# Patient Record
Sex: FEMALE | Race: WHITE | Employment: FULL TIME | URBAN - METROPOLITAN AREA
[De-identification: names, ages, dates, MRNs, and addresses within clinical notes are randomized per-mention and may not be internally consistent; named-entity substitution may affect disease eponyms.]

---

## 2017-05-03 RX ORDER — LEVOTHYROXINE SODIUM 0.15 MG/1
TABLET ORAL
Qty: 30 TABLET | Refills: 0 | Status: SHIPPED | OUTPATIENT
Start: 2017-05-03 | End: 2017-06-23

## 2017-05-03 NOTE — TELEPHONE ENCOUNTER
LOV 1/28/16 with RTC 1 year. No F/U scheduled. Letter sent to patient requesting she make a follow up appt. 30 day refill pending.

## 2017-05-10 ENCOUNTER — TELEPHONE (OUTPATIENT)
Dept: ENDOCRINOLOGY CLINIC | Facility: CLINIC | Age: 57
End: 2017-05-10

## 2017-05-10 DIAGNOSIS — E89.0 POSTOPERATIVE HYPOTHYROIDISM: Primary | ICD-10-CM

## 2017-05-10 NOTE — TELEPHONE ENCOUNTER
[facility] and let her know she will need bloodwork and a thyroid Ultrasound prior to OV. Provided her number for scheduling US and orders placed.

## 2017-05-10 NOTE — TELEPHONE ENCOUNTER
Pt requesting a call back to advise if she needs to have blood test prior to OV, Please Advise. Per pt ok to LV.

## 2017-05-20 ENCOUNTER — APPOINTMENT (OUTPATIENT)
Dept: LAB | Facility: HOSPITAL | Age: 57
End: 2017-05-20
Attending: INTERNAL MEDICINE
Payer: COMMERCIAL

## 2017-05-20 ENCOUNTER — HOSPITAL ENCOUNTER (OUTPATIENT)
Dept: ULTRASOUND IMAGING | Facility: HOSPITAL | Age: 57
Discharge: HOME | End: 2017-05-20
Attending: INTERNAL MEDICINE
Payer: COMMERCIAL

## 2017-05-20 DIAGNOSIS — E89.0 POSTOPERATIVE HYPOTHYROIDISM: ICD-10-CM

## 2017-05-20 PROCEDURE — 76536 US EXAM OF HEAD AND NECK: CPT | Performed by: INTERNAL MEDICINE

## 2017-05-20 PROCEDURE — 36415 COLL VENOUS BLD VENIPUNCTURE: CPT

## 2017-05-20 PROCEDURE — 84432 ASSAY OF THYROGLOBULIN: CPT

## 2017-05-20 PROCEDURE — 84439 ASSAY OF FREE THYROXINE: CPT

## 2017-05-20 PROCEDURE — 86800 THYROGLOBULIN ANTIBODY: CPT

## 2017-05-20 PROCEDURE — 84443 ASSAY THYROID STIM HORMONE: CPT

## 2017-06-08 ENCOUNTER — TELEPHONE (OUTPATIENT)
Dept: ENDOCRINOLOGY CLINIC | Facility: CLINIC | Age: 57
End: 2017-06-08

## 2017-06-08 ENCOUNTER — OFFICE VISIT (OUTPATIENT)
Dept: ENDOCRINOLOGY CLINIC | Facility: CLINIC | Age: 57
End: 2017-06-08

## 2017-06-08 VITALS
WEIGHT: 181.19 LBS | BODY MASS INDEX: 32.11 KG/M2 | DIASTOLIC BLOOD PRESSURE: 82 MMHG | HEART RATE: 76 BPM | HEIGHT: 63 IN | SYSTOLIC BLOOD PRESSURE: 134 MMHG

## 2017-06-08 DIAGNOSIS — E03.8 OTHER SPECIFIED HYPOTHYROIDISM: ICD-10-CM

## 2017-06-08 DIAGNOSIS — C73 THYROID CANCER (HCC): Primary | ICD-10-CM

## 2017-06-08 PROCEDURE — 99212 OFFICE O/P EST SF 10 MIN: CPT | Performed by: INTERNAL MEDICINE

## 2017-06-08 PROCEDURE — 99214 OFFICE O/P EST MOD 30 MIN: CPT | Performed by: INTERNAL MEDICINE

## 2017-06-08 RX ORDER — CLONAZEPAM 0.5 MG/1
0.5 TABLET, ORALLY DISINTEGRATING ORAL 2 TIMES DAILY PRN
COMMUNITY
End: 2021-10-12

## 2017-06-08 NOTE — TELEPHONE ENCOUNTER
Patient needs WBS per [facility] to monitor for thyroid cancer recurrence. Patient is concerned about cost of scan given high deductible plan. As she is hesitant to complete scan, I contacted her [employer] to check benefits.      Spoke with representative and

## 2017-06-08 NOTE — PROGRESS NOTES
Name: Zina Jones  Date: 6/8/2017    Referring Physician: No ref.  provider found    Patient presents with:  Thyroid Problem      HISTORY OF PRESENT ILLNESS   Zina Jones is a 65year old female who presents for Patient presents with:  Thyroid Problem normal moisturized skin    Medications:     Current outpatient prescriptions:   •  ClonazePAM 0.5 MG Oral Tablet Dispersible, Take 0.5 mg by mouth 2 (two) times daily as needed. , Disp: , Rfl:   •  LEVOTHYROXINE SODIUM 150 MCG Oral Tab, TAKE 1 TABLET BY BRAD Appearance:  Alert, in no acute distress, well developed  Eyes: normal conjunctivae, sclera.   Ears/Nose/Mouth/Throat/Neck:  normal hearing, normal speech and absent thyroid gland, no LNs  Neurologic: sensory grossly intact and motor grossly intact  Respira

## 2017-06-09 ENCOUNTER — TELEPHONE (OUTPATIENT)
Dept: OTOLARYNGOLOGY | Facility: CLINIC | Age: 57
End: 2017-06-09

## 2017-06-09 NOTE — TELEPHONE ENCOUNTER
Spoke w/ pt; informed of JDO's feedback and recommendation to RTC if surgical management is recommended. Pt verbalized understanding. Appt for Monday cxl'd.

## 2017-06-09 NOTE — TELEPHONE ENCOUNTER
Contacted also Price estimates department with central scheduling here at the [facility]. Cost of scan is $2,732.00. Patient will for sure owe $208.00 toward rest of deductible and then 20% of rest of cost which would be around $504.  Patient will likely

## 2017-06-09 NOTE — TELEPHONE ENCOUNTER
Martina Levy MD  [facility] Clinical Staff                     Please let her know that Dr. Mims Bolds contacted me regarding her recent [address] of the thyroid.  I would like for her to RTC to discuss this finding and to discuss her upcoming nuclear scan study.  Ple

## 2017-06-09 NOTE — TELEPHONE ENCOUNTER
She has lymph nodes in both side of her neck that may represent recurrence of her previous thyroid cancer.   If the nuclear scan suggests recurrence she may be able to get more radioactive iodine but surgery may be an alternative and in fact may be recommen

## 2017-06-12 ENCOUNTER — TELEPHONE (OUTPATIENT)
Dept: ENDOCRINOLOGY CLINIC | Facility: CLINIC | Age: 57
End: 2017-06-12

## 2017-06-12 NOTE — TELEPHONE ENCOUNTER
Pt calling to let  know that no one from hospital has gotten back to her yet re the body scan - she called them twice

## 2017-06-12 NOTE — TELEPHONE ENCOUNTER
Per chart, patient was called with pricing information on Friday. Patient states she would like to schedule the procedure but is having trouble doing so with central scheduling. Called central scheduling.  They state that they do not scheduling the [address] dianna

## 2017-06-19 ENCOUNTER — TELEPHONE (OUTPATIENT)
Dept: ENDOCRINOLOGY CLINIC | Facility: CLINIC | Age: 57
End: 2017-06-19

## 2017-06-19 NOTE — TELEPHONE ENCOUNTER
Spoke with patient. Having difficulty understanding what she needs.  She states she spoke with Dr. Raylene Severin to schedule her WBS and he said he was going to send the instructions for the diet and preparation to her [facility] account but she did not receive anyt

## 2017-06-19 NOTE — TELEPHONE ENCOUNTER
Refer to TE 6/12/17 Pt is requesting to have body scan results and updated medication list to be documented into [employer]. Pt stated that in order for her to see Dr. Sarah Aparicio she needs this information. For additional questions Please Call.  Thank you

## 2017-06-23 RX ORDER — LEVOTHYROXINE SODIUM 0.15 MG/1
TABLET ORAL
Qty: 30 TABLET | Refills: 5 | Status: SHIPPED | OUTPATIENT
Start: 2017-06-23 | End: 2018-02-10

## 2017-07-06 ENCOUNTER — HOSPITAL ENCOUNTER (OUTPATIENT)
Dept: NUCLEAR MEDICINE | Facility: HOSPITAL | Age: 57
Discharge: HOME | End: 2017-07-06
Attending: INTERNAL MEDICINE
Payer: COMMERCIAL

## 2017-07-06 DIAGNOSIS — C73 THYROID CANCER (HCC): ICD-10-CM

## 2017-07-06 NOTE — PROGRESS NOTES
Thyrogen injection:  First dose thyrogen injection 0.9mg/ml given IM to the Left buttock area. Thyrogen dose- Exp. Oct 2017. Lot# [id number]. Patient instructed to come back for next dose in am at 0730.   Patient will wait for 15 minutes in the lounge, post

## 2017-07-07 ENCOUNTER — HOSPITAL ENCOUNTER (OUTPATIENT)
Dept: NUCLEAR MEDICINE | Facility: HOSPITAL | Age: 57
Discharge: HOME | End: 2017-07-07
Attending: INTERNAL MEDICINE
Payer: COMMERCIAL

## 2017-07-08 ENCOUNTER — HOSPITAL ENCOUNTER (OUTPATIENT)
Dept: NUCLEAR MEDICINE | Facility: HOSPITAL | Age: 57
Discharge: HOME | End: 2017-07-08
Attending: INTERNAL MEDICINE
Payer: COMMERCIAL

## 2017-07-10 ENCOUNTER — TELEPHONE (OUTPATIENT)
Dept: ENDOCRINOLOGY CLINIC | Facility: CLINIC | Age: 57
End: 2017-07-10

## 2017-07-10 ENCOUNTER — HOSPITAL ENCOUNTER (OUTPATIENT)
Dept: NUCLEAR MEDICINE | Facility: HOSPITAL | Age: 57
Discharge: HOME | End: 2017-07-10
Attending: INTERNAL MEDICINE
Payer: COMMERCIAL

## 2017-07-10 PROCEDURE — 78020 THYROID MET UPTAKE: CPT | Performed by: INTERNAL MEDICINE

## 2017-07-10 PROCEDURE — 78018 THYROID MET IMAGING BODY: CPT | Performed by: INTERNAL MEDICINE

## 2017-07-10 NOTE — TELEPHONE ENCOUNTER
Pt called for clarification for what she needs to do, make appt. ? Or you will advise if Dr. Barbara Cantu needs to see her. Pt had one more lab work today, pls call at: [phone], thanks.

## 2017-07-10 NOTE — TELEPHONE ENCOUNTER
No need to make appointment. Her WBS scan was normal and no evidence of cancer recurrence. The blood work is still pending and can take a week to result. We will contact her when I have the result.   Thanks

## 2017-07-10 NOTE — TELEPHONE ENCOUNTER
[facility] and let her know per [facility] no need to make appt at this time. Discussed normal WBS and let her know bloodwork pending but we will call when we get results.

## 2017-07-13 ENCOUNTER — TELEPHONE (OUTPATIENT)
Dept: ENDOCRINOLOGY CLINIC | Facility: CLINIC | Age: 57
End: 2017-07-13

## 2017-07-13 DIAGNOSIS — C73 THYROID CANCER (HCC): Primary | ICD-10-CM

## 2017-07-13 NOTE — PROGRESS NOTES
Please let her know that I am aware and very happy that her nuclear scan showed no recurrence of her thyroid cancer. I would like for her to be aware that these lymph nodes seen in the ultrasound were noted to be abnormal in appearance.   It might not be a

## 2017-07-13 NOTE — TELEPHONE ENCOUNTER
Discussed case with PCP, Dr. Trey David. Dr. Shaneka Prado I am sending her for PET/CT and then will likely also ask her to follow up with you. Thanks.

## 2017-07-15 ENCOUNTER — TELEPHONE (OUTPATIENT)
Dept: OTOLARYNGOLOGY | Facility: CLINIC | Age: 57
End: 2017-07-15

## 2017-07-15 NOTE — TELEPHONE ENCOUNTER
Jory Monreal MD at 7/12/2017 10:12 PM     Status: Signed      Please let her know that I am aware and very happy that her nuclear scan showed no recurrence of her thyroid cancer.  I would like for her to be aware that these lymph nodes seen in the [facility]

## 2017-07-15 NOTE — TELEPHONE ENCOUNTER
Patient informed of the results, verbalized understanding. Patient states has PET scan scheduled this Wednesday 7-19-17, ordered by Dr. Valeria Odom. Patient states after scan, will schedule an appointment with DAT.  Since patient is having PET scan, will you want

## 2017-07-19 ENCOUNTER — HOSPITAL ENCOUNTER (OUTPATIENT)
Dept: NUCLEAR MEDICINE | Facility: HOSPITAL | Age: 57
Discharge: HOME | End: 2017-07-19
Attending: INTERNAL MEDICINE
Payer: COMMERCIAL

## 2017-07-19 DIAGNOSIS — C73 THYROID CANCER (HCC): ICD-10-CM

## 2017-07-19 LAB — GLUCOSE BLDC GLUCOMTR-MCNC: 118 MG/DL (ref 70–99)

## 2017-07-19 PROCEDURE — 82962 GLUCOSE BLOOD TEST: CPT

## 2017-07-19 PROCEDURE — 78815 PET IMAGE W/CT SKULL-THIGH: CPT | Performed by: INTERNAL MEDICINE

## 2017-07-20 ENCOUNTER — TELEPHONE (OUTPATIENT)
Dept: ENDOCRINOLOGY CLINIC | Facility: CLINIC | Age: 57
End: 2017-07-20

## 2017-07-20 ENCOUNTER — OFFICE VISIT (OUTPATIENT)
Dept: OTOLARYNGOLOGY | Facility: CLINIC | Age: 57
End: 2017-07-20

## 2017-07-20 VITALS
HEIGHT: 63 IN | SYSTOLIC BLOOD PRESSURE: 132 MMHG | TEMPERATURE: 98 F | WEIGHT: 177 LBS | BODY MASS INDEX: 31.36 KG/M2 | DIASTOLIC BLOOD PRESSURE: 72 MMHG

## 2017-07-20 DIAGNOSIS — C73 THYROID CANCER (HCC): Primary | ICD-10-CM

## 2017-07-20 PROCEDURE — 99212 OFFICE O/P EST SF 10 MIN: CPT | Performed by: OTOLARYNGOLOGY

## 2017-07-20 PROCEDURE — 99243 OFF/OP CNSLTJ NEW/EST LOW 30: CPT | Performed by: OTOLARYNGOLOGY

## 2017-07-20 NOTE — TELEPHONE ENCOUNTER
Called patient to discuss positive PET scan, she has bilateral lymph nodes although unclear on scan if recurrent thyroid cancer vs. Infection. Although concerned for recurrent thyroid cancer given elevated TG level.   Refer to Dr. Danette Rosenbaum for possible surgi

## 2017-07-20 NOTE — PROGRESS NOTES
Miguelina Driscoll is a 65year old female. 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  At the time of surgery in Drug use: No            Other Topics            Concern  Caffeine Concern        Yes    Comment:1 cup of coffee         Family History   Problem Relation Age of Onset   • Heart Disorder Mother    • Ovarian Cancer Neg    • Breast Cancer Neg        Past Medi Normal Inspection - Normal. Palpation - Normal. Parotid gland - Normal. Thyroid gland -surgically absent   Eyes Normal Conjunctiva - Right: Normal, Left: Normal. Pupil - Right: Normal, Left: Normal. Fundus - Right: Normal, Left: Normal.   Neurological Norm were suspicious even back in May. She does understand that she may require surgery and/or further radioablation. I will call her after my discussions with Dr. Georgiana Day. Timur Burrows.  Bolivar Longo MD    7/20/2017    6:52 PM

## 2017-07-26 ENCOUNTER — TELEPHONE (OUTPATIENT)
Dept: OTOLARYNGOLOGY | Facility: CLINIC | Age: 57
End: 2017-07-26

## 2017-07-26 DIAGNOSIS — C73 THYROID CANCER (HCC): Primary | ICD-10-CM

## 2017-07-26 NOTE — TELEPHONE ENCOUNTER
Please let her know that I have spoken with Devika Pulido and have discussed her case and both have reached the same conclusion that she should have these two suspicious nodes biopsied with ultrasound guidance in order to make sure that these nodes are abnorm

## 2017-07-27 NOTE — TELEPHONE ENCOUNTER
Informed the patient of Dr. Sabina Orozco  Recommendations, The patient had some questions.  The patient would like to know if they want to biopsy 2 or 3 nodes, she was under the impression there were 3, also wanted to know if he is basically checking to see if

## 2017-08-18 ENCOUNTER — HOSPITAL ENCOUNTER (OUTPATIENT)
Dept: ULTRASOUND IMAGING | Facility: HOSPITAL | Age: 57
Discharge: HOME | End: 2017-08-18
Attending: OTOLARYNGOLOGY
Payer: COMMERCIAL

## 2017-08-18 VITALS
HEART RATE: 63 BPM | DIASTOLIC BLOOD PRESSURE: 79 MMHG | SYSTOLIC BLOOD PRESSURE: 142 MMHG | RESPIRATION RATE: 16 BRPM | OXYGEN SATURATION: 98 %

## 2017-08-18 DIAGNOSIS — C73 THYROID CANCER (HCC): ICD-10-CM

## 2017-08-18 PROCEDURE — 88173 CYTOPATH EVAL FNA REPORT: CPT | Performed by: OTOLARYNGOLOGY

## 2017-08-18 PROCEDURE — 88305 TISSUE EXAM BY PATHOLOGIST: CPT | Performed by: OTOLARYNGOLOGY

## 2017-08-18 PROCEDURE — 88172 CYTP DX EVAL FNA 1ST EA SITE: CPT | Performed by: OTOLARYNGOLOGY

## 2017-08-18 PROCEDURE — 76942 ECHO GUIDE FOR BIOPSY: CPT | Performed by: OTOLARYNGOLOGY

## 2017-08-18 PROCEDURE — 88342 IMHCHEM/IMCYTCHM 1ST ANTB: CPT | Performed by: OTOLARYNGOLOGY

## 2017-08-18 PROCEDURE — 10022 US FINE NEEDLE ASPIRATION W GUIDE(CPT=10022/76942): CPT | Performed by: OTOLARYNGOLOGY

## 2017-08-18 PROCEDURE — 88177 CYTP FNA EVAL EA ADDL: CPT | Performed by: OTOLARYNGOLOGY

## 2017-08-18 PROCEDURE — 88341 IMHCHEM/IMCYTCHM EA ADD ANTB: CPT | Performed by: OTOLARYNGOLOGY

## 2017-08-18 NOTE — PROCEDURES
[facility]  Procedure Note    Amelia Turner Patient Status:  Outpatient    1960 MRN [id number]   Location [address] [facility] Attending Darya Bradley MD   Hosp Day # 0 PCP Olinda Mittal MD, John Boone MD     Procedure

## 2017-08-18 NOTE — IMAGING NOTE
PT ARRIVED TO ROOM 3   SCANS BY Ray BAÑUELOS TAKEN PROCEDURE EXPLAINED QUESTIONS ANSWERED  Abnormal bilateral neck lymph nodes. History of thyroid cancer, hypothyroidism.  In 2010, thyriodectomy    PT CONSENTED AT [address] [place] [address]

## 2017-08-23 ENCOUNTER — TELEPHONE (OUTPATIENT)
Dept: OTOLARYNGOLOGY | Facility: CLINIC | Age: 57
End: 2017-08-23

## 2017-08-23 NOTE — TELEPHONE ENCOUNTER
Pt notified of results and recommendations. Verbalizes understanding and denies questions at this time. See result note documentation.

## 2017-08-29 ENCOUNTER — OFFICE VISIT (OUTPATIENT)
Dept: OTOLARYNGOLOGY | Facility: CLINIC | Age: 57
End: 2017-08-29

## 2017-08-29 VITALS
HEIGHT: 63 IN | TEMPERATURE: 98 F | WEIGHT: 177 LBS | BODY MASS INDEX: 31.36 KG/M2 | SYSTOLIC BLOOD PRESSURE: 118 MMHG | DIASTOLIC BLOOD PRESSURE: 62 MMHG

## 2017-08-29 DIAGNOSIS — C73 THYROID CANCER (HCC): Primary | ICD-10-CM

## 2017-08-29 PROCEDURE — 99214 OFFICE O/P EST MOD 30 MIN: CPT | Performed by: OTOLARYNGOLOGY

## 2017-08-29 PROCEDURE — 99212 OFFICE O/P EST SF 10 MIN: CPT | Performed by: OTOLARYNGOLOGY

## 2017-08-29 NOTE — PROGRESS NOTES
Iqra Love is a 65year old female. Patient presents with:   Follow - Up: patient presents for f/u on lymph node cytology   Sinus Problem: also has been having nasal allergies itching has not taken any medication for the problem      HISTORY OF PRE time.    Social History    Marital status:              Spouse name:                       Years of education:                 Number of children:               Social History Main Topics    Smoking status: Never Smoker Hema/Lymph Negative Easy bleeding and easy bruising.            PHYSICAL EXAM    /62 (BP Location: Left arm, Patient Position: Sitting, Cuff Size: large)   Temp 98 °F (36.7 °C) (Tympanic)   Ht 5' 3\" (1.6 m)   Wt 177 lb (80.3 kg)   BMI 31.35 kg/m² Disp: , Rfl:   •  Nebivolol HCl (BYSTOLIC) 5 MG Oral Tab, Take 5 mg by mouth daily. , Disp: , Rfl:   ASSESSMENT AND PLAN    1. Thyroid cancer ([facility] [address]  We discussed the results of her pathology which demonstrated 2 benign lymph nodes.   At this time I have greta

## 2017-09-01 ENCOUNTER — TELEPHONE (OUTPATIENT)
Dept: OTOLARYNGOLOGY | Facility: CLINIC | Age: 57
End: 2017-09-01

## 2017-09-01 NOTE — TELEPHONE ENCOUNTER
Kristen Macias MD  [facility] Clinical Staff             Please place her name and our thyroid book.  She requires a repeat ultrasound of the thyroid specifically looking for lymphadenopathy.  This should be performed in 6 months.

## 2018-02-12 RX ORDER — LEVOTHYROXINE SODIUM 0.15 MG/1
TABLET ORAL
Qty: 30 TABLET | Refills: 0 | Status: SHIPPED | OUTPATIENT
Start: 2018-02-12 | End: 2018-03-12

## 2018-03-12 RX ORDER — LEVOTHYROXINE SODIUM 0.15 MG/1
TABLET ORAL
Qty: 30 TABLET | Refills: 2 | Status: SHIPPED | OUTPATIENT
Start: 2018-03-12 | End: 2018-07-17

## 2018-03-28 ENCOUNTER — TELEPHONE (OUTPATIENT)
Dept: OTOLARYNGOLOGY | Facility: CLINIC | Age: 58
End: 2018-03-28

## 2018-03-28 DIAGNOSIS — C73 THYROID CANCER (HCC): Primary | ICD-10-CM

## 2018-03-31 NOTE — TELEPHONE ENCOUNTER
Pt informed she is due for her repeat US THYROID. Pt verbalized understanding; phone number to CS given to pt. She states she will call to schedule an appt.

## 2018-04-16 NOTE — TELEPHONE ENCOUNTER
SHAYLATCTAMAR. Pt is due for repeat US THYROID and she may contact CS to schedule, [phone]. Please inform pt.

## 2018-04-27 NOTE — TELEPHONE ENCOUNTER
Dr. Ramy Stout, ELIN:  Office has spoken with pt and LM several times to remind pt to schedule US THYROID. Pt has not scheduled yet.

## 2018-07-17 RX ORDER — LEVOTHYROXINE SODIUM 0.15 MG/1
TABLET ORAL
Qty: 30 TABLET | Refills: 0 | Status: SHIPPED | OUTPATIENT
Start: 2018-07-17 | End: 2018-08-27

## 2018-07-17 NOTE — TELEPHONE ENCOUNTER
LOV 6/8/17. RTC 3 months. NO F/U scheduled. Letter sent that patient is due for an appt. 1 month refill sent per [facility] protocol.

## 2018-08-27 RX ORDER — LEVOTHYROXINE SODIUM 0.15 MG/1
TABLET ORAL
Qty: 30 TABLET | Refills: 0 | Status: SHIPPED | OUTPATIENT
Start: 2018-08-27 | End: 2018-10-02

## 2018-08-27 NOTE — TELEPHONE ENCOUNTER
LOV 6/8/17. RTC 3 months. No F/U scheduled. Letter sent 7/17/18. Called Heike Ferrari. ROGERS that she is due for an appt. 1 month refill pending.

## 2018-09-13 ENCOUNTER — HOSPITAL ENCOUNTER (OUTPATIENT)
Dept: ULTRASOUND IMAGING | Facility: HOSPITAL | Age: 58
Discharge: HOME | End: 2018-09-13
Attending: PODIATRIST
Payer: COMMERCIAL

## 2018-09-13 DIAGNOSIS — M77.42 METATARSALGIA OF BOTH FEET: ICD-10-CM

## 2018-09-13 DIAGNOSIS — M77.41 METATARSALGIA OF BOTH FEET: ICD-10-CM

## 2018-09-13 PROCEDURE — 76881 US COMPL JOINT R-T W/IMG: CPT | Performed by: PODIATRIST

## 2018-10-03 RX ORDER — LEVOTHYROXINE SODIUM 0.15 MG/1
TABLET ORAL
Qty: 30 TABLET | Refills: 2 | Status: SHIPPED | OUTPATIENT
Start: 2018-10-03 | End: 2018-12-06

## 2018-10-29 ENCOUNTER — TELEPHONE (OUTPATIENT)
Dept: ENDOCRINOLOGY CLINIC | Facility: CLINIC | Age: 58
End: 2018-10-29

## 2018-10-29 DIAGNOSIS — Z85.850 HISTORY OF THYROID CANCER: Primary | ICD-10-CM

## 2018-10-29 NOTE — TELEPHONE ENCOUNTER
Pt cancelled apt on 10/31 due to illness and rescheduled 12/6. Pt would like to know if you would like any labs before apt on 12/6. Please advise.

## 2018-10-29 NOTE — TELEPHONE ENCOUNTER
LMTCB- needs yearly follow up so if chooses to cancel on Wednesday will need to reschedule next available for yearly follow up.

## 2018-10-29 NOTE — TELEPHONE ENCOUNTER
Pt states she currently has a cold and inquiring why SH would like to see her for appt scheduled on 10/31/18. Pt prefers to cancel appt due to being sick.  Please call thank you [phone]

## 2018-12-03 ENCOUNTER — APPOINTMENT (OUTPATIENT)
Dept: LAB | Age: 58
End: 2018-12-03
Attending: INTERNAL MEDICINE
Payer: COMMERCIAL

## 2018-12-03 DIAGNOSIS — Z85.850 HISTORY OF THYROID CANCER: ICD-10-CM

## 2018-12-03 PROCEDURE — 84439 ASSAY OF FREE THYROXINE: CPT

## 2018-12-03 PROCEDURE — 84443 ASSAY THYROID STIM HORMONE: CPT

## 2018-12-03 PROCEDURE — 86800 THYROGLOBULIN ANTIBODY: CPT

## 2018-12-03 PROCEDURE — 36415 COLL VENOUS BLD VENIPUNCTURE: CPT

## 2018-12-03 PROCEDURE — 84432 ASSAY OF THYROGLOBULIN: CPT

## 2018-12-06 ENCOUNTER — OFFICE VISIT (OUTPATIENT)
Dept: ENDOCRINOLOGY CLINIC | Facility: CLINIC | Age: 58
End: 2018-12-06
Payer: COMMERCIAL

## 2018-12-06 VITALS
DIASTOLIC BLOOD PRESSURE: 82 MMHG | SYSTOLIC BLOOD PRESSURE: 128 MMHG | WEIGHT: 175.38 LBS | HEART RATE: 61 BPM | BODY MASS INDEX: 31 KG/M2

## 2018-12-06 DIAGNOSIS — E89.0 POSTOPERATIVE HYPOTHYROIDISM: Primary | ICD-10-CM

## 2018-12-06 PROCEDURE — 99213 OFFICE O/P EST LOW 20 MIN: CPT | Performed by: INTERNAL MEDICINE

## 2018-12-06 PROCEDURE — 99212 OFFICE O/P EST SF 10 MIN: CPT | Performed by: INTERNAL MEDICINE

## 2018-12-06 RX ORDER — LEVOTHYROXINE SODIUM 0.15 MG/1
TABLET ORAL
Qty: 90 TABLET | Refills: 3 | Status: SHIPPED | OUTPATIENT
Start: 2018-12-06

## 2018-12-06 RX ORDER — FAMOTIDINE 40 MG/1
40 TABLET, FILM COATED ORAL AS NEEDED
Refills: 0 | COMMUNITY
Start: 2018-10-10

## 2018-12-06 NOTE — PROGRESS NOTES
Name: Michele Kennedy  Date: 12/6/2018    Referring Physician: No ref. provider found    Patient presents with:   Follow - Up: Thyroid      HISTORY OF PRESENT ILLNESS   Michele Kennedy is a 63year old female who presents for Patient presents with:  F normal moisturized skin    Medications:     Current Outpatient Medications:   •  famotidine 40 MG Oral Tab, 40 mg as needed. , Disp: , Rfl: 0  •  LEVOTHYROXINE SODIUM 150 MCG Oral Tab, TAKE 1 TABLET BY MOUTH BEFORE BREAKFAST, Disp: 30 tablet, Rfl: 2  •  ome hr labor    •       40 week 7 lb(s) 3 oz Female   •       41 week 9 lb(s) 8 oz Male   • OTHER SURGICAL HISTORY      Injection Tendon Sheath, Ligament   • THYROIDECTOMY      total       PHYSICAL EXAMINATION  /82   Pulse 61   Wt

## 2020-10-19 ENCOUNTER — APPOINTMENT (OUTPATIENT)
Dept: GENERAL RADIOLOGY | Facility: HOSPITAL | Age: 60
End: 2020-10-19
Attending: EMERGENCY MEDICINE
Payer: COMMERCIAL

## 2020-10-19 ENCOUNTER — APPOINTMENT (OUTPATIENT)
Dept: CT IMAGING | Facility: HOSPITAL | Age: 60
End: 2020-10-19
Attending: EMERGENCY MEDICINE
Payer: COMMERCIAL

## 2020-10-19 ENCOUNTER — HOSPITAL ENCOUNTER (EMERGENCY)
Facility: HOSPITAL | Age: 60
Discharge: HOME | End: 2020-10-19
Attending: EMERGENCY MEDICINE
Payer: COMMERCIAL

## 2020-10-19 ENCOUNTER — HOSPITAL ENCOUNTER (OUTPATIENT)
Age: 60
Discharge: ANOTHER HOSPITAL | End: 2020-10-19
Attending: EMERGENCY MEDICINE
Payer: COMMERCIAL

## 2020-10-19 VITALS
DIASTOLIC BLOOD PRESSURE: 72 MMHG | HEART RATE: 75 BPM | SYSTOLIC BLOOD PRESSURE: 138 MMHG | TEMPERATURE: 99 F | BODY MASS INDEX: 31.89 KG/M2 | WEIGHT: 180 LBS | RESPIRATION RATE: 18 BRPM | OXYGEN SATURATION: 99 % | HEIGHT: 63 IN

## 2020-10-19 VITALS
TEMPERATURE: 98 F | DIASTOLIC BLOOD PRESSURE: 73 MMHG | RESPIRATION RATE: 16 BRPM | WEIGHT: 180 LBS | HEIGHT: 63 IN | SYSTOLIC BLOOD PRESSURE: 147 MMHG | OXYGEN SATURATION: 100 % | BODY MASS INDEX: 31.89 KG/M2 | HEART RATE: 73 BPM

## 2020-10-19 DIAGNOSIS — S09.90XA INJURY OF HEAD, INITIAL ENCOUNTER: Primary | ICD-10-CM
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
## 2020-10-19 PROCEDURE — 72220 X-RAY EXAM SACRUM TAILBONE: CPT | Performed by: EMERGENCY MEDICINE

## 2020-10-19 PROCEDURE — 72125 CT NECK SPINE W/O DYE: CPT | Performed by: EMERGENCY MEDICINE

## 2020-10-19 PROCEDURE — 93010 ELECTROCARDIOGRAM REPORT: CPT | Performed by: EMERGENCY MEDICINE

## 2020-10-19 PROCEDURE — 99284 EMERGENCY DEPT VISIT MOD MDM: CPT

## 2020-10-19 PROCEDURE — 73080 X-RAY EXAM OF ELBOW: CPT | Performed by: EMERGENCY MEDICINE

## 2020-10-19 PROCEDURE — 99204 OFFICE O/P NEW MOD 45 MIN: CPT | Performed by: EMERGENCY MEDICINE

## 2020-10-19 PROCEDURE — 72100 X-RAY EXAM L-S SPINE 2/3 VWS: CPT | Performed by: EMERGENCY MEDICINE

## 2020-10-19 PROCEDURE — 70450 CT HEAD/BRAIN W/O DYE: CPT | Performed by: EMERGENCY MEDICINE

## 2020-10-19 PROCEDURE — 93005 ELECTROCARDIOGRAM TRACING: CPT

## 2020-10-19 RX ORDER — IBUPROFEN 600 MG/1
600 TABLET ORAL ONCE
Status: COMPLETED | OUTPATIENT
Start: 2020-10-19 | End: 2020-10-19

## 2020-10-19 RX ORDER — ONDANSETRON 4 MG/1
4 TABLET, ORALLY DISINTEGRATING ORAL ONCE
Status: COMPLETED | OUTPATIENT
Start: 2020-10-19 | End: 2020-10-19

## 2020-10-19 NOTE — ED INITIAL ASSESSMENT (HPI)
Pt came in for mechanical fall 2330 last night while walking on wet street. Hit back of he head, buttocks, and left elbow. Sent from [facility] for CT. RR even and nonlabored, speaking in full sentences, ambulatory slow gait.  No blood thinners, witnessed fall, de

## 2020-10-19 NOTE — ED PROVIDER NOTES
Patient Seen in: [facility]    History   Patient presents with:  Fall    Stated Complaint: fell/inj head/back/lt elbow/buttocks    HPI    Patient here with complaint of head injury.   Injury occurred last night at 11:30 PM.  Patient denies [place] Cancer Neg    • Breast Cancer Neg      Family history reviewed with patient/caregiver and is not pertinent to presenting problem. Social History    Tobacco Use      Smoking status: Never Smoker      Smokeless tobacco: Never Used    Alcohol use:  Yes head, initial encounter  (primary encounter diagnosis)    Disposition: Ic to ed    Follow-up:  No follow-up provider specified.     Medications Prescribed:  Current Discharge Medication List

## 2020-10-19 NOTE — ED PROVIDER NOTES
Patient Seen in: [facility] AND [facility] Emergency Department      History   Patient presents with:  Fall    Stated Complaint: Post fall, c/o headache, nausea, left elbow and back pain    HPI    81-year-old female presenting for evaluation after a fall.   Sara Rodriguez (37 °C)   Temp src Oral   SpO2 98 %   O2 Device None (Room air)       Current:/75   Pulse 72   Temp 98.6 °F (37 °C) (Oral)   Resp 16   Ht 160 cm (5' 3\")   Wt 81.6 kg   SpO2 98%   BMI 31.89 kg/m²         Physical Exam  Vitals signs and nursing note r Dictated by (CST): Francisco Choudhury MD on 10/19/2020 at 12:18 PM     Finalized by (CST): Francisco Choudhury MD on 10/19/2020 at 12:21 PM          Xr Elbow, Complete (min 3 Views), Left (cpt=73080)    Result Date: 10/19/2020  CONCLUSION:   Normal x-rays of the follow-up with her PMD.  Return precautions given she is comfortable with the plan.               Disposition and Plan     Clinical Impression:  Subgaleal hemorrhage  (primary encounter diagnosis)  Fall, initial encounter    Disposition:  Discharge  10/19/2

## 2020-10-19 NOTE — ED NOTES
Pt states last night at 2330 was out walking and fell, landing on back, hitting lt elbow and back of head. Denies LOC.  states she either tripped on her own feet or slipped on wet leaves.  States bump/tingling to rt posterior head and bruising to lt

## 2020-10-19 NOTE — ED NOTES
Pt sent to [address] [place] [address] ED for further testing and management. Pt's  driving patient to ED.

## 2020-10-19 NOTE — ED INITIAL ASSESSMENT (HPI)
Pt presents with pain on left elbow, back and back of head due to a fall yesterday.  Pt nauseous now

## 2021-02-16 ENCOUNTER — HOSPITAL ENCOUNTER (EMERGENCY)
Facility: HOSPITAL | Age: 61
Discharge: HOME | End: 2021-02-16
Attending: EMERGENCY MEDICINE
Payer: COMMERCIAL

## 2021-02-16 ENCOUNTER — APPOINTMENT (OUTPATIENT)
Dept: CT IMAGING | Facility: HOSPITAL | Age: 61
End: 2021-02-16
Attending: EMERGENCY MEDICINE
Payer: COMMERCIAL

## 2021-02-16 ENCOUNTER — APPOINTMENT (OUTPATIENT)
Dept: GENERAL RADIOLOGY | Facility: HOSPITAL | Age: 61
End: 2021-02-16
Attending: EMERGENCY MEDICINE
Payer: COMMERCIAL

## 2021-02-16 VITALS
OXYGEN SATURATION: 94 % | TEMPERATURE: 100 F | RESPIRATION RATE: 25 BRPM | DIASTOLIC BLOOD PRESSURE: 72 MMHG | BODY MASS INDEX: 29.95 KG/M2 | HEART RATE: 71 BPM | SYSTOLIC BLOOD PRESSURE: 141 MMHG | HEIGHT: 63 IN | WEIGHT: 169 LBS

## 2021-02-16 DIAGNOSIS — J12.82 PNEUMONIA DUE TO COVID-19 VIRUS: Primary | ICD-10-CM

## 2021-02-16 DIAGNOSIS — U07.1 PNEUMONIA DUE TO COVID-19 VIRUS: Primary | ICD-10-CM

## 2021-02-16 LAB
ANION GAP SERPL CALC-SCNC: 6 MMOL/L (ref 0–18)
BASOPHILS # BLD AUTO: 0.02 X10(3) UL (ref 0–0.2)
BASOPHILS NFR BLD AUTO: 0.2 %
BUN BLD-MCNC: 11 MG/DL (ref 7–18)
BUN/CREAT SERPL: 11.8 (ref 10–20)
CALCIUM BLD-MCNC: 8.3 MG/DL (ref 8.5–10.1)
CHLORIDE SERPL-SCNC: 107 MMOL/L (ref 98–112)
CO2 SERPL-SCNC: 27 MMOL/L (ref 21–32)
CREAT BLD-MCNC: 0.93 MG/DL
D DIMER PPP FEU-MCNC: 0.69 UG/ML FEU (ref ?–0.59)
DEPRECATED RDW RBC AUTO: 45.8 FL (ref 35.1–46.3)
EOSINOPHIL # BLD AUTO: 0.05 X10(3) UL (ref 0–0.7)
EOSINOPHIL NFR BLD AUTO: 0.6 %
ERYTHROCYTE [DISTWIDTH] IN BLOOD BY AUTOMATED COUNT: 13.5 % (ref 11–15)
GLUCOSE BLD-MCNC: 104 MG/DL (ref 70–99)
HCT VFR BLD AUTO: 39.3 %
HGB BLD-MCNC: 12.4 G/DL
IMM GRANULOCYTES # BLD AUTO: 0.06 X10(3) UL (ref 0–1)
IMM GRANULOCYTES NFR BLD: 0.7 %
LYMPHOCYTES # BLD AUTO: 1.5 X10(3) UL (ref 1–4)
LYMPHOCYTES NFR BLD AUTO: 18.7 %
MCH RBC QN AUTO: 28.8 PG (ref 26–34)
MCHC RBC AUTO-ENTMCNC: 31.6 G/DL (ref 31–37)
MCV RBC AUTO: 91.2 FL
MONOCYTES # BLD AUTO: 0.53 X10(3) UL (ref 0.1–1)
MONOCYTES NFR BLD AUTO: 6.6 %
NEUTROPHILS # BLD AUTO: 5.86 X10 (3) UL (ref 1.5–7.7)
NEUTROPHILS # BLD AUTO: 5.86 X10(3) UL (ref 1.5–7.7)
NEUTROPHILS NFR BLD AUTO: 73.2 %
OSMOLALITY SERPL CALC.SUM OF ELEC: 290 MOSM/KG (ref 275–295)
PLATELET # BLD AUTO: 252 10(3)UL (ref 150–450)
POTASSIUM SERPL-SCNC: 3.4 MMOL/L (ref 3.5–5.1)
RBC # BLD AUTO: 4.31 X10(6)UL
SODIUM SERPL-SCNC: 140 MMOL/L (ref 136–145)
WBC # BLD AUTO: 8 X10(3) UL (ref 4–11)

## 2021-02-16 PROCEDURE — 96361 HYDRATE IV INFUSION ADD-ON: CPT

## 2021-02-16 PROCEDURE — 71260 CT THORAX DX C+: CPT | Performed by: EMERGENCY MEDICINE

## 2021-02-16 PROCEDURE — 85379 FIBRIN DEGRADATION QUANT: CPT | Performed by: EMERGENCY MEDICINE

## 2021-02-16 PROCEDURE — 93010 ELECTROCARDIOGRAM REPORT: CPT | Performed by: EMERGENCY MEDICINE

## 2021-02-16 PROCEDURE — 96374 THER/PROPH/DIAG INJ IV PUSH: CPT

## 2021-02-16 PROCEDURE — 71045 X-RAY EXAM CHEST 1 VIEW: CPT | Performed by: EMERGENCY MEDICINE

## 2021-02-16 PROCEDURE — 80048 BASIC METABOLIC PNL TOTAL CA: CPT | Performed by: EMERGENCY MEDICINE

## 2021-02-16 PROCEDURE — 99285 EMERGENCY DEPT VISIT HI MDM: CPT

## 2021-02-16 PROCEDURE — 96375 TX/PRO/DX INJ NEW DRUG ADDON: CPT

## 2021-02-16 PROCEDURE — 85025 COMPLETE CBC W/AUTO DIFF WBC: CPT | Performed by: EMERGENCY MEDICINE

## 2021-02-16 PROCEDURE — 99453 REM MNTR PHYSIOL PARAM SETUP: CPT

## 2021-02-16 PROCEDURE — 93005 ELECTROCARDIOGRAM TRACING: CPT

## 2021-02-16 RX ORDER — ONDANSETRON 4 MG/1
4 TABLET, ORALLY DISINTEGRATING ORAL EVERY 8 HOURS PRN
Qty: 20 TABLET | Refills: 0 | Status: SHIPPED | OUTPATIENT
Start: 2021-02-16 | End: 2021-10-12

## 2021-02-16 RX ORDER — LORAZEPAM 2 MG/ML
0.5 INJECTION INTRAMUSCULAR ONCE
Status: COMPLETED | OUTPATIENT
Start: 2021-02-16 | End: 2021-02-16

## 2021-02-16 RX ORDER — ONDANSETRON 2 MG/ML
4 INJECTION INTRAMUSCULAR; INTRAVENOUS ONCE
Status: COMPLETED | OUTPATIENT
Start: 2021-02-16 | End: 2021-02-16

## 2021-02-16 RX ORDER — KETOROLAC TROMETHAMINE 30 MG/ML
30 INJECTION, SOLUTION INTRAMUSCULAR; INTRAVENOUS ONCE
Status: COMPLETED | OUTPATIENT
Start: 2021-02-16 | End: 2021-02-16

## 2021-02-16 NOTE — PATIENT INSTRUCTIONS
Patient Instructions for Home Pulse Oximetry due to COVID-19 Pneumonia  Overview and Terminology  A pulse oximeter a small device that clips on a finger and measures oxygen saturation levels along with your heart rate.  It works by passing small beams of [phone]

## 2021-02-16 NOTE — ED NOTES
Discharge instructions given to pt. Pt verbalized understanding of home care, pulse ox use, incentive spirometer use, and to follow up with pcp. Pt denied further questions or concerns. Pt discharged to lobby via wheelchair, discharged in stable condition.

## 2021-02-16 NOTE — ED INITIAL ASSESSMENT (HPI)
Patient presents to ER with complaints of difficulty breathing, chills and fatigue. Patient COVID positive on 2/8. States her \"breathing has gotten worse\" +cough. Patient is able to speak in full sentences. Shallow breaths due to cough. 96% RA.

## 2021-02-16 NOTE — ED PROVIDER NOTES
Patient Seen in: [facility] AND [facility] Emergency Department    History   Patient presents with:  Shortness Of Breath    Stated Complaint: shortness of breath    HPI    Patient is here with multiple symptoms.   She states she has not felt well for 15 days and famotidine 40 MG Oral Tab,  40 mg as needed.    Levothyroxine Sodium 150 MCG Oral Tab,  TAKE 1 TABLET BY MOUTH BEFORE BREAKFAST   omeprazole (PRILOSEC) 20 MG Oral Capsule Delayed Release,  Take 20 mg by mouth every morning before breakfast.   Olmesartan Med Patient appears anxious. Patient with continued symptoms related to known Covid. She is currently saturating 96% on room air with good waveform.   We will give her IV fluids chest x-ray blood work IV Toradol IV Zofran IV lorazepam    Patient Demar Cueva rhythm    Disposition and Plan     We recommend that you schedule follow up care with a primary care provider within the next three months to obtain basic health screening including reassessment of your blood pressure.       Clinical Impression:  Pneumonia

## 2021-04-13 ENCOUNTER — IMMUNIZATION (OUTPATIENT)
Dept: LAB | Age: 61
End: 2021-04-13
Attending: HOSPITALIST
Payer: COMMERCIAL

## 2021-04-13 DIAGNOSIS — Z23 NEED FOR VACCINATION: Primary | ICD-10-CM

## 2021-04-13 PROCEDURE — 0001A SARSCOV2 VAC 30MCG/0.3ML IM: CPT

## 2021-05-20 ENCOUNTER — HOSPITAL ENCOUNTER (OUTPATIENT)
Dept: ULTRASOUND IMAGING | Facility: HOSPITAL | Age: 61
Discharge: HOME | End: 2021-05-20
Attending: PODIATRIST
Payer: COMMERCIAL

## 2021-05-20 DIAGNOSIS — M77.30 HEEL SPUR, UNSPECIFIED LATERALITY: ICD-10-CM

## 2021-05-20 DIAGNOSIS — M72.2 PLANTAR FASCIITIS, BILATERAL: ICD-10-CM

## 2021-05-20 PROCEDURE — 76881 US COMPL JOINT R-T W/IMG: CPT | Performed by: PODIATRIST

## 2021-07-01 ENCOUNTER — TELEPHONE (OUTPATIENT)
Dept: LAB | Facility: HOSPITAL | Age: 61
End: 2021-07-01

## 2021-09-14 ENCOUNTER — ORDER TRANSCRIPTION (OUTPATIENT)
Dept: ADMINISTRATIVE | Facility: HOSPITAL | Age: 61
End: 2021-09-14

## 2021-09-14 DIAGNOSIS — Z12.31 ENCOUNTER FOR SCREENING MAMMOGRAM FOR MALIGNANT NEOPLASM OF BREAST: Primary | ICD-10-CM

## 2021-10-06 ENCOUNTER — HOSPITAL ENCOUNTER (OUTPATIENT)
Dept: MAMMOGRAPHY | Age: 61
Discharge: HOME | End: 2021-10-06
Attending: INTERNAL MEDICINE
Payer: COMMERCIAL

## 2021-10-06 DIAGNOSIS — Z12.31 ENCOUNTER FOR SCREENING MAMMOGRAM FOR MALIGNANT NEOPLASM OF BREAST: ICD-10-CM

## 2021-10-06 PROCEDURE — 77063 BREAST TOMOSYNTHESIS BI: CPT | Performed by: INTERNAL MEDICINE

## 2021-10-06 PROCEDURE — 77067 SCR MAMMO BI INCL CAD: CPT | Performed by: INTERNAL MEDICINE

## 2021-10-12 PROBLEM — R53.83 FATIGUE: Status: ACTIVE | Noted: 2021-03-01

## 2021-10-12 PROBLEM — J01.90 ACUTE SINUSITIS: Status: ACTIVE | Noted: 2021-07-25

## 2021-10-12 PROBLEM — M23.92 INTERNAL DERANGEMENT OF LEFT KNEE: Status: ACTIVE | Noted: 2021-10-12

## 2021-10-12 PROBLEM — K29.00 ACUTE GASTRITIS: Status: ACTIVE | Noted: 2021-01-16

## 2021-10-12 PROBLEM — J03.90 ACUTE TONSILLITIS: Status: ACTIVE | Noted: 2021-07-25

## 2021-10-12 PROBLEM — I10 ESSENTIAL HYPERTENSION: Status: ACTIVE | Noted: 2021-08-22

## 2021-10-12 PROBLEM — M54.9 BACKACHE: Status: ACTIVE | Noted: 2021-08-22

## 2021-10-12 PROBLEM — B34.9 NONSPECIFIC SYNDROME SUGGESTIVE OF VIRAL ILLNESS: Status: ACTIVE | Noted: 2021-02-12

## 2021-10-12 PROBLEM — M75.50 BURSITIS OF SHOULDER: Status: ACTIVE | Noted: 2021-08-22

## 2021-10-12 PROBLEM — F51.04 PSYCHOPHYSIOLOGIC INSOMNIA: Status: ACTIVE | Noted: 2021-04-04

## 2021-10-12 PROBLEM — J34.89 NASAL MUCOSA DRY: Status: ACTIVE | Noted: 2021-01-16

## 2021-10-12 PROBLEM — K21.9 GASTROESOPHAGEAL REFLUX DISEASE: Status: ACTIVE | Noted: 2021-01-16

## 2021-10-12 PROBLEM — M17.12 PRIMARY OSTEOARTHRITIS OF LEFT KNEE: Status: ACTIVE | Noted: 2021-10-12

## 2021-10-26 PROBLEM — M23.91 INTERNAL DERANGEMENT OF RIGHT KNEE: Status: ACTIVE | Noted: 2021-10-26

## 2021-10-26 PROBLEM — M25.361 KNEE INSTABILITY, RIGHT: Status: ACTIVE | Noted: 2021-10-26

## 2021-11-12 ENCOUNTER — HOSPITAL ENCOUNTER (OUTPATIENT)
Dept: CT IMAGING | Facility: HOSPITAL | Age: 61
Discharge: HOME | End: 2021-11-12
Attending: ORTHOPAEDIC SURGERY
Payer: COMMERCIAL

## 2021-11-12 ENCOUNTER — HOSPITAL ENCOUNTER (OUTPATIENT)
Dept: GENERAL RADIOLOGY | Facility: HOSPITAL | Age: 61
Discharge: HOME | End: 2021-11-12
Attending: ORTHOPAEDIC SURGERY
Payer: COMMERCIAL

## 2021-11-12 DIAGNOSIS — M23.91 INTERNAL DERANGEMENT OF RIGHT KNEE: ICD-10-CM

## 2021-11-12 DIAGNOSIS — M25.361 KNEE INSTABILITY, RIGHT: ICD-10-CM

## 2021-11-12 PROCEDURE — 77002 NEEDLE LOCALIZATION BY XRAY: CPT | Performed by: ORTHOPAEDIC SURGERY

## 2021-11-12 PROCEDURE — 27369 NJX CNTRST KNE ARTHG/CT/MRI: CPT | Performed by: ORTHOPAEDIC SURGERY

## 2021-11-12 PROCEDURE — 73701 CT LOWER EXTREMITY W/DYE: CPT | Performed by: ORTHOPAEDIC SURGERY

## 2021-11-12 RX ORDER — LIDOCAINE HYDROCHLORIDE 20 MG/ML
5 INJECTION, SOLUTION EPIDURAL; INFILTRATION; INTRACAUDAL; PERINEURAL ONCE
Status: COMPLETED | OUTPATIENT
Start: 2021-11-12 | End: 2021-11-12

## 2021-11-12 RX ORDER — LIDOCAINE HYDROCHLORIDE 20 MG/ML
INJECTION, SOLUTION EPIDURAL; INFILTRATION; INTRACAUDAL; PERINEURAL
Status: COMPLETED
Start: 2021-11-12 | End: 2021-11-12

## 2021-11-12 RX ADMIN — LIDOCAINE HYDROCHLORIDE 5 ML: 20 INJECTION, SOLUTION EPIDURAL; INFILTRATION; INTRACAUDAL; PERINEURAL at 10:07:00

## 2021-11-12 RX ADMIN — LIDOCAINE HYDROCHLORIDE 5 ML: 20 INJECTION, SOLUTION EPIDURAL; INFILTRATION; INTRACAUDAL; PERINEURAL at 10:09:00

## 2021-11-23 PROBLEM — S83.231A COMPLEX TEAR OF MEDIAL MENISCUS OF RIGHT KNEE AS CURRENT INJURY: Status: ACTIVE | Noted: 2021-11-23

## 2021-11-29 ENCOUNTER — OFFICE VISIT (OUTPATIENT)
Dept: PHYSICAL MEDICINE AND REHAB | Facility: CLINIC | Age: 61
End: 2021-11-29
Payer: COMMERCIAL

## 2021-11-29 ENCOUNTER — TELEPHONE (OUTPATIENT)
Dept: NEUROLOGY | Facility: CLINIC | Age: 61
End: 2021-11-29

## 2021-11-29 VITALS
BODY MASS INDEX: 32.6 KG/M2 | WEIGHT: 184 LBS | SYSTOLIC BLOOD PRESSURE: 140 MMHG | HEART RATE: 91 BPM | OXYGEN SATURATION: 98 % | DIASTOLIC BLOOD PRESSURE: 72 MMHG | HEIGHT: 63 IN

## 2021-11-29 DIAGNOSIS — F41.9 ANXIETY: ICD-10-CM

## 2021-11-29 DIAGNOSIS — R26.9 GAIT DISTURBANCE: ICD-10-CM

## 2021-11-29 DIAGNOSIS — M54.42 CHRONIC BILATERAL LOW BACK PAIN WITH BILATERAL SCIATICA: Primary | ICD-10-CM

## 2021-11-29 DIAGNOSIS — G89.29 CHRONIC BILATERAL LOW BACK PAIN WITH BILATERAL SCIATICA: Primary | ICD-10-CM

## 2021-11-29 DIAGNOSIS — F32.A DEPRESSION, UNSPECIFIED DEPRESSION TYPE: ICD-10-CM

## 2021-11-29 DIAGNOSIS — M54.41 CHRONIC BILATERAL LOW BACK PAIN WITH BILATERAL SCIATICA: Primary | ICD-10-CM

## 2021-11-29 DIAGNOSIS — R12 HEARTBURN: ICD-10-CM

## 2021-11-29 PROCEDURE — 99244 OFF/OP CNSLTJ NEW/EST MOD 40: CPT | Performed by: PHYSICAL MEDICINE & REHABILITATION

## 2021-11-29 PROCEDURE — 3078F DIAST BP <80 MM HG: CPT | Performed by: PHYSICAL MEDICINE & REHABILITATION

## 2021-11-29 PROCEDURE — 3077F SYST BP >= 140 MM HG: CPT | Performed by: PHYSICAL MEDICINE & REHABILITATION

## 2021-11-29 PROCEDURE — 3008F BODY MASS INDEX DOCD: CPT | Performed by: PHYSICAL MEDICINE & REHABILITATION

## 2021-11-29 NOTE — TELEPHONE ENCOUNTER
[employer] for authorization of approval for MRI L-spine wo cpt code 52887. Authorization is not required. Transaction ID: [id number]. Pt. Informed. Transferred call to scheduling for appt.

## 2021-11-29 NOTE — PROGRESS NOTES
Claudio Mera [address]  Progress Note    CHIEF COMPLAINT:  Patient presents with:  Back Pain: New right handed pt comes in with back pain that radiates to both feet. Feels like she is walking on glass.  Has N/T fee SURGICAL HISTORY      Injection Tendon Sheath, Ligament   • THYROIDECTOMY  2010    total   • TOTAL ABDOM HYSTERECTOMY         SOCIAL HISTORY:   Social History    Occupational History      Not on file    Tobacco Use      Smoking status: Never Smoker      Sm denies  Nodules or Lumps: denies  Rash: denies   Neurological  Loss of Strength Since last Visit: admits  Tingling/Numbness: admits  Balance: admits   Psychiatric  Anxiety: admits  Depressed Mood: admits             PHYSICAL EXAM:   /72   Pulse 91 spinal condition, she can be seen by neurology  - MRI SPINE LUMBAR (CPT=72148); Future    3. Heartburn  No NSAIDs    4. Anxiety      5.  Depression, unspecified depression type          RTC: Post MRI      The patient was in agreement with the assessment and

## 2021-11-30 ENCOUNTER — HOSPITAL ENCOUNTER (OUTPATIENT)
Dept: MRI IMAGING | Age: 61
Discharge: HOME | End: 2021-11-30
Attending: PHYSICAL MEDICINE & REHABILITATION
Payer: COMMERCIAL

## 2021-11-30 DIAGNOSIS — R26.9 GAIT DISTURBANCE: ICD-10-CM

## 2021-11-30 DIAGNOSIS — G89.29 CHRONIC BILATERAL LOW BACK PAIN WITH BILATERAL SCIATICA: ICD-10-CM

## 2021-11-30 DIAGNOSIS — M54.42 CHRONIC BILATERAL LOW BACK PAIN WITH BILATERAL SCIATICA: ICD-10-CM

## 2021-11-30 DIAGNOSIS — M54.41 CHRONIC BILATERAL LOW BACK PAIN WITH BILATERAL SCIATICA: ICD-10-CM

## 2021-11-30 PROCEDURE — 72148 MRI LUMBAR SPINE W/O DYE: CPT | Performed by: PHYSICAL MEDICINE & REHABILITATION

## 2021-12-01 ENCOUNTER — TELEPHONE (OUTPATIENT)
Dept: NEUROLOGY | Facility: CLINIC | Age: 61
End: 2021-12-01

## 2021-12-01 NOTE — TELEPHONE ENCOUNTER
----- Message from Supa Khanna MD sent at 11/30/2021  5:18 PM CST -----  I personally reviewed a lumbar MRI from November 2021. There is minor disc desiccation, worst at L4-5 and L5-S1.   No nerve root impingement or significant central canal stenosi

## 2021-12-07 PROBLEM — M17.11 PRIMARY OSTEOARTHRITIS OF RIGHT KNEE: Status: ACTIVE | Noted: 2021-12-07

## 2022-02-17 ENCOUNTER — TELEPHONE (OUTPATIENT)
Dept: GASTROENTEROLOGY | Facility: CLINIC | Age: 62
End: 2022-02-17

## 2022-02-17 ENCOUNTER — OFFICE VISIT (OUTPATIENT)
Dept: GASTROENTEROLOGY | Facility: CLINIC | Age: 62
End: 2022-02-17
Payer: COMMERCIAL

## 2022-02-17 VITALS
WEIGHT: 186 LBS | BODY MASS INDEX: 32.96 KG/M2 | SYSTOLIC BLOOD PRESSURE: 128 MMHG | HEART RATE: 75 BPM | HEIGHT: 63 IN | TEMPERATURE: 99 F | DIASTOLIC BLOOD PRESSURE: 73 MMHG

## 2022-02-17 DIAGNOSIS — R19.8 IRREGULAR BOWEL HABITS: ICD-10-CM

## 2022-02-17 DIAGNOSIS — Z86.010 PERSONAL HISTORY OF COLONIC POLYPS: ICD-10-CM

## 2022-02-17 DIAGNOSIS — K21.9 GASTROESOPHAGEAL REFLUX DISEASE, UNSPECIFIED WHETHER ESOPHAGITIS PRESENT: Primary | ICD-10-CM

## 2022-02-17 DIAGNOSIS — K59.00 CONSTIPATION, UNSPECIFIED CONSTIPATION TYPE: ICD-10-CM

## 2022-02-17 DIAGNOSIS — R15.9 INCONTINENCE OF FECES, UNSPECIFIED FECAL INCONTINENCE TYPE: ICD-10-CM

## 2022-02-17 DIAGNOSIS — Z12.11 SCREENING FOR COLON CANCER: ICD-10-CM

## 2022-02-17 PROCEDURE — 3078F DIAST BP <80 MM HG: CPT | Performed by: NURSE PRACTITIONER

## 2022-02-17 PROCEDURE — 3008F BODY MASS INDEX DOCD: CPT | Performed by: NURSE PRACTITIONER

## 2022-02-17 PROCEDURE — 3074F SYST BP LT 130 MM HG: CPT | Performed by: NURSE PRACTITIONER

## 2022-02-17 PROCEDURE — 99204 OFFICE O/P NEW MOD 45 MIN: CPT | Performed by: NURSE PRACTITIONER

## 2022-02-17 RX ORDER — OMEPRAZOLE 40 MG/1
1 CAPSULE, DELAYED RELEASE ORAL DAILY
COMMUNITY

## 2022-02-17 RX ORDER — DICYCLOMINE HCL 20 MG
20 TABLET ORAL 2 TIMES DAILY
COMMUNITY
Start: 2021-12-18

## 2022-02-17 RX ORDER — POLYETHYLENE GLYCOL 3350, SODIUM CHLORIDE, SODIUM BICARBONATE, POTASSIUM CHLORIDE 420; 11.2; 5.72; 1.48 G/4L; G/4L; G/4L; G/4L
POWDER, FOR SOLUTION ORAL
Qty: 4000 ML | Refills: 0 | Status: SHIPPED | OUTPATIENT
Start: 2022-02-17

## 2022-02-17 RX ORDER — ATORVASTATIN CALCIUM 10 MG/1
TABLET, FILM COATED ORAL
COMMUNITY
Start: 2022-02-10

## 2022-02-17 NOTE — TELEPHONE ENCOUNTER
Scheduled for:  Colonoscopy 39485 EGD [id number]  Provider Name:  Dr James Milian  Date:  5/27/2022  Location:  [facility]  Sedation:  MAC  Time:  11:00am (pt is aware that [address] will call the day before to confirm arrival time)   Prep:  Trilyte  Meds/Allergies Reconciled?:  Huyen/SANJIV reviewed. Diagnosis with codes:  Colon screening Z12.11 History of colon polyps Z86.010 Change in bowel habits R19.4 Fecal Incontinence R15.9  Was patient informed to call insurance with codes (Y/N):  Yes  Referral sent?: Yes   [address] [facility] or [address] notified? :  (pt is aware that [address] will call the day before to confirm arrival time)   Medication Orders:   This patient verbally confirmed that she is not taking:  Iron, blood thinners and is not diabetic  Not latex allergy, Not PCN allergy and does not have a pacemaker  Patient is aware to HOLD Olmesartan the day of the procedure  Pt is aware to NOT take iron pills, herbal meds and diet supplements for 7 days before exam. Also to NOT take any form of alcohol, recreational drugs and any forms of ED meds 24 hours before exam.         Misc Orders:  Patient is aware that the ENDO dept will call to schedule a COVID 19 test before the procedure      Further instructions given by staff:   I discussed the prep instructions with the patient at the time of the appointment which she verbally understood

## 2022-03-16 ENCOUNTER — TELEPHONE (OUTPATIENT)
Dept: GASTROENTEROLOGY | Facility: CLINIC | Age: 62
End: 2022-03-16

## 2022-03-16 NOTE — TELEPHONE ENCOUNTER
FYI-Pt has CLN/EGD scheduled for 5/27/22. Pt states she will have knee replacement surgery on 4/25/22. Pt states she is unsure if she will be able to proceed with CLN/EGD. Pt states she will call back at a later time to inform the office of her decision. Pt inquiring if there are any dates available this month for CLN/EGD.  Please call [phone]

## 2022-03-16 NOTE — TELEPHONE ENCOUNTER
Noted message below. GI Schedulers--    Patient wants to know if there are any open dates within next 2 weeks.

## 2022-03-19 NOTE — TELEPHONE ENCOUNTER
Dr Michelle Pompa    This patient is having knee replacement surgery on 4/25/2022 and is requesting the have her Colon/EGD before that she is scheduled on 5/27/2022     Can I add her on Thursday 3/31/2022 in the afternoon you have time open from 2-4    Please advise

## 2022-05-23 NOTE — TELEPHONE ENCOUNTER
CBLM to reschedule procedure. Please transfer to [facility] at ext [phone] for scheduling. Please send me a [employer] message if the pt returns my call.

## 2022-08-11 ENCOUNTER — TELEPHONE (OUTPATIENT)
Dept: GASTROENTEROLOGY | Facility: CLINIC | Age: 62
End: 2022-08-11

## 2022-08-11 NOTE — TELEPHONE ENCOUNTER
Pt called requesting a sooner procedure appt with Dr. Tanya Conte, pt states been having frequent accidents at work, would like a call back

## 2022-08-12 NOTE — TELEPHONE ENCOUNTER
Dr. Claudette Weinstein-      Patient is requesting for a sooner date because she has been having frequent accidents at work. I told her that right now that you are booked until January. Can she go to a different provider to have the procedure? Please advise! She is currently scheduled for 10/14/2022. Thank you!

## 2022-09-26 ENCOUNTER — PATIENT MESSAGE (OUTPATIENT)
Dept: GASTROENTEROLOGY | Facility: CLINIC | Age: 62
End: 2022-09-26

## 2022-09-30 NOTE — TELEPHONE ENCOUNTER
Patient's10/14/22 colonoscopy with Dr. Merissa Garcia cancelled per patient request.     Griselda Colmenares via apt desk. See TE 8/11/22 --> message sent to GI schedulers to send [employer] request to cancel.

## 2022-09-30 NOTE — TELEPHONE ENCOUNTER
Gi Schedulers-    See [employer] message 9/29/22. Patient's10/14/22 colonoscopy with Dr. Juan Antonio Maharaj cancelled per patient request.     Adam Bustos via apt desk. Please cancel through caset[employer]. Thank you!

## 2022-10-26 RX ORDER — SODIUM CHLORIDE, SODIUM LACTATE, POTASSIUM CHLORIDE, CALCIUM CHLORIDE 600; 310; 30; 20 MG/100ML; MG/100ML; MG/100ML; MG/100ML
INJECTION, SOLUTION INTRAVENOUS PRN
Status: CANCELLED | OUTPATIENT
Start: 2022-10-26

## 2022-10-26 RX ORDER — 0.9 % SODIUM CHLORIDE 0.9 %
2 VIAL (ML) INJECTION EVERY 12 HOURS SCHEDULED
Status: CANCELLED | OUTPATIENT
Start: 2022-10-26

## 2022-10-27 ENCOUNTER — ANESTHESIA EVENT (OUTPATIENT)
Dept: GASTROENTEROLOGY | Age: 62
End: 2022-10-27

## 2022-10-27 ENCOUNTER — ANESTHESIA (OUTPATIENT)
Dept: GASTROENTEROLOGY | Age: 62
End: 2022-10-27

## 2022-10-27 ENCOUNTER — HOSPITAL ENCOUNTER (OUTPATIENT)
Dept: GASTROENTEROLOGY | Age: 62
Discharge: HOME | End: 2022-10-27
Attending: INTERNAL MEDICINE

## 2022-10-27 VITALS
BODY MASS INDEX: 31.36 KG/M2 | SYSTOLIC BLOOD PRESSURE: 135 MMHG | HEART RATE: 70 BPM | OXYGEN SATURATION: 99 % | WEIGHT: 177 LBS | DIASTOLIC BLOOD PRESSURE: 88 MMHG | HEIGHT: 63 IN | RESPIRATION RATE: 17 BRPM | TEMPERATURE: 97.5 F

## 2022-10-27 DIAGNOSIS — K21.9 GERD WITHOUT ESOPHAGITIS: ICD-10-CM

## 2022-10-27 DIAGNOSIS — R15.9 INCONTINENCE OF FECES, UNSPECIFIED FECAL INCONTINENCE TYPE: ICD-10-CM

## 2022-10-27 DIAGNOSIS — R19.4 CHANGE IN BOWEL HABITS: ICD-10-CM

## 2022-10-27 PROCEDURE — 88305 TISSUE EXAM BY PATHOLOGIST: CPT | Performed by: SURGERY

## 2022-10-27 PROCEDURE — 10002803 HB RX 637: Performed by: SURGERY

## 2022-10-27 PROCEDURE — 13000028 HB GI MAJOR COMPLEX CASE S/U + 1ST 15 MIN

## 2022-10-27 PROCEDURE — 10002801 HB RX 250 W/O HCPCS: Performed by: ANESTHESIOLOGY

## 2022-10-27 PROCEDURE — 13000004 HB  ANESTHESIA  GENERAL OUTSIDE OR

## 2022-10-27 PROCEDURE — 10002807 HB RX 258: Performed by: ANESTHESIOLOGY

## 2022-10-27 PROCEDURE — 13000029 HB GI MAJOR COMPLEX CASE EA ADD MINUTE

## 2022-10-27 PROCEDURE — 13000001 HB PHASE II RECOVERY EA 30 MINUTES

## 2022-10-27 PROCEDURE — 10002800 HB RX 250 W HCPCS: Performed by: ANESTHESIOLOGY

## 2022-10-27 PROCEDURE — 88342 IMHCHEM/IMCYTCHM 1ST ANTB: CPT | Performed by: INTERNAL MEDICINE

## 2022-10-27 PROCEDURE — 10006027 HB SUPPLY 278

## 2022-10-27 RX ORDER — LIDOCAINE HYDROCHLORIDE 10 MG/ML
INJECTION, SOLUTION INFILTRATION; PERINEURAL PRN
Status: DISCONTINUED | OUTPATIENT
Start: 2022-10-27 | End: 2022-10-27

## 2022-10-27 RX ORDER — ONDANSETRON 2 MG/ML
4 INJECTION INTRAMUSCULAR; INTRAVENOUS
Status: DISCONTINUED | OUTPATIENT
Start: 2022-10-27 | End: 2022-10-29 | Stop reason: HOSPADM

## 2022-10-27 RX ORDER — SIMETHICONE 20 MG/.3ML
EMULSION ORAL PRN
Status: COMPLETED | OUTPATIENT
Start: 2022-10-27 | End: 2022-10-27

## 2022-10-27 RX ORDER — SODIUM CHLORIDE, SODIUM LACTATE, POTASSIUM CHLORIDE, CALCIUM CHLORIDE 600; 310; 30; 20 MG/100ML; MG/100ML; MG/100ML; MG/100ML
INJECTION, SOLUTION INTRAVENOUS CONTINUOUS PRN
Status: DISCONTINUED | OUTPATIENT
Start: 2022-10-27 | End: 2022-10-27

## 2022-10-27 RX ORDER — OLMESARTAN MEDOXOMIL 20 MG/1
20 TABLET ORAL DAILY
COMMUNITY

## 2022-10-27 RX ORDER — PROPOFOL 10 MG/ML
INJECTION, EMULSION INTRAVENOUS PRN
Status: DISCONTINUED | OUTPATIENT
Start: 2022-10-27 | End: 2022-10-27

## 2022-10-27 RX ADMIN — SIMETHICONE 20 MG: 20 EMULSION ORAL at 11:41

## 2022-10-27 RX ADMIN — LIDOCAINE HYDROCHLORIDE 50 MG: 10 INJECTION, SOLUTION INFILTRATION; PERINEURAL at 11:18

## 2022-10-27 RX ADMIN — SODIUM CHLORIDE, POTASSIUM CHLORIDE, SODIUM LACTATE AND CALCIUM CHLORIDE: 600; 310; 30; 20 INJECTION, SOLUTION INTRAVENOUS at 11:00

## 2022-10-27 RX ADMIN — PROPOFOL 75 MCG/KG/MIN: 10 INJECTION, EMULSION INTRAVENOUS at 11:18

## 2022-10-27 RX ADMIN — PROPOFOL 100 MG: 10 INJECTION, EMULSION INTRAVENOUS at 11:18

## 2022-10-27 ASSESSMENT — PAIN SCALES - GENERAL
PAINLEVEL_OUTOF10: 0
PAINLEVEL_OUTOF10: 0

## 2022-10-27 ASSESSMENT — ENCOUNTER SYMPTOMS
CHEST TIGHTNESS: 0
ABDOMINAL DISTENTION: 0
DIARRHEA: 1
ACTIVITY CHANGE: 0
ABDOMINAL PAIN: 0

## 2022-10-27 ASSESSMENT — ACTIVITIES OF DAILY LIVING (ADL)
ADL_SCORE: 12
ADL_BEFORE_ADMISSION: INDEPENDENT

## 2022-10-31 LAB
ASR DISCLAIMER: NORMAL
ASR DISCLAIMER: NORMAL
CASE RPRT: NORMAL
CASE RPRT: NORMAL
CLINICAL INFO: NORMAL
CLINICAL INFO: NORMAL
PATH REPORT.FINAL DX SPEC: NORMAL
PATH REPORT.FINAL DX SPEC: NORMAL
PATH REPORT.GROSS SPEC: NORMAL
PATH REPORT.GROSS SPEC: NORMAL

## 2022-11-30 ENCOUNTER — APPOINTMENT (OUTPATIENT)
Dept: CT IMAGING | Facility: HOSPITAL | Age: 62
End: 2022-11-30
Attending: EMERGENCY MEDICINE
Payer: COMMERCIAL

## 2022-11-30 ENCOUNTER — HOSPITAL ENCOUNTER (EMERGENCY)
Facility: HOSPITAL | Age: 62
Discharge: HOME | End: 2022-11-30
Attending: EMERGENCY MEDICINE
Payer: COMMERCIAL

## 2022-11-30 VITALS
OXYGEN SATURATION: 98 % | SYSTOLIC BLOOD PRESSURE: 139 MMHG | WEIGHT: 177 LBS | HEART RATE: 84 BPM | BODY MASS INDEX: 31.36 KG/M2 | RESPIRATION RATE: 20 BRPM | DIASTOLIC BLOOD PRESSURE: 78 MMHG | TEMPERATURE: 98 F | HEIGHT: 63 IN

## 2022-11-30 DIAGNOSIS — K86.89 PANCREATIC MASS: ICD-10-CM

## 2022-11-30 DIAGNOSIS — N94.89 ADNEXAL MASS: ICD-10-CM

## 2022-11-30 DIAGNOSIS — K57.92 ACUTE DIVERTICULITIS: Primary | ICD-10-CM

## 2022-11-30 LAB
ALBUMIN SERPL-MCNC: 3.7 G/DL (ref 3.4–5)
ALP LIVER SERPL-CCNC: 119 U/L
ALT SERPL-CCNC: 24 U/L
ANION GAP SERPL CALC-SCNC: 8 MMOL/L (ref 0–18)
AST SERPL-CCNC: 8 U/L (ref 15–37)
BASOPHILS # BLD AUTO: 0.07 X10(3) UL (ref 0–0.2)
BASOPHILS NFR BLD AUTO: 0.3 %
BILIRUB DIRECT SERPL-MCNC: 0.3 MG/DL (ref 0–0.2)
BILIRUB SERPL-MCNC: 2.1 MG/DL (ref 0.1–2)
BILIRUB UR QL: NEGATIVE
BUN BLD-MCNC: 14 MG/DL (ref 7–18)
BUN/CREAT SERPL: 16.7 (ref 10–20)
CALCIUM BLD-MCNC: 9.4 MG/DL (ref 8.5–10.1)
CHLORIDE SERPL-SCNC: 101 MMOL/L (ref 98–112)
CLARITY UR: CLEAR
CO2 SERPL-SCNC: 29 MMOL/L (ref 21–32)
COLOR UR: YELLOW
CREAT BLD-MCNC: 0.84 MG/DL
DEPRECATED RDW RBC AUTO: 48.4 FL (ref 35.1–46.3)
EOSINOPHIL # BLD AUTO: 0.06 X10(3) UL (ref 0–0.7)
EOSINOPHIL NFR BLD AUTO: 0.3 %
ERYTHROCYTE [DISTWIDTH] IN BLOOD BY AUTOMATED COUNT: 14.4 % (ref 11–15)
GFR SERPLBLD BASED ON 1.73 SQ M-ARVRAT: 79 ML/MIN/1.73M2 (ref 60–?)
GLUCOSE BLD-MCNC: 111 MG/DL (ref 70–99)
GLUCOSE UR-MCNC: NEGATIVE MG/DL
HCT VFR BLD AUTO: 42.5 %
HGB BLD-MCNC: 13.4 G/DL
IMM GRANULOCYTES # BLD AUTO: 0.11 X10(3) UL (ref 0–1)
IMM GRANULOCYTES NFR BLD: 0.5 %
KETONES UR-MCNC: NEGATIVE MG/DL
LIPASE SERPL-CCNC: 59 U/L (ref 73–393)
LYMPHOCYTES # BLD AUTO: 3.93 X10(3) UL (ref 1–4)
LYMPHOCYTES NFR BLD AUTO: 19.1 %
MCH RBC QN AUTO: 29.1 PG (ref 26–34)
MCHC RBC AUTO-ENTMCNC: 31.5 G/DL (ref 31–37)
MCV RBC AUTO: 92.2 FL
MONOCYTES # BLD AUTO: 1.16 X10(3) UL (ref 0.1–1)
MONOCYTES NFR BLD AUTO: 5.6 %
NEUTROPHILS # BLD AUTO: 15.25 X10 (3) UL (ref 1.5–7.7)
NEUTROPHILS # BLD AUTO: 15.25 X10(3) UL (ref 1.5–7.7)
NEUTROPHILS NFR BLD AUTO: 74.2 %
NITRITE UR QL STRIP.AUTO: NEGATIVE
OSMOLALITY SERPL CALC.SUM OF ELEC: 287 MOSM/KG (ref 275–295)
PH UR: 5.5 [PH] (ref 5–8)
PLATELET # BLD AUTO: 280 10(3)UL (ref 150–450)
POTASSIUM SERPL-SCNC: 3.5 MMOL/L (ref 3.5–5.1)
PROT SERPL-MCNC: 9 G/DL (ref 6.4–8.2)
PROT UR-MCNC: NEGATIVE MG/DL
RBC # BLD AUTO: 4.61 X10(6)UL
SODIUM SERPL-SCNC: 138 MMOL/L (ref 136–145)
SP GR UR STRIP: 1.01 (ref 1–1.03)
UROBILINOGEN UR STRIP-ACNC: 0.2
WBC # BLD AUTO: 20.6 X10(3) UL (ref 4–11)

## 2022-11-30 PROCEDURE — 96361 HYDRATE IV INFUSION ADD-ON: CPT

## 2022-11-30 PROCEDURE — 81001 URINALYSIS AUTO W/SCOPE: CPT

## 2022-11-30 PROCEDURE — 99284 EMERGENCY DEPT VISIT MOD MDM: CPT

## 2022-11-30 PROCEDURE — 85025 COMPLETE CBC W/AUTO DIFF WBC: CPT

## 2022-11-30 PROCEDURE — 87086 URINE CULTURE/COLONY COUNT: CPT

## 2022-11-30 PROCEDURE — 85025 COMPLETE CBC W/AUTO DIFF WBC: CPT | Performed by: EMERGENCY MEDICINE

## 2022-11-30 PROCEDURE — 96374 THER/PROPH/DIAG INJ IV PUSH: CPT

## 2022-11-30 PROCEDURE — 81015 MICROSCOPIC EXAM OF URINE: CPT

## 2022-11-30 PROCEDURE — 80048 BASIC METABOLIC PNL TOTAL CA: CPT

## 2022-11-30 PROCEDURE — 74177 CT ABD & PELVIS W/CONTRAST: CPT | Performed by: EMERGENCY MEDICINE

## 2022-11-30 PROCEDURE — 83690 ASSAY OF LIPASE: CPT | Performed by: EMERGENCY MEDICINE

## 2022-11-30 PROCEDURE — 80076 HEPATIC FUNCTION PANEL: CPT | Performed by: EMERGENCY MEDICINE

## 2022-11-30 PROCEDURE — 87086 URINE CULTURE/COLONY COUNT: CPT | Performed by: EMERGENCY MEDICINE

## 2022-11-30 PROCEDURE — 81001 URINALYSIS AUTO W/SCOPE: CPT | Performed by: EMERGENCY MEDICINE

## 2022-11-30 PROCEDURE — 80048 BASIC METABOLIC PNL TOTAL CA: CPT | Performed by: EMERGENCY MEDICINE

## 2022-11-30 RX ORDER — FLUCONAZOLE 150 MG/1
150 TABLET ORAL ONCE
Qty: 1 TABLET | Refills: 0 | Status: SHIPPED | OUTPATIENT
Start: 2022-11-30 | End: 2022-11-30

## 2022-11-30 RX ORDER — CIPROFLOXACIN 500 MG/1
500 TABLET, FILM COATED ORAL 2 TIMES DAILY
Qty: 20 TABLET | Refills: 0 | Status: SHIPPED | OUTPATIENT
Start: 2022-11-30 | End: 2022-12-10

## 2022-11-30 RX ORDER — HYDROCODONE BITARTRATE AND ACETAMINOPHEN 5; 325 MG/1; MG/1
1-2 TABLET ORAL EVERY 6 HOURS PRN
Qty: 10 TABLET | Refills: 0 | Status: SHIPPED | OUTPATIENT
Start: 2022-11-30 | End: 2022-12-05

## 2022-11-30 RX ORDER — MORPHINE SULFATE 4 MG/ML
4 INJECTION, SOLUTION INTRAMUSCULAR; INTRAVENOUS ONCE
Status: COMPLETED | OUTPATIENT
Start: 2022-11-30 | End: 2022-11-30

## 2022-11-30 RX ORDER — METRONIDAZOLE 500 MG/1
500 TABLET ORAL 3 TIMES DAILY
Qty: 30 TABLET | Refills: 0 | Status: SHIPPED | OUTPATIENT
Start: 2022-11-30 | End: 2022-12-10

## 2022-11-30 NOTE — ED INITIAL ASSESSMENT (HPI)
Pt presents to ED for lower abdominal spasms that radiate to her rectal area after a colonoscopy on 10/27. Denies N/V. Pt also c/o lower back pain with the spasms from her abdomen.

## 2022-12-01 NOTE — DISCHARGE INSTRUCTIONS
Take antibiotics as prescribed. Take Tylenol or ibuprofen as needed for pain. Take Norco as needed for worsening pain. Follow-up with gynecology and with your primary physician for further evaluation. Return to the emergency department if increasing pain, repeated vomiting, or other new symptoms develop.

## 2022-12-02 ENCOUNTER — TELEPHONE (OUTPATIENT)
Dept: OBGYN CLINIC | Facility: CLINIC | Age: 62
End: 2022-12-02

## 2022-12-05 ENCOUNTER — OFFICE VISIT (OUTPATIENT)
Dept: OBGYN CLINIC | Facility: CLINIC | Age: 62
End: 2022-12-05
Payer: COMMERCIAL

## 2022-12-05 ENCOUNTER — LAB ENCOUNTER (OUTPATIENT)
Dept: LAB | Facility: HOSPITAL | Age: 62
End: 2022-12-05
Attending: OBSTETRICS & GYNECOLOGY
Payer: COMMERCIAL

## 2022-12-05 VITALS — DIASTOLIC BLOOD PRESSURE: 72 MMHG | SYSTOLIC BLOOD PRESSURE: 112 MMHG | WEIGHT: 182 LBS | BODY MASS INDEX: 32 KG/M2

## 2022-12-05 DIAGNOSIS — N83.8 OVARIAN MASS, RIGHT: ICD-10-CM

## 2022-12-05 DIAGNOSIS — N94.89 ADNEXAL MASS: Primary | ICD-10-CM

## 2022-12-05 DIAGNOSIS — N94.89 ADNEXAL MASS: ICD-10-CM

## 2022-12-05 LAB — CANCER AG125 SERPL-ACNC: 18.1 U/ML (ref ?–35)

## 2022-12-05 PROCEDURE — 36415 COLL VENOUS BLD VENIPUNCTURE: CPT

## 2022-12-05 PROCEDURE — 86304 IMMUNOASSAY TUMOR CA 125: CPT

## 2022-12-05 RX ORDER — AMLODIPINE BESYLATE 100 %
POWDER (GRAM) MISCELLANEOUS
COMMUNITY

## 2022-12-05 RX ORDER — ATROPINE SULFATE MONOHYDRATE 1 G/G
POWDER MISCELLANEOUS
COMMUNITY

## 2022-12-05 RX ORDER — LEVOTHYROXINE SODIUM 100 %
POWDER (GRAM) MISCELLANEOUS
COMMUNITY

## 2022-12-06 ENCOUNTER — TELEPHONE (OUTPATIENT)
Dept: HEMATOLOGY/ONCOLOGY | Facility: HOSPITAL | Age: 62
End: 2022-12-06

## 2022-12-06 DIAGNOSIS — K86.2 CYSTIC MASS OF PANCREAS: ICD-10-CM

## 2022-12-06 DIAGNOSIS — N94.89 ADNEXAL MASS: Primary | ICD-10-CM

## 2022-12-06 NOTE — TELEPHONE ENCOUNTER
**M to schedule a new consult, Ref by Laveda Kayser to corrine becerra Cyst on pancrease, called 12/6/22**

## 2022-12-06 NOTE — TELEPHONE ENCOUNTER
Called patient and LM on number confirmed  VM to call us back to arrange consult and discuss imaging that was ordered.

## 2022-12-09 ENCOUNTER — TELEPHONE (OUTPATIENT)
Dept: OBGYN CLINIC | Facility: CLINIC | Age: 62
End: 2022-12-09

## 2022-12-09 NOTE — TELEPHONE ENCOUNTER
----- Message from Daniel Thomas MD sent at 12/9/2022  9:40 AM CST -----  Called pt and left message.

## 2022-12-13 ENCOUNTER — HOSPITAL ENCOUNTER (OUTPATIENT)
Dept: MRI IMAGING | Facility: HOSPITAL | Age: 62
Discharge: HOME | End: 2022-12-13
Attending: PHYSICIAN ASSISTANT
Payer: COMMERCIAL

## 2022-12-13 DIAGNOSIS — K86.2 CYSTIC MASS OF PANCREAS: ICD-10-CM

## 2022-12-13 PROCEDURE — 74181 MRI ABDOMEN W/O CONTRAST: CPT | Performed by: PHYSICIAN ASSISTANT

## 2022-12-13 PROCEDURE — 76376 3D RENDER W/INTRP POSTPROCES: CPT | Performed by: PHYSICIAN ASSISTANT

## 2022-12-14 ENCOUNTER — HOSPITAL ENCOUNTER (OUTPATIENT)
Dept: MRI IMAGING | Age: 62
Discharge: HOME | End: 2022-12-14
Attending: PHYSICIAN ASSISTANT
Payer: COMMERCIAL

## 2022-12-14 DIAGNOSIS — N94.89 ADNEXAL MASS: ICD-10-CM

## 2022-12-14 PROCEDURE — A9575 INJ GADOTERATE MEGLUMI 0.1ML: HCPCS | Performed by: PHYSICIAN ASSISTANT

## 2022-12-14 PROCEDURE — 72197 MRI PELVIS W/O & W/DYE: CPT | Performed by: PHYSICIAN ASSISTANT

## 2022-12-14 RX ORDER — GADOTERATE MEGLUMINE 376.9 MG/ML
20 INJECTION INTRAVENOUS
Status: COMPLETED | OUTPATIENT
Start: 2022-12-14 | End: 2022-12-14

## 2022-12-14 RX ADMIN — GADOTERATE MEGLUMINE 17 ML: 376.9 INJECTION INTRAVENOUS at 17:59:00

## 2022-12-14 NOTE — TELEPHONE ENCOUNTER
Patient calling to follow up. She will be having an MRI done today 5:30 - 6:30 so will be unavailable.

## 2022-12-14 NOTE — TELEPHONE ENCOUNTER
Patient calling back. When an unknown number comes through the call automatically goes to voicemail. Please advise the best way for her to reach him.

## 2022-12-15 NOTE — TELEPHONE ENCOUNTER
Counseled pt on ca 125 and mri pelvis ,  5.5 cm adnexal mass and 2.2 adnexal cyst,  I advised laparotomy/bilateral salpingoophorectomy. Pt is going to talk to her work and call us in 1 week on when she can schedule this. Please call pt in 1 week.

## 2022-12-17 ENCOUNTER — LAB ENCOUNTER (OUTPATIENT)
Dept: LAB | Age: 62
End: 2022-12-17
Attending: ANESTHESIOLOGY
Payer: COMMERCIAL

## 2022-12-17 DIAGNOSIS — Z01.812 PRE-PROCEDURAL LABORATORY EXAMINATION: Primary | ICD-10-CM

## 2022-12-17 LAB
ANION GAP SERPL CALC-SCNC: 7 MMOL/L (ref 0–18)
APTT PPP: 26 SECONDS (ref 23.3–35.6)
BASOPHILS # BLD AUTO: 0.03 X10(3) UL (ref 0–0.2)
BASOPHILS NFR BLD AUTO: 0.2 %
BUN BLD-MCNC: 20 MG/DL (ref 7–18)
BUN/CREAT SERPL: 30.3 (ref 10–20)
CALCIUM BLD-MCNC: 8.7 MG/DL (ref 8.5–10.1)
CHLORIDE SERPL-SCNC: 108 MMOL/L (ref 98–112)
CO2 SERPL-SCNC: 27 MMOL/L (ref 21–32)
CREAT BLD-MCNC: 0.66 MG/DL
DEPRECATED RDW RBC AUTO: 48.1 FL (ref 35.1–46.3)
EOSINOPHIL # BLD AUTO: 0.03 X10(3) UL (ref 0–0.7)
EOSINOPHIL NFR BLD AUTO: 0.2 %
ERYTHROCYTE [DISTWIDTH] IN BLOOD BY AUTOMATED COUNT: 14.4 % (ref 11–15)
FASTING STATUS PATIENT QL REPORTED: YES
GFR SERPLBLD BASED ON 1.73 SQ M-ARVRAT: 100 ML/MIN/1.73M2 (ref 60–?)
GLUCOSE BLD-MCNC: 97 MG/DL (ref 70–99)
HCT VFR BLD AUTO: 38.6 %
HGB BLD-MCNC: 12.3 G/DL
IMM GRANULOCYTES # BLD AUTO: 0.05 X10(3) UL (ref 0–1)
IMM GRANULOCYTES NFR BLD: 0.4 %
INR BLD: 1.02 (ref 0.85–1.16)
LYMPHOCYTES # BLD AUTO: 2.67 X10(3) UL (ref 1–4)
LYMPHOCYTES NFR BLD AUTO: 21.7 %
MCH RBC QN AUTO: 29.2 PG (ref 26–34)
MCHC RBC AUTO-ENTMCNC: 31.9 G/DL (ref 31–37)
MCV RBC AUTO: 91.7 FL
MONOCYTES # BLD AUTO: 0.62 X10(3) UL (ref 0.1–1)
MONOCYTES NFR BLD AUTO: 5 %
NEUTROPHILS # BLD AUTO: 8.93 X10 (3) UL (ref 1.5–7.7)
NEUTROPHILS # BLD AUTO: 8.93 X10(3) UL (ref 1.5–7.7)
NEUTROPHILS NFR BLD AUTO: 72.5 %
OSMOLALITY SERPL CALC.SUM OF ELEC: 297 MOSM/KG (ref 275–295)
PLATELET # BLD AUTO: 282 10(3)UL (ref 150–450)
POTASSIUM SERPL-SCNC: 3.9 MMOL/L (ref 3.5–5.1)
PROTHROMBIN TIME: 13.4 SECONDS (ref 11.6–14.8)
RBC # BLD AUTO: 4.21 X10(6)UL
SODIUM SERPL-SCNC: 142 MMOL/L (ref 136–145)
WBC # BLD AUTO: 12.3 X10(3) UL (ref 4–11)

## 2022-12-17 PROCEDURE — 85610 PROTHROMBIN TIME: CPT

## 2022-12-17 PROCEDURE — 85730 THROMBOPLASTIN TIME PARTIAL: CPT

## 2022-12-17 PROCEDURE — 85025 COMPLETE CBC W/AUTO DIFF WBC: CPT

## 2022-12-17 PROCEDURE — 36415 COLL VENOUS BLD VENIPUNCTURE: CPT

## 2022-12-17 PROCEDURE — 80048 BASIC METABOLIC PNL TOTAL CA: CPT

## 2022-12-19 ENCOUNTER — TELEPHONE (OUTPATIENT)
Dept: OBGYN CLINIC | Facility: CLINIC | Age: 62
End: 2022-12-19

## 2022-12-19 NOTE — TELEPHONE ENCOUNTER
Pt called and informed of results and recommendations. Pt voices understanding. Pt's mother is having health issues and surgery. Pt wants to be called in January to schedule her surgery. ----- Message from Elizabeth Hu MD sent at 12/17/2022  2:42 PM CST -----  Called pt , no answer. MRI c/w findings on ultrasound. Please inform, pt is deciding on surgery date,  please inform and pt may coordinate with Chelsy/me as well.

## 2022-12-21 ENCOUNTER — APPOINTMENT (OUTPATIENT)
Dept: CT IMAGING | Age: 62
End: 2022-12-21
Attending: SURGERY

## 2022-12-22 ENCOUNTER — OFFICE VISIT (OUTPATIENT)
Dept: HEMATOLOGY/ONCOLOGY | Facility: HOSPITAL | Age: 62
End: 2022-12-22
Attending: INTERNAL MEDICINE
Payer: COMMERCIAL

## 2022-12-22 ENCOUNTER — TELEPHONE (OUTPATIENT)
Dept: GASTROENTEROLOGY | Facility: CLINIC | Age: 62
End: 2022-12-22

## 2022-12-22 VITALS
TEMPERATURE: 98 F | HEART RATE: 71 BPM | DIASTOLIC BLOOD PRESSURE: 63 MMHG | BODY MASS INDEX: 31.43 KG/M2 | RESPIRATION RATE: 20 BRPM | OXYGEN SATURATION: 99 % | WEIGHT: 177.38 LBS | SYSTOLIC BLOOD PRESSURE: 141 MMHG | HEIGHT: 63 IN

## 2022-12-22 DIAGNOSIS — N83.201 CYST OF RIGHT OVARY: ICD-10-CM

## 2022-12-22 DIAGNOSIS — R93.3 ABNORMAL MAGNETIC RESONANCE CHOLANGIOPANCREATOGRAPHY (MRCP): ICD-10-CM

## 2022-12-22 DIAGNOSIS — Z87.19 HX OF DIVERTICULITIS OF COLON: ICD-10-CM

## 2022-12-22 DIAGNOSIS — Z12.11 COLON CANCER SCREENING: ICD-10-CM

## 2022-12-22 DIAGNOSIS — K86.89 PANCREATIC MASS: Primary | ICD-10-CM

## 2022-12-22 DIAGNOSIS — K86.9 PANCREATIC LESION: Primary | ICD-10-CM

## 2022-12-22 PROCEDURE — 99244 OFF/OP CNSLTJ NEW/EST MOD 40: CPT | Performed by: INTERNAL MEDICINE

## 2022-12-22 RX ORDER — OMEPRAZOLE 20 MG/1
20 CAPSULE, DELAYED RELEASE ORAL
COMMUNITY

## 2022-12-22 NOTE — TELEPHONE ENCOUNTER
----- Message from Netta Matute MD sent at 12/22/2022 10:38 AM CST -----  Regarding: pt in need of EUS  Hi Marialuisajose martin Omer,    This is the patient we discussed briefly who is in need of EUS evaluation for likely IPMN as the lesion is over 3cm. I feel this should get evaluated/biopsied prior to consideration of surgery. She also needs follow up for hx of recent diverticulitis as she underwent colonoscopy at an OSH, but wants to switch all of her care to our system. Symptoms are improved s/p her abx course. Told her that you can do the repeat C-scope as well. She is aware that your team will reach out to her for scheduling for next Thur/Friday.     Thanks,  [employer]

## 2022-12-22 NOTE — TELEPHONE ENCOUNTER
GI staff    Please schedule patient for EGD/EUS with FNA and colonoscopy with MAC at the hospital for diagnosis of pancreas lesion, abnormal MRCP, colorectal cancer screening, history of diverticulitis     Split golytely    Please see if this can be done 12/29/22    90 minute procedure    Thank you!     David Winston MD  [address] - Gastroenterology  12/22/2022  11:02 AM

## 2022-12-22 NOTE — TELEPHONE ENCOUNTER
Scheduled for:  Colonoscopy - 89335, EGD - 27166 & EUS (w/poss FNA) - 34162   Provider Name:  Dr. Ondina Rowe    Date:  12/29/22  Location:  [facility]  Sedation:  MAC  Time:  12:30 pm (pt is aware to arrive at 11:30 am)  Prep:  Golytely, Prep instructions were given to pt over the phone, pt verbalized understanding. Meds/Allergies Reconciled?:  Yes, Physician reviewed    Diagnosis with codes:  Pancreatic lesion - K86.9, Abnormal MRCP - R93.3, Colon screening - Z12.11, Hx of diverticulitis - Z87.19  Was patient informed to call insurance with codes (Y/N):  Yes, I confirmed [employer] PPO insurance with this patient. Referral sent?:  Yes, Referral was sent at the time of electronic surgical scheduling. [address] [place] [address] or [facility] notified?:  Yes, I sent an electronic request to Endo Scheduling and received a confirmation today. Medication Orders:  Pt is to HOLD Olmesartan the evening before or the morning of the procedure. Misc Orders:  Patient was informed that they will need a COVID 19 test prior to their procedure. Patient verbally understood & will await a phone call from [facility] to schedule. Further instructions given by staff:  I discussed the prep instructions with the patient which she verbally understood and is aware that I will send the instructions today via [address]

## 2022-12-27 ENCOUNTER — LAB ENCOUNTER (OUTPATIENT)
Dept: LAB | Age: 62
End: 2022-12-27
Attending: INTERNAL MEDICINE
Payer: COMMERCIAL

## 2022-12-27 DIAGNOSIS — Z01.818 PRE-OP TESTING: ICD-10-CM

## 2022-12-27 LAB — SARS-COV-2 RNA RESP QL NAA+PROBE: NOT DETECTED

## 2022-12-28 ENCOUNTER — TELEPHONE (OUTPATIENT)
Dept: HEMATOLOGY/ONCOLOGY | Facility: HOSPITAL | Age: 62
End: 2022-12-28

## 2022-12-29 ENCOUNTER — HOSPITAL ENCOUNTER (OUTPATIENT)
Facility: HOSPITAL | Age: 62
Setting detail: HOSPITAL OUTPATIENT SURGERY
Discharge: HOME | End: 2022-12-29
Attending: INTERNAL MEDICINE | Admitting: INTERNAL MEDICINE
Payer: COMMERCIAL

## 2022-12-29 ENCOUNTER — ANESTHESIA (OUTPATIENT)
Dept: ENDOSCOPY | Facility: HOSPITAL | Age: 62
End: 2022-12-29
Payer: COMMERCIAL

## 2022-12-29 ENCOUNTER — TELEPHONE (OUTPATIENT)
Dept: GASTROENTEROLOGY | Facility: CLINIC | Age: 62
End: 2022-12-29

## 2022-12-29 ENCOUNTER — ANESTHESIA EVENT (OUTPATIENT)
Dept: ENDOSCOPY | Facility: HOSPITAL | Age: 62
End: 2022-12-29
Payer: COMMERCIAL

## 2022-12-29 VITALS
OXYGEN SATURATION: 96 % | WEIGHT: 177 LBS | DIASTOLIC BLOOD PRESSURE: 79 MMHG | RESPIRATION RATE: 14 BRPM | BODY MASS INDEX: 31.36 KG/M2 | HEART RATE: 69 BPM | SYSTOLIC BLOOD PRESSURE: 130 MMHG | HEIGHT: 63 IN

## 2022-12-29 DIAGNOSIS — Z12.11 COLON CANCER SCREENING: ICD-10-CM

## 2022-12-29 DIAGNOSIS — Z87.19 HX OF DIVERTICULITIS OF COLON: ICD-10-CM

## 2022-12-29 DIAGNOSIS — R93.3 ABNORMAL MAGNETIC RESONANCE CHOLANGIOPANCREATOGRAPHY (MRCP): ICD-10-CM

## 2022-12-29 DIAGNOSIS — Z01.818 PRE-OP TESTING: Primary | ICD-10-CM

## 2022-12-29 DIAGNOSIS — K86.9 PANCREATIC LESION: ICD-10-CM

## 2022-12-29 PROCEDURE — 43238 EGD US FINE NEEDLE BX/ASPIR: CPT | Performed by: INTERNAL MEDICINE

## 2022-12-29 PROCEDURE — 45380 COLONOSCOPY AND BIOPSY: CPT | Performed by: INTERNAL MEDICINE

## 2022-12-29 PROCEDURE — 0DDK8ZX EXTRACTION OF ASCENDING COLON, VIA NATURAL OR ARTIFICIAL OPENING ENDOSCOPIC, DIAGNOSTIC: ICD-10-PCS | Performed by: INTERNAL MEDICINE

## 2022-12-29 PROCEDURE — 0DDP8ZX EXTRACTION OF RECTUM, VIA NATURAL OR ARTIFICIAL OPENING ENDOSCOPIC, DIAGNOSTIC: ICD-10-PCS | Performed by: INTERNAL MEDICINE

## 2022-12-29 PROCEDURE — 0DBL8ZX EXCISION OF TRANSVERSE COLON, VIA NATURAL OR ARTIFICIAL OPENING ENDOSCOPIC, DIAGNOSTIC: ICD-10-PCS | Performed by: INTERNAL MEDICINE

## 2022-12-29 PROCEDURE — 45385 COLONOSCOPY W/LESION REMOVAL: CPT | Performed by: INTERNAL MEDICINE

## 2022-12-29 PROCEDURE — 0FD98ZX EXTRACTION OF COMMON BILE DUCT, VIA NATURAL OR ARTIFICIAL OPENING ENDOSCOPIC, DIAGNOSTIC: ICD-10-PCS | Performed by: INTERNAL MEDICINE

## 2022-12-29 PROCEDURE — 43239 EGD BIOPSY SINGLE/MULTIPLE: CPT | Performed by: INTERNAL MEDICINE

## 2022-12-29 PROCEDURE — 0DDE8ZX EXTRACTION OF LARGE INTESTINE, VIA NATURAL OR ARTIFICIAL OPENING ENDOSCOPIC, DIAGNOSTIC: ICD-10-PCS | Performed by: INTERNAL MEDICINE

## 2022-12-29 RX ORDER — LEVOFLOXACIN 5 MG/ML
500 INJECTION, SOLUTION INTRAVENOUS ONCE
Status: COMPLETED | OUTPATIENT
Start: 2022-12-29 | End: 2022-12-29

## 2022-12-29 RX ORDER — NALOXONE HYDROCHLORIDE 0.4 MG/ML
80 INJECTION, SOLUTION INTRAMUSCULAR; INTRAVENOUS; SUBCUTANEOUS AS NEEDED
Status: DISCONTINUED | OUTPATIENT
Start: 2022-12-29 | End: 2022-12-29

## 2022-12-29 RX ORDER — LIDOCAINE HYDROCHLORIDE 10 MG/ML
INJECTION, SOLUTION EPIDURAL; INFILTRATION; INTRACAUDAL; PERINEURAL AS NEEDED
Status: DISCONTINUED | OUTPATIENT
Start: 2022-12-29 | End: 2022-12-29 | Stop reason: SURG

## 2022-12-29 RX ORDER — LEVOFLOXACIN 500 MG/1
500 TABLET, FILM COATED ORAL EVERY 24 HOURS
Qty: 2 TABLET | Refills: 0 | Status: SHIPPED | OUTPATIENT
Start: 2022-12-30 | End: 2023-01-01

## 2022-12-29 RX ORDER — SODIUM CHLORIDE, SODIUM LACTATE, POTASSIUM CHLORIDE, CALCIUM CHLORIDE 600; 310; 30; 20 MG/100ML; MG/100ML; MG/100ML; MG/100ML
INJECTION, SOLUTION INTRAVENOUS CONTINUOUS
Status: DISCONTINUED | OUTPATIENT
Start: 2022-12-29 | End: 2022-12-29

## 2022-12-29 RX ADMIN — LIDOCAINE HYDROCHLORIDE 50 MG: 10 INJECTION, SOLUTION EPIDURAL; INFILTRATION; INTRACAUDAL; PERINEURAL at 12:45:00

## 2022-12-29 RX ADMIN — SODIUM CHLORIDE, SODIUM LACTATE, POTASSIUM CHLORIDE, CALCIUM CHLORIDE: 600; 310; 30; 20 INJECTION, SOLUTION INTRAVENOUS at 12:40:00

## 2022-12-29 RX ADMIN — LEVOFLOXACIN 500 MG: 5 INJECTION, SOLUTION INTRAVENOUS at 12:56:00

## 2022-12-29 RX ADMIN — SODIUM CHLORIDE, SODIUM LACTATE, POTASSIUM CHLORIDE, CALCIUM CHLORIDE: 600; 310; 30; 20 INJECTION, SOLUTION INTRAVENOUS at 13:34:00

## 2022-12-29 NOTE — ANESTHESIA POSTPROCEDURE EVALUATION
Patient:  Darline Markham    Procedure Summary     Date: 12/29/22 Room / Location: [address] [place] [address] ENDOSCOPY 01 / [address] [place] [address] ENDOSCOPY    Anesthesia Start: 5591 Anesthesia Stop: 7932    Procedures:       COLONOSCOPY      ESOPHAGOGASTRODUODENOSCOPY (EGD)      ENDOSCOPIC ULTRASOUND (EUS) with fine needle aspiration Diagnosis:       Pancreatic lesion      Abnormal magnetic resonance cholangiopancreatography (MRCP)      Colon cancer screening      Hx of diverticulitis of colon      (colon polyps, hemorrhoids, normal EGD, pancreatic cyst, )    Surgeons: Gurdeep De Leon MD Anesthesiologist: Kassandra Layne CRNA    Anesthesia Type: MAC ASA Status: 2          Anesthesia Type: MAC    Vitals Value Taken Time   /74 12/29/22 1338   Temp  12/29/22 1338   Pulse 74 12/29/22 1338   Resp 15 12/29/22 1338   SpO2 96 12/29/22 1338       [facility] AN Post Evaluation:   Patient Evaluated in PACU  Patient Participation: waiting for patient participation  Level of Consciousness: awake and alert  Pain Management: adequate  Airway Patency:patent  Yes    Cardiovascular Status: blood pressure returned to baseline  Respiratory Status: acceptable and room air  Postoperative Hydration acceptable      Cj Mathews CRNA  12/29/2022 1:38 PM

## 2022-12-29 NOTE — TELEPHONE ENCOUNTER
Discussed/reviewed findings and recommendations with patient and spouse.     Sending new script for levofloxacin for 2 days    Ady Mancia MD  [address] - Gastroenterology  12/29/2022  1:58 PM

## 2022-12-29 NOTE — DISCHARGE INSTRUCTIONS

## 2022-12-29 NOTE — OR NURSING
Interpace given to cytology [facility]. Two vials and one buccal brush labeled with patient sticker, date and location. Requisition, and facesheet placed with specimen.

## 2023-01-05 ENCOUNTER — TELEPHONE (OUTPATIENT)
Dept: HEMATOLOGY/ONCOLOGY | Facility: HOSPITAL | Age: 63
End: 2023-01-05

## 2023-01-05 ENCOUNTER — TELEPHONE (OUTPATIENT)
Dept: SURGERY | Facility: CLINIC | Age: 63
End: 2023-01-05

## 2023-01-05 ENCOUNTER — APPOINTMENT (OUTPATIENT)
Dept: HEMATOLOGY/ONCOLOGY | Facility: HOSPITAL | Age: 63
End: 2023-01-05
Attending: INTERNAL MEDICINE
Payer: COMMERCIAL

## 2023-01-05 NOTE — TELEPHONE ENCOUNTER
I called patient to schedule a new consult to see Dr Valente Carlson , referred by Dr. Sakina Rodney for a pancreatic cyst

## 2023-01-05 NOTE — TELEPHONE ENCOUNTER
Per Dr. Luz aHys please reschedule patient's appointment for 1 month for a follow up with HW. Per Jeremi Powers.  Called 1/5/23

## 2023-01-05 NOTE — TELEPHONE ENCOUNTER
I called the patient to discuss her plans for follow up. We reviewed that pathology result have not returned from her EUS procedure with Dr. Margo Decker. I reviewed her case with Dr. Desiree Hunt in surgical oncology. He would like to see this patient as he feels the pancreatic lesion does not need surgical resection only monitoring with subsequent MRI/MRCP. Her result suggest this is not an aggressive pancreatic adenocarcinoma that needs immediate surgical removal.  Discussed the plan for Blairwandy Sera to follow-up with me in a month. In the meantime, she will be seen by Dr. Desiree Hunt. His office will reach out to her to schedule the visit. She thanked me for the call and information. My office will reach out to Justyn Zamora for her new visit with me. Pilar Rice MD  [facility]  [address], [facility]

## 2023-01-06 ENCOUNTER — TELEPHONE (OUTPATIENT)
Facility: CLINIC | Age: 63
End: 2023-01-06

## 2023-01-10 ENCOUNTER — APPOINTMENT (OUTPATIENT)
Dept: HEMATOLOGY/ONCOLOGY | Facility: HOSPITAL | Age: 63
End: 2023-01-10
Attending: INTERNAL MEDICINE
Payer: COMMERCIAL

## 2023-01-10 ENCOUNTER — TELEPHONE (OUTPATIENT)
Facility: CLINIC | Age: 63
End: 2023-01-10

## 2023-01-10 NOTE — TELEPHONE ENCOUNTER
Health maintenance updated. Last colonoscopy done 12/29/22. 2 year recall placed into Pt Outreach, next due on 12/29/24 per Dr. Elizabeth Rhodes.       MRI/MRCP recall laced in alternate TE.

## 2023-01-10 NOTE — TELEPHONE ENCOUNTER
I called and spoke with the patient. We discussed her path including those biopsies from the upper endoscopy which was unremarkable. She had 3 colon polyps that were adenomas. I also reviewed that she had had a colonoscopy at an outside facility in October with 6 adenomas removed then. Based on the number of adenomas, I discussed I recommend we repeat her colonoscopy in 2 years. 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 Discussed I recommend we repeat her MRI/MRCP in 1 year. She notes she was referred to Dr. Vanegas Courser. Discussed welcome to see him and get his opinion on the cyst as well. She notes only at this time some symptoms of heartburn. Says she was taking omeprazole daily but was giving her stomach ache, diarrhea and incontinence and not taking now has improved those symptoms but without it having more reflux and she is trying behavioral modification like avoiding eating late and avoiding food triggers. Discussed she should use famotidine OTC. Appreciative of call.     GI staff:    Please recall patient for MRI/MRCP in 1 year    Please recall patient for colonoscopy in 2 years    Deidre Mane MD  [address] - Gastroenterology  1/10/2023  2:36 PM

## 2023-01-11 ENCOUNTER — TELEPHONE (OUTPATIENT)
Dept: HEMATOLOGY/ONCOLOGY | Facility: HOSPITAL | Age: 63
End: 2023-01-11

## 2023-01-11 NOTE — TELEPHONE ENCOUNTER
Patient called unsure if she needs to see Dr. Sis Casillas- reviewed Dr. Arden Esteban note and explained to her his reasoning for seeing Dr. Sis Casillas per Dr. Arden Esteban note. She will keep appointment on 1/18/23 with surgery.

## 2023-01-16 PROBLEM — E66.9 OBESITY WITH BODY MASS INDEX 30 OR GREATER: Status: ACTIVE | Noted: 2019-08-23

## 2023-01-16 PROBLEM — E73.9 INTESTINAL DISACCHARIDASE DEFICIENCY: Status: ACTIVE | Noted: 2020-04-21

## 2023-01-16 PROBLEM — R53.83 MALAISE AND FATIGUE: Status: ACTIVE | Noted: 2020-03-12

## 2023-01-16 PROBLEM — M76.821 POSTERIOR TIBIAL TENDINITIS OF RIGHT LEG: Status: ACTIVE | Noted: 2017-12-27

## 2023-01-16 PROBLEM — M54.6 PAIN IN THORACIC SPINE: Status: ACTIVE | Noted: 2019-12-03

## 2023-01-16 PROBLEM — M72.2 PLANTAR FASCIAL FIBROMATOSIS: Status: ACTIVE | Noted: 2021-12-14

## 2023-01-16 PROBLEM — G57.60 MORTON'S METATARSALGIA: Status: ACTIVE | Noted: 2021-12-14

## 2023-01-16 PROBLEM — E11.9 TYPE 2 DIABETES MELLITUS WITHOUT COMPLICATION (HCC): Status: ACTIVE | Noted: 2019-09-28

## 2023-01-16 PROBLEM — R15.9 INCONTINENCE OF FECES: Status: ACTIVE | Noted: 2021-12-18

## 2023-01-16 PROBLEM — J30.2 SEASONAL ALLERGIC RHINITIS: Status: ACTIVE | Noted: 2018-11-27

## 2023-01-16 PROBLEM — M17.9 OSTEOARTHRITIS OF KNEE: Status: ACTIVE | Noted: 2021-12-18

## 2023-01-16 PROBLEM — R53.81 MALAISE AND FATIGUE: Status: ACTIVE | Noted: 2020-03-12

## 2023-01-16 PROBLEM — R82.81 PYURIA: Status: ACTIVE | Noted: 2018-08-24

## 2023-01-16 PROBLEM — M72.2 BILATERAL PLANTAR FASCIITIS: Status: ACTIVE | Noted: 2021-12-18

## 2023-01-16 PROBLEM — K57.92 DIVERTICULITIS: Status: ACTIVE | Noted: 2022-12-02

## 2023-01-16 PROBLEM — S83.249A ACUTE MENISCAL TEAR, MEDIAL: Status: ACTIVE | Noted: 2021-11-23

## 2023-01-16 PROBLEM — B34.9 VIRAL INFECTION: Status: ACTIVE | Noted: 2021-02-12

## 2023-01-16 PROBLEM — E78.2 MIXED HYPERLIPIDEMIA: Status: ACTIVE | Noted: 2020-04-21

## 2023-01-16 PROBLEM — R51.9 HEADACHE: Status: ACTIVE | Noted: 2020-04-12
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
## 2023-01-16 PROBLEM — E66.9 OBESITY: Status: ACTIVE | Noted: 2019-11-03

## 2023-01-16 PROBLEM — K58.9 IRRITABLE BOWEL SYNDROME: Status: ACTIVE | Noted: 2021-12-18

## 2023-01-16 PROBLEM — M54.17 LUMBOSACRAL RADICULOPATHY: Status: ACTIVE | Noted: 2020-03-22

## 2023-01-16 PROBLEM — R06.00 DYSPNEA: Status: ACTIVE | Noted: 2020-03-12

## 2023-01-16 PROBLEM — R26.2 DISABILITY OF WALKING: Status: ACTIVE | Noted: 2022-01-07

## 2023-01-16 PROBLEM — R26.9 ABNORMAL GAIT: Status: ACTIVE | Noted: 2021-11-29

## 2023-01-16 PROBLEM — K86.2 CYST OF PANCREAS (HCC): Status: ACTIVE | Noted: 2022-12-02

## 2023-01-16 PROBLEM — F41.1 ANXIETY STATE: Status: ACTIVE | Noted: 2020-02-26

## 2023-01-16 PROBLEM — L29.9 ITCHING: Status: ACTIVE | Noted: 2017-12-27

## 2023-01-16 PROBLEM — J34.89 POSTERIOR RHINORRHEA: Status: ACTIVE | Noted: 2020-04-12

## 2023-01-16 PROBLEM — K86.2 CYST OF PANCREAS: Status: ACTIVE | Noted: 2022-12-02

## 2023-01-16 PROBLEM — N94.89 MASS OF UTERINE ADNEXA: Status: ACTIVE | Noted: 2022-12-02

## 2023-01-16 PROBLEM — L40.9 PSORIASIS: Status: ACTIVE | Noted: 2018-05-15

## 2023-01-16 PROBLEM — M25.361 OTHER INSTABILITY, RIGHT KNEE: Status: ACTIVE | Noted: 2021-10-26

## 2023-01-16 PROBLEM — B35.4 TINEA CORPORIS: Status: ACTIVE | Noted: 2017-12-27

## 2023-01-16 PROBLEM — R07.81 PLEURITIC PAIN: Status: ACTIVE | Noted: 2019-10-02

## 2023-01-16 PROBLEM — F41.1 GENERALIZED ANXIETY DISORDER: Status: ACTIVE | Noted: 2019-08-23

## 2023-01-16 PROBLEM — Z23 INFLUENZA VACCINE NEEDED: Status: ACTIVE | Noted: 2019-12-08

## 2023-01-16 PROBLEM — E55.9 VITAMIN D DEFICIENCY: Status: ACTIVE | Noted: 2020-04-21

## 2023-01-16 PROBLEM — L25.9 CONTACT DERMATITIS: Status: ACTIVE | Noted: 2017-12-27

## 2023-01-16 PROBLEM — M77.30 CALCANEAL SPUR: Status: ACTIVE | Noted: 2019-10-02

## 2023-01-16 PROBLEM — I10 HYPERTENSIVE DISORDER: Status: ACTIVE | Noted: 2019-11-03

## 2023-01-18 ENCOUNTER — TELEPHONE (OUTPATIENT)
Dept: OBGYN CLINIC | Facility: CLINIC | Age: 63
End: 2023-01-18

## 2023-01-18 ENCOUNTER — OFFICE VISIT (OUTPATIENT)
Dept: SURGERY | Facility: CLINIC | Age: 63
End: 2023-01-18
Payer: COMMERCIAL

## 2023-01-18 VITALS
BODY MASS INDEX: 31.89 KG/M2 | TEMPERATURE: 98 F | WEIGHT: 180 LBS | SYSTOLIC BLOOD PRESSURE: 145 MMHG | HEART RATE: 80 BPM | DIASTOLIC BLOOD PRESSURE: 80 MMHG | HEIGHT: 63 IN | RESPIRATION RATE: 16 BRPM | OXYGEN SATURATION: 98 %

## 2023-01-18 DIAGNOSIS — K86.2 PANCREATIC CYST: Primary | ICD-10-CM

## 2023-01-18 PROCEDURE — 86301 IMMUNOASSAY TUMOR CA 19-9: CPT | Performed by: SURGERY

## 2023-01-18 NOTE — TELEPHONE ENCOUNTER
Pt stated she is supposed to have surgery. Would like to speak with Dr. Serena Fernando first or does Pt have to make appointment. Please call.

## 2023-01-19 ENCOUNTER — APPOINTMENT (OUTPATIENT)
Dept: HEMATOLOGY/ONCOLOGY | Facility: HOSPITAL | Age: 63
End: 2023-01-19
Attending: INTERNAL MEDICINE
Payer: COMMERCIAL

## 2023-01-19 NOTE — TELEPHONE ENCOUNTER
Pt counseled on laparotomy/bilateral salpingoophorectomy,  pt planning to schedule surgery,  pt wants to schedule for feb 2023,  please call the OR and coordinate with pt .  Thank you

## 2023-01-19 NOTE — TELEPHONE ENCOUNTER
Called and spoke to pt. Pt requests dates after 2/13. I informed pt that I will check dates with ASJ and return her call tomorrow. Request call between [phone].

## 2023-01-31 ENCOUNTER — OFFICE VISIT (OUTPATIENT)
Dept: OBGYN CLINIC | Facility: CLINIC | Age: 63
End: 2023-01-31

## 2023-01-31 VITALS
SYSTOLIC BLOOD PRESSURE: 132 MMHG | WEIGHT: 181.19 LBS | DIASTOLIC BLOOD PRESSURE: 84 MMHG | HEIGHT: 63 IN | BODY MASS INDEX: 32.11 KG/M2

## 2023-01-31 DIAGNOSIS — N83.299 COMPLEX OVARIAN CYST: ICD-10-CM

## 2023-01-31 DIAGNOSIS — N94.89 ADNEXAL MASS: Primary | ICD-10-CM

## 2023-01-31 DIAGNOSIS — Z78.0 ASYMPTOMATIC POSTMENOPAUSAL STATUS: ICD-10-CM

## 2023-01-31 PROCEDURE — 3079F DIAST BP 80-89 MM HG: CPT | Performed by: OBSTETRICS & GYNECOLOGY

## 2023-01-31 PROCEDURE — 99214 OFFICE O/P EST MOD 30 MIN: CPT | Performed by: OBSTETRICS & GYNECOLOGY

## 2023-01-31 PROCEDURE — 3075F SYST BP GE 130 - 139MM HG: CPT | Performed by: OBSTETRICS & GYNECOLOGY

## 2023-01-31 PROCEDURE — 3008F BODY MASS INDEX DOCD: CPT | Performed by: OBSTETRICS & GYNECOLOGY

## 2023-02-01 ENCOUNTER — TELEPHONE (OUTPATIENT)
Dept: OBGYN CLINIC | Facility: CLINIC | Age: 63
End: 2023-02-01

## 2023-02-01 NOTE — TELEPHONE ENCOUNTER
Dr. Sharon Robles told Pt to see Dr. Vanessa Roy for pre op clearance for surgery. Dr. Mendez Given office  requested an order or instructions on what is needed. Surgery is scheduled for 2/28. Please call.

## 2023-02-02 ENCOUNTER — APPOINTMENT (OUTPATIENT)
Dept: HEMATOLOGY/ONCOLOGY | Facility: HOSPITAL | Age: 63
End: 2023-02-02
Attending: INTERNAL MEDICINE
Payer: COMMERCIAL

## 2023-02-03 NOTE — TELEPHONE ENCOUNTER
Spoke to Dr. Som Pedroza office and informed. Pt scheduled for preop appt and will receive call from his RN once appt is completed.

## 2023-02-07 ENCOUNTER — TELEPHONE (OUTPATIENT)
Dept: HEMATOLOGY/ONCOLOGY | Facility: HOSPITAL | Age: 63
End: 2023-02-07

## 2023-02-07 NOTE — TELEPHONE ENCOUNTER
Called and unable to reach patient. Left VM to notify patient that we want to reschedule her appointment tomorrow Wednesday, February 8th, 2023 for a follow up appointment in one year. Left her our office phone number to call back. We will try to contact patient again tomorrow.

## 2023-02-08 ENCOUNTER — APPOINTMENT (OUTPATIENT)
Dept: HEMATOLOGY/ONCOLOGY | Facility: HOSPITAL | Age: 63
End: 2023-02-08
Attending: INTERNAL MEDICINE
Payer: COMMERCIAL

## 2023-02-15 ENCOUNTER — HOSPITAL ENCOUNTER (OUTPATIENT)
Dept: GENERAL RADIOLOGY | Age: 63
Discharge: HOME | End: 2023-02-15
Attending: INTERNAL MEDICINE
Payer: COMMERCIAL

## 2023-02-15 DIAGNOSIS — Z01.818 PREOPERATIVE EXAMINATION, UNSPECIFIED: ICD-10-CM

## 2023-02-15 PROCEDURE — 71046 X-RAY EXAM CHEST 2 VIEWS: CPT | Performed by: INTERNAL MEDICINE

## 2023-02-25 ENCOUNTER — LAB ENCOUNTER (OUTPATIENT)
Dept: LAB | Age: 63
End: 2023-02-25
Attending: OBSTETRICS & GYNECOLOGY
Payer: COMMERCIAL

## 2023-02-25 DIAGNOSIS — Z01.818 PREOPERATIVE TESTING: ICD-10-CM

## 2023-02-25 LAB
ANTIBODY SCREEN: NEGATIVE
RH BLOOD TYPE: POSITIVE
RH BLOOD TYPE: POSITIVE

## 2023-02-25 PROCEDURE — 86900 BLOOD TYPING SEROLOGIC ABO: CPT

## 2023-02-25 PROCEDURE — 86850 RBC ANTIBODY SCREEN: CPT

## 2023-02-25 PROCEDURE — 86901 BLOOD TYPING SEROLOGIC RH(D): CPT

## 2023-02-25 PROCEDURE — 36415 COLL VENOUS BLD VENIPUNCTURE: CPT

## 2023-02-25 RX ORDER — OLMESARTAN MEDOXOMIL AND HYDROCHLOROTHIAZIDE 40/25 40; 25 MG/1; MG/1
1 TABLET ORAL DAILY
COMMUNITY

## 2023-02-26 LAB — SARS-COV-2 RNA RESP QL NAA+PROBE: NOT DETECTED

## 2023-02-28 ENCOUNTER — HOSPITAL ENCOUNTER (INPATIENT)
Facility: HOSPITAL | Age: 63
LOS: 2 days | Discharge: HOME | DRG: 743 | End: 2023-03-02
Attending: OBSTETRICS & GYNECOLOGY | Admitting: OBSTETRICS & GYNECOLOGY
Payer: COMMERCIAL

## 2023-02-28 ENCOUNTER — ANESTHESIA EVENT (OUTPATIENT)
Dept: SURGERY | Facility: HOSPITAL | Age: 63
DRG: 743 | End: 2023-02-28
Payer: COMMERCIAL

## 2023-02-28 ENCOUNTER — ANESTHESIA (OUTPATIENT)
Dept: SURGERY | Facility: HOSPITAL | Age: 63
DRG: 743 | End: 2023-02-28
Payer: COMMERCIAL

## 2023-02-28 DIAGNOSIS — N94.89 ADNEXAL MASS: ICD-10-CM

## 2023-02-28 DIAGNOSIS — Z01.818 PREOPERATIVE TESTING: Primary | ICD-10-CM

## 2023-02-28 PROCEDURE — 58720 REMOVAL OF OVARY/TUBE(S): CPT | Performed by: OBSTETRICS & GYNECOLOGY

## 2023-02-28 PROCEDURE — 0UT20ZZ RESECTION OF BILATERAL OVARIES, OPEN APPROACH: ICD-10-PCS | Performed by: OBSTETRICS & GYNECOLOGY

## 2023-02-28 PROCEDURE — 0UB70ZZ EXCISION OF BILATERAL FALLOPIAN TUBES, OPEN APPROACH: ICD-10-PCS | Performed by: OBSTETRICS & GYNECOLOGY

## 2023-02-28 RX ORDER — MORPHINE SULFATE 4 MG/ML
4 INJECTION, SOLUTION INTRAMUSCULAR; INTRAVENOUS EVERY 10 MIN PRN
Status: DISCONTINUED | OUTPATIENT
Start: 2023-02-28 | End: 2023-02-28 | Stop reason: HOSPADM

## 2023-02-28 RX ORDER — PROCHLORPERAZINE EDISYLATE 5 MG/ML
5 INJECTION INTRAMUSCULAR; INTRAVENOUS EVERY 8 HOURS PRN
Status: DISCONTINUED | OUTPATIENT
Start: 2023-02-28 | End: 2023-02-28 | Stop reason: HOSPADM

## 2023-02-28 RX ORDER — SODIUM CHLORIDE, SODIUM LACTATE, POTASSIUM CHLORIDE, CALCIUM CHLORIDE 600; 310; 30; 20 MG/100ML; MG/100ML; MG/100ML; MG/100ML
INJECTION, SOLUTION INTRAVENOUS CONTINUOUS
Status: DISCONTINUED | OUTPATIENT
Start: 2023-02-28 | End: 2023-02-28 | Stop reason: HOSPADM

## 2023-02-28 RX ORDER — NICOTINE POLACRILEX 4 MG
15 LOZENGE BUCCAL
Status: DISCONTINUED | OUTPATIENT
Start: 2023-02-28 | End: 2023-02-28 | Stop reason: HOSPADM

## 2023-02-28 RX ORDER — NEOSTIGMINE METHYLSULFATE 1 MG/ML
INJECTION, SOLUTION INTRAVENOUS AS NEEDED
Status: DISCONTINUED | OUTPATIENT
Start: 2023-02-28 | End: 2023-02-28 | Stop reason: SURG

## 2023-02-28 RX ORDER — DEXTROSE MONOHYDRATE 25 G/50ML
50 INJECTION, SOLUTION INTRAVENOUS
Status: DISCONTINUED | OUTPATIENT
Start: 2023-02-28 | End: 2023-02-28 | Stop reason: HOSPADM

## 2023-02-28 RX ORDER — HYDROMORPHONE HYDROCHLORIDE 1 MG/ML
0.2 INJECTION, SOLUTION INTRAMUSCULAR; INTRAVENOUS; SUBCUTANEOUS EVERY 5 MIN PRN
Status: DISCONTINUED | OUTPATIENT
Start: 2023-02-28 | End: 2023-02-28 | Stop reason: HOSPADM

## 2023-02-28 RX ORDER — GLYCOPYRROLATE 0.2 MG/ML
INJECTION, SOLUTION INTRAMUSCULAR; INTRAVENOUS AS NEEDED
Status: DISCONTINUED | OUTPATIENT
Start: 2023-02-28 | End: 2023-02-28 | Stop reason: SURG

## 2023-02-28 RX ORDER — BUPIVACAINE HYDROCHLORIDE 2.5 MG/ML
INJECTION, SOLUTION EPIDURAL; INFILTRATION; INTRACAUDAL AS NEEDED
Status: DISCONTINUED | OUTPATIENT
Start: 2023-02-28 | End: 2023-02-28 | Stop reason: HOSPADM

## 2023-02-28 RX ORDER — PANTOPRAZOLE SODIUM 40 MG/1
40 TABLET, DELAYED RELEASE ORAL
Status: DISCONTINUED | OUTPATIENT
Start: 2023-03-01 | End: 2023-03-02

## 2023-02-28 RX ORDER — METOCLOPRAMIDE 10 MG/1
10 TABLET ORAL ONCE
Status: COMPLETED | OUTPATIENT
Start: 2023-02-28 | End: 2023-02-28

## 2023-02-28 RX ORDER — DIPHENHYDRAMINE HYDROCHLORIDE 50 MG/ML
12.5 INJECTION INTRAMUSCULAR; INTRAVENOUS EVERY 4 HOURS PRN
Status: DISCONTINUED | OUTPATIENT
Start: 2023-02-28 | End: 2023-03-01

## 2023-02-28 RX ORDER — AMLODIPINE BESYLATE 10 MG/1
10 TABLET ORAL EVERY MORNING
Status: DISCONTINUED | OUTPATIENT
Start: 2023-03-01 | End: 2023-03-02

## 2023-02-28 RX ORDER — LEVOTHYROXINE SODIUM 0.15 MG/1
150 TABLET ORAL EVERY MORNING
Status: DISCONTINUED | OUTPATIENT
Start: 2023-02-28 | End: 2023-03-02

## 2023-02-28 RX ORDER — ONDANSETRON 4 MG/1
4 TABLET, FILM COATED ORAL EVERY 8 HOURS PRN
Status: DISCONTINUED | OUTPATIENT
Start: 2023-02-28 | End: 2023-03-02

## 2023-02-28 RX ORDER — FAMOTIDINE 20 MG/1
20 TABLET, FILM COATED ORAL ONCE
Status: COMPLETED | OUTPATIENT
Start: 2023-02-28 | End: 2023-02-28

## 2023-02-28 RX ORDER — LIDOCAINE HYDROCHLORIDE 10 MG/ML
INJECTION, SOLUTION EPIDURAL; INFILTRATION; INTRACAUDAL; PERINEURAL AS NEEDED
Status: DISCONTINUED | OUTPATIENT
Start: 2023-02-28 | End: 2023-02-28 | Stop reason: SURG

## 2023-02-28 RX ORDER — MORPHINE SULFATE 10 MG/ML
6 INJECTION, SOLUTION INTRAMUSCULAR; INTRAVENOUS EVERY 10 MIN PRN
Status: DISCONTINUED | OUTPATIENT
Start: 2023-02-28 | End: 2023-02-28 | Stop reason: HOSPADM

## 2023-02-28 RX ORDER — HYDROCODONE BITARTRATE AND ACETAMINOPHEN 5; 325 MG/1; MG/1
2 TABLET ORAL EVERY 4 HOURS PRN
Status: DISCONTINUED | OUTPATIENT
Start: 2023-02-28 | End: 2023-03-01

## 2023-02-28 RX ORDER — NALOXONE HYDROCHLORIDE 0.4 MG/ML
0.08 INJECTION, SOLUTION INTRAMUSCULAR; INTRAVENOUS; SUBCUTANEOUS
Status: DISCONTINUED | OUTPATIENT
Start: 2023-02-28 | End: 2023-03-01

## 2023-02-28 RX ORDER — NICOTINE POLACRILEX 4 MG
30 LOZENGE BUCCAL
Status: DISCONTINUED | OUTPATIENT
Start: 2023-02-28 | End: 2023-02-28 | Stop reason: HOSPADM

## 2023-02-28 RX ORDER — MORPHINE SULFATE 4 MG/ML
2 INJECTION, SOLUTION INTRAMUSCULAR; INTRAVENOUS EVERY 10 MIN PRN
Status: DISCONTINUED | OUTPATIENT
Start: 2023-02-28 | End: 2023-02-28 | Stop reason: HOSPADM

## 2023-02-28 RX ORDER — HYDROMORPHONE HYDROCHLORIDE 1 MG/ML
0.4 INJECTION, SOLUTION INTRAMUSCULAR; INTRAVENOUS; SUBCUTANEOUS EVERY 5 MIN PRN
Status: DISCONTINUED | OUTPATIENT
Start: 2023-02-28 | End: 2023-02-28 | Stop reason: HOSPADM

## 2023-02-28 RX ORDER — SODIUM CHLORIDE, SODIUM LACTATE, POTASSIUM CHLORIDE, CALCIUM CHLORIDE 600; 310; 30; 20 MG/100ML; MG/100ML; MG/100ML; MG/100ML
INJECTION, SOLUTION INTRAVENOUS CONTINUOUS
Status: DISCONTINUED | OUTPATIENT
Start: 2023-02-28 | End: 2023-02-28

## 2023-02-28 RX ORDER — HYDROMORPHONE HYDROCHLORIDE 1 MG/ML
0.6 INJECTION, SOLUTION INTRAMUSCULAR; INTRAVENOUS; SUBCUTANEOUS EVERY 5 MIN PRN
Status: DISCONTINUED | OUTPATIENT
Start: 2023-02-28 | End: 2023-02-28 | Stop reason: HOSPADM

## 2023-02-28 RX ORDER — ENOXAPARIN SODIUM 100 MG/ML
40 INJECTION SUBCUTANEOUS NIGHTLY
Status: DISCONTINUED | OUTPATIENT
Start: 2023-02-28 | End: 2023-03-02

## 2023-02-28 RX ORDER — HYDROCODONE BITARTRATE AND ACETAMINOPHEN 5; 325 MG/1; MG/1
1 TABLET ORAL EVERY 4 HOURS PRN
Status: DISCONTINUED | OUTPATIENT
Start: 2023-02-28 | End: 2023-03-01

## 2023-02-28 RX ORDER — ROCURONIUM BROMIDE 10 MG/ML
INJECTION, SOLUTION INTRAVENOUS AS NEEDED
Status: DISCONTINUED | OUTPATIENT
Start: 2023-02-28 | End: 2023-02-28 | Stop reason: SURG

## 2023-02-28 RX ORDER — DEXTROSE, SODIUM CHLORIDE, SODIUM LACTATE, POTASSIUM CHLORIDE, AND CALCIUM CHLORIDE 5; .6; .31; .03; .02 G/100ML; G/100ML; G/100ML; G/100ML; G/100ML
INJECTION, SOLUTION INTRAVENOUS CONTINUOUS
Status: DISCONTINUED | OUTPATIENT
Start: 2023-02-28 | End: 2023-03-02

## 2023-02-28 RX ORDER — ONDANSETRON 2 MG/ML
INJECTION INTRAMUSCULAR; INTRAVENOUS AS NEEDED
Status: DISCONTINUED | OUTPATIENT
Start: 2023-02-28 | End: 2023-02-28 | Stop reason: SURG

## 2023-02-28 RX ORDER — NALOXONE HYDROCHLORIDE 0.4 MG/ML
80 INJECTION, SOLUTION INTRAMUSCULAR; INTRAVENOUS; SUBCUTANEOUS AS NEEDED
Status: DISCONTINUED | OUTPATIENT
Start: 2023-02-28 | End: 2023-02-28 | Stop reason: HOSPADM

## 2023-02-28 RX ORDER — CEFAZOLIN SODIUM/WATER 2 G/20 ML
SYRINGE (ML) INTRAVENOUS AS NEEDED
Status: DISCONTINUED | OUTPATIENT
Start: 2023-02-28 | End: 2023-02-28 | Stop reason: SURG

## 2023-02-28 RX ORDER — ONDANSETRON 2 MG/ML
4 INJECTION INTRAMUSCULAR; INTRAVENOUS EVERY 8 HOURS PRN
Status: DISCONTINUED | OUTPATIENT
Start: 2023-02-28 | End: 2023-03-02

## 2023-02-28 RX ORDER — SODIUM CHLORIDE 9 MG/ML
INJECTION, SOLUTION INTRAVENOUS CONTINUOUS
Status: DISCONTINUED | OUTPATIENT
Start: 2023-02-28 | End: 2023-03-01

## 2023-02-28 RX ORDER — ONDANSETRON 2 MG/ML
4 INJECTION INTRAMUSCULAR; INTRAVENOUS EVERY 6 HOURS PRN
Status: DISCONTINUED | OUTPATIENT
Start: 2023-02-28 | End: 2023-03-01

## 2023-02-28 RX ORDER — ONDANSETRON 2 MG/ML
4 INJECTION INTRAMUSCULAR; INTRAVENOUS EVERY 6 HOURS PRN
Status: DISCONTINUED | OUTPATIENT
Start: 2023-02-28 | End: 2023-02-28 | Stop reason: HOSPADM

## 2023-02-28 RX ORDER — LOSARTAN POTASSIUM AND HYDROCHLOROTHIAZIDE 25; 100 MG/1; MG/1
1 TABLET ORAL DAILY
Status: DISCONTINUED | OUTPATIENT
Start: 2023-02-28 | End: 2023-02-28 | Stop reason: RX

## 2023-02-28 RX ORDER — DEXAMETHASONE SODIUM PHOSPHATE 4 MG/ML
VIAL (ML) INJECTION AS NEEDED
Status: DISCONTINUED | OUTPATIENT
Start: 2023-02-28 | End: 2023-02-28 | Stop reason: SURG

## 2023-02-28 RX ORDER — ACETAMINOPHEN 500 MG
1000 TABLET ORAL ONCE
Status: COMPLETED | OUTPATIENT
Start: 2023-02-28 | End: 2023-02-28

## 2023-02-28 RX ORDER — CLONAZEPAM 1 MG/1
2 TABLET ORAL NIGHTLY
Status: DISCONTINUED | OUTPATIENT
Start: 2023-02-28 | End: 2023-03-02

## 2023-02-28 RX ADMIN — DEXAMETHASONE SODIUM PHOSPHATE 4 MG: 4 MG/ML VIAL (ML) INJECTION at 08:00:00

## 2023-02-28 RX ADMIN — ROCURONIUM BROMIDE 40 MG: 10 INJECTION, SOLUTION INTRAVENOUS at 07:48:00

## 2023-02-28 RX ADMIN — LIDOCAINE HYDROCHLORIDE 50 MG: 10 INJECTION, SOLUTION EPIDURAL; INFILTRATION; INTRACAUDAL; PERINEURAL at 07:48:00

## 2023-02-28 RX ADMIN — CEFAZOLIN SODIUM/WATER 2 G: 2 G/20 ML SYRINGE (ML) INTRAVENOUS at 07:55:00

## 2023-02-28 RX ADMIN — NEOSTIGMINE METHYLSULFATE 4 MG: 1 INJECTION, SOLUTION INTRAVENOUS at 08:55:00

## 2023-02-28 RX ADMIN — GLYCOPYRROLATE 0.6 MG: 0.2 INJECTION, SOLUTION INTRAMUSCULAR; INTRAVENOUS at 08:55:00

## 2023-02-28 RX ADMIN — SODIUM CHLORIDE, SODIUM LACTATE, POTASSIUM CHLORIDE, CALCIUM CHLORIDE: 600; 310; 30; 20 INJECTION, SOLUTION INTRAVENOUS at 09:16:00

## 2023-02-28 RX ADMIN — ONDANSETRON 4 MG: 2 INJECTION INTRAMUSCULAR; INTRAVENOUS at 08:00:00

## 2023-02-28 NOTE — OPERATIVE REPORT
[facility]    PATIENT'S NAME: Tyson KEY   ATTENDING PHYSICIAN: Barrington Dyer MD   OPERATING PHYSICIAN: Mau Dyer MD   PATIENT ACCOUNT#:   [de-identified]    LOCATION:  [address] #:   [id number]       YOB: 1960  ADMISSION DATE:       02/28/2023      OPERATION DATE:  02/28/2023    OPERATIVE REPORT    PREOPERATIVE DIAGNOSIS:  Large adnexal mass. POSTOPERATIVE DIAGNOSIS:  Large adnexal mass. PROCEDURE:  Exploratory laparotomy and excision of right adnexal mass/right ovary partial tube and left ovary and partial tube. ASSISTANT SURGEON:  Cy Gonzales M.D. ANESTHESIA:  General endotracheal.    ESTIMATED BLOOD LOSS:  Less than 10 mL. URINE OUTPUT:  Pending. COMPLICATIONS:  None. CONDITION:  Patient tolerated the procedure well, was taken to PAR in good condition. INDICATIONS:  The patient is a 71-year-old female noted to have persistent right ovarian cyst and left ovarian cyst.  Per the patient, this has been present for over 6 years. The patient stated she is now ready for surgery. The patient's CA-125 was within normal limits. The patient is menopausal and is status post total abdominal hysterectomy in the past.  The patient was counseled risks, benefits, and alternatives of surgery including risk of bleeding, infection, damage to internal organs (including bowel, bladder, uterus, ovaries, tubes, cervix, vagina, ureters, blood vessels, among other organs), risk of anesthesia, risk of thromboembolic disease such as DVT, PE, MI or stroke, risk of hemorrhage and blood transfusion, among other risks. The patient voiced understanding of all above, and all questions were answered. FINDINGS:  Large right ovary measuring approximately 6 cm with inflammatory cyst noted contiguous to the right ovary. Left ovary noted to be small with no gross abnormality noted.   Portion of right and left fallopian tube noted at the time intraoperatively as well. OPERATIVE TECHNIQUE:  The patient was taken to the operating room. After induction of general anesthesia, the patient was prepped and draped in usual sterile fashion. Mann catheter was placed in bladder for drainage. Ancef 2 g was infused preoperatively for infection prophylaxis. Sequential compression devices were begun prior to procedure, were operative throughout the procedure, and will be operative postop as well for DVT prophylaxis. A knife was used to perform a Pfannenstiel skin incision which was carried down through subcutaneous tissue to the fascia. The fascia was incised in midline and incision extended laterally. Fascia was raised off the rectus muscles. Rectus muscles  in midline. Peritoneum was entered. The peritoneal incision was extended. The patient was placed in Trendelenburg position. The medium Silvio self-retaining retractor was placed. Two tagged moist laparotomy sponges were used to retract the bowel cephalad. Right and left ovary were visualized. The uterus was surgically absent. Right ovary was noted to be enlarged with inflammatory cyst noted contiguous to the right ovary. Left ovary was noted to be small and no inflammatory cyst noted. Portions of fallopian tubes appeared to remain from the prior surgery. The right ovary was then elevated and was noted to be away from the ureter by the surgeons. Curved Nora clamps were placed as well as curved 6 on the infundibulopelvic ligament and the right ovary and probable portion of right fallopian tube was excised intact and sent to Pathology. A 0 Vicryl suture was used to doubly ligate, and excellent hemostasis noted. Attention was turned to the left fallopian tube. This was elevated and noted to be away from the pelvic sidewall and away from the ureters by the surgeons as well. This was doubly clamped and doubly suture ligated with 0 Vicryl suture and excellent hemostasis was noted. At this time, copious irrigation was performed and excellent hemostasis was noted. All counts were correct. Medium Silvio retractor was removed and rectus muscles noted to be hemostatic. The fascia was then closed with #1 Vicryl suture x2 and excellent closure noted. All counts were correct once again. The subcutaneous tissue was irrigated. Excellent hemostasis noted. A 3-0 plain gut suture was used to reapproximate the subcutaneous space. A 3-0 Vicryl Rapide suture was used to close the skin in a subcuticular stitch, and Dermabond was then placed after 20 mL of Marcaine was used in the alan-incisional area for postop analgesia. Dermabond was then placed. A dressing was applied. All counts were correct. The patient tolerated the procedure well, was taken to PAR in good condition. Dictated By Alan Alvarez MD  d: 02/28/2023 09:45:12  t: 02/28/2023 11:33:40  Job [id number]  AAS/    cc: Dr. David Alexander

## 2023-02-28 NOTE — INTERVAL H&P NOTE
Pre-op Diagnosis: Adnexal mass [N94.89]    The above referenced H&P was reviewed by Katherine Orozco. Roxy Perez MD on 2/28/2023, the patient was examined and no significant changes have occurred in the patient's condition since the H&P was performed. I discussed with the patient and/or legal representative the potential benefits, risks and side effects of this procedure; the likelihood of the patient achieving goals; and potential problems that might occur during recuperation. I discussed reasonable alternatives to the procedure, including risks, benefits and side effects related to the alternatives and risks related to not receiving this procedure. We will proceed with procedure as planned. The pt and her family were counseled on the risks/benefits/alternatives of surgery; pt noted that she she has been observing her ovarian cysts for over 6 years and wants to proceed with surgery today;  pt aware of the possibility of needing further treatment/surgery in case of a frozen pelvis, which the pt inquired about today as well, or if findings on final pathology indicate further treatment/surgery in the future, since malignancy is always a possible finding on pathology analysis, and all questions answered. Pt requested to proceed with exploratory laparotomy/bilateral salpingoophorectomy today.

## 2023-02-28 NOTE — BRIEF OP NOTE
Pre-Operative Diagnosis: Adnexal mass [N94.89]     Post-Operative Diagnosis: Adnexal mass [N94.89]      Procedure Performed:   EXPLORATORY LAPAROTOMYy, BILATERAL SALPINGO-OOPHERECTOMY    Surgeon(s) and Role:     * Levi Favre, MD - Primary     * Carin Alejandro MD - Assisting Surgeon    Assistant(s):        Surgical Findings: enlarged right ovary, prob inflammatory cysts noted. Specimen: right ovary/portion of right tube, left ovary/portion left tube     Estimated Blood Loss: No data recorded    Dictation Number:  Pending      Barrington Brown MD  2/28/2023  9:28 AM

## 2023-02-28 NOTE — ANESTHESIA PROCEDURE NOTES
Airway  Date/Time: 2/28/2023 7:49 AM  Urgency: Elective      General Information and Staff    Patient location during procedure: OR  Anesthesiologist: Leticia Padilla MD  Resident/CRNA: Kendra Marie CRNA  Performed: CRNA   Performed by: Kendra Marie CRNA  Authorized by: Leticia Paidlla MD      Indications and Patient Condition  Indications for airway management: anesthesia  Sedation level: deep  Preoxygenated: yes  Patient position: sniffing  Mask difficulty assessment: 1 - vent by mask    Final Airway Details  Final airway type: endotracheal airway      Successful airway: ETT  Cuffed: yes   Successful intubation technique: direct laryngoscopy  Facilitating devices/methods: intubating stylet and cricoid pressure  Endotracheal tube insertion site: oral  Blade: Luca  Blade size: #3  ETT size (mm): 7.0    Cormack-Lehane Classification: grade IIB - view of arytenoids or posterior of glottis only  Placement verified by: chest auscultation and capnometry   Measured from: teeth  ETT to teeth (cm): 22  Number of attempts at approach: 1

## 2023-03-01 LAB
DEPRECATED RDW RBC AUTO: 41.9 FL (ref 35.1–46.3)
ERYTHROCYTE [DISTWIDTH] IN BLOOD BY AUTOMATED COUNT: 12.6 % (ref 11–15)
HCT VFR BLD AUTO: 35.1 %
HGB BLD-MCNC: 11.3 G/DL
MCH RBC QN AUTO: 29.5 PG (ref 26–34)
MCHC RBC AUTO-ENTMCNC: 32.2 G/DL (ref 31–37)
MCV RBC AUTO: 91.6 FL
PLATELET # BLD AUTO: 246 10(3)UL (ref 150–450)
RBC # BLD AUTO: 3.83 X10(6)UL
WBC # BLD AUTO: 18.1 X10(3) UL (ref 4–11)

## 2023-03-01 RX ORDER — GABAPENTIN 300 MG/1
300 CAPSULE ORAL 3 TIMES DAILY
Status: DISCONTINUED | OUTPATIENT
Start: 2023-03-01 | End: 2023-03-02

## 2023-03-01 RX ORDER — IBUPROFEN 600 MG/1
600 TABLET ORAL EVERY 6 HOURS PRN
Status: DISCONTINUED | OUTPATIENT
Start: 2023-03-01 | End: 2023-03-02

## 2023-03-01 RX ORDER — DOCUSATE SODIUM 100 MG/1
100 CAPSULE, LIQUID FILLED ORAL 2 TIMES DAILY
Status: DISCONTINUED | OUTPATIENT
Start: 2023-03-01 | End: 2023-03-02

## 2023-03-01 RX ORDER — ACETAMINOPHEN 500 MG
1000 TABLET ORAL EVERY 6 HOURS PRN
Status: DISCONTINUED | OUTPATIENT
Start: 2023-03-01 | End: 2023-03-02

## 2023-03-01 NOTE — PROGRESS NOTES
POD 1    Pt doing well. Good pain control. Alert and oriented, nad  abd soft, nontender, incision c/d/i  Ext nt    A/P POD 1  Pt doing well. No c/o. Start po pain meds and stop pca. Ambulate.

## 2023-03-01 NOTE — PROGRESS NOTES
Therapeutic Substitution Note    Olmesartan/hydrochlorothiazide 100/25mg is non-formulary and was auto-substituted to losartan/hydrochlorothiazide 100/25mg per [employer]change Policy.     Binta Del Rosario PharmD, 02/28/23, 7:14 PM

## 2023-03-02 VITALS
RESPIRATION RATE: 18 BRPM | HEIGHT: 63 IN | TEMPERATURE: 98 F | WEIGHT: 179 LBS | OXYGEN SATURATION: 95 % | HEART RATE: 85 BPM | BODY MASS INDEX: 31.71 KG/M2 | SYSTOLIC BLOOD PRESSURE: 129 MMHG | DIASTOLIC BLOOD PRESSURE: 70 MMHG

## 2023-03-02 RX ORDER — IBUPROFEN 600 MG/1
600 TABLET ORAL EVERY 6 HOURS PRN
Qty: 40 TABLET | Refills: 1 | Status: SHIPPED | OUTPATIENT
Start: 2023-03-02

## 2023-03-02 RX ORDER — ACETAMINOPHEN 500 MG
500 TABLET ORAL EVERY 6 HOURS PRN
Qty: 40 TABLET | Refills: 1 | Status: SHIPPED | OUTPATIENT
Start: 2023-03-02

## 2023-03-02 NOTE — PROGRESS NOTES
FOCUS: DISCHARGE    D: DISCHARGE ORDERS RECEIVED FROM PROVIDER. A: DISCHARGE AVS GIVEN AND REVIEWED EXTENSIVELY, PRESCRIPTIONS REVIEWED. VOCALIZED UNDERSTANDING. PATIENT INFORMED WHEN TO MAKE FOLLOW UP APPOINTMENTS WITH PROVIDER. R: DISCHARGED IN STABLE CONDITION VIA AMBULATORY.

## 2023-03-02 NOTE — PLAN OF CARE
Problem: Patient Centered Care  Goal: Patient preferences are identified and integrated in the patient's plan of care  Description: Interventions:  - What would you like us to know as we care for you?  Discharge on thursday  - Provide timely, complete, and accurate information to patient/family  - Incorporate patient and family knowledge, values, beliefs, and cultural backgrounds into the planning and delivery of care  - Encourage patient/family to participate in care and decision-making at the level they choose  - Honor patient and family perspectives and choices  Outcome: Progressing

## 2023-03-02 NOTE — PLAN OF CARE
Problem: Patient Centered Care  Goal: Patient preferences are identified and integrated in the patient's plan of care  Description: Interventions:  - What would you like us to know as we care for you?  Pain control  - Provide timely, complete, and accurate information to patient/family  - Incorporate patient and family knowledge, values, beliefs, and cultural backgrounds into the planning and delivery of care  - Encourage patient/family to participate in care and decision-making at the level they choose  - Honor patient and family perspectives and choices  Outcome: Progressing

## 2023-03-02 NOTE — PLAN OF CARE
Problem: Patient Centered Care  Goal: Patient preferences are identified and integrated in the patient's plan of care  Description: Interventions:  - What would you like us to know as we care for you?  - Provide timely, complete, and accurate information to patient/family  - Incorporate patient and family knowledge, values, beliefs, and cultural backgrounds into the planning and delivery of care  - Encourage patient/family to participate in care and decision-making at the level they choose  - Honor patient and family perspectives and choices  Outcome: Completed     Problem: PAIN - ADULT  Goal: Verbalizes/displays adequate comfort level or patient's stated pain goal  Description: INTERVENTIONS:  - Encourage pt to monitor pain and request assistance  - Assess pain using appropriate pain scale  - Administer analgesics based on type and severity of pain and evaluate response  - Implement non-pharmacological measures as appropriate and evaluate response  - Consider cultural and social influences on pain and pain management  - Manage/alleviate anxiety  - Utilize distraction and/or relaxation techniques  - Monitor for opioid side effects  - Notify MD/LIP if interventions unsuccessful or patient reports new pain  - Anticipate increased pain with activity and pre-medicate as appropriate  Outcome: Completed     Problem: RISK FOR INFECTION - ADULT  Goal: Absence of fever/infection during anticipated neutropenic period  Description: INTERVENTIONS  - Monitor WBC  - Administer growth factors as ordered  - Implement neutropenic guidelines  Outcome: Completed     Problem: SAFETY ADULT - FALL  Goal: Free from fall injury  Description: INTERVENTIONS:  - Assess pt frequently for physical needs  - Identify cognitive and physical deficits and behaviors that affect risk of falls.   - [place] fall precautions as indicated by assessment.  - Educate pt/family on patient safety including physical limitations  - Instruct pt to call for assistance with activity based on assessment  - Modify environment to reduce risk of injury  - Provide assistive devices as appropriate  - Consider OT/PT consult to assist with strengthening/mobility  - Encourage toileting schedule  Outcome: Completed     Problem: DISCHARGE PLANNING  Goal: Discharge to home or other facility with appropriate resources  Description: INTERVENTIONS:  - Identify barriers to discharge w/pt and caregiver  - Include patient/family/discharge partner in discharge planning  - Arrange for needed discharge resources and transportation as appropriate  - Identify discharge learning needs (meds, wound care, etc)  - Arrange for interpreters to assist at discharge as needed  - Consider post-discharge preferences of patient/family/discharge partner  - Complete POLST form as appropriate  - Assess patient's ability to be responsible for managing their own health  - Refer to Case Management Department for coordinating discharge planning if the patient needs post-hospital services based on physician/LIP order or complex needs related to functional status, cognitive ability or social support system  Outcome: Completed

## 2023-03-02 NOTE — DISCHARGE SUMMARY
[facility]    Discharge Summary    Tani Corrales Patient Status:  Inpatient    1960 MRN [id number]   Location [facility] 3SE Attending Maximo Simmons MD   Hosp Day # 2 PCP Naomi Dorsey MD, Kaylin Wade MD     Admit Date: 2023    Discharge Date : 3/2/23    Attending Physician: Maximo Simmons MD    Reason for Admission:  Adnexal masses    Procedures:  Exploratory laparotomy  Removal adnexal VIA [facility] [place] Course: POD 2, pt is feeling well, good pain control on tylenol/motrin prn. Passing flatus. Ambulating well. No c/o, pt feels well and requested discharge home now. F/u office 1 week. Discharge Diagnoses: The primary encounter diagnosis was Preoperative testing. A diagnosis of Adnexal mass was also pertinent to this visit. Discharged Condition: good    Disposition: home     Alert and oriented, shannen  abd soft, nontender, incision c/d/i  Ext nt    Discharge instructions given. F/u 1 week    Patient Instructions: Follow-up appointment with office in 1 week. Barrington Dyer MD  3/2/2023  5:46 PM

## 2023-03-06 ENCOUNTER — TELEPHONE (OUTPATIENT)
Dept: OBGYN CLINIC | Facility: CLINIC | Age: 63
End: 2023-03-06

## 2023-03-06 NOTE — TELEPHONE ENCOUNTER
Routed to [facility] clinical pool in error. Please redirect message.

## 2023-03-06 NOTE — TELEPHONE ENCOUNTER
Patient name and  verified. Contacted patient. Patient would not state what symptoms she is experiencing. Requesting a call back from [facility]. Aware ASTIFFANY is not in office currently.

## 2023-03-07 ENCOUNTER — PATIENT MESSAGE (OUTPATIENT)
Dept: OBGYN CLINIC | Facility: CLINIC | Age: 63
End: 2023-03-07

## 2023-03-07 NOTE — TELEPHONE ENCOUNTER
From: Rosey Enrique  To: Barrington Calhoun MD  Sent: 3/7/2023 5:14 PM CST  Subject: Clearance letter for work    Good afternoon,    As per our conversation yesterday , please send me the clearance letter to email it to work [employer].     Respectfully,  Bethel Cagle [facility]   4/6/1960

## 2023-03-07 NOTE — TELEPHONE ENCOUNTER
Note generated and uploaded to [facility] per ASJ verbal approval. Message sent.

## 2023-03-07 NOTE — TELEPHONE ENCOUNTER
Called pt mobile phone,  pt stated she may be feeling a little depressed,  pt stated her work is telling her to go back to work. Stated she is healing well from the surgery, not having pain, incision slightly sore and relieved by ice. Pt has f/u in office 3/15/23 for postop visit. Pt requesting note for her work at her postop visit. Pt taking stool softener to avoid constipation. Eating well, no nausea or vomiting.

## 2023-03-13 ENCOUNTER — LAB ENCOUNTER (OUTPATIENT)
Dept: LAB | Age: 63
End: 2023-03-13
Attending: ANESTHESIOLOGY
Payer: COMMERCIAL

## 2023-03-13 DIAGNOSIS — Z01.812 ENCOUNTER FOR PREPROCEDURAL LABORATORY EXAMINATION: Primary | ICD-10-CM

## 2023-03-13 LAB
ANION GAP SERPL CALC-SCNC: 7 MMOL/L (ref 0–18)
BASOPHILS # BLD AUTO: 0.05 X10(3) UL (ref 0–0.2)
BASOPHILS NFR BLD AUTO: 0.4 %
BUN BLD-MCNC: 18 MG/DL (ref 7–18)
BUN/CREAT SERPL: 20.2 (ref 10–20)
CALCIUM BLD-MCNC: 9 MG/DL (ref 8.5–10.1)
CHLORIDE SERPL-SCNC: 109 MMOL/L (ref 98–112)
CO2 SERPL-SCNC: 28 MMOL/L (ref 21–32)
CREAT BLD-MCNC: 0.89 MG/DL
DEPRECATED RDW RBC AUTO: 41.3 FL (ref 35.1–46.3)
EOSINOPHIL # BLD AUTO: 0.15 X10(3) UL (ref 0–0.7)
EOSINOPHIL NFR BLD AUTO: 1.3 %
ERYTHROCYTE [DISTWIDTH] IN BLOOD BY AUTOMATED COUNT: 12.4 % (ref 11–15)
FASTING STATUS PATIENT QL REPORTED: NO
GFR SERPLBLD BASED ON 1.73 SQ M-ARVRAT: 74 ML/MIN/1.73M2 (ref 60–?)
GLUCOSE BLD-MCNC: 140 MG/DL (ref 70–99)
HCT VFR BLD AUTO: 37.1 %
HGB BLD-MCNC: 11.9 G/DL
IMM GRANULOCYTES # BLD AUTO: 0.03 X10(3) UL (ref 0–1)
IMM GRANULOCYTES NFR BLD: 0.3 %
INR BLD: 0.98 (ref 0.85–1.16)
LYMPHOCYTES # BLD AUTO: 3.17 X10(3) UL (ref 1–4)
LYMPHOCYTES NFR BLD AUTO: 26.6 %
MCH RBC QN AUTO: 29.2 PG (ref 26–34)
MCHC RBC AUTO-ENTMCNC: 32.1 G/DL (ref 31–37)
MCV RBC AUTO: 90.9 FL
MONOCYTES # BLD AUTO: 0.57 X10(3) UL (ref 0.1–1)
MONOCYTES NFR BLD AUTO: 4.8 %
NEUTROPHILS # BLD AUTO: 7.93 X10 (3) UL (ref 1.5–7.7)
NEUTROPHILS # BLD AUTO: 7.93 X10(3) UL (ref 1.5–7.7)
NEUTROPHILS NFR BLD AUTO: 66.6 %
OSMOLALITY SERPL CALC.SUM OF ELEC: 302 MOSM/KG (ref 275–295)
PLATELET # BLD AUTO: 312 10(3)UL (ref 150–450)
POTASSIUM SERPL-SCNC: 3.6 MMOL/L (ref 3.5–5.1)
PROTHROMBIN TIME: 12.9 SECONDS (ref 11.6–14.8)
RBC # BLD AUTO: 4.08 X10(6)UL
SODIUM SERPL-SCNC: 144 MMOL/L (ref 136–145)
WBC # BLD AUTO: 11.9 X10(3) UL (ref 4–11)

## 2023-03-13 PROCEDURE — 80048 BASIC METABOLIC PNL TOTAL CA: CPT

## 2023-03-13 PROCEDURE — 36415 COLL VENOUS BLD VENIPUNCTURE: CPT

## 2023-03-13 PROCEDURE — 85025 COMPLETE CBC W/AUTO DIFF WBC: CPT

## 2023-03-13 PROCEDURE — 85610 PROTHROMBIN TIME: CPT

## 2023-03-15 ENCOUNTER — OFFICE VISIT (OUTPATIENT)
Dept: OBGYN CLINIC | Facility: CLINIC | Age: 63
End: 2023-03-15

## 2023-03-15 VITALS
HEIGHT: 63 IN | BODY MASS INDEX: 31.18 KG/M2 | DIASTOLIC BLOOD PRESSURE: 80 MMHG | HEART RATE: 89 BPM | WEIGHT: 176 LBS | SYSTOLIC BLOOD PRESSURE: 134 MMHG

## 2023-03-15 DIAGNOSIS — Z98.890 POSTOPERATIVE STATE: Primary | ICD-10-CM

## 2023-03-15 PROCEDURE — 3008F BODY MASS INDEX DOCD: CPT | Performed by: OBSTETRICS & GYNECOLOGY

## 2023-03-15 PROCEDURE — 3075F SYST BP GE 130 - 139MM HG: CPT | Performed by: OBSTETRICS & GYNECOLOGY

## 2023-03-15 PROCEDURE — 3079F DIAST BP 80-89 MM HG: CPT | Performed by: OBSTETRICS & GYNECOLOGY

## 2023-03-15 PROCEDURE — 99024 POSTOP FOLLOW-UP VISIT: CPT | Performed by: OBSTETRICS & GYNECOLOGY

## 2023-04-17 ENCOUNTER — OFFICE VISIT (OUTPATIENT)
Dept: OBGYN CLINIC | Facility: CLINIC | Age: 63
End: 2023-04-17

## 2023-04-17 VITALS
DIASTOLIC BLOOD PRESSURE: 86 MMHG | SYSTOLIC BLOOD PRESSURE: 124 MMHG | WEIGHT: 176 LBS | HEIGHT: 63 IN | BODY MASS INDEX: 31.18 KG/M2

## 2023-04-17 DIAGNOSIS — Z98.890 POSTOPERATIVE STATE: Primary | ICD-10-CM

## 2023-04-17 PROCEDURE — 99024 POSTOP FOLLOW-UP VISIT: CPT | Performed by: OBSTETRICS & GYNECOLOGY

## 2023-04-17 PROCEDURE — 3074F SYST BP LT 130 MM HG: CPT | Performed by: OBSTETRICS & GYNECOLOGY

## 2023-04-17 PROCEDURE — 3008F BODY MASS INDEX DOCD: CPT | Performed by: OBSTETRICS & GYNECOLOGY

## 2023-04-17 PROCEDURE — 3079F DIAST BP 80-89 MM HG: CPT | Performed by: OBSTETRICS & GYNECOLOGY

## 2023-04-17 RX ORDER — AMOXICILLIN AND CLAVULANATE POTASSIUM 875; 125 MG/1; MG/1
TABLET, FILM COATED ORAL
COMMUNITY
Start: 2023-04-08

## 2023-04-17 RX ORDER — OMEPRAZOLE 40 MG/1
CAPSULE, DELAYED RELEASE ORAL
COMMUNITY
Start: 2023-04-12

## 2023-04-17 RX ORDER — LEVOTHYROXINE SODIUM 0.15 MG/1
TABLET ORAL
COMMUNITY
Start: 2023-04-09

## 2023-04-17 RX ORDER — DEXTROMETHORPHAN HYDROBROMIDE AND PROMETHAZINE HYDROCHLORIDE 15; 6.25 MG/5ML; MG/5ML
SYRUP ORAL
COMMUNITY
Start: 2023-04-08

## 2023-04-17 RX ORDER — OLMESARTAN MEDOXOMIL 40 MG/1
TABLET ORAL
COMMUNITY
Start: 2023-03-17

## 2023-08-14 ENCOUNTER — ORDER TRANSCRIPTION (OUTPATIENT)
Dept: ADMINISTRATIVE | Facility: HOSPITAL | Age: 63
End: 2023-08-14

## 2023-08-14 DIAGNOSIS — D36.10 NEUROMA: ICD-10-CM

## 2023-08-14 DIAGNOSIS — M72.2 PLANTAR FASCIITIS: Primary | ICD-10-CM

## 2023-08-29 ENCOUNTER — HOSPITAL ENCOUNTER (OUTPATIENT)
Dept: ULTRASOUND IMAGING | Facility: HOSPITAL | Age: 63
Discharge: HOME | End: 2023-08-29
Attending: PODIATRIST
Payer: COMMERCIAL

## 2023-08-29 DIAGNOSIS — M72.2 PLANTAR FASCIITIS: ICD-10-CM

## 2023-08-29 DIAGNOSIS — D36.10 NEUROMA: ICD-10-CM

## 2023-08-29 PROCEDURE — 76881 US COMPL JOINT R-T W/IMG: CPT | Performed by: PODIATRIST

## 2023-12-08 ENCOUNTER — TELEPHONE (OUTPATIENT)
Facility: CLINIC | Age: 63
End: 2023-12-08

## 2023-12-08 DIAGNOSIS — K86.2 PANCREAS CYST: Primary | ICD-10-CM

## 2023-12-08 DIAGNOSIS — R93.3 ABNORMAL MAGNETIC RESONANCE CHOLANGIOPANCREATOGRAPHY (MRCP): ICD-10-CM

## 2023-12-15 NOTE — TELEPHONE ENCOUNTER
Dr. Margo Emery,    Patient due for recall mri/mrcp. Please place order if appropriate, thank you.
Dr. Margo Emery,    The order placed by Dr. Yuliet Sun is good until 1/18/24, are you able to place a new one is case she doesn't get an appt after that?
It looks like her MRI was ordered by Dr. Valente Carlson already but I can repeat the order if needed    Thanks    Arlette Alvarez MD  [address] - Gastroenterology  12/11/2023  4:19 PM
Mychart sent to patient.
Patient outreach message received:    MRI/MRCP 1 recall placed into Pt Outreach. Next due December 2023 per Dr. Elizabeth Rhodes.
Patient viewed message    Last read by Ahmet Artis at 11:05 AM on 12/14/2023.
Sure, order placed    Thanks    Jovi Murphy MD  [address] - Gastroenterology  12/12/2023  9:59 AM
141

## 2024-01-23 ENCOUNTER — HOSPITAL ENCOUNTER (OUTPATIENT)
Dept: MRI IMAGING | Age: 64
Discharge: HOME | End: 2024-01-23
Attending: INTERNAL MEDICINE
Payer: COMMERCIAL

## 2024-01-23 DIAGNOSIS — K86.2 PANCREAS CYST: ICD-10-CM

## 2024-01-23 DIAGNOSIS — R93.3 ABNORMAL MAGNETIC RESONANCE CHOLANGIOPANCREATOGRAPHY (MRCP): ICD-10-CM

## 2024-01-23 PROCEDURE — A9575 INJ GADOTERATE MEGLUMI 0.1ML: HCPCS | Performed by: INTERNAL MEDICINE

## 2024-01-23 PROCEDURE — 74183 MRI ABD W/O CNTR FLWD CNTR: CPT | Performed by: INTERNAL MEDICINE

## 2024-01-23 RX ORDER — GADOTERATE MEGLUMINE 376.9 MG/ML
20 INJECTION INTRAVENOUS
Status: COMPLETED | OUTPATIENT
Start: 2024-01-23 | End: 2024-01-23

## 2024-01-23 RX ADMIN — GADOTERATE MEGLUMINE 20 ML: 376.9 INJECTION INTRAVENOUS at 16:06:00

## 2024-01-30 ENCOUNTER — APPOINTMENT (OUTPATIENT)
Dept: PAIN MANAGEMENT | Age: 64
End: 2024-01-30
Attending: ANESTHESIOLOGY

## 2024-01-31 ENCOUNTER — TELEPHONE (OUTPATIENT)
Dept: GASTROENTEROLOGY | Facility: CLINIC | Age: 64
End: 2024-01-31

## 2024-01-31 DIAGNOSIS — K86.2 PANCREAS CYST (HCC): Primary | ICD-10-CM

## 2024-01-31 DIAGNOSIS — R93.3 ABNORMAL MAGNETIC RESONANCE CHOLANGIOPANCREATOGRAPHY (MRCP): ICD-10-CM

## 2024-01-31 NOTE — TELEPHONE ENCOUNTER
Patient states that she is returning Dr No's call to go over the MRI results, but she is only available between 1:00 to 1:30 during lunch break and then any time 5:00pm.

## 2024-01-31 NOTE — TELEPHONE ENCOUNTER
Dr No,    Patient returned your call to go over MRI results.    Please call, states she's available between 1-1:30 and then after 5pm.

## 2024-01-31 NOTE — TELEPHONE ENCOUNTER
I called the patient discussed the MRI/MRCP. We discussed the cyst of the pancreas is a bit bigger than noted 1 year prior. Discussed again the challenge of these cysts and that diagnosis can be challenging. While fluid studies did not suggest an mucinous cyst, it is a bit large than 1 year prior and radiology suggest it connects with the main pancreatic duct suggestive more of an IPMN. We discussed given this I think it would be reasonable and recommend repeating her EUS in the next few months as we discussed these pre-cancerous cysts can transform to malignancy which can have poor prognosis. She expressed understanding and willingness to plan for this. I also reminded her to follow up with Dr. Bobo as he indicated in his office note from 1 year prior.    GI staff:    Please call the patient to schedule EGD/EUS with possible FNA with MAC at the hospital for abnormal MRCP, pancreas cyst    Thanks    MD Jabari [facility] - Gastroenterology  1/31/2024  1:20 PM

## 2024-03-08 NOTE — TELEPHONE ENCOUNTER
GI staff:     Please call the patient to schedule EGD/EUS with possible FNA with MAC at the hospital for abnormal MRCP, pancreas cyst     Thanks     Neeraj No MD

## 2024-03-12 ENCOUNTER — TELEPHONE (OUTPATIENT)
Dept: SURGERY | Facility: CLINIC | Age: 64
End: 2024-03-12

## 2024-03-12 NOTE — TELEPHONE ENCOUNTER
Called and LVM. Awaiting call back to follow-up on Dr. No recommending EUS and FNA and schedule yearly appointment with Dr. Bobo in April.

## 2024-03-21 NOTE — TELEPHONE ENCOUNTER
Second attempt to reach patient to follow-up on Dr. No recommending EUS and FNA and schedule yearly appointment with Dr. Bobo in April, 2024. LVM. Awaiting call back.

## 2024-03-22 ENCOUNTER — TELEPHONE (OUTPATIENT)
Dept: SURGERY | Facility: CLINIC | Age: 64
End: 2024-03-22

## 2024-03-22 NOTE — TELEPHONE ENCOUNTER
Spoke to patient regarding follow-up appointment with Dr. Bobo past due. Patient declines appointment at this time and states does not want to see Dr. Bobo until after EUS. States calling financial assistance to make payment arrangements for balance due to Dr. No. States this needs to be done prior to EUS and FNA to be scheduled with Dr. No. Advise patient to call financial assistance and schedule EUS and FNA with Dr. No. Financial assistance number given. Advise patient to call as soon as EUS is scheduled or if unable to call and schedule follow-up with Dr. Bobo. All questions answered, advised to call back for any future questions or concerns. Patient verbalized understanding.

## 2024-03-25 NOTE — TELEPHONE ENCOUNTER
Dr. No-    I spoke with patient and attempted to schedule procedure, patient states she is \"confused\" as to what she needs to do first. She states oncology wants her to do some testing as well, and she is unsure what she is supposed to do.    I advised patient you are out of town for the week and I also advised patient to speak to oncology as well in regards to her testing.    Patient states she is ok waiting for your call    Thanks!

## 2024-04-01 NOTE — TELEPHONE ENCOUNTER
I called the patient to try to answer her questions.  There was no answer I left a message to call back.    [facility] - Gastroenterology  4/1/2024  4:49 PM

## 2024-04-04 NOTE — TELEPHONE ENCOUNTER
I called and spoke with the patient.  She answered the phone.  She wanted to simply speak with me about plan.  I reviewed the recent MRI of the end of January.  Which sound increased in size or cyst we again discussed the difficult nature and diagnosis of the cysts.  Discussed given the size increase recommendation for EUS nonurgently.  She is comfortable with this plan and she wants to proceed with the EUS prior to her follow-up with Dr. Bobo which is fine.  She was appreciative the call.  Discussed we will have the office staff call her to schedule the procedure.    GI staff:    Please call the patient to schedule her EUS    Thanks    MD Jabari [facility] - Gastroenterology  4/4/2024  11:06 AM

## 2024-04-04 NOTE — TELEPHONE ENCOUNTER
Scheduled for:  EGD 49886 EUS w/ poss FNA  Provider Name:  Dr. No  Date:  6/3/2024  Location:  [facility]  Sedation:  MAC  Time:  12:30pm, (pt is aware to arrive at 11:30am)   Prep:  NPO  Meds/Allergies Reconciled?:  Physician reviewed     Diagnosis with codes:  abnormal MRCP R93.3 pancreas cyst K86.2  Was patient informed to call insurance with codes (Y/N):  Yes, I confirmed [employer] insurance with this patient.      Referral sent?:  Referral was sent at the time of electronic surgical scheduling.   EM or EOSC notified?:  I sent an electronic request to Endo Scheduling and received a confirmation today.      Medication Orders:  states not currently on diabetic medications  Misc Orders:  n/a     Further instructions given by staff:   I discussed the prep instructions with the patient which she verbally understood and is aware that I will mail the instructions today.

## 2024-05-02 ENCOUNTER — LAB ENCOUNTER (OUTPATIENT)
Dept: LAB | Facility: HOSPITAL | Age: 64
End: 2024-05-02
Attending: OTOLARYNGOLOGY
Payer: COMMERCIAL

## 2024-05-02 ENCOUNTER — OFFICE VISIT (OUTPATIENT)
Dept: OTOLARYNGOLOGY | Facility: CLINIC | Age: 64
End: 2024-05-02
Payer: COMMERCIAL

## 2024-05-02 DIAGNOSIS — C73 THYROID CANCER (HCC): ICD-10-CM

## 2024-05-02 DIAGNOSIS — C73 THYROID CANCER (HCC): Primary | ICD-10-CM

## 2024-05-02 LAB
T3FREE SERPL-MCNC: 3.07 PG/ML (ref 2.4–4.2)
T4 FREE SERPL-MCNC: 2 NG/DL (ref 0.8–1.7)

## 2024-05-02 PROCEDURE — 36415 COLL VENOUS BLD VENIPUNCTURE: CPT

## 2024-05-02 PROCEDURE — 86800 THYROGLOBULIN ANTIBODY: CPT

## 2024-05-02 PROCEDURE — 84439 ASSAY OF FREE THYROXINE: CPT

## 2024-05-02 PROCEDURE — 84481 FREE ASSAY (FT-3): CPT

## 2024-05-02 RX ORDER — SERTRALINE HYDROCHLORIDE 25 MG/1
TABLET, FILM COATED ORAL
COMMUNITY

## 2024-05-02 RX ORDER — LANCETS 33 GAUGE
EACH MISCELLANEOUS
COMMUNITY
Start: 2024-03-30

## 2024-05-02 RX ORDER — BUTALBITAL, ACETAMINOPHEN AND CAFFEINE 50; 325; 40 MG/1; MG/1; MG/1
TABLET ORAL
COMMUNITY

## 2024-05-02 RX ORDER — BLOOD SUGAR DIAGNOSTIC
STRIP MISCELLANEOUS
COMMUNITY
Start: 2024-03-30

## 2024-05-02 RX ORDER — ONDANSETRON 4 MG/1
TABLET, ORALLY DISINTEGRATING ORAL
COMMUNITY
Start: 2024-03-23

## 2024-05-02 RX ORDER — HYDROCHLOROTHIAZIDE 12.5 MG/1
TABLET ORAL
COMMUNITY

## 2024-05-02 NOTE — PROGRESS NOTES
Camelia Markham is a 64 year old female.    Chief Complaint   Patient presents with    Consult     Patient is here due to being referred by Dr. Marie  due to abnormal labs.        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 At the time of surgery in 2010 she was noted to have a left true vocal fold paralysis. She did receive postoperative radioablation by Dr. Pike. I last saw her in 2011 and at that time recommended a repeat nuclear scan. She believes that this has not been done at this point.She has had her levothyroxine adjusted and was recently increased the labs were recently performed demonstrating a TSH of 28.5 as well as low vitamin D levels. She does complain of chronic fatigue as well as a sense of depression. No other signs, symptoms or complaints.    7/20/17 she has not been seen in my office since late 2014.  History of left true vocal cord paralysis with subsequent finding of papillary carcinoma the thyroid extending into the laryngeal structures.  She is done well but more recently has been having findings of elevated thyroglobulin levels.  She underwent an ultrasound with no significant findings other than 3 small nodes less than a centimeter in size but suspicious due to their abnormal appearance.  She did have a subsequent nuclear scan which was negative for any uptake.  He did recently have a PET CT scan which demonstrated uptake in the neck with unusual finding of lymph nodes near the submandibular glands potentially causing blocked view of these nodes during her nuclear scan.  She is otherwise asymptomatic    8/29/17 she is here to go over the results of her recent ultrasound-guided biopsies of 2 lymph nodes which lit up on a PET scan.  Both were noted to have normal lymphoid cellularity with no evidence of malignancy.  No other significant issues at this time.     5/2/24 I last saw her 7 years ago the previous  history of having total thyroidectomy back in .  Known left true vocal cord paralysis.  She has been on a certain level of levothyroxine for years had her blood checked recently and her TSH is suppressed at 0.1 and has been for the last 12 months.  Sent to me for further discussion as to management of her thyroid hormones.  No recent ultrasounds no recent thyroglobulin levels.  Sent by Dr. Marie for my opinion regarding her thyroid issues.      Social History     Socioeconomic History    Marital status:    Tobacco Use    Smoking status: Former     Types: Cigarettes    Smokeless tobacco: Never   Vaping Use    Vaping status: Never Used   Substance and Sexual Activity    Alcohol use: Not Currently     Comment: social    Drug use: No   Other Topics Concern    Caffeine Concern Yes     Comment: 1 cup of coffee        Family History   Problem Relation Age of Onset    Heart Disorder Mother     Breast Cancer Maternal Cousin Female 57    Cancer Daughter 29        Hodgkin's Lymphoma    Ovarian Cancer Neg     Bleeding Disorders Neg     DVT/VTE Neg     Pancreatic Cancer Neg     Prostate Cancer Neg        Past Medical History:    Anxiety state, unspecified    Disorder of thyroid    thyroidectomy    Diverticulosis of large intestine    Esophageal reflux    Exposure to medical therapeutic radiation    thyroid    High blood pressure    Hypercholesteremia    Hypothyroidism    Osteoarthrosis, unspecified whether generalized or localized, unspecified site    Thyroid cancer (HCC)    papillary thyroid; s/p surgery resection with Asher and BRIDGES    Unspecified essential hypertension       Past Surgical History:   Procedure Laterality Date    Colonoscopy N/A 2022    Procedure: COLONOSCOPY;  Surgeon: Neeraj No MD;  [facility] ENDOSCOPY    Egd  2022    Endoscopic ultrasound - internal  2022    Knee surgery Left 2022    no associated bleeding complications          40 week 7 lb(s) 5 oz  Male; 6 hr labor           40 week 7 lb(s) 3 oz Female          41 week 9 lb(s) 8 oz Male    Other surgical history      Injection Tendon Sheath, Ligament    Thyroidectomy      total    Total abdom hysterectomy           REVIEW OF SYSTEMS    System Neg/Pos Details   Constitutional Negative Fatigue, fever and weight loss.   ENMT Negative Drooling.   Eyes Negative Blurred vision and vision changes.   Respiratory Negative Dyspnea and wheezing.   Cardio Negative Chest pain, irregular heartbeat/palpitations and syncope.   GI Negative Abdominal pain and diarrhea.   Endocrine Negative Cold intolerance and heat intolerance.   Neuro Negative Tremors.   Psych Negative Anxiety and depression.   Integumentary Negative Frequent skin infections, pigment change and rash.   Hema/Lymph Negative Easy bleeding and easy bruising.           PHYSICAL EXAM    There were no vitals taken for this visit.       Constitutional Normal Overall appearance - Normal.   Psychiatric Normal Orientation - Oriented to time, place, person & situation. Appropriate mood and affect.   Neck Exam Normal Inspection - Normal. Palpation - Normal. Parotid gland - Normal. Thyroid gland -surgically absent   Eyes Normal Conjunctiva - Right: Normal, Left: Normal. Pupil - Right: Normal, Left: Normal. Fundus - Right: Normal, Left: Normal.   Neurological Normal Memory - Normal. Cranial nerves - Cranial nerves II through XII grossly intact.   Head/Face Normal Facial features - Normal. Eyebrows - Normal. Skull - Normal.        Nasopharynx Normal External nose - Normal. Lips/teeth/gums - Normal. Tonsils - Normal. Oropharynx - Normal.   Ears Normal Inspection - Right: Normal, Left: Normal. Canal - Right: Normal, Left: Normal. TM - Right: Normal, Left: Normal.   Skin Normal Inspection - Normal.        Lymph Detail Normal Submental. Submandibular. Anterior cervical. Posterior cervical. Supraclavicular.        Nose/Mouth/Throat Normal External nose -  Normal. Lips/teeth/gums - Normal. Tonsils - Normal. Oropharynx - Normal.   Nose/Mouth/Throat Normal Nares - Right: Normal Left: Normal. Septum -Normal  Turbinates - Right: Normal, Left: Normal.       Current Outpatient Medications:     butalbital-acetaminophen-caffeine -40 MG Oral Tab, Take 1 tablet every 8 hours by oral route as needed., Disp: , Rfl:     Ciclopirox Olamine 0.77 % External Cream, , Disp: , Rfl:     Glucose Blood (ONETOUCH VERIO) In Vitro Strip, , Disp: , Rfl:     ondansetron 4 MG Oral Tablet Dispersible, , Disp: , Rfl:     sertraline 25 MG Oral Tab, Take 1 tablet every day by oral route in the morning., Disp: , Rfl:     Olmesartan Medoxomil 40 MG Oral Tab, , Disp: , Rfl:     Omeprazole 40 MG Oral Capsule Delayed Release, , Disp: , Rfl:     Levothyroxine Sodium 150 MCG/5ML Oral Solution, Take by mouth every morning., Disp: , Rfl:     amLODIPine 10 MG Oral Tab, Take 1 tablet (10 mg total) by mouth every morning., Disp: , Rfl:     clonazePAM 2 MG Oral Tab, Take 1 tablet (2 mg total) by mouth nightly., Disp: , Rfl:     hydroCHLOROthiazide 12.5 MG Oral Tab, Take 1 tablet every day by oral route in the morning. (Patient not taking: Reported on 5/2/2024), Disp: , Rfl:     Lancets (ONETOUCH DELICA PLUS [id number]) Does not apply Misc, , Disp: , Rfl:     metFORMIN HCl 1000 MG Oral Tab, , Disp: , Rfl:   ASSESSMENT AND PLAN    1. Thyroid cancer (HCC)  She is more than 13 years out from total thyroidectomy for papillary carcinoma.  Known true vocal cord paralysis on the left preoperatively.  No respiratory issues at this time.  Chronically suppressed TSH.  We did discuss the fact that at this point she more than likely does not require her TSH to be suppressed and may be able to back off on some of her levothyroxine.  Has not seen Dr. Dana Sands of endocrinology for many years.  I did recommend getting an ultrasound of her neck to evaluate for any residual thyroid tissue or new lymphadenopathy as she did  have lymph nodes in her neck 6 years ago that turned out to be benign.  Additionally I did recommend checking a thyroglobulin level and T3-T4 and she will follow-up with Dr. Sands to discuss management of her levothyroxine.  She will continue to see me on a yearly basis for routine oncologic follow-up.        This note was prepared using Dragon Medical voice recognition dictation software. As a result errors may occur. When identified these errors have been corrected. While every attempt is made to correct errors during dictation discrepancies may still exist    Norman Asher MD    5/2/2024    3:20 PM

## 2024-05-06 LAB
THYROGLOB AB: <1 IU/ML
THYROGLOB IMA: 7.7 NG/ML

## 2024-05-20 ENCOUNTER — TELEPHONE (OUTPATIENT)
Facility: CLINIC | Age: 64
End: 2024-05-20

## 2024-05-20 NOTE — TELEPHONE ENCOUNTER
Spoke to patient states she can't speak right now. I let her know I will call towards the end of business day.

## 2024-05-20 NOTE — TELEPHONE ENCOUNTER
Dr. No,    Patient is scheduled for EGD/EUS on 6/3. For the past year on/off she's been having episodes of diarrhea 10 minutes after eating, she has to run to the bathroom. Notes she has had some accidents. She changed her eating habits 7 months ago, eats mostly home cooked meals.  Since this past Thursday she also has been having pain on right side under rib cage. She is able to breathe but taking deep breaths hurt.  No tenderness. She saw her pcp this past Saturday and currently following liquid/bland diet for 5 days. Staying hydrated. Feels tired and hungry due to diet. She was prescribed dicyclomine and ciprofloxacin. Hasn't started either, wanted to get your thoughts before taking to see if you know what could be going on.    Her pcp asked her to reach out to you for further guidance. She wanted to see you prior to procedure if possible or if you have recommendations to follow in the meantime prior to her procedure.

## 2024-05-21 NOTE — TELEPHONE ENCOUNTER
Looking through her chart appears to indicate similar episodes of urgent/loose stools she described to our GI APRN over 2 years ago. I would have her follow up in the office to further discuss. I see she is scheduled 6/27/24. I'm not sure unfortunatley I have any sooner availability. I think a routine appointment to discuss this makes sense. I'm not sure what to make of the RUQ pain and taking deep breath hurts. I would recommend this be evaluated sooner than later in the emergency department if severe not improving or consider urgent care/immediate care if less severe/not constant.     Thanks    MD Jabari [facility]  [facility] - Gastroenterology  5/21/2024  2:14 PM

## 2024-05-23 ENCOUNTER — APPOINTMENT (OUTPATIENT)
Dept: CT IMAGING | Facility: HOSPITAL | Age: 64
End: 2024-05-23
Attending: STUDENT IN AN ORGANIZED HEALTH CARE EDUCATION/TRAINING PROGRAM

## 2024-05-23 ENCOUNTER — HOSPITAL ENCOUNTER (EMERGENCY)
Facility: HOSPITAL | Age: 64
Discharge: HOME | End: 2024-05-23
Attending: STUDENT IN AN ORGANIZED HEALTH CARE EDUCATION/TRAINING PROGRAM

## 2024-05-23 VITALS
OXYGEN SATURATION: 95 % | SYSTOLIC BLOOD PRESSURE: 147 MMHG | HEIGHT: 63 IN | RESPIRATION RATE: 24 BRPM | WEIGHT: 184 LBS | HEART RATE: 63 BPM | DIASTOLIC BLOOD PRESSURE: 74 MMHG | BODY MASS INDEX: 32.6 KG/M2 | TEMPERATURE: 98 F

## 2024-05-23 DIAGNOSIS — K57.92 ACUTE DIVERTICULITIS: Primary | ICD-10-CM

## 2024-05-23 LAB
ADENOVIRUS F 40/41 PCR: NEGATIVE
ALBUMIN SERPL-MCNC: 4.4 G/DL (ref 3.2–4.8)
ALBUMIN/GLOB SERPL: 1.2 {RATIO} (ref 1–2)
ALP LIVER SERPL-CCNC: 121 U/L
ALT SERPL-CCNC: 23 U/L
ANION GAP SERPL CALC-SCNC: 8 MMOL/L (ref 0–18)
AST SERPL-CCNC: 23 U/L (ref ?–34)
ASTROVIRUS PCR: NEGATIVE
BASOPHILS # BLD AUTO: 0.04 X10(3) UL (ref 0–0.2)
BASOPHILS NFR BLD AUTO: 0.4 %
BILIRUB SERPL-MCNC: 1.2 MG/DL (ref 0.2–1.1)
BILIRUB UR QL: NEGATIVE
BUN BLD-MCNC: 12 MG/DL (ref 9–23)
BUN/CREAT SERPL: 14 (ref 10–20)
C CAYETANENSIS DNA SPEC QL NAA+PROBE: NEGATIVE
C DIFF TOX B STL QL: NEGATIVE
CALCIUM BLD-MCNC: 9.3 MG/DL (ref 8.7–10.4)
CAMPY SP DNA.DIARRHEA STL QL NAA+PROBE: NEGATIVE
CHLORIDE SERPL-SCNC: 109 MMOL/L (ref 98–112)
CLARITY UR: CLEAR
CO2 SERPL-SCNC: 26 MMOL/L (ref 21–32)
COLOR UR: COLORLESS
CREAT BLD-MCNC: 0.86 MG/DL
CRYPTOSP DNA SPEC QL NAA+PROBE: NEGATIVE
D DIMER PPP FEU-MCNC: 0.55 UG/ML FEU (ref ?–0.63)
DEPRECATED RDW RBC AUTO: 46.8 FL (ref 35.1–46.3)
EAEC PAA PLAS AGGR+AATA ST NAA+NON-PRB: POSITIVE
EC STX1+STX2 + H7 FLIC SPEC NAA+PROBE: NEGATIVE
EGFRCR SERPLBLD CKD-EPI 2021: 76 ML/MIN/1.73M2 (ref 60–?)
ENTAMOEBA HISTOLYTICA PCR: NEGATIVE
EOSINOPHIL # BLD AUTO: 0.17 X10(3) UL (ref 0–0.7)
EOSINOPHIL NFR BLD AUTO: 1.8 %
EPEC EAE GENE STL QL NAA+NON-PROBE: POSITIVE
ERYTHROCYTE [DISTWIDTH] IN BLOOD BY AUTOMATED COUNT: 14.1 % (ref 11–15)
ETEC LTA+ST1A+ST1B TOX ST NAA+NON-PROBE: NEGATIVE
GIARDIA LAMBLIA PCR: NEGATIVE
GLOBULIN PLAS-MCNC: 3.8 G/DL (ref 2–3.5)
GLUCOSE BLD-MCNC: 106 MG/DL (ref 70–99)
GLUCOSE UR-MCNC: NORMAL MG/DL
HCT VFR BLD AUTO: 41 %
HGB BLD-MCNC: 13 G/DL
HGB UR QL STRIP.AUTO: NEGATIVE
IMM GRANULOCYTES # BLD AUTO: 0.03 X10(3) UL (ref 0–1)
IMM GRANULOCYTES NFR BLD: 0.3 %
KETONES UR-MCNC: NEGATIVE MG/DL
LEUKOCYTE ESTERASE UR QL STRIP.AUTO: 75
LIPASE SERPL-CCNC: 33 U/L (ref 13–75)
LYMPHOCYTES # BLD AUTO: 2.73 X10(3) UL (ref 1–4)
LYMPHOCYTES NFR BLD AUTO: 28.4 %
MCH RBC QN AUTO: 28.6 PG (ref 26–34)
MCHC RBC AUTO-ENTMCNC: 31.7 G/DL (ref 31–37)
MCV RBC AUTO: 90.3 FL
MONOCYTES # BLD AUTO: 0.47 X10(3) UL (ref 0.1–1)
MONOCYTES NFR BLD AUTO: 4.9 %
NEUTROPHILS # BLD AUTO: 6.16 X10 (3) UL (ref 1.5–7.7)
NEUTROPHILS # BLD AUTO: 6.16 X10(3) UL (ref 1.5–7.7)
NEUTROPHILS NFR BLD AUTO: 64.2 %
NITRITE UR QL STRIP.AUTO: NEGATIVE
NOROVIRUS GI/GII PCR: NEGATIVE
OSMOLALITY SERPL CALC.SUM OF ELEC: 296 MOSM/KG (ref 275–295)
P SHIGELLOIDES DNA STL QL NAA+PROBE: NEGATIVE
PH UR: 6.5 [PH] (ref 5–8)
PLATELET # BLD AUTO: 253 10(3)UL (ref 150–450)
POTASSIUM SERPL-SCNC: 3.9 MMOL/L (ref 3.5–5.1)
PROT SERPL-MCNC: 8.2 G/DL (ref 5.7–8.2)
PROT UR-MCNC: NEGATIVE MG/DL
RBC # BLD AUTO: 4.54 X10(6)UL
ROTAVIRUS A PCR: NEGATIVE
SALMONELLA DNA SPEC QL NAA+PROBE: NEGATIVE
SAPOVIRUS PCR: NEGATIVE
SHIGELLA SP+EIEC IPAH ST NAA+NON-PROBE: NEGATIVE
SODIUM SERPL-SCNC: 143 MMOL/L (ref 136–145)
SP GR UR STRIP: 1.03 (ref 1–1.03)
UROBILINOGEN UR STRIP-ACNC: NORMAL
V CHOLERAE DNA SPEC QL NAA+PROBE: NEGATIVE
VIBRIO DNA SPEC NAA+PROBE: NEGATIVE
WBC # BLD AUTO: 9.6 X10(3) UL (ref 4–11)
YERSINIA DNA SPEC NAA+PROBE: NEGATIVE

## 2024-05-23 PROCEDURE — 99285 EMERGENCY DEPT VISIT HI MDM: CPT

## 2024-05-23 PROCEDURE — 87493 C DIFF AMPLIFIED PROBE: CPT | Performed by: STUDENT IN AN ORGANIZED HEALTH CARE EDUCATION/TRAINING PROGRAM

## 2024-05-23 PROCEDURE — 96361 HYDRATE IV INFUSION ADD-ON: CPT

## 2024-05-23 PROCEDURE — 83690 ASSAY OF LIPASE: CPT | Performed by: STUDENT IN AN ORGANIZED HEALTH CARE EDUCATION/TRAINING PROGRAM

## 2024-05-23 PROCEDURE — 81001 URINALYSIS AUTO W/SCOPE: CPT | Performed by: STUDENT IN AN ORGANIZED HEALTH CARE EDUCATION/TRAINING PROGRAM

## 2024-05-23 PROCEDURE — 85025 COMPLETE CBC W/AUTO DIFF WBC: CPT | Performed by: STUDENT IN AN ORGANIZED HEALTH CARE EDUCATION/TRAINING PROGRAM

## 2024-05-23 PROCEDURE — 99284 EMERGENCY DEPT VISIT MOD MDM: CPT

## 2024-05-23 PROCEDURE — 74177 CT ABD & PELVIS W/CONTRAST: CPT | Performed by: STUDENT IN AN ORGANIZED HEALTH CARE EDUCATION/TRAINING PROGRAM

## 2024-05-23 PROCEDURE — 87507 IADNA-DNA/RNA PROBE TQ 12-25: CPT | Performed by: STUDENT IN AN ORGANIZED HEALTH CARE EDUCATION/TRAINING PROGRAM

## 2024-05-23 PROCEDURE — 96372 THER/PROPH/DIAG INJ SC/IM: CPT

## 2024-05-23 PROCEDURE — 87086 URINE CULTURE/COLONY COUNT: CPT | Performed by: STUDENT IN AN ORGANIZED HEALTH CARE EDUCATION/TRAINING PROGRAM

## 2024-05-23 PROCEDURE — 96374 THER/PROPH/DIAG INJ IV PUSH: CPT

## 2024-05-23 PROCEDURE — 80053 COMPREHEN METABOLIC PANEL: CPT | Performed by: STUDENT IN AN ORGANIZED HEALTH CARE EDUCATION/TRAINING PROGRAM

## 2024-05-23 PROCEDURE — 85379 FIBRIN DEGRADATION QUANT: CPT | Performed by: STUDENT IN AN ORGANIZED HEALTH CARE EDUCATION/TRAINING PROGRAM

## 2024-05-23 RX ORDER — DICYCLOMINE HCL 20 MG
20 TABLET ORAL 4 TIMES DAILY PRN
Qty: 30 TABLET | Refills: 0 | Status: SHIPPED | OUTPATIENT
Start: 2024-05-23 | End: 2024-06-22

## 2024-05-23 RX ORDER — AMOXICILLIN AND CLAVULANATE POTASSIUM 875; 125 MG/1; MG/1
1 TABLET, FILM COATED ORAL 2 TIMES DAILY
Qty: 20 TABLET | Refills: 0 | Status: SHIPPED | OUTPATIENT
Start: 2024-05-23 | End: 2024-06-02

## 2024-05-23 RX ORDER — DICYCLOMINE HYDROCHLORIDE 10 MG/ML
20 INJECTION INTRAMUSCULAR ONCE
Status: COMPLETED | OUTPATIENT
Start: 2024-05-23 | End: 2024-05-23

## 2024-05-23 RX ORDER — ONDANSETRON 4 MG/1
4 TABLET, ORALLY DISINTEGRATING ORAL EVERY 4 HOURS PRN
Qty: 10 TABLET | Refills: 0 | Status: SHIPPED | OUTPATIENT
Start: 2024-05-23 | End: 2024-05-30

## 2024-05-23 RX ORDER — ONDANSETRON 2 MG/ML
4 INJECTION INTRAMUSCULAR; INTRAVENOUS ONCE
Status: COMPLETED | OUTPATIENT
Start: 2024-05-23 | End: 2024-05-23

## 2024-05-23 NOTE — ED INITIAL ASSESSMENT (HPI)
Pt presents for c/o diarrhea x1.5 weeks. Pt reports diarrhea after any solid food or liquid intake. Pt also reports RUQ abd pain. Pt denies recent abx use. Pt denies fevers or vomiting.

## 2024-05-23 NOTE — ED PROVIDER NOTES
Patient Seen in: [facility] Emergency Department      History     Chief Complaint   Patient presents with    Abdomen/Flank Pain     Stated Complaint: Trouble breathing, RUQ pain, diarrhea.    Subjective:   HPI    64-year-old female history of anxiety hypertension hyperlipidemia hypothyroidism presenting for evaluation of diarrhea.  Onset of symptoms 1.5 weeks ago.  Describes several episodes of daily watery nonbloody diarrhea.  Associated with lower and right upper abdominal discomfort.  He describes a lower abdominal pain is cramping.  And and she has persistent discomfort in her right upper quadrant.  This pain is worse with taking deep breaths.  She is due to have endoscopy 6/3 with EUS and possible FNA regarding pancreatic mass for which she had recent MRI.  No fevers chills or night sweats no urinary symptoms.  Denies bleeding from any orifice.    Objective:   Past Medical History:    Anxiety state, unspecified    Disorder of thyroid    thyroidectomy    Diverticulosis of large intestine    Esophageal reflux    Exposure to medical therapeutic radiation    thyroid    High blood pressure    Hypercholesteremia    Hypothyroidism    Osteoarthrosis, unspecified whether generalized or localized, unspecified site    Thyroid cancer (HCC)    papillary thyroid; s/p surgery resection with Asher and BRIDGES    Unspecified essential hypertension              Past Surgical History:   Procedure Laterality Date    Colonoscopy N/A 2022    Procedure: COLONOSCOPY;  Surgeon: Neeraj No MD;  Location: [facility] ENDOSCOPY    Egd  2022    Endoscopic ultrasound - internal  2022    Knee surgery Left 2022    no associated bleeding complications          40 week 7 lb(s) 5 oz Male; 6 hr labor           40 week 7 lb(s) 3 oz Female          41 week 9 lb(s) 8 oz Male    Other surgical history      Injection Tendon Sheath, Ligament    Thyroidectomy      total    Total abdom  hysterectomy                  Social History     Socioeconomic History    Marital status:    Tobacco Use    Smoking status: Never    Smokeless tobacco: Never   Vaping Use    Vaping status: Never Used   Substance and Sexual Activity    Alcohol use: Not Currently     Comment: social    Drug use: No   Other Topics Concern    Caffeine Concern Yes     Comment: 1 cup of coffee    Social History Narrative    Camelia Figueroa is  to Baldemar x30 yrs. She has 3 adult children. Patient works in accounting in book keeping. She lives with her , her mom, and 1 of the children in [place], [place].     Social Determinants of Health      Received from [facility]              Review of Systems    Positive for stated complaint: Trouble breathing, RUQ pain, diarrhea.  Other systems are as noted in HPI.  Constitutional and vital signs reviewed.      All other systems reviewed and negative except as noted above.    Physical Exam     ED Triage Vitals   BP 05/23/24 1634 155/84   Pulse 05/23/24 1634 93   Resp 05/23/24 1634 24   Temp 05/23/24 1635 98.1 °F (36.7 °C)   Temp src 05/23/24 1635 Oral   SpO2 05/23/24 1634 94 %   O2 Device 05/23/24 1634 None (Room air)       Current Vitals:   Vital Signs  BP: 147/74  Pulse: 63  Resp: 24  Temp: 98.1 °F (36.7 °C)  Temp src: Oral    Oxygen Therapy  SpO2: 95 %  O2 Device: None (Room air)            Physical Exam    Constitutional: awake, alert, no sig distress  HENT: mmm, no lesions,  Neck: normal range of motion, no tenderness, supple.  Eyes: PERRL, EOMI, conjunctiva normal, no discharge. Sclera anicteric.  Cardiovascular: rr no murmur  Respiratory: Normal breath sounds, no respiratory distress, no wheezing, no chest tenderness.  GI: Bowel sounds normal, Soft, Epigastric, RUQ, and LLQ TTP, no masses, no pulsatile masses.  : No CVA tenderness.  Skin: Warm, dry, no erythema, no rash.  Musculoskeletal: Intact distal pulses, no edema, no tenderness, no cyanosis, no clubbing. Good  range of motion in all major joints. No tenderness to palpation or major deformities noted. Back- No tenderness.  Neurologic: Alert & oriented x 3, normal motor function, normal sensory function, no focal deficits noted.  Psych: Calm, cooperative, nl affect        ED Course     Labs Reviewed   COMP METABOLIC PANEL (14) - Abnormal; Notable for the following components:       Result Value    Glucose 106 (*)     Calculated Osmolality 296 (*)     Bilirubin, Total 1.2 (*)     Globulin  3.8 (*)     All other components within normal limits   URINALYSIS WITH CULTURE REFLEX - Abnormal; Notable for the following components:    Urine Color Colorless (*)     Leukocyte Esterase Urine 75 (*)     Squamous Epi. Cells Few (*)     All other components within normal limits   CBC W/ DIFFERENTIAL - Abnormal; Notable for the following components:    RDW-SD 46.8 (*)     All other components within normal limits   LIPASE - Normal   D-DIMER - Normal   C. DIFFICILE(TOXIGENIC)PCR - Normal   CBC WITH DIFFERENTIAL WITH PLATELET    Narrative:     The following orders were created for panel order CBC With Differential With Platelet.  Procedure                               Abnormality         Status                     ---------                               -----------         ------                     CBC W/ DIFFERENTIAL[[id number]]          Abnormal            Final result                 Please view results for these tests on the individual orders.   RAINBOW DRAW LAVENDER   RAINBOW DRAW LIGHT GREEN   GI STOOL PANEL BY PCR   URINE CULTURE, ROUTINE                      MDM      64F hx as above presenting with RUQ pain, LLQ pain, diarrhea. On arrival vss, reassuring  Ddx: diverticulitis, gastroenteritis, enterocolitis, metabolic derangement  -unclear if RUQ pain is related to diarrhea, may be attributable to small bowel irritation, or known pancreatic cyst, or may be separate entity such as PE given pleuritic nature of pain, cannot apply PERC  rule d/t age and SPO2 94%, so will obtain d-dimer      Labs reviewed no acute findings.  I have independently reviewed patient CT scan which does not show any evidence of perforation or free air.  CT read is for possible mild acute uncomplicated diverticulitis.  This fits clinical picture.  Will start on Augmentin Bentyl Zofran.  Return precautions and follow-up instructions were discussed with patient who voiced understanding and agreement the plan.  All questions were answered to patient satisfaction.                             Medical Decision Making      Disposition and Plan     Clinical Impression:  1. Acute diverticulitis         Disposition:  Discharge  5/23/2024  8:06 pm    Follow-up:  Alton Marie MD  [address]  [place] [address]  [phone]    Follow up in 2 day(s)      [facility] Emergency Department  [address] [id number] [phone]  Follow up  As needed, If symptoms worsen          Medications Prescribed:  Discharge Medication List as of 5/23/2024  8:07 PM        START taking these medications    Details   amoxicillin clavulanate 875-125 MG Oral Tab Take 1 tablet by mouth 2 (two) times daily for 10 days., Normal, Disp-20 tablet, R-0      !! ondansetron 4 MG Oral Tablet Dispersible Take 1 tablet (4 mg total) by mouth every 4 (four) hours as needed for Nausea., Normal, Disp-10 tablet, R-0      dicyclomine 20 MG Oral Tab Take 1 tablet (20 mg total) by mouth 4 (four) times daily as needed., Normal, Disp-30 tablet, R-0       !! - Potential duplicate medications found. Please discuss with provider.

## 2024-05-28 ENCOUNTER — TELEPHONE (OUTPATIENT)
Facility: CLINIC | Age: 64
End: 2024-05-28

## 2024-05-28 DIAGNOSIS — K86.2 PANCREAS CYST (HCC): ICD-10-CM

## 2024-05-28 DIAGNOSIS — R93.3 ABNORMAL MAGNETIC RESONANCE CHOLANGIOPANCREATOGRAPHY (MRCP): Primary | ICD-10-CM

## 2024-05-28 NOTE — TELEPHONE ENCOUNTER
Dr. No,    I spoke to patient who was transferred from [facility]. States she was seen in ED on 5/23 and discharged home with antibiotics for acute diverticulitis. Yesterday she started feeling better, no diarrhea, cramping of abdominal pain. She has been following a high fiber/soft diet.     Merged with [facility] also let her know she tested positive for e.Coli and patient states this was not mentioned to her in ER and if needs to be treated. All labs/imaging are available in [facility] for review..     Also needs to know if ok to keep EGD/EUS on 6/3 as planned or reschedule to later date. Will she need a colonoscopy as well?

## 2024-05-29 NOTE — TELEPHONE ENCOUNTER
Dr. No,    I spoke to patient and reviewed your note below. She is agreeable to the plan. She is asking for a detailed note stating that her procedure is going to be cancelled due to her diverticulitis noted in ED visit and that we will plan to have procedure at a later date after seeing her in office on 6/27.    Please let me know if I can send a letter to her ([phone]).

## 2024-05-29 NOTE — TELEPHONE ENCOUNTER
Yes, please send a letter that is fine.    Thank you!    Neeraj No MD  [facility] - Gastroenterology  5/29/2024  2:43 PM

## 2024-05-29 NOTE — TELEPHONE ENCOUNTER
I saw the results of her stool test showing the E. coli species.  These do not absolutely require antibiotic treatment especially if she is feeling better.  I think since there has been a lot going on since I have last seen her including this I would recommend that we follow-up first at our planned office visit in the next 3 to 4 weeks and hold off specifically on the endoscopic ultrasound at this time.  It is not urgent and we can discuss it in the office as to the timing to perform it.    Thanks    MD Jab[facility] - Gastroenterology  5/29/2024  12:12 PM

## 2024-06-17 ENCOUNTER — HOSPITAL ENCOUNTER (OUTPATIENT)
Dept: ULTRASOUND IMAGING | Facility: HOSPITAL | Age: 64
Discharge: HOME | End: 2024-06-17
Attending: OTOLARYNGOLOGY

## 2024-06-17 DIAGNOSIS — C73 THYROID CANCER (HCC): ICD-10-CM

## 2024-06-17 PROCEDURE — 76536 US EXAM OF HEAD AND NECK: CPT | Performed by: OTOLARYNGOLOGY

## 2024-06-19 ENCOUNTER — TELEPHONE (OUTPATIENT)
Dept: OTOLARYNGOLOGY | Facility: CLINIC | Age: 64
End: 2024-06-19

## 2024-06-27 ENCOUNTER — OFFICE VISIT (OUTPATIENT)
Dept: GASTROENTEROLOGY | Facility: CLINIC | Age: 64
End: 2024-06-27

## 2024-06-27 ENCOUNTER — TELEPHONE (OUTPATIENT)
Dept: GASTROENTEROLOGY | Facility: CLINIC | Age: 64
End: 2024-06-27

## 2024-06-27 VITALS
SYSTOLIC BLOOD PRESSURE: 125 MMHG | WEIGHT: 177.38 LBS | HEART RATE: 91 BPM | BODY MASS INDEX: 31.43 KG/M2 | DIASTOLIC BLOOD PRESSURE: 78 MMHG | HEIGHT: 63 IN

## 2024-06-27 DIAGNOSIS — K57.30 DIVERTICULOSIS OF COLON: ICD-10-CM

## 2024-06-27 DIAGNOSIS — K21.9 GASTROESOPHAGEAL REFLUX DISEASE, UNSPECIFIED WHETHER ESOPHAGITIS PRESENT: ICD-10-CM

## 2024-06-27 DIAGNOSIS — K86.2 PANCREAS CYST (HCC): Primary | ICD-10-CM

## 2024-06-27 DIAGNOSIS — Z12.11 ENCOUNTER FOR COLONOSCOPY DUE TO HISTORY OF ADENOMATOUS COLONIC POLYPS: ICD-10-CM

## 2024-06-27 DIAGNOSIS — R93.3 ABNORMAL MAGNETIC RESONANCE CHOLANGIOPANCREATOGRAPHY (MRCP): ICD-10-CM

## 2024-06-27 DIAGNOSIS — Z86.010 ENCOUNTER FOR COLONOSCOPY DUE TO HISTORY OF ADENOMATOUS COLONIC POLYPS: ICD-10-CM

## 2024-06-27 DIAGNOSIS — R10.9 ABDOMINAL DISCOMFORT: ICD-10-CM

## 2024-06-27 PROCEDURE — 3008F BODY MASS INDEX DOCD: CPT | Performed by: INTERNAL MEDICINE

## 2024-06-27 PROCEDURE — 3078F DIAST BP <80 MM HG: CPT | Performed by: INTERNAL MEDICINE

## 2024-06-27 PROCEDURE — 3074F SYST BP LT 130 MM HG: CPT | Performed by: INTERNAL MEDICINE

## 2024-06-27 PROCEDURE — 99215 OFFICE O/P EST HI 40 MIN: CPT | Performed by: INTERNAL MEDICINE

## 2024-06-27 RX ORDER — SODIUM, POTASSIUM,MAG SULFATES 17.5-3.13G
SOLUTION, RECONSTITUTED, ORAL ORAL
Qty: 1 EACH | Refills: 0 | Status: SHIPPED | OUTPATIENT
Start: 2024-06-27

## 2024-06-27 RX ORDER — OFLOXACIN 3 MG/ML
2 SOLUTION/ DROPS OPHTHALMIC EVERY 4 HOURS
COMMUNITY
Start: 2024-05-21

## 2024-06-27 RX ORDER — KETOROLAC TROMETHAMINE 5 MG/ML
1 SOLUTION OPHTHALMIC 4 TIMES DAILY
COMMUNITY
Start: 2024-05-21

## 2024-06-27 RX ORDER — PREDNISOLONE ACETATE 10 MG/ML
1 SUSPENSION/ DROPS OPHTHALMIC 3 TIMES DAILY
COMMUNITY
Start: 2024-05-21

## 2024-06-27 NOTE — TELEPHONE ENCOUNTER
PPD PA-    Patient is scheduled within the next month in July please check for PA.  Patient scheduled for Colonoscopy 03059 ,Egd 06800 ,Eus [id number] with possible Fine Needle Aspiration at [facility] on 7/29/2024 @12:30 Pm  Diagnosis:   Pancreas cyst (HCC)  Encounter for colonoscopy due to history of adenomatous colonic polyps  Abnormal magnetic resonance cholangiopancreatography (MRCP)  Abdominal discomfort  Gastroesophageal reflux disease, unspecified whether esophagitis present  Diverticulosis of colon  Thanks!   Alexis

## 2024-06-27 NOTE — TELEPHONE ENCOUNTER
Scheduled for:  Colonoscopy 13121 ,EGD 02549,Eus [id number] with possible Fna  Provider Name:  Dr. No  Date:  7/29/2024  Location:[facility]  Sedation:  MAC  Time:  12:30 Pm(Patient is aware arrival time is at 11:30 Am)  Prep:  Suprep ,Egd -Npo 3 hours before prior to prcedure  Meds/Allergies Reconciled?:  Physician Reviewed   Diagnosis with codes:  Pancreas cyst (HCC) K86.2  Encounter for colonoscopy due to history of adenomatous colonic polyps Z12.11, Z86.010  Abnormal magnetic resonance cholangiopancreatography (MRCP) R93.3  Abdominal discomfort R10.9  Gastroesophageal reflux disease, unspecified whether esophagitis present K21.9  Diverticulosis of colon K57.30  Was patient informed to call insurance with codes (Y/N):  Yes  Referral sent?:  Referral was sent at the time of electronic surgical scheduling.  EM or [facility] notified?:  I sent an electronic request to Endo Scheduling and received a confirmation today.  Medication Orders:  Pt is aware to NOT take iron pills, herbal meds and diet supplements for 7 days before exam. Also to NOT take any form of alcohol, recreational drugs and any forms of ED meds 24 hours before exam.    Medication   Hold metformin day before and day of  Continue all medications for procedure,I confirmed with patient is no longer taking her Metformin .  Misc Orders:  Patient was informed about the new cancellation policy for his/her procedure. Patient was also given a copy of the cancellation policy at the time of the appointment and verbalized understanding.      Further instructions given by staff:  I provide prep instructions to patient at the time of the appointment and reviewed date, time and location, Pattient verbalized that she understood and is aware to call if she has any questions.

## 2024-06-27 NOTE — PATIENT INSTRUCTIONS
1.Schedule upper endoscopy (EGD) and endoscopic ultrasound (EUS) with possible fine needle aspiration (FNA) and colonoscopy with monitored anesthesia care (MAC) with Dr. No at the hospital    2.  bowel prep from pharmacy (split suprep) - please read attached/given instructions very carefully     3. Medication   Hold metformin day before and day of  Continue all medications for procedure    4. Read all bowel prep instructions carefully    5. AVOID seeds, nuts, popcorn, raw fruits and vegetables (cooked is okay) for 2-3 days before procedure    >>>Please note: if you were prescribed a bowel prep and it is too expensive or not covered by insurance, it is okay to substitute Trilyte or Golytely (or any similar generic prep). This can be done by notifying the pharmacy or calling our office.     
VIEW ALL (Pediatric)

## 2024-06-27 NOTE — PROGRESS NOTES
[facility] - Gastroenterology                                                                                                  Clinic Progress Note    Chief Complaint   Patient presents with    Follow - Up     Pancreas cyst     HPI:   Camelia Markham is a 64 year old woman with history of BMI of 30, thyroid cancer, diabetes, hyperlipidemia, hypertension, lumbar radiculopathy and multiple other musculoskeletal problems listed, anxiety, dysthymia, knee surgery, thyroidectomy, hysterectomy here for follow-up    Says she has been doing okay.  Does have some issues with heartburn.  Has been taking omeprazole 40 mg every other day.  Feels like taking it every day causes more cramping in her stomach.  Has the cramping intermittently and has been this way for some years more so in the left lower quadrant.  Has been really trying to eat healthier recently.  Does note lots of stress in her life.  She does actually note that her bowel movements have improved as to previously where she would have explosive bowel movements and incontinence not having this so much anymore with what she feels like has been related to changes in diet.    History, Medications, Allergies, ROS:      Past Medical History:    Anxiety state, unspecified    Disorder of thyroid    thyroidectomy    Diverticulosis of large intestine    Esophageal reflux    Exposure to medical therapeutic radiation    thyroid    High blood pressure    Hypercholesteremia    Hypothyroidism    Osteoarthrosis, unspecified whether generalized or localized, unspecified site    Thyroid cancer (HCC)    papillary thyroid; s/p surgery resection with Asher and BRIDGES    Unspecified essential hypertension      Past Surgical History:   Procedure Laterality Date    Colonoscopy N/A 12/29/2022    Procedure: COLONOSCOPY;  Surgeon: Neeraj No MD;  Location: [facility] ENDOSCOPY    Egd  12/29/2022     Endoscopic ultrasound - internal  2022    Knee surgery Left 2022    no associated bleeding complications          40 week 7 lb(s) 5 oz Male; 6 hr labor           40 week 7 lb(s) 3 oz Female          41 week 9 lb(s) 8 oz Male    Other surgical history      Injection Tendon Sheath, Ligament    Thyroidectomy      total    Total abdom hysterectomy        Family Hx:   Family History   Problem Relation Age of Onset    Heart Disorder Mother     Breast Cancer Maternal Cousin Female 57    Cancer Daughter 29        Hodgkin's Lymphoma    Ovarian Cancer Neg     Bleeding Disorders Neg     DVT/VTE Neg     Pancreatic Cancer Neg     Prostate Cancer Neg       Social History:   Social History     Socioeconomic History    Marital status:    Tobacco Use    Smoking status: Never    Smokeless tobacco: Never   Vaping Use    Vaping status: Never Used   Substance and Sexual Activity    Alcohol use: Not Currently     Comment: social    Drug use: No   Other Topics Concern    Caffeine Concern Yes     Comment: 1 cup of coffee    Social History Narrative    Camelia Figueroa is  to Baldemar x30 yrs. She has 3 adult children. 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 She lives with her , her mom, and 1 of the children in [place], [place].     Social Determinants of Health      Received from [facility]        Medications (Active prior to today's visit):  Current Outpatient Medications   Medication Sig Dispense Refill    Omega-3 Fatty Acids (FISH OIL) 1200 MG Oral Capsule Delayed Release Take by mouth.      butalbital-acetaminophen-caffeine -40 MG Oral Tab Take 1 tablet every 8 hours by oral route as needed. (Patient not taking: Reported on 2024)      Ciclopirox Olamine 0.77 % External Cream  (Patient not taking: Reported on 2024)      Glucose Blood (ONETOUCH VERIO) In Vitro Strip       hydroCHLOROthiazide 12.5 MG Oral Tab Take 1 tablet every day by oral route in  the morning. (Patient not taking: Reported on 5/2/2024)      Lancets (ONETOUCH DELICA PLUS [id number]) Does not apply Misc  (Patient not taking: Reported on 5/2/2024)      metFORMIN HCl 1000 MG Oral Tab  (Patient not taking: Reported on 6/18/2024)      ondansetron 4 MG Oral Tablet Dispersible  (Patient not taking: Reported on 6/18/2024)      sertraline 25 MG Oral Tab Take 1 tablet every day by oral route in the morning. (Patient not taking: Reported on 6/18/2024)      Olmesartan Medoxomil 40 MG Oral Tab       Omeprazole 40 MG Oral Capsule Delayed Release       Levothyroxine Sodium 150 MCG/5ML Oral Solution Take by mouth every morning.      amLODIPine 10 MG Oral Tab Take 1 tablet (10 mg total) by mouth every morning.      clonazePAM 2 MG Oral Tab Take 1 tablet (2 mg total) by mouth nightly.         Allergies:  Allergies   Allergen Reactions    Latex HIVES    Peanut-Containing Drug Products SWELLING     Closes Throat  Closes Throat  Closes Throat      Peanuts SWELLING     Closes Throat    Adhesive Tape OTHER (SEE COMMENTS)    Seasonal        ROS:   Systems were reviewed and were negative except as noted in the HPI    PHYSICAL EXAM:   Blood pressure 125/78, pulse 91, height 5' 3\" (1.6 m), weight 177 lb 6.4 oz (80.5 kg).    General:awake, cooperative, no acute distress  HEENT: EOMI, no scleral icterus, MMM; oral pharnyx is without exudates or lesions  Neck: no lymphadenopathy; thyroid is not enlarged and without nodules  CV: RRR  Resp: non-labored breathing  Abd: soft, non-tender, non-distended  Ext: no lower extremity swelling  Neuro: Alert, Oriented X 3  Skin: no rashes, bruises  Psych: normal affect    Labs/Imaging:     Reviewed as noted in the HPI and A/P    ASSESSMENT/PLAN:   Camelia Markham is a 64 year old woman with history of BMI of 30, thyroid cancer, diabetes, hyperlipidemia, hypertension, lumbar radiculopathy and multiple other musculoskeletal problems listed, anxiety, dysthymia, knee surgery, thyroidectomy,  hysterectomy here for follow-up    She was initially seen by the gastrointestinal nurse practitioner in the office in February 2022 for evaluation of acid reflux which was noted to be longstanding as well as symptoms of abdominal bloating, fecal urgency with incontinence.  Review of the chart notes that she underwent upper endoscopy and colonoscopy as well as anorectal manometry through [facility] system in October 2022.  The procedure reports are not readily available though review of the pathology report shows unremarkable gastric biopsies and appears to be multiple adenomatous colon polyps removed.  She is then noted to have presented to [facility] emergency department November 2022 with abdominal bloating and pain.  CT abdomen pelvis from that time noted a focal short segment of sigmoid colon diverticulitis, indeterminate right adnexal lesion, cystic pancreatic lesion.  A subsequent MRI/MRCP in December 2022 showed a 3.2 cm cystic pancreatic lesion in the body.  She did see an OB [facility] for evaluation of the right adnexal mass which appears was eventually removed surgically in March 2023.  She underwent EGD, EUS, colonoscopy December 29, 2022 with findings including 6 subcentimeter colon polyps removed, few sigmoid colon diverticulosis, small hemorrhoids, a 2.93 cm x 1.44 cm pancreas body lesion status post FNA.  Her pathology and fluid studies showed 3 of her colon polyps were adenomatous. Her pancreas lesion aspirate showed a low CEA and elevated glucose.  Mucin was noted on cytopathology.  Biopsies additionally from the duodenum and colon were unremarkable.  It was recommended to repeat her colonoscopy in 2 years and to repeat her MRI/MRCP in 1 year.  She did see surgical oncology and follow-up in January 2023.  A follow-up MRI/MRCP in January 2024 showed moderate diffuse fatty atrophy of the pancreas with a cystic lesion measuring 3.8 cm in largest dimension where it previously measured 3.2 cm.   This was described to likely communicate with the main pancreatic duct which was not dilated.  After that I spoke with her at least twice over the phone about recommendations for reevaluation with endoscopic ultrasound.  She was noted to be in the emergency department in May 2024 with a CT scan of the abdomen and pelvis describing question mild acute, uncomplicated diverticulitis    We discussed at this time recommendations for colonoscopy for surveillance of adenomatous colon polyps as well as again as we have discussed repeating her endoscopic ultrasound given the pancreas that she has and changes noted on last prior cross-sectional imaging.  Discussed can evaluate if there are changes related to GERD at the time with upper endoscopy.  We discussed consideration of changing her omeprazole to something like famotidine though she believes she has tried this before without help or to something like pantoprazole.  We also discussed the abdominal cramping could be related more to suspected irritable bowel syndrome.  She believes dicyclomine has not helped in the past.  Could consider hyoscyamine.  At the moment she prefers not to start or change medications at this moment though certainly can be entertained in the future.  We discussed her diverticulosis and certainly if diverticulitis occurs that antibiotics or not absolutely necessary but can be managed with safe doses of acetaminophen bowel rest and monitoring    Recommend:  -EGD/EUS and colonoscopy  -consider hyoscyamine  -consider changing omeprazole to pantoprazole    EGD Consent: I have discussed the risks, benefits, and alternatives to EGD with the patient [who demonstrated understanding], including but not limited to the risks of bleeding, infection, pain, perforation as well as the cardiopulmonary risks of anesthesia all leading to prolonged hospital stay or surgical intervention. All questions were answered to the patient’s satisfaction. The patient elected  to proceed with EGD with intervention [i.e. Biopsy, dilatation, control of bleeding, etc.] as indicated.    EUS Consent: I have discussed the risks, benefits, and alternatives to EUS with the patient [who demonstrated understanding], including but not limited to the risks of bleeding, infection, pain, pancreatitis, perforation, missed lesions as well as the cardiopulmonary risks of anesthesia all leading to prolonged hospital stay or surgical intervention. All questions were answered to the patient’s satisfaction. The patient elected to proceed with EUS with intervention [i.e. FNA, FNB, etc.] as indicated.    Colonoscopy consent: I have discussed the risks, benefits, and alternatives (including stool testing) to colonoscopy with the patient [who demonstrated understanding], including but not limited to the risks of bleeding, infection, pain, as well as the risks of anesthesia and perforation all leading to prolonged hospitalization, surgical intervention, or could be life threatening. I also specifically mentioned the risk of missed lesions/polyps. All questions were answered to the patient’s satisfaction. The patient signed informed consent and elected to proceed with colonoscopy with intervention [i.e. polypectomy, biopsy, control of bleeding, etc.] as indicated. I also discussed a ride home from a family member of friend is required and driving after the procedure with sedation is not safe or recommended.    I spent 45 minutes obtaining history, evaluating the patient including a medically appropriate exam, discussing treatment options and counseling and educating the patient, reviewing the patient's chart, ordering tests/medications/procedures, and completing documentation    Orders This Visit:  No orders of the defined types were placed in this encounter.    Meds This Visit:  Requested Prescriptions      No prescriptions requested or ordered in this encounter     Imaging & Referrals:  None     Neeraj  MD Jab[facility] No[facility]  [facility] - Gastroenterology  6/27/2024

## 2024-06-29 ENCOUNTER — TELEPHONE (OUTPATIENT)
Dept: ENDOCRINOLOGY CLINIC | Facility: CLINIC | Age: 64
End: 2024-06-29

## 2024-06-29 ENCOUNTER — OFFICE VISIT (OUTPATIENT)
Dept: OTOLARYNGOLOGY | Facility: CLINIC | Age: 64
End: 2024-06-29

## 2024-06-29 DIAGNOSIS — C73 THYROID CANCER (HCC): Primary | ICD-10-CM

## 2024-06-29 DIAGNOSIS — E04.1 THYROID NODULE: ICD-10-CM

## 2024-06-29 PROCEDURE — 99213 OFFICE O/P EST LOW 20 MIN: CPT | Performed by: OTOLARYNGOLOGY

## 2024-06-29 RX ORDER — LORATADINE 10 MG/1
10 TABLET ORAL DAILY
Qty: 30 TABLET | Refills: 3 | Status: SHIPPED | OUTPATIENT
Start: 2024-06-29

## 2024-06-29 RX ORDER — MONTELUKAST SODIUM 10 MG/1
10 TABLET ORAL NIGHTLY
Qty: 30 TABLET | Refills: 3 | Status: SHIPPED | OUTPATIENT
Start: 2024-06-29

## 2024-06-29 RX ORDER — AZELASTINE 1 MG/ML
2 SPRAY, METERED NASAL 2 TIMES DAILY
Qty: 30 ML | Refills: 0 | Status: SHIPPED | OUTPATIENT
Start: 2024-06-29

## 2024-06-29 NOTE — PROGRESS NOTES
Camelia Markham is a 64 year old female.    Chief Complaint   Patient presents with    Follow - Up     Patient is here for ultrasound results. Patient reports discomfort on both ears.       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 At the time of surgery in 2010 she was noted to have a left true vocal fold paralysis. She did receive postoperative radioablation by Dr. Pike. I last saw her in 2011 and at that time recommended a repeat nuclear scan. She believes that this has not been done at this point.She has had her levothyroxine adjusted and was recently increased the labs were recently performed demonstrating a TSH of 28.5 as well as low vitamin D levels. She does complain of chronic fatigue as well as a sense of depression. No other signs, symptoms or complaints.     7/20/17 she has not been seen in my office since late 2014.  History of left true vocal cord paralysis with subsequent finding of papillary carcinoma the thyroid extending into the laryngeal structures.  She is done well but more recently has been having findings of elevated thyroglobulin levels.  She underwent an ultrasound with no significant findings other than 3 small nodes less than a centimeter in size but suspicious due to their abnormal appearance.  She did have a subsequent nuclear scan which was negative for any uptake.  He did recently have a PET CT scan which demonstrated uptake in the neck with unusual finding of lymph nodes near the submandibular glands potentially causing blocked view of these nodes during her nuclear scan.  She is otherwise asymptomatic     8/29/17 she is here to go over the results of her recent ultrasound-guided biopsies of 2 lymph nodes which lit up on a PET scan.  Both were noted to have normal lymphoid cellularity with no evidence of malignancy.  No other significant issues at this time.      5/2/24 I last saw her 7 years ago  the previous history of having total thyroidectomy back in 2010.  Known left true vocal cord paralysis.  She has been on a certain level of levothyroxine for years had her blood checked recently and her TSH is suppressed at 0.1 and has been for the last 12 months.  Sent to me for further discussion as to management of her thyroid hormones.  No recent ultrasounds no recent thyroglobulin levels.  Sent by Dr. Marie for my opinion regarding her thyroid issues.     6/29/24 last visit I ordered an ultrasound and some blood work.  Thyroglobulin levels came back at 7.7.  Up from 2.7 back in 2018.  Found demonstrates a left-sided  nodule in the thyroid bed measuring  1 x 0.6 x 0.6 cm similar right-sided lymph nodes that appear to be benign as they have a fatty hilum.  TSH is 0.01.  Scheduled an appointment in August to meet with a different endocrinologist that she cannot meet with Dr. Sands.      Social History     Socioeconomic History    Marital status:    Tobacco Use    Smoking status: Never    Smokeless tobacco: Never   Vaping Use    Vaping status: Never Used   Substance and Sexual Activity    Alcohol use: Not Currently     Comment: social    Drug use: No   Other Topics Concern    Caffeine Concern Yes     Comment: 1 cup of coffee        Family History   Problem Relation Age of Onset    Heart Disorder Mother     Breast Cancer Maternal Cousin Female 57    Cancer Daughter 29        Hodgkin's Lymphoma    Ovarian Cancer Neg     Bleeding Disorders Neg     DVT/VTE Neg     Pancreatic Cancer Neg     Prostate Cancer Neg        Past Medical History:    Anxiety state, unspecified    Disorder of thyroid    thyroidectomy    Diverticulosis of large intestine    Esophageal reflux    Exposure to medical therapeutic radiation    thyroid    High blood pressure    Hypercholesteremia    Hypothyroidism    Osteoarthrosis, unspecified whether generalized or localized, unspecified site    Thyroid cancer (HCC)    papillary thyroid; s/p  surgery resection with Asher and BRIDGES    Unspecified essential hypertension       Past Surgical History:   Procedure Laterality Date    Colonoscopy N/A 2022    Procedure: COLONOSCOPY;  Surgeon: Neeraj No MD;  Location: [facility] ENDOSCOPY    Egd  2022    Endoscopic ultrasound - internal  2022    Knee surgery Left 2022    no associated bleeding complications          40 week 7 lb(s) 5 oz Male; 6 hr labor           40 week 7 lb(s) 3 oz Female          41 week 9 lb(s) 8 oz Male    Other surgical history      Injection Tendon Sheath, Ligament    Thyroidectomy      total    Total abdom hysterectomy           REVIEW OF SYSTEMS    System Neg/Pos Details   Constitutional Negative Fatigue, fever and weight loss.   ENMT Negative Drooling.   Eyes Negative Blurred vision and vision changes.   Respiratory Negative Dyspnea and wheezing.   Cardio Negative Chest pain, irregular heartbeat/palpitations and syncope.   GI Negative Abdominal pain and diarrhea.   Endocrine Negative Cold intolerance and heat intolerance.   Neuro Negative Tremors.   Psych Negative Anxiety and depression.   Integumentary Negative Frequent skin infections, pigment change and rash.   Hema/Lymph Negative Easy bleeding and easy bruising.           PHYSICAL EXAM    There were no vitals taken for this visit.       Constitutional Normal Overall appearance - Normal.   Psychiatric Normal Orientation - Oriented to time, place, person & situation. Appropriate mood and affect.   Neck Exam Normal Inspection - Normal. Palpation - Normal. Parotid gland - Normal. Thyroid gland -surgically absent   Eyes Normal Conjunctiva - Right: Normal, Left: Normal. Pupil - Right: Normal, Left: Normal. Fundus - Right: Normal, Left: Normal.   Neurological Normal Memory - Normal. Cranial nerves - Cranial nerves II through XII grossly intact.   Head/Face Normal Facial features - Normal. Eyebrows - Normal. Skull - Normal.         Nasopharynx Normal External nose - Normal. Lips/teeth/gums - Normal. Tonsils - Normal. Oropharynx - Normal.   Ears Normal Inspection - Right: Normal, Left: Normal. Canal - Right: Normal, Left: Normal. TM - Right: Normal, Left: Normal.   Skin Normal Inspection - Normal.        Lymph Detail Normal Submental. Submandibular. Anterior cervical. Posterior cervical. Supraclavicular.        Nose/Mouth/Throat Normal External nose - Normal. Lips/teeth/gums - Normal. Tonsils - Normal. Oropharynx -postnasal discharge with erythema   Nose/Mouth/Throat Normal Nares - Right: Normal Left: Normal. Septum -Normal  Turbinates - Right: Normal, Left: Normal.  Nasal mucosa-congested       Current Outpatient Medications:     ofloxacin 0.3 % Ophthalmic Solution, Place 2 drops into the right eye every 4 (four) hours., Disp: , Rfl:     ketorolac 0.5 % Ophthalmic Solution, Place 1 drop into the right eye 4 (four) times daily., Disp: , Rfl:     prednisoLONE 1 % Ophthalmic Suspension, Place 1 drop into the right eye 3 (three) times daily., Disp: , Rfl:     Na Sulfate-K Sulfate-Mg Sulf (SUPREP BOWEL PREP KIT) 17.5-3.13-1.6 GM/177ML Oral Solution, Take as directed, Disp: 1 each, Rfl: 0    Omega-3 Fatty Acids (FISH OIL) 1200 MG Oral Capsule Delayed Release, Take by mouth., Disp: , Rfl:     ondansetron 4 MG Oral Tablet Dispersible, , Disp: , Rfl:     sertraline 25 MG Oral Tab, , Disp: , Rfl:     Olmesartan Medoxomil 40 MG Oral Tab, , Disp: , Rfl:     Omeprazole 40 MG Oral Capsule Delayed Release, , Disp: , Rfl:     Levothyroxine Sodium 150 MCG/5ML Oral Solution, Take by mouth every morning., Disp: , Rfl:     amLODIPine 10 MG Oral Tab, Take 1 tablet (10 mg total) by mouth every morning., Disp: , Rfl:     clonazePAM 2 MG Oral Tab, Take 1 tablet (2 mg total) by mouth nightly., Disp: , Rfl:     butalbital-acetaminophen-caffeine -40 MG Oral Tab, Take 1 tablet every 8 hours by oral route as needed. (Patient not taking: Reported on 6/18/2024),  Disp: , Rfl:     Ciclopirox Olamine 0.77 % External Cream, , Disp: , Rfl:     Glucose Blood (ONETOUCH VERIO) In Vitro Strip, , Disp: , Rfl:     hydroCHLOROthiazide 12.5 MG Oral Tab, Take 1 tablet every day by oral route in the morning. (Patient not taking: Reported on 5/2/2024), Disp: , Rfl:     Lancets (ONETOUCH DELICA PLUS [id number]) Does not apply Misc, , Disp: , Rfl:     metFORMIN HCl 1000 MG Oral Tab, , Disp: , Rfl:   ASSESSMENT AND PLAN    1. Thyroid cancer (HCC)  Physical exam showed no significant abnormalities except for postnasal discharge most likely allergy related.  I will call in some Singulair loratadine and Astelin nasal spray for her.  With respect to her ultrasound findings I did recommend that she undergo an ultrasound-guided needle biopsy of this 1 cm lesion in the left thyroid bed.  We did discuss the fact that I am a bit concerned about her thyroglobulin increasing from 2.7 to 7.7 since 2018.  I will contact  to see if she can see her sooner to help with adjusting her thyroid hormones.  She will return to see me after her studies to discuss further management.        This note was prepared using Dragon [employer] voice recognition dictation software. As a result errors may occur. When identified these errors have been corrected. While every attempt is made to correct errors during dictation discrepancies may still exist    Norman Asher MD    6/29/2024    7:23 AM

## 2024-07-02 ENCOUNTER — TELEPHONE (OUTPATIENT)
Dept: OTOLARYNGOLOGY | Facility: CLINIC | Age: 64
End: 2024-07-02

## 2024-07-02 NOTE — TELEPHONE ENCOUNTER
90 day prescription request for   Azelastine 0.1% (137 mcg) nasal sp-200  Use 2 sprays in each nostril twice daily

## 2024-07-05 RX ORDER — AZELASTINE 1 MG/ML
2 SPRAY, METERED NASAL 2 TIMES DAILY
Qty: 90 ML | Refills: 1 | Status: SHIPPED | OUTPATIENT
Start: 2024-07-05

## 2024-07-17 ENCOUNTER — OFFICE VISIT (OUTPATIENT)
Dept: ENDOCRINOLOGY CLINIC | Facility: CLINIC | Age: 64
End: 2024-07-17
Payer: COMMERCIAL

## 2024-07-17 ENCOUNTER — LAB ENCOUNTER (OUTPATIENT)
Dept: LAB | Age: 64
End: 2024-07-17
Attending: INTERNAL MEDICINE
Payer: COMMERCIAL

## 2024-07-17 VITALS
BODY MASS INDEX: 31.36 KG/M2 | SYSTOLIC BLOOD PRESSURE: 130 MMHG | DIASTOLIC BLOOD PRESSURE: 82 MMHG | WEIGHT: 177 LBS | HEIGHT: 63 IN | HEART RATE: 87 BPM

## 2024-07-17 DIAGNOSIS — C73 THYROID CANCER (HCC): ICD-10-CM

## 2024-07-17 DIAGNOSIS — E89.0 POSTOPERATIVE HYPOTHYROIDISM: ICD-10-CM

## 2024-07-17 DIAGNOSIS — E89.0 POSTOPERATIVE HYPOTHYROIDISM: Primary | ICD-10-CM

## 2024-07-17 LAB
T4 FREE SERPL-MCNC: 1.9 NG/DL (ref 0.8–1.7)
TSI SER-ACNC: 0.05 MIU/ML (ref 0.55–4.78)

## 2024-07-17 PROCEDURE — 84439 ASSAY OF FREE THYROXINE: CPT

## 2024-07-17 PROCEDURE — 36415 COLL VENOUS BLD VENIPUNCTURE: CPT

## 2024-07-17 PROCEDURE — 3079F DIAST BP 80-89 MM HG: CPT | Performed by: INTERNAL MEDICINE

## 2024-07-17 PROCEDURE — 3075F SYST BP GE 130 - 139MM HG: CPT | Performed by: INTERNAL MEDICINE

## 2024-07-17 PROCEDURE — 86800 THYROGLOBULIN ANTIBODY: CPT

## 2024-07-17 PROCEDURE — 84432 ASSAY OF THYROGLOBULIN: CPT

## 2024-07-17 PROCEDURE — 84443 ASSAY THYROID STIM HORMONE: CPT

## 2024-07-17 PROCEDURE — 3008F BODY MASS INDEX DOCD: CPT | Performed by: INTERNAL MEDICINE

## 2024-07-17 PROCEDURE — 99203 OFFICE O/P NEW LOW 30 MIN: CPT | Performed by: INTERNAL MEDICINE

## 2024-07-17 NOTE — PROGRESS NOTES
Name: Camelia Markham  Date: 7/17/2024    Referring Physician: No ref. provider found    Chief Complaint   Patient presents with    Thyroid Problem       HISTORY OF PRESENT ILLNESS   Camelia Markham is a 64 year old female who presents for   Chief Complaint   Patient presents with    Thyroid Problem     63 y/o F presents for follow up evaluation of thyroid cancer.  She underwent total thyroidectomy 3 years ago for papillary thyroid cancer.  She initially presented to ENT 3 years ago due to recurrent sinus symptoms although she was noted to have enlarged thyroid gland. She underwent US guided FNA which demonstrated papillary thyroid cancer.  She underwent resection on 11/3/2010 and final pathology demonstrated 2.5cm tumor and 2 positive lymph nodes for metastasis, stage 4 disease.  She underwent WBS in 12/2010 and received 207miCu of radioactive iodine, no abnormal uptake outside of the neck.     She is currently maintained on LT4 150mcg PO daily, dose was increased at last visit in 12/2014.  She underwent yearly testing in 3/2015 which demonstrated low thyroglobulin level and thyroid US was negative for recurrence.     She was noted to have mild elevation of TG level at last visit - WBS was negative for recurrence and normal PET scan.  Since that time she has been lost to follow up for 18 months and repeat TG level 2.7.  However she did not undergo imaging due to cost at that time.     7/2024   She has now been lost to follow up for 6 years. Her most recent blood work demonstrated elevated TG level and LN on thyroid US.      She is taking Levothyroxine 150mcg PO daily, taking in AM and waiting 30-60 min before eating.  She is feeling ok on medication.  No palpitations.        She was evaluated by Dr. Asher and found to have enlarged LN - planning to undergo FNA.     REVIEW OF SYSTEMS  Eyes: no change in vision  Neurologic: no headache, generalized or focal weakness or numbness.  Head: normal  ENT: normal  Lungs:  no shortness of breath, wheezing or VAUGHN  Cardiovascular:  no chest pain or palpitations  Gastrointestinal:  no abdominal pain, bowel movement problems  Musculoskeletal: no muscle pain or arthralgia  /Gyne: no frequency or discomfort while urinating  Psychiatric:  no acute distress, anxiety  or depression  Skin: normal moisturized skin    Medications:     Current Outpatient Medications:     azelastine 0.1 % Nasal Solution, 2 sprays by Nasal route 2 (two) times daily., Disp: 90 mL, Rfl: 1    montelukast 10 MG Oral Tab, Take 1 tablet (10 mg total) by mouth nightly., Disp: 30 tablet, Rfl: 3    ofloxacin 0.3 % Ophthalmic Solution, Place 2 drops into the right eye every 4 (four) hours., Disp: , Rfl:     ketorolac 0.5 % Ophthalmic Solution, Place 1 drop into the right eye 4 (four) times daily., Disp: , Rfl:     prednisoLONE 1 % Ophthalmic Suspension, Place 1 drop into the right eye 3 (three) times daily., Disp: , Rfl:     Na Sulfate-K Sulfate-Mg Sulf (SUPREP BOWEL PREP KIT) 17.5-3.13-1.6 GM/177ML Oral Solution, Take as directed, Disp: 1 each, Rfl: 0    Olmesartan Medoxomil 40 MG Oral Tab, , Disp: , Rfl:     Omeprazole 40 MG Oral Capsule Delayed Release, , Disp: , Rfl:     Levothyroxine Sodium 150 MCG/5ML Oral Solution, Take by mouth every morning., Disp: , Rfl:     amLODIPine 10 MG Oral Tab, Take 1 tablet (10 mg total) by mouth every morning., Disp: , Rfl:     clonazePAM 2 MG Oral Tab, Take 1 tablet (2 mg total) by mouth nightly., Disp: , Rfl:     loratadine 10 MG Oral Tab, Take 1 tablet (10 mg total) by mouth daily. (Patient not taking: Reported on 7/17/2024), Disp: 30 tablet, Rfl: 3    Omega-3 Fatty Acids (FISH OIL) 1200 MG Oral Capsule Delayed Release, Take by mouth. (Patient not taking: Reported on 7/17/2024), Disp: , Rfl:     butalbital-acetaminophen-caffeine -40 MG Oral Tab, Take 1 tablet every 8 hours by oral route as needed. (Patient not taking: Reported on 6/18/2024), Disp: , Rfl:     Ciclopirox Olamine  0.77 % External Cream, , Disp: , Rfl:     Glucose Blood (ONETOUCH VERIO) In Vitro Strip, , Disp: , Rfl:     hydroCHLOROthiazide 12.5 MG Oral Tab, Take 1 tablet every day by oral route in the morning. (Patient not taking: Reported on 5/2/2024), Disp: , Rfl:     Lancets (ONETOUCH DELICA PLUS [id number]) Does not apply Misc, , Disp: , Rfl:     metFORMIN HCl 1000 MG Oral Tab, , Disp: , Rfl:     ondansetron 4 MG Oral Tablet Dispersible, , Disp: , Rfl:     sertraline 25 MG Oral Tab, , Disp: , Rfl:      Allergies:   Allergies   Allergen Reactions    Latex HIVES    Peanut-Containing Drug Products SWELLING     Closes Throat  Closes Throat  Closes Throat      Peanuts SWELLING     Closes Throat    Adhesive Tape OTHER (SEE COMMENTS)    Seasonal        Social History:   Social History     Socioeconomic History    Marital status:    Tobacco Use    Smoking status: Never    Smokeless tobacco: Never   Vaping Use    Vaping status: Never Used   Substance and Sexual Activity    Alcohol use: Not Currently     Comment: social    Drug use: No   Other Topics Concern    Caffeine Concern Yes     Comment: 1 cup of coffee        Medical History:   Past Medical History:    Anxiety state, unspecified    Disorder of thyroid    thyroidectomy    Diverticulosis of large intestine    Esophageal reflux    Exposure to medical therapeutic radiation    thyroid    High blood pressure    Hypercholesteremia    Hypothyroidism    Osteoarthrosis, unspecified whether generalized or localized, unspecified site    Thyroid cancer (HCC)    papillary thyroid; s/p surgery resection with Asher and BRIDGES    Unspecified essential hypertension       Surgical history:   Past Surgical History:   Procedure Laterality Date    Colonoscopy N/A 12/29/2022    Procedure: COLONOSCOPY;  Surgeon: Neeraj No MD;  Location: [facility] ENDOSCOPY    Egd  12/29/2022    Endoscopic ultrasound - internal  12/29/2022    Knee surgery Left 04/25/2022    no associated bleeding  complications          40 week 7 lb(s) 5 oz Male; 6 hr labor           40 week 7 lb(s) 3 oz Female          41 week 9 lb(s) 8 oz Male    Other surgical history      Injection Tendon Sheath, Ligament    Thyroidectomy      total    Total abdom hysterectomy         PHYSICAL EXAMINATION  /82   Pulse 87   Ht 5' 3\" (1.6 m)   Wt 177 lb (80.3 kg)   BMI 31.35 kg/m²     General Appearance:  Alert, in no acute distress, well developed  Eyes: normal conjunctivae, sclera.  Ears/Nose/Mouth/Throat/Neck:  normal hearing, normal speech and absent thyroid gland, no LNs  Neurologic: sensory grossly intact and motor grossly intact  Musculoskeletal:  normal muscle strength and tone  PV: normal pulses of carotids, pedals  Skin:  normal moisture and skin texture  Hair & Nails:  normal scalp hair     Neuro:  sensory grossly intact and motor grossly intact  Psychiatric:  oriented to time, self, and place  Nutritional:  no abnormal weight gain or loss    ASSESSMENT/PLAN:    1. Thyroid Cancer  -Diagnosed with stage 4 thyroid cancer 4 years ago  -She has been lost to follow up for 6 years with increased TG Level   -Continue Levothyroxine 150mcg PO daily  -TFTs are at goal, discussed TSH should be low to prevent recurrence of thyroid cancer   -Recheck TSH, FT4, Thyroglobulin level  -Discussed importance of thyrogen stimulated WBS  -FNA scheduled per DR. Asher  -Further management based on above results     RTC 6 months     2024  Dana Sands MD

## 2024-07-19 DIAGNOSIS — C73 THYROID CANCER (HCC): Primary | ICD-10-CM

## 2024-07-19 LAB
THYROGLOB AB: <1 IU/ML
THYROGLOB IMA: 10 NG/ML

## 2024-07-29 ENCOUNTER — ANESTHESIA (OUTPATIENT)
Dept: ENDOSCOPY | Facility: HOSPITAL | Age: 64
End: 2024-07-29
Payer: COMMERCIAL

## 2024-07-29 ENCOUNTER — TELEPHONE (OUTPATIENT)
Dept: GASTROENTEROLOGY | Facility: CLINIC | Age: 64
End: 2024-07-29

## 2024-07-29 ENCOUNTER — HOSPITAL ENCOUNTER (OUTPATIENT)
Facility: HOSPITAL | Age: 64
Setting detail: HOSPITAL OUTPATIENT SURGERY
Discharge: HOME | End: 2024-07-29
Attending: INTERNAL MEDICINE | Admitting: INTERNAL MEDICINE
Payer: COMMERCIAL

## 2024-07-29 ENCOUNTER — ANESTHESIA EVENT (OUTPATIENT)
Dept: ENDOSCOPY | Facility: HOSPITAL | Age: 64
End: 2024-07-29
Payer: COMMERCIAL

## 2024-07-29 DIAGNOSIS — Z86.010 ENCOUNTER FOR COLONOSCOPY DUE TO HISTORY OF ADENOMATOUS COLONIC POLYPS: ICD-10-CM

## 2024-07-29 DIAGNOSIS — K21.9 GASTROESOPHAGEAL REFLUX DISEASE, UNSPECIFIED WHETHER ESOPHAGITIS PRESENT: ICD-10-CM

## 2024-07-29 DIAGNOSIS — K86.2 PANCREAS CYST (HCC): ICD-10-CM

## 2024-07-29 DIAGNOSIS — R10.9 ABDOMINAL DISCOMFORT: ICD-10-CM

## 2024-07-29 DIAGNOSIS — Z12.11 ENCOUNTER FOR COLONOSCOPY DUE TO HISTORY OF ADENOMATOUS COLONIC POLYPS: ICD-10-CM

## 2024-07-29 DIAGNOSIS — R93.3 ABNORMAL MAGNETIC RESONANCE CHOLANGIOPANCREATOGRAPHY (MRCP): ICD-10-CM

## 2024-07-29 DIAGNOSIS — K57.30 DIVERTICULOSIS OF COLON: ICD-10-CM

## 2024-07-29 PROCEDURE — 0DBL8ZX EXCISION OF TRANSVERSE COLON, VIA NATURAL OR ARTIFICIAL OPENING ENDOSCOPIC, DIAGNOSTIC: ICD-10-PCS | Performed by: INTERNAL MEDICINE

## 2024-07-29 PROCEDURE — 45385 COLONOSCOPY W/LESION REMOVAL: CPT | Performed by: INTERNAL MEDICINE

## 2024-07-29 PROCEDURE — 43238 EGD US FINE NEEDLE BX/ASPIR: CPT | Performed by: INTERNAL MEDICINE

## 2024-07-29 PROCEDURE — 0DJ08ZZ INSPECTION OF UPPER INTESTINAL TRACT, VIA NATURAL OR ARTIFICIAL OPENING ENDOSCOPIC: ICD-10-PCS | Performed by: INTERNAL MEDICINE

## 2024-07-29 PROCEDURE — 0F9G8ZX DRAINAGE OF PANCREAS, VIA NATURAL OR ARTIFICIAL OPENING ENDOSCOPIC, DIAGNOSTIC: ICD-10-PCS | Performed by: INTERNAL MEDICINE

## 2024-07-29 PROCEDURE — BF47ZZZ ULTRASONOGRAPHY OF PANCREAS: ICD-10-PCS | Performed by: INTERNAL MEDICINE

## 2024-07-29 PROCEDURE — 0DBK8ZX EXCISION OF ASCENDING COLON, VIA NATURAL OR ARTIFICIAL OPENING ENDOSCOPIC, DIAGNOSTIC: ICD-10-PCS | Performed by: INTERNAL MEDICINE

## 2024-07-29 RX ORDER — AMOXICILLIN AND CLAVULANATE POTASSIUM 875; 125 MG/1; MG/1
1 TABLET, FILM COATED ORAL 2 TIMES DAILY
Qty: 6 TABLET | Refills: 0 | Status: SHIPPED | OUTPATIENT
Start: 2024-07-30 | End: 2024-08-02

## 2024-07-29 RX ORDER — LIDOCAINE HYDROCHLORIDE 10 MG/ML
INJECTION, SOLUTION EPIDURAL; INFILTRATION; INTRACAUDAL; PERINEURAL AS NEEDED
Status: DISCONTINUED | OUTPATIENT
Start: 2024-07-29 | End: 2024-07-29 | Stop reason: SURG

## 2024-07-29 RX ORDER — NALOXONE HYDROCHLORIDE 0.4 MG/ML
0.08 INJECTION, SOLUTION INTRAMUSCULAR; INTRAVENOUS; SUBCUTANEOUS ONCE AS NEEDED
Status: DISCONTINUED | OUTPATIENT
Start: 2024-07-29 | End: 2024-07-29

## 2024-07-29 RX ORDER — SODIUM CHLORIDE, SODIUM LACTATE, POTASSIUM CHLORIDE, CALCIUM CHLORIDE 600; 310; 30; 20 MG/100ML; MG/100ML; MG/100ML; MG/100ML
INJECTION, SOLUTION INTRAVENOUS CONTINUOUS
Status: DISCONTINUED | OUTPATIENT
Start: 2024-07-29 | End: 2024-07-29

## 2024-07-29 RX ADMIN — LIDOCAINE HYDROCHLORIDE 50 MG: 10 INJECTION, SOLUTION EPIDURAL; INFILTRATION; INTRACAUDAL; PERINEURAL at 12:44:00

## 2024-07-29 NOTE — ANESTHESIA POSTPROCEDURE EVALUATION
Patient: Camelia Markham    Procedure Summary       Date: 07/29/24 Room / Location: [facility] ENDOSCOPY 01 / [facility] ENDOSCOPY    Anesthesia Start: 1240 Anesthesia Stop:     Procedures:       COLONOSCOPY      ESOPHAGOGASTRODUODENOSCOPY      ENDOSCOPIC ULTRASOUND Diagnosis:       Pancreas cyst (HCC)      Encounter for colonoscopy due to history of adenomatous colonic polyps      Abdominal discomfort      Gastroesophageal reflux disease, unspecified whether esophagitis present      Abnormal magnetic resonance cholangiopancreatography (MRCP)      Diverticulosis of colon      (Pancreas cyst (HCC)/ Encounter for colonoscopy due to history of adenomatous colonic polyps/ Abdominal discomfort/ Gastroesophageal reflux disease, unspecified whether esophagitis present/ Abnormal magnetic resonance cholangiopancreatography (MRCP)/)      (Diverticulosis of colon)    Surgeons: Neeraj No MD Anesthesiologist: Kostas Wolff CRNA    Anesthesia Type: MAC ASA Status: 2            Anesthesia Type: MAC    Vitals Value Taken Time   /67 07/29/24 1254   Temp / 07/29/24 1254   Pulse 81 07/29/24 1254   Resp 20 07/29/24 1254   SpO2 94% 07/29/24 1254       [facility] AN Post Evaluation:   Patient Evaluated in PACU  Patient Participation: complete - patient participated  Level of Consciousness: awake  Pain Score: 0  Pain Management: adequate  Airway Patency:patent  Dental exam unchanged from preop  Yes    Nausea/Vomiting: none  Cardiovascular Status: acceptable and stable  Respiratory Status: acceptable and room air  Postoperative Hydration acceptable      Kostas Wolff CRNA  7/29/2024 12:54 PM

## 2024-07-29 NOTE — ANESTHESIA PREPROCEDURE EVALUATION
Anesthesia PreOp Note    HPI:     Camelia Markham is a 64 year old female who presents for preoperative consultation requested by: Neeraj No MD    Date of Surgery: 7/29/2024    Procedure(s):  COLONOSCOPY  ESOPHAGOGASTRODUODENOSCOPY  ENDOSCOPIC ULTRASOUND  Indication: Pancreas cyst (HCC)/ Encounter for colonoscopy due to history of adenomatous colonic polyps/ Abdominal discomfort/ Gastroesophageal reflux disease, unspecified whether esophagitis present/ Abnormal magnetic resonance cholangiopancreatography (MRCP)/  Diverticulosis of colon    Relevant Problems   No relevant active problems       NPO:   Last solid intake: 7/28/24 at 11 AM   Last clear liquid intake: 7/29/24 at 8:10 AM                   History Review:  Patient Active Problem List    Diagnosis Date Noted    Preoperative testing 02/28/2023    History of colon polyps     Cyst of pancreas (HCC) 12/02/2022    Diverticulitis 12/02/2022    Mass of uterine adnexa 12/02/2022    Exposure to medical therapeutic radiation 08/01/2022    Disability of walking 01/07/2022    Incontinence of feces 12/18/2021    Bilateral plantar fasciitis 12/18/2021    Irritable bowel syndrome 12/18/2021    Osteoarthritis of knee 12/18/2021    Rogers's metatarsalgia 12/14/2021    Plantar fascial fibromatosis 12/14/2021    Primary osteoarthritis of right knee 12/07/2021    Abnormal gait 11/29/2021    Heartburn 11/29/2021    Acute meniscal tear, medial 11/23/2021    Internal derangement of right knee 10/26/2021    Other instability, right knee 10/26/2021    Primary osteoarthritis of left knee 10/12/2021    Internal derangement of left knee 10/12/2021    Backache 08/22/2021    Bursitis of shoulder 08/22/2021    Acute tonsillitis 07/25/2021    Psychophysiologic insomnia 04/04/2021    Fatigue 03/01/2021    Nonspecific syndrome suggestive of viral illness 02/12/2021    Viral infection 02/12/2021    Acute gastritis 01/16/2021    Gastroesophageal reflux disease 01/16/2021    Nasal  mucosa dry 2021    Intestinal disaccharidase deficiency 2020    Mixed hyperlipidemia 2020    Vitamin D deficiency 2020    Headache 2020    Posterior rhinorrhea 2020    Lumbosacral radiculopathy 2020    Dyspnea 2020    Malaise and fatigue 2020    Anxiety state 2020    Influenza vaccine needed 2019    Pain in thoracic spine 2019    Hypertensive disorder 2019    Obesity 2019    Calcaneal spur 10/02/2019    Pleuritic pain 10/02/2019    Type 2 diabetes mellitus without complication (HCC) 2019    Generalized anxiety disorder 2019    Obesity with body mass index 30 or greater 2019    Seasonal allergic rhinitis 2018    Pyuria 2018    Psoriasis 05/15/2018    Posterior tibial tendinitis of right leg 2017    Contact dermatitis 2017    Itching 2017    Tinea corporis 2017    Thyroid cancer (HCC) 2014    Hypothyroidism 2014    Malignant neoplasm of thyroid gland (HCC) 2014    Dysthymia 2012    Familial combined hyperlipidemia 2012       Past Medical History:    Anxiety state, unspecified    Disorder of thyroid    thyroidectomy    Diverticulosis of large intestine    Esophageal reflux    Exposure to medical therapeutic radiation    thyroid    High blood pressure    High cholesterol    Hypercholesteremia    Hypothyroidism    Osteoarthrosis, unspecified whether generalized or localized, unspecified site    Thyroid cancer (HCC)    papillary thyroid; s/p surgery resection with Asher and BRIDGES    Unspecified essential hypertension       Past Surgical History:   Procedure Laterality Date    Colonoscopy N/A 2022    Procedure: COLONOSCOPY;  Surgeon: Neeraj No MD;  Location: [facility] ENDOSCOPY    Egd  2022    Endoscopic ultrasound - internal  2022    Hysterectomy      Knee surgery Left 2022    no associated bleeding complications      1984     40 week 7 lb(s) 5 oz Male; 6 hr labor           40 week 7 lb(s) 3 oz Female          41 week 9 lb(s) 8 oz Male    Other surgical history      Injection Tendon Sheath, Ligament    Thyroidectomy      total    Total abdom hysterectomy         Medications Prior to Admission   Medication Sig Dispense Refill Last Dose    azelastine 0.1 % Nasal Solution 2 sprays by Nasal route 2 (two) times daily. 90 mL 1 2024    montelukast 10 MG Oral Tab Take 1 tablet (10 mg total) by mouth nightly. 30 tablet 3 2024    Na Sulfate-K Sulfate-Mg Sulf (SUPREP BOWEL PREP KIT) 17.5-3.13-1.6 GM/177ML Oral Solution Take as directed 1 each 0     ondansetron 4 MG Oral Tablet Dispersible Take 1 tablet (4 mg total) by mouth every 4 (four) hours as needed for Nausea. 10 tablet 0     dicyclomine 20 MG Oral Tab Take 1 tablet (20 mg total) by mouth 4 (four) times daily as needed. 30 tablet 0  at prn    Olmesartan Medoxomil 40 MG Oral Tab        Omeprazole 40 MG Oral Capsule Delayed Release        Levothyroxine Sodium 150 MCG/5ML Oral Solution Take by mouth every morning.   2024 at 0800    amLODIPine 10 MG Oral Tab Take 1 tablet (10 mg total) by mouth every morning.   2024    clonazePAM 2 MG Oral Tab Take 1 tablet (2 mg total) by mouth nightly.   2024    loratadine 10 MG Oral Tab Take 1 tablet (10 mg total) by mouth daily. (Patient not taking: Reported on 2024) 30 tablet 3     ofloxacin 0.3 % Ophthalmic Solution Place 2 drops into the right eye every 4 (four) hours.   2024    ketorolac 0.5 % Ophthalmic Solution Place 1 drop into the right eye 4 (four) times daily.   2024 at prn    prednisoLONE 1 % Ophthalmic Suspension Place 1 drop into the right eye 3 (three) times daily.       Omega-3 Fatty Acids (FISH OIL) 1200 MG Oral Capsule Delayed Release Take by mouth. (Patient not taking: Reported on 2024)       [] amoxicillin clavulanate 875-125 MG Oral Tab Take 1 tablet by mouth 2  (two) times daily for 10 days. (Patient not taking: Reported on 7/29/2024) 20 tablet 0 Not Taking    butalbital-acetaminophen-caffeine -40 MG Oral Tab Take 1 tablet every 8 hours by oral route as needed. (Patient not taking: Reported on 6/18/2024)       Ciclopirox Olamine 0.77 % External Cream  (Patient not taking: Reported on 6/18/2024)       Glucose Blood (ONETOUCH VERIO) In Vitro Strip        hydroCHLOROthiazide 12.5 MG Oral Tab Take 1 tablet every day by oral route in the morning. (Patient not taking: Reported on 5/2/2024)       Lancets (ONETOUCH DELICA PLUS [id number]) Does not apply Misc  (Patient not taking: Reported on 5/2/2024)       metFORMIN HCl 1000 MG Oral Tab  (Patient not taking: Reported on 6/18/2024)       ondansetron 4 MG Oral Tablet Dispersible        sertraline 25 MG Oral Tab  (Patient not taking: Reported on 7/17/2024)        No current Epic-ordered facility-administered medications on file.     No current Epic-ordered outpatient medications on file.       Allergies   Allergen Reactions    Latex HIVES    Peanut-Containing Drug Products SWELLING     Closes Throat  Closes Throat  Closes Throat      Peanuts SWELLING     Closes Throat    Adhesive Tape OTHER (SEE COMMENTS)    Seasonal        Family History   Problem Relation Age of Onset    Heart Disorder Mother     Breast Cancer Maternal Cousin Female 57    Cancer Daughter 29        Hodgkin's Lymphoma    Ovarian Cancer Neg     Bleeding Disorders Neg     DVT/VTE Neg     Pancreatic Cancer Neg     Prostate Cancer Neg      Social History     Socioeconomic History    Marital status:    Tobacco Use    Smoking status: Never    Smokeless tobacco: Never   Vaping Use    Vaping status: Never Used   Substance and Sexual Activity    Alcohol use: Yes     Comment: social    Drug use: No   Other Topics Concern    Caffeine Concern Yes     Comment: 1 cup of coffee        Available pre-op labs reviewed.  Lab Results   Component Value Date    WBC 9.6  05/23/2024    RBC 4.54 05/23/2024    HGB 13.0 05/23/2024    HCT 41.0 05/23/2024    MCV 90.3 05/23/2024    MCH 28.6 05/23/2024    MCHC 31.7 05/23/2024    RDW 14.1 05/23/2024    .0 05/23/2024     Lab Results   Component Value Date     05/23/2024    K 3.9 05/23/2024     05/23/2024    CO2 26.0 05/23/2024    BUN 12 05/23/2024    CREATSERUM 0.86 05/23/2024     (H) 05/23/2024    CA 9.3 05/23/2024          Vital Signs:  Body mass index is 31.18 kg/m².   height is 1.6 m (5' 3\") and weight is 79.8 kg (176 lb). Her blood pressure is 121/70 and her pulse is 82. Her respiration is 15 and oxygen saturation is 95%.   Vitals:    07/23/24 1557 07/29/24 1209   BP:  121/70   Pulse:  82   Resp:  15   SpO2:  95%   Weight: 79.8 kg (176 lb)    Height: 1.6 m (5' 3\")         Anesthesia Evaluation     Patient summary reviewed and Nursing notes reviewed    No history of anesthetic complications   Airway   Mallampati: II  TM distance: >3 FB  Neck ROM: full  Dental    (+) upper dentures and partials        Pulmonary    (-) COPD, asthma, shortness of breath, recent URI, sleep apnea  Cardiovascular   Exercise tolerance: good  (+) hypertension well controlled  (-) past MI, CAD, CABG/stent, angina    Rhythm: regular    Neuro/Psych    (+)  anxiety/panic attacks,      (-) seizures, CVA    GI/Hepatic/Renal    (+) GERD poorly controlled, bowel prep  (-) liver disease, renal disease    Endo/Other    (+) hypothyroidism  (-) diabetes mellitus, blood dyscrasia    Comments: Thyroid cancer s/p throidectomy  Abdominal                  Anesthesia Plan:   ASA:  2  Plan:   MAC  Informed Consent Plan and Risks Discussed With:  Patient  Discussed plan with:  CRNA and surgeon      I have informed Camelia Markham and/or legal guardian or family member of the nature of the anesthetic plan, benefits, risks including possible dental damage if relevant, major complications, and any alternative forms of anesthetic management.   All of the  patient's questions were answered to the best of my ability. The patient desires the anesthetic management as planned.  Kostas Wolff CRNA  7/29/2024 12:16 PM  Present on Admission:  **None**

## 2024-07-29 NOTE — DISCHARGE INSTRUCTIONS
Home Care Instructions for Colonoscopy and/or Gastroscopy with Sedation    Diet:  - Resume your regular diet as tolerated unless otherwise instructed.  - Start with light meals to minimize bloating.  - Do not drink alcohol today.    Medication:  - If you have questions about resuming your normal medications, please contact your Primary Care Physician.    Activities:  - Take it easy today. Do not return to work today.  - Do not drive today.  - Do not operate any machinery today (including kitchen equipment).    Colonoscopy:  - You may notice some rectal \"spotting\" (a little blood on the toilet tissue) for a day or two after the exam. This is normal.  - If you experience any rectal bleeding (not spotting), persistent tenderness or sharp severe abdominal pains, oral temperature over 100 degrees Fahrenheit, light-headedness or dizziness, or any other problems, contact your doctor.    Gastroscopy:  - You may have a sore throat for 2-3 days following the exam. This is normal. Gargling with warm salt water (1/2 tsp salt to 1 glass warm water) or using throat lozenges will help.  - If you experience any sharp pain in your neck, abdomen or chest, vomiting of blood, oral temperature over 100 degrees Fahrenheit, light-headedness or dizziness, or any other problems, contact your doctor.    **If unable to reach your doctor, please go to the [facility] Emergency Room**    - Your referring physician will receive a full report of your examination.  - If you do not hear from your doctor's office within two weeks of your biopsy, please call them for your results.    You may be able to see your laboratory results in [facility] between 4 and 7 business days.  In some cases, your physician may not have viewed the results before they are released to [facility].  If you have questions regarding your results contact the physician who ordered the test/exam by phone or via [facility] by choosing \"Ask a Medical Question.\"

## 2024-07-29 NOTE — H&P
History & Physical Examination    Patient Name: Camelia Markham  MRN: [id number]  [facility]: [id number]  YOB: 1960    Diagnosis: GERD, pancreas cyst, history of adenomatous colon polyps, colorectal cancer screening    Medications Prior to Admission   Medication Sig Dispense Refill Last Dose    azelastine 0.1 % Nasal Solution 2 sprays by Nasal route 2 (two) times daily. 90 mL 1 2024    montelukast 10 MG Oral Tab Take 1 tablet (10 mg total) by mouth nightly. 30 tablet 3 2024    Na Sulfate-K Sulfate-Mg Sulf (SUPREP BOWEL PREP KIT) 17.5-3.13-1.6 GM/177ML Oral Solution Take as directed 1 each 0     ondansetron 4 MG Oral Tablet Dispersible Take 1 tablet (4 mg total) by mouth every 4 (four) hours as needed for Nausea. 10 tablet 0     dicyclomine 20 MG Oral Tab Take 1 tablet (20 mg total) by mouth 4 (four) times daily as needed. 30 tablet 0  at prn    Olmesartan Medoxomil 40 MG Oral Tab        Omeprazole 40 MG Oral Capsule Delayed Release        Levothyroxine Sodium 150 MCG/5ML Oral Solution Take by mouth every morning.   2024 at 0800    amLODIPine 10 MG Oral Tab Take 1 tablet (10 mg total) by mouth every morning.   2024    clonazePAM 2 MG Oral Tab Take 1 tablet (2 mg total) by mouth nightly.   2024    loratadine 10 MG Oral Tab Take 1 tablet (10 mg total) by mouth daily. (Patient not taking: Reported on 2024) 30 tablet 3     ofloxacin 0.3 % Ophthalmic Solution Place 2 drops into the right eye every 4 (four) hours.   2024    ketorolac 0.5 % Ophthalmic Solution Place 1 drop into the right eye 4 (four) times daily.   2024 at prn    prednisoLONE 1 % Ophthalmic Suspension Place 1 drop into the right eye 3 (three) times daily.       Omega-3 Fatty Acids (FISH OIL) 1200 MG Oral Capsule Delayed Release Take by mouth. (Patient not taking: Reported on 2024)       [] amoxicillin clavulanate 875-125 MG Oral Tab Take 1 tablet by mouth 2 (two) times daily for 10 days. (Patient  not taking: Reported on 7/29/2024) 20 tablet 0 Not Taking    butalbital-acetaminophen-caffeine -40 MG Oral Tab Take 1 tablet every 8 hours by oral route as needed. (Patient not taking: Reported on 6/18/2024)       Ciclopirox Olamine 0.77 % External Cream  (Patient not taking: Reported on 6/18/2024)       Glucose Blood (ONETOUCH VERIO) In Vitro Strip        hydroCHLOROthiazide 12.5 MG Oral Tab Take 1 tablet every day by oral route in the morning. (Patient not taking: Reported on 5/2/2024)       Lancets (ONETOUCH DELICA PLUS [id number]) Does not apply Misc  (Patient not taking: Reported on 5/2/2024)       metFORMIN HCl 1000 MG Oral Tab  (Patient not taking: Reported on 6/18/2024)       ondansetron 4 MG Oral Tablet Dispersible        sertraline 25 MG Oral Tab  (Patient not taking: Reported on 7/17/2024)        Current Facility-Administered Medications   Medication Dose Route Frequency    lactated ringers infusion   Intravenous Continuous       Allergies:   Allergies   Allergen Reactions    Latex HIVES    Peanut-Containing Drug Products SWELLING     Closes Throat  Closes Throat  Closes Throat      Peanuts SWELLING     Closes Throat    Adhesive Tape OTHER (SEE COMMENTS)    Seasonal        Past Medical History:    Anxiety state, unspecified    Disorder of thyroid    thyroidectomy    Diverticulosis of large intestine    Esophageal reflux    Exposure to medical therapeutic radiation    thyroid    High blood pressure    High cholesterol    Hypercholesteremia    Hypothyroidism    Osteoarthrosis, unspecified whether generalized or localized, unspecified site    Thyroid cancer (HCC)    papillary thyroid; s/p surgery resection with Asher and BRIDGES    Unspecified essential hypertension     Past Surgical History:   Procedure Laterality Date    Colonoscopy N/A 12/29/2022    Procedure: COLONOSCOPY;  Surgeon: Neeraj No MD;  Location: [facility] ENDOSCOPY    Egd  12/29/2022    Endoscopic ultrasound - internal  12/29/2022     Hysterectomy      Knee surgery Left 2022    no associated bleeding complications          40 week 7 lb(s) 5 oz Male; 6 hr labor           40 week 7 lb(s) 3 oz Female          41 week 9 lb(s) 8 oz Male    Other surgical history      Injection Tendon Sheath, Ligament    Thyroidectomy  2010    total    Total abdom hysterectomy       Family History   Problem Relation Age of Onset    Heart Disorder Mother     Breast Cancer Maternal Cousin Female 57    Cancer Daughter 29        Hodgkin's Lymphoma    Ovarian Cancer Neg     Bleeding Disorders Neg     DVT/VTE Neg     Pancreatic Cancer Neg     Prostate Cancer Neg      Social History     Tobacco Use    Smoking status: Never    Smokeless tobacco: Never   Substance Use Topics    Alcohol use: Yes     Comment: social       SYSTEM Check if Physical Exam is Normal If not normal, please explain:   HEENT [ X]    NECK  [ X]    HEART [ X]    LUNGS [ X]    ABDOMEN [ X]    EXTREMITIES [ X]      General:awake, cooperative, no acute distress  HEENT: EOMI, no scleral icterus, MMM; oral pharnyx is without exudates or lesions  Neck: no lymphadenopathy; thyroid is not enlarged and without nodules  CV: RRR  Resp: non-labored breathing  Abd: soft, non-tender, non-distended  Ext: no lower extremity swelling  Neuro: Alert, Oriented X 3  Skin: no rashes, bruises  Psych: normal affect  I have discussed the risks and benefits and alternatives of the procedure with the patient/family.  They understand and agreed to proceed with plan of care.   I have reviewed the History and Physical done within the last 30 days.  Any changes noted above.  Neeraj No MD  [facility] - Gastroenterology  2024  12:22 PM

## 2024-07-29 NOTE — OPERATIVE REPORT
Colonoscopy Report    Camelia Markham     1960 Age 64 year old   PCP FINA MABRY, MD WENDY Endoscopist Neeraj No MD     Date of procedure: 24    Procedure: Colonoscopy w/ snare polypectomy    Pre-operative diagnosis: colorectal cancer screening, history of adenomatous colon polyps    Post-operative diagnosis: see impression    Sedation: monitored anesthesia care (MAC)    Consent: We discussed the risks/benefits and alternatives to this procedure, as well as the planned sedation. Informed consent was obtained from the patient after the risks of the procedure were discussed, including but not limited to bleeding, perforation, aspiration, infection, or possibility of a missed lesion as well as the risks of anesthesia including but not limited to cardiopulmonary complications. The patient signed informed consent and elected to proceed with Colonoscopy with intervention [i.e. Biopsy, control of bleeding, dilatation, polypectomy, endoscopic mucosal resection, etc.] as indicated.    Colonoscopy procedure: Once an adequate level of sedation was obtained a digital rectal exam was completed revealing normal tone and no masses palpated. Then the lubricated tip of the [id number] diagnostic video colonoscope was carefully inserted and advanced without difficulty to the cecum using the air insufflation technique (only Co2 was used for insufflation). The cecum was identified by localizing the trifold, the appendix and the ileocecal valve. Withdrawal was begun with thorough washing and careful examination of the colonic walls and folds. The ascending colon was viewed twice in the forward view. Photodocumentation was obtained. The bowel prep was good. Views of the colon were good with washing. Withdrawal time was 9 minutes.    Air was then withdrawn and the endoscope was removed. The patient tolerated the procedure well. There were no immediate postoperative complications. The patient’s vital signs  were monitored throughout the procedure and remained stable.    Estimated blood loss: insignificant    Specimens collected:  colon polyps    Complications: none     Colonoscopy findings:  There were scattered diverticulosis noted throughout the colon.    Cecum: normal mucosa and vascular pattern    Ascending colon: there was a 5-6 mm polyp removed by cold snare polypectomy. There was blue hued mucosa in the mid to distal ascending colon consistent with a prior tattoo marking. Otherwise, normal mucosa and vascular pattern    Transverse colon: there was a 5-6 mm polyp near the hepatic flexure and a 5-6 mm mid transverse colon polyp and both removed by cold snare polypectomy. Otherwise, normal mucosa and vascular pattern    Descending colon: normal mucosa and vascular pattern    Sigmoid colon: normal mucosa and vascular pattern    Rectum: retroflexed view showed small non-bleeding hemorrhoids. Otherwise, normal mucosa and vascular pattern.    Impression:  1. 3 small colon polyps removed  2. Colon diverticulosis  3. Small hemorrhoids  4. Otherwise, unremarkable colonoscopy    Recommend:  1. Await pathology.   2. Repeat colonoscopy interval pending final pathology results. If new signs or symptoms develop, procedure may need to be repeated sooner.   3. Continue your current medications  4. Increase fiber in the diet  5. Follow up with your primary care physician on a routine basis    >>>If biopsies were performed and you have not received your pathology results either by phone or letter within 2 weeks, please call our office at [phone].    MD Jabari [facility] - Gastroenterology  7/29/2024

## 2024-07-29 NOTE — TELEPHONE ENCOUNTER
Discussed findings with patient and spouse post procedure. Discussed recommendations.     Neeraj No MD  [facility] - Gastroenterology  7/29/2024  1:53 PM

## 2024-07-29 NOTE — OPERATIVE REPORT
Esophagogastroduodenoscopy (EGD) & Endoscopic Ultrasound (EUS) Report    Camelia Markham     1960 Age 64 year old   PCP FINA MABRY, MD WENDY Endoscopist Neeraj No MD     Date of procedure: 24    Procedure: EGD and EUS esophagus, stomach, duodenum with guided FNA    Pre-operative diagnosis: pancreas cyst, abnormal MRCP    Post-operative diagnosis: see impression    Sedation: Monitored anesthesia care (MAC)    Medication: ceftriaxone 2 g IV    Consent: We discussed the risks/benefits and alternatives to this procedure, as well as the planned sedation.  Informed consent was obtained from the patient after the risks of the procedure were discussed, including but not limited to bleeding, perforation, aspiration, infection, or possibility of a missed lesion as well as the risks of anesthesia including but not limited to cardiopulmonary complications. The patient signed informed consent and elected to proceed with EGD/EUS with intervention [i.e. Biopsy, control of bleeding, dilatation, FNA, FNB, polypectomy, endoscopic mucosal resection, etc.] as indicated.     EGD & EUS procedure: The lubricated tip of the [id number] diagnostic video upper endoscope was carefully inserted and advanced using direct visualization into the posterior pharynx and ultimately into the esophagus. After the EGD was performed, the gastroscope was removed and the echoendoscope was then carefully inserted through the oropharynx, esophagus intubated, then advanced to the stomach and descending duodenum.    Air was then withdrawn and the echoendoscope was removed. The patient tolerated the procedure well. There were no immediate postoperative complications. The patient’s vital signs were monitored throughout the procedure and remained stable.    Estimated blood loss: insignificant    Specimens collected:  pancreas cyst aspirate    Complications: none    EGD findings:    1. Esophagus: The squamocolumnar junction was noted  at 35 cm and appeared regular. The esophageal mucosa appeared unremarkable.  2. Stomach: The stomach distended normally. Normal rugal folds were seen. The pylorus was patent. The gastric mucosa appeared unremarkable. Retroflexion revealed a normal fundus and cardia.   3. Duodenum: The duodenal mucosa appeared normal in the 1st and 2nd portion of the duodenum.     EUS findings:  A linear echoendoscope was used.  In the body of the pancreas there was a multiloculated anechoic cystic lesion measuring 3.9 cm in largest diameter.  The largest locule measured 2.1 x 2.2 cm.  FNA was performed using a 22-gauge needle with use of Doppler to avoid any intervening vessels. There was no solid component or mural nodule visualized. Aspiration of approximately 4 mL of somewhat yellow/brownish fluid that was aspirated.  The parenchyma in the head, neck, body and tail were unremarkable otherwise. The pancreatic duct was normal in size.     Impression:  A 3.9 cm pancreas body cyst s/p FNA    Recommend:  Await cytology and fluid studies  Oral antibiotic course  Proceed with planned colonoscopy     MD Jabari [facility] - Gastroenterology  7/29/2024

## 2024-07-30 VITALS
RESPIRATION RATE: 14 BRPM | WEIGHT: 176 LBS | OXYGEN SATURATION: 98 % | HEART RATE: 74 BPM | BODY MASS INDEX: 31.18 KG/M2 | HEIGHT: 63 IN | DIASTOLIC BLOOD PRESSURE: 76 MMHG | SYSTOLIC BLOOD PRESSURE: 129 MMHG

## 2024-08-05 ENCOUNTER — TELEPHONE (OUTPATIENT)
Facility: CLINIC | Age: 64
End: 2024-08-05

## 2024-08-05 NOTE — TELEPHONE ENCOUNTER
----- Message from Neeraj No sent at 8/5/2024 12:35 PM CDT -----  I called and spoke with the patient. Discussed the path results including unremarkable findings from cyst aspirate but recommendations to continue to monitor. Also discussed the polyps from the colon which were pre-cancerous but benign. Discussed recommendation colonoscopy for surveillance. She was appreciative of the call.    GI staff:    Please recall patient for colonoscopy in 3 years    Please recall for MRI/MRCP in 6 months    Thanks    MD [facility] - Gastroenterology  8/5/2024  12:33 PM

## 2024-08-05 NOTE — PROGRESS NOTES
I called and spoke with the patient. Discussed the path results including unremarkable findings from cyst aspirate but recommendations to continue to monitor. Also discussed the polyps from the colon which were pre-cancerous but benign. Discussed recommendation colonoscopy for surveillance. She was appreciative of the call.    GI staff:    Please recall patient for colonoscopy in 3 years    Please recall for MRI/MRCP in 6 months    Thanks    Neeraj No MD  [facility] - Gastroenterology  8/5/2024  12:33 PM

## 2024-08-05 NOTE — TELEPHONE ENCOUNTER
Health Maintenance Updated.    3 year colonoscopy recall entered into patient outreach in [facility].  Next colonoscopy will be due 7/29/2027

## 2024-08-05 NOTE — TELEPHONE ENCOUNTER
----- Message from Neeraj No sent at 8/5/2024 12:35 PM CDT -----  I called and spoke with the patient. Discussed the path results including unremarkable findings from cyst aspirate but recommendations to continue to monitor. Also discussed the polyps from the colon which were pre-cancerous but benign. Discussed recommendation colonoscopy for surveillance. She was appreciative of the call.    GI staff:    Please recall patient for colonoscopy in 3 years    Please recall for MRI/MRCP in 6 months    Thanks    MD [facility] - Gastroenterology  8/5/2024  12:33 PM

## 2024-08-05 NOTE — DISCHARGE INSTRUCTIONS
Discharge Instructions for Fine-Needle Thyroid Biopsy  You have had a procedure called fine-needle thyroid biopsy. This biopsy was done to learn more about a nodule or cyst in your thyroid gland or to find out what might be causing enlargement of your thyroid. During the biopsy, a very thin needle is inserted through the skin into the gland. The needle is used to remove a small amount of tissue from the gland. More than 1 tissue sample may be done to be sure to get cells from all parts of the nodule. Or the needle might be used to drain fluid from a cyst. Often a special ultrasound probe or transducer is used to help guide the needle to the right place. The tissue or fluid is then studied in a lab.    Home care  Here are some tips to take care of yourself at home:   You will have a small adhesive bandage on your biopsy site. Leave the bandage on for 4 to 6 hours. After this time, you don't need to keep it covered.   If you feel discomfort after the biopsy, take over-the-counter pain medicine. Don't take aspirin. It's normal to feel sore for a day or 2.  Ask your healthcare provider when you can return to normal activities. This will likely be the same day as your procedure.  Getting your results  Your biopsy results may take a few days. When the results are ready, your healthcare provider will discuss them with you and tell you what, if anything, needs to be done next. If you have questions, consider writing them down so you won't forget to ask when the provider calls.   Follow-up  Make a follow-up appointment as directed by your healthcare provider.  When to call your healthcare provider  Call your healthcare provider right away if you have any of the following:  Bleeding that won’t stop  Shortness of breath, trouble breathing or speaking, or a change in your voice  Fever of 100.4°F (38°C) or higher, or as directed by your provider  Increasing pain, redness, tenderness, or drainage at the biopsy site  Swelling of  the biopsy site  Be sure you understand what problems you should watch for and know how to reach the healthcare provider after hours and on weekends.    Kristi last reviewed this educational content on 12/1/2021 © 2000-2023 [employer]. All rights reserved. This information is not intended as a substitute for professional medical care. Always follow your healthcare professional's instructions.         22-Aug-2018

## 2024-08-05 NOTE — TELEPHONE ENCOUNTER
6 month MRI/MRCP recall entered into patient outreach in [facility] due February 2025

## 2024-08-06 ENCOUNTER — HOSPITAL ENCOUNTER (OUTPATIENT)
Dept: ULTRASOUND IMAGING | Facility: HOSPITAL | Age: 64
Discharge: HOME | End: 2024-08-06
Attending: OTOLARYNGOLOGY
Payer: COMMERCIAL

## 2024-08-06 VITALS — RESPIRATION RATE: 16 BRPM | SYSTOLIC BLOOD PRESSURE: 133 MMHG | DIASTOLIC BLOOD PRESSURE: 68 MMHG | HEART RATE: 73 BPM

## 2024-08-06 DIAGNOSIS — E04.1 THYROID NODULE: ICD-10-CM

## 2024-08-06 PROCEDURE — 88305 TISSUE EXAM BY PATHOLOGIST: CPT | Performed by: OTOLARYNGOLOGY

## 2024-08-06 PROCEDURE — 88173 CYTOPATH EVAL FNA REPORT: CPT | Performed by: OTOLARYNGOLOGY

## 2024-08-06 PROCEDURE — 10005 FNA BX W/US GDN 1ST LES: CPT | Performed by: OTOLARYNGOLOGY

## 2024-08-06 PROCEDURE — 88177 CYTP FNA EVAL EA ADDL: CPT | Performed by: OTOLARYNGOLOGY

## 2024-08-06 PROCEDURE — 88172 CYTP DX EVAL FNA 1ST EA SITE: CPT | Performed by: OTOLARYNGOLOGY

## 2024-08-06 NOTE — IMAGING NOTE
1355 Pt arrived to ultrasound room #3    1358 Scans taken by SAM HANNA ultrasound  sonographer     1400 History taken and as follows:  abnormal head/neck ultrasound. Hx thyroid cancer.      1408 Procedure explained questions answered.    1410 Consent verified and obtained      1422  scans reviewed by Dr. SIMENTAL    Site marked LEFT THYROID BED NODULE     1425 Time out taken      1427 Area cleaned sterile towels  to site. Pathology  was  notified.      1430 Lidocaine 1% 10 milligrams per ml  from kit  was given 4 ml    at 1430     FNA # 1 taken at  1431 with 25 g needle    FNA # 2 taken at 1433  with 25 g needle    FNA # 3 taken at  1438 with 25 g needle    FNA # 4 taken at  1440 with 25 g needle    1442 Procedure completed. Per Dr. Simental, compression to site for 5 minutes and rescan.     1448 area re scanned. Per Dr. Simental, okay to discharge. Area cleaned band aid to site ice pack to site.        1453 Post instructions given  verbal et written with AVS summary sheet provided to patient.  Also instructed patient to refrain from drinking or eating anything hot for several hours after biopsy  to prevent increase bleeding from occurring.    1458  Pt  discharged comfortably.

## 2024-08-07 ENCOUNTER — TELEPHONE (OUTPATIENT)
Dept: PULMONOLOGY | Facility: CLINIC | Age: 64
End: 2024-08-07

## 2024-08-07 ENCOUNTER — TELEPHONE (OUTPATIENT)
Dept: ENDOCRINOLOGY CLINIC | Facility: CLINIC | Age: 64
End: 2024-08-07

## 2024-08-07 NOTE — TELEPHONE ENCOUNTER
Received pulmonary referral from Dr. Tristen Marie. Patient does not have any upcoming appointments scheduled at this time. Referral given to PSR/.

## 2024-08-07 NOTE — TELEPHONE ENCOUNTER
Received fax from [facility] attached is pt xray results, results placed in provider folder for review.

## 2024-08-07 NOTE — TELEPHONE ENCOUNTER
Patient calling [facility] did an xray on 7/27/24 with [facility] and will have them fax over results for Dr Sands. [facility] also did also biopsy yesterday for nodule. States fluids were found in right lung. Unsure if  can advise. Please call and advise.

## 2024-08-12 ENCOUNTER — HOSPITAL ENCOUNTER (OUTPATIENT)
Dept: NUCLEAR MEDICINE | Facility: HOSPITAL | Age: 64
Discharge: HOME | End: 2024-08-12
Attending: INTERNAL MEDICINE
Payer: COMMERCIAL

## 2024-08-12 DIAGNOSIS — C73 THYROID CANCER (HCC): ICD-10-CM

## 2024-08-12 DIAGNOSIS — E89.0 POSTOPERATIVE HYPOTHYROIDISM: ICD-10-CM

## 2024-08-12 PROCEDURE — 78020 THYROID MET UPTAKE: CPT | Performed by: INTERNAL MEDICINE

## 2024-08-12 PROCEDURE — 78018 THYROID MET IMAGING BODY: CPT | Performed by: INTERNAL MEDICINE

## 2024-08-12 RX ORDER — WATER 10 ML/10ML
INJECTION INTRAMUSCULAR; INTRAVENOUS; SUBCUTANEOUS
Status: DISPENSED
Start: 2024-08-12 | End: 2024-08-12

## 2024-08-13 ENCOUNTER — HOSPITAL ENCOUNTER (OUTPATIENT)
Dept: NUCLEAR MEDICINE | Facility: HOSPITAL | Age: 64
Discharge: HOME | End: 2024-08-13
Attending: INTERNAL MEDICINE
Payer: COMMERCIAL

## 2024-08-13 RX ORDER — WATER 10 ML/10ML
INJECTION INTRAMUSCULAR; INTRAVENOUS; SUBCUTANEOUS
Status: DISCONTINUED
Start: 2024-08-13 | End: 2024-08-13

## 2024-08-14 ENCOUNTER — HOSPITAL ENCOUNTER (OUTPATIENT)
Dept: NUCLEAR MEDICINE | Facility: HOSPITAL | Age: 64
Discharge: HOME | End: 2024-08-14
Attending: INTERNAL MEDICINE
Payer: COMMERCIAL

## 2024-08-16 ENCOUNTER — HOSPITAL ENCOUNTER (OUTPATIENT)
Dept: NUCLEAR MEDICINE | Facility: HOSPITAL | Age: 64
Discharge: HOME | End: 2024-08-16
Attending: INTERNAL MEDICINE
Payer: COMMERCIAL

## 2024-08-16 ENCOUNTER — TELEPHONE (OUTPATIENT)
Dept: ENDOCRINOLOGY CLINIC | Facility: CLINIC | Age: 64
End: 2024-08-16

## 2024-08-16 DIAGNOSIS — C73 THYROID CANCER (HCC): Primary | ICD-10-CM

## 2024-08-16 NOTE — TELEPHONE ENCOUNTER
Called patient to discuss results.  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  Discussed with radiology and proceed with PET scan.  Concerned for possible pulmonary involvement given pleural effusions.  May need to consider medical oncology consult based on results.       ELIN Asher.  I did briefly discuss with Dr. Rivas and he will add on patient to schedule once imaging is complete if necessary.

## 2024-08-17 ENCOUNTER — OFFICE VISIT (OUTPATIENT)
Dept: OTOLARYNGOLOGY | Facility: CLINIC | Age: 64
End: 2024-08-17
Payer: COMMERCIAL

## 2024-08-17 DIAGNOSIS — C73 THYROID CANCER (HCC): Primary | ICD-10-CM

## 2024-08-17 PROCEDURE — 99214 OFFICE O/P EST MOD 30 MIN: CPT | Performed by: OTOLARYNGOLOGY

## 2024-08-17 NOTE — PROGRESS NOTES
Camelia Markham is a 64 year old female.    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 At the time of surgery in 2010 she was noted to have a left true vocal fold paralysis. She did receive postoperative radioablation by Dr. Pike. I last saw her in 2011 and at that time recommended a repeat nuclear scan. She believes that this has not been done at this point.She has had her levothyroxine adjusted and was recently increased the labs were recently performed demonstrating a TSH of 28.5 as well as low vitamin D levels. She does complain of chronic fatigue as well as a sense of depression. No other signs, symptoms or complaints.     7/20/17 she has not been seen in my office since late 2014.  History of left true vocal cord paralysis with subsequent finding of papillary carcinoma the thyroid extending into the laryngeal structures.  She is done well but more recently has been having findings of elevated thyroglobulin levels.  She underwent an ultrasound with no significant findings other than 3 small nodes less than a centimeter in size but suspicious due to their abnormal appearance.  She did have a subsequent nuclear scan which was negative for any uptake.  He did recently have a PET CT scan which demonstrated uptake in the neck with unusual finding of lymph nodes near the submandibular glands potentially causing blocked view of these nodes during her nuclear scan.  She is otherwise asymptomatic     8/29/17 she is here to go over the results of her recent ultrasound-guided biopsies of 2 lymph nodes which lit up on a PET scan.  Both were noted to have normal lymphoid cellularity with no evidence of malignancy.  No other significant issues at this time.      5/2/24 I last saw her 7 years ago the previous history of having total  thyroidectomy back in 2010.  Known left true vocal cord paralysis.  She has been on a certain level of levothyroxine for years had her blood checked recently and her TSH is suppressed at 0.1 and has been for the last 12 months.  Sent to me for further discussion as to management of her thyroid hormones.  No recent ultrasounds no recent thyroglobulin levels.  Sent by Dr. Marie for my opinion regarding her thyroid issues.     6/29/24 last visit I ordered an ultrasound and some blood work.  Thyroglobulin levels came back at 7.7.  Up from 2.7 back in 2018.  Found demonstrates a left-sided  nodule in the thyroid bed measuring  1 x 0.6 x 0.6 cm similar right-sided lymph nodes that appear to be benign as they have a fatty hilum.  TSH is 0.01.  Scheduled an appointment in August to meet with a different endocrinologist that she cannot meet with Dr. Sands.    8/17/24 since I last saw her she underwent an ultrasound-guided biopsy of a 1 cm structure in the left thyroid bed.  This unfortunately did demonstrate recurrent papillary carcinoma.  Yesterday underwent Thyrogen stimulated nuclear medicine study with no uptake noted within the neck suggestive of all noniodine avid papillary carcinoma.  Scheduled for a PET CT scan in the near future.  Previously noted to have a very low TSH and currently having her thyroid supplementation managed by Dr. Sands of endocrinology.  Here to discuss further management.    Social History     Socioeconomic History    Marital status:    Tobacco Use    Smoking status: Never    Smokeless tobacco: Never   Vaping Use    Vaping status: Never Used   Substance and Sexual Activity    Alcohol use: Yes     Comment: social    Drug use: No   Other Topics Concern    Caffeine Concern Yes     Comment: 1 cup of coffee        Family History   Problem Relation Age of Onset    Heart Disorder Mother     Breast Cancer Maternal Cousin Female 57    Cancer Daughter 29        Hodgkin's Lymphoma    Ovarian Cancer  Neg     Bleeding Disorders Neg     DVT/VTE Neg     Pancreatic Cancer Neg     Prostate Cancer Neg        Past Medical History:    Anxiety state, unspecified    Colon adenomas    x3    Disorder of thyroid    thyroidectomy    Diverticulosis of large intestine    Esophageal reflux    Exposure to medical therapeutic radiation    thyroid    High blood pressure    High cholesterol    Hypercholesteremia    Hypothyroidism    Osteoarthrosis, unspecified whether generalized or localized, unspecified site    Thyroid cancer (HCC)    papillary thyroid; s/p surgery resection with Asher and BRIDGES    Unspecified essential hypertension       Past Surgical History:   Procedure Laterality Date    Colonoscopy N/A 2022    Procedure: COLONOSCOPY;  Surgeon: Neeraj No MD;  Location: [facility] ENDOSCOPY    Colonoscopy  2024    Colonoscopy N/A 2024    Procedure: COLONOSCOPY;  Surgeon: Neeraj No MD;  Location: [facility] ENDOSCOPY    Egd  2022    Egd  2024    Esophagogastroduodenoscopy    Endoscopic ultrasound - internal  2022    Endoscopic ultrasound exam  2024    Endoscopic Ultrasound    Hysterectomy      Knee surgery Left 2022    no associated bleeding complications          40 week 7 lb(s) 5 oz Male; 6 hr labor           40 week 7 lb(s) 3 oz Female          41 week 9 lb(s) 8 oz Male    Other surgical history      Injection Tendon Sheath, Ligament    Thyroidectomy      total    Total abdom hysterectomy           REVIEW OF SYSTEMS    System Neg/Pos Details   Constitutional Negative Fatigue, fever and weight loss.   ENMT Negative Drooling.   Eyes Negative Blurred vision and vision changes.   Respiratory Negative Dyspnea and wheezing.   Cardio Negative Chest pain, irregular heartbeat/palpitations and syncope.   GI Negative Abdominal pain and diarrhea.   Endocrine Negative Cold intolerance and heat intolerance.   Neuro Negative Tremors.   Psych Negative Anxiety  and depression.   Integumentary Negative Frequent skin infections, pigment change and rash.   Hema/Lymph Negative Easy bleeding and easy bruising.           PHYSICAL EXAM    There were no vitals taken for this visit.       Constitutional Normal Overall appearance - Normal.   Psychiatric Normal Orientation - Oriented to time, place, person & situation. Appropriate mood and affect.   Neck Exam Normal Inspection - Normal. Palpation - Normal. Parotid gland - Normal. Thyroid gland no palpable thyroid tissue   Eyes Normal Conjunctiva - Right: Normal, Left: Normal. Pupil - Right: Normal, Left: Normal. Fundus - Right: Normal, Left: Normal.   Neurological Normal Memory - Normal. Cranial nerves - Cranial nerves II through XII grossly intact.   Head/Face Normal Facial features - Normal. Eyebrows - Normal. Skull - Normal.        Nasopharynx Normal External nose - Normal. Lips/teeth/gums - Normal. Tonsils - Normal. Oropharynx - Normal.   Ears Normal Inspection - Right: Normal, Left: Normal. Canal - Right: Normal, Left: Normal. TM - Right: Normal, Left: Normal.   Skin Normal Inspection - Normal.        Lymph Detail Normal Submental. Submandibular. Anterior cervical. Posterior cervical. Supraclavicular.        Nose/Mouth/Throat Normal External nose - Normal. Lips/teeth/gums - Normal. Tonsils - Normal. Oropharynx - Normal.   Nose/Mouth/Throat Normal Nares - Right: Normal Left: Normal. Septum -Normal  Turbinates - Right: Normal, Left: Normal.       Current Outpatient Medications:     azelastine 0.1 % Nasal Solution, 2 sprays by Nasal route 2 (two) times daily., Disp: 90 mL, Rfl: 1    montelukast 10 MG Oral Tab, Take 1 tablet (10 mg total) by mouth nightly., Disp: 30 tablet, Rfl: 3    loratadine 10 MG Oral Tab, Take 1 tablet (10 mg total) by mouth daily. (Patient not taking: Reported on 7/17/2024), Disp: 30 tablet, Rfl: 3    ofloxacin 0.3 % Ophthalmic Solution, Place 2 drops into the right eye every 4 (four) hours., Disp: , Rfl:      ketorolac 0.5 % Ophthalmic Solution, Place 1 drop into the right eye 4 (four) times daily., Disp: , Rfl:     prednisoLONE 1 % Ophthalmic Suspension, Place 1 drop into the right eye 3 (three) times daily., Disp: , Rfl:     metFORMIN HCl 1000 MG Oral Tab, , Disp: , Rfl:     ondansetron 4 MG Oral Tablet Dispersible, , Disp: , Rfl:     sertraline 25 MG Oral Tab, , Disp: , Rfl:     Olmesartan Medoxomil 40 MG Oral Tab, , Disp: , Rfl:     Omeprazole 40 MG Oral Capsule Delayed Release, , Disp: , Rfl:     Levothyroxine Sodium 150 MCG/5ML Oral Solution, Take by mouth every morning., Disp: , Rfl:     amLODIPine 10 MG Oral Tab, Take 1 tablet (10 mg total) by mouth every morning., Disp: , Rfl:     clonazePAM 2 MG Oral Tab, Take 1 tablet (2 mg total) by mouth nightly., Disp: , Rfl:   ASSESSMENT AND PLAN    1. Thyroid cancer (HCC)  We had a very detailed conversation with the patient and her  regarding the findings of her studies so far.  Previous ultrasound demonstrated a 1 cm structure within the left thyroid bed/peritracheal region.  She subsequently underwent ultrasound-guided biopsy of this 1 cm left neck nodule which demonstrated recurrent papillary carcinoma.  We did discuss the fact that this is a very small recurrence at this time.  We also discussed the fact that her nuclear medicine study was negative for any uptake in the left neck suggestive of a noniodine avid papillary carcinoma recurrence.  At this time she is scheduled to undergo a PET scan to evaluate for this neck metastasis and also to look for any metastasis anywhere else throughout the body.  We did discuss management options which may likely require neck dissection in the near future.  At this time I have recommended that we simply wait to see what the results of her PET scan which show and plan accordingly depending on these results.  She states understanding and accepts this plan and will simply return to see me after she has gone through with her  PET scan.  Greater than 30 minutes spent in discussions with patient and  regarding management of her recurrent papillary carcinoma.  Greater than 30 minutes spent with patient and  in discussions regarding management of her recurrent papillary carcinoma.        This note was prepared using Dragon Medical voice recognition dictation software. As a result errors may occur. When identified these errors have been corrected. While every attempt is made to correct errors during dictation discrepancies may still exist    Norman Asher MD    8/17/2024    9:24 AM

## 2024-08-22 ENCOUNTER — HOSPITAL ENCOUNTER (OUTPATIENT)
Dept: NUCLEAR MEDICINE | Facility: HOSPITAL | Age: 64
Discharge: HOME | End: 2024-08-22
Attending: INTERNAL MEDICINE
Payer: COMMERCIAL

## 2024-08-22 ENCOUNTER — PATIENT MESSAGE (OUTPATIENT)
Dept: OTOLARYNGOLOGY | Facility: CLINIC | Age: 64
End: 2024-08-22

## 2024-08-22 ENCOUNTER — TELEPHONE (OUTPATIENT)
Dept: ENDOCRINOLOGY CLINIC | Facility: CLINIC | Age: 64
End: 2024-08-22

## 2024-08-22 DIAGNOSIS — C73 THYROID CANCER (HCC): ICD-10-CM

## 2024-08-22 DIAGNOSIS — C73 THYROID CANCER (HCC): Primary | ICD-10-CM

## 2024-08-22 LAB — GLUCOSE BLDC GLUCOMTR-MCNC: 113 MG/DL (ref 70–99)

## 2024-08-22 PROCEDURE — 78815 PET IMAGE W/CT SKULL-THIGH: CPT | Performed by: INTERNAL MEDICINE

## 2024-08-22 PROCEDURE — 82962 GLUCOSE BLOOD TEST: CPT

## 2024-08-22 NOTE — TELEPHONE ENCOUNTER
Called patient to discuss PET results.  Her scan does demonstrate recurrent thyroid cancer in the cervical lymph nodes but also extensive pulmonary disease.  She has a nodule in the right lobe with large pleural effusion and extensive pleural disease.      Recommend evaluation by medical oncology and referral placed.      Will also discussion with pulmonary if any benefit for evaluation of pleural fluid.     ELIN Asher

## 2024-08-22 NOTE — TELEPHONE ENCOUNTER
From: Camelia Markham  To: Norman Asher  Sent: 8/22/2024 8:46 AM CDT  Subject: PET Scan    Good morning Dr. Asher!  Just dropping a note to let you know that I just finished the PET scan that was scheduled for today.    Regards  Katlyn Markham

## 2024-08-23 ENCOUNTER — TELEPHONE (OUTPATIENT)
Dept: HEMATOLOGY/ONCOLOGY | Facility: HOSPITAL | Age: 64
End: 2024-08-23

## 2024-08-23 NOTE — TELEPHONE ENCOUNTER
Patient called to make f/u with dr becerra new dx for lung. Dr david brower Good afternoon, I actually talked to Dr. Becerra this morning and he is recommending that you see Dr. Chen. He specialized in head and neck cancers. So when you call oncology hopefully they can get you in for an appointment with that doctor. Thank you. Dr. Sands

## 2024-08-26 ENCOUNTER — TELEPHONE (OUTPATIENT)
Dept: PULMONOLOGY | Facility: CLINIC | Age: 64
End: 2024-08-26

## 2024-08-26 DIAGNOSIS — J90 PLEURAL EFFUSION: Primary | ICD-10-CM

## 2024-08-26 NOTE — TELEPHONE ENCOUNTER
Spoke with patient states she has oncology appointment on Wednesday 8/28/24 at 8:30am, she is unable to make consult appointment with Dr. Brooks and thoracentesis. Spoke with Dr. Brooks, ask if Radiology room and patient available 8am consult on 8/29/24 and 9am thoracentesis.

## 2024-08-26 NOTE — TELEPHONE ENCOUNTER
Per Dr. Brooks, schedule patient on Wednesday 8/28/24 at 8am, 9am thoracentesis. Spoke with Martina in Radiology, Tequila scheduled for 9am.    Dr. Brooks - Per PET, large right pleural effusion. Please review/sign pended order.

## 2024-08-27 PROBLEM — C78.01 MALIGNANT NEOPLASM METASTATIC TO RIGHT LUNG (HCC): Status: ACTIVE | Noted: 2024-08-27

## 2024-08-27 PROBLEM — J90 PLEURAL EFFUSION: Status: ACTIVE | Noted: 2024-08-27

## 2024-08-27 NOTE — CONSULTS
[facility] Consultation Note    Patient Name: Camelia Markham   YOB: 1960   Medical Record Number: [id number]   CSN: [id number]   Consulting Physician: Inocente Chen MD  Referring Physician(s): Dr Dana Sands MD  Date of Consultation: 8/27/2024     Reason for Consultation:  Metastatic  Thyroid Cancer   Cancer Staging   Malignant neoplasm of thyroid gland (HCC)  Staging form: Thyroid - Differentiated, AJCC 8th Edition  - Clinical: Stage IVB (rcT2, cN1, cM1, Age at diagnosis: >= 55 years) - Signed by Inocente Chen MD on 8/28/2024      History of Present Illness:   Camelia Markham is a 64 year old female that was seen today in the Cancer Center for metastatic thyroid cancer. Her oncologic history is detailed below    11/3/2010 was found to have an enlarged thyroid gland ultrasound-guided FNA showed papillary thyroid cancer.  Underwent total thyroidectomy with Dr. Asher.  Final pathology showed a 2.5 cm tumor and 2 positive lymph nodes.    12/2010 BRIDGES uptake study showed no evidence of abnormal uptake outside the neck.  Underwent 207 miCu of radioactive iodine.  She had then been maintained on levothyroxine.  Subsequently was lost to follow-up after 2018.    6/17/2024 Thyroglobulin increased to 10, prompted an ultrasound that showed an oval hypoechoic nodule in the superior aspect of the left thyroid measuring 1 x 0.6 x 0.6 cm thought to represent an indeterminate lymph node.    8/6/2024 ultrasound-guided FNA showed papillary carcinoma.     8/16/2024 [facility] thyroid scan showed no discernible pathologic activity increased uptake in the thyroid bed.  Given the biopsy-proven recurrence and absence of I-131 uptake this was thought to represent dedifferentiated recurrent thyroid malignancy.    8/22/2024 PET/CT fusion study showed extensive hypermetabolic pleural-based nodularity, large pleural effusion as well as hypermetabolic nodularity in the left neck soft tissues as well as peripheral right  upper nodule all of which was concerning for metastatic disease.  Previously seen cystic pancreatic lesion did not demonstrate any hypermetabolic activity.    She was recommended thoracentesis and is awaiting pulmonology evaluation for the same. Continues to struggle with right chest wall pain that is affecting her sleep and her ability to work. Also has some exertional dyspnea. Overwhelmed about her diagnosis and financial issues etc    Past Medical History:  Past Medical History:    Anxiety state, unspecified    Colon adenomas    x3    Disorder of thyroid    thyroidectomy    Diverticulosis of large intestine    Esophageal reflux    Exposure to medical therapeutic radiation    thyroid    High blood pressure    High cholesterol    Hypercholesteremia    Hypothyroidism    Osteoarthrosis, unspecified whether generalized or localized, unspecified site    Thyroid cancer (HCC)    papillary thyroid; s/p surgery resection with Asher and BRIDGES    Unspecified essential hypertension       Past Surgical History:  Past Surgical History:   Procedure Laterality Date    Colonoscopy N/A 2022    Procedure: COLONOSCOPY;  Surgeon: Neeraj No MD;  Location: [facility] ENDOSCOPY    Colonoscopy  2024    Colonoscopy N/A 2024    Procedure: COLONOSCOPY;  Surgeon: Neeraj No MD;  Location: [facility] ENDOSCOPY    Egd  2022    Egd  2024    Esophagogastroduodenoscopy    Endoscopic ultrasound - internal  2022    Endoscopic ultrasound exam  2024    Endoscopic Ultrasound    Hysterectomy      Knee surgery Left 2022    no associated bleeding complications          40 week 7 lb(s) 5 oz Male; 6 hr labor           40 week 7 lb(s) 3 oz Female          41 week 9 lb(s) 8 oz Male    Other surgical history      Injection Tendon Sheath, Ligament    Thyroidectomy      total    Total abdom hysterectomy         Family Medical History:  Family History   Problem Relation Age of  Onset    Heart Disorder Mother     Breast Cancer Maternal Cousin Female 57    Cancer Daughter 29        Hodgkin's Lymphoma    Ovarian Cancer Neg     Bleeding Disorders Neg     DVT/VTE Neg     Pancreatic Cancer Neg     Prostate Cancer Neg        Gyne History:  OB History    Para Term  AB Living   3 3 0 0 0 0   SAB IAB Ectopic Multiple Live Births   0 0 0 0 0       Social History:  Social History     Socioeconomic History    Marital status:      Spouse name: Not on file    Number of children: Not on file    Years of education: Not on file    Highest education level: Not on file   Occupational History    Not on file   Tobacco Use    Smoking status: Never    Smokeless tobacco: Never   Vaping Use    Vaping status: Never Used   Substance and Sexual Activity    Alcohol use: Yes     Comment: social    Drug use: No    Sexual activity: Not on file   Other Topics Concern     Service Not Asked    Blood Transfusions Not Asked    Caffeine Concern Yes     Comment: 1 cup of coffee     Occupational Exposure Not Asked    Hobby Hazards Not Asked    Sleep Concern Not Asked    Stress Concern Not Asked    Weight Concern Not Asked    Special Diet Not Asked    Back Care Not Asked    Exercise Not Asked    Bike Helmet Not Asked    Seat Belt Not Asked    Self-Exams Not Asked   Social History Narrative    Camelia Figueroa is  to Baldemar x30 yrs. She has 3 adult children. 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 She lives with her , her mom, and 1 of the children in [place], [place].     Social Determinants of Health     Financial Resource Strain: Not on file   Food Insecurity: Not on file   Transportation Needs: Not on file   Physical Activity: Not on file   Stress: Not on file   Social Connections: Not on file   Housing Stability: At Risk (2023)    Received from [facility]     Living Situation: Not on file     Housing Problems: Not on file       Allergies:   Allergies   Allergen Reactions     Latex HIVES    Peanut-Containing Drug Products SWELLING     Closes Throat  Closes Throat  Closes Throat      Peanuts SWELLING     Closes Throat    Adhesive Tape OTHER (SEE COMMENTS)    Seasonal        Current Medications:  No outpatient medications have been marked as taking for the 8/28/24 encounter (Appointment) with Inocente Chen MD.       Review of Systems:    Constitutional: Negative for anorexia, chills, Has fatigue but no recent weight loss  Eyes: Negative for visual disturbance, irritation and redness.  Respiratory: Negative for cough, hemoptysis,  has right sided chest pain, and VAUGHN as above  Gastrointestinal: Negative for dysphagia, odynophagia, reflux symptoms, nausea, vomiting  Has chronic diarrhea and bowel urgency   Integument/breast: Negative for rash, skin lesions, and pruritus.  Hematologic/lymphatic: Negative for easy bruising, bleeding, and lymphadenopathy.  Musculoskeletal: Negative for myalgias, arthralgias, muscle weakness.  Neurological: Negative for headaches, dizziness, speech problems, gait problems and weakness.    A comprehensive 14 point review of systems was completed.  Pertinent positives and negatives noted in the the HPI.     Vital Signs:  /59 (BP Location: Left arm, Patient Position: Sitting, Cuff Size: large)   Pulse 72   Temp 97.9 °F (36.6 °C) (Oral)   Resp 18   Ht 1.575 m (5' 2\")   Wt 79.4 kg (175 lb)   SpO2 97%   BMI 32.01 kg/m²    Physical Examination:    General: Patient is alert and oriented x 3, not in acute distress.  Psych:  oriented x 3, very anxious   HEENT: EOMs intact. PERRL. Oropharynx is clear.   Neck: No JVD. No palpable lymphadenopathy. Neck is supple.  Lymphatics: There is no palpable lymphadenopathy throughout in the cervical, supraclavicular, axillary, or inguinal regions.  Chest: Clear to auscultation. Right rib tenderness around 6th rib, decreased AE right base  Heart: Regular rate and rhythm. S1S2 normal.  Abdomen: Soft, non tender.  No  hepatosplenomegaly.  No palpable mass.  Extremities: No edema or calf tenderness.  Neurological: Grossly intact.     Performance Status:  ECOG-1    Labs:    Lab Results   Component Value Date/Time    WBC 9.6 05/23/2024 05:12 PM    RBC 4.54 05/23/2024 05:12 PM    HGB 13.0 05/23/2024 05:12 PM    HCT 41.0 05/23/2024 05:12 PM    MCV 90.3 05/23/2024 05:12 PM    MCH 28.6 05/23/2024 05:12 PM    MCHC 31.7 05/23/2024 05:12 PM    RDW 14.1 05/23/2024 05:12 PM    NEPRELIM 6.16 05/23/2024 05:12 PM    .0 05/23/2024 05:12 PM       Lab Results   Component Value Date/Time     (H) 05/23/2024 05:12 PM    BUN 12 05/23/2024 05:12 PM    CREATSERUM 0.86 05/23/2024 05:12 PM    GFRNAA 67 02/16/2021 12:09 PM    CA 9.3 05/23/2024 05:12 PM    ALB 4.4 05/23/2024 05:12 PM     05/23/2024 05:12 PM    K 3.9 05/23/2024 05:12 PM     05/23/2024 05:12 PM    CO2 26.0 05/23/2024 05:12 PM    ALKPHO 121 05/23/2024 05:12 PM    AST 23 05/23/2024 05:12 PM    ALT 23 05/23/2024 05:12 PM       Imaging:  PET films reviewed personallly with pt/spouse -impression as above    Impression:      ICD-10-CM    1. Thyroid cancer (HCC)  C73       2. Malignant neoplasm metastatic to right lung (HCC)  C78.01       3. Pleural effusion  J90       64-year-old with a long history of thyroid cancer s/p thyroidectomy followed by BRIDGES and levothyroxine suppression now with biopsy-proven recurrence around the neck as well as pulmonary parenchymal mets and pleural nodules all worrisome for metastatic thyroid cancer.  BRIDGES uptake scan was negative suggesting dedifferentiated thyroid cancer.    I had a long discussion with patient explained that unfortunately this represents an incurable Stage IV malignancy.  However this is certainly very treatable with oral tyrosine kinase inhibitors with pt having survival for several years.    Plan:  Proceed with planned thoracentesis and will await cytology specimens.  Will send these for NGS testing to evaluate for   mutations including BRAF and RET  Recommended initiation of treatment with lenvatinib.  We discussed the anticipated side effects of this therapy including HTN, proteinuria, diarrhea skin rash etc.  She will meet with the oncology NP for detailed drug education and we will attempt to arrange this through the specialty pharmacy.  Obtain baseline thyroglobulin will monitor this throughout her treatment and plan reimaging in about 3 months.  Start hydrocodone for cancer related pain, if pain uncontrolled will refer to Radiation oncology for palliative XRT  MSW referral to assist with financial issues/disability application etc    The diagnosis, prognosis, treatment goals, plan for treatment, and expected response was explained to the patient.     Thank you Dr Dana Gonsalves MD for the opportunity to participate in the care of this interesting patient. Please do contact me if I may be of any further assistance    Inocente Chen MD  [facility] Hematology/Oncology    Total time on this consult was  65 minutes , more than 50% of the time was spent in counseling and/or coordination of care related to metastatic thyroid cancer.    Copy to:  Dr FINA MABRY, MD Dr. Norman NGUYEN MD

## 2024-08-28 ENCOUNTER — OFFICE VISIT (OUTPATIENT)
Dept: HEMATOLOGY/ONCOLOGY | Facility: HOSPITAL | Age: 64
End: 2024-08-28
Attending: INTERNAL MEDICINE
Payer: COMMERCIAL

## 2024-08-28 VITALS
HEART RATE: 72 BPM | HEIGHT: 62 IN | SYSTOLIC BLOOD PRESSURE: 112 MMHG | OXYGEN SATURATION: 97 % | TEMPERATURE: 98 F | DIASTOLIC BLOOD PRESSURE: 59 MMHG | RESPIRATION RATE: 18 BRPM | WEIGHT: 175 LBS | BODY MASS INDEX: 32.2 KG/M2

## 2024-08-28 DIAGNOSIS — C73 THYROID CANCER (HCC): Primary | ICD-10-CM

## 2024-08-28 DIAGNOSIS — J90 PLEURAL EFFUSION: ICD-10-CM

## 2024-08-28 DIAGNOSIS — G89.3 CANCER RELATED PAIN: ICD-10-CM

## 2024-08-28 DIAGNOSIS — C78.01 MALIGNANT NEOPLASM METASTATIC TO RIGHT LUNG (HCC): ICD-10-CM

## 2024-08-28 PROCEDURE — 99215 OFFICE O/P EST HI 40 MIN: CPT | Performed by: INTERNAL MEDICINE

## 2024-08-28 RX ORDER — HYDROCODONE BITARTRATE AND ACETAMINOPHEN 5; 325 MG/1; MG/1
1-2 TABLET ORAL EVERY 6 HOURS PRN
Qty: 60 TABLET | Refills: 0 | Status: SHIPPED | OUTPATIENT
Start: 2024-08-28

## 2024-08-28 RX ORDER — LENVATINIB 24 MG/DAY
24 KIT ORAL DAILY
Qty: 30 EACH | Refills: 5 | Status: SHIPPED | OUTPATIENT
Start: 2024-08-28

## 2024-08-28 NOTE — PROGRESS NOTES
[facility] collected and sent via [employer] on 8/28.      Order entered online for [facility] for PD-L1, xT, and MMR will be drawn from Pathology specimen [id number].

## 2024-08-29 ENCOUNTER — OFFICE VISIT (OUTPATIENT)
Dept: PULMONOLOGY | Facility: CLINIC | Age: 64
End: 2024-08-29
Payer: COMMERCIAL

## 2024-08-29 ENCOUNTER — HOSPITAL ENCOUNTER (OUTPATIENT)
Dept: ULTRASOUND IMAGING | Facility: HOSPITAL | Age: 64
Discharge: HOME | End: 2024-08-29
Attending: INTERNAL MEDICINE
Payer: COMMERCIAL

## 2024-08-29 ENCOUNTER — HOSPITAL ENCOUNTER (OUTPATIENT)
Dept: GENERAL RADIOLOGY | Facility: HOSPITAL | Age: 64
Discharge: HOME | End: 2024-08-29
Attending: INTERNAL MEDICINE
Payer: COMMERCIAL

## 2024-08-29 ENCOUNTER — LAB REQUISITION (OUTPATIENT)
Dept: LAB | Facility: HOSPITAL | Age: 64
End: 2024-08-29
Payer: COMMERCIAL

## 2024-08-29 VITALS
SYSTOLIC BLOOD PRESSURE: 119 MMHG | BODY MASS INDEX: 32.54 KG/M2 | RESPIRATION RATE: 20 BRPM | DIASTOLIC BLOOD PRESSURE: 73 MMHG | OXYGEN SATURATION: 97 % | HEIGHT: 62 IN | WEIGHT: 176.81 LBS | HEART RATE: 71 BPM

## 2024-08-29 VITALS — WEIGHT: 176 LBS | HEIGHT: 62 IN | BODY MASS INDEX: 32.39 KG/M2

## 2024-08-29 DIAGNOSIS — J90 PLEURAL EFFUSION: ICD-10-CM

## 2024-08-29 DIAGNOSIS — J90 PLEURAL EFFUSION: Primary | ICD-10-CM

## 2024-08-29 DIAGNOSIS — C73 MALIGNANT NEOPLASM OF THYROID GLAND (HCC): ICD-10-CM

## 2024-08-29 LAB
BASOPHILS NFR PLR: 1 %
EOSINOPHIL NFR PLR: 13 %
GLUCOSE PLR-MCNC: 66 MG/DL
LDH FLD L TO P-CCNC: 1320 U/L
LYMPHOCYTES NFR PLR: 53 %
MONOS+MACROS NFR PLR: 27 %
NEUTROPHILS NFR PLR: 5 %
PROT PLR-MCNC: 5.8 G/DL
RBC # FLD: ABNORMAL /CUMM (ref ?–1)
TOTAL CELLS COUNTED FLD: 100
TOTAL CELLS COUNTED PLR: 2341 /CUMM (ref ?–1)
WBC # PLR: 2318 /CUMM
WBC OTHER NFR PLR: 1 %

## 2024-08-29 PROCEDURE — 32555 ASPIRATE PLEURA W/ IMAGING: CPT | Performed by: INTERNAL MEDICINE

## 2024-08-29 PROCEDURE — 99204 OFFICE O/P NEW MOD 45 MIN: CPT | Performed by: INTERNAL MEDICINE

## 2024-08-29 PROCEDURE — 3078F DIAST BP <80 MM HG: CPT | Performed by: INTERNAL MEDICINE

## 2024-08-29 PROCEDURE — 3008F BODY MASS INDEX DOCD: CPT | Performed by: INTERNAL MEDICINE

## 2024-08-29 PROCEDURE — 3074F SYST BP LT 130 MM HG: CPT | Performed by: INTERNAL MEDICINE

## 2024-08-29 PROCEDURE — 88363 XM ARCHIVE TISSUE MOLEC ANAL: CPT | Performed by: PATHOLOGY

## 2024-08-29 NOTE — PROGRESS NOTES
Initial Pulmonary Medicine Outpatient Consultation           Reason for Consultation and CC: abnormal chest CT and new right pleural effusion.    Referring Physician: Dr. Marie and Dr. Sands     HPI: I had the pleasure of seeing Camelia Markham for a Pulmonary Medicine consultation on 8/29/24.  Very pleasant 63 yo woman with hx of metastatic thyroid CA s/p total thyroidectomy in 2010, s/p BRIDGES tx. Recently, thyroglobulin level increased and a thyroid US showed an oval thyroid nodule thought to be a indeterminante lymph node. 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 Most recent PET scan notable showed extensive hypermetabolic pleural-based nodularity, large right pleural effusion as well as hypermetabolic nodularity in the left neck soft tissues as well as peripheral right upper nodule all of which was concerning for metastatic disease. She has been experiencing right chest pain (under her right breast) for 2 months. She thought it was MSK from sleeping the wrong way.   Also reports shortness of breath at rest and with exertion over the past 2 weeks.   Reports she coughs sometimes and wheezes. Cough is dry.   Denies fevers, chills. Does endorse night-time upper neck sweating about twice a week.        REVIEW OF SYSTEMS:  Positives and negatives as stated in HPI. Remainder of 10 pt review of systems otherwise are negative.       PAST MEDICAL HISTORY:  Past Medical History:    Anxiety state, unspecified    Colon adenomas    x3    Disorder of thyroid    thyroidectomy    Diverticulosis of large intestine    Esophageal reflux    Exposure to medical therapeutic radiation    thyroid    High blood pressure    High cholesterol    Hypercholesteremia    Hypothyroidism    Osteoarthrosis, unspecified whether generalized or localized, unspecified site    Thyroid cancer (HCC)    papillary thyroid; s/p surgery resection with Asher and BRIDGES    Unspecified  essential hypertension   Denies hx of previous pulmonary conditions       PAST SURGICAL HISTORY:  Past Surgical History:   Procedure Laterality Date    Colonoscopy N/A 2022    Procedure: COLONOSCOPY;  Surgeon: Neeraj No MD;  Location: [facility] ENDOSCOPY    Colonoscopy  2024    Colonoscopy N/A 2024    Procedure: COLONOSCOPY;  Surgeon: Neeraj No MD;  Location: [facility] ENDOSCOPY    Egd  2022    Egd  2024    Esophagogastroduodenoscopy    Endoscopic ultrasound - internal  2022    Endoscopic ultrasound exam  2024    Endoscopic Ultrasound    Hysterectomy      Knee surgery Left 2022    no associated bleeding complications          40 week 7 lb(s) 5 oz Male; 6 hr labor           40 week 7 lb(s) 3 oz Female          41 week 9 lb(s) 8 oz Male    Other surgical history      Injection Tendon Sheath, Ligament    Thyroidectomy      total    Total abdom hysterectomy          PAST FAMILY HISTORY:  Family History   Problem Relation Age of Onset    Heart Disorder Mother     Breast Cancer Maternal Cousin Female 57    Cancer Daughter 29        Hodgkin's Lymphoma    Ovarian Cancer Neg     Bleeding Disorders Neg     DVT/VTE Neg     Pancreatic Cancer Neg     Prostate Cancer Neg         PAST SOCIAL HISTORY:  Social History     Socioeconomic History    Marital status:    Tobacco Use    Smoking status: Never    Smokeless tobacco: Never   Vaping Use    Vaping status: Never Used   Substance and Sexual Activity    Alcohol use: Yes     Comment: social    Drug use: No   Other Topics Concern    Caffeine Concern Yes     Comment: 1 cup of coffee    Never smoked  No hx of asbestos exposure  Lives with mom and   Has a dog  Employed: office setting environment      ALLERGIES:  Allergies   Allergen Reactions    Latex HIVES    Peanut-Containing Drug Products SWELLING     Closes Throat  Closes Throat  Closes Throat      Peanuts SWELLING     Closes Throat     Adhesive Tape OTHER (SEE COMMENTS)    Seasonal         MEDS:  Current Outpatient Medications on File Prior to Visit   Medication Sig Dispense Refill    HYDROcodone-acetaminophen 5-325 MG Oral Tab Take 1-2 tablets by mouth every 6 (six) hours as needed for Pain. 60 tablet 0    Lenvatinib, 24 MG Daily Dose, (LENVIMA, 24 MG DAILY DOSE,) 2 x 10 MG & 4 MG Oral Capsule Therapy Pack Take 24 mg by mouth daily. 30 each 5    azelastine 0.1 % Nasal Solution 2 sprays by Nasal route 2 (two) times daily. 90 mL 1    ofloxacin 0.3 % Ophthalmic Solution Place 2 drops into the right eye every 4 (four) hours.      ketorolac 0.5 % Ophthalmic Solution Place 1 drop into the right eye 4 (four) times daily.      prednisoLONE 1 % Ophthalmic Suspension Place 1 drop into the right eye 3 (three) times daily.      sertraline 25 MG Oral Tab       Olmesartan Medoxomil 40 MG Oral Tab       Omeprazole 40 MG Oral Capsule Delayed Release       Levothyroxine Sodium 150 MCG/5ML Oral Solution Take by mouth every morning.      amLODIPine 10 MG Oral Tab Take 1 tablet (10 mg total) by mouth every morning.      clonazePAM 2 MG Oral Tab Take 1 tablet (2 mg total) by mouth nightly.       No current facility-administered medications on file prior to visit.       PHYSICAL EXAM:  Blood pressure 119/73, pulse 71, resp. rate 20, height 5' 2\" (1.575 m), weight 176 lb 12.8 oz (80.2 kg), SpO2 97%.  CONSTITUTIONAL: alert, oriented, no apparent distress  HEENT: atraumatic normocephalic  MOUTH: mucous membranes are moist. No OP exudates  NECK/THROAT: no JVD. Trachea midline. No obvious thyromegaly  LUNG: clear mostly with diminished at right base, no wheezing, crackles. Chest symmetric with respiratory motion  HEART: regular rate and rhythm, no obvious murmers or gallops note  ABD: soft non tender. + bowel sounds. No organomegaly noted  EXT: no clubbing, cyanosis, or edema noted. Pulses intact grossly  NEURO/MUSCULOSKELETAL: no gross deficits  SKIN: warm, dry. No  obvious lesions noted  LYMPH: no obvious LAD       IMAGES:  PET scan 8/2024  FINDINGS:  REFERENCE VALUES: The SUV max of the mediastinal blood pool is 3.3. The SUV max of the liver is 4.3.  HEAD/NECK: In the left neck soft tissues, a 1.5 x 1.7 cm hypodensity is seen (series 11, image 39) with SUV max of 18.4, increasing to 18.8 on delayed head neck imaging. A 0.6 x 0.7 cm node in the left neck (series 3, image 50) demonstrates SUV 5.6,  increasing to 6.1.    Postoperative changes of thyroidectomy are demonstrated. There is metallic streak artifact from dental amalgam.  LUNGS/PLEURA: Extensive pleural nodularity is noted. A reference area of nodularity measuring 0.8 x 1.2 cm (series 3, image 109) has SUV max of 9.3. Hypermetabolic pleural nodularity is demonstrated along the right lateral pleural surfaces is seen with  SUV max of 6.7-7.1. Extensive nodularity along the minor fissure is demonstrated, measuring 2.7 x 5.2 cm in aggregate (series 3, image 135) with SUV max of 8.9-10.7. Posteriorly along the minor fissure, there is hypermetabolic activity with SUV max of  8.3. Anterior pleural nodularity measures up to 4.6 x 1.8 cm (series 3, image 161) and has SUV max of 12.3. Posterior pleural nodularity has SUV max of 7.8. Extensive nodularity extending into the right costophrenic sulcus has SUV max of 7.7-9.1.  A peripheral right upper lobe nodule measures 1.4 x 1.1 cm (series 3, image 111) with SUV max of 6.1.  Large right pleural effusion is seen with associated compressive atelectasis, with or without superimposed airspace consolidation. Additional scattered ground-glass and reticular opacities are present and may be atelectatic in origin.  MEDIASTINUM/SHAZIA: Epicardial nodularity is seen with reference nodules measuring 1.1 x 1.2 cm and 1.9 x 1.5 cm (series 3, image 54). The SUV max in this region is 4.1-5.3. Posterior and lateral epicardial activity is seen with SUV max of 6.2.  Periesophageal activity is seen  with SUV max of 5.7.  CHEST WALL/AXILLA: No pathologic FDG activity.    ABDOMEN/PELVIS: Nonspecific low density of the hepatic parenchyma may represent underlying hepatic steatosis. The gallbladder is surgically absent with cholecystectomy clips in the gallbladder fossa. There is mild diffuse fatty replacement of the  pancreas. A lobulated cystic pancreatic lesion the body measures 1.9 x 3.6 cm (series 23, image 182). Scattered colonic diverticula are present in the sigmoid colon. There is no colonic wall thickening or pericolonic fat stranding. Scattered  atherosclerotic vascular calcifications of the abdominal aorta are observed. The uterus is surgically absent. No pathologic FDG activity.    MUSCULOSKELETAL: There are degenerative changes of the shoulders bilaterally.  Degenerative changes of the hips are present bilaterally.  Postprocedural changes of prior right femoral intramedullary carmella and nail placement are partially delineated with  resultant artifactual degradation. No pathologic FDG activity. No suspicious lesions on CT imaging.    OTHER: There is a minute fat-containing umbilical hernia. There is transversely oriented lower abdominopelvic wall scarring, suggestive of prior Pfannenstiel incision.  Small bilateral fat containing inguinal hernias are suspected.    Impression   1. Extensive hypermetabolic pleural-based nodularity is compatible with metastatic disease. Given the absence of activity on prior I-131 imaging, these findings are strongly suggestive of dedifferentiated metastatic thyroid malignancy.  2. Large right pleural effusion, likely malignant.  3. Hypermetabolic nodularity in the left neck soft tissues and involving a left-sided cervical node, consistent with metastatic disease.  4. A hypermetabolic peripheral right upper lobe nodule is concerning for pulmonary parenchymal metastasis.  5. Cystic pancreatic lesion without discernible hypermetabolic activity.  6. Uncomplicated distal colonic  diverticulosis.    7. Hepatic steatosis.  8. Status post cholecystectomy.  9. Postoperative changes of hysterectomy.    10. Lesser incidental findings as above.        LABS:  Lab Results   Component Value Date    WBC 10.9 08/28/2024    RBC 4.25 08/28/2024    HGB 12.7 08/28/2024    HCT 38.2 08/28/2024    MCV 89.9 08/28/2024    MCH 29.9 08/28/2024    MCHC 33.2 08/28/2024    MPV 9.9 04/08/2011     Recent Labs   Lab 08/28/24  0920   *   BUN 16   CREATSERUM 0.97   EGFRCR 66   CA 9.5   ALB 4.4      K 3.7      CO2 27.0   ALKPHO 116   AST 18   ALT 14   BILT 1.0   TP 8.4*        PFTS none on record       ASSESSMENT/PLAN:  Abnormal PET scan with hypermetabolic uptake in cervical/neck tissues, hypermetabolic right pleural nodularity/right upper lung nodule and a large right pleural effusion in a pt with hx of thyroid cancer concerning for recurrence. Referred for a right sided thoracentesis.  PET scan imaging was reviewed and discussed with the patient during the clinic visit. We discussed the next steps for diagnosis involves performing a thoracentesis. Risks and benefits were discussed (including bleeding, infection, lung collapse) with the pt. She understood and is willing to proceed with the procedure.  Plan for a US guided right sided thoracentesis later today.  If the pleural fluid is non diagnostic, then recommend pt be referred to IR for a pleural biopsy.  She understands the right chest discomfort may not improve with the thoracentesis since she has evidence of uptake in the pleural lining.       Thank you for the opportunity to care for Camelia Markham.     I spent a total of 35 minutes in direct contact with the patient and reviewing pertinent outside records on the day of the encounter.     ALDA Brooks DO, MPH  Pulmonary Critical Care Medicine

## 2024-08-29 NOTE — PATIENT INSTRUCTIONS
We will plan to do the right sided thoracentesis at 12 pm today. You will need a chest x ray after the procedure.     We will call you with the results.

## 2024-08-29 NOTE — PROCEDURES
Thoracentesis Procedure Note:                 Procedure: Ultrasound Guided Thoracentesis     Performed by: Dr. ALDA Brooks DO     Indications: right pleural effusion        Patient consent: Indications and risks discussed with the patient who understands the risks and benefits of the procedure and consents.        Preparation and Technique:     Informed consent was obtained and verified in the chart prior to procedure. Patient medication list reviewed. The patient's platelet count, and INR were reviewed. After reviewing risks and benefits, the patient was deemed satisfactory condition to undergo the procedure. A time out was performed and chest imaging reviewed. Ultrasound guidance was used to identify anatomical structures and the right sided pleural effusion was confirmed and site marked on the patient's skin. The patient was then prepped and draped in a sterile manner using chlorhexidine scrubs.  Sterile gloves and mask were used by all operators. A total of 10cc of 1% lidocaine was used to anesthesize the skin, subcutaneous tissue, superior aspect of the rib periosteum and parietal pleura. A finder needle was then introduced over the superior aspect of the rib to locate the pleural fluid; a tea-colored pleural fluid was aspirated. 11-blade scalpel was used to nick the skin at the insertion site. The Safe-T- Centesis needle was then introduced and advanced through the skin incision into the pleural space using negative aspiration pressure. The thoracentesis catheter was then threaded without difficulty.  A total of 1000 cc of tea-colored fluid was removed without difficulty.  The catheter was then removed. No immediate complications were noted during the procedure. A post-procedure chest x-ray is pending. Pleural fluid sent for studies (Light's criteria, micro and cytology).        Complications: None. Follow up CXR     Estimated blood loss: none        ALDA Brooks DO, MPH  Pulmonary Critical Care  Medicine

## 2024-09-03 DIAGNOSIS — J90 PLEURAL EFFUSION ON RIGHT: Primary | ICD-10-CM

## 2024-09-03 RX ORDER — SULFAMETHOXAZOLE/TRIMETHOPRIM 800-160 MG
1 TABLET ORAL 2 TIMES DAILY
Qty: 14 TABLET | Refills: 0 | Status: SHIPPED | OUTPATIENT
Start: 2024-09-03 | End: 2024-09-10

## 2024-09-04 RX ORDER — LENVATINIB 24 MG/DAY
24 KIT ORAL DAILY
Qty: 30 EACH | Refills: 5 | Status: SHIPPED | OUTPATIENT
Start: 2024-09-04 | End: 2024-09-05

## 2024-09-05 ENCOUNTER — OFFICE VISIT (OUTPATIENT)
Dept: HEMATOLOGY/ONCOLOGY | Facility: HOSPITAL | Age: 64
End: 2024-09-05
Attending: INTERNAL MEDICINE
Payer: COMMERCIAL

## 2024-09-05 DIAGNOSIS — C73 THYROID CANCER (HCC): Primary | ICD-10-CM

## 2024-09-05 DIAGNOSIS — C78.01 MALIGNANT NEOPLASM METASTATIC TO RIGHT LUNG (HCC): ICD-10-CM

## 2024-09-05 DIAGNOSIS — F41.9 ANXIETY: ICD-10-CM

## 2024-09-05 DIAGNOSIS — R11.0 NAUSEA: ICD-10-CM

## 2024-09-05 PROCEDURE — 99215 OFFICE O/P EST HI 40 MIN: CPT | Performed by: NURSE PRACTITIONER

## 2024-09-05 RX ORDER — PROCHLORPERAZINE MALEATE 10 MG
10 TABLET ORAL EVERY 6 HOURS PRN
Qty: 30 TABLET | Refills: 3 | Status: SHIPPED | OUTPATIENT
Start: 2024-09-05

## 2024-09-05 RX ORDER — LENVATINIB 24 MG/DAY
24 KIT ORAL DAILY
Qty: 30 EACH | Refills: 5 | Status: SHIPPED | OUTPATIENT
Start: 2024-09-05

## 2024-09-05 RX ORDER — ONDANSETRON 8 MG/1
8 TABLET, FILM COATED ORAL EVERY 8 HOURS PRN
Qty: 30 TABLET | Refills: 3 | Status: SHIPPED | OUTPATIENT
Start: 2024-09-05

## 2024-09-05 NOTE — PROGRESS NOTES
Medication Education Record: Oral Therapy    Learner:  Patient and Spouse    Barriers / Limitations:  None    Psychosocial Assessment:  patient psychosocial response appropriate and referred to Behavioral health    Diagnosis:   Thyroid Cancer    Readiness of the patient to learn and adhere to oral cancer treatment outside of the cancer center: Yes    Medication Name Dose/Strength Frequency   Lenvatinib (Lenvima)  24mg (Two 10mg and one 4mg tablet)   Daily               Schedule of oral medication(s):  Daily         Number of cycles planned:  Based on tolerance and repsonse    Verified Consent to Chemotherapy/Biotherapy Cancer Treatment form signed by patient and provider:  Yes      RN/PA Verified prescription bottle against physician order: No, awaiting medication delivery    Start date of treatment:  TBD, awaiting medication delivery    How to take your medication(s):  Swallow each tablet whole.  Do not break, crush, or chew    Plan for appointments and lab testing: Labs every 2 weeks for 2 months then monthly or as clinically indiciated    Missed Dose Management:   If you miss a dose, call your doctor for further instructions.  If you vomit or throw up your medication, call your doctor for further instructions.  Do not take 2 doses at the same time to make up for a missed dose.       Cancer Treatment Side Effects (refer to Chemo Care handout for further information):  Allergic reactions  Constipation  Diarrhea  Fatigue  Hair loss  Heart effects  Kidney / Bladder effects  Liver effects  Loss of appetite  Low red blood cell count / Anemia  Low White Blood Cell Count/Risk of infection  Low Platelet Count/Risk of Bleeding  Lung Effects  Mouth or Throat Sores  Muscle / Bone Effects  Nausea / Vomiting  Nerve Effects  Reproductive / Fertility Effects  Sexual Effects  Skin Effects  Taste Changes  Other: High blood pressure  Recommended Antinausea medications: (as directed by your Provider):   Prochloperazine (Compazine)  10 mg every 6 hours and Ondansetron (Zofran) 8 mg every 8 hours  Take as needed  Helpful hints during cancer treatment:    Diet:  Avoid greasy or spicy foods on days surrounding chemotherapy  Eat small frequent meals per day (6-7 meals) rather than 3 large meals  Choose high calorie/high protein foods (chicken, hard cooked eggs, peanut butter, cheese)  If nauseated, try dry foods, such as toast, crackers or pretzels; light or bland foods, such as applesauce or oatmeal.    Fluid intake:  Drink 8-10 cups of liquid a day and take a water bottle wherever you go.  Any fluid is acceptable, but caffeinated products do not count towards your intake and should be limited to 1-2 drinks/day.    Physical Activity  If your doctor approves, be as physically active as you can, but start out slowly, and increase your activity over time as you feel stronger.  Listen to your body and rest when you need to.  Do what you can when you feel up to it.      Oral Care  Keep your mouth clean.  Rinse your mouth before and after meals with plain water or with a mild mouth rinse (made with 1 quart water, 1 teaspoon salt, and 1 teaspoon baking soda, shake before using)   Avoid commercial mouthwashes that contain alcohol, alcoholic or acidic drinks or tobacco  An acceptable mouthwash is Biotene®   If soreness or sores develop, contact the office.    Diarrhea or Constipation  Imodium A-D use for diarrhea:  Take 2 tabs (4mg) at the first sign of diarrhea; then take 1 tab (2mg) every 2 hours until you have had no diarrhea for at least 12 hours; during the night take 2 tabs (4mg) every 4 hours as needed.  Maximum of 8 tablets in 24 hours.    Constipation: Over-the-counter recommendations include: Senokot, Ducolax, Milk of Magnesia or Miralax (generics are ok)      Skin Care  Avoid direct sunlight.  Wear a broad-spectrum sunscreen with an SPF of 30 or higher on any skin exposed to the sun.  Re-apply every 2 hours if in the sun and after bathing or  sweating.  For dry skin, use an alcohol-free lotion twice per day, especially after baths.  If scalp hair loss has occurred, protect the scalp from the sun by wearing a hat and use sunscreen.  Apply alcohol-free moisturizer as needed.      When to call the doctor:  Fever of 100.4 or greater or shaking chills  Nausea/vomiting not controlled with anti-nausea medications: Unable to drink for 24 hours or have signs of dehydration: tiredness, thirst, dry mouth, dark and decreased amount of urine  Diarrhea - not controlled with Imodium AD or more than 6 episodes in 24 hours  Constipation -no bowel movement x 3 days, no response to stool softeners or laxatives  Mouth sores, sore throat or blisters on the lips affecting oral intake  Difficulty breathing, chest pain, or new cough  Excessive tiredness or weakness, confusion or loss of balance  New rash  Tingling or burning, redness, swelling of the palms of hands or soles of feet  Sudden new unexpected symptom -such as change in vision, swelling in arm or leg  Increase in numbness and tingling of hands or feet  Unusual bleeding (nose bleeds, blood in urine, stool or phlegm)  Pain with urination  Persistent mood changes, depression, nervousness, difficulty sleeping   Pain, redness, swelling or blistering at the IV site  If you go to Emergency Room for any reason or seek medical attention elsewhere  If you should need to cancel or reschedule any visit, it is important that you contact the office    What Phone Number to Call:   Cancer Center [phone] / Triage Nurse    Teaching Materials Provided:   Chemo Care chemotherapy information sheets     Please refer to the following link if you are interested in additional information about chemotherapy for yourself or family members: [email or web address]        Safe Handling of Chemotherapy  While you or your family member is receiving chemotherapy, whether in the clinic or at home, the following  precautions must be taken to lessen any exposure to the medication.     Handling oral medication:    Confirm that the medication is appropriately labeled.  Only patients who need chemotherapy should take or touch the medication.    If caregivers are involved, caregivers should wear gloves when administering the medication to protect against exposure.  Discard used gloves immediately - do not use for anything else.  Wash hands thoroughly before and after contact with this medication.  Women of child bearing age, pregnant women or women who are nursing should not handle this medication or any contaminated waste.     If the medication is provided in a blister pack, care should be taken when opening the packet to avoid breathing in any powder that may be aerosolized.   Do not crush, break or open any pills or capsules unless instructed by your doctor.  Do not chew tablets.    Storage:   Store medication in sealed containers, out of reach of children and pets.  Do not store medication in the bathroom, as high humidity may damage the medication.    Check the medication label to confirm if medication should be stored in the refrigerator or away from light.  Store the medicine container inside another container or in a sealed bag.  Make sure it does not touch any food.    The medication should remain in its original packaging until it is ready for ingestion.  Pill containers can be used but should not be used for other medications, and as few people as possible should come in contact with it.  Empty pill containers should be washed with soap and water or disposed within a plastic bag.      Disposal:  The unused medication should not be flushed down the toilet or placed in the trash (preventing contamination of landfills and water supply).  Please contact your local police department for collection of unused prescription medications.    Handling Body Waste:   Caregivers must wear gloves if exposed to the patient’s blood,  urine, stool or vomit.  Dispose of the used gloves after each use and wash hands with soap and water.  Any sheets or clothes soiled with the bodily fluids should be machine washed twice in hot water with regular laundry detergent.  Do not wash soiled garments with hands.  If the soiled garments cannot be washed right away, place them in a sealed plastic bag until they can be washed.   Absorbable undergarments, or any other items contaminated with chemotherapy, should be placed in a sealed plastic bag for disposal, separate from other trash.  Toilets should be flushed twice with the lid closed while taking this medication and for 48 hours after the last dose.  Wash your hands after using the toilet.  Wash any skin area that has come in contact with urine or stool.    Safety for my family and friends:  Hugging and kissing is safe for you and your partner or family members.  You can visit, sit with, hug and kiss the children in your life.    You can be around pregnant women, though (if possible) they should not clean up any of your body fluids after you have treatment.   Sexual activity is safe while throughout treatment.  It is possible that traces of the oral chemotherapy may be present in vaginal fluid or semen for 48 hours after taking.  A condom should be used during this time.  Effective birth control should be used throughout treatment to prevent pregnancy while on these medications and for several months or years after therapy.  Chemotherapy can have harmful side effects to the fetus, especially in the first trimester.  In addition, menstrual cycles can become irregular during and after treatment, so you may not know if you are at a time in your cycle when you could become pregnant or if you are actually pregnant.      60 minute appointment spent on education and counseling on above plan along with supportive care

## 2024-09-05 NOTE — PATIENT INSTRUCTIONS
of live baby boy at  by Dr. Steph Hull. Apgars 8/9. Medication Education Record: Oral Therapy    Learner:  Patient and Spouse    Barriers / Limitations:  None    Psychosocial Assessment:  patient psychosocial response appropriate and referred to Behavioral health    Diagnosis:   Thyroid Cancer    Readiness of the patient to learn and adhere to oral cancer treatment outside of the cancer center: Yes    Medication Name Dose/Strength Frequency   Lenvatinib (Lenvima)  24mg (Two 10mg and one 4mg tablet)   Daily               Schedule of oral medication(s):  Daily           Number of cycles planned:  Based on tolerance and repsonse    Verified Consent to Chemotherapy/Biotherapy Cancer Treatment form signed by patient and provider:  Yes      RN/PA Verified prescription bottle against physician order: no; awaiting medication delivery    Start date of treatment:  TBD, awaiting medication delivery    How to take your medication(s):  Swallow each tablet whole.  Do not break, crush, or chew    Plan for appointments and lab testing: Labs every 2 weeks for 2 months then monthly or as clinically indiciated    Missed Dose Management:   If you miss a dose, call your doctor for further instructions.  If you vomit or throw up your medication, call your doctor for further instructions.  Do not take 2 doses at the same time to make up for a missed dose.       Cancer Treatment Side Effects (refer to Chemo Care handout for further information):  Allergic reactions  Constipation  Diarrhea  Fatigue  Hair loss  Heart effects  Kidney / Bladder effects  Liver effects  Loss of appetite  Low red blood cell count / Anemia  Low White Blood Cell Count/Risk of infection  Low Platelet Count/Risk of Bleeding  Lung Effects  Mouth or Throat Sores  Muscle / Bone Effects  Nausea / Vomiting  Nerve Effects  Reproductive / Fertility Effects  Sexual Effects  Skin Effects  Taste Changes  Other: High blood pressure  Recommended Antinausea medications: (as directed by your Provider):   Prochloperazine (Compazine)  10 mg every 6 hours and Ondansetron (Zofran) 8 mg every 8 hours  Take as needed  Helpful hints during cancer treatment:    Diet:  Avoid greasy or spicy foods on days surrounding chemotherapy  Eat small frequent meals per day (6-7 meals) rather than 3 large meals  Choose high calorie/high protein foods (chicken, hard cooked eggs, peanut butter, cheese)  If nauseated, try dry foods, such as toast, crackers or pretzels; light or bland foods, such as applesauce or oatmeal.    Fluid intake:  Drink 8-10 cups of liquid a day and take a water bottle wherever you go.  Any fluid is acceptable, but caffeinated products do not count towards your intake and should be limited to 1-2 drinks/day.    Physical Activity  If your doctor approves, be as physically active as you can, but start out slowly, and increase your activity over time as you feel stronger.  Listen to your body and rest when you need to.  Do what you can when you feel up to it.      Oral Care  Keep your mouth clean.  Rinse your mouth before and after meals with plain water or with a mild mouth rinse (made with 1 quart water, 1 teaspoon salt, and 1 teaspoon baking soda, shake before using)   Avoid commercial mouthwashes that contain alcohol, alcoholic or acidic drinks or tobacco  An acceptable mouthwash is Biotene®   If soreness or sores develop, contact the office.    Diarrhea or Constipation  Imodium A-D use for diarrhea:  Take 2 tabs (4mg) at the first sign of diarrhea; then take 1 tab (2mg) every 2 hours until you have had no diarrhea for at least 12 hours; during the night take 2 tabs (4mg) every 4 hours as needed.  Maximum of 8 tablets in 24 hours.    Constipation: Over-the-counter recommendations include: Senokot, Ducolax, Milk of Magnesia or Miralax (generics are ok)      Skin Care  Avoid direct sunlight.  Wear a broad-spectrum sunscreen with an SPF of 30 or higher on any skin exposed to the sun.  Re-apply every 2 hours if in the sun and after bathing or  sweating.  For dry skin, use an alcohol-free lotion twice per day, especially after baths.  If scalp hair loss has occurred, protect the scalp from the sun by wearing a hat and use sunscreen.  Apply alcohol-free moisturizer as needed.      When to call the doctor:  Fever of 100.4 or greater or shaking chills  Nausea/vomiting not controlled with anti-nausea medications: Unable to drink for 24 hours or have signs of dehydration: tiredness, thirst, dry mouth, dark and decreased amount of urine  Diarrhea - not controlled with Imodium AD or more than 6 episodes in 24 hours  Constipation -no bowel movement x 3 days, no response to stool softeners or laxatives  Mouth sores, sore throat or blisters on the lips affecting oral intake  Difficulty breathing, chest pain, or new cough  Excessive tiredness or weakness, confusion or loss of balance  New rash  Tingling or burning, redness, swelling of the palms of hands or soles of feet  Sudden new unexpected symptom -such as change in vision, swelling in arm or leg  Increase in numbness and tingling of hands or feet  Unusual bleeding (nose bleeds, blood in urine, stool or phlegm)  Pain with urination  Persistent mood changes, depression, nervousness, difficulty sleeping   Pain, redness, swelling or blistering at the IV site  If you go to Emergency Room for any reason or seek medical attention elsewhere  If you should need to cancel or reschedule any visit, it is important that you contact the office    What Phone Number to Call:   Cancer Center [phone] / Triage Nurse    Teaching Materials Provided:   Chemo Care chemotherapy information sheets     Please refer to the following link if you are interested in additional information about chemotherapy for yourself or family members: [email or web address]        Safe Handling of Chemotherapy  While you or your family member is receiving chemotherapy, whether in the clinic or at home, the following  precautions must be taken to lessen any exposure to the medication.     Handling oral medication:    Confirm that the medication is appropriately labeled.  Only patients who need chemotherapy should take or touch the medication.    If caregivers are involved, caregivers should wear gloves when administering the medication to protect against exposure.  Discard used gloves immediately - do not use for anything else.  Wash hands thoroughly before and after contact with this medication.  Women of child bearing age, pregnant women or women who are nursing should not handle this medication or any contaminated waste.     If the medication is provided in a blister pack, care should be taken when opening the packet to avoid breathing in any powder that may be aerosolized.   Do not crush, break or open any pills or capsules unless instructed by your doctor.  Do not chew tablets.    Storage:   Store medication in sealed containers, out of reach of children and pets.  Do not store medication in the bathroom, as high humidity may damage the medication.    Check the medication label to confirm if medication should be stored in the refrigerator or away from light.  Store the medicine container inside another container or in a sealed bag.  Make sure it does not touch any food.    The medication should remain in its original packaging until it is ready for ingestion.  Pill containers can be used but should not be used for other medications, and as few people as possible should come in contact with it.  Empty pill containers should be washed with soap and water or disposed within a plastic bag.      Disposal:  The unused medication should not be flushed down the toilet or placed in the trash (preventing contamination of landfills and water supply).  Please contact your local police department for collection of unused prescription medications.    Handling Body Waste:   Caregivers must wear gloves if exposed to the patient’s blood,  urine, stool or vomit.  Dispose of the used gloves after each use and wash hands with soap and water.  Any sheets or clothes soiled with the bodily fluids should be machine washed twice in hot water with regular laundry detergent.  Do not wash soiled garments with hands.  If the soiled garments cannot be washed right away, place them in a sealed plastic bag until they can be washed.   Absorbable undergarments, or any other items contaminated with chemotherapy, should be placed in a sealed plastic bag for disposal, separate from other trash.  Toilets should be flushed twice with the lid closed while taking this medication and for 48 hours after the last dose.  Wash your hands after using the toilet.  Wash any skin area that has come in contact with urine or stool.    Safety for my family and friends:  Hugging and kissing is safe for you and your partner or family members.  You can visit, sit with, hug and kiss the children in your life.    You can be around pregnant women, though (if possible) they should not clean up any of your body fluids after you have treatment.   Sexual activity is safe while throughout treatment.  It is possible that traces of the oral chemotherapy may be present in vaginal fluid or semen for 48 hours after taking.  A condom should be used during this time.  Effective birth control should be used throughout treatment to prevent pregnancy while on these medications and for several months or years after therapy.  Chemotherapy can have harmful side effects to the fetus, especially in the first trimester.  In addition, menstrual cycles can become irregular during and after treatment, so you may not know if you are at a time in your cycle when you could become pregnant or if you are actually pregnant.

## 2024-09-06 ENCOUNTER — TELEPHONE (OUTPATIENT)
Dept: HEMATOLOGY/ONCOLOGY | Facility: HOSPITAL | Age: 64
End: 2024-09-06

## 2024-09-06 NOTE — TELEPHONE ENCOUNTER
Patient called back, did not understand request from us. Explained to patient the form she submitted is for herself, not her provider. Patient understood, will reach back out to employer and submit forms for provider.

## 2024-09-06 NOTE — TELEPHONE ENCOUNTER
Family Medical Leave Act forms received BUT IT IS ONLY FOR THE PATIENT, DID NOT COME WITH FORM FOR THE PROVIDER TO FILL OUT. Called patient and left voice message to call back.

## 2024-09-09 ENCOUNTER — TELEPHONE (OUTPATIENT)
Dept: HEMATOLOGY/ONCOLOGY | Facility: HOSPITAL | Age: 64
End: 2024-09-09

## 2024-09-09 RX ORDER — LENVATINIB 24 MG/DAY
24 KIT ORAL DAILY
Qty: 30 EACH | Refills: 5 | OUTPATIENT
Start: 2024-09-09

## 2024-09-09 NOTE — TELEPHONE ENCOUNTER
Called Camelia Figueroa, she has heard from [facility] and the medication is supposed to be delivered on 9/12. She will call me when she receives the medication. The co-pay was affordable per patient.

## 2024-09-13 ENCOUNTER — APPOINTMENT (OUTPATIENT)
Dept: INTERVENTIONAL RADIOLOGY/VASCULAR | Facility: HOSPITAL | Age: 64
End: 2024-09-13
Attending: NURSE PRACTITIONER
Payer: COMMERCIAL

## 2024-09-13 ENCOUNTER — TELEPHONE (OUTPATIENT)
Dept: HEMATOLOGY/ONCOLOGY | Facility: HOSPITAL | Age: 64
End: 2024-09-13

## 2024-09-13 ENCOUNTER — OFFICE VISIT (OUTPATIENT)
Dept: HEMATOLOGY/ONCOLOGY | Facility: HOSPITAL | Age: 64
End: 2024-09-13
Attending: INTERNAL MEDICINE
Payer: COMMERCIAL

## 2024-09-13 ENCOUNTER — TELEPHONE (OUTPATIENT)
Dept: PULMONOLOGY | Facility: CLINIC | Age: 64
End: 2024-09-13

## 2024-09-13 ENCOUNTER — HOSPITAL ENCOUNTER (INPATIENT)
Facility: HOSPITAL | Age: 64
LOS: 4 days | Discharge: HOME | End: 2024-09-17
Attending: EMERGENCY MEDICINE | Admitting: HOSPITALIST
Payer: COMMERCIAL

## 2024-09-13 ENCOUNTER — HOSPITAL ENCOUNTER (OUTPATIENT)
Dept: GENERAL RADIOLOGY | Facility: HOSPITAL | Age: 64
Discharge: HOME | End: 2024-09-13
Attending: NURSE PRACTITIONER
Payer: COMMERCIAL

## 2024-09-13 VITALS
BODY MASS INDEX: 31.65 KG/M2 | RESPIRATION RATE: 18 BRPM | HEART RATE: 90 BPM | WEIGHT: 172 LBS | SYSTOLIC BLOOD PRESSURE: 120 MMHG | HEIGHT: 62 IN | DIASTOLIC BLOOD PRESSURE: 63 MMHG | OXYGEN SATURATION: 97 % | TEMPERATURE: 98 F

## 2024-09-13 DIAGNOSIS — C78.01 MALIGNANT NEOPLASM METASTATIC TO RIGHT LUNG (HCC): ICD-10-CM

## 2024-09-13 DIAGNOSIS — C73 THYROID CANCER (HCC): ICD-10-CM

## 2024-09-13 DIAGNOSIS — J90 PLEURAL EFFUSION: ICD-10-CM

## 2024-09-13 DIAGNOSIS — R05.8 OTHER COUGH: ICD-10-CM

## 2024-09-13 DIAGNOSIS — J90 PLEURAL EFFUSION: Primary | ICD-10-CM

## 2024-09-13 DIAGNOSIS — R05.8 OTHER COUGH: Primary | ICD-10-CM

## 2024-09-13 PROBLEM — J91.0 MALIGNANT PLEURAL EFFUSION (HCC): Status: ACTIVE | Noted: 2024-09-13

## 2024-09-13 LAB
ANION GAP SERPL CALC-SCNC: 7 MMOL/L (ref 0–18)
BASOPHILS # BLD AUTO: 0.1 X10(3) UL (ref 0–0.2)
BASOPHILS NFR BLD AUTO: 0.8 %
BUN BLD-MCNC: 16 MG/DL (ref 9–23)
BUN/CREAT SERPL: 16.7 (ref 10–20)
CALCIUM BLD-MCNC: 9 MG/DL (ref 8.7–10.4)
CHLORIDE SERPL-SCNC: 108 MMOL/L (ref 98–112)
CO2 SERPL-SCNC: 23 MMOL/L (ref 21–32)
CREAT BLD-MCNC: 0.96 MG/DL
DEPRECATED RDW RBC AUTO: 46.2 FL (ref 35.1–46.3)
EGFRCR SERPLBLD CKD-EPI 2021: 66 ML/MIN/1.73M2 (ref 60–?)
EOSINOPHIL # BLD AUTO: 0.35 X10(3) UL (ref 0–0.7)
EOSINOPHIL NFR BLD AUTO: 2.9 %
ERYTHROCYTE [DISTWIDTH] IN BLOOD BY AUTOMATED COUNT: 14.3 % (ref 11–15)
EST. AVERAGE GLUCOSE BLD GHB EST-MCNC: 128 MG/DL (ref 68–126)
GLUCOSE BLD-MCNC: 98 MG/DL (ref 70–99)
GLUCOSE BLDC GLUCOMTR-MCNC: 113 MG/DL (ref 70–99)
GLUCOSE BLDC GLUCOMTR-MCNC: 96 MG/DL (ref 70–99)
HBA1C MFR BLD: 6.1 % (ref ?–5.7)
HCT VFR BLD AUTO: 36 %
HGB BLD-MCNC: 12.2 G/DL
IMM GRANULOCYTES # BLD AUTO: 0.04 X10(3) UL (ref 0–1)
IMM GRANULOCYTES NFR BLD: 0.3 %
LYMPHOCYTES # BLD AUTO: 3.44 X10(3) UL (ref 1–4)
LYMPHOCYTES NFR BLD AUTO: 28.1 %
MCH RBC QN AUTO: 30.1 PG (ref 26–34)
MCHC RBC AUTO-ENTMCNC: 33.9 G/DL (ref 31–37)
MCV RBC AUTO: 88.9 FL
MONOCYTES # BLD AUTO: 0.72 X10(3) UL (ref 0.1–1)
MONOCYTES NFR BLD AUTO: 5.9 %
NEUTROPHILS # BLD AUTO: 7.6 X10 (3) UL (ref 1.5–7.7)
NEUTROPHILS # BLD AUTO: 7.6 X10(3) UL (ref 1.5–7.7)
NEUTROPHILS NFR BLD AUTO: 62 %
OSMOLALITY SERPL CALC.SUM OF ELEC: 287 MOSM/KG (ref 275–295)
PLATELET # BLD AUTO: 279 10(3)UL (ref 150–450)
POTASSIUM SERPL-SCNC: 4.1 MMOL/L (ref 3.5–5.1)
RBC # BLD AUTO: 4.05 X10(6)UL
SODIUM SERPL-SCNC: 138 MMOL/L (ref 136–145)
WBC # BLD AUTO: 12.3 X10(3) UL (ref 4–11)

## 2024-09-13 PROCEDURE — 99223 1ST HOSP IP/OBS HIGH 75: CPT | Performed by: HOSPITALIST

## 2024-09-13 PROCEDURE — 99215 OFFICE O/P EST HI 40 MIN: CPT | Performed by: NURSE PRACTITIONER

## 2024-09-13 PROCEDURE — 0W9930Z DRAINAGE OF RIGHT PLEURAL CAVITY WITH DRAINAGE DEVICE, PERCUTANEOUS APPROACH: ICD-10-PCS | Performed by: RADIOLOGY

## 2024-09-13 PROCEDURE — 99223 1ST HOSP IP/OBS HIGH 75: CPT | Performed by: INTERNAL MEDICINE

## 2024-09-13 PROCEDURE — 71046 X-RAY EXAM CHEST 2 VIEWS: CPT | Performed by: NURSE PRACTITIONER

## 2024-09-13 RX ORDER — MORPHINE SULFATE 2 MG/ML
2 INJECTION, SOLUTION INTRAMUSCULAR; INTRAVENOUS EVERY 2 HOUR PRN
Status: DISCONTINUED | OUTPATIENT
Start: 2024-09-13 | End: 2024-09-17

## 2024-09-13 RX ORDER — CLONAZEPAM 1 MG/1
2 TABLET ORAL NIGHTLY
Status: DISCONTINUED | OUTPATIENT
Start: 2024-09-13 | End: 2024-09-17

## 2024-09-13 RX ORDER — DICYCLOMINE HCL 20 MG
20 TABLET ORAL 4 TIMES DAILY PRN
Status: DISCONTINUED | OUTPATIENT
Start: 2024-09-13 | End: 2024-09-17

## 2024-09-13 RX ORDER — MORPHINE SULFATE 2 MG/ML
1 INJECTION, SOLUTION INTRAMUSCULAR; INTRAVENOUS EVERY 2 HOUR PRN
Status: DISCONTINUED | OUTPATIENT
Start: 2024-09-13 | End: 2024-09-17

## 2024-09-13 RX ORDER — HYDROCODONE BITARTRATE AND ACETAMINOPHEN 5; 325 MG/1; MG/1
2 TABLET ORAL EVERY 4 HOURS PRN
Status: DISCONTINUED | OUTPATIENT
Start: 2024-09-13 | End: 2024-09-17

## 2024-09-13 RX ORDER — ONDANSETRON 2 MG/ML
4 INJECTION INTRAMUSCULAR; INTRAVENOUS EVERY 6 HOURS PRN
Status: DISCONTINUED | OUTPATIENT
Start: 2024-09-13 | End: 2024-09-17

## 2024-09-13 RX ORDER — MIDAZOLAM HYDROCHLORIDE 1 MG/ML
INJECTION INTRAMUSCULAR; INTRAVENOUS
Status: COMPLETED
Start: 2024-09-13 | End: 2024-09-13

## 2024-09-13 RX ORDER — PANTOPRAZOLE SODIUM 40 MG/1
40 TABLET, DELAYED RELEASE ORAL
Status: DISCONTINUED | OUTPATIENT
Start: 2024-09-14 | End: 2024-09-17

## 2024-09-13 RX ORDER — HYDROCODONE BITARTRATE AND ACETAMINOPHEN 5; 325 MG/1; MG/1
1-2 TABLET ORAL EVERY 6 HOURS PRN
Status: DISCONTINUED | OUTPATIENT
Start: 2024-09-13 | End: 2024-09-13

## 2024-09-13 RX ORDER — LEVOTHYROXINE SODIUM 75 UG/1
150 TABLET ORAL
Status: DISCONTINUED | OUTPATIENT
Start: 2024-09-14 | End: 2024-09-17

## 2024-09-13 RX ORDER — MORPHINE SULFATE 4 MG/ML
4 INJECTION, SOLUTION INTRAMUSCULAR; INTRAVENOUS EVERY 2 HOUR PRN
Status: DISCONTINUED | OUTPATIENT
Start: 2024-09-13 | End: 2024-09-17

## 2024-09-13 RX ORDER — ACETAMINOPHEN 500 MG
500 TABLET ORAL EVERY 4 HOURS PRN
Status: DISCONTINUED | OUTPATIENT
Start: 2024-09-13 | End: 2024-09-17

## 2024-09-13 RX ORDER — AMLODIPINE BESYLATE 10 MG/1
10 TABLET ORAL DAILY
Status: DISCONTINUED | OUTPATIENT
Start: 2024-09-13 | End: 2024-09-17

## 2024-09-13 RX ORDER — ACETAMINOPHEN 325 MG/1
650 TABLET ORAL EVERY 4 HOURS PRN
Status: DISCONTINUED | OUTPATIENT
Start: 2024-09-13 | End: 2024-09-17

## 2024-09-13 RX ORDER — HYDROCODONE BITARTRATE AND ACETAMINOPHEN 5; 325 MG/1; MG/1
1 TABLET ORAL EVERY 4 HOURS PRN
Status: DISCONTINUED | OUTPATIENT
Start: 2024-09-13 | End: 2024-09-17

## 2024-09-13 RX ORDER — LIDOCAINE HYDROCHLORIDE 20 MG/ML
INJECTION, SOLUTION INFILTRATION; PERINEURAL
Status: COMPLETED
Start: 2024-09-13 | End: 2024-09-13

## 2024-09-13 RX ORDER — PROCHLORPERAZINE EDISYLATE 5 MG/ML
5 INJECTION INTRAMUSCULAR; INTRAVENOUS EVERY 8 HOURS PRN
Status: DISCONTINUED | OUTPATIENT
Start: 2024-09-13 | End: 2024-09-17

## 2024-09-13 RX ORDER — TEMAZEPAM 15 MG/1
15 CAPSULE ORAL NIGHTLY PRN
Status: DISCONTINUED | OUTPATIENT
Start: 2024-09-13 | End: 2024-09-17

## 2024-09-13 RX ORDER — LEVOTHYROXINE SODIUM 150 UG/1
150 TABLET ORAL
COMMUNITY

## 2024-09-13 RX ORDER — ENOXAPARIN SODIUM 100 MG/ML
40 INJECTION SUBCUTANEOUS EVERY 24 HOURS
Status: DISCONTINUED | OUTPATIENT
Start: 2024-09-13 | End: 2024-09-17

## 2024-09-13 RX ORDER — LOSARTAN POTASSIUM 100 MG/1
100 TABLET ORAL DAILY
Status: DISCONTINUED | OUTPATIENT
Start: 2024-09-13 | End: 2024-09-17

## 2024-09-13 NOTE — ED QUICK NOTES
Pt presented to ED c/o EMRLENE with exertion and rest x5 days. Stated she started Monday with a mild cough and some shortness of breath and then Wednesday progressed to having more severe MERLENE with exertion. Pt stated she had a thoracentesis procedure preformed on August 29th for a R pleural effusion in which they removed 1L of fluid off her R lung. Pt has hx of metastatic lung cancer, but has not started her new Lenvima medication.     Pt arrives in mild resp distress with tripod positioning.

## 2024-09-13 NOTE — TELEPHONE ENCOUNTER
Called patient as she sent a my chart message that she received her Lenvatinib. As we were talking she was coughing a lot on the phone,  and has had a cough for the past 3 days. She has been more tired. Requested that she come in for an acute care visit to be seen to see if she needs another thoracentesis again, she might need a CXR. ACV booked for 11:00 with Riya. Camelia Figueroa is worried about leaving work for appointment, stressed the importance she needs to worry about herself right now.

## 2024-09-13 NOTE — TELEPHONE ENCOUNTER
Dr. Brooks-cancer center called r/t symptomatic patient being taken to the ED for thoracentesis. ELIN

## 2024-09-13 NOTE — H&P
[facility]    PATIENT'S NAME: DEANA SHINE   ATTENDING PHYSICIAN: Inocente Mercado MD   PATIENT ACCOUNT#:   [id number]    LOCATION:  [place] [address]  MEDICAL RECORD #:   [id number]       YOB: 1960  ADMISSION DATE:       09/13/2024    HISTORY AND PHYSICAL EXAMINATION    CHIEF COMPLAINT:  Right-sided malignant pleural effusion, shortness of breath, and chest pain.    HISTORY OF PRESENT ILLNESS:  Patient is a 64-year-old  female who was recently diagnosed with recurrent metastatic thyroid papillary carcinoma.  She had thoracentesis of her right pleural effusion on August 29, and 1 L was removed which was positive for malignancy.  Today, came to the emergency department for recurrent shortness of breath and right-sided chest discomfort.  She had an x-ray done today as an outpatient which showed large right pleural effusion retirement in the right hemithorax, increased from previous study.  Patient was evaluated by Pulmonology, and she will be taken to interventional radiology suite for right PleurX catheter placement.    PAST MEDICAL HISTORY:  Patient initially had papillary thyroid carcinoma, status post total thyroidectomy in 2010.  Recently, she had elevation of thyroglobulin.  She had neck ultrasound which showed thyroid bed oval nodule.  Fine-needle aspiration was positive for recurrent papillary thyroid carcinoma.  She had a PET scan which showed right pleural effusion, right upper lobe lung metastatic lesion, and neck thyroid bed recurrent disease with lymphadenopathy.  She was evaluated by Hematology/Oncology and recently had right pleurocentesis as outlined above, and she is supposed to be started on lenvatinib.  Also, she has history of generalized osteoarthritis, hyperlipidemia, hypertension, gastroesophageal reflux disease, hypothyroidism, diverticulosis.    PAST SURGICAL HISTORY:  Total thyroidectomy, hysterectomy, left knee arthroscopic procedure.     MEDICATIONS:   Please see medication reconciliation list.     ALLERGIES:  Latex and peanuts.  No known drug allergies.     FAMILY HISTORY:  Mother had heart disease.    SOCIAL HISTORY:  No tobacco or drug use.  Occasional alcohol.  Lives with her family.  Independent for basic activities of daily living.     REVIEW OF SYSTEMS:  Recurrent progressive shortness of breath and right pleuritic chest pain for the last week.  She denies any fever or chills.  No abdominal pain.  Other 12-point review of systems is negative.       PHYSICAL EXAMINATION:    GENERAL:  Alert and oriented to time, place and person.  Mild to moderate distress.   VITAL SIGNS:  Temperature 98.1, pulse 88, respiratory rate 15, blood pressure 126/76, pulse ox 95% on room air.   HEENT:  Atraumatic.  Oropharynx clear.  Moist mucous membranes.  Ears and nose normal.  Eyes:  Anicteric sclerae.   NECK:  Supple.  No lymphadenopathy.  Trachea midline.  Full range of motion.   LUNGS:  No breathing sounds auscultated on lower two-thirds of the right lung field.  Otherwise, clear to auscultation bilaterally.   HEART:  Regular rate and rhythm.  S1 and S2 auscultated.  No murmur.    ABDOMEN:  Soft, nondistended.  No tenderness.  Positive bowel sounds.   EXTREMITIES:  Trace edema, both legs.  No clubbing or cyanosis.   NEUROLOGIC:  Motor and sensory intact.      ASSESSMENT:  Right malignant pleural effusion with recurrent thyroid cancer, as outlined above.      PLAN:  Patient will be admitted to general medical floor.  PleurX catheter to be inserted by Interventional Radiology.  Postoperative DVT prophylaxis and pain control.  Pulmonary and oncology consults were notified.  Further recommendations to follow.     Dictated By Dara Campos MD  d: 09/13/2024 14:41:20  t: 09/13/2024 15:44:09  Job [id number]  /

## 2024-09-13 NOTE — ED QUICK NOTES
Pulmonologist  georgi savage at bedside to discuss thoracentesis with patient. Patient asked if Plurex drain could be done since she's here anyway. Pulmonologist called IR rn to see if patient can to go the procedure today. Last PO intake was coffee at 8am. Will continue to follow up.    Thoracentesis tray and US machine at bedside for MD

## 2024-09-13 NOTE — ED INITIAL ASSESSMENT (HPI)
Patient to ED from Cancer [facility] for pleural effusion. Went to center to start treatment today but had difficulty breathing; XR completed showing a large right pleural effusion. Hx metastatic thyroid cancer.

## 2024-09-13 NOTE — PROGRESS NOTES
[facility] Consultation Note    Patient Name: Camelia Markham   YOB: 1960   Medical Record Number: [id number]   CSN: [id number]   Consulting Physician: Inocente Chen MD  Referring Physician(s): Dr Dana Sands MD      Reason for Consultation:  Metastatic  Thyroid Cancer   Cancer Staging   Malignant neoplasm of thyroid gland (HCC)  Staging form: Thyroid - Differentiated, AJCC 8th Edition  - Clinical: Stage IVB (rcT2, cN1, cM1, Age at diagnosis: >= 55 years) - Signed by Inocente Chen MD on 8/28/2024      History of Present Illness:   Camelia Markham is a 64 year old female that was seen in the Cancer Center for metastatic thyroid cancer. Her oncologic history is detailed below    11/3/2010 was found to have an enlarged thyroid gland ultrasound-guided FNA showed papillary thyroid cancer.  Underwent total thyroidectomy with Dr. Asher.  Final pathology showed a 2.5 cm tumor and 2 positive lymph nodes.    12/2010 BRIDGES uptake study showed no evidence of abnormal uptake outside the neck.  Underwent 207 miCu of radioactive iodine.  She had then been maintained on levothyroxine.  Subsequently was lost to follow-up after 2018.    6/17/2024 Thyroglobulin increased to 10, prompted an ultrasound that showed an oval hypoechoic nodule in the superior aspect of the left thyroid measuring 1 x 0.6 x 0.6 cm thought to represent an indeterminate lymph node.    8/6/2024 ultrasound-guided FNA showed papillary carcinoma.     8/16/2024 [facility] thyroid scan showed no discernible pathologic activity increased uptake in the thyroid bed.  Given the biopsy-proven recurrence and absence of I-131 uptake this was thought to represent dedifferentiated recurrent thyroid malignancy.    8/22/2024 PET/CT fusion study showed extensive hypermetabolic pleural-based nodularity, large pleural effusion as well as hypermetabolic nodularity in the left neck soft tissues as well as peripheral right upper nodule all of which was  concerning for metastatic disease.  Previously seen cystic pancreatic lesion did not demonstrate any hypermetabolic activity.    Interval History:  Patient is here today for an Acute Care visit.    She called our clinic RN to inform us that she received her Lenvatinib and RN noticed patient was coughing.  Patient then reported she has been having cough and fatigue x 3-4 days.     In clinic, patient with persistent dry cough; vitals stable.  She says the cough has progressively gotten worse since Monday and has required multiple pillows to be able to sleep.  The cough is not productive of any sputum. She denies fever/chills, rhinorrhea, sore throat, malaise, and body aches.        Past Medical History:  Past Medical History:    Anxiety state, unspecified    Cancer (HCC)    Colon adenomas    x3    Disorder of thyroid    thyroidectomy    Diverticulosis of large intestine    Esophageal reflux    Exposure to medical therapeutic radiation    thyroid    High blood pressure    High cholesterol    Hypercholesteremia    Hypothyroidism    Osteoarthrosis, unspecified whether generalized or localized, unspecified site    Thyroid cancer (HCC)    papillary thyroid; s/p surgery resection with Asher and BRIDGES    Unspecified essential hypertension       Past Surgical History:  Past Surgical History:   Procedure Laterality Date    Colonoscopy N/A 2022    Procedure: COLONOSCOPY;  Surgeon: Neeraj No MD;  Location: [facility] ENDOSCOPY    Colonoscopy  2024    Colonoscopy N/A 2024    Procedure: COLONOSCOPY;  Surgeon: Neeraj No MD;  Location: [facility] ENDOSCOPY    Egd  2022    Egd  2024    Esophagogastroduodenoscopy    Endoscopic ultrasound - internal  2022    Endoscopic ultrasound exam  2024    Endoscopic Ultrasound    Eye surgery  2024    Eye surgery Left 2024    Hysterectomy      Knee surgery Left 2022    no associated bleeding complications          40 week 7 lb(s) 5  oz Male; 6 hr labor           40 week 7 lb(s) 3 oz Female          41 week 9 lb(s) 8 oz Male    Other surgical history      Injection Tendon Sheath, Ligament    Thyroidectomy      total    Total abdom hysterectomy         Family Medical History:  Family History   Problem Relation Age of Onset    Heart Disorder Mother     Breast Cancer Maternal Cousin Female 57    Cancer Daughter 29        Hodgkin's Lymphoma    Ovarian Cancer Neg     Bleeding Disorders Neg     DVT/VTE Neg     Pancreatic Cancer Neg     Prostate Cancer Neg        Gyne History:  OB History    Para Term  AB Living   3 3 0 0 0 0   SAB IAB Ectopic Multiple Live Births   0 0 0 0 0       Social History:  Social History     Socioeconomic History    Marital status:      Spouse name: Not on file    Number of children: Not on file    Years of education: Not on file    Highest education level: Not on file   Occupational History    Not on file   Tobacco Use    Smoking status: Never    Smokeless tobacco: Never   Vaping Use    Vaping status: Never Used   Substance and Sexual Activity    Alcohol use: Yes     Comment: social    Drug use: No    Sexual activity: Not on file   Other Topics Concern     Service Not Asked    Blood Transfusions Not Asked    Caffeine Concern Yes     Comment: 1 cup of coffee     Occupational Exposure Not Asked    Hobby Hazards Not Asked    Sleep Concern Not Asked    Stress Concern Not Asked    Weight Concern Not Asked    Special Diet Not Asked    Back Care Not Asked    Exercise Not Asked    Bike Helmet Not Asked    Seat Belt Not Asked    Self-Exams Not Asked   Social History Narrative    Camelia Figueroa is  to Baldemar x30 yrs. She has 3 adult children. Patient works in accounting in book keeping. She lives with her , her mom, and 1 of the children in [place], [place].     Social Determinants of Health     Financial Resource Strain: Not on file   Food Insecurity: Not on file   Transportation  Needs: Not on file   Physical Activity: Not on file   Stress: Not on file   Social Connections: Not on file   Housing Stability: At Risk (8/18/2023)    Received from [facility], [facility]     Living Situation: Not on file     Housing Problems: Not on file       Allergies:   Allergies   Allergen Reactions    Latex HIVES    Peanut-Containing Drug Products SWELLING     Closes Throat  Closes Throat  Closes Throat      Peanuts SWELLING     Closes Throat    Adhesive Tape OTHER (SEE COMMENTS)    Seasonal        Current Medications:   prochlorperazine (COMPAZINE) 10 mg tablet Take 1 tablet (10 mg total) by mouth every 6 (six) hours as needed for Nausea. 30 tablet 3    ondansetron (ZOFRAN) 8 MG tablet Take 1 tablet (8 mg total) by mouth every 8 (eight) hours as needed for Nausea. 30 tablet 3    HYDROcodone-acetaminophen 5-325 MG Oral Tab Take 1-2 tablets by mouth every 6 (six) hours as needed for Pain. 60 tablet 0    azelastine 0.1 % Nasal Solution 2 sprays by Nasal route 2 (two) times daily. 90 mL 1    ketorolac 0.5 % Ophthalmic Solution Place 1 drop into the right eye 4 (four) times daily.      prednisoLONE 1 % Ophthalmic Suspension Place 1 drop into the right eye 3 (three) times daily.      sertraline 25 MG Oral Tab       Olmesartan Medoxomil 40 MG Oral Tab       Omeprazole 40 MG Oral Capsule Delayed Release       Levothyroxine Sodium 150 MCG/5ML Oral Solution Take by mouth every morning.      amLODIPine 10 MG Oral Tab Take 1 tablet (10 mg total) by mouth every morning.      clonazePAM 2 MG Oral Tab Take 1 tablet (2 mg total) by mouth nightly.         Review of Systems:    A comprehensive 14 point review of systems was completed.  Pertinent positives and negatives noted in the the HPI.       Vital Signs:  /63 (BP Location: Left arm, Patient Position: Sitting, Cuff Size: large)   Pulse 90   Temp 98.3 °F (36.8 °C) (Oral)   Resp 18   Ht 1.575 m (5' 2\")   Wt 78 kg (172 lb)   SpO2 97%   BMI  31.46 kg/m²    Physical Examination:    General: Patient is alert and oriented x 3  HEENT: EOMs intact. PERRL. Oropharynx is clear.   Neck: No JVD  Chest: Decreased breath sounds RLL; persistent dry cough throughout visit  Heart: Regular rate and rhythm. S1S2 normal.  Abdomen: Soft, non tender.    Extremities: No edema or calf tenderness.  Neurological: Grossly intact.     Performance Status:  ECO-1    Labs:    Lab Results   Component Value Date/Time    WBC 10.9 2024 09:20 AM    RBC 4.25 2024 09:20 AM    HGB 12.7 2024 09:20 AM    HCT 38.2 2024 09:20 AM    MCV 89.9 2024 09:20 AM    MCH 29.9 2024 09:20 AM    MCHC 33.2 2024 09:20 AM    RDW 14.3 2024 09:20 AM    NEPRELIM 6.71 2024 09:20 AM    .0 2024 09:20 AM       Lab Results   Component Value Date/Time     (H) 2024 09:20 AM    BUN 16 2024 09:20 AM    CREATSERUM 0.97 2024 09:20 AM    GFRNAA 67 2021 12:09 PM    CA 9.5 2024 09:20 AM    ALB 4.4 2024 09:20 AM     2024 09:20 AM    K 3.7 2024 09:20 AM     2024 09:20 AM    CO2 27.0 2024 09:20 AM    ALKPHO 116 2024 09:20 AM    AST 18 2024 09:20 AM    ALT 14 2024 09:20 AM       Imaging:  PET films reviewed personallly with pt/spouse -impression as above    Impression:    No diagnosis found.  64-year-old with a long history of thyroid cancer s/p thyroidectomy followed by BRIDGES and levothyroxine suppression now with biopsy-proven recurrence around the neck as well as pulmonary parenchymal mets and pleural nodules all worrisome for metastatic thyroid cancer.  BRIDGES uptake scan was negative suggesting dedifferentiated thyroid cancer.    I had a long discussion with patient explained that unfortunately this represents an incurable Stage IV malignancy.  However this is certainly very treatable with oral tyrosine kinase inhibitors with pt having survival for several  years.    Plan:  Proceed with planned thoracentesis and will await cytology specimens.  Will send these for NGS testing to evaluate for  mutations including BRAF and RET  Recommended initiation of treatment with lenvatinib.  We discussed the anticipated side effects of this therapy including HTN, proteinuria, diarrhea skin rash etc.  She will meet with the oncology NP for detailed drug education and we will attempt to arrange this through the specialty pharmacy.  Obtain baseline thyroglobulin will monitor this throughout her treatment and plan reimaging in about 3 months.  Start hydrocodone for cancer related pain, if pain uncontrolled will refer to Radiation oncology for palliative XRT  MSW referral to assist with financial issues/disability application etc    Acute Care Visit 9/13/24:    -Patient came in for dry cough x 4-5 days that has progressively gotten worse  -Vitals stable, no systemic signs or symptoms of infection  -STAT CXR ordered:  large pleural effusion in right lung seen  -Pt unable to be seen by pulmonology today for thoracentesis, patient taken to ER by RN      SANJIV Rodriguez  Hematology/Oncology

## 2024-09-13 NOTE — ED QUICK NOTES
Rounded on pt. Pt sitting in bed at 90 degrees in a position of comfort. Pt does not appear to be in resp distress. Call light within reach. IR NP obtained consent for Pleurex placement for pt later this afternoon.

## 2024-09-13 NOTE — ED QUICK NOTES
Orders for admission, patient is aware of plan and ready to go upstairs. Any questions, please call ED RN Vik at extension [phone].     Patient Covid vaccination status: Fully vaccinated     COVID Test Ordered in ED: None    COVID Suspicion at Admission: N/A    Running Infusions:  None    Mental Status/LOC at time of transport: A/Ox4.  Ambulatory, independent.     Other pertinent information:   CIWA score: N/A   NIH score:  N/A

## 2024-09-13 NOTE — CONSULTS
Initial Pulmonary Medicine Inpatient Consultation           Consult requested by Dr. Estrada for recurrent right pleural effusion.        HPI: Very pleasant 65 yo woman with hx of metastatic thyroid CA s/p total thyroidectomy in 2010, s/p BRIDGES tx. Recently, thyroglobulin level increased and a thyroid US showed an oval thyroid nodule thought to be a indeterminante lymph node. 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 Most recent PET scan notable showed extensive hypermetabolic pleural-based nodularity, large right pleural effusion as well as hypermetabolic nodularity in the left neck soft tissues as well as peripheral right upper nodule all of which was concerning for metastatic disease. She had been experiencing right chest pain (under her right breast) for 2 months. She thought it was MSK from sleeping the wrong way. Chest imaging demonstrated large right pleural effusion for which she underwent a right thoracentesis with 1 L of pleural fluid removed on 8/29/24. Effusion was + for malignancy. Pt was to start chemo today but reported fatigue, shortness of breath, dizziness, and cough. A repeat CXR showed recurrence of the effusion for which she was sent to the ED. I discussed performing a bedside thoracentesis again but also that she will likely need a right pleurex catheter for recurrent malignant effusion. She prefers to have the pleurex placed today if able.   She otherwise denies fevers, chills, chest pain, sick contacts.       REVIEW OF SYSTEMS:  Positives and negatives as stated in HPI. Remainder of 12 pt review of systems otherwise are negative.       PAST MEDICAL HISTORY:  Past Medical History:   Diagnosis Date    Anxiety state, unspecified     Cancer (HCC) 2024    Colon adenomas 07/29/2024    x3    Disorder of thyroid     thyroidectomy    Diverticulosis of large intestine     Esophageal reflux     Exposure to medical therapeutic radiation  2011    thyroid    High blood pressure     High cholesterol     Hypercholesteremia 2005    Hypothyroidism     Osteoarthrosis, unspecified whether generalized or localized, unspecified site     Thyroid cancer (HCC) 2011    papillary thyroid; s/p surgery resection with Asher and BRIDGES    Unspecified essential hypertension         PAST SURGICAL HISTORY:  Past Surgical History:   Procedure Laterality Date    Colonoscopy N/A 2022    Procedure: COLONOSCOPY;  Surgeon: Neeraj No MD;  Location: [facility] ENDOSCOPY    Colonoscopy  2024    Colonoscopy N/A 2024    Procedure: COLONOSCOPY;  Surgeon: Neeraj No MD;  Location: [facility] ENDOSCOPY    Egd  2022    Egd  2024    Esophagogastroduodenoscopy    Endoscopic ultrasound - internal  2022    Endoscopic ultrasound exam  2024    Endoscopic Ultrasound    Eye surgery  2024    Eye surgery Left 2024    Hysterectomy      Knee surgery Left 2022    no associated bleeding complications          40 week 7 lb(s) 5 oz Male; 6 hr labor           40 week 7 lb(s) 3 oz Female          41 week 9 lb(s) 8 oz Male    Other surgical history      Injection Tendon Sheath, Ligament    Thyroidectomy      total    Total abdom hysterectomy          PAST FAMILY HISTORY:  Family History   Problem Relation Age of Onset    Heart Disorder Mother     Breast Cancer Maternal Cousin Female 57    Cancer Daughter 29        Hodgkin's Lymphoma    Ovarian Cancer Neg     Bleeding Disorders Neg     DVT/VTE Neg     Pancreatic Cancer Neg     Prostate Cancer Neg         PAST SOCIAL HISTORY:  Social History     Socioeconomic History    Marital status:    Tobacco Use    Smoking status: Never    Smokeless tobacco: Never   Vaping Use    Vaping status: Never Used   Substance and Sexual Activity    Alcohol use: Yes     Comment: social    Drug use: No   Other Topics Concern    Caffeine Concern Yes     Comment: 1 cup  of coffee         ALLERGIES:  Allergies   Allergen Reactions    Latex HIVES    Peanut-Containing Drug Products SWELLING     Closes Throat  Closes Throat  Closes Throat      Peanuts SWELLING     Closes Throat    Adhesive Tape OTHER (SEE COMMENTS)    Seasonal         MEDS:  Home Medications:  No outpatient medications have been marked as taking for the 9/13/24 encounter (Hospital Encounter).     Scheduled Medication:    Continuous Infusing Medication:    PRN Medications:         PHYSICAL EXAM:  /76   Pulse 84   Temp 98.1 °F (36.7 °C)   Resp 19   Wt 172 lb (78 kg)   SpO2 95%   BMI 31.46 kg/m²   CONSTITUTIONAL: alert, oriented, no apparent distress  HEENT: atraumatic normocephalic  MOUTH: mucous membranes are moist. No OP exudates  NECK/THROAT: no JVD. Trachea midline. No obvious thyromegaly  LUNG: clear upper with diminished right base, no wheezing, crackles. Chest symmetric with respiratory motion  HEART: regular rate and rhythm, no obvious murmers or gallops note  ABD: soft non tender. + bowel sounds. No organomegaly noted  EXT: no clubbing, cyanosis, or edema noted. Pulses intact grossly  NEURO/MUSCULOSKELETAL: no gross deficits  SKIN: warm, dry. No obvious lesions noted  LYMPH: no obvious LAD       IMAGES:  CXR  CONCLUSION:   1. Large right pleural effusion coursing USP up the right hemithorax increased from previous study of 8/29/2024.  I cannot exclude right basal pneumonia/atelectasis/mass.  Left lung field is clear.  Correlate clinically.       LABS:  Recent Labs   Lab 09/13/24  1319   RBC 4.05   HGB 12.2   HCT 36.0   MCV 88.9   MCH 30.1   MCHC 33.9   RDW 14.3   NEPRELIM 7.60   WBC 12.3*   .0       Recent Labs   Lab 09/13/24  1319   GLU 98   BUN 16   CREATSERUM 0.96   EGFRCR 66   CA 9.0      K 4.1      CO2 23.0   Final Diagnosis:  Pleural fluid, right; ultrasound guided thoracentesis:  Adequacy: Satisfactory for evaluation  General Category: Positive for  malignancy  Diagnosis:  · Cytomorphologic features diagnostic of metastatic thyroid carcinoma      ASSESSMENT/PLAN:  1.Recurrent malignant right pleural effusion  -effusion re-occurred within 2 weeks and is malignant  -pleurex catheter will provide the best benefit at this time  -IR contacted and able to place this afternoon  -keep pt npo for the procedure  -will send message to Dr. Chen, Dr. Sands, Dr. Asher, and Dr. Marie      Thank you for the opportunity to care for Camelia MarkhamMaverick Brooks DO, MPH  Pulmonary Critical Care Medicine  [facility] Pulmonary and Critical Care Medicine

## 2024-09-13 NOTE — ED PROVIDER NOTES
Patient Seen in: [facility] Emergency Department    History     Chief Complaint   Patient presents with    Difficulty Breathing       HPI    History is provided by patient/independent historian: patient  64 year old female with history of thyroid cancer with mets to the lungs status post recent thoracentesis, here with complaints of worsening difficulty breathing over the last few weeks.  She went today to the cancer center to start chemo, but was noted to have shortness of breath.  She had an x-ray that showed a large pleural effusion and was sent to the emergency department for further evaluation.  No fevers.  She does have a cough.  No leg swelling.    History reviewed.   Past Medical History:    Anxiety state, unspecified    Cancer (HCC)    Colon adenomas    x3    Disorder of thyroid    thyroidectomy    Diverticulosis of large intestine    Esophageal reflux    Exposure to medical therapeutic radiation    thyroid    High blood pressure    High cholesterol    Hypercholesteremia    Hypothyroidism    Osteoarthrosis, unspecified whether generalized or localized, unspecified site    Thyroid cancer (HCC)    papillary thyroid; s/p surgery resection with Asher and BRIDGES    Unspecified essential hypertension         History reviewed.   Past Surgical History:   Procedure Laterality Date    Colonoscopy N/A 2022    Procedure: COLONOSCOPY;  Surgeon: Neeraj No MD;  Location: [facility] ENDOSCOPY    Colonoscopy  2024    Colonoscopy N/A 2024    Procedure: COLONOSCOPY;  Surgeon: Neeraj No MD;  Location: [facility] ENDOSCOPY    Egd  2022    Egd  2024    Esophagogastroduodenoscopy    Endoscopic ultrasound - internal  2022    Endoscopic ultrasound exam  2024    Endoscopic Ultrasound    Eye surgery  2024    Eye surgery Left 2024    Hysterectomy      Knee surgery Left 2022    no associated bleeding complications          40 week 7 lb(s) 5 oz Male; 6 hr labor            40 week 7 lb(s) 3 oz Female          41 week 9 lb(s) 8 oz Male    Other surgical history      Injection Tendon Sheath, Ligament    Thyroidectomy      total    Total abdom hysterectomy           Home Medications reviewed :  (Not in a hospital admission)        History reviewed.   Social History     Socioeconomic History    Marital status:    Tobacco Use    Smoking status: Never    Smokeless tobacco: Never   Vaping Use    Vaping status: Never Used   Substance and Sexual Activity    Alcohol use: Yes     Comment: social    Drug use: No   Other Topics Concern    Caffeine Concern Yes     Comment: 1 cup of coffee          ROS  Review of Systems   Respiratory:  Positive for cough and shortness of breath.    Cardiovascular:  Negative for chest pain.   All other systems reviewed and are negative.     All other pertinent organ systems are reviewed and are negative.      Physical Exam     ED Triage Vitals [24 1252]   /84   Pulse 93   Resp 22   Temp 98.1 °F (36.7 °C)   Temp src    SpO2 95 %   O2 Device None (Room air)     Vital signs reviewed.      Physical Exam  Vitals and nursing note reviewed.   Cardiovascular:      Pulses: Normal pulses.   Pulmonary:      Effort: No respiratory distress.      Breath sounds: Examination of the right-middle field reveals decreased breath sounds. Examination of the right-lower field reveals decreased breath sounds. Decreased breath sounds present.      Comments: Tachypneic  Abdominal:      General: There is no distension.   Neurological:      Mental Status: She is alert.         ED Course       Labs:     Labs Reviewed   CBC WITH DIFFERENTIAL WITH PLATELET - Abnormal; Notable for the following components:       Result Value    WBC 12.3 (*)     All other components within normal limits   BASIC METABOLIC PANEL (8) - Normal   RAINBOW DRAW LAVENDER   RAINBOW DRAW LIGHT GREEN   RAINBOW DRAW BLUE         My EKG Interpretation: EKG    Rate, intervals  and axes as noted on EKG Report.  Rate: 93  Rhythm: Sinus Rhythm  Reading: normal for rate, normal for rhythm, no acute ST changes           As reviewed and Interpreted by me      Imaging Results Available and Reviewed while in ED:   XR CHEST PA + LAT CHEST (CPT=71046)    Result Date: 9/13/2024  CONCLUSION:  1. Large right pleural effusion coursing retirement up the right hemithorax increased from previous study of 8/29/2024.  I cannot exclude right basal pneumonia/atelectasis/mass.  Left lung field is clear.  Correlate clinically.    Dictated by (CST): Nicolás Caicedo MD on 9/13/2024 at 12:21 PM     Finalized by (CST): Nicolás Caicedo MD on 9/13/2024 at 12:25 PM         My review and independent interpretation of XR images: R pleural effusion. Radiology report corroborates this in addition to other details as reported by them.      Decision rules/scores evaluated: none      Diagnostic labs/tests considered but not ordered: CRP    ED Medications Administered:   Medications   lidocaine (Xylocaine) 2 % injection (has no administration in time range)                MDM       Medical Decision Making      Differential Diagnosis: After obtaining the patient's history, performing the physical exam and reviewing the diagnostics, multiple initial diagnoses were considered based on the presenting problem including pneumothorax, hemothorax, pleural effusion, empyema    External document review: I personally reviewed available external medical records for any recent pertinent discharge summaries, testing, and procedures - the findings are as follows: today visit with BEBE Gordon for cough    Complicating Factors: The patient already  has a past medical history of Anxiety state, unspecified, Cancer (HCC) (2024), Colon adenomas (07/29/2024), Disorder of thyroid, Diverticulosis of large intestine, Esophageal reflux, Exposure to medical therapeutic radiation (01/01/2011), High blood pressure, High cholesterol, Hypercholesteremia  (01/01/2005), Hypothyroidism, Osteoarthrosis, unspecified whether generalized or localized, unspecified site, Thyroid cancer (HCC) (01/01/2011), and Unspecified essential hypertension. to contribute to the complexity of this ED evaluation.    Procedures performed: none    Discussed management with physician/appropriate source: Dr. Brooks, Eliza Miller for IR, Dr. Chen    Considered admission/deescalation of care for: pleural effusion    Social determinants of health affecting patient care: none    Prescription medications considered: discussed continuing current medication regimen    The patient requires continuous monitoring for: shortness of breath    Shared decision making: discussed possible admission    Disposition and Plan     Clinical Impression:  1. Pleural effusion        Disposition:  Admit    Follow-up:  No follow-up provider specified.    Medications Prescribed:  Current Discharge Medication List          Hospital Problems       Present on Admission  Date Reviewed: 9/13/2024            ICD-10-CM Noted POA    * (Principal) Pleural effusion J90 8/27/2024 Unknown

## 2024-09-13 NOTE — CONSULTS
[facility]  part of [facility]      Consult     Camelia Markham Patient Status:  Emergency    1960 MRN [id number]   Location [facility] EMERGENCY DEPARTMENT Attending Odin Estrada MD   Hosp Day # 0 PCP FINA MABRY, MD WENDY     Referring Provider: Dr Brooks   Reason for Consultation: chest pleurx catheter placement    Subjective:    Camelia Markham is a 64 year old female with metastatic thyroid cancer s/p recent thoracentesis, pleural fluid was malignant. She presents to the emergency department with increased SOB and recurrent right large pleural effusion. IR consulted for chest pleurx placement.    History/Other:      Past Medical History:  Past Medical History:    Anxiety state, unspecified    Cancer (HCC)    Colon adenomas    x3    Disorder of thyroid    thyroidectomy    Diverticulosis of large intestine    Esophageal reflux    Exposure to medical therapeutic radiation    thyroid    High blood pressure    High cholesterol    Hypercholesteremia    Hypothyroidism    Osteoarthrosis, unspecified whether generalized or localized, unspecified site    Thyroid cancer (HCC)    papillary thyroid; s/p surgery resection with Asher and BRIDGES    Unspecified essential hypertension        Past Surgical History:   Past Surgical History:   Procedure Laterality Date    Colonoscopy N/A 2022    Procedure: COLONOSCOPY;  Surgeon: Neeraj No MD;  Location: [facility] ENDOSCOPY    Colonoscopy  2024    Colonoscopy N/A 2024    Procedure: COLONOSCOPY;  Surgeon: Neeraj No MD;  Location: [facility] ENDOSCOPY    Egd  2022    Egd  2024    Esophagogastroduodenoscopy    Endoscopic ultrasound - internal  2022    Endoscopic ultrasound exam  2024    Endoscopic Ultrasound    Eye surgery  2024    Eye surgery Left 2024    Hysterectomy      Knee surgery Left 2022    no associated bleeding complications          40 week 7 lb(s) 5 oz Male; 6 hr labor            40 week 7 lb(s) 3 oz Female          41 week 9 lb(s) 8 oz Male    Other surgical history      Injection Tendon Sheath, Ligament    Thyroidectomy      total    Total abdom hysterectomy         Social History:  reports that she has never smoked. She has never used smokeless tobacco. She reports current alcohol use. She reports that she does not use drugs.    Family History:   Family History   Problem Relation Age of Onset    Heart Disorder Mother     Breast Cancer Maternal Cousin Female 57    Cancer Daughter 29        Hodgkin's Lymphoma    Ovarian Cancer Neg     Bleeding Disorders Neg     DVT/VTE Neg     Pancreatic Cancer Neg     Prostate Cancer Neg        Allergies:   Allergies   Allergen Reactions    Latex HIVES    Peanut-Containing Drug Products SWELLING     Closes Throat  Closes Throat  Closes Throat      Peanuts SWELLING     Closes Throat    Adhesive Tape OTHER (SEE COMMENTS)    Seasonal        Medications:    Current Facility-Administered Medications on File Prior to Encounter   Medication Dose Route Frequency Provider Last Rate Last Admin    [COMPLETED] tetracaine (Pontocaine) 0.5 % ophthalmic solution 1 drop  1 drop Left Eye Once Jeremi Santiago MD   1 drop at 24 1240    [COMPLETED] diazePAM (Valium) tab 5 mg  5 mg Oral Once Jeremi Santiago MD   5 mg at 24 1250    [COMPLETED] phenylephrine/cyclopentolate/tropicamide ophthalmic mixture  1 drop Left Eye Q5 Min Jeremi Santiago MD   1 drop at 24 1250    [COMPLETED] diazePAM (Valium) tab 5 mg  5 mg Oral Once Jeremi Santiago MD   5 mg at 24 1309    [COMPLETED] thyrotropin ky (Thyrogen) 0.9 mg injection 0.9 mg  0.9 mg Intramuscular Q24H Dana Sands MD   0.9 mg at 24 0715    [] sterile water for injection (PF) injection             [COMPLETED] cefTRIAXone (Rocephin) 2 g in sodium chloride 0.9% 100 mL IVPB-ADDV  2 g Intravenous Once Neeraj No MD   2 g at 24 1256     [COMPLETED] proparacaine (Alcaine) 0.5 % ophthalmic solution 1 drop  1 drop Right Eye Once Jeremi Santiago MD   1 drop at 24 1225    [COMPLETED] phenylephrine/cyclopentolate/tropicamide ophthalmic mixture  1 drop Right Eye Q5 Min Jeremi Santiago MD   1 drop at 24 1236    [COMPLETED] diazePAM (Valium) tab 10 mg  10 mg Oral Once Jeremi Santiago MD   10 mg at 24 1224     Current Outpatient Medications on File Prior to Encounter   Medication Sig Dispense Refill    Lenvatinib, 24 MG Daily Dose, (LENVIMA, 24 MG DAILY DOSE,) 2 x 10 MG & 4 MG Oral Capsule Therapy Pack Take 24 mg by mouth daily. (Patient not taking: Reported on 2024) 30 each 5    prochlorperazine (COMPAZINE) 10 mg tablet Take 1 tablet (10 mg total) by mouth every 6 (six) hours as needed for Nausea. 30 tablet 3    ondansetron (ZOFRAN) 8 MG tablet Take 1 tablet (8 mg total) by mouth every 8 (eight) hours as needed for Nausea. 30 tablet 3    [] sulfamethoxazole-trimethoprim -160 MG Oral Tab per tablet Take 1 tablet by mouth 2 (two) times daily for 7 days. 14 tablet 0    HYDROcodone-acetaminophen 5-325 MG Oral Tab Take 1-2 tablets by mouth every 6 (six) hours as needed for Pain. 60 tablet 0    [] amoxicillin clavulanate 875-125 MG Oral Tab Take 1 tablet by mouth 2 (two) times daily for 3 days. Take every 12 hours 6 tablet 0    azelastine 0.1 % Nasal Solution 2 sprays by Nasal route 2 (two) times daily. 90 mL 1    ketorolac 0.5 % Ophthalmic Solution Place 1 drop into the right eye 4 (four) times daily.      prednisoLONE 1 % Ophthalmic Suspension Place 1 drop into the right eye 3 (three) times daily.      [] ondansetron 4 MG Oral Tablet Dispersible Take 1 tablet (4 mg total) by mouth every 4 (four) hours as needed for Nausea. 10 tablet 0    [] dicyclomine 20 MG Oral Tab Take 1 tablet (20 mg total) by mouth 4 (four) times daily as needed. 30 tablet 0    sertraline 25 MG Oral Tab       Olmesartan  Medoxomil 40 MG Oral Tab       Omeprazole 40 MG Oral Capsule Delayed Release       Levothyroxine Sodium 150 MCG/5ML Oral Solution Take by mouth every morning.      amLODIPine 10 MG Oral Tab Take 1 tablet (10 mg total) by mouth every morning.      clonazePAM 2 MG Oral Tab Take 1 tablet (2 mg total) by mouth nightly.         Review of Systems:   Review of systems was completed.  Pertinent positives and negatives noted in the HPI.    Objective:     /76   Pulse 88   Temp 98.1 °F (36.7 °C)   Resp 15   Wt 172 lb (78 kg)   SpO2 95%   BMI 31.46 kg/m²   General: No acute distress.  A/OX.3  Respiratory: normal respiratory effort, room air  Cardiovascular: rate regular  Abdomen: no guarding    Results:    Recent Labs   Lab 09/13/24  1319   RBC 4.05   HGB 12.2   HCT 36.0   MCV 88.9   MCH 30.1   MCHC 33.9   RDW 14.3   NEPRELIM 7.60   WBC 12.3*   .0       Recent Labs   Lab 09/13/24  1319   GLU 98   BUN 16   CREATSERUM 0.96   EGFRCR 66   CA 9.0      K 4.1      CO2 23.0     XR CHEST PA + LAT CHEST (CPT=71046)    Result Date: 9/13/2024  CONCLUSION:  1. Large right pleural effusion coursing nursing home up the right hemithorax increased from previous study of 8/29/2024.  I cannot exclude right basal pneumonia/atelectasis/mass.  Left lung field is clear.  Correlate clinically.    Dictated by (CST): Nicolás Caicedo MD on 9/13/2024 at 12:21 PM     Finalized by (CST): Nicolás Caicedo MD on 9/13/2024 at 12:25 PM             Assessment & Plan:    65 yo female with metastatic thyroid cancer with recurrent malignant right pleural effusion  Plan: chest pleurx catheter insertion today  Discussed procedure, risks, benefits with patient who agrees to proceed    Recommending  services to assist with pleurx drainage and supplies, patient agrees    BEBE Tovar  9/13/2024

## 2024-09-13 NOTE — TELEPHONE ENCOUNTER
Cancer Center calling to find out if patient should be sent to the ER.  RN states patient has a Right Plural Effusion.  Transferred to RN

## 2024-09-13 NOTE — PLAN OF CARE
Pt received post plurex drain placement, a/o x 4, vss.  Discussed POC, offered emotional support.. Oriented to room and call light. Instructed pt to use call light for assistance, call light and belongings with in reach.   Problem: Patient Centered Care  Goal: Patient preferences are identified and integrated in the patient's plan of care  Description: Interventions:  - What would you like us to know as we care for you?   - Provide timely, complete, and accurate information to patient/family  - Incorporate patient and family knowledge, values, beliefs, and cultural backgrounds into the planning and delivery of care  - Encourage patient/family to participate in care and decision-making at the level they choose  - Honor patient and family perspectives and choices  Outcome: Progressing     Problem: Patient/Family Goals  Goal: Patient/Family Long Term Goal  Description: Patient's Long Term Goal:     Interventions:  -   - See additional Care Plan goals for specific interventions  Outcome: Progressing  Goal: Patient/Family Short Term Goal  Description: Patient's Short Term Goal:     Interventions:   -   - See additional Care Plan goals for specific interventions  Outcome: Progressing     Problem: Diabetes/Glucose Control  Goal: Glucose maintained within prescribed range  Description: INTERVENTIONS:  - Monitor Blood Glucose as ordered  - Assess for signs and symptoms of hyperglycemia and hypoglycemia  - Administer ordered medications to maintain glucose within target range  - Assess barriers to adequate nutritional intake and initiate nutrition consult as needed  - Instruct patient on self management of diabetes  Outcome: Progressing

## 2024-09-14 ENCOUNTER — APPOINTMENT (OUTPATIENT)
Dept: GENERAL RADIOLOGY | Facility: HOSPITAL | Age: 64
End: 2024-09-14
Attending: HOSPITALIST
Payer: COMMERCIAL

## 2024-09-14 LAB
ANION GAP SERPL CALC-SCNC: 5 MMOL/L (ref 0–18)
ATRIAL RATE: 93 BPM
BASOPHILS # BLD AUTO: 0.07 X10(3) UL (ref 0–0.2)
BASOPHILS NFR BLD AUTO: 0.7 %
BUN BLD-MCNC: 18 MG/DL (ref 9–23)
BUN/CREAT SERPL: 20 (ref 10–20)
CALCIUM BLD-MCNC: 9.1 MG/DL (ref 8.7–10.4)
CHLORIDE SERPL-SCNC: 108 MMOL/L (ref 98–112)
CO2 SERPL-SCNC: 24 MMOL/L (ref 21–32)
CREAT BLD-MCNC: 0.9 MG/DL
DEPRECATED RDW RBC AUTO: 50.1 FL (ref 35.1–46.3)
EGFRCR SERPLBLD CKD-EPI 2021: 72 ML/MIN/1.73M2 (ref 60–?)
EOSINOPHIL # BLD AUTO: 0.34 X10(3) UL (ref 0–0.7)
EOSINOPHIL NFR BLD AUTO: 3.5 %
ERYTHROCYTE [DISTWIDTH] IN BLOOD BY AUTOMATED COUNT: 14.2 % (ref 11–15)
GLUCOSE BLD-MCNC: 109 MG/DL (ref 70–99)
GLUCOSE BLDC GLUCOMTR-MCNC: 113 MG/DL (ref 70–99)
GLUCOSE BLDC GLUCOMTR-MCNC: 115 MG/DL (ref 70–99)
GLUCOSE BLDC GLUCOMTR-MCNC: 122 MG/DL (ref 70–99)
GLUCOSE BLDC GLUCOMTR-MCNC: 128 MG/DL (ref 70–99)
HCT VFR BLD AUTO: 36 %
HGB BLD-MCNC: 11.2 G/DL
IMM GRANULOCYTES # BLD AUTO: 0.02 X10(3) UL (ref 0–1)
IMM GRANULOCYTES NFR BLD: 0.2 %
LYMPHOCYTES # BLD AUTO: 2.49 X10(3) UL (ref 1–4)
LYMPHOCYTES NFR BLD AUTO: 25.6 %
MCH RBC QN AUTO: 29.7 PG (ref 26–34)
MCHC RBC AUTO-ENTMCNC: 31.1 G/DL (ref 31–37)
MCV RBC AUTO: 95.5 FL
MONOCYTES # BLD AUTO: 0.66 X10(3) UL (ref 0.1–1)
MONOCYTES NFR BLD AUTO: 6.8 %
NEUTROPHILS # BLD AUTO: 6.13 X10 (3) UL (ref 1.5–7.7)
NEUTROPHILS # BLD AUTO: 6.13 X10(3) UL (ref 1.5–7.7)
NEUTROPHILS NFR BLD AUTO: 63.2 %
OSMOLALITY SERPL CALC.SUM OF ELEC: 286 MOSM/KG (ref 275–295)
P AXIS: 6 DEGREES
P-R INTERVAL: 136 MS
PLATELET # BLD AUTO: 241 10(3)UL (ref 150–450)
POTASSIUM SERPL-SCNC: 4.6 MMOL/L (ref 3.5–5.1)
Q-T INTERVAL: 372 MS
QRS DURATION: 90 MS
QTC CALCULATION (BEZET): 462 MS
R AXIS: -17 DEGREES
RBC # BLD AUTO: 3.77 X10(6)UL
SODIUM SERPL-SCNC: 137 MMOL/L (ref 136–145)
T AXIS: 19 DEGREES
VENTRICULAR RATE: 93 BPM
WBC # BLD AUTO: 9.7 X10(3) UL (ref 4–11)

## 2024-09-14 PROCEDURE — 71046 X-RAY EXAM CHEST 2 VIEWS: CPT | Performed by: HOSPITALIST

## 2024-09-14 PROCEDURE — 99232 SBSQ HOSP IP/OBS MODERATE 35: CPT | Performed by: INTERNAL MEDICINE

## 2024-09-14 PROCEDURE — 99233 SBSQ HOSP IP/OBS HIGH 50: CPT | Performed by: HOSPITALIST

## 2024-09-14 PROCEDURE — 99253 IP/OBS CNSLTJ NEW/EST LOW 45: CPT | Performed by: STUDENT IN AN ORGANIZED HEALTH CARE EDUCATION/TRAINING PROGRAM

## 2024-09-14 RX ORDER — POLYETHYLENE GLYCOL 3350 17 G/17G
17 POWDER, FOR SOLUTION ORAL DAILY PRN
Status: DISCONTINUED | OUTPATIENT
Start: 2024-09-14 | End: 2024-09-17

## 2024-09-14 RX ORDER — SENNOSIDES 8.6 MG
8.6 TABLET ORAL 2 TIMES DAILY
Status: DISCONTINUED | OUTPATIENT
Start: 2024-09-14 | End: 2024-09-17

## 2024-09-14 NOTE — PROGRESS NOTES
[facility]  part of [facility]    Progress Note    Camelia Markham Patient Status:  Inpatient    1960 MRN [id number]   Location [facility] 4W/SW/SE Attending Feroz Henderson MD   Hosp Day # 1 PCP FINA MABRY, MD WENDY       Subjective:   Camelia Markham is a(n) 64 year old female was seen and examined  Lying in bed, mild distress  Cough persistent  No acute sob  Having some pleuritic cp  No f,c,n,v abd pain or HA     Objective:   Blood pressure 110/67, pulse 74, temperature 97.7 °F (36.5 °C), temperature source Oral, resp. rate 18, weight 170 lb 9.6 oz (77.4 kg), SpO2 95%.    GENERAL:  The patient appeared to be in no distress and was comfortable.  SKIN:  Warm and hydrated  PSYCHIATRIC: Calm and cooperative    HEENT:  Head was atraumatic and normocephalic.  Eyes: Sclera was anicteric.  Pupils were equal.  Ears:  There were no lesions.  Nose:  No lesions were noted.      NECK:  Supple.  There was no JVD.    CHEST:  R chest pleurex  CARDIAC: S1 S2+, RRR  LUNGS: decreased air exchange in R lung fields  ABDOMEN: Non-distended, non-tender, BS+  MUSCULOSKELETAL:  There was no deformity.  There was full range of motion in all the extremities.    EXTREMITIES: There was no edema  NEUROLOGIC: Cranial nerves II-XII intact, no focal defecits    Current Inpatient Medications:     Current Facility-Administered Medications:     enoxaparin (Lovenox) 40 MG/0.4ML SUBQ injection 40 mg, 40 mg, Subcutaneous, Q24H    acetaminophen (Tylenol Extra Strength) tab 500 mg, 500 mg, Oral, Q4H PRN    ondansetron (Zofran) 4 MG/2ML injection 4 mg, 4 mg, Intravenous, Q6H PRN    prochlorperazine (Compazine) 10 MG/2ML injection 5 mg, 5 mg, Intravenous, Q8H PRN    temazepam (Restoril) cap 15 mg, 15 mg, Oral, Nightly PRN    morphINE PF 2 MG/ML injection 1 mg, 1 mg, Intravenous, Q2H PRN **OR** morphINE PF 2 MG/ML injection 2 mg, 2 mg, Intravenous, Q2H PRN **OR** morphINE PF 4 MG/ML injection 4 mg, 4 mg,  Intravenous, Q2H PRN    acetaminophen (Tylenol) tab 650 mg, 650 mg, Oral, Q4H PRN **OR** HYDROcodone-acetaminophen (Norco) 5-325 MG per tab 1 tablet, 1 tablet, Oral, Q4H PRN **OR** HYDROcodone-acetaminophen (Norco) 5-325 MG per tab 2 tablet, 2 tablet, Oral, Q4H PRN    amLODIPine (Norvasc) tab 10 mg, 10 mg, Oral, Daily    clonazePAM (KlonoPIN) tab 2 mg, 2 mg, Oral, Nightly    dicyclomine (Bentyl) tab 20 mg, 20 mg, Oral, QID PRN    levothyroxine (Synthroid) tab 150 mcg, 150 mcg, Oral, Before breakfast    losartan (Cozaar) tab 100 mg, 100 mg, Oral, Daily    pantoprazole (Protonix) DR tab 40 mg, 40 mg, Oral, QAM AC    sertraline (Zoloft) tab 50 mg, 50 mg, Oral, Daily    Assessment and Plan:   R Malignant pleural effusion  Due t metastatic thyroid CA  IR has placed CT in R chest  Will repeat pa lateral cxr today  Oncology and pulm following     Other issues  Papillary thyroid CA s/p total thyroidectomy in 2010  HL  HTN  GERD  Hypothyroidism    DVT Ppx: lovenox  Full code    MDM: High         Results:     Recent Labs   Lab 09/13/24  1319 09/14/24  0722   RBC 4.05 3.77*   HGB 12.2 11.2*   HCT 36.0 36.0   MCV 88.9 95.5   MCH 30.1 29.7   MCHC 33.9 31.1   RDW 14.3 14.2   NEPRELIM 7.60 6.13   WBC 12.3* 9.7   .0 241.0         Recent Labs   Lab 09/13/24  1319 09/14/24  0722   GLU 98 109*   BUN 16 18   CREATSERUM 0.96 0.90   EGFRCR 66 72   CA 9.0 9.1    137   K 4.1 4.6    108   CO2 23.0 24.0         Imaging:   XR CHEST PA + LAT CHEST (CPT=71046)    Result Date: 9/13/2024  CONCLUSION:  1. Large right pleural effusion coursing long-term up the right hemithorax increased from previous study of 8/29/2024.  I cannot exclude right basal pneumonia/atelectasis/mass.  Left lung field is clear.  Correlate clinically.    Dictated by (CST): Nicolás Caicedo MD on 9/13/2024 at 12:21 PM     Finalized by (CST): Nicolás Caicedo MD on 9/13/2024 at 12:25 PM         EKG 12 Lead    Result Date: 9/14/2024  Normal sinus rhythm Normal ECG  No previous ECGs found in [facility] Confirmed by DO Barrios Asif ([id number]) on 9/14/2024 8:26:27 AM       Feroz Henderson MD  9/14/2024

## 2024-09-14 NOTE — PLAN OF CARE
Camelia Figueroa reports breathing is about the same; she has pain now with cough or deep breath due to placement of pleurex.  Norco and morphine used for pain.  Pleurex in place with dressing clean, dry and intact.   Patient using oxygen 2L for comfort prn, O2 sats stable on room air.    Problem: Patient Centered Care  Goal: Patient preferences are identified and integrated in the patient's plan of care  Description: Interventions:  - What would you like us to know as we care for you?   - Provide timely, complete, and accurate information to patient/family  - Incorporate patient and family knowledge, values, beliefs, and cultural backgrounds into the planning and delivery of care  - Encourage patient/family to participate in care and decision-making at the level they choose  - Honor patient and family perspectives and choices  Outcome: Progressing     Problem: Patient/Family Goals  Goal: Patient/Family Long Term Goal  Description: Patient's Long Term Goal: return home, start new chemotherapy    Interventions:  - discharge planning  - pleurex drain care  - See additional Care Plan goals for specific interventions  Outcome: Progressing  Goal: Patient/Family Short Term Goal  Description: Patient's Short Term Goal: less pain, easier breathing    Interventions:   - maintain pleurex, drain as ordered  - oxygen as needed  - pain management, norco or morphine   - See additional Care Plan goals for specific interventions  Outcome: Progressing     Problem: Diabetes/Glucose Control  Goal: Glucose maintained within prescribed range  Description: INTERVENTIONS:  - Monitor Blood Glucose as ordered  - Assess for signs and symptoms of hyperglycemia and hypoglycemia  - Administer ordered medications to maintain glucose within target range  - Assess barriers to adequate nutritional intake and initiate nutrition consult as needed  - Instruct patient on self management of diabetes  Outcome: Progressing     Problem: RESPIRATORY - ADULT  Goal:  Achieves optimal ventilation and oxygenation  Description: INTERVENTIONS:  - Assess for changes in respiratory status  - Assess for changes in mentation and behavior  - Position to facilitate oxygenation and minimize respiratory effort  - Oxygen supplementation based on oxygen saturation or ABGs  - Provide Smoking Cessation handout, if applicable  - Encourage broncho-pulmonary hygiene including cough, deep breathe, Incentive Spirometry  - Assess the need for suctioning and perform as needed  - Assess and instruct to report SOB or any respiratory difficulty  - Respiratory Therapy support as indicated  - Manage/alleviate anxiety  - Monitor for signs/symptoms of CO2 retention  Outcome: Progressing

## 2024-09-14 NOTE — CONSULTS
Hematology/Oncology Initial Consultation Note    Patient Name: Camelia Markham  Medical Record Number: [id number]    YOB: 1960   Date of Consultation: 9/14/2024   Physician requesting consultation: Dr. Gustafson    Reason for Consultation:  Camelia Markham was seen today for the diagnosis of metastatic thyroid cancer    Oncologic History:  From prior notes and confirmed with patient  11/3/2010 was found to have an enlarged thyroid gland ultrasound-guided FNA showed papillary thyroid cancer.  Underwent total thyroidectomy with Dr. Asher.  Final pathology showed a 2.5 cm tumor and 2 positive lymph nodes.     12/2010 BRIDGES uptake study showed no evidence of abnormal uptake outside the neck.  Underwent 207 miCu of radioactive iodine.  She had then been maintained on levothyroxine.  Subsequently was lost to follow-up after 2018.     6/17/2024 Thyroglobulin increased to 10, prompted an ultrasound that showed an oval hypoechoic nodule in the superior aspect of the left thyroid measuring 1 x 0.6 x 0.6 cm thought to represent an indeterminate lymph node.     8/6/2024 ultrasound-guided FNA showed papillary carcinoma.      8/16/2024 [facility] thyroid scan showed no discernible pathologic activity increased uptake in the thyroid bed.  Given the biopsy-proven recurrence and absence of I-131 uptake this was thought to represent dedifferentiated recurrent thyroid malignancy.     8/22/2024 PET/CT fusion study showed extensive hypermetabolic pleural-based nodularity, large pleural effusion as well as hypermetabolic nodularity in the left neck soft tissues as well as peripheral right upper nodule all of which was concerning for metastatic disease.  Previously seen cystic pancreatic lesion did not demonstrate any hypermetabolic activity.    =============================================================  History of Present Illness:      64-year-old female with recurrent metastatic thyroid papillary carcinoma with history of  pleural effusion on the right s/p thoracentesis on  presents to the ER with shortness of breath and chest discomfort.  She had an x-ray done that showed large right pleural effusion and patient was evaluated with pulmonology and had Pleurx catheter placed by the interventional radiology.  Patient mentions her breathing is improved.  No fevers or chills.  No new cough.  No chest discomfort.    Past Medical History:  Past Medical History:    Anxiety state, unspecified    Cancer (HCC)    Colon adenomas    x3    Disorder of thyroid    thyroidectomy    Diverticulosis of large intestine    Esophageal reflux    Exposure to medical therapeutic radiation    thyroid    High blood pressure    High cholesterol    Hypercholesteremia    Hypothyroidism    Osteoarthrosis, unspecified whether generalized or localized, unspecified site    Thyroid cancer (HCC)    papillary thyroid; s/p surgery resection with Asher and BRIDGES    Unspecified essential hypertension       Past Surgical History:   Procedure Laterality Date    Colonoscopy N/A 2022    Procedure: COLONOSCOPY;  Surgeon: Neeraj No MD;  Location: [facility] ENDOSCOPY    Colonoscopy  2024    Colonoscopy N/A 2024    Procedure: COLONOSCOPY;  Surgeon: Neeraj No MD;  Location: [facility] ENDOSCOPY    Egd  2022    Egd  2024    Esophagogastroduodenoscopy    Endoscopic ultrasound - internal  2022    Endoscopic ultrasound exam  2024    Endoscopic Ultrasound    Eye surgery  2024    Eye surgery Left 2024    Hysterectomy      Knee surgery Left 2022    no associated bleeding complications          40 week 7 lb(s) 5 oz Male; 6 hr labor           40 week 7 lb(s) 3 oz Female          41 week 9 lb(s) 8 oz Male    Other surgical history      Injection Tendon Sheath, Ligament    Thyroidectomy      total    Total abdom hysterectomy         Home Medications:  Current Facility-Administered Medications  on File Prior to Encounter   Medication Dose Route Frequency Provider Last Rate Last Admin    [COMPLETED] tetracaine (Pontocaine) 0.5 % ophthalmic solution 1 drop  1 drop Left Eye Once Jeremi Santiago MD   1 drop at 24 1240    [COMPLETED] diazePAM (Valium) tab 5 mg  5 mg Oral Once Jeremi Santiago MD   5 mg at 24 1250    [COMPLETED] phenylephrine/cyclopentolate/tropicamide ophthalmic mixture  1 drop Left Eye Q5 Min Jeremi Santiago MD   1 drop at 24 1250    [COMPLETED] diazePAM (Valium) tab 5 mg  5 mg Oral Once Jeremi Santiago MD   5 mg at 24 1309     Current Outpatient Medications on File Prior to Encounter   Medication Sig Dispense Refill    levothyroxine 150 MCG Oral Tab Take 1 tablet (150 mcg total) by mouth before breakfast.      ondansetron (ZOFRAN) 8 MG tablet Take 1 tablet (8 mg total) by mouth every 8 (eight) hours as needed for Nausea. 30 tablet 3    HYDROcodone-acetaminophen 5-325 MG Oral Tab Take 1-2 tablets by mouth every 6 (six) hours as needed for Pain. 60 tablet 0    ketorolac 0.5 % Ophthalmic Solution Place 1 drop into the left eye 4 (four) times daily.      prednisoLONE 1 % Ophthalmic Suspension Place 1 drop into the left eye 3 (three) times daily.      [] dicyclomine 20 MG Oral Tab Take 1 tablet (20 mg total) by mouth 4 (four) times daily as needed. 30 tablet 0    Olmesartan Medoxomil 40 MG Oral Tab Take 1 tablet (40 mg total) by mouth daily.      Omeprazole 40 MG Oral Capsule Delayed Release Take 1 capsule (40 mg total) by mouth every other day.      amLODIPine 10 MG Oral Tab Take 1 tablet (10 mg total) by mouth daily.      clonazePAM 2 MG Oral Tab Take 0.5 tablets (1 mg total) by mouth nightly.      Lenvatinib, 24 MG Daily Dose, (LENVIMA, 24 MG DAILY DOSE,) 2 x 10 MG & 4 MG Oral Capsule Therapy Pack Take 24 mg by mouth daily. (Patient not taking: Reported on 2024) 30 each 5    prochlorperazine (COMPAZINE) 10 mg tablet Take 1 tablet (10 mg total) by mouth  every 6 (six) hours as needed for Nausea. 30 tablet 3    azelastine 0.1 % Nasal Solution 2 sprays by Nasal route 2 (two) times daily. 90 mL 1    sertraline 50 MG Oral Tab Take 1 tablet (50 mg total) by mouth daily.         Current Inpatient Medications:  Inpatient Meds:   enoxaparin  40 mg Subcutaneous Q24H    amLODIPine  10 mg Oral Daily    clonazePAM  2 mg Oral Nightly    levothyroxine  150 mcg Oral Before breakfast    losartan  100 mg Oral Daily    pantoprazole  40 mg Oral QAM AC    sertraline  50 mg Oral Daily         PRN Meds:    acetaminophen    ondansetron    prochlorperazine    temazepam    morphINE **OR** morphINE **OR** morphINE    acetaminophen **OR** HYDROcodone-acetaminophen **OR** HYDROcodone-acetaminophen    dicyclomine    Allergies:   Allergies   Allergen Reactions    Latex HIVES    Peanut-Containing Drug Products SWELLING     Closes Throat  Closes Throat  Closes Throat      Peanuts SWELLING     Closes Throat    Adhesive Tape OTHER (SEE COMMENTS)    Seasonal        Psychosocial History:  Social History     Social History Narrative    Camelia Figueroa is  to Baldemar x30 yrs. She has 3 adult children. 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 She lives with her , her mom, and 1 of the children in [place], [place].     Social History     Socioeconomic History    Marital status:    Tobacco Use    Smoking status: Never    Smokeless tobacco: Never   Vaping Use    Vaping status: Never Used   Substance and Sexual Activity    Alcohol use: Yes     Comment: social    Drug use: No   Other Topics Concern    Caffeine Concern Yes     Comment: 1 cup of coffee    Social History Narrative    Camelia Figueroa is  to Baldemar x30 yrs. She has 3 adult children. 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 She lives with her , her mom, and 1 of the children in [place], [place].     Social Determinants of Health     Food Insecurity: No Food Insecurity (9/13/2024)    Food Insecurity     Food Insecurity: Never true    Transportation Needs: No Transportation Needs (9/13/2024)    Transportation Needs     Lack of Transportation: No   Housing Stability: Low Risk  (9/13/2024)    Housing Stability     Housing Instability: No       Family Medical History:  Family History   Problem Relation Age of Onset    Heart Disorder Mother     Breast Cancer Maternal Cousin Female 57    Cancer Daughter 29        Hodgkin's Lymphoma    Ovarian Cancer Neg     Bleeding Disorders Neg     DVT/VTE Neg     Pancreatic Cancer Neg     Prostate Cancer Neg        Review of Systems:  A 10-point ROS was done with pertinent positives and negative per the HPI    Vital Signs:  Height: --  Weight: 77.4 kg (170 lb 9.6 oz) (09/13 1615)  BSA (Calculated - sq m): --  Pulse: 68 (09/14 1225)  BP: 125/66 (09/14 1225)  Temp: 97.5 °F (36.4 °C) (09/14 1225)  Do Not Use - Resp Rate: --  SpO2: 95 % (09/14 1225)      Wt Readings from Last 6 Encounters:   09/13/24 77.4 kg (170 lb 9.6 oz)   09/13/24 78 kg (172 lb)   08/29/24 79.8 kg (176 lb)   08/29/24 80.2 kg (176 lb 12.8 oz)   08/28/24 79.4 kg (175 lb)   08/15/24 78.9 kg (174 lb)       ECOG PS: 0    Physical Examination:  General: Patient is alert and oriented, not in acute distress  Psych: Mood and affect are appropriate  Eyes: EOMI, PERRL  ENT: Oropharynx is clear, no adenopathy  CV: Regular rate and rhythm, normal S1S2, no murmurs, no LE edema  Respiratory: Lungs clear to auscultation bilaterally  GI/Abd: Soft, non-tender with normoactive bowel sounds, no hepatosplenomegaly  Neurological: Grossly intact   Lymphatics: No palpable cervical, supraclavicular, axillary, or inguinal lymphadenopathy  Skin: no rashes or petechiae  Pleurx catheter are noted on the right side.  Pleural fluid, bloody drainage.  Laboratory:  Recent Labs   Lab 09/13/24  1319 09/14/24  0722   WBC 12.3* 9.7   HGB 12.2 11.2*   HCT 36.0 36.0   .0 241.0   MCV 88.9 95.5   RDW 14.3 14.2   NEPRELIM 7.60 6.13       Recent Labs   Lab 09/13/24  7056  09/14/24  0722    137   K 4.1 4.6    108   CO2 23.0 24.0   BUN 16 18   CREATSERUM 0.96 0.90   GLU 98 109*   CA 9.0 9.1       No results for input(s): \"PT\", \"INR\", \"PTT\", \"FIB\" in the last 168 hours.    Imaging:    Chest x-ray reviewed.  Stable pleural effusion on the right side.    Impression & Plan:     64-year-old female with thyroid cancer s/p thyroidectomy followed by radioactive iodine and levothyroxine suppression with biopsy-proven recurrence including a pulmonary parenchyma and pleural nodules presenting with recurrent pleural effusion.  Patient had a negative BRIDGES uptake scan suggesting dedifferentiated thyroid cancer.    Right-sided malignant pleural effusion.   From metastatic thyroid carcinoma.  Patient had Pleurx catheter placed on the right chest.  Patient had good drainage from her catheter.  Her shortness of breath is improved.  Home care management to be arranged.      Metastatic thyroid cancer  Patient to start on lenvatinib after discharge.    Follow up with Dr. Chen in clinic.     Terri Mendoza MD  Hematology/Medical Oncology

## 2024-09-14 NOTE — PROGRESS NOTES
Pulmonary Medicine Inpatient Progress Note                 Subjective:  S/p right pleurx catheter yesterday  On RA  Still has chest and back pain       ALLERGIES:  Allergies   Allergen Reactions    Latex HIVES    Peanut-Containing Drug Products SWELLING     Closes Throat  Closes Throat  Closes Throat      Peanuts SWELLING     Closes Throat    Adhesive Tape OTHER (SEE COMMENTS)    Seasonal         MEDS:  Home Medications:  Outpatient Medications Marked as Taking for the 24 encounter (Hospital Encounter)   Medication Sig Dispense Refill    levothyroxine 150 MCG Oral Tab Take 1 tablet (150 mcg total) by mouth before breakfast.      ondansetron (ZOFRAN) 8 MG tablet Take 1 tablet (8 mg total) by mouth every 8 (eight) hours as needed for Nausea. 30 tablet 3    HYDROcodone-acetaminophen 5-325 MG Oral Tab Take 1-2 tablets by mouth every 6 (six) hours as needed for Pain. 60 tablet 0    ketorolac 0.5 % Ophthalmic Solution Place 1 drop into the left eye 4 (four) times daily.      prednisoLONE 1 % Ophthalmic Suspension Place 1 drop into the left eye 3 (three) times daily.      [] dicyclomine 20 MG Oral Tab Take 1 tablet (20 mg total) by mouth 4 (four) times daily as needed. 30 tablet 0    Olmesartan Medoxomil 40 MG Oral Tab Take 1 tablet (40 mg total) by mouth daily.      Omeprazole 40 MG Oral Capsule Delayed Release Take 1 capsule (40 mg total) by mouth every other day.      amLODIPine 10 MG Oral Tab Take 1 tablet (10 mg total) by mouth daily.      clonazePAM 2 MG Oral Tab Take 0.5 tablets (1 mg total) by mouth nightly.       Scheduled Medication:   enoxaparin  40 mg Subcutaneous Q24H    amLODIPine  10 mg Oral Daily    clonazePAM  2 mg Oral Nightly    levothyroxine  150 mcg Oral Before breakfast    losartan  100 mg Oral Daily    pantoprazole  40 mg Oral QAM AC    sertraline  50 mg Oral Daily     Continuous Infusing Medication:    PRN Medications:    acetaminophen    ondansetron    prochlorperazine    temazepam     morphINE **OR** morphINE **OR** morphINE    acetaminophen **OR** HYDROcodone-acetaminophen **OR** HYDROcodone-acetaminophen    dicyclomine       PHYSICAL EXAM:  /66 (BP Location: Right arm)   Pulse 68   Temp 97.5 °F (36.4 °C) (Oral)   Resp 16   Wt 170 lb 9.6 oz (77.4 kg)   SpO2 95%   BMI 31.20 kg/m²   CONSTITUTIONAL: alert, oriented, no apparent distress  HEENT: atraumatic normocephalic  MOUTH: mucous membranes are moist. No OP exudates  NECK/THROAT: no JVD. Trachea midline. No obvious thyromegaly  LUNG: clear upper with diminished right base, no wheezing, crackles. Chest symmetric with respiratory motion  HEART: regular rate and rhythm, no obvious murmers or gallops note  ABD: soft non tender. + bowel sounds. No organomegaly noted  EXT: no clubbing, cyanosis, or edema noted. Pulses intact grossly  NEURO/MUSCULOSKELETAL: no gross deficits  SKIN: warm, dry. No obvious lesions noted  LYMPH: no obvious LAD  + right anterior pleurex catheter       IMAGES:  CXR today  CONCLUSION:   Large right pleural effusion with right lower lobe atelectasis or pneumonia has increased in size since 9/13/2024.   Small left lower lobe pleural effusion with left lower lobe atelectasis or pneumonia is new.   Enlarged cardiomediastinal silhouette, unchanged.       LABS:  Recent Labs   Lab 09/13/24  1319 09/14/24  0722   RBC 4.05 3.77*   HGB 12.2 11.2*   HCT 36.0 36.0   MCV 88.9 95.5   MCH 30.1 29.7   MCHC 33.9 31.1   RDW 14.3 14.2   NEPRELIM 7.60 6.13   WBC 12.3* 9.7   .0 241.0       Recent Labs   Lab 09/13/24  1319 09/14/24  0722   GLU 98 109*   BUN 16 18   CREATSERUM 0.96 0.90   EGFRCR 66 72   CA 9.0 9.1    137   K 4.1 4.6    108   CO2 23.0 24.0       ASSESSMENT/PLAN:  1.Recurrent malignant right pleural effusion  -effusion re-occurred within 2 weeks and is malignant  -pleurex catheter will provide the best benefit at this time  -IR contacted and placed right pleurex catheter on 9/13/24  -recommend drain  today up to 1.5 liter. Should be drained every 3-4 days  -messaged Dr. Chen, Dr. Sands, Dr. Asher, and Dr. Marie to update them  -pain control    Can discharge home once pleurex catheter teaching is done and pt feels pain is controlled    Will follow       Thank you for the opportunity to care for Camelia MarkhamMaverick Brooks DO, MPH  Pulmonary Critical Care Medicine  [facility]

## 2024-09-14 NOTE — CM/SW NOTE
RAND followed up on DC planning.     RAND discussed with the pt regarding DC plan and HHC services    Pt is aware and agreeable to HHC services with [facility]    [facility] can do a SOC on Tuesday the earliest. Pt is suppose to drain every 3-4 days per MD notes and order.     RAND discussed with RN who is aware to send pt home with some Plurex drain kits to supplement pt in the mean time.     Form for drain completed and sent to RN to put on the chart for pulmonology to sign    PLAN: DC home with [facility] with the plurex drain once medically cleared    Shelia QUEZADA LSW, MSW ext. [phone]

## 2024-09-14 NOTE — DIETARY NOTE
NUTRITION NOTE:    9/14/24: Consult for : \"navigating diet with medication specific restrictions\". Meds reviewed. Pt is receiving 2 med rx that require specific instructions attached to them and reviewed guidelines with RN today.   Levothyroxine and Protonix are held daily before breakfast. CPM.     Rebekah Ha RD, LDN, [facility] ([id number])

## 2024-09-15 LAB
ALBUMIN SERPL-MCNC: 3.9 G/DL (ref 3.2–4.8)
ANION GAP SERPL CALC-SCNC: 5 MMOL/L (ref 0–18)
BASOPHILS # BLD AUTO: 0.08 X10(3) UL (ref 0–0.2)
BASOPHILS NFR BLD AUTO: 0.6 %
BUN BLD-MCNC: 18 MG/DL (ref 9–23)
BUN/CREAT SERPL: 18 (ref 10–20)
CALCIUM BLD-MCNC: 9.2 MG/DL (ref 8.7–10.4)
CHLORIDE SERPL-SCNC: 104 MMOL/L (ref 98–112)
CO2 SERPL-SCNC: 27 MMOL/L (ref 21–32)
CREAT BLD-MCNC: 1 MG/DL
DEPRECATED RDW RBC AUTO: 48 FL (ref 35.1–46.3)
EGFRCR SERPLBLD CKD-EPI 2021: 63 ML/MIN/1.73M2 (ref 60–?)
EOSINOPHIL # BLD AUTO: 0.41 X10(3) UL (ref 0–0.7)
EOSINOPHIL NFR BLD AUTO: 3.3 %
ERYTHROCYTE [DISTWIDTH] IN BLOOD BY AUTOMATED COUNT: 14 % (ref 11–15)
GLUCOSE BLD-MCNC: 113 MG/DL (ref 70–99)
GLUCOSE BLDC GLUCOMTR-MCNC: 107 MG/DL (ref 70–99)
GLUCOSE BLDC GLUCOMTR-MCNC: 117 MG/DL (ref 70–99)
GLUCOSE BLDC GLUCOMTR-MCNC: 118 MG/DL (ref 70–99)
GLUCOSE BLDC GLUCOMTR-MCNC: 94 MG/DL (ref 70–99)
HCT VFR BLD AUTO: 36.5 %
HGB BLD-MCNC: 11.4 G/DL
IMM GRANULOCYTES # BLD AUTO: 0.03 X10(3) UL (ref 0–1)
IMM GRANULOCYTES NFR BLD: 0.2 %
LYMPHOCYTES # BLD AUTO: 2.96 X10(3) UL (ref 1–4)
LYMPHOCYTES NFR BLD AUTO: 23.7 %
MAGNESIUM SERPL-MCNC: 2.3 MG/DL (ref 1.6–2.6)
MCH RBC QN AUTO: 29.4 PG (ref 26–34)
MCHC RBC AUTO-ENTMCNC: 31.2 G/DL (ref 31–37)
MCV RBC AUTO: 94.1 FL
MONOCYTES # BLD AUTO: 0.81 X10(3) UL (ref 0.1–1)
MONOCYTES NFR BLD AUTO: 6.5 %
NEUTROPHILS # BLD AUTO: 8.19 X10 (3) UL (ref 1.5–7.7)
NEUTROPHILS # BLD AUTO: 8.19 X10(3) UL (ref 1.5–7.7)
NEUTROPHILS NFR BLD AUTO: 65.7 %
OSMOLALITY SERPL CALC.SUM OF ELEC: 285 MOSM/KG (ref 275–295)
PHOSPHATE SERPL-MCNC: 4.6 MG/DL (ref 2.4–5.1)
PLATELET # BLD AUTO: 263 10(3)UL (ref 150–450)
POTASSIUM SERPL-SCNC: 4.5 MMOL/L (ref 3.5–5.1)
RBC # BLD AUTO: 3.88 X10(6)UL
SODIUM SERPL-SCNC: 136 MMOL/L (ref 136–145)
WBC # BLD AUTO: 12.5 X10(3) UL (ref 4–11)

## 2024-09-15 PROCEDURE — 99233 SBSQ HOSP IP/OBS HIGH 50: CPT | Performed by: HOSPITALIST

## 2024-09-15 PROCEDURE — 99233 SBSQ HOSP IP/OBS HIGH 50: CPT | Performed by: INTERNAL MEDICINE

## 2024-09-15 RX ORDER — ESCITALOPRAM OXALATE 10 MG/1
10 TABLET ORAL EVERY MORNING
Status: DISCONTINUED | OUTPATIENT
Start: 2024-09-15 | End: 2024-09-16

## 2024-09-15 NOTE — PLAN OF CARE
Patient is alert & oriented x4. On 2L NC for comfort. Vitals stable. Pain being managed with norco prn. Tolerating diet. Voiding freely. Senna & miralax added for constipation. Up x1 & walker. Will continue to monitor.      Problem: Patient Centered Care  Goal: Patient preferences are identified and integrated in the patient's plan of care  Description: Interventions:  - What would you like us to know as we care for you?   - Provide timely, complete, and accurate information to patient/family  - Incorporate patient and family knowledge, values, beliefs, and cultural backgrounds into the planning and delivery of care  - Encourage patient/family to participate in care and decision-making at the level they choose  - Honor patient and family perspectives and choices  Outcome: Progressing     Problem: Patient/Family Goals  Goal: Patient/Family Long Term Goal  Description: Patient's Long Term Goal: return home, start new chemotherapy    Interventions:  - discharge planning  - pleurex drain care  - See additional Care Plan goals for specific interventions  Outcome: Progressing  Goal: Patient/Family Short Term Goal  Description: Patient's Short Term Goal: less pain, easier breathing    Interventions:   - maintain pleurex, drain as ordered  - oxygen as needed  - pain management, norco or morphine   - See additional Care Plan goals for specific interventions  Outcome: Progressing     Problem: Diabetes/Glucose Control  Goal: Glucose maintained within prescribed range  Description: INTERVENTIONS:  - Monitor Blood Glucose as ordered  - Assess for signs and symptoms of hyperglycemia and hypoglycemia  - Administer ordered medications to maintain glucose within target range  - Assess barriers to adequate nutritional intake and initiate nutrition consult as needed  - Instruct patient on self management of diabetes  Outcome: Progressing     Problem: RESPIRATORY - ADULT  Goal: Achieves optimal ventilation and oxygenation  Description:  INTERVENTIONS:  - Assess for changes in respiratory status  - Assess for changes in mentation and behavior  - Position to facilitate oxygenation and minimize respiratory effort  - Oxygen supplementation based on oxygen saturation or ABGs  - Provide Smoking Cessation handout, if applicable  - Encourage broncho-pulmonary hygiene including cough, deep breathe, Incentive Spirometry  - Assess the need for suctioning and perform as needed  - Assess and instruct to report SOB or any respiratory difficulty  - Respiratory Therapy support as indicated  - Manage/alleviate anxiety  - Monitor for signs/symptoms of CO2 retention  Outcome: Progressing

## 2024-09-15 NOTE — PROGRESS NOTES
[facility]  part of [facility]    Progress Note    Camelia Markham Patient Status:  Inpatient    1960 MRN [id number]   Location [facility] 4W/SW/SE Attending Feroz Henderson MD   Hosp Day # 2 PCP FINA MABRY, MD WENDY       Subjective:   Camelia Markham is a(n) 64 year old female was seen and examined  Lying in bed, mild distress  Tearful   No acute sob  Having some pleuritic cp  No f,c,n,v abd pain or HA     Objective:   Blood pressure 119/66, pulse 78, temperature 97.8 °F (36.6 °C), temperature source Oral, resp. rate 16, weight 170 lb 9.6 oz (77.4 kg), SpO2 91%.    GENERAL:  NAD  SKIN:  Warm and hydrated  PSYCHIATRIC: Tearful, anxious    HEENT:  Head was atraumatic and normocephalic.  Eyes: Sclera was anicteric.  Pupils were equal.  Ears:  There were no lesions.  Nose:  No lesions were noted.      NECK:  Supple.  There was no JVD.    CHEST:  R chest pleurex  CARDIAC: S1 S2+, RRR  LUNGS: decreased air exchange in R lung fields  ABDOMEN: Non-distended, non-tender, BS+  MUSCULOSKELETAL:  There was no deformity.  There was full range of motion in all the extremities.    EXTREMITIES: There was no edema  NEUROLOGIC: Cranial nerves II-XII intact, no focal defecits    Current Inpatient Medications:     Current Facility-Administered Medications:     sennosides (Senokot) tab 8.6 mg, 8.6 mg, Oral, BID    polyethylene glycol (PEG 3350) (Miralax) 17 g oral packet 17 g, 17 g, Oral, Daily PRN    enoxaparin (Lovenox) 40 MG/0.4ML SUBQ injection 40 mg, 40 mg, Subcutaneous, Q24H    acetaminophen (Tylenol Extra Strength) tab 500 mg, 500 mg, Oral, Q4H PRN    ondansetron (Zofran) 4 MG/2ML injection 4 mg, 4 mg, Intravenous, Q6H PRN    prochlorperazine (Compazine) 10 MG/2ML injection 5 mg, 5 mg, Intravenous, Q8H PRN    temazepam (Restoril) cap 15 mg, 15 mg, Oral, Nightly PRN    morphINE PF 2 MG/ML injection 1 mg, 1 mg, Intravenous, Q2H PRN **OR** morphINE PF 2 MG/ML injection 2 mg, 2 mg,  Intravenous, Q2H PRN **OR** morphINE PF 4 MG/ML injection 4 mg, 4 mg, Intravenous, Q2H PRN    acetaminophen (Tylenol) tab 650 mg, 650 mg, Oral, Q4H PRN **OR** HYDROcodone-acetaminophen (Norco) 5-325 MG per tab 1 tablet, 1 tablet, Oral, Q4H PRN **OR** HYDROcodone-acetaminophen (Norco) 5-325 MG per tab 2 tablet, 2 tablet, Oral, Q4H PRN    amLODIPine (Norvasc) tab 10 mg, 10 mg, Oral, Daily    clonazePAM (KlonoPIN) tab 2 mg, 2 mg, Oral, Nightly    dicyclomine (Bentyl) tab 20 mg, 20 mg, Oral, QID PRN    levothyroxine (Synthroid) tab 150 mcg, 150 mcg, Oral, Before breakfast    losartan (Cozaar) tab 100 mg, 100 mg, Oral, Daily    pantoprazole (Protonix) DR tab 40 mg, 40 mg, Oral, QAM AC    sertraline (Zoloft) tab 50 mg, 50 mg, Oral, Daily    Assessment and Plan:   R Malignant pleural effusion  Due to metastatic thyroid CA  IR has placed CT in R chest  Repeat cxr reviewed  650 ml drained yesterday  Oncology and pulm following     Anxiety  Depression  Pt no longer taking sertraline  Will start lexapro  Will consult palliative care in am to further manage uncontrolled symptoms    Other issues  Papillary thyroid CA s/p total thyroidectomy in 2010  HL  HTN  GERD  Hypothyroidism    DVT Ppx: lovenox  Full code    MDM: High         Results:     Recent Labs   Lab 09/13/24  1319 09/14/24  0722 09/15/24  0624   RBC 4.05 3.77* 3.88   HGB 12.2 11.2* 11.4*   HCT 36.0 36.0 36.5   MCV 88.9 95.5 94.1   MCH 30.1 29.7 29.4   MCHC 33.9 31.1 31.2   RDW 14.3 14.2 14.0   NEPRELIM 7.60 6.13 8.19*   WBC 12.3* 9.7 12.5*   .0 241.0 263.0         Recent Labs   Lab 09/13/24  1319 09/14/24  0722 09/15/24  0624   GLU 98 109* 113*   BUN 16 18 18   CREATSERUM 0.96 0.90 1.00   EGFRCR 66 72 63   CA 9.0 9.1 9.2    137 136   K 4.1 4.6 4.5    108 104   CO2 23.0 24.0 27.0         Imaging:   XR CHEST PA + LAT CHEST (CPT=71046)    Result Date: 9/14/2024  CONCLUSION:   Large right pleural effusion with right lower lobe atelectasis or pneumonia  has increased in size since 9/13/2024.  Small left lower lobe pleural effusion with left lower lobe atelectasis or pneumonia is new.  Enlarged cardiomediastinal silhouette, unchanged.      Dictated by (CST): Klaus Mandel MD on 9/14/2024 at 1:37 PM     Finalized by (CST): Klaus Mandel MD on 9/14/2024 at 1:39 PM          XR CHEST PA + LAT CHEST (CPT=71046)    Result Date: 9/13/2024  CONCLUSION:  1. Large right pleural effusion coursing FPC up the right hemithorax increased from previous study of 8/29/2024.  I cannot exclude right basal pneumonia/atelectasis/mass.  Left lung field is clear.  Correlate clinically.    Dictated by (CST): Nicolás Caicedo MD on 9/13/2024 at 12:21 PM     Finalized by (CST): Nicolás Caicedo MD on 9/13/2024 at 12:25 PM         EKG 12 Lead    Result Date: 9/14/2024  Normal sinus rhythm Normal ECG No previous ECGs found in [facility] Confirmed by DO Barrios Asif ([id number]) on 9/14/2024 8:26:27 AM       Feroz Henderson MD  9/15/2024

## 2024-09-15 NOTE — SPIRITUAL CARE NOTE
Spiritual Care Visit Note    Patient Name: Camelia Markham Date of Spiritual Care Visit: 24   : 1960 Primary Dx: Pleural effusion       Referred By: Referral From: Nurse    Spiritual Care Taxonomy:    Intended Effects: Demonstrate caring and concern    Methods: Accompany someone in their spiritual/Episcopal practice outside your shadi tradition;Collaborate with care team member;Offer spiritual/Episcopal support    Interventions: Acknowledge response to difficult experience;Prayer for healing    Visit Type/Summary:     - Spiritual Care: Responded to a request via the on call phone Consulted with RN prior to visit. Offered empathic listening and emotional support. Provided support for Patient's spiritual/Episcopal requests. Coordinated Jew Communion and verified NPO status. Offered prayer.    Spiritual Care support can be requested via an Epic consult. For urgent/immediate needs, please contact the On Call  at: [place]: ext [phone]    Rev. Felicia Rascon MDiv

## 2024-09-15 NOTE — PROGRESS NOTES
Pulmonary Medicine Inpatient Progress Note                 Subjective:  S/p right pleurx catheter on 24.  Had 650ml drained yesterday.  On RA to 2 LNC       ALLERGIES:  Allergies   Allergen Reactions    Latex HIVES    Peanut-Containing Drug Products SWELLING     Closes Throat  Closes Throat  Closes Throat      Peanuts SWELLING     Closes Throat    Adhesive Tape OTHER (SEE COMMENTS)    Seasonal         MEDS:  Home Medications:  Outpatient Medications Marked as Taking for the 24 encounter (Hospital Encounter)   Medication Sig Dispense Refill    levothyroxine 150 MCG Oral Tab Take 1 tablet (150 mcg total) by mouth before breakfast.      ondansetron (ZOFRAN) 8 MG tablet Take 1 tablet (8 mg total) by mouth every 8 (eight) hours as needed for Nausea. 30 tablet 3    HYDROcodone-acetaminophen 5-325 MG Oral Tab Take 1-2 tablets by mouth every 6 (six) hours as needed for Pain. 60 tablet 0    ketorolac 0.5 % Ophthalmic Solution Place 1 drop into the left eye 4 (four) times daily.      prednisoLONE 1 % Ophthalmic Suspension Place 1 drop into the left eye 3 (three) times daily.      [] dicyclomine 20 MG Oral Tab Take 1 tablet (20 mg total) by mouth 4 (four) times daily as needed. 30 tablet 0    Olmesartan Medoxomil 40 MG Oral Tab Take 1 tablet (40 mg total) by mouth daily.      Omeprazole 40 MG Oral Capsule Delayed Release Take 1 capsule (40 mg total) by mouth every other day.      amLODIPine 10 MG Oral Tab Take 1 tablet (10 mg total) by mouth daily.      clonazePAM 2 MG Oral Tab Take 0.5 tablets (1 mg total) by mouth nightly.       Scheduled Medication:   sennosides  8.6 mg Oral BID    enoxaparin  40 mg Subcutaneous Q24H    amLODIPine  10 mg Oral Daily    clonazePAM  2 mg Oral Nightly    levothyroxine  150 mcg Oral Before breakfast    losartan  100 mg Oral Daily    pantoprazole  40 mg Oral QAM AC    sertraline  50 mg Oral Daily     Continuous Infusing Medication:    PRN Medications:    polyethylene glycol (PEG  6759)    acetaminophen    ondansetron    prochlorperazine    temazepam    morphINE **OR** morphINE **OR** morphINE    acetaminophen **OR** HYDROcodone-acetaminophen **OR** HYDROcodone-acetaminophen    dicyclomine       PHYSICAL EXAM:  /68 (BP Location: Right arm)   Pulse 78   Temp 97.8 °F (36.6 °C) (Oral)   Resp 16   Wt 170 lb 9.6 oz (77.4 kg)   SpO2 94%   BMI 31.20 kg/m²   CONSTITUTIONAL: alert, oriented, no apparent distress  HEENT: atraumatic normocephalic  MOUTH: mucous membranes are moist. No OP exudates  NECK/THROAT: no JVD. Trachea midline. No obvious thyromegaly  LUNG: clear upper with crackles at right base, no wheezing. Chest symmetric with respiratory motion  HEART: regular rate and rhythm, no obvious murmers or gallops note  ABD: soft non tender. + bowel sounds. No organomegaly noted  EXT: no clubbing, cyanosis, or edema noted. Pulses intact grossly  NEURO/MUSCULOSKELETAL: no gross deficits  SKIN: warm, dry. No obvious lesions noted  LYMPH: no obvious LAD  + right anterior pleurex catheter       IMAGES:  CXR today  CONCLUSION:   Large right pleural effusion with right lower lobe atelectasis or pneumonia has increased in size since 9/13/2024.   Small left lower lobe pleural effusion with left lower lobe atelectasis or pneumonia is new.   Enlarged cardiomediastinal silhouette, unchanged.       LABS:  Recent Labs   Lab 09/13/24  1319 09/14/24  0722 09/15/24  0624   RBC 4.05 3.77* 3.88   HGB 12.2 11.2* 11.4*   HCT 36.0 36.0 36.5   MCV 88.9 95.5 94.1   MCH 30.1 29.7 29.4   MCHC 33.9 31.1 31.2   RDW 14.3 14.2 14.0   NEPRELIM 7.60 6.13 8.19*   WBC 12.3* 9.7 12.5*   .0 241.0 263.0       Recent Labs   Lab 09/13/24  1319 09/14/24  0722 09/15/24  0624   GLU 98 109* 113*   BUN 16 18 18   CREATSERUM 0.96 0.90 1.00   EGFRCR 66 72 63   CA 9.0 9.1 9.2   ALB  --   --  3.9    137 136   K 4.1 4.6 4.5    108 104   CO2 23.0 24.0 27.0       ASSESSMENT/PLAN:  1.Recurrent malignant right pleural  effusion  -effusion re-occurred within 2 weeks and is malignant  -pleurex catheter will provide the best benefit at this time  -IR contacted and placed right pleurex catheter on 9/13/24  -recommend drain up to 1.5 liter. Should be drained every 3-4 days. RN communication ordered  -messaged Dr. Chen, Dr. Sands, Dr. Asher, and Dr. Marie to update them  -pain control-recommend palliative consult for pain control and symptom control  -PT/OT    Can discharge home once pleurex catheter teaching is done, home health is set up, and pt feels pain is controlled.    Will follow       Thank you for the opportunity to care for Camelia Markham.     ALDA Brooks DO, MPH  Pulmonary Critical Care Medicine  [facility]

## 2024-09-16 PROBLEM — Z51.5 PALLIATIVE CARE BY SPECIALIST: Status: ACTIVE | Noted: 2024-09-16

## 2024-09-16 PROBLEM — Z71.89 GOALS OF CARE, COUNSELING/DISCUSSION: Status: ACTIVE | Noted: 2024-09-16

## 2024-09-16 PROBLEM — C73 THYROID CARCINOMA (HCC): Status: ACTIVE | Noted: 2024-09-16

## 2024-09-16 PROBLEM — G89.3 CANCER RELATED PAIN: Status: ACTIVE | Noted: 2024-09-16

## 2024-09-16 PROBLEM — F41.9 ANXIETY: Status: ACTIVE | Noted: 2024-09-16

## 2024-09-16 PROBLEM — Z71.89 ADVANCE CARE PLANNING: Status: ACTIVE | Noted: 2024-09-16

## 2024-09-16 LAB
ALBUMIN SERPL-MCNC: 3.8 G/DL (ref 3.2–4.8)
ALBUMIN/GLOB SERPL: 1.1 {RATIO} (ref 1–2)
ALP LIVER SERPL-CCNC: 108 U/L
ALT SERPL-CCNC: 8 U/L
ANION GAP SERPL CALC-SCNC: 6 MMOL/L (ref 0–18)
AST SERPL-CCNC: 12 U/L (ref ?–34)
BASOPHILS # BLD AUTO: 0.06 X10(3) UL (ref 0–0.2)
BASOPHILS NFR BLD AUTO: 0.5 %
BILIRUB SERPL-MCNC: 1.1 MG/DL (ref 0.2–1.1)
BUN BLD-MCNC: 18 MG/DL (ref 9–23)
BUN/CREAT SERPL: 19.8 (ref 10–20)
CALCIUM BLD-MCNC: 9 MG/DL (ref 8.7–10.4)
CHLORIDE SERPL-SCNC: 104 MMOL/L (ref 98–112)
CO2 SERPL-SCNC: 26 MMOL/L (ref 21–32)
CREAT BLD-MCNC: 0.91 MG/DL
DEPRECATED RDW RBC AUTO: 46.8 FL (ref 35.1–46.3)
EGFRCR SERPLBLD CKD-EPI 2021: 71 ML/MIN/1.73M2 (ref 60–?)
EOSINOPHIL # BLD AUTO: 0.34 X10(3) UL (ref 0–0.7)
EOSINOPHIL NFR BLD AUTO: 2.7 %
ERYTHROCYTE [DISTWIDTH] IN BLOOD BY AUTOMATED COUNT: 13.7 % (ref 11–15)
GLOBULIN PLAS-MCNC: 3.5 G/DL (ref 2–3.5)
GLUCOSE BLD-MCNC: 101 MG/DL (ref 70–99)
GLUCOSE BLDC GLUCOMTR-MCNC: 101 MG/DL (ref 70–99)
GLUCOSE BLDC GLUCOMTR-MCNC: 107 MG/DL (ref 70–99)
HCT VFR BLD AUTO: 34.1 %
HGB BLD-MCNC: 11 G/DL
IMM GRANULOCYTES # BLD AUTO: 0.03 X10(3) UL (ref 0–1)
IMM GRANULOCYTES NFR BLD: 0.2 %
LYMPHOCYTES # BLD AUTO: 2.72 X10(3) UL (ref 1–4)
LYMPHOCYTES NFR BLD AUTO: 21.4 %
MCH RBC QN AUTO: 29.8 PG (ref 26–34)
MCHC RBC AUTO-ENTMCNC: 32.3 G/DL (ref 31–37)
MCV RBC AUTO: 92.4 FL
MONOCYTES # BLD AUTO: 0.69 X10(3) UL (ref 0.1–1)
MONOCYTES NFR BLD AUTO: 5.4 %
NEUTROPHILS # BLD AUTO: 8.85 X10 (3) UL (ref 1.5–7.7)
NEUTROPHILS # BLD AUTO: 8.85 X10(3) UL (ref 1.5–7.7)
NEUTROPHILS NFR BLD AUTO: 69.8 %
OSMOLALITY SERPL CALC.SUM OF ELEC: 284 MOSM/KG (ref 275–295)
PLATELET # BLD AUTO: 253 10(3)UL (ref 150–450)
POTASSIUM SERPL-SCNC: 4.2 MMOL/L (ref 3.5–5.1)
PROT SERPL-MCNC: 7.3 G/DL (ref 5.7–8.2)
RBC # BLD AUTO: 3.69 X10(6)UL
SODIUM SERPL-SCNC: 136 MMOL/L (ref 136–145)
WBC # BLD AUTO: 12.7 X10(3) UL (ref 4–11)

## 2024-09-16 PROCEDURE — 99254 IP/OBS CNSLTJ NEW/EST MOD 60: CPT | Performed by: REGISTERED NURSE

## 2024-09-16 PROCEDURE — 99233 SBSQ HOSP IP/OBS HIGH 50: CPT | Performed by: HOSPITALIST

## 2024-09-16 PROCEDURE — 99232 SBSQ HOSP IP/OBS MODERATE 35: CPT | Performed by: INTERNAL MEDICINE

## 2024-09-16 PROCEDURE — 99233 SBSQ HOSP IP/OBS HIGH 50: CPT | Performed by: INTERNAL MEDICINE

## 2024-09-16 RX ORDER — ESCITALOPRAM OXALATE 10 MG/1
10 TABLET ORAL EVERY EVENING
Status: DISCONTINUED | OUTPATIENT
Start: 2024-09-16 | End: 2024-09-17

## 2024-09-16 NOTE — PAYOR COMM NOTE
--------------  ADMISSION REVIEW   9/13-9/16  Payor: FLORINDA RODRIGUEZ  Subscriber #:  [id number]  Authorization Number: [id number]    Admit date: 9/13/24  Admit time:  4:12 PM       REVIEW DOCUMENTATION:    ED Provider Notes signed by Odin Estrada MD at 9/13/2024  2:58 PM        Patient Seen in: [facility] Emergency Department    History     Chief Complaint   Patient presents with    Difficulty Breathing       HPI    History is provided by patient/independent historian: patient  64 year old female with history of thyroid cancer with mets to the lungs status post recent thoracentesis, here with complaints of worsening difficulty breathing over the last few weeks.  She went today to the cancer center to start chemo, but was noted to have shortness of breath.  She had an x-ray that showed a large pleural effusion and was sent to the emergency department for further evaluation.  No fevers.  She does have a cough.  No leg swelling.    History reviewed.   Past Medical History:    Anxiety state, unspecified    Cancer (HCC)    Colon adenomas    x3    Disorder of thyroid    thyroidectomy    Diverticulosis of large intestine    Esophageal reflux    Exposure to medical therapeutic radiation    thyroid    High blood pressure    High cholesterol    Hypercholesteremia    Hypothyroidism    Osteoarthrosis, unspecified whether generalized or localized, unspecified site    Thyroid cancer (HCC)    papillary thyroid; s/p surgery resection with Asher and BRIDGES    Unspecified essential hypertension   ROS  Review of Systems   Respiratory:  Positive for cough and shortness of breath.    Cardiovascular:  Negative for chest pain.   All other systems reviewed and are negative.     All other pertinent organ systems are reviewed and are negative.      Physical Exam     ED Triage Vitals [09/13/24 1252]   /84   Pulse 93   Resp 22   Temp 98.1 °F (36.7 °C)   Temp src    SpO2 95 %   O2 Device None (Room air)     Vital signs  reviewed.      Physical Exam  Vitals and nursing note reviewed.   Cardiovascular:      Pulses: Normal pulses.   Pulmonary:      Effort: No respiratory distress.      Breath sounds: Examination of the right-middle field reveals decreased breath sounds. Examination of the right-lower field reveals decreased breath sounds. Decreased breath sounds present.      Comments: Tachypneic  Abdominal:      General: There is no distension.   Neurological:      Mental Status: She is alert.     Labs Reviewed   CBC WITH DIFFERENTIAL WITH PLATELET - Abnormal; Notable for the following components:       Result Value    WBC 12.3 (*)     All other components within normal limits   BASIC METABOLIC PANEL (8) - Normal   RAINBOW DRAW LAVENDER   RAINBOW DRAW LIGHT GREEN   RAINBOW DRAW BLUE     My EKG Interpretation: EKG    Rate, intervals and axes as noted on EKG Report.  Rate: 93  Rhythm: Sinus Rhythm  Reading: normal for rate, normal for rhythm, no acute ST changes           As reviewed and Interpreted by me      Imaging Results Available and Reviewed while in ED:   XR CHEST PA + LAT CHEST (CPT=71046)    Result Date: 9/13/2024  CONCLUSION:  1. Large right pleural effusion coursing [facility] up the right hemithorax increased from previous study of 8/29/2024.  I cannot exclude right basal pneumonia/atelectasis/mass.  Left lung field is clear.  Correlate clinically.    Dictated by (CST): Nicolás Caicedo MD on 9/13/2024 at 12:21 PM     Finalized by (CST): Nicolás Caicedo MD on 9/13/2024 at 12:25 PM         My review and independent interpretation of XR images: R pleural effusion. Radiology report corroborates this in addition to other details as reported by them.      Decision rules/scores evaluated: none      Diagnostic labs/tests considered but not ordered: CRP    ED Medications Administered:   Medications   lidocaine (Xylocaine) 2 % injection (has no administration in time range)       Differential Diagnosis: After obtaining the patient's  history, performing the physical exam and reviewing the diagnostics, multiple initial diagnoses were considered based on the presenting problem including pneumothorax, hemothorax, pleural effusion, empyema    External document review: I personally reviewed available external medical records for any recent pertinent discharge summaries, testing, and procedures - the findings are as follows: today visit with BEBE Gordon for cough    Complicating Factors: The patient already  has a past medical history of Anxiety state, unspecified, Cancer (HCC) (2024), Colon adenomas (07/29/2024), Disorder of thyroid, Diverticulosis of large intestine, Esophageal reflux, Exposure to medical therapeutic radiation (01/01/2011), High blood pressure, High cholesterol, Hypercholesteremia (01/01/2005), Hypothyroidism, Osteoarthrosis, unspecified whether generalized or localized, unspecified site, Thyroid cancer (HCC) (01/01/2011), and Unspecified essential hypertension. to contribute to the complexity of this ED evaluation.    Procedures performed: none    Discussed management with physician/appropriate source: Dr. Brooks, Dr. Campos, Eliza SPENCE for IR, Dr. Chen    Considered admission/deescalation of care for: pleural effusion    Social determinants of health affecting patient care: none    Prescription medications considered: discussed continuing current medication regimen    The patient requires continuous monitoring for: shortness of breath    Shared decision making: discussed possible admission    Disposition and Plan     Clinical Impression:  1. Pleural effusion        Disposition:  Admit  Hospital Problems       Present on Admission  Date Reviewed: 9/13/2024            ICD-10-CM Noted POA    * (Principal) Pleural effusion J90 8/27/2024 Unknown              9/13  HISTORY AND PHYSICAL EXAMINATION     CHIEF COMPLAINT:  Right-sided malignant pleural effusion, shortness of breath, and chest pain.     HISTORY OF PRESENT ILLNESS:  Patient  is a 64-year-old  female who was recently diagnosed with recurrent metastatic thyroid papillary carcinoma.  She had thoracentesis of her right pleural effusion on August 29, and 1 L was removed which was positive for malignancy.  Today, came to the emergency department for recurrent shortness of breath and right-sided chest discomfort.  She had an x-ray done today as an outpatient which showed large right pleural effusion FCI in the right hemithorax, increased from previous study.  Patient was evaluated by Pulmonology, and she will be taken to interventional radiology suite for right PleurX catheter placement.     PAST MEDICAL HISTORY:  Patient initially had papillary thyroid carcinoma, status post total thyroidectomy in 2010.  Recently, she had elevation of thyroglobulin.  She had neck ultrasound which showed thyroid bed oval nodule.  Fine-needle aspiration was positive for recurrent papillary thyroid carcinoma.  She had a PET scan which showed right pleural effusion, right upper lobe lung metastatic lesion, and neck thyroid bed recurrent disease with lymphadenopathy.  She was evaluated by Hematology/Oncology and recently had right pleurocentesis as outlined above, and she is supposed to be started on lenvatinib.  Also, she has history of generalized osteoarthritis, hyperlipidemia, hypertension, gastroesophageal reflux disease, hypothyroidism, diverticulosis.      REVIEW OF SYSTEMS:  Recurrent progressive shortness of breath and right pleuritic chest pain for the last week.  She denies any fever or chills.  No abdominal pain.  Other 12-point review of systems is negative.        PHYSICAL EXAMINATION:    GENERAL:  Alert and oriented to time, place and person.  Mild to moderate distress.   VITAL SIGNS:  Temperature 98.1, pulse 88, respiratory rate 15, blood pressure 126/76, pulse ox 95% on room air.   HEENT:  Atraumatic.  Oropharynx clear.  Moist mucous membranes.  Ears and nose normal.  Eyes:  Anicteric  sclerae.   NECK:  Supple.  No lymphadenopathy.  Trachea midline.  Full range of motion.   LUNGS:  No breathing sounds auscultated on lower two-thirds of the right lung field.  Otherwise, clear to auscultation bilaterally.   HEART:  Regular rate and rhythm.  S1 and S2 auscultated.  No murmur.    ABDOMEN:  Soft, nondistended.  No tenderness.  Positive bowel sounds.   EXTREMITIES:  Trace edema, both legs.  No clubbing or cyanosis.   NEUROLOGIC:  Motor and sensory intact.       ASSESSMENT:  Right malignant pleural effusion with recurrent thyroid cancer, as outlined above.       PLAN:  Patient will be admitted to general medical floor.  PleurX catheter to be inserted by Interventional Radiology.  Postoperative DVT prophylaxis and pain control.  Pulmonary and oncology consults were notified.  Further recommendations to follow.           9/14      Subjective:   Camelia Markham is a(n) 64 year old female was seen and examined  Lying in bed, mild distress  Cough persistent  No acute sob  Having some pleuritic cp  No f,c,n,v abd pain or HA      Objective:   Blood pressure 110/67, pulse 74, temperature 97.7 °F (36.5 °C), temperature source Oral, resp. rate 18, weight 170 lb 9.6 oz (77.4 kg), SpO2 95%.     GENERAL:  The patient appeared to be in no distress and was comfortable.  SKIN:  Warm and hydrated  PSYCHIATRIC: Calm and cooperative    HEENT:  Head was atraumatic and normocephalic.  Eyes: Sclera was anicteric.  Pupils were equal.  Ears:  There were no lesions.  Nose:  No lesions were noted.      NECK:  Supple.  There was no JVD.    CHEST:  R chest pleurex  CARDIAC: S1 S2+, RRR  LUNGS: decreased air exchange in R lung fields  ABDOMEN: Non-distended, non-tender, BS+  MUSCULOSKELETAL:  There was no deformity.  There was full range of motion in all the extremities.    EXTREMITIES: There was no edema  NEUROLOGIC: Cranial nerves II-XII intact, no focal defecits     Current Inpatient Medications:     Current Hospital Medications       Current Facility-Administered Medications:     enoxaparin (Lovenox) 40 MG/0.4ML SUBQ injection 40 mg, 40 mg, Subcutaneous, Q24H    acetaminophen (Tylenol Extra Strength) tab 500 mg, 500 mg, Oral, Q4H PRN    ondansetron (Zofran) 4 MG/2ML injection 4 mg, 4 mg, Intravenous, Q6H PRN    prochlorperazine (Compazine) 10 MG/2ML injection 5 mg, 5 mg, Intravenous, Q8H PRN    temazepam (Restoril) cap 15 mg, 15 mg, Oral, Nightly PRN    morphINE PF 2 MG/ML injection 1 mg, 1 mg, Intravenous, Q2H PRN **OR** morphINE PF 2 MG/ML injection 2 mg, 2 mg, Intravenous, Q2H PRN **OR** morphINE PF 4 MG/ML injection 4 mg, 4 mg, Intravenous, Q2H PRN    acetaminophen (Tylenol) tab 650 mg, 650 mg, Oral, Q4H PRN **OR** HYDROcodone-acetaminophen (Norco) 5-325 MG per tab 1 tablet, 1 tablet, Oral, Q4H PRN **OR** HYDROcodone-acetaminophen (Norco) 5-325 MG per tab 2 tablet, 2 tablet, Oral, Q4H PRN    amLODIPine (Norvasc) tab 10 mg, 10 mg, Oral, Daily    clonazePAM (KlonoPIN) tab 2 mg, 2 mg, Oral, Nightly    dicyclomine (Bentyl) tab 20 mg, 20 mg, Oral, QID PRN    levothyroxine (Synthroid) tab 150 mcg, 150 mcg, Oral, Before breakfast    losartan (Cozaar) tab 100 mg, 100 mg, Oral, Daily    pantoprazole (Protonix) DR tab 40 mg, 40 mg, Oral, QAM AC    sertraline (Zoloft) tab 50 mg, 50 mg, Oral, Daily        Assessment and Plan:   R Malignant pleural effusion  Due t metastatic thyroid CA  IR has placed CT in R chest  Will repeat pa lateral cxr today  Oncology and pulm following      Other issues  Papillary thyroid CA s/p total thyroidectomy in 2010  HL  HTN  GERD  Hypothyroidism     DVT Ppx: lovenox  Full code     MDM: High            Results:           Recent Labs   Lab 09/13/24  1319 09/14/24  0722   RBC 4.05 3.77*   HGB 12.2 11.2*   HCT 36.0 36.0   MCV 88.9 95.5   MCH 30.1 29.7   MCHC 33.9 31.1   RDW 14.3 14.2   NEPRELIM 7.60 6.13   WBC 12.3* 9.7   .0 241.0                 Recent Labs   Lab 09/13/24  1319 09/14/24  0722   GLU 98 109*   BUN  16 18   CREATSERUM 0.96 0.90   EGFRCR 66 72   CA 9.0 9.1    137   K 4.1 4.6    108   CO2 23.0 24.0       9/14 Pulm    Subjective:  S/p right pleurx catheter yesterday  On RA  Still has chest and back pain         ASSESSMENT/PLAN:  1.Recurrent malignant right pleural effusion  -effusion re-occurred within 2 weeks and is malignant  -pleurex catheter will provide the best benefit at this time  -IR contacted and placed right pleurex catheter on 9/13/24  -recommend drain today up to 1.5 liter. Should be drained every 3-4 days  -messaged Dr. Chen, Dr. Sands, Dr. Asher, and Dr. Marie to update them  -pain control            9/15    2 PCP FINA MABRY, MD WENDY         Subjective:   Camelia Markham is a(n) 64 year old female was seen and examined  Lying in bed, mild distress  Tearful   No acute sob  Having some pleuritic cp  No f,c,n,v abd pain or HA      Objective:   Blood pressure 119/66, pulse 78, temperature 97.8 °F (36.6 °C), temperature source Oral, resp. rate 16, weight 170 lb 9.6 oz (77.4 kg), SpO2 91%.     GENERAL:  NAD  SKIN:  Warm and hydrated  PSYCHIATRIC: Tearful, anxious    HEENT:  Head was atraumatic and normocephalic.  Eyes: Sclera was anicteric.  Pupils were equal.  Ears:  There were no lesions.  Nose:  No lesions were noted.      NECK:  Supple.  There was no JVD.    CHEST:  R chest pleurex  CARDIAC: S1 S2+, RRR  LUNGS: decreased air exchange in R lung fields  ABDOMEN: Non-distended, non-tender, BS+  MUSCULOSKELETAL:  There was no deformity.  There was full range of motion in all the extremities.    EXTREMITIES: There was no edema  NEUROLOGIC: Cranial nerves II-XII intact, no focal defecits     Current Inpatient Medications:     Current Hospital Medications      Current Facility-Administered Medications:     sennosides (Senokot) tab 8.6 mg, 8.6 mg, Oral, BID    polyethylene glycol (PEG 3350) (Miralax) 17 g oral packet 17 g, 17 g, Oral, Daily PRN    enoxaparin (Lovenox) 40 MG/0.4ML SUBQ  injection 40 mg, 40 mg, Subcutaneous, Q24H    acetaminophen (Tylenol Extra Strength) tab 500 mg, 500 mg, Oral, Q4H PRN    ondansetron (Zofran) 4 MG/2ML injection 4 mg, 4 mg, Intravenous, Q6H PRN    prochlorperazine (Compazine) 10 MG/2ML injection 5 mg, 5 mg, Intravenous, Q8H PRN    temazepam (Restoril) cap 15 mg, 15 mg, Oral, Nightly PRN    morphINE PF 2 MG/ML injection 1 mg, 1 mg, Intravenous, Q2H PRN **OR** morphINE PF 2 MG/ML injection 2 mg, 2 mg, Intravenous, Q2H PRN **OR** morphINE PF 4 MG/ML injection 4 mg, 4 mg, Intravenous, Q2H PRN    acetaminophen (Tylenol) tab 650 mg, 650 mg, Oral, Q4H PRN **OR** HYDROcodone-acetaminophen (Norco) 5-325 MG per tab 1 tablet, 1 tablet, Oral, Q4H PRN **OR** HYDROcodone-acetaminophen (Norco) 5-325 MG per tab 2 tablet, 2 tablet, Oral, Q4H PRN    amLODIPine (Norvasc) tab 10 mg, 10 mg, Oral, Daily    clonazePAM (KlonoPIN) tab 2 mg, 2 mg, Oral, Nightly    dicyclomine (Bentyl) tab 20 mg, 20 mg, Oral, QID PRN    levothyroxine (Synthroid) tab 150 mcg, 150 mcg, Oral, Before breakfast    losartan (Cozaar) tab 100 mg, 100 mg, Oral, Daily    pantoprazole (Protonix) DR tab 40 mg, 40 mg, Oral, QAM AC    sertraline (Zoloft) tab 50 mg, 50 mg, Oral, Daily        Assessment and Plan:   R Malignant pleural effusion  Due to metastatic thyroid CA  IR has placed CT in R chest  Repeat cxr reviewed  650 ml drained yesterday  Oncology and pulm following      Anxiety  Depression  Pt no longer taking sertraline  Will start lexapro  Will consult palliative care in am to further manage uncontrolled symptoms     Other issues  Papillary thyroid CA s/p total thyroidectomy in 2010  HL  HTN  GERD  Hypothyroidism     DVT Ppx: lovenox  Full code     MDM: High            Results:            Recent Labs   Lab 09/13/24  1319 09/14/24  0722 09/15/24  0624   RBC 4.05 3.77* 3.88   HGB 12.2 11.2* 11.4*   HCT 36.0 36.0 36.5   MCV 88.9 95.5 94.1   MCH 30.1 29.7 29.4   MCHC 33.9 31.1 31.2   RDW 14.3 14.2 14.0   NEPRELIM  7.60 6.13 8.19*   WBC 12.3* 9.7 12.5*   .0 241.0 263.0                  Recent Labs   Lab 09/13/24  1319 09/14/24  0722 09/15/24  0624   GLU 98 109* 113*   BUN 16 18 18   CREATSERUM 0.96 0.90 1.00   EGFRCR 66 72 63   CA 9.0 9.1 9.2    137 136   K 4.1 4.6 4.5    108 104   CO2 23.0 24.0 27.0               9/16    Subjective:   Camelia Markham is a(n) 64 year old female was seen and examined  Lying in bed, NAD  Calm  No acute sob  Having some pleuritic cp  No f,c,n,v abd pain or HA      Objective:   Blood pressure 112/53, pulse 96, temperature 98.5 °F (36.9 °C), temperature source Oral, resp. rate 16, weight 170 lb 9.6 oz (77.4 kg), SpO2 91%.     GENERAL:  NAD  SKIN:  Warm and hydrated  PSYCHIATRIC: Tearful, anxious    HEENT:  Head was atraumatic and normocephalic.  Eyes: Sclera was anicteric.  Pupils were equal.  Ears:  There were no lesions.  Nose:  No lesions were noted.      NECK:  Supple.  There was no JVD.    CHEST:  R chest pleurex  CARDIAC: S1 S2+, RRR  LUNGS: decreased air exchange in R lung fields  ABDOMEN: Non-distended, non-tender, BS+  MUSCULOSKELETAL:  There was no deformity.  There was full range of motion in all the extremities.    EXTREMITIES: There was no edema  NEUROLOGIC: Cranial nerves II-XII intact, no focal defecits     Current Inpatient Medications:     Current Hospital Medications      Current Facility-Administered Medications:     escitalopram (Lexapro) tab 10 mg, 10 mg, Oral, QPM    sennosides (Senokot) tab 8.6 mg, 8.6 mg, Oral, BID    polyethylene glycol (PEG 3350) (Miralax) 17 g oral packet 17 g, 17 g, Oral, Daily PRN    enoxaparin (Lovenox) 40 MG/0.4ML SUBQ injection 40 mg, 40 mg, Subcutaneous, Q24H    acetaminophen (Tylenol Extra Strength) tab 500 mg, 500 mg, Oral, Q4H PRN    ondansetron (Zofran) 4 MG/2ML injection 4 mg, 4 mg, Intravenous, Q6H PRN    prochlorperazine (Compazine) 10 MG/2ML injection 5 mg, 5 mg, Intravenous, Q8H PRN    temazepam (Restoril) cap 15 mg, 15  mg, Oral, Nightly PRN    morphINE PF 2 MG/ML injection 1 mg, 1 mg, Intravenous, Q2H PRN **OR** morphINE PF 2 MG/ML injection 2 mg, 2 mg, Intravenous, Q2H PRN **OR** morphINE PF 4 MG/ML injection 4 mg, 4 mg, Intravenous, Q2H PRN    acetaminophen (Tylenol) tab 650 mg, 650 mg, Oral, Q4H PRN **OR** HYDROcodone-acetaminophen (Norco) 5-325 MG per tab 1 tablet, 1 tablet, Oral, Q4H PRN **OR** HYDROcodone-acetaminophen (Norco) 5-325 MG per tab 2 tablet, 2 tablet, Oral, Q4H PRN    amLODIPine (Norvasc) tab 10 mg, 10 mg, Oral, Daily    clonazePAM (KlonoPIN) tab 2 mg, 2 mg, Oral, Nightly    dicyclomine (Bentyl) tab 20 mg, 20 mg, Oral, QID PRN    levothyroxine (Synthroid) tab 150 mcg, 150 mcg, Oral, Before breakfast    losartan (Cozaar) tab 100 mg, 100 mg, Oral, Daily    pantoprazole (Protonix) DR tab 40 mg, 40 mg, Oral, QAM AC        Assessment and Plan:   R Malignant pleural effusion  Due to metastatic thyroid CA  IR has placed CT in R chest  Repeat cxr reviewed  650 ml drained 9/14  Oncology and pulm following      Anxiety  Depression  Pt no longer taking sertraline  Cont lexapro  Palliative care consulted for further evaluation      Other issues  Papillary thyroid CA s/p total thyroidectomy in 2010  HL  HTN  GERD  Hypothyroidism     DVT Ppx: lovenox  Full code     MDM: High            Results:            Recent Labs   Lab 09/14/24  0722 09/15/24  0624 09/16/24  0619   RBC 3.77* 3.88 3.69*   HGB 11.2* 11.4* 11.0*   HCT 36.0 36.5 34.1*   MCV 95.5 94.1 92.4   MCH 29.7 29.4 29.8   MCHC 31.1 31.2 32.3   RDW 14.2 14.0 13.7   NEPRELIM 6.13 8.19* 8.85*   WBC 9.7 12.5* 12.7*   .0 263.0 253.0                  Recent Labs   Lab 09/14/24  0722 09/15/24  0624 09/16/24  0619   * 113* 101*   BUN 18 18 18   CREATSERUM 0.90 1.00 0.91   EGFRCR 72 63 71   CA 9.1 9.2 9.0    136 136   K 4.6 4.5 4.2    104 104   CO2 24.0 27.0 26.0           MEDICATIONS ADMINISTERED IN LAST 1 DAY:  clonazePAM (KlonoPIN) tab 2 mg       Date  Action Dose Route User    9/15/2024 2152 Given 2 mg Oral Marlo Barrera RN          enoxaparin (Lovenox) 40 MG/0.4ML SUBQ injection 40 mg       Date Action Dose Route User    9/15/2024 1830 Given 40 mg Subcutaneous (Left Lower Abdomen) Jenny Hightower RN          HYDROcodone-acetaminophen (Norco) 5-325 MG per tab 1 tablet       Date Action Dose Route User    9/16/2024 1359 Given 1 tablet Oral Jenny Hightower RN    9/16/2024 0649 Given 1 tablet Oral Marlo Barrera RN    9/15/2024 2242 Given 1 tablet Oral Goldberg, Rachel, RN          levothyroxine (Synthroid) tab 150 mcg       Date Action Dose Route User    9/16/2024 0645 Given 150 mcg Oral Marlo Barrera RN          pantoprazole (Protonix) DR tab 40 mg       Date Action Dose Route User    9/16/2024 0645 Given 40 mg Oral Marlo Barrera RN          sennosides (Senokot) tab 8.6 mg       Date Action Dose Route User    9/16/2024 1107 Given 8.6 mg Oral Jenny Hightower RN    9/15/2024 2152 Given 8.6 mg Oral Marlo Barrera RN            Vitals (last day)       Date/Time Temp Pulse Resp BP SpO2 Weight O2 Device O2 Flow Rate (L/min) [facility]    09/16/24 1106 -- 96 -- 112/53 91 % -- None (Room air) --     09/16/24 0630 98.5 °F (36.9 °C) 80 16 123/68 94 % -- None (Room air) --     09/15/24 2150 100.2 °F (37.9 °C) -- -- -- -- -- -- --     09/15/24 2132 100.1 °F (37.8 °C) 94 16 122/65 95 % -- None (Room air) --     09/15/24 1150 98.6 °F (37 °C) 86 16 109/71 92 % -- None (Room air) --     09/15/24 1056 -- -- -- -- 91 % -- None (Room air) --     09/15/24 1038 -- -- -- -- 93 % -- None (Room air) -- AJ    09/15/24 1027 -- 78 -- 119/66 96 % -- Nasal cannula 2 L/min AJ    09/15/24 0629 97.8 °F (36.6 °C) 78 16 115/68 94 % -- Nasal cannula 2 L/min IG

## 2024-09-16 NOTE — HOME CARE LIAISON
[facility]. Spoke w/ patient who is agreeable with [facility]. Contact information placed on AVS.

## 2024-09-16 NOTE — PROGRESS NOTES
Hematology/Oncology Progress note    Patient Name: Camelia Markham  Medical Record Number: [id number]    YOB: 1960   Date of Consultation: 9/14/2024   Physician requesting consultation: Dr. Gustafson    Reason for Consultation:  Camelia Markham was seen today for the diagnosis of metastatic thyroid cancer    Interval History  She was hospitalized Friday with worsening pleural effusion.  Was seen by pulmonology and recommended a Pleurx bridge with which she underwent Saturday with evacuation of 600 cc of fluid.  Has some pleuritic chest pain.  Very overwhelmed with her diagnosis.  Anticipates being discharged home today    Oncologic History:  11/3/2010 was found to have an enlarged thyroid gland ultrasound-guided FNA showed papillary thyroid cancer.  Underwent total thyroidectomy with Dr. Asher.  Final pathology showed a 2.5 cm tumor and 2 positive lymph nodes.     12/2010 BRIDGES uptake study showed no evidence of abnormal uptake outside the neck.  Underwent 207 miCu of radioactive iodine.  She had then been maintained on levothyroxine.  Subsequently was lost to follow-up after 2018.     6/17/2024 Thyroglobulin increased to 10, prompted an ultrasound that showed an oval hypoechoic nodule in the superior aspect of the left thyroid measuring 1 x 0.6 x 0.6 cm thought to represent an indeterminate lymph node.     8/6/2024 ultrasound-guided FNA showed papillary carcinoma.      8/16/2024 [facility] thyroid scan showed no discernible pathologic activity increased uptake in the thyroid bed.  Given the biopsy-proven recurrence and absence of I-131 uptake this was thought to represent dedifferentiated recurrent thyroid malignancy.     8/22/2024 PET/CT fusion study showed extensive hypermetabolic pleural-based nodularity, large pleural effusion as well as hypermetabolic nodularity in the left neck soft tissues as well as peripheral right upper nodule all of which was concerning for metastatic disease.  Previously seen  cystic pancreatic lesion did not demonstrate any hypermetabolic activity.      Past Medical History:  Past Medical History:    Anxiety state, unspecified    Cancer (HCC)    Colon adenomas    x3    Disorder of thyroid    thyroidectomy    Diverticulosis of large intestine    Esophageal reflux    Exposure to medical therapeutic radiation    thyroid    High blood pressure    High cholesterol    Hypercholesteremia    Hypothyroidism    Osteoarthrosis, unspecified whether generalized or localized, unspecified site    Thyroid cancer (HCC)    papillary thyroid; s/p surgery resection with Asher and BRIDGES    Unspecified essential hypertension       Past Surgical History:   Procedure Laterality Date    Colonoscopy N/A 2022    Procedure: COLONOSCOPY;  Surgeon: Neeraj No MD;  Location: [facility] ENDOSCOPY    Colonoscopy  2024    Colonoscopy N/A 2024    Procedure: COLONOSCOPY;  Surgeon: Neeraj No MD;  Location: [facility] ENDOSCOPY    Egd  2022    Egd  2024    Esophagogastroduodenoscopy    Endoscopic ultrasound - internal  2022    Endoscopic ultrasound exam  2024    Endoscopic Ultrasound    Eye surgery  2024    Eye surgery Left 2024    Hysterectomy      Knee surgery Left 2022    no associated bleeding complications          40 week 7 lb(s) 5 oz Male; 6 hr labor           40 week 7 lb(s) 3 oz Female          41 week 9 lb(s) 8 oz Male    Other surgical history      Injection Tendon Sheath, Ligament    Thyroidectomy      total    Total abdom hysterectomy         Home Medications:  Current Facility-Administered Medications on File Prior to Encounter   Medication Dose Route Frequency Provider Last Rate Last Admin    [COMPLETED] tetracaine (Pontocaine) 0.5 % ophthalmic solution 1 drop  1 drop Left Eye Once Jeremi Santiago MD   1 drop at 24 1240    [COMPLETED] diazePAM (Valium) tab 5 mg  5 mg Oral Once Jeremi Santiago MD   5 mg at  24 1250    [COMPLETED] phenylephrine/cyclopentolate/tropicamide ophthalmic mixture  1 drop Left Eye Q5 Min Jeremi Santiago MD   1 drop at 24 1250    [COMPLETED] diazePAM (Valium) tab 5 mg  5 mg Oral Once Jeremi Santiago MD   5 mg at 24 1309     Current Outpatient Medications on File Prior to Encounter   Medication Sig Dispense Refill    levothyroxine 150 MCG Oral Tab Take 1 tablet (150 mcg total) by mouth before breakfast.      ondansetron (ZOFRAN) 8 MG tablet Take 1 tablet (8 mg total) by mouth every 8 (eight) hours as needed for Nausea. 30 tablet 3    HYDROcodone-acetaminophen 5-325 MG Oral Tab Take 1-2 tablets by mouth every 6 (six) hours as needed for Pain. 60 tablet 0    ketorolac 0.5 % Ophthalmic Solution Place 1 drop into the left eye 4 (four) times daily.      prednisoLONE 1 % Ophthalmic Suspension Place 1 drop into the left eye 3 (three) times daily.      [] dicyclomine 20 MG Oral Tab Take 1 tablet (20 mg total) by mouth 4 (four) times daily as needed. 30 tablet 0    Olmesartan Medoxomil 40 MG Oral Tab Take 1 tablet (40 mg total) by mouth daily.      Omeprazole 40 MG Oral Capsule Delayed Release Take 1 capsule (40 mg total) by mouth every other day.      amLODIPine 10 MG Oral Tab Take 1 tablet (10 mg total) by mouth daily.      clonazePAM 2 MG Oral Tab Take 0.5 tablets (1 mg total) by mouth nightly.      Lenvatinib, 24 MG Daily Dose, (LENVIMA, 24 MG DAILY DOSE,) 2 x 10 MG & 4 MG Oral Capsule Therapy Pack Take 24 mg by mouth daily. (Patient not taking: Reported on 2024) 30 each 5    prochlorperazine (COMPAZINE) 10 mg tablet Take 1 tablet (10 mg total) by mouth every 6 (six) hours as needed for Nausea. 30 tablet 3    azelastine 0.1 % Nasal Solution 2 sprays by Nasal route 2 (two) times daily. 90 mL 1    sertraline 50 MG Oral Tab Take 1 tablet (50 mg total) by mouth daily.         Current Inpatient Medications:  Inpatient Meds:   escitalopram  10 mg Oral QAM    sennosides  8.6  mg Oral BID    enoxaparin  40 mg Subcutaneous Q24H    amLODIPine  10 mg Oral Daily    clonazePAM  2 mg Oral Nightly    levothyroxine  150 mcg Oral Before breakfast    losartan  100 mg Oral Daily    pantoprazole  40 mg Oral QAM AC         PRN Meds:    polyethylene glycol (PEG 3350)    acetaminophen    ondansetron    prochlorperazine    temazepam    morphINE **OR** morphINE **OR** morphINE    acetaminophen **OR** HYDROcodone-acetaminophen **OR** HYDROcodone-acetaminophen    dicyclomine    Allergies:   Allergies   Allergen Reactions    Latex HIVES    Peanut-Containing Drug Products SWELLING     Closes Throat  Closes Throat  Closes Throat      Peanuts SWELLING     Closes Throat    Adhesive Tape OTHER (SEE COMMENTS)    Seasonal        Psychosocial History:  Social History     Social History Narrative    Camelia Figueroa is  to Baldemar x30 yrs. She has 3 adult children. 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 She lives with her , her mom, and 1 of the children in [place], [place].     Social History     Socioeconomic History    Marital status:    Tobacco Use    Smoking status: Never    Smokeless tobacco: Never   Vaping Use    Vaping status: Never Used   Substance and Sexual Activity    Alcohol use: Yes     Comment: social    Drug use: No   Other Topics Concern    Caffeine Concern Yes     Comment: 1 cup of coffee    Social History Narrative    Camelia Figueroa is  to Baldemar x30 yrs. She has 3 adult children. Patient works in [employer] in [employer] keeping. She lives with her , her mom, and 1 of the children in [place], [place].     Social Determinants of Health     Food Insecurity: No Food Insecurity (9/13/2024)    Food Insecurity     Food Insecurity: Never true   Transportation Needs: No Transportation Needs (9/13/2024)    Transportation Needs     Lack of Transportation: No   Housing Stability: Low Risk  (9/13/2024)    Housing Stability     Housing Instability: No       Family Medical History:  Family History    Problem Relation Age of Onset    Heart Disorder Mother     Breast Cancer Maternal Cousin Female 57    Cancer Daughter 29        Hodgkin's Lymphoma    Ovarian Cancer Neg     Bleeding Disorders Neg     DVT/VTE Neg     Pancreatic Cancer Neg     Prostate Cancer Neg        Review of Systems:  A 10-point ROS was done with pertinent positives and negative per the HPI    Vital Signs:  Height: --  Weight: --  BSA (Calculated - sq m): --  Pulse: 80 (09/16 0630)  BP: 123/68 (09/16 0630)  Temp: 98.5 °F (36.9 °C) (09/16 0630)  Do Not Use - Resp Rate: --  SpO2: 94 % (09/16 0630)      Wt Readings from Last 6 Encounters:   09/13/24 77.4 kg (170 lb 9.6 oz)   09/13/24 78 kg (172 lb)   08/29/24 79.8 kg (176 lb)   08/29/24 80.2 kg (176 lb 12.8 oz)   08/28/24 79.4 kg (175 lb)   08/15/24 78.9 kg (174 lb)       ECOG PS: 0    Physical Examination:  /53 (BP Location: Left arm)   Pulse 96   Temp 98.5 °F (36.9 °C) (Oral)   Resp 16   Wt 77.4 kg (170 lb 9.6 oz)   SpO2 91%   BMI 31.20 kg/m²    General: Patient is alert and oriented, not in acute distress  Psych: Mood and affect are appropriate  Eyes: EOMI, PERRL  CV: Regular rate and rhythm, normal S1S2, no murmurs, no LE edema  Respiratory: Lungs clear to auscultation bilaterally  GI/Abd: Soft, non-tender with normoactive bowel sounds, no hepatosplenomegaly  Neurological: Grossly intact   Lymphatics: No palpable cervical, supraclavicular, axillary, or inguinal lymphadenopathy  Skin: no rashes or petechiae  Pleurx catheter are noted on the right side.  Pleural fluid, bloody drainage.    Laboratory:  Recent Labs   Lab 09/14/24  0722 09/15/24  0624 09/16/24  0619   WBC 9.7 12.5* 12.7*   HGB 11.2* 11.4* 11.0*   HCT 36.0 36.5 34.1*   .0 263.0 253.0   MCV 95.5 94.1 92.4   RDW 14.2 14.0 13.7   NEPRELIM 6.13 8.19* 8.85*       Recent Labs   Lab 09/14/24  0722 09/15/24  0624 09/16/24  0619    136 136   K 4.6 4.5 4.2    104 104   CO2 24.0 27.0 26.0   BUN 18 18 18    CREATSERUM 0.90 1.00 0.91   * 113* 101*   CA 9.1 9.2 9.0   PHOS  --  4.6  --    TP  --   --  7.3   ALB  --  3.9 3.8   ALKPHO  --   --  108   AST  --   --  12   ALT  --   --  8*   BILT  --   --  1.1       No results for input(s): \"PT\", \"INR\", \"PTT\", \"FIB\" in the last 168 hours.    Imaging:    Chest x-ray reviewed.  Stable pleural effusion on the right side.    Impression & Plan:   64-year-old female with thyroid cancer s/p thyroidectomy followed by radioactive iodine and levothyroxine suppression with biopsy-proven recurrence including a pulmonary parenchyma and pleural nodules presenting with recurrent pleural effusion.  Patient had a negative BRIDGES uptake scan suggesting dedifferentiated thyroid cancer.    Right-sided malignant pleural effusion: From metastatic thyroid carcinoma.  Patient had Pleurx catheter placed on the right chest.  Patient had good drainage from her catheter.  Her shortness of breath is improved.Home care management to be arranged.    Metastatic thyroid cancer  Patient to start on lenvatinib after discharge. Has had detailed drug education previously    CHARBEL: started lexapro, has lots of anxiety, seeing palliative care    Follow up in about 2 weeks as previously scheduled.     Inocente Chen MD  [facility] Hematology/Medical Oncology

## 2024-09-16 NOTE — PROGRESS NOTES
[facility], [place]  part of [facility]    Progress Note    Camelia Markham Patient Status:  Inpatient    1960 MRN [id number]   Location [facility] 4W/SW/SE Attending Feroz Henderson MD   Hosp Day # 3 PCP FINA MABRY, MD WENDY       Subjective:   Camelia Markham is a(n) 64 year old female was seen and examined  Lying in bed, NAD  Calm  No acute sob  Having some pleuritic cp  No f,c,n,v abd pain or HA     Objective:   Blood pressure 112/53, pulse 96, temperature 98.5 °F (36.9 °C), temperature source Oral, resp. rate 16, weight 170 lb 9.6 oz (77.4 kg), SpO2 91%.    GENERAL:  NAD  SKIN:  Warm and hydrated  PSYCHIATRIC: Tearful, anxious    HEENT:  Head was atraumatic and normocephalic.  Eyes: Sclera was anicteric.  Pupils were equal.  Ears:  There were no lesions.  Nose:  No lesions were noted.      NECK:  Supple.  There was no JVD.    CHEST:  R chest pleurex  CARDIAC: S1 S2+, RRR  LUNGS: decreased air exchange in R lung fields  ABDOMEN: Non-distended, non-tender, BS+  MUSCULOSKELETAL:  There was no deformity.  There was full range of motion in all the extremities.    EXTREMITIES: There was no edema  NEUROLOGIC: Cranial nerves II-XII intact, no focal defecits    Current Inpatient Medications:     Current Facility-Administered Medications:     escitalopram (Lexapro) tab 10 mg, 10 mg, Oral, QPM    sennosides (Senokot) tab 8.6 mg, 8.6 mg, Oral, BID    polyethylene glycol (PEG 3350) (Miralax) 17 g oral packet 17 g, 17 g, Oral, Daily PRN    enoxaparin (Lovenox) 40 MG/0.4ML SUBQ injection 40 mg, 40 mg, Subcutaneous, Q24H    acetaminophen (Tylenol Extra Strength) tab 500 mg, 500 mg, Oral, Q4H PRN    ondansetron (Zofran) 4 MG/2ML injection 4 mg, 4 mg, Intravenous, Q6H PRN    prochlorperazine (Compazine) 10 MG/2ML injection 5 mg, 5 mg, Intravenous, Q8H PRN    temazepam (Restoril) cap 15 mg, 15 mg, Oral, Nightly PRN    morphINE PF 2 MG/ML injection 1 mg, 1 mg, Intravenous, Q2H PRN **OR** morphINE  PF 2 MG/ML injection 2 mg, 2 mg, Intravenous, Q2H PRN **OR** morphINE PF 4 MG/ML injection 4 mg, 4 mg, Intravenous, Q2H PRN    acetaminophen (Tylenol) tab 650 mg, 650 mg, Oral, Q4H PRN **OR** HYDROcodone-acetaminophen (Norco) 5-325 MG per tab 1 tablet, 1 tablet, Oral, Q4H PRN **OR** HYDROcodone-acetaminophen (Norco) 5-325 MG per tab 2 tablet, 2 tablet, Oral, Q4H PRN    amLODIPine (Norvasc) tab 10 mg, 10 mg, Oral, Daily    clonazePAM (KlonoPIN) tab 2 mg, 2 mg, Oral, Nightly    dicyclomine (Bentyl) tab 20 mg, 20 mg, Oral, QID PRN    levothyroxine (Synthroid) tab 150 mcg, 150 mcg, Oral, Before breakfast    losartan (Cozaar) tab 100 mg, 100 mg, Oral, Daily    pantoprazole (Protonix) DR tab 40 mg, 40 mg, Oral, QAM AC    Assessment and Plan:   R Malignant pleural effusion  Due to metastatic thyroid CA  IR has placed CT in R chest  Repeat cxr reviewed  650 ml drained 9/14  Oncology and pulm following     Anxiety  Depression  Pt no longer taking sertraline  Cont lexapro  Palliative care consulted for further evaluation     Other issues  Papillary thyroid CA s/p total thyroidectomy in 2010  HL  HTN  GERD  Hypothyroidism    DVT Ppx: lovenox  Full code    MDM: High         Results:     Recent Labs   Lab 09/14/24  0722 09/15/24  0624 09/16/24  0619   RBC 3.77* 3.88 3.69*   HGB 11.2* 11.4* 11.0*   HCT 36.0 36.5 34.1*   MCV 95.5 94.1 92.4   MCH 29.7 29.4 29.8   MCHC 31.1 31.2 32.3   RDW 14.2 14.0 13.7   NEPRELIM 6.13 8.19* 8.85*   WBC 9.7 12.5* 12.7*   .0 263.0 253.0         Recent Labs   Lab 09/14/24  0722 09/15/24  0624 09/16/24  0619   * 113* 101*   BUN 18 18 18   CREATSERUM 0.90 1.00 0.91   EGFRCR 72 63 71   CA 9.1 9.2 9.0    136 136   K 4.6 4.5 4.2    104 104   CO2 24.0 27.0 26.0         Imaging:   XR CHEST PA + LAT CHEST (CPT=71046)    Result Date: 9/14/2024  CONCLUSION:   Large right pleural effusion with right lower lobe atelectasis or pneumonia has increased in size since 9/13/2024.  Small left  lower lobe pleural effusion with left lower lobe atelectasis or pneumonia is new.  Enlarged cardiomediastinal silhouette, unchanged.      Dictated by (CST): Klaus Mandel MD on 9/14/2024 at 1:37 PM     Finalized by (CST): Klaus Mandel MD on 9/14/2024 at 1:39 PM                 Feroz Henderson MD  9/16/2024

## 2024-09-16 NOTE — CONSULTS
[facility]  part of [facility]  Palliative Care Initial Consult Note    Camelia Markham Patient Status:  Inpatient    1960 MRN [id number]   Location [facility] 4W/SW/SE Attending Feroz Henderson MD   Hosp Day # 3 PCP FINA MABRY, MD WENDY     Date of Consult: 2024  Patient seen at: [facility] Inpatient    The  Cures Act makes medical notes like these available to patients in the interest of transparency. Please be advised this is a medical document. Medical documents are intended to carry relevant information, facts as evident, and the clinical opinion of the practitioner. The medical note is intended as peer to peer communication and may appear blunt or direct. It is written in medical language and may contain abbreviations or verbiage that are unfamiliar.     Reason for Consultation:   for evaluation of Palliative Care needs and Uncontrolled symptoms;Goals of care discussion.    Subjective     History of Present Illness: Camelia Markham is a 64 year old female with history of metastatic thyroid ca s/p thyroidectomy , radioactive iodine tx with recent metastatic disease to lung, LN follows with Dr. Chen, hypothyroidism, HTN, HLD, GERD and anxiety who was admitted on 2024 for SOB and cough. See below for reviewed labs and imaging. Pt was admitted for treatment and evaluation of recurrent  large R malignant pleural effusion. Pt underwent R pleurx placement . See below for reviewed imaging.     She is being followed by pulmonary, IR and oncology services.    I reviewed labs and imaging-see below. History was obtained from Epic and pt.  Today is day 3 of hospitalization. No other recent hospitalizations.    Pt has no code status listed in Epic, and no advance directives on file.    Reviewed symptom needs past 24 hrs: Norco 5/325 mg po X2    Reviewed IPMP report-filled  Klonopin 2 mg #30, Norco 5/325 mg #60    Patient was seen and examined  in bed with no family at bedside. Pt is A&OX4 and very pleasant. She reports fatigue symptoms. Breathing is non-labored on RA, +reports of dyspnea with any activity which improves with rest, but says her breathing has improved overall since pleurx was placed. +intermittent dry cough. She reports intermittent R chest wall pain, pleuritic type pain which radiates to her back, pain is sharp in nature, currently 9/10, but says she is very sensitive to pain medications and doesn't want anything for pain now, Pain worsens with coughing and she uses pillow splint and heat packs at night which help. She says she is getting relief from [place] 5/325 prn. She says at home she was taking Norco 5/325 tabs,  but was cutting them into 1/4's and mostly just taking one dose a day at night. Appetite has been recently decreased, +wt loss of 14 pounds in past 4 months, denies abdominal pain, yesterday had some nausea symptoms with dry heaving, denies n/v symptoms today. +constipation issues and finally had large BM today after fleets enema. +reports of depression with associated anxiety since she found out she has metastatic disease. She says she is worried about her job and finances as she was still working full time prior to admission. She was just started on Lexapro yesterday and says she has been on Klonopin 2 mg nightly (typically only takes 1 mg.) She tells me she is not a \"medicine person\" and prefers supportive care. She has good support from her family and feels she is coping ok.  See physical exam and ROS below.    Review of Systems/Palliative Care symptom needs assessed:   Pertinent items are noted in HPI/below    Fatigue:  Yes  Dyspnea: +intermittent dyspnea with activity   Current O2 therapy: RA  Cough: +intermittent dry cough  Pain Present: +intermittent R chest wall, pleuritic pain which radiates to back, worsens with coughing, pain improves with Norco prn, heat and splinting  Non-verbal signs of pain present:  NO  Appetite: decreased recently  Constipation: +constipation issues, just had large BM today after enema  Diarrhea: denies  Nausea/Vomiting: denies  Depression: +reports of depression with mood being down r/t metastatic dz  Anxiety: +anxiety r/t her job stress/finances and fear of having metastatic disease    Medical History: obtained from EPIC and pt  Past Medical History:    Anxiety state, unspecified    Cancer (HCC)    Colon adenomas    x3    Disorder of thyroid    thyroidectomy    Diverticulosis of large intestine    Esophageal reflux    Exposure to medical therapeutic radiation    thyroid    High blood pressure    High cholesterol    Hypercholesteremia    Hypothyroidism    Osteoarthrosis, unspecified whether generalized or localized, unspecified site    Thyroid cancer (HCC)    papillary thyroid; s/p surgery resection with Asher and BRIDGES    Unspecified essential hypertension     Past Surgical History:   Procedure Laterality Date    Colonoscopy N/A 2022    Procedure: COLONOSCOPY;  Surgeon: Neeraj No MD;  Location: [facility] ENDOSCOPY    Colonoscopy  2024    Colonoscopy N/A 2024    Procedure: COLONOSCOPY;  Surgeon: Neeraj No MD;  Location: [facility] ENDOSCOPY    Egd  2022    Egd  2024    Esophagogastroduodenoscopy    Endoscopic ultrasound - internal  2022    Endoscopic ultrasound exam  2024    Endoscopic Ultrasound    Eye surgery  2024    Eye surgery Left 2024    Hysterectomy      Knee surgery Left 2022    no associated bleeding complications          40 week 7 lb(s) 5 oz Male; 6 hr labor           40 week 7 lb(s) 3 oz Female          41 week 9 lb(s) 8 oz Male    Other surgical history      Injection Tendon Sheath, Ligament    Thyroidectomy      total    Total abdom hysterectomy         Family History: obtained from [facility]  Family History   Problem Relation Age of Onset    Heart Disorder Mother     Breast Cancer Maternal  Cousin Female 57    Cancer Daughter 29        Hodgkin's Lymphoma    Ovarian Cancer Neg     Bleeding Disorders Neg     DVT/VTE Neg     Pancreatic Cancer Neg     Prostate Cancer Neg        Palliative Care Social History:   Marital Status:   Children: 3 kids (2 sons, 1 dtr)  Living Situation Prior to Admit: lives with  and her mother  Does Patient Live Alone: no  Is Patient Confused: no  Occupational History: 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 She has never used smokeless tobacco.  reports current alcohol use.  reports no history of drug use.  Hx of Substance Use/Abuse: No    Spiritual Assessment:   Alevism: Bahai   requested: already following    Allergies:  Allergies   Allergen Reactions    Latex HIVES    Peanut-Containing Drug Products SWELLING     Closes Throat  Closes Throat  Closes Throat      Peanuts SWELLING     Closes Throat    Adhesive Tape OTHER (SEE COMMENTS)    Seasonal        Medications:     Current Facility-Administered Medications:     escitalopram (Lexapro) tab 10 mg, 10 mg, Oral, QPM    sennosides (Senokot) tab 8.6 mg, 8.6 mg, Oral, BID    polyethylene glycol (PEG 3350) (Miralax) 17 g oral packet 17 g, 17 g, Oral, Daily PRN    enoxaparin (Lovenox) 40 MG/0.4ML SUBQ injection 40 mg, 40 mg, Subcutaneous, Q24H    acetaminophen (Tylenol Extra Strength) tab 500 mg, 500 mg, Oral, Q4H PRN    ondansetron (Zofran) 4 MG/2ML injection 4 mg, 4 mg, Intravenous, Q6H PRN    prochlorperazine (Compazine) 10 MG/2ML injection 5 mg, 5 mg, Intravenous, Q8H PRN    temazepam (Restoril) cap 15 mg, 15 mg, Oral, Nightly PRN    morphINE PF 2 MG/ML injection 1 mg, 1 mg, Intravenous, Q2H PRN **OR** morphINE PF 2 MG/ML injection 2 mg, 2 mg, Intravenous, Q2H PRN **OR** morphINE PF 4 MG/ML injection 4 mg, 4 mg, Intravenous, Q2H PRN    acetaminophen (Tylenol) tab 650 mg, 650 mg, Oral, Q4H PRN **OR**  HYDROcodone-acetaminophen (Norco) 5-325 MG per tab 1 tablet, 1 tablet, Oral, Q4H PRN **OR** HYDROcodone-acetaminophen (Norco) 5-325 MG per tab 2 tablet, 2 tablet, Oral, Q4H PRN    amLODIPine (Norvasc) tab 10 mg, 10 mg, Oral, Daily    clonazePAM (KlonoPIN) tab 2 mg, 2 mg, Oral, Nightly    dicyclomine (Bentyl) tab 20 mg, 20 mg, Oral, QID PRN    levothyroxine (Synthroid) tab 150 mcg, 150 mcg, Oral, Before breakfast    losartan (Cozaar) tab 100 mg, 100 mg, Oral, Daily    pantoprazole (Protonix) DR tab 40 mg, 40 mg, Oral, QAM AC    Nutritional status:  BMI: 31 Weight: 170  Weight loss: down 14 pounds in past 4 months per EPIC documentation  Current Appetite: Poor  Dysphagia: denies    Functional Status History:  ADLs: uses walker for short distances, still independent      Palliative Performance Scale:   Prior to admission: 70%  Observed during hospitalization: 60%  % Ambulation Activity Level Self-Care Intake Consciousness   100 Full  Normal  No Disease Full Normal Full   90 Full  Normal  Some Disease Full Normal Full   80 Full  Normal w/effort  Some Disease Full Normal or reduced Full   70 Reduced  Can't Perform Job  Some Disease Full Normal or reduced Full   60 Reduced  Can't Perform Hobby   Significant Disease Occ Assist Normal or reduced Full or confused   50 Mainly sit/lie Can't do any work  Extensive Disease Partial Assist Normal or reduced Full or confused   40 Mainly in bed Can't do any work  Extensive Disease Mainly Assist Normal or reduced Full or confused   30 Bed Bound Can't do any work  Extensive Disease Max Assist  Total Care Reduced  Drowsy/confused   20 Bed Bound Can't do any work  Extensive Disease Max Assist  Total Care Minimal  Drowsy/confused   10 Bed Bound Can't do any work  Extensive Disease Max Assist  Total Care Mouth Care  Drowsy/confused   0 Death        Objective      Vital Signs:  Blood pressure 112/53, pulse 96, temperature 98.5 °F (36.9 °C), temperature source Oral, resp. rate 16, weight  170 lb 9.6 oz (77.4 kg), SpO2 91%.  Body mass index is 31.2 kg/m².  Present Level of pain: 9/10 R chest wall  Non-verbal signs of pain present: No    Physical Exam:  General: Alert and in no apparent distress.  HEENT: No focal deficits. +poor dentition  Cardiac: Regular rate and rhythm, S1, S2 normal, no murmur, rub or gallop.  Lungs: Diminished breath sounds bilaterally.  Normal excursions and effort. R pleurx site CDI  Abdomen: Soft, non-tender, normal bowel sounds X 4 quadrants, no rebound or guarding  Extremities: Without clubbing, cyanosis. Peripheral pulses are 2+. BLE Edema not present  Neurologic: Alert and oriented X4, anxious appearing  Skin: Warm and dry.    Hematology:  Lab Results   Component Value Date    WBC 12.7 (H) 09/16/2024    HGB 11.0 (L) 09/16/2024    HCT 34.1 (L) 09/16/2024    .0 09/16/2024       Coags:  Lab Results   Component Value Date    INR 0.98 03/13/2023    PTT 26.0 12/17/2022       Chemistry:  Lab Results   Component Value Date    CREATSERUM 0.91 09/16/2024    BUN 18 09/16/2024     09/16/2024    K 4.2 09/16/2024     09/16/2024    CO2 26.0 09/16/2024     (H) 09/16/2024    CA 9.0 09/16/2024    ALB 3.8 09/16/2024    ALKPHO 108 09/16/2024    BILT 1.1 09/16/2024    TP 7.3 09/16/2024    AST 12 09/16/2024    ALT 8 (L) 09/16/2024    DDIMER 0.55 05/23/2024    MG 2.3 09/15/2024    PHOS 4.6 09/15/2024       Imaging:  XR CHEST PA + LAT CHEST (CPT=71046)    Result Date: 9/14/2024  CONCLUSION:   Large right pleural effusion with right lower lobe atelectasis or pneumonia has increased in size since 9/13/2024.  Small left lower lobe pleural effusion with left lower lobe atelectasis or pneumonia is new.  Enlarged cardiomediastinal silhouette, unchanged.      Dictated by (CST): Klaus Mandel MD on 9/14/2024 at 1:37 PM     Finalized by (CST): Klaus Mandel MD on 9/14/2024 at 1:39 PM            8/22/24 PET Scan  CONCLUSION:   1. Extensive hypermetabolic pleural-based nodularity is  compatible with metastatic disease. Given the absence of activity on prior I-131 imaging, these findings are strongly suggestive of dedifferentiated metastatic thyroid malignancy.      2. Large right pleural effusion, likely malignant.      3. Hypermetabolic nodularity in the left neck soft tissues and involving a left-sided cervical node, consistent with metastatic disease.      4. A hypermetabolic peripheral right upper lobe nodule is concerning for pulmonary parenchymal metastasis.      5. Cystic pancreatic lesion without discernible hypermetabolic activity.      6. Uncomplicated distal colonic diverticulosis.       7. Hepatic steatosis.      8. Status post cholecystectomy.      9. Postoperative changes of hysterectomy.       10. Lesser incidental fi   Summary of GOC Discussion        I discussed reason for palliative care consultation with patient.     I differentiated the palliative treatment-focus model versus the hospice comfort-focused philosophy of care. I informed the patient/family that having palliative care support does not limit medical treatment options or decisions to those who wish to continue curative or restorative medical therapies. I discussed the benefits of palliative care to include assistance with arising symptom management needs, an extra layer of support, to ensure GOC are respected throughout healthcare continuum, and assist with transition to hospice care when appropriate.  Palliative care handout provided.    Outpatient/Community Palliative Care Services:  Usually visit once per 4 weeks depending on contracted agency guidelines  Focus on GOC and symptom management   Palliative Care criteria:  Not altered by prognosis   Does not limit curative or restorative therapies      Outpatient Hospice services:  24/7 phone triage services   RN visit one or more times per week depending on need  Home health aid to assist in ADLs/hygiene   Hospice criteria:  Less than six-month prognosis   Must  forego most life-prolonging  measures/treatments   Focus solely on comfort   Must sign onto hospice benefit with agency       Prognostic awareness/understanding: Good    -I  discussed current clinical condition and explored previous discussions with MDs. She was tearful today as she talked about her cancer dx and I provided emotional support.    -I discussed the normal disease trajectory of metastatic thyroid ca with associated symptoms and decline over time.     Hopes/goals/concerns:   -Pt talked with me about her concern with having metastatic ca. She has a lot of anticipatory worry about how she will feel with cancer tx. I encouraged taking things one day at a time.     -She says she is hopeful she will be able to start on cancer tx soon as OP. She has worries about her job and finances and was tearful at times as she talked about her situation. She says she is applying for FMLA and is considering applying for SSI disability as she is not sure that she will be able to maintain working full time during her cancer tx. She tells me she has good family support especially from her /children and her siblings.     -Pt is hopeful to return home on dc. I recommended establishing care with Sugar KINCAID in OP palliative care clinic for support through cancer journey. Pt is agreeable to this.    -Discussed ongoing GOC discussions will be needed over time.     Advance Care Planning counseling and discussion:     -I discussed the importance of advance care planning prior to crisis with pt. She admits she has not completed any advance directives in the past.    -I encouraged consideration for HCPOA paperwork completion and provided blank paperwork for review. I discussed [place] her  Baldemar would be her HC surrogate.     -I also addressed the importance of early code status  discussions in the setting of advanced malignancy. I discussed the risks vs benefits of life sustaining treatments in  the setting of her clinical picture. Education provided on what DNAR entails and encouraged setting limits. Patient says she will think about her wishes further and will remain full code. I did provide [place] for [place].    -Provided emotional support to pt who is coping adequately.       Assessment and Recommendations      Problem List:    Recurrent R malignant pleural effusion s/p Pleurx placement 9/13  Metastatic thyroid ca  Leukocytosis  HTN  HLD  GERD  Hypothyroidism    Cancer related pain  -Controlled, pt says she is very sensitive to opioids  -Continue Norco 5/325 mg 1-2 tabs po Q 4 hrs prn  -Continue Morphine 1,2,4 mg IVP Q 2 hrs prn BTP not controlled with orals.  -Continue splinting and heat packs prn  -Will continue to monitor pain symptoms closely.    Opioid-induced Constipation  -Resolved today after enema  -Continue Senna 2 tabs po BID and Miralax prn  -Education provided on need for ongoing bowel regimen to prevent OIC while on pain meds.    Anxiety/depression  -Continue Lexapro 10 mg po nightly  -Continue Klonopin 2 mg po nightly for sleep.  -Ongoing supportive care and encouraged consideration for counseling as OP.     Dyspnea  -Stable, improved after Pleurx placement  -Monitor, O2 prn, tx per pulm    Goals of care counseling  -see above for details  -Pt is full code; introduced importance of early code status discussions in the setting of advanced malignancy.  -Pt wants to continue with cancer tx as OP.  -Ongoing GOC discussions will be needed through her cancer journey.  -Pt has concerns about her job/finances and encouraged ongoing discussions with SW about FMLA/possible SSI disability applying.  -Agreeable to palliative care following  -Dispo:  Return home with HH. Encouraged follow up with Sugar KINCAID in OP palliative care clinic on 9/23 at 9a. SW to help with dc planning.   -Provided emotional support to pt who is coping adequately.    Advance care planning  -see above for  details  -Pt has not completed any advance directives in the past. Education provided on the importance of early completion.  -Pt's  Baldemar Markham is HC surrogate #[phone].  -Provided [place] of [place] for [place] and discussed palliative care can assist with completion when ready.    Palliative Performance Scale 60%    Emotion support provided to patient/family today: Yes    A total of 70 mins were spent on this consult, which included all of the following:direct face to face contact, history taking, physical examination, and >50% was spent counseling and coordinating care.    Discussed today's visit with message to Dr. Henderson, , April, Suagr KINCAID and Jenny ISLAS.    I will continue to follow clinically.    Thank you for allowing Palliative Care services to participate in the care of Ms. Camelia Markham.       Martina Merritt, ANP-BC, ACHPN [id number]  9/16/2024  2:46 PM  Palliative Care Services

## 2024-09-16 NOTE — DISCHARGE INSTRUCTIONS
Sometimes managing your health at home requires assistance.  The [facility] team has recognized your preference to use [facility].  They can be reached by phone at [phone].  The fax number for your reference is [phone].  A representative from the home health agency will contact you or your family to schedule your first visit.           Pleurx drain to be draining every 4 days. Last time it was drained was 9/17/2024; Max is 1.5L.

## 2024-09-16 NOTE — PROGRESS NOTES
Pulmonary Medicine Inpatient Progress Note                 Subjective:  S/p right pleurx catheter on 24.  Had 650ml drained on Saturday.  On RA        ALLERGIES:  Allergies   Allergen Reactions    Latex HIVES    Peanut-Containing Drug Products SWELLING     Closes Throat  Closes Throat  Closes Throat      Peanuts SWELLING     Closes Throat    Adhesive Tape OTHER (SEE COMMENTS)    Seasonal         MEDS:  Home Medications:  Outpatient Medications Marked as Taking for the 24 encounter (Hospital Encounter)   Medication Sig Dispense Refill    levothyroxine 150 MCG Oral Tab Take 1 tablet (150 mcg total) by mouth before breakfast.      ondansetron (ZOFRAN) 8 MG tablet Take 1 tablet (8 mg total) by mouth every 8 (eight) hours as needed for Nausea. 30 tablet 3    HYDROcodone-acetaminophen 5-325 MG Oral Tab Take 1-2 tablets by mouth every 6 (six) hours as needed for Pain. 60 tablet 0    ketorolac 0.5 % Ophthalmic Solution Place 1 drop into the left eye 4 (four) times daily.      prednisoLONE 1 % Ophthalmic Suspension Place 1 drop into the left eye 3 (three) times daily.      [] dicyclomine 20 MG Oral Tab Take 1 tablet (20 mg total) by mouth 4 (four) times daily as needed. 30 tablet 0    Olmesartan Medoxomil 40 MG Oral Tab Take 1 tablet (40 mg total) by mouth daily.      Omeprazole 40 MG Oral Capsule Delayed Release Take 1 capsule (40 mg total) by mouth every other day.      amLODIPine 10 MG Oral Tab Take 1 tablet (10 mg total) by mouth daily.      clonazePAM 2 MG Oral Tab Take 0.5 tablets (1 mg total) by mouth nightly.       Scheduled Medication:   escitalopram  10 mg Oral QPM    sennosides  8.6 mg Oral BID    enoxaparin  40 mg Subcutaneous Q24H    amLODIPine  10 mg Oral Daily    clonazePAM  2 mg Oral Nightly    levothyroxine  150 mcg Oral Before breakfast    losartan  100 mg Oral Daily    pantoprazole  40 mg Oral QAM AC     Continuous Infusing Medication:    PRN Medications:    polyethylene glycol (PEG  0360)    acetaminophen    ondansetron    prochlorperazine    temazepam    morphINE **OR** morphINE **OR** morphINE    acetaminophen **OR** HYDROcodone-acetaminophen **OR** HYDROcodone-acetaminophen    dicyclomine       PHYSICAL EXAM:  /53 (BP Location: Left arm)   Pulse 96   Temp 98.5 °F (36.9 °C) (Oral)   Resp 16   Wt 170 lb 9.6 oz (77.4 kg)   SpO2 91%   BMI 31.20 kg/m²   CONSTITUTIONAL: alert, oriented, no apparent distress  HEENT: atraumatic normocephalic  MOUTH: mucous membranes are moist. No OP exudates  NECK/THROAT: no JVD. Trachea midline. No obvious thyromegaly  LUNG: clear upper with crackles at right base, no wheezing. Chest symmetric with respiratory motion  HEART: regular rate and rhythm, no obvious murmers or gallops note  ABD: soft non tender. + bowel sounds. No organomegaly noted  EXT: no clubbing, cyanosis, or edema noted. Pulses intact grossly  NEURO/MUSCULOSKELETAL: no gross deficits  SKIN: warm, dry. No obvious lesions noted  LYMPH: no obvious LAD  + right anterior pleurex catheter       IMAGES:  CXR today  CONCLUSION:   Large right pleural effusion with right lower lobe atelectasis or pneumonia has increased in size since 9/13/2024.   Small left lower lobe pleural effusion with left lower lobe atelectasis or pneumonia is new.   Enlarged cardiomediastinal silhouette, unchanged.       LABS:  Recent Labs   Lab 09/14/24  0722 09/15/24  0624 09/16/24  0619   RBC 3.77* 3.88 3.69*   HGB 11.2* 11.4* 11.0*   HCT 36.0 36.5 34.1*   MCV 95.5 94.1 92.4   MCH 29.7 29.4 29.8   MCHC 31.1 31.2 32.3   RDW 14.2 14.0 13.7   NEPRELIM 6.13 8.19* 8.85*   WBC 9.7 12.5* 12.7*   .0 263.0 253.0       Recent Labs   Lab 09/14/24  0722 09/15/24  0624 09/16/24  0619   * 113* 101*   BUN 18 18 18   CREATSERUM 0.90 1.00 0.91   EGFRCR 72 63 71   CA 9.1 9.2 9.0   ALB  --  3.9 3.8    136 136   K 4.6 4.5 4.2    104 104   CO2 24.0 27.0 26.0   ALKPHO  --   --  108   AST  --   --  12   ALT  --   --   8*   BILT  --   --  1.1   TP  --   --  7.3       ASSESSMENT/PLAN:  1.Recurrent malignant right pleural effusion  -effusion re-occurred within 2 weeks and is malignant  -pleurex catheter will provide the best benefit at this time  -IR contacted and placed right pleurex catheter on 9/13/24  -recommend drain up to 1.5 liter. Should be drained every 4 days. RN communication ordered. Paperwork signed  -messaged Dr. Chen, Dr. Sands, Dr. Asher, and Dr. Marie to update them  -pain control-palliative consulted for pain control and symptom control  -PT/OT  -check desat screen b/f discharge    Can discharge home once pleurex catheter teaching is done, home health is set up, and pt feels pain is controlled.    Will follow       Thank you for the opportunity to care for Camelia MarkhamMaverick Brooks DO, MPH  Pulmonary Critical Care Medicine  [facility]

## 2024-09-16 NOTE — PROGRESS NOTES
[facility]  part of [facility]  Progress Note      Camelia Markham Patient Status:  Inpatient    1960 MRN [id number]   Location [facility] 4W/SW/SE Attending Feroz Henderson MD   Hosp Day # 3 PCP FINA MABRY, MD WENDY       Subjective:   C/o constipation which has now resolved post enema. In bed, comfortable    Objective:   A/ox3  Unlabored breathing  R chest pleurx site intact    Vital Signs:  Blood pressure 112/53, pulse 96, temperature 98.5 °F (36.9 °C), temperature source Oral, resp. rate 16, weight 170 lb 9.6 oz (77.4 kg), SpO2 91%.    Input/Output:    Intake/Output Summary (Last 24 hours) at 2024 1420  Last data filed at 2024 1300  Gross per 24 hour   Intake 180 ml   Output --   Net 180 ml       Results:   Labs:  Lab Results   Component Value Date    WBC 12.7 2024    RBC 3.69 2024    HGB 11.0 2024    HCT 34.1 2024    MCV 92.4 2024    MCH 29.8 2024    MCHC 32.3 2024    RDW 13.7 2024    .0 2024     Recent Labs   Lab 24  0722 09/15/24  0624 24  0619   * 113* 101*   BUN 18 18 18   CREATSERUM 0.90 1.00 0.91   EGFRCR 72 63 71   CA 9.1 9.2 9.0    136 136   K 4.6 4.5 4.2    104 104   CO2 24.0 27.0 26.0         Assessment and Plan:   S/p R chest pleurx catheter insertion 24  650mL drained over the weekend  Supplies left at the bedside  Discussed with RNKeiko SUE is being set up to manage drainage and supplies    BEBE Tovar  2024  2:20 PM

## 2024-09-16 NOTE — PLAN OF CARE
Problem: Patient Centered Care  Goal: Patient preferences are identified and integrated in the patient's plan of care  Description: Interventions:  - What would you like us to know as we care for you?   - Provide timely, complete, and accurate information to patient/family  - Incorporate patient and family knowledge, values, beliefs, and cultural backgrounds into the planning and delivery of care  - Encourage patient/family to participate in care and decision-making at the level they choose  - Honor patient and family perspectives and choices  Outcome: Progressing     Problem: Patient/Family Goals  Goal: Patient/Family Long Term Goal  Description: Patient's Long Term Goal: return home, start new chemotherapy    Interventions:  - discharge planning  - pleurex drain care  - See additional Care Plan goals for specific interventions  Outcome: Progressing  Goal: Patient/Family Short Term Goal  Description: Patient's Short Term Goal: less pain, easier breathing    Interventions:   - maintain pleurex, drain as ordered  - oxygen as needed  - pain management, norco or morphine   - See additional Care Plan goals for specific interventions  Outcome: Progressing     Problem: Diabetes/Glucose Control  Goal: Glucose maintained within prescribed range  Description: INTERVENTIONS:  - Monitor Blood Glucose as ordered  - Assess for signs and symptoms of hyperglycemia and hypoglycemia  - Administer ordered medications to maintain glucose within target range  - Assess barriers to adequate nutritional intake and initiate nutrition consult as needed  - Instruct patient on self management of diabetes  Outcome: Progressing     Problem: RESPIRATORY - ADULT  Goal: Achieves optimal ventilation and oxygenation  Description: INTERVENTIONS:  - Assess for changes in respiratory status  - Assess for changes in mentation and behavior  - Position to facilitate oxygenation and minimize respiratory effort  - Oxygen supplementation based on oxygen  saturation or ABGs  - Provide Smoking Cessation handout, if applicable  - Encourage broncho-pulmonary hygiene including cough, deep breathe, Incentive Spirometry  - Assess the need for suctioning and perform as needed  - Assess and instruct to report SOB or any respiratory difficulty  - Respiratory Therapy support as indicated  - Manage/alleviate anxiety  - Monitor for signs/symptoms of CO2 retention  Outcome: Progressing     A/ox4, on 2L O2 overnight, up x1 SBA with rolling walker, received PRN Norco for pain management, denies nausea, ACHS, up x1 SBA with rolling walker, vital signs stable, no acute changes overnight. Call light within reach, safety measures in place, frequent rounding done, plan of care continued.

## 2024-09-16 NOTE — PLAN OF CARE
Problem: Patient Centered Care  Goal: Patient preferences are identified and integrated in the patient's plan of care  Description: Interventions:  - What would you like us to know as we care for you?   - Provide timely, complete, and accurate information to patient/family  - Incorporate patient and family knowledge, values, beliefs, and cultural backgrounds into the planning and delivery of care  - Encourage patient/family to participate in care and decision-making at the level they choose  - Honor patient and family perspectives and choices  Outcome: Progressing   Patient denies acute changes during the shift. She has been having intermittent pain to right chest. Pain increases with coughing. Patient denies difficulty breathing, RA. Plerux cath in place. Regular diet patient reports nausea, no vomiting, Zofran given as ordered. Palliative consult for pain management. Possible discharge home tomorrow.

## 2024-09-17 VITALS
OXYGEN SATURATION: 90 % | SYSTOLIC BLOOD PRESSURE: 100 MMHG | BODY MASS INDEX: 31 KG/M2 | WEIGHT: 170.63 LBS | HEART RATE: 80 BPM | TEMPERATURE: 99 F | DIASTOLIC BLOOD PRESSURE: 57 MMHG | RESPIRATION RATE: 18 BRPM

## 2024-09-17 LAB
MAGNESIUM SERPL-MCNC: 2.4 MG/DL (ref 1.6–2.6)
PHOSPHATE SERPL-MCNC: 4.2 MG/DL (ref 2.4–5.1)

## 2024-09-17 PROCEDURE — 99231 SBSQ HOSP IP/OBS SF/LOW 25: CPT | Performed by: REGISTERED NURSE

## 2024-09-17 PROCEDURE — 99232 SBSQ HOSP IP/OBS MODERATE 35: CPT | Performed by: INTERNAL MEDICINE

## 2024-09-17 PROCEDURE — 99232 SBSQ HOSP IP/OBS MODERATE 35: CPT | Performed by: PHYSICIAN ASSISTANT

## 2024-09-17 PROCEDURE — 99239 HOSP IP/OBS DSCHRG MGMT >30: CPT | Performed by: HOSPITALIST

## 2024-09-17 RX ORDER — DICYCLOMINE HCL 20 MG
20 TABLET ORAL 4 TIMES DAILY PRN
Qty: 30 TABLET | Refills: 0 | Status: SHIPPED | OUTPATIENT
Start: 2024-09-17 | End: 2024-10-17

## 2024-09-17 RX ORDER — ESCITALOPRAM OXALATE 10 MG/1
10 TABLET ORAL EVERY EVENING
Qty: 30 TABLET | Refills: 0 | Status: SHIPPED | OUTPATIENT
Start: 2024-09-17 | End: 2024-09-24

## 2024-09-17 RX ORDER — SENNOSIDES A AND B 8.6 MG/1
8.6 TABLET, FILM COATED ORAL 2 TIMES DAILY
Qty: 60 TABLET | Refills: 0 | Status: SHIPPED | OUTPATIENT
Start: 2024-09-17

## 2024-09-17 NOTE — PROGRESS NOTES
[facility]  part of [facility]    Progress Note    Camelia Markham Patient Status:  Inpatient    1960 MRN [id number]   Location [facility] 4W/SW/SE Attending Feroz Henderson MD   Hosp Day # 4 PCP FINA MABRY, MD WENDY     Subjective:   Doing well and hoping to go home today. Complains of mild right sided chest discomfort. Overall pain well controlled with Norco. No shortness of breath. Appetite is improving. On room air and tolerating ambulation.    Objective:   Blood pressure 131/74, pulse 84, temperature 98.5 °F (36.9 °C), temperature source Oral, resp. rate 18, weight 170 lb 9.6 oz (77.4 kg), SpO2 91%.  Physical Exam  Vitals and nursing note reviewed.   Constitutional:       General: She is awake. She is not in acute distress.     Appearance: Normal appearance.   HENT:      Head: Normocephalic and atraumatic.   Cardiovascular:      Rate and Rhythm: Normal rate and regular rhythm.   Pulmonary:      Effort: Pulmonary effort is normal. No respiratory distress.      Breath sounds: Examination of the right-lower field reveals decreased breath sounds. Decreased breath sounds and rales (few right basilar crackles) present. No wheezing or rhonchi.      Comments: Right sided Pleurx  Abdominal:      General: There is no distension.      Palpations: Abdomen is soft.      Tenderness: There is no abdominal tenderness.   Musculoskeletal:      Cervical back: Normal range of motion and neck supple.      Right lower leg: No edema.      Left lower leg: No edema.   Skin:     General: Skin is warm and dry.   Neurological:      General: No focal deficit present.      Mental Status: She is alert and oriented to person, place, and time.   Psychiatric:         Mood and Affect: Mood normal.         Behavior: Behavior is cooperative.       Results:   Lab Results   Component Value Date    WBC 12.7 (H) 2024    HGB 11.0 (L) 2024    HCT 34.1 (L) 2024    .0 2024     CREATSERUM 0.91 09/16/2024    BUN 18 09/16/2024     09/16/2024    K 4.2 09/16/2024     09/16/2024    CO2 26.0 09/16/2024     (H) 09/16/2024    CA 9.0 09/16/2024    ALB 3.8 09/16/2024    ALKPHO 108 09/16/2024    BILT 1.1 09/16/2024    TP 7.3 09/16/2024    AST 12 09/16/2024    ALT 8 (L) 09/16/2024    PTT 26.0 12/17/2022    INR 0.98 03/13/2023    T4F 1.9 (H) 07/17/2024    TSH 0.048 (L) 07/17/2024    LIP 33 05/23/2024    DDIMER 0.55 05/23/2024    MG 2.4 09/16/2024    PHOS 4.2 09/16/2024       Assessment & Plan:   Recurrent malignant right pleural effusion  Pleural effusion re-occurred within 2 weeks and is c/w metastatic thyroid carcinoma  S/p R Pleurx placement 9/13  Drained -650 ml on 9/14  Pain is controlled with current regimen  On room air and did not desaturate with exertion  Plan:  -Drain R Pleurx today with Pleurx teaching  -Drain R Pleurx every 4 days up to 1.5 L  -Pain control and appreciate palliative care assistance  -Anticipate discharge home this afternoon after home health set up  -Follow up with Dr. Brooks in 1 month    DVT prophylaxis: Rajesh Patel PA-C  Pulmonary Medicine  9/17/2024

## 2024-09-17 NOTE — PLAN OF CARE
Problem: Patient Centered Care  Goal: Patient preferences are identified and integrated in the patient's plan of care  Description: Interventions:  - What would you like us to know as we care for you?   - Provide timely, complete, and accurate information to patient/family  - Incorporate patient and family knowledge, values, beliefs, and cultural backgrounds into the planning and delivery of care  - Encourage patient/family to participate in care and decision-making at the level they choose  - Honor patient and family perspectives and choices  Outcome: Progressing     Problem: Diabetes/Glucose Control  Goal: Glucose maintained within prescribed range  Description: INTERVENTIONS:  - Monitor Blood Glucose as ordered  - Assess for signs and symptoms of hyperglycemia and hypoglycemia  - Administer ordered medications to maintain glucose within target range  - Assess barriers to adequate nutritional intake and initiate nutrition consult as needed  - Instruct patient on self management of diabetes  Outcome: Progressing     Problem: RESPIRATORY - ADULT  Goal: Achieves optimal ventilation and oxygenation  Description: INTERVENTIONS:  - Assess for changes in respiratory status  - Assess for changes in mentation and behavior  - Position to facilitate oxygenation and minimize respiratory effort  - Oxygen supplementation based on oxygen saturation or ABGs  - Provide Smoking Cessation handout, if applicable  - Encourage broncho-pulmonary hygiene including cough, deep breathe, Incentive Spirometry  - Assess the need for suctioning and perform as needed  - Assess and instruct to report SOB or any respiratory difficulty  - Respiratory Therapy support as indicated  - Manage/alleviate anxiety  - Monitor for signs/symptoms of CO2 retention  Outcome: Progressing   Patient alert and oriented X 4. Denies SOB, RA, right plerux in place, right chest pain pain is managed with Norco PRN. Patient has been tolerating regular diet ,  constipation resolved after Fleets enema. Patient has been ambulating in hallway. Fall precautions in place. Plan for discharge home tomorrow with home health care services.

## 2024-09-17 NOTE — PROGRESS NOTES
Hematology/Oncology Progress note    Patient Name: Camelia Markham  Medical Record Number: [id number]    YOB: 1960   Date of Consultation: 9/14/2024   Physician requesting consultation: Dr. Gustafson    Reason for Consultation:  Camelia Markham was seen today for the diagnosis of metastatic thyroid cancer    Interval History  9/17 she is feeling little better.  No dyspnea.  Anticipating going home today.  Has some discomfort around the Pleurx catheter site.     9/16 She was hospitalized Friday with worsening pleural effusion.  Was seen by pulmonology and recommended a Pleurx with which she underwent Saturday with evacuation of 600 cc of fluid.  Has some pleuritic chest pain.  Very overwhelmed with her diagnosis.  Anticipates being discharged home today    Oncologic History:  11/3/2010 was found to have an enlarged thyroid gland ultrasound-guided FNA showed papillary thyroid cancer.  Underwent total thyroidectomy with Dr. Asher.  Final pathology showed a 2.5 cm tumor and 2 positive lymph nodes.     12/2010 BRIDGES uptake study showed no evidence of abnormal uptake outside the neck.  Underwent 207 miCu of radioactive iodine.  She had then been maintained on levothyroxine.  Subsequently was lost to follow-up after 2018.     6/17/2024 Thyroglobulin increased to 10, prompted an ultrasound that showed an oval hypoechoic nodule in the superior aspect of the left thyroid measuring 1 x 0.6 x 0.6 cm thought to represent an indeterminate lymph node.     8/6/2024 ultrasound-guided FNA showed papillary carcinoma.      8/16/2024 [facility]BS thyroid scan showed no discernible pathologic activity increased uptake in the thyroid bed.  Given the biopsy-proven recurrence and absence of I-131 uptake this was thought to represent dedifferentiated recurrent thyroid malignancy.     8/22/2024 PET/CT fusion study showed extensive hypermetabolic pleural-based nodularity, large pleural effusion as well as hypermetabolic nodularity in the  left neck soft tissues as well as peripheral right upper nodule all of which was concerning for metastatic disease.  Previously seen cystic pancreatic lesion did not demonstrate any hypermetabolic activity.      Past Medical History:  Past Medical History:    Anxiety state, unspecified    Cancer (HCC)    Colon adenomas    x3    Disorder of thyroid    thyroidectomy    Diverticulosis of large intestine    Esophageal reflux    Exposure to medical therapeutic radiation    thyroid    High blood pressure    High cholesterol    Hypercholesteremia    Hypothyroidism    Osteoarthrosis, unspecified whether generalized or localized, unspecified site    Thyroid cancer (HCC)    papillary thyroid; s/p surgery resection with Asher and BRIDGES    Unspecified essential hypertension       Past Surgical History:   Procedure Laterality Date    Colonoscopy N/A 2022    Procedure: COLONOSCOPY;  Surgeon: Neeraj No MD;  Location: [facility] ENDOSCOPY    Colonoscopy  2024    Colonoscopy N/A 2024    Procedure: COLONOSCOPY;  Surgeon: Neeraj No MD;  Location: [facility] ENDOSCOPY    Egd  2022    Egd  2024    Esophagogastroduodenoscopy    Endoscopic ultrasound - internal  2022    Endoscopic ultrasound exam  2024    Endoscopic Ultrasound    Eye surgery  2024    Eye surgery Left 2024    Hysterectomy      Knee surgery Left 2022    no associated bleeding complications          40 week 7 lb(s) 5 oz Male; 6 hr labor           40 week 7 lb(s) 3 oz Female          41 week 9 lb(s) 8 oz Male    Other surgical history      Injection Tendon Sheath, Ligament    Thyroidectomy      total    Total abdom hysterectomy         Home Medications:  Current Facility-Administered Medications on File Prior to Encounter   Medication Dose Route Frequency Provider Last Rate Last Admin    [COMPLETED] tetracaine (Pontocaine) 0.5 % ophthalmic solution 1 drop  1 drop Left Eye Once Jack  Jeremi MIRAMONTES MD   1 drop at 08/20/24 1240    [COMPLETED] diazePAM (Valium) tab 5 mg  5 mg Oral Once Jeremi Santiago MD   5 mg at 08/20/24 1250    [COMPLETED] phenylephrine/cyclopentolate/tropicamide ophthalmic mixture  1 drop Left Eye Q5 Min Jeremi Santiago MD   1 drop at 08/20/24 1250    [COMPLETED] diazePAM (Valium) tab 5 mg  5 mg Oral Once Jeremi Santiago MD   5 mg at 08/20/24 1309     Current Outpatient Medications on File Prior to Encounter   Medication Sig Dispense Refill    levothyroxine 150 MCG Oral Tab Take 1 tablet (150 mcg total) by mouth before breakfast.      ondansetron (ZOFRAN) 8 MG tablet Take 1 tablet (8 mg total) by mouth every 8 (eight) hours as needed for Nausea. 30 tablet 3    HYDROcodone-acetaminophen 5-325 MG Oral Tab Take 1-2 tablets by mouth every 6 (six) hours as needed for Pain. 60 tablet 0    ketorolac 0.5 % Ophthalmic Solution Place 1 drop into the left eye 4 (four) times daily.      prednisoLONE 1 % Ophthalmic Suspension Place 1 drop into the left eye 3 (three) times daily.      Olmesartan Medoxomil 40 MG Oral Tab Take 1 tablet (40 mg total) by mouth daily.      Omeprazole 40 MG Oral Capsule Delayed Release Take 1 capsule (40 mg total) by mouth every other day.      amLODIPine 10 MG Oral Tab Take 1 tablet (10 mg total) by mouth daily.      clonazePAM 2 MG Oral Tab Take 0.5 tablets (1 mg total) by mouth nightly.      Lenvatinib, 24 MG Daily Dose, (LENVIMA, 24 MG DAILY DOSE,) 2 x 10 MG & 4 MG Oral Capsule Therapy Pack Take 24 mg by mouth daily. (Patient not taking: Reported on 9/13/2024) 30 each 5    prochlorperazine (COMPAZINE) 10 mg tablet Take 1 tablet (10 mg total) by mouth every 6 (six) hours as needed for Nausea. 30 tablet 3    azelastine 0.1 % Nasal Solution 2 sprays by Nasal route 2 (two) times daily. 90 mL 1    sertraline 50 MG Oral Tab Take 1 tablet (50 mg total) by mouth daily.         Current Inpatient Medications:  Inpatient Meds:   escitalopram  10 mg Oral QPM     sennosides  8.6 mg Oral BID    enoxaparin  40 mg Subcutaneous Q24H    amLODIPine  10 mg Oral Daily    clonazePAM  2 mg Oral Nightly    levothyroxine  150 mcg Oral Before breakfast    losartan  100 mg Oral Daily    pantoprazole  40 mg Oral QAM AC         PRN Meds:    polyethylene glycol (PEG 3350)    acetaminophen    ondansetron    prochlorperazine    temazepam    morphINE **OR** morphINE **OR** morphINE    acetaminophen **OR** HYDROcodone-acetaminophen **OR** HYDROcodone-acetaminophen    dicyclomine    Allergies:   Allergies   Allergen Reactions    Latex HIVES    Peanut-Containing Drug Products SWELLING     Closes Throat  Closes Throat  Closes Throat      Peanuts SWELLING     Closes Throat    Adhesive Tape OTHER (SEE COMMENTS)    Seasonal        Psychosocial History:  Social History     Social History Narrative    Camelia Figueroa is  to Baldemar x30 yrs. She has 3 adult children. Patient works in [employer] in [place]. She lives with her , her mom, and 1 of the children in [place], [place].     Social History     Socioeconomic History    Marital status:    Tobacco Use    Smoking status: Never    Smokeless tobacco: Never   Vaping Use    Vaping status: Never Used   Substance and Sexual Activity    Alcohol use: Yes     Comment: social    Drug use: No   Other Topics Concern    Caffeine Concern Yes     Comment: 1 cup of coffee    Social History Narrative    Camelia Figueroa is  to Baldemar x30 yrs. She has 3 adult children. Patient works in [employer] in [employer] [place]. She lives with her , her mom, and 1 of the children in [place], [place].     Social Determinants of Health     Food Insecurity: No Food Insecurity (9/13/2024)    Food Insecurity     Food Insecurity: Never true   Transportation Needs: No Transportation Needs (9/13/2024)    Transportation Needs     Lack of Transportation: No   Housing Stability: Low Risk  (9/13/2024)    Housing Stability     Housing Instability: No       Family Medical  History:  Family History   Problem Relation Age of Onset    Heart Disorder Mother     Breast Cancer Maternal Cousin Female 57    Cancer Daughter 29        Hodgkin's Lymphoma    Ovarian Cancer Neg     Bleeding Disorders Neg     DVT/VTE Neg     Pancreatic Cancer Neg     Prostate Cancer Neg        Review of Systems:  A 10-point ROS was done with pertinent positives and negative per the HPI    Vital Signs:  Height: --  Weight: --  BSA (Calculated - sq m): --  Pulse: 80 (09/17 1304)  BP: 100/57 (09/17 1304)  Temp: --  Do Not Use - Resp Rate: --  SpO2: 90 % (09/17 1304)      Wt Readings from Last 6 Encounters:   09/13/24 77.4 kg (170 lb 9.6 oz)   09/13/24 78 kg (172 lb)   08/29/24 79.8 kg (176 lb)   08/29/24 80.2 kg (176 lb 12.8 oz)   08/28/24 79.4 kg (175 lb)   08/15/24 78.9 kg (174 lb)       ECOG PS: 0    Physical Examination:  /57 (BP Location: Right arm)   Pulse 80   Temp 98.5 °F (36.9 °C) (Oral)   Resp 18   Wt 77.4 kg (170 lb 9.6 oz)   SpO2 90%   BMI 31.20 kg/m²    General: Patient is alert and oriented, not in acute distress  Psych: Mood and affect are appropriate  Eyes: EOMI, PERRL  CV: Regular rate and rhythm, normal S1S2, no murmurs, no LE edema  Respiratory: Lungs clear to auscultation bilaterally  GI/Abd: Soft, non-tender with normoactive bowel sounds, no hepatosplenomegaly  Lymphatics: No palpable cervical, supraclavicular, axillary, or inguinal lymphadenopathy  Skin: no rashes or petechiae    Laboratory:  Recent Labs   Lab 09/14/24  0722 09/15/24  0624 09/16/24  0619   WBC 9.7 12.5* 12.7*   HGB 11.2* 11.4* 11.0*   HCT 36.0 36.5 34.1*   .0 263.0 253.0   MCV 95.5 94.1 92.4   RDW 14.2 14.0 13.7   NEPRELIM 6.13 8.19* 8.85*       Recent Labs   Lab 09/14/24  0722 09/15/24  0624 09/16/24  0619    136 136   K 4.6 4.5 4.2    104 104   CO2 24.0 27.0 26.0   BUN 18 18 18   CREATSERUM 0.90 1.00 0.91   * 113* 101*   CA 9.1 9.2 9.0   PHOS  --  4.6 4.2   TP  --   --  7.3   ALB  --  3.9  3.8   ALKPHO  --   --  108   AST  --   --  12   ALT  --   --  8*   BILT  --   --  1.1       No results for input(s): \"PT\", \"INR\", \"PTT\", \"FIB\" in the last 168 hours.    Imaging:    Chest x-ray reviewed.  Stable pleural effusion on the right side.    Impression & Plan:   64-year-old female with thyroid cancer s/p thyroidectomy followed by radioactive iodine and levothyroxine suppression with biopsy-proven recurrence including a pulmonary parenchyma and pleural nodules presenting with recurrent pleural effusion.  Patient had a negative BRIDGES uptake scan suggesting dedifferentiated thyroid cancer.    Right-sided malignant pleural effusion: From metastatic thyroid carcinoma.  Patient had Pleurx catheter placed on the right chest.  Patient had good drainage from her catheter.  Her shortness of breath is improved.Home care management to be arranged. Noted pulm recs for drainage q4days    Metastatic thyroid cancer  Patient to start on lenvatinib after discharge. Has had detailed drug education previously    CHARBEL: started lexapro, has lots of anxiety, seeing palliative care    Follow up next week as previously scheduled.     Inocente Chen MD  [facility] Hematology/Medical Oncology

## 2024-09-17 NOTE — PROCEDURES
Pulse oximetry without oxygen:    Before ambulating (at rest)- 92%   While ambulating- 93%  After ambulating (at rest)- 93%

## 2024-09-17 NOTE — DISCHARGE SUMMARY
[facility]  part of [facility]    Discharge Summary    Camelia Markham Patient Status:  Inpatient    1960 MRN [id number]   Location [facility] 4W/SW/SE Attending Feroz Henderson MD   Hosp Day # 4 PCP FINA MABRY, MD WENDY     Date of Admission: 2024 Disposition: Home or Self Care     Date of Discharge: 24.    Admitting Diagnosis: Pleural effusion [J90]    Hospital Discharge Diagnoses: Metastatic thyroid CA, malignant pleural effusion, pleurex catheter placement    Lace+ Score: 65  59-90 High Risk  29-58 Medium Risk  0-28   Low Risk.    TCM Follow-Up Recommendation:  LACE > 58: High Risk of readmission after discharge from the hospital.    Problem List:   Patient Active Problem List   Diagnosis    Thyroid cancer (HCC)    Hypothyroidism    Exposure to medical therapeutic radiation    Acute gastritis    Posterior tibial tendinitis of right leg    Acute tonsillitis    Backache    Bursitis of shoulder    Hypertensive disorder    Fatigue    Gastroesophageal reflux disease    Nasal mucosa dry    Nonspecific syndrome suggestive of viral illness    Psychophysiologic insomnia    Primary osteoarthritis of left knee    Internal derangement of left knee    Internal derangement of right knee    Other instability, right knee    Acute meniscal tear, medial    Abnormal gait    Heartburn    Anxiety state    Dysthymia    Primary osteoarthritis of right knee    Incontinence of feces    History of colon polyps    Cyst of pancreas (HCC)    Intestinal disaccharidase deficiency    Headache    Generalized anxiety disorder    Familial combined hyperlipidemia    Dyspnea    Diverticulitis    Disability of walking    Contact dermatitis    Calcaneal spur    Bilateral plantar fasciitis    Pain in thoracic spine    Obesity    Rogers's metatarsalgia    Mixed hyperlipidemia    Mass of uterine adnexa    Malaise and fatigue    Lumbosacral radiculopathy    Itching    Irritable bowel syndrome     Obesity with body mass index 30 or greater    Type 2 diabetes mellitus without complication (HCC)    Tinea corporis    Seasonal allergic rhinitis    Pyuria    Psoriasis    Posterior rhinorrhea    Pleuritic pain    Plantar fascial fibromatosis    Viral infection    Vitamin D deficiency    Osteoarthritis of knee    Malignant neoplasm of thyroid gland (HCC)    Influenza vaccine needed    Preoperative testing    Malignant neoplasm metastatic to right lung (HCC)    Pleural effusion    Malignant pleural effusion (HCC)    Goals of care, counseling/discussion    Advance care planning    Palliative care by specialist    Thyroid carcinoma (HCC)    Cancer related pain    Anxiety       Reason for Admission: Right-sided malignant pleural effusion, shortness of breath, and chest pain.     Physical Exam:   Vitals:    09/17/24 1304   BP: 100/57   Pulse: 80   Resp: 18   GENERAL:  NAD  SKIN:  Warm and hydrated  PSYCHIATRIC: Tearful, anxious    HEENT:  Head was atraumatic and normocephalic.  Eyes: Sclera was anicteric.  Pupils were equal.  Ears:  There were no lesions.  Nose:  No lesions were noted.      NECK:  Supple.  There was no JVD.    CHEST:  R chest pleurex  CARDIAC: S1 S2+, RRR  LUNGS: decreased air exchange in R lung fields  ABDOMEN: Non-distended, non-tender, BS+  MUSCULOSKELETAL:  There was no deformity.  There was full range of motion in all the extremities.    EXTREMITIES: There was no edema  NEUROLOGIC: Cranial nerves II-XII intact, no focal defecits    History of Present Illness:   Per Dr. Campos  Patient is a 64-year-old  female who was recently diagnosed with recurrent metastatic thyroid papillary carcinoma.  She had thoracentesis of her right pleural effusion on August 29, and 1 L was removed which was positive for malignancy.  Today, came to the emergency department for recurrent shortness of breath and right-sided chest discomfort.  She had an x-ray done today as an outpatient which showed large right  pleural effusion snf in the right hemithorax, increased from previous study.  Patient was evaluated by Pulmonology, and she will be taken to interventional radiology suite for right PleurX catheter placement.     Hospital Course:   R Malignant pleural effusion  Due to metastatic thyroid CA  IR placed CT in R chest  Repeat cxr reviewed  650 ml drained 9/14  Oncology and pulm were following     Anxiety  Depression  Pt no longer taking sertraline  Cont lexapro  Appreciate palliative care evaluation     Other issues  Papillary thyroid CA s/p total thyroidectomy in 2010  HL  HTN  GERD  Hypothyroidism       Consultations: Pulmonary, IR, Oncology, Palliative care    Procedures: see above    Complications: none    Discharge Condition: Stable    Discharge Medications:      Discharge Medications        START taking these medications        Instructions Prescription details   escitalopram 10 MG Tabs  Commonly known as: Lexapro      Take 1 tablet (10 mg total) by mouth every evening.   Quantity: 30 tablet  Refills: 0     sennosides 8.6 MG Tabs  Commonly known as: Senokot      Take 1 tablet (8.6 mg total) by mouth 2 (two) times daily.   Quantity: 60 tablet  Refills: 0            CONTINUE taking these medications        Instructions Prescription details   amLODIPine 10 MG Tabs  Commonly known as: Norvasc      Take 1 tablet (10 mg total) by mouth daily.   Refills: 0     azelastine 0.1 % Soln  Commonly known as: Astelin      2 sprays by Nasal route 2 (two) times daily.   Quantity: 90 mL  Refills: 1     clonazePAM 2 MG Tabs  Commonly known as: KlonoPIN      Take 0.5 tablets (1 mg total) by mouth nightly.   Refills: 0     dicyclomine 20 MG Tabs  Commonly known as: Bentyl      Take 1 tablet (20 mg total) by mouth 4 (four) times daily as needed.   Stop taking on: October 17, 2024  Quantity: 30 tablet  Refills: 0     HYDROcodone-acetaminophen 5-325 MG Tabs  Commonly known as: Norco      Take 1-2 tablets by mouth every 6 (six) hours  as needed for Pain.   Quantity: 60 tablet  Refills: 0     ketorolac 0.5 % Soln  Commonly known as: Acular      Place 1 drop into the left eye 4 (four) times daily.   Refills: 0     levothyroxine 150 MCG Tabs  Commonly known as: Synthroid      Take 1 tablet (150 mcg total) by mouth before breakfast.   Refills: 0     Olmesartan Medoxomil 40 MG Tabs  Commonly known as: BENICAR      Take 1 tablet (40 mg total) by mouth daily.   Refills: 0     Omeprazole 40 MG Cpdr      Take 1 capsule (40 mg total) by mouth every other day.   Refills: 0     ondansetron 8 MG tablet  Commonly known as: Zofran      Take 1 tablet (8 mg total) by mouth every 8 (eight) hours as needed for Nausea.   Quantity: 30 tablet  Refills: 3     prednisoLONE 1 % Susp  Commonly known as: Pred Forte      Place 1 drop into the left eye 3 (three) times daily.   Refills: 0     prochlorperazine 10 mg tablet  Commonly known as: Compazine      Take 1 tablet (10 mg total) by mouth every 6 (six) hours as needed for Nausea.   Quantity: 30 tablet  Refills: 3            STOP taking these medications      sertraline 50 MG Tabs  Commonly known as: Zoloft               ASK your doctor about these medications        Instructions Prescription details   Lenvima (24 MG Daily Dose) 2 x 10 MG & 4 MG Cppk  Generic drug: Lenvatinib (24 MG Daily Dose)      Take 24 mg by mouth daily.   Quantity: 30 each  Refills: 5               Where to Get Your Medications        These medications were sent to [id number] - [place], [place] - [address] AT [facility], [phone], [phone]  [address] [place] [id number]      Phone: [phone]   dicyclomine 20 MG Tabs  escitalopram 10 MG Tabs  sennosides 8.6 MG Tabs         Greater than 35 minutes spent, >50% spent counseling re: treatment plan and workup     Feroz Henderson MD  9/17/2024

## 2024-09-17 NOTE — PLAN OF CARE
Pt A/O x4, VSS. Tolerating diet. Accu-checks dc'd. [place] PRN for pain. Right pleurx drained today, no output. Educated pt on draining pleurx. Verbalized understanding. Up ad lashell. Voiding freely. All dc information given to pt, all questions answered. Verbalized understanding. Will be going home today.     Problem: Patient Centered Care  Goal: Patient preferences are identified and integrated in the patient's plan of care  Description: Interventions:  - What would you like us to know as we care for you?   - Provide timely, complete, and accurate information to patient/family  - Incorporate patient and family knowledge, values, beliefs, and cultural backgrounds into the planning and delivery of care  - Encourage patient/family to participate in care and decision-making at the level they choose  - Honor patient and family perspectives and choices  Outcome: Adequate for Discharge     Problem: Patient/Family Goals  Goal: Patient/Family Long Term Goal  Description: Patient's Long Term Goal: return home, start new chemotherapy    Interventions:  - discharge planning  - pleurex drain care  - See additional Care Plan goals for specific interventions  Outcome: Adequate for Discharge  Goal: Patient/Family Short Term Goal  Description: Patient's Short Term Goal: less pain, easier breathing    Interventions:   - maintain pleurex, drain as ordered  - oxygen as needed  - pain management, norco or morphine   - See additional Care Plan goals for specific interventions  Outcome: Adequate for Discharge     Problem: Diabetes/Glucose Control  Goal: Glucose maintained within prescribed range  Description: INTERVENTIONS:  - Monitor Blood Glucose as ordered  - Assess for signs and symptoms of hyperglycemia and hypoglycemia  - Administer ordered medications to maintain glucose within target range  - Assess barriers to adequate nutritional intake and initiate nutrition consult as needed  - Instruct patient on self management of  diabetes  Outcome: Adequate for Discharge     Problem: RESPIRATORY - ADULT  Goal: Achieves optimal ventilation and oxygenation  Description: INTERVENTIONS:  - Assess for changes in respiratory status  - Assess for changes in mentation and behavior  - Position to facilitate oxygenation and minimize respiratory effort  - Oxygen supplementation based on oxygen saturation or ABGs  - Provide Smoking Cessation handout, if applicable  - Encourage broncho-pulmonary hygiene including cough, deep breathe, Incentive Spirometry  - Assess the need for suctioning and perform as needed  - Assess and instruct to report SOB or any respiratory difficulty  - Respiratory Therapy support as indicated  - Manage/alleviate anxiety  - Monitor for signs/symptoms of CO2 retention  Outcome: Adequate for Discharge     Problem: SAFETY ADULT - FALL  Goal: Free from fall injury  Description: INTERVENTIONS:  - Assess pt frequently for physical needs  - Identify cognitive and physical deficits and behaviors that affect risk of falls.  - [place] fall precautions as indicated by assessment.  - Educate pt/family on patient safety including physical limitations  - Instruct pt to call for assistance with activity based on assessment  - Modify environment to reduce risk of injury  - Provide assistive devices as appropriate  - Consider OT/PT consult to assist with strengthening/mobility  - Encourage toileting schedule  Outcome: Adequate for Discharge     Problem: DISCHARGE PLANNING  Goal: Discharge to home or other facility with appropriate resources  Description: INTERVENTIONS:  - Identify barriers to discharge w/pt and caregiver  - Include patient/family/discharge partner in discharge planning  - Arrange for needed discharge resources and transportation as appropriate  - Identify discharge learning needs (meds, wound care, etc)  - Arrange for interpreters to assist at discharge as needed  - Consider post-discharge preferences of  patient/family/discharge partner  - Complete POLST form as appropriate  - Assess patient's ability to be responsible for managing their own health  - Refer to Case Management Department for coordinating discharge planning if the patient needs post-hospital services based on physician/LIP order or complex needs related to functional status, cognitive ability or social support system  Outcome: Adequate for Discharge

## 2024-09-17 NOTE — PALLIATIVE CARE NOTE
[facility]  part of [facility]  Palliative Care Progress Note    Camelia Markham Patient Status:  Inpatient    1960 MRN [id number]   Location [facility] 4W/SW/SE Attending Feroz Henderson MD   Hosp Day # 4 PCP FINA MABRY, MD WENDY     The  Cures Act makes medical notes like these available to patients in the interest of transparency. Please be advised this is a medical document. Medical documents are intended to carry relevant information, facts as evident, and the clinical opinion of the practitioner. The medical note is intended as peer to peer communication and may appear blunt or direct. It is written in medical language and may contain abbreviations or verbiage that are unfamiliar.       Subjective     Reviewed symptom medications past 24 hrs: Norco 5/325 mg po X1    Patient was seen and examined in bed with no family at the bedside. Pt is A&O X4 and very pleasant. She says she slept well overnight. Denies any dyspnea symptoms on RA. She is planning on getting pleurx teaching today with draining. She reports mild pain at pleurx site, but doesn't want any pain meds. Appetite is fair, denies abdominal pain, n/v and moved her bowels yesterday.     See summary of discussion below.     Review of Systems:  Pertinent items are noted in subjective.    Allergies:  Allergies   Allergen Reactions    Latex HIVES    Peanut-Containing Drug Products SWELLING     Closes Throat  Closes Throat  Closes Throat      Peanuts SWELLING     Closes Throat    Adhesive Tape OTHER (SEE COMMENTS)    Seasonal        Medications:     Current Facility-Administered Medications:     escitalopram (Lexapro) tab 10 mg, 10 mg, Oral, QPM    sennosides (Senokot) tab 8.6 mg, 8.6 mg, Oral, BID    polyethylene glycol (PEG 3350) (Miralax) 17 g oral packet 17 g, 17 g, Oral, Daily PRN    enoxaparin (Lovenox) 40 MG/0.4ML SUBQ injection 40 mg, 40 mg, Subcutaneous, Q24H    acetaminophen (Tylenol Extra  Strength) tab 500 mg, 500 mg, Oral, Q4H PRN    ondansetron (Zofran) 4 MG/2ML injection 4 mg, 4 mg, Intravenous, Q6H PRN    prochlorperazine (Compazine) 10 MG/2ML injection 5 mg, 5 mg, Intravenous, Q8H PRN    temazepam (Restoril) cap 15 mg, 15 mg, Oral, Nightly PRN    morphINE PF 2 MG/ML injection 1 mg, 1 mg, Intravenous, Q2H PRN **OR** morphINE PF 2 MG/ML injection 2 mg, 2 mg, Intravenous, Q2H PRN **OR** morphINE PF 4 MG/ML injection 4 mg, 4 mg, Intravenous, Q2H PRN    acetaminophen (Tylenol) tab 650 mg, 650 mg, Oral, Q4H PRN **OR** HYDROcodone-acetaminophen (Norco) 5-325 MG per tab 1 tablet, 1 tablet, Oral, Q4H PRN **OR** HYDROcodone-acetaminophen (Norco) 5-325 MG per tab 2 tablet, 2 tablet, Oral, Q4H PRN    amLODIPine (Norvasc) tab 10 mg, 10 mg, Oral, Daily    clonazePAM (KlonoPIN) tab 2 mg, 2 mg, Oral, Nightly    dicyclomine (Bentyl) tab 20 mg, 20 mg, Oral, QID PRN    levothyroxine (Synthroid) tab 150 mcg, 150 mcg, Oral, Before breakfast    losartan (Cozaar) tab 100 mg, 100 mg, Oral, Daily    pantoprazole (Protonix) DR tab 40 mg, 40 mg, Oral, QAM AC    Objective     Vital Signs:  Blood pressure 131/74, pulse 84, temperature 98.5 °F (36.9 °C), temperature source Oral, resp. rate 18, weight 170 lb 9.6 oz (77.4 kg), SpO2 91%.  Body mass index is 31.2 kg/m².  Present Level of pain: mild R pleurx site pain  Non-verbal signs of pain present: No    Physical Exam:  General: Alert and in no apparent distress.  HEENT: No focal deficits. +poor dentition  Cardiac: Regular rate and rhythm, S1, S2 normal, no murmur, rub or gallop.  Lungs: Diminished breath sounds bilaterally.  Normal excursions and effort. R pleurx site CDI  Abdomen: Soft, non-tender, normal bowel sounds X 4 quadrants, no rebound or guarding  Extremities: Without clubbing, cyanosis. Peripheral pulses are 2+. BLE Edema not present  Neurologic: Alert and oriented X4  Skin: Warm and dry.    Prior to admission Palliative performance scale PPSv2 (%): 70    Current  PPS 60%    Hematology:  Lab Results   Component Value Date    WBC 12.7 (H) 09/16/2024    HGB 11.0 (L) 09/16/2024    HCT 34.1 (L) 09/16/2024    .0 09/16/2024       Coags:  Lab Results   Component Value Date    INR 0.98 03/13/2023    PTT 26.0 12/17/2022       Chemistry:  Lab Results   Component Value Date    CREATSERUM 0.91 09/16/2024    BUN 18 09/16/2024     09/16/2024    K 4.2 09/16/2024     09/16/2024    CO2 26.0 09/16/2024     (H) 09/16/2024    CA 9.0 09/16/2024    ALB 3.8 09/16/2024    ALKPHO 108 09/16/2024    BILT 1.1 09/16/2024    TP 7.3 09/16/2024    AST 12 09/16/2024    ALT 8 (L) 09/16/2024    DDIMER 0.55 05/23/2024    MG 2.4 09/16/2024    PHOS 4.2 09/16/2024       Imaging:  XR CHEST PA + LAT CHEST (CPT=71046)    Result Date: 9/14/2024  CONCLUSION:   Large right pleural effusion with right lower lobe atelectasis or pneumonia has increased in size since 9/13/2024.  Small left lower lobe pleural effusion with left lower lobe atelectasis or pneumonia is new.  Enlarged cardiomediastinal silhouette, unchanged.      Dictated by (CST): Klaus Mandel MD on 9/14/2024 at 1:37 PM     Finalized by (CST): Klaus Mandel MD on 9/14/2024 at 1:39 PM           Follow up GOC Discussion      9/17/24-Provided clinical update to pt. She is hopeful to go home later today after pleurx teaching. She wants to continue with supportive care and she talked with me about her anticipatory worry about starting on cancer tx. 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 I encouraged pt to remain positive and take things one day at a time. Provided emotional support to pt who is coping adequately.    Discussed with Patient: yes  Patient's preference about sharing medical information: speak to pt and family  Patient's decision making preferences: speak to pt  Code status: Full code  Have advanced directives been discussed with patient or  healthcare power of : Yes        Healthcare Agent Appointed: Yes  Healthcare Agent's Name: Baldemar Markham  Healthcare Agent's Phone Number: [phone]          Spiritual needs addressed: Currently following    Palliative disposition: Outpatient Palliative Care      Palliative Care Assessment and Recommendations     Problem List:     Recurrent R malignant pleural effusion s/p Pleurx placement 9/13  Metastatic thyroid ca  Leukocytosis  HTN  HLD  GERD  Hypothyroidism     Cancer related pain  -Controlled, pt says she is very sensitive to opioids  -Continue Norco 5/325 mg 1-2 tabs po Q 4 hrs prn  -Continue Morphine 1,2,4 mg IVP Q 2 hrs prn BTP not controlled with orals.  -Continue splinting and heat packs prn  -Will continue to monitor pain symptoms closely.     Opioid-induced Constipation  -Resolved today after enema  -Continue Senna 2 tabs po BID and Miralax prn  -Education provided on need for ongoing bowel regimen to prevent OIC while on pain meds.     Anxiety/depression  -Continue Lexapro 10 mg po nightly  -Continue Klonopin 2 mg po nightly for sleep.  -Ongoing supportive care and encouraged consideration for counseling as OP.      Dyspnea  -Stable, improved after Pleurx placement  -Monitor, O2 prn, tx per pulm     Goals of care counseling  -see above for details  -Pt is full code; introduced importance of early code status discussions in the setting of advanced malignancy.  -Pt wants to continue with cancer tx as OP.  -Ongoing GOC discussions will be needed through her cancer journey.  -Pt has concerns about her job/finances and encouraged ongoing discussions with SW about FMLA/possible SSI disability applying.  -Agreeable to palliative care following  -Dispo:  Return home with HH. Encouraged follow up with Sugar KINCAID in OP palliative care clinic on 9/23 at 9a. SW to help with dc planning.   -Provided emotional support to pt who is coping adequately.     Advance care planning  -Pt has not  completed any advance directives in the past. Education provided on the importance of early completion.  -Pt's  Baldemar Markham is  surrogate #[phone].  -Provided copies of HCP[place] paperwork/[place] POLST [place] for review and discussed palliative care can assist with completion when ready.     Palliative Performance Scale 60%     Emotion support provided to patient/family today: Yes      A total of 25 mins were spent on this consult, which included all of the following: direct face to face contact, history taking, physical examination, and >50% was spent counseling and coordinating care.    Discussed today's visit with Laurence ISLAS, message to Dr. Henderson and Sugar KINCAID.    I will sign off as dc is anticipated.     Martina Merritt, ANP-BC, [facility] [id number]  9/17/2024  10:18 AM  Palliative Care Services

## 2024-09-17 NOTE — PLAN OF CARE
Patient's pain managed with [place]. She denies of n/v. Afebrile overnight. Pleurx drain clamped. Up standby and a walker to bathroom, voiding freely. Plan for home today. Safety precautions in place.     Problem: Patient Centered Care  Goal: Patient preferences are identified and integrated in the patient's plan of care  Description: Interventions:  - What would you like us to know as we care for you?   - Provide timely, complete, and accurate information to patient/family  - Incorporate patient and family knowledge, values, beliefs, and cultural backgrounds into the planning and delivery of care  - Encourage patient/family to participate in care and decision-making at the level they choose  - Honor patient and family perspectives and choices  Outcome: Progressing     Problem: Diabetes/Glucose Control  Goal: Glucose maintained within prescribed range  Description: INTERVENTIONS:  - Monitor Blood Glucose as ordered  - Assess for signs and symptoms of hyperglycemia and hypoglycemia  - Administer ordered medications to maintain glucose within target range  - Assess barriers to adequate nutritional intake and initiate nutrition consult as needed  - Instruct patient on self management of diabetes  Outcome: Progressing

## 2024-09-19 ENCOUNTER — TELEPHONE (OUTPATIENT)
Dept: HEMATOLOGY/ONCOLOGY | Facility: HOSPITAL | Age: 64
End: 2024-09-19

## 2024-09-19 ENCOUNTER — DOCUMENTATION ONLY (OUTPATIENT)
Dept: HEMATOLOGY/ONCOLOGY | Facility: HOSPITAL | Age: 64
End: 2024-09-19

## 2024-09-19 NOTE — PROGRESS NOTES
Called Camelia Figueroa to see how she was feeling after discharge. She is feeling tired but much better. She started her Lenvatinib on 9/18 and is tolerating well. Will come on 9/23 for follow up visit. She thanked me for the call.

## 2024-09-23 ENCOUNTER — OFFICE VISIT (OUTPATIENT)
Dept: HEMATOLOGY/ONCOLOGY | Facility: HOSPITAL | Age: 64
End: 2024-09-23
Attending: INTERNAL MEDICINE
Payer: COMMERCIAL

## 2024-09-23 ENCOUNTER — TELEPHONE (OUTPATIENT)
Dept: PULMONOLOGY | Facility: CLINIC | Age: 64
End: 2024-09-23

## 2024-09-23 ENCOUNTER — TELEPHONE (OUTPATIENT)
Dept: HEMATOLOGY/ONCOLOGY | Facility: HOSPITAL | Age: 64
End: 2024-09-23

## 2024-09-23 VITALS
OXYGEN SATURATION: 96 % | HEART RATE: 66 BPM | BODY MASS INDEX: 30.51 KG/M2 | HEIGHT: 62 IN | TEMPERATURE: 98 F | RESPIRATION RATE: 16 BRPM | SYSTOLIC BLOOD PRESSURE: 146 MMHG | DIASTOLIC BLOOD PRESSURE: 66 MMHG | WEIGHT: 165.81 LBS

## 2024-09-23 VITALS
HEART RATE: 66 BPM | RESPIRATION RATE: 16 BRPM | OXYGEN SATURATION: 96 % | BODY MASS INDEX: 30.51 KG/M2 | SYSTOLIC BLOOD PRESSURE: 146 MMHG | TEMPERATURE: 98 F | DIASTOLIC BLOOD PRESSURE: 66 MMHG | WEIGHT: 165.81 LBS | HEIGHT: 62 IN

## 2024-09-23 DIAGNOSIS — G89.3 CANCER RELATED PAIN: ICD-10-CM

## 2024-09-23 DIAGNOSIS — C78.01 MALIGNANT NEOPLASM METASTATIC TO RIGHT LUNG (HCC): Primary | ICD-10-CM

## 2024-09-23 DIAGNOSIS — C78.01 MALIGNANT NEOPLASM METASTATIC TO RIGHT LUNG (HCC): ICD-10-CM

## 2024-09-23 DIAGNOSIS — J90 PLEURAL EFFUSION: ICD-10-CM

## 2024-09-23 DIAGNOSIS — C73 THYROID CANCER (HCC): ICD-10-CM

## 2024-09-23 DIAGNOSIS — I10 HYPERTENSION, UNSPECIFIED TYPE: ICD-10-CM

## 2024-09-23 DIAGNOSIS — T40.2X5A THERAPEUTIC OPIOID INDUCED CONSTIPATION: ICD-10-CM

## 2024-09-23 DIAGNOSIS — Z51.5 PALLIATIVE CARE ENCOUNTER: Primary | ICD-10-CM

## 2024-09-23 DIAGNOSIS — Z51.81 THERAPEUTIC DRUG MONITORING: ICD-10-CM

## 2024-09-23 DIAGNOSIS — K59.03 THERAPEUTIC OPIOID INDUCED CONSTIPATION: ICD-10-CM

## 2024-09-23 DIAGNOSIS — R45.89 ANXIETY ABOUT HEALTH: ICD-10-CM

## 2024-09-23 DIAGNOSIS — Z71.89 GOALS OF CARE, COUNSELING/DISCUSSION: ICD-10-CM

## 2024-09-23 LAB
ALBUMIN SERPL-MCNC: 4.5 G/DL (ref 3.2–4.8)
ALBUMIN/GLOB SERPL: 1.1 {RATIO} (ref 1–2)
ALP LIVER SERPL-CCNC: 133 U/L
ALT SERPL-CCNC: 21 U/L
ANION GAP SERPL CALC-SCNC: 8 MMOL/L (ref 0–18)
AST SERPL-CCNC: 24 U/L (ref ?–34)
BASOPHILS # BLD AUTO: 0.09 X10(3) UL (ref 0–0.2)
BASOPHILS NFR BLD AUTO: 0.9 %
BILIRUB SERPL-MCNC: 0.7 MG/DL (ref 0.2–1.1)
BUN BLD-MCNC: 12 MG/DL (ref 9–23)
BUN/CREAT SERPL: 13.8 (ref 10–20)
CALCIUM BLD-MCNC: 9.5 MG/DL (ref 8.7–10.4)
CHLORIDE SERPL-SCNC: 108 MMOL/L (ref 98–112)
CO2 SERPL-SCNC: 25 MMOL/L (ref 21–32)
CREAT BLD-MCNC: 0.87 MG/DL
DEPRECATED RDW RBC AUTO: 42.4 FL (ref 35.1–46.3)
EGFRCR SERPLBLD CKD-EPI 2021: 75 ML/MIN/1.73M2 (ref 60–?)
EOSINOPHIL # BLD AUTO: 0.19 X10(3) UL (ref 0–0.7)
EOSINOPHIL NFR BLD AUTO: 1.9 %
ERYTHROCYTE [DISTWIDTH] IN BLOOD BY AUTOMATED COUNT: 12.9 % (ref 11–15)
GLOBULIN PLAS-MCNC: 4.1 G/DL (ref 2–3.5)
GLUCOSE BLD-MCNC: 108 MG/DL (ref 70–99)
HCT VFR BLD AUTO: 38.9 %
HGB BLD-MCNC: 12.9 G/DL
IMM GRANULOCYTES # BLD AUTO: 0.05 X10(3) UL (ref 0–1)
IMM GRANULOCYTES NFR BLD: 0.5 %
LYMPHOCYTES # BLD AUTO: 2.77 X10(3) UL (ref 1–4)
LYMPHOCYTES NFR BLD AUTO: 27.6 %
MCH RBC QN AUTO: 29.5 PG (ref 26–34)
MCHC RBC AUTO-ENTMCNC: 33.2 G/DL (ref 31–37)
MCV RBC AUTO: 88.8 FL
MONOCYTES # BLD AUTO: 0.62 X10(3) UL (ref 0.1–1)
MONOCYTES NFR BLD AUTO: 6.2 %
NEUTROPHILS # BLD AUTO: 6.31 X10 (3) UL (ref 1.5–7.7)
NEUTROPHILS # BLD AUTO: 6.31 X10(3) UL (ref 1.5–7.7)
NEUTROPHILS NFR BLD AUTO: 62.9 %
OSMOLALITY SERPL CALC.SUM OF ELEC: 292 MOSM/KG (ref 275–295)
PLATELET # BLD AUTO: 301 10(3)UL (ref 150–450)
POTASSIUM SERPL-SCNC: 4 MMOL/L (ref 3.5–5.1)
PROT SERPL-MCNC: 8.6 G/DL (ref 5.7–8.2)
RBC # BLD AUTO: 4.38 X10(6)UL
SODIUM SERPL-SCNC: 141 MMOL/L (ref 136–145)
WBC # BLD AUTO: 10 X10(3) UL (ref 4–11)

## 2024-09-23 PROCEDURE — 99214 OFFICE O/P EST MOD 30 MIN: CPT | Performed by: NURSE PRACTITIONER

## 2024-09-23 PROCEDURE — 86800 THYROGLOBULIN ANTIBODY: CPT

## 2024-09-23 PROCEDURE — 80053 COMPREHEN METABOLIC PANEL: CPT

## 2024-09-23 PROCEDURE — 36415 COLL VENOUS BLD VENIPUNCTURE: CPT

## 2024-09-23 PROCEDURE — 99215 OFFICE O/P EST HI 40 MIN: CPT | Performed by: INTERNAL MEDICINE

## 2024-09-23 PROCEDURE — 85025 COMPLETE CBC W/AUTO DIFF WBC: CPT

## 2024-09-23 NOTE — TELEPHONE ENCOUNTER
Dank Brooks, DO  You5 minutes ago (10:50 AM)   MA  Would like it drained every 4 days up to 1.5 liters maximum at each time    Thanks    Mike Brooks

## 2024-09-23 NOTE — PROGRESS NOTES
Palliative Care Follow Up Note     Patient Name: Camelia Markham   YOB: 1960   Medical Record Number: [id number]   Date of visit: 9/23/2024     The 21st Century Cures Act makes medical notes like these available to patients in the interest of transparency. Please be advised this is a medical document. Medical documents are intended to carry relevant information, facts as evident, and the clinical opinion of the practitioner. The medical note is intended as peer to peer communication and may appear blunt or direct. It is written in medical language and may contain abbreviations or verbiage that are unfamiliar.     Chief Complaint/Reason for Visit:  Chief Complaint   Patient presents with    Palliative Care        History of Present Illness:         Camelia Markham is a 64 year old female with  history of metastatic thyroid ca s/p thyroidectomy 2010, radioactive iodine tx with recent metastatic disease to lung, LN follows with Dr. Chen, hypothyroidism, HTN, HLD, GERD and anxiety.    Camelia Figueroa was recently hospitalized with SOB and cough caused by large R pleural effusion. R PleurX drain was placed during hospitalization on 9/13/2024. She was seen by inpatient Palliative team for cancer related pain and anxiety.     PCP: Dr. Marie  Onc: Dr. Chen    Patient seen and examined with no family present present today. Camelia Figueroa is awake, alert, oriented x 4, answers questions appropriately, is a reliable historian, and is in NAD today.    See ROS.     Problem List:  Patient Active Problem List   Diagnosis    Thyroid cancer (HCC)    Hypothyroidism    Exposure to medical therapeutic radiation    Acute gastritis    Posterior tibial tendinitis of right leg    Acute tonsillitis    Backache    Bursitis of shoulder    Hypertensive disorder    Fatigue    Gastroesophageal reflux disease    Nasal mucosa dry    Nonspecific syndrome suggestive of viral illness    Psychophysiologic insomnia    Primary osteoarthritis of left  knee    Internal derangement of left knee    Internal derangement of right knee    Other instability, right knee    Acute meniscal tear, medial    Abnormal gait    Heartburn    Anxiety state    Dysthymia    Primary osteoarthritis of right knee    Incontinence of feces    History of colon polyps    Cyst of pancreas (HCC)    Intestinal disaccharidase deficiency    Headache    Generalized anxiety disorder    Familial combined hyperlipidemia    Dyspnea    Diverticulitis    Disability of walking    Contact dermatitis    Calcaneal spur    Bilateral plantar fasciitis    Pain in thoracic spine    Obesity    Rogers's metatarsalgia    Mixed hyperlipidemia    Mass of uterine adnexa    Malaise and fatigue    Lumbosacral radiculopathy    Itching    Irritable bowel syndrome    Obesity with body mass index 30 or greater    Type 2 diabetes mellitus without complication (HCC)    Tinea corporis    Seasonal allergic rhinitis    Pyuria    Psoriasis    Posterior rhinorrhea    Pleuritic pain    Plantar fascial fibromatosis    Viral infection    Vitamin D deficiency    Osteoarthritis of knee    Malignant neoplasm of thyroid gland (HCC)    Influenza vaccine needed    Preoperative testing    Malignant neoplasm metastatic to right lung (HCC)    Pleural effusion    Malignant pleural effusion (HCC)    Goals of care, counseling/discussion    Advance care planning    Palliative care by specialist    Thyroid carcinoma (HCC)    Cancer related pain    Anxiety        Medical History:  Past Medical History:    Anxiety state, unspecified    Cancer (HCC)    Colon adenomas    x3    Disorder of thyroid    thyroidectomy    Diverticulosis of large intestine    Esophageal reflux    Exposure to medical therapeutic radiation    thyroid    High blood pressure    High cholesterol    Hypercholesteremia    Hypothyroidism    Osteoarthrosis, unspecified whether generalized or localized, unspecified site    Thyroid cancer (HCC)    papillary thyroid; s/p surgery  resection with Asher and BRIDGES    Unspecified essential hypertension       Surgical History:  Past Surgical History:   Procedure Laterality Date    Colonoscopy N/A 2022    Procedure: COLONOSCOPY;  Surgeon: Neeraj No MD;  Location: [facility] ENDOSCOPY    Colonoscopy  2024    Colonoscopy N/A 2024    Procedure: COLONOSCOPY;  Surgeon: Neeraj No MD;  Location: [facility] ENDOSCOPY    Egd  2022    Egd  2024    Esophagogastroduodenoscopy    Endoscopic ultrasound - internal  2022    Endoscopic ultrasound exam  2024    Endoscopic Ultrasound    Eye surgery  2024    Eye surgery Left 2024    Hysterectomy      Knee surgery Left 2022    no associated bleeding complications          40 week 7 lb(s) 5 oz Male; 6 hr labor           40 week 7 lb(s) 3 oz Female          41 week 9 lb(s) 8 oz Male    Other surgical history      Injection Tendon Sheath, Ligament    Thyroidectomy      total    Total abdom hysterectomy         Allergies:  Allergies   Allergen Reactions    Latex HIVES    Peanut-Containing Drug Products SWELLING     Closes Throat  Closes Throat  Closes Throat      Peanuts SWELLING     Closes Throat    Adhesive Tape OTHER (SEE COMMENTS)    Seasonal        Family History:  Family History   Problem Relation Age of Onset    Heart Disorder Mother     Breast Cancer Maternal Cousin Female 57    Cancer Daughter 29        Hodgkin's Lymphoma    Ovarian Cancer Neg     Bleeding Disorders Neg     DVT/VTE Neg     Pancreatic Cancer Neg     Prostate Cancer Neg        Social History:  Social History     Socioeconomic History    Marital status:    Tobacco Use    Smoking status: Never    Smokeless tobacco: Never   Vaping Use    Vaping status: Never Used   Substance and Sexual Activity    Alcohol use: Yes     Comment: social    Drug use: No       Medications:  Current Outpatient Medications   Medication Sig Dispense Refill    sennosides 8.6 MG  Oral Tab Take 1 tablet (8.6 mg total) by mouth 2 (two) times daily. 60 tablet 0    dicyclomine 20 MG Oral Tab Take 1 tablet (20 mg total) by mouth 4 (four) times daily as needed. 30 tablet 0    escitalopram 10 MG Oral Tab Take 1 tablet (10 mg total) by mouth every evening. 30 tablet 0    levothyroxine 150 MCG Oral Tab Take 1 tablet (150 mcg total) by mouth before breakfast.      Lenvatinib, 24 MG Daily Dose, (LENVIMA, 24 MG DAILY DOSE,) 2 x 10 MG & 4 MG Oral Capsule Therapy Pack Take 24 mg by mouth daily. 30 each 5    prochlorperazine (COMPAZINE) 10 mg tablet Take 1 tablet (10 mg total) by mouth every 6 (six) hours as needed for Nausea. 30 tablet 3    ondansetron (ZOFRAN) 8 MG tablet Take 1 tablet (8 mg total) by mouth every 8 (eight) hours as needed for Nausea. 30 tablet 3    HYDROcodone-acetaminophen 5-325 MG Oral Tab Take 1-2 tablets by mouth every 6 (six) hours as needed for Pain. 60 tablet 0    prednisoLONE 1 % Ophthalmic Suspension Place 1 drop into the left eye 3 (three) times daily.      Olmesartan Medoxomil 40 MG Oral Tab Take 1 tablet (40 mg total) by mouth daily.      Omeprazole 40 MG Oral Capsule Delayed Release Take 1 capsule (40 mg total) by mouth every other day.      amLODIPine 10 MG Oral Tab Take 1 tablet (10 mg total) by mouth daily.      clonazePAM 2 MG Oral Tab Take 0.5 tablets (1 mg total) by mouth nightly.         Review of Systems:  Review of Systems   Constitutional:  Positive for malaise/fatigue (Fatigue begins mid day; Pt reports that she has more energy in the mornings) and weight loss.   HENT: Negative.     Eyes: Negative.    Respiratory:  Negative for cough, hemoptysis, sputum production, shortness of breath and wheezing.         Cough and SOB have improved since PleurX placed    R PleurX in place - per Pt., PleurX catheter is drained ~Q4D by LINETTE ISLAS    During this past weekend, PleurX catheter was clogged; HH RN resolved the clog by milking the tube   Cardiovascular: Negative.  Negative  for chest pain, palpitations and leg swelling.   Gastrointestinal:  Negative for constipation (Denies; Senna-S or Miralax PRN).   Genitourinary: Negative.    Musculoskeletal:         Cancer related R chest wall pain, pleuritic type pain which radiates to her back, pain is sharp in nature when present    Denies pain today during visit    Takes half tab Norco 5/325mg at bedtime/PRN, tried not to take Norco during the day   Skin: Negative.    Neurological: Negative.    Psychiatric/Behavioral:  Negative for hallucinations, memory loss, substance abuse and suicidal ideas. The patient is nervous/anxious (Anxiety about health; has appt with Polina Kelly LCSW in early October; Takes Lexapro 10mg QHS and Klonopin 1mg QHS to help with sleep; Lexapro causes pt to feel \"foggy, so she takes at bedtime). The patient does not have insomnia.         Physical Examination:  Physical Exam  Vitals reviewed.   Constitutional:       Appearance: Normal appearance. She is not ill-appearing.   HENT:      Head: Normocephalic and atraumatic.      Right Ear: External ear normal.      Left Ear: External ear normal.      Nose: Nose normal.      Mouth/Throat:      Mouth: Mucous membranes are moist.      Pharynx: Oropharynx is clear.   Eyes:      General: No scleral icterus.     Conjunctiva/sclera: Conjunctivae normal.   Pulmonary:      Effort: Pulmonary effort is normal. No respiratory distress.      Comments: R PleurX drain present and covered in a dressing    Abdominal:      General: There is no distension.   Musculoskeletal:         General: Normal range of motion.      Cervical back: Normal range of motion and neck supple.      Right lower leg: No edema.      Left lower leg: No edema.   Skin:     General: Skin is warm and dry.      Coloration: Skin is not jaundiced or pale.   Neurological:      General: No focal deficit present.      Mental Status: She is alert and oriented to person, place, and time. Mental status is at baseline.       Motor: No weakness.      Gait: Gait normal.   Psychiatric:         Mood and Affect: Mood normal.         Behavior: Behavior normal.         Thought Content: Thought content normal.         Judgment: Judgment normal.       Palliative Care Goals of Care:  Discussed with patient/family today: Yes  Patient's preference about sharing medical information: Patient and family may receive information  Patient's decision making preferences: Fully involved and speak with family  Code status: FULL CODE  Have advanced directives been discussed with patient or healthcare power of : Yes                         Palliative Care Assessment/Plan:  1. Palliative care encounter    2. Anxiety about health    3. Cancer related pain    4. Therapeutic opioid induced constipation    5. Goals of care, counseling/discussion    6. Thyroid cancer (HCC)    7. Malignant neoplasm metastatic to right lung (HCC)        Cancer Related Pain  -Discussed addiction vs tolerance  -Reviewed IL   -Discussed MOA and explained side effects of pain medications  -Max daily Tylenol limit is 3,000mg  -Pt reports pain is controlled at this time  -Pt states she is very sensitive to opioids  -Continue Norco 5/325 mg 1-2 tabs po Q 4 hrs prn  -Continue splinting and heat packs prn  -Discussed indication for opioid medications for cancer related pain per NCCN guidelines  -Discussed common SE of opioid meds which include, but are not limited to: drowsiness, n/v, stomach upset, constipation  -Excessive or misuse of opioid medications may cause respiratory depression, CNS depression, and possibly death  -Discussed to NEVER self adjust pain med/opioid doses or stop these meds abruptly as this may lead to opioid withdrawal  -Discussed signs and symptoms of withdrawal which include, but are not limited to: rhinorrhea, diarrhea, irritability, chills, sweating, insomnia, mood swings, anxiety, increased pain      Therapeutic opioid induced constipation  -A side effect  of opioids is constipation   -Continue Senna 2 tabs po BID and Miralax prn   -Increase water intake  -Goal is for a bowel movement at least every other day  -HOLD Senna-S/Miralax for 1-2 doses if you are having loose stool or diarrhea     Anxiety about health  -CONTINUE Lexapro 10mg at bedtime  -CONTINUE Klonopin 1mg at bedtime/PRN  -[facility]  -Increased stress r/t illness, caring for her elderly Mother, fear of losing insurance if she is not able to continue working FT  -[facility]     Goals of care counseling/discussion  -Pt is FULL CODE  -Continue supportive medical treatments; pt plans to continue pursuing cancer treatment  -Patient is agreeable to hospitalization, if indicated  -Patient agreeable to following up with outpatient palliative care  -Provided emotional support to pt/family who appear to be coping adequately  -See above narrative for further details      Advance care planning counseling/discussion  -Pt is FULL CODE  -Health Surrogate: Baldemar Markham ()      Palliative Performance Scale 80%    Emotional support provided to patient/family: Yes    Palliative Care Follow-up:  I spent a total of  30 minutes with the patient today, which included all of the following:direct face to face contact, history taking, physical examination, and >50% was spent counseling and coordinating care.    Thank you for allowing the Palliative Care Team to participate in the care of your patient. I will continue to follow clinically.    KARELY SMITH, BEBE DNP, FNP-BC, [facility]  [phone]  9/23/2024

## 2024-09-23 NOTE — TELEPHONE ENCOUNTER
Patient aware of Dr. Brooks's response below. Reassured her. All of her concerns & questions were addressed at this time.

## 2024-09-23 NOTE — TELEPHONE ENCOUNTER
Camelia Figueroa had right PleurX catheter placement on 9/13/24. According to progress notes while inpatient by Marlo Patel PA-C dated 9/17/24 \"drain R Pleurx every 4 days up to 1.5 L\". Please see home care liaison notes 9/16/24- it appears that patient is receiving home health services through [facility].    Patient requests to know if Dr. Brooks wants her PleurX catheter drained by home health every 3 or 4 days. She states on the fourth day, she coagulated,  they used 3 bottles, home health ordered more bottles since she had 1 left, & they were able to drain PleurX catheter finally. Patient aware RN will follow up after discussing with provider.

## 2024-09-23 NOTE — TELEPHONE ENCOUNTER
Call received from [facility] that there was an interaction with Lenvantinib and Lexapro. Causing increased risk of QT prolongation. Informed Dr. Chen, he has requested Praveena Miller from palliative care to change his antidepressants.

## 2024-09-23 NOTE — TELEPHONE ENCOUNTER
Patient called to inquire about where to send some Family Medical Leave Act paperwork that she is getting from her HR department, after some research it is determined that patient is not being given the full packet from HR to complete/sign off by  Office, patient will provide her portion of paperwork back to HR along w/ the letter from the providers office explaining upcoming care/ chemo and other appointments and if they need anything additional she has information to give to them to send directly to us for processing... patient expressed understanding of next steps.

## 2024-09-23 NOTE — PROGRESS NOTES
[facility] Progress Note    Patient Name: Camelia Markham   YOB: 1960   Medical Record Number: [id number]   CSN: [id number]   Consulting Physician: Inocente Chen MD  Referring Physician(s): Dr Dana Sands MD  Date of Visit: 9/23/24    Reason for Consultation:  Metastatic  Thyroid Cancer   Cancer Staging   Malignant neoplasm of thyroid gland (HCC)  Staging form: Thyroid - Differentiated, AJCC 8th Edition  - Clinical: Stage IVB (rcT2, cN1, cM1, Age at diagnosis: >= 55 years) - Signed by Inocente Chen MD on 8/28/2024    Current Therapy  Lenvatinib - started 9/18/24    Interval History  Patient returns for follow-up.  Since her discharge she has been tolerating her lenvatinib quite well.  Had Pleurx drained on Saturday with 50 cc of fluid.  She has a follow-up with pulmonology in a few weeks.  No significant chest pain has had to take her Norco occasionally.  No significant diarrhea.  Blood pressure was elevated at PCPs office Sat but was normal today in the office at 146/66    History of Present Illness:   Camelia Markham is a 64 year old female that was seen today in the Cancer Center for metastatic thyroid cancer. Her oncologic history is detailed below    11/3/2010 was found to have an enlarged thyroid gland ultrasound-guided FNA showed papillary thyroid cancer.  Underwent total thyroidectomy with Dr. Asher.  Final pathology showed a 2.5 cm tumor and 2 positive lymph nodes.    12/2010 BRIDGES uptake study showed no evidence of abnormal uptake outside the neck.  Underwent 207 miCu of radioactive iodine.  She had then been maintained on levothyroxine.  Subsequently was lost to follow-up after 2018.    6/17/2024 Thyroglobulin increased to 10, prompted an ultrasound that showed an oval hypoechoic nodule in the superior aspect of the left thyroid measuring 1 x 0.6 x 0.6 cm thought to represent an indeterminate lymph node.    8/6/2024 ultrasound-guided FNA showed papillary carcinoma.   NGS  testing showed a BRAF [id number] mutation    8/16/2024 [facility]BS thyroid scan showed no discernible pathologic activity increased uptake in the thyroid bed.  Given the biopsy-proven recurrence and absence of I-131 uptake this was thought to represent dedifferentiated recurrent thyroid malignancy.      8/22/2024 PET/CT fusion study showed extensive hypermetabolic pleural-based nodularity, large pleural effusion as well as hypermetabolic nodularity in the left neck soft tissues as well as peripheral right upper nodule all of which was concerning for metastatic disease.  Previously seen cystic pancreatic lesion did not demonstrate any hypermetabolic activity.    8/29/24 had thoracentesis with cytology showing metastatic thyroid carcinoma.    9/15/24 Underwent pleur-X for recurrent pleural effusion.    9/18/24 started lenvatinib      Past Medical History:  Past Medical History:    Anxiety state, unspecified    Cancer (HCC)    Colon adenomas    x3    Disorder of thyroid    thyroidectomy    Diverticulosis of large intestine    Esophageal reflux    Exposure to medical therapeutic radiation    thyroid    High blood pressure    High cholesterol    Hypercholesteremia    Hypothyroidism    Osteoarthrosis, unspecified whether generalized or localized, unspecified site    Thyroid cancer (HCC)    papillary thyroid; s/p surgery resection with Asher and BRIDGES    Unspecified essential hypertension       Past Surgical History:  Past Surgical History:   Procedure Laterality Date    Colonoscopy N/A 12/29/2022    Procedure: COLONOSCOPY;  Surgeon: Neeraj No MD;  Location: [facility] ENDOSCOPY    Colonoscopy  07/29/2024    Colonoscopy N/A 07/29/2024    Procedure: COLONOSCOPY;  Surgeon: Neeraj No MD;  Location: [facility] ENDOSCOPY    Egd  12/29/2022    Egd  07/29/2024    Esophagogastroduodenoscopy    Endoscopic ultrasound - internal  12/29/2022    Endoscopic ultrasound exam  07/29/2024    Endoscopic Ultrasound    Eye surgery  06/2024    Eye  surgery Left 2024    Hysterectomy      Knee surgery Left 2022    no associated bleeding complications          40 week 7 lb(s) 5 oz Male; 6 hr labor           40 week 7 lb(s) 3 oz Female          41 week 9 lb(s) 8 oz Male    Other surgical history      Injection Tendon Sheath, Ligament    Thyroidectomy      total    Total abdom hysterectomy         Family Medical History:  Family History   Problem Relation Age of Onset    Heart Disorder Mother     Breast Cancer Maternal Cousin Female 57    Cancer Daughter 29        Hodgkin's Lymphoma    Ovarian Cancer Neg     Bleeding Disorders Neg     DVT/VTE Neg     Pancreatic Cancer Neg     Prostate Cancer Neg        Gyne History:  OB History    Para Term  AB Living   3 3 0 0 0 0   SAB IAB Ectopic Multiple Live Births   0 0 0 0 0       Social History:  Social History     Socioeconomic History    Marital status:      Spouse name: Not on file    Number of children: Not on file    Years of education: Not on file    Highest education level: Not on file   Occupational History    Not on file   Tobacco Use    Smoking status: Never    Smokeless tobacco: Never   Vaping Use    Vaping status: Never Used   Substance and Sexual Activity    Alcohol use: Yes     Comment: social    Drug use: No    Sexual activity: Not on file   Other Topics Concern     Service Not Asked    Blood Transfusions Not Asked    Caffeine Concern Yes     Comment: 1 cup of coffee     Occupational Exposure Not Asked    Hobby Hazards Not Asked    Sleep Concern Not Asked    Stress Concern Not Asked    Weight Concern Not Asked    Special Diet Not Asked    Back Care Not Asked    Exercise Not Asked    Bike Helmet Not Asked    Seat Belt Not Asked    Self-Exams Not Asked   Social History Narrative    Camelia Figueroa is  to Baldemar x30 yrs. She has 3 adult children. Patient works in accounting in book keeping. She lives with her , her mom, and 1 of the  children in [place], [place].     Social Determinants of Health     Financial Resource Strain: Not on file   Food Insecurity: No Food Insecurity (9/13/2024)    Food Insecurity     Food Insecurity: Never true   Transportation Needs: No Transportation Needs (9/13/2024)    Transportation Needs     Lack of Transportation: No     Car Seat: Not on file   Physical Activity: Not on file   Stress: Not on file   Social Connections: Not on file   Housing Stability: Low Risk  (9/13/2024)    Housing Stability     Housing Instability: No     Housing Instability Emergency: Not on file     Crib or Bassinette: Not on file       Allergies:   Allergies   Allergen Reactions    Latex HIVES    Peanut-Containing Drug Products SWELLING     Closes Throat  Closes Throat  Closes Throat      Peanuts SWELLING     Closes Throat    Adhesive Tape OTHER (SEE COMMENTS)    Seasonal        Current Medications:   sennosides 8.6 MG Oral Tab Take 1 tablet (8.6 mg total) by mouth 2 (two) times daily. 60 tablet 0    dicyclomine 20 MG Oral Tab Take 1 tablet (20 mg total) by mouth 4 (four) times daily as needed. 30 tablet 0    escitalopram 10 MG Oral Tab Take 1 tablet (10 mg total) by mouth every evening. 30 tablet 0    levothyroxine 150 MCG Oral Tab Take 1 tablet (150 mcg total) by mouth before breakfast.      Lenvatinib, 24 MG Daily Dose, (LENVIMA, 24 MG DAILY DOSE,) 2 x 10 MG & 4 MG Oral Capsule Therapy Pack Take 24 mg by mouth daily. 30 each 5    prochlorperazine (COMPAZINE) 10 mg tablet Take 1 tablet (10 mg total) by mouth every 6 (six) hours as needed for Nausea. 30 tablet 3    ondansetron (ZOFRAN) 8 MG tablet Take 1 tablet (8 mg total) by mouth every 8 (eight) hours as needed for Nausea. 30 tablet 3    HYDROcodone-acetaminophen 5-325 MG Oral Tab Take 1-2 tablets by mouth every 6 (six) hours as needed for Pain. 60 tablet 0    prednisoLONE 1 % Ophthalmic Suspension Place 1 drop into the left eye 3 (three) times daily.      Olmesartan Medoxomil 40 MG Oral Tab  Take 1 tablet (40 mg total) by mouth daily.      Omeprazole 40 MG Oral Capsule Delayed Release Take 1 capsule (40 mg total) by mouth every other day.      amLODIPine 10 MG Oral Tab Take 1 tablet (10 mg total) by mouth daily.      clonazePAM 2 MG Oral Tab Take 0.5 tablets (1 mg total) by mouth nightly.         Review of Systems:  A comprehensive 14 point review of systems was completed.  Pertinent positives and negatives noted in the the HPI.     Vital Signs:  /66 (BP Location: Left arm, Patient Position: Sitting, Cuff Size: large)   Pulse 66   Temp 98.2 °F (36.8 °C) (Oral)   Resp 16   Ht 1.575 m (5' 2\")   Wt 75.2 kg (165 lb 12.8 oz)   SpO2 96%   BMI 30.33 kg/m²    Physical Examination:  General: Patient is alert and oriented x 3, not in acute distress.  Psych:  oriented x 3, very anxious   HEENT: EOMs intact. PERRL. Oropharynx is clear.   Neck: No JVD. No palpable lymphadenopathy. Neck is supple.  Lymphatics: There is no palpable lymphadenopathy throughout in the cervical, supraclavicular, axillary, or inguinal regions.  Chest: Clear to auscultation. Right rib tenderness around 6th rib, decreased AE right base  Pleur-X in place right c/w  Heart: Regular rate and rhythm. S1S2 normal.  Extremities: No edema or calf tenderness.  Neurological: Grossly intact.     Performance Status:  ECOG-1    Labs:    Lab Results   Component Value Date/Time    WBC 10.0 09/23/2024 07:48 AM    RBC 4.38 09/23/2024 07:48 AM    HGB 12.9 09/23/2024 07:48 AM    HCT 38.9 09/23/2024 07:48 AM    MCV 88.8 09/23/2024 07:48 AM    MCH 29.5 09/23/2024 07:48 AM    MCHC 33.2 09/23/2024 07:48 AM    RDW 12.9 09/23/2024 07:48 AM    NEPRELIM 6.31 09/23/2024 07:48 AM    .0 09/23/2024 07:48 AM       Lab Results   Component Value Date/Time     (H) 09/23/2024 07:48 AM    BUN 12 09/23/2024 07:48 AM    CREATSERUM 0.87 09/23/2024 07:48 AM    GFRNAA 67 02/16/2021 12:09 PM    CA 9.5 09/23/2024 07:48 AM    ALB 4.5 09/23/2024 07:48 AM    NA  141 09/23/2024 07:48 AM    K 4.0 09/23/2024 07:48 AM     09/23/2024 07:48 AM    CO2 25.0 09/23/2024 07:48 AM    ALKPHO 133 (H) 09/23/2024 07:48 AM    AST 24 09/23/2024 07:48 AM    ALT 21 09/23/2024 07:48 AM       Imaging:  PET films reviewed personallly with pt/spouse -impression as above    Impression:      ICD-10-CM    1. Malignant neoplasm metastatic to right lung (HCC)  C78.01       2. Pleural effusion  J90       3. Cancer related pain  G89.3       64-year-old with a long history of thyroid cancer s/p thyroidectomy followed by BRIDGES and levothyroxine suppression now with biopsy-proven recurrence around the neck as well as pulmonary parenchymal mets and pleural nodules all worrisome for metastatic thyroid cancer.  BRIDGES uptake scan was negative suggesting dedifferentiated thyroid cancer.    I had a long discussion with patient explained that unfortunately this represents an incurable Stage IV malignancy.  However this is certainly very treatable with oral tyrosine kinase inhibitors with good disease control    Plan:  Recommended continued treatment with lenvatinib.  We discussed the anticipated side effects of this therapy including HTN, proteinuria, diarrhea skin rash etc.  Repeat thyroglobulin will monitor this throughout her treatment and plan reimaging in about 3 months.  continue hydrocodone for cancer related pain, if pain uncontrolled will refer to Radiation oncology for palliative XRT  Pleux-X drainage q3-4 days, has fu with pulmonology  RTC with ANP in 2 wks with repeat labs  HTN: well controlled today ,monitor      The diagnosis, prognosis, treatment goals, plan for treatment, and expected response was explained to the patient.     Thank you Dr Dana Gonsalves MD for the opportunity to participate in the care of this interesting patient. Please do contact me if I may be of any further assistance    Inocente Chen MD  [facility] Hematology/Oncology      Copy to:  Dr FINA MABRY, MD WENDY    High  complex MDM with complex diagnosis of thyroid cancer/malignant effusion curently on anti-cancer therapy

## 2024-09-24 ENCOUNTER — TELEPHONE (OUTPATIENT)
Dept: HEMATOLOGY/ONCOLOGY | Facility: HOSPITAL | Age: 64
End: 2024-09-24

## 2024-09-24 LAB
THYROGLOB AB: <1 IU/ML
THYROGLOB IMA: 8.7 NG/ML

## 2024-09-24 NOTE — TELEPHONE ENCOUNTER
Trudy, Pharmacist at [facility] called and stated Sugar Miller sent a prescription for Duloxetine and Trudy wanted to notify Praveena that pt has recently been prescribed  another medication Lexapro ordered by Dr. Henderson and there is a drug interaction    Attempted to reach Sugar Miller    Please give Trudy a call back

## 2024-09-24 NOTE — TELEPHONE ENCOUNTER
[facility] notified Dr. Chen that there is an interaction between Lenvatinib and Lexapro (risk for QT prolongation).    I spoke with Camelia Figueroa and advised her to discontinue Lexapro today and start Duloxetine 20mg daily.    There is no drug interactions between Duloxetine and Lenvatinib.       I explained the indications, MOA, and SE of Duloxetine with Lynne today.    Camelia Figueroa verbalized understanding and will start Duloxetine 20mg daily when she picks up the prescription.     Camelia Figueroa verbalized understanding of above conversation and plan.

## 2024-09-24 NOTE — TELEPHONE ENCOUNTER
I spoke with Pharmacist @ [facility]. Notified her that Lexapro has been discontinued and Duloxetine is to start today.     She verbalized understanding.

## 2024-09-30 ENCOUNTER — HOSPITAL ENCOUNTER (OUTPATIENT)
Dept: GENERAL RADIOLOGY | Facility: HOSPITAL | Age: 64
Discharge: HOME | End: 2024-09-30
Attending: INTERNAL MEDICINE
Payer: COMMERCIAL

## 2024-09-30 ENCOUNTER — TELEPHONE (OUTPATIENT)
Dept: PULMONOLOGY | Facility: CLINIC | Age: 64
End: 2024-09-30

## 2024-09-30 DIAGNOSIS — J90 PLEURAL EFFUSION: Primary | ICD-10-CM

## 2024-09-30 DIAGNOSIS — J90 PLEURAL EFFUSION: ICD-10-CM

## 2024-09-30 PROCEDURE — 71046 X-RAY EXAM CHEST 2 VIEWS: CPT | Performed by: INTERNAL MEDICINE

## 2024-09-30 NOTE — TELEPHONE ENCOUNTER
Patient states that home health RN has not been able to get much fluid out of pleurx cath.  Please call.

## 2024-09-30 NOTE — TELEPHONE ENCOUNTER
Dank Brooks, DO   to Me   MA    9/30/24 12:37 PM  Signed thanks    Patient notified and verbalized understanding

## 2024-09-30 NOTE — TELEPHONE ENCOUNTER
Todd, Dank Mckeon, DO   to Me   MA    9/30/24 12:05 PM  Can we order a CXR for her and if it looks better,  then can drain once a week basis. Maybe the drainage is decreasing as malignancy is being treated.    Dr Brooks- please sign pended order if agreeable

## 2024-09-30 NOTE — TELEPHONE ENCOUNTER
Patient states Pleurx catheter is being emptied every 4 days. Was 250ml on the 21st, then 75ml, 50ml and 25 ml yesterday. Patient denies any cough, chest pain or tightness. Patient complained of shortness of breath with stairs and talking. Oxygen saturations 96% RA. Pt states she developed a blister from tape around catheter.    Dr Brooks- patient asking if she needs a follow up xray or if the RN should only empty once a week?

## 2024-10-03 ENCOUNTER — OFFICE VISIT (OUTPATIENT)
Dept: PULMONOLOGY | Facility: CLINIC | Age: 64
End: 2024-10-03
Payer: COMMERCIAL

## 2024-10-03 VITALS
HEIGHT: 62 IN | OXYGEN SATURATION: 98 % | BODY MASS INDEX: 30.36 KG/M2 | WEIGHT: 165 LBS | HEART RATE: 79 BPM | DIASTOLIC BLOOD PRESSURE: 91 MMHG | SYSTOLIC BLOOD PRESSURE: 150 MMHG

## 2024-10-03 DIAGNOSIS — J91.0 MALIGNANT PLEURAL EFFUSION (HCC): Primary | ICD-10-CM

## 2024-10-03 PROCEDURE — 99213 OFFICE O/P EST LOW 20 MIN: CPT | Performed by: INTERNAL MEDICINE

## 2024-10-03 PROCEDURE — 3008F BODY MASS INDEX DOCD: CPT | Performed by: INTERNAL MEDICINE

## 2024-10-03 PROCEDURE — 3077F SYST BP >= 140 MM HG: CPT | Performed by: INTERNAL MEDICINE

## 2024-10-03 PROCEDURE — 3080F DIAST BP >= 90 MM HG: CPT | Performed by: INTERNAL MEDICINE

## 2024-10-03 NOTE — PROGRESS NOTES
Orders sent to [facility] for home health RN to drain right pluerx once weekly. Home health RN to notify office weekly of output and if drainage has decreased. Confirmation received

## 2024-10-03 NOTE — PROGRESS NOTES
Pulmonary Medicine Outpatient Clinic Note           Reason for Consultation and CC: abnormal chest CT and new right pleural effusion.    Referring Physician: Dr. Marie and Dr. Sands       Subjective:  Seen for f/u on 10/3/24.  Since the initial visit, underwent a right sided thoracentesis with pleural fluid c/w malignancy (thyroid cancer). Two weeks later was admitted to [facility] with shortness of breath and chest discomfort. CXR noted the effusion had re-accumulated so she had a pleurex catheter placed on 9/13/24. Was eventually discharged with home care to drain the effusion every 4 days. Also started on treatment Lenvatinib managed by oncology (Dr. Chen).  Now here for f/u. Pleurex drainage was 250ml on the 21st, then 75ml, then 50ml, and 25 ml two days ago. Repeat CXR showed improvement in the right pleural effusion.   Reports some discharge at the site but has since healed.   Reports back pain. Taking the pain medicine and using heat packs.       From the initial visit.    HPI: I had the pleasure of seeing Camelia Markham for a Pulmonary Medicine consultation on 8/29/24.  Very pleasant 63 yo woman with hx of metastatic thyroid CA s/p total thyroidectomy in 2010, s/p BIRDGES tx. Recently, thyroglobulin level increased and a thyroid US showed an oval thyroid nodule thought to be a indeterminante lymph node. 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 Most recent PET scan notable showed extensive hypermetabolic pleural-based nodularity, large right pleural effusion as well as hypermetabolic nodularity in the left neck soft tissues as well as peripheral right upper nodule all of which was concerning for metastatic disease. She has been experiencing right chest pain (under her right breast) for 2 months. She thought it was MSK from sleeping the wrong way.   Also reports shortness of breath at rest and with exertion over the past 2 weeks.    Reports she coughs sometimes and wheezes. Cough is dry.   Denies fevers, chills. Does endorse night-time upper neck sweating about twice a week.        REVIEW OF SYSTEMS:  Positives and negatives as stated in HPI. Remainder of 10 pt review of systems otherwise are negative.       PAST MEDICAL HISTORY:  Past Medical History:    Anxiety state, unspecified    Cancer (HCC)    Colon adenomas    x3    Disorder of thyroid    thyroidectomy    Diverticulosis of large intestine    Esophageal reflux    Exposure to medical therapeutic radiation    thyroid    High blood pressure    High cholesterol    Hypercholesteremia    Hypothyroidism    Osteoarthrosis, unspecified whether generalized or localized, unspecified site    Thyroid cancer (HCC)    papillary thyroid; s/p surgery resection with Asher and BRIDGES    Unspecified essential hypertension   Denies hx of previous pulmonary conditions       PAST SURGICAL HISTORY:  Past Surgical History:   Procedure Laterality Date    Colonoscopy N/A 2022    Procedure: COLONOSCOPY;  Surgeon: Neeraj No MD;  Location: [facility] ENDOSCOPY    Colonoscopy  2024    Colonoscopy N/A 2024    Procedure: COLONOSCOPY;  Surgeon: Neeraj No MD;  Location: [facility] ENDOSCOPY    Egd  2022    Egd  2024    Esophagogastroduodenoscopy    Endoscopic ultrasound - internal  2022    Endoscopic ultrasound exam  2024    Endoscopic Ultrasound    Eye surgery  2024    Eye surgery Left 2024    Hysterectomy      Knee surgery Left 2022    no associated bleeding complications          40 week 7 lb(s) 5 oz Male; 6 hr labor           40 week 7 lb(s) 3 oz Female          41 week 9 lb(s) 8 oz Male    Other surgical history      Injection Tendon Sheath, Ligament    Thyroidectomy      total    Total abdom hysterectomy          PAST FAMILY HISTORY:  Family History   Problem Relation Age of Onset    Heart Disorder Mother     Breast Cancer  Maternal Cousin Female 57    Cancer Daughter 29        Hodgkin's Lymphoma    Ovarian Cancer Neg     Bleeding Disorders Neg     DVT/VTE Neg     Pancreatic Cancer Neg     Prostate Cancer Neg         PAST SOCIAL HISTORY:  Social History     Socioeconomic History    Marital status:    Tobacco Use    Smoking status: Never    Smokeless tobacco: Never   Vaping Use    Vaping status: Never Used   Substance and Sexual Activity    Alcohol use: Yes     Comment: social    Drug use: No   Other Topics Concern    Caffeine Concern Yes     Comment: 1 cup of coffee    Never smoked  No hx of asbestos exposure  Lives with mom and   Has a dog  Employed: office setting environment      ALLERGIES:  Allergies   Allergen Reactions    Latex HIVES    Peanut-Containing Drug Products SWELLING     Closes Throat  Closes Throat  Closes Throat      Peanuts SWELLING     Closes Throat    Adhesive Tape OTHER (SEE COMMENTS)    Seasonal         MEDS:  Current Outpatient Medications on File Prior to Visit   Medication Sig Dispense Refill    DULoxetine 20 MG Oral Cap DR Particles Take 1 capsule (20 mg total) by mouth daily. 30 capsule 0    sennosides 8.6 MG Oral Tab Take 1 tablet (8.6 mg total) by mouth 2 (two) times daily. 60 tablet 0    dicyclomine 20 MG Oral Tab Take 1 tablet (20 mg total) by mouth 4 (four) times daily as needed. 30 tablet 0    levothyroxine 150 MCG Oral Tab Take 1 tablet (150 mcg total) by mouth before breakfast.      Lenvatinib, 24 MG Daily Dose, (LENVIMA, 24 MG DAILY DOSE,) 2 x 10 MG & 4 MG Oral Capsule Therapy Pack Take 24 mg by mouth daily. 30 each 5    prochlorperazine (COMPAZINE) 10 mg tablet Take 1 tablet (10 mg total) by mouth every 6 (six) hours as needed for Nausea. 30 tablet 3    ondansetron (ZOFRAN) 8 MG tablet Take 1 tablet (8 mg total) by mouth every 8 (eight) hours as needed for Nausea. 30 tablet 3    HYDROcodone-acetaminophen 5-325 MG Oral Tab Take 1-2 tablets by mouth every 6 (six) hours as needed for  Pain. 60 tablet 0    prednisoLONE 1 % Ophthalmic Suspension Place 1 drop into the left eye 3 (three) times daily.      Olmesartan Medoxomil 40 MG Oral Tab Take 1 tablet (40 mg total) by mouth daily.      Omeprazole 40 MG Oral Capsule Delayed Release Take 1 capsule (40 mg total) by mouth every other day.      amLODIPine 10 MG Oral Tab Take 1 tablet (10 mg total) by mouth daily.      clonazePAM 2 MG Oral Tab Take 0.5 tablets (1 mg total) by mouth nightly.       No current facility-administered medications on file prior to visit.       PHYSICAL EXAM:  BP (!) 150/91   Pulse 79   Ht 5' 2\" (1.575 m)   Wt 165 lb (74.8 kg)   SpO2 98%   BMI 30.18 kg/m²   CONSTITUTIONAL: alert, oriented, no apparent distress  HEENT: atraumatic normocephalic  MOUTH: mucous membranes are moist. No OP exudates  NECK/THROAT: no JVD. Trachea midline. No obvious thyromegaly  LUNG: clear b/l no w/r/r. Chest symmetric with respiratory motion. Right pleurex site healed, no erythema or drainage  HEART: regular rate and rhythm, no obvious murmers or gallops note  ABD: soft non tender. + bowel sounds. No organomegaly noted  EXT: no clubbing, cyanosis, or edema noted. Pulses intact grossly  NEURO/MUSCULOSKELETAL: no gross deficits  SKIN: warm, dry. No obvious lesions noted  LYMPH: no obvious LAD       IMAGES:  CXR 9/30/24  CONCLUSION:   Small right pleural effusion, moderately decreased from prior.   Decreased right basilar opacity, with persistent somewhat nodular opacity at the lateral right lung base, may in part relate to pleural metastatic disease.       CXR 9/14/24  CONCLUSION:   Large right pleural effusion with right lower lobe atelectasis or pneumonia has increased in size since 9/13/2024.   Small left lower lobe pleural effusion with left lower lobe atelectasis or pneumonia is new.   Enlarged cardiomediastinal silhouette, unchanged.       PET scan 8/2024  FINDINGS:  REFERENCE VALUES: The SUV max of the mediastinal blood pool is 3.3. The SUV  max of the liver is 4.3.  HEAD/NECK: In the left neck soft tissues, a 1.5 x 1.7 cm hypodensity is seen (series 11, image 39) with SUV max of 18.4, increasing to 18.8 on delayed head neck imaging. A 0.6 x 0.7 cm node in the left neck (series 3, image 50) demonstrates SUV 5.6,  increasing to 6.1.    Postoperative changes of thyroidectomy are demonstrated. There is metallic streak artifact from dental amalgam.  LUNGS/PLEURA: Extensive pleural nodularity is noted. A reference area of nodularity measuring 0.8 x 1.2 cm (series 3, image 109) has SUV max of 9.3. Hypermetabolic pleural nodularity is demonstrated along the right lateral pleural surfaces is seen with  SUV max of 6.7-7.1. Extensive nodularity along the minor fissure is demonstrated, measuring 2.7 x 5.2 cm in aggregate (series 3, image 135) with SUV max of 8.9-10.7. Posteriorly along the minor fissure, there is hypermetabolic activity with SUV max of  8.3. Anterior pleural nodularity measures up to 4.6 x 1.8 cm (series 3, image 161) and has SUV max of 12.3. Posterior pleural nodularity has SUV max of 7.8. Extensive nodularity extending into the right costophrenic sulcus has SUV max of 7.7-9.1.  A peripheral right upper lobe nodule measures 1.4 x 1.1 cm (series 3, image 111) with SUV max of 6.1.  Large right pleural effusion is seen with associated compressive atelectasis, with or without superimposed airspace consolidation. Additional scattered ground-glass and reticular opacities are present and may be atelectatic in origin.  MEDIASTINUM/SHAZIA: Epicardial nodularity is seen with reference nodules measuring 1.1 x 1.2 cm and 1.9 x 1.5 cm (series 3, image 54). The SUV max in this region is 4.1-5.3. Posterior and lateral epicardial activity is seen with SUV max of 6.2.  Periesophageal activity is seen with SUV max of 5.7.  CHEST WALL/AXILLA: No pathologic FDG activity.    ABDOMEN/PELVIS: Nonspecific low density of the hepatic parenchyma may represent underlying  hepatic steatosis. The gallbladder is surgically absent with cholecystectomy clips in the gallbladder fossa. There is mild diffuse fatty replacement of the  pancreas. A lobulated cystic pancreatic lesion the body measures 1.9 x 3.6 cm (series 23, image 182). Scattered colonic diverticula are present in the sigmoid colon. There is no colonic wall thickening or pericolonic fat stranding. Scattered  atherosclerotic vascular calcifications of the abdominal aorta are observed. The uterus is surgically absent. No pathologic FDG activity.    MUSCULOSKELETAL: There are degenerative changes of the shoulders bilaterally.  Degenerative changes of the hips are present bilaterally.  Postprocedural changes of prior right femoral intramedullary carmella and nail placement are partially delineated with  resultant artifactual degradation. No pathologic FDG activity. No suspicious lesions on CT imaging.    OTHER: There is a minute fat-containing umbilical hernia. There is transversely oriented lower abdominopelvic wall scarring, suggestive of prior Pfannenstiel incision.  Small bilateral fat containing inguinal hernias are suspected.    Impression   1. Extensive hypermetabolic pleural-based nodularity is compatible with metastatic disease. Given the absence of activity on prior I-131 imaging, these findings are strongly suggestive of dedifferentiated metastatic thyroid malignancy.  2. Large right pleural effusion, likely malignant.  3. Hypermetabolic nodularity in the left neck soft tissues and involving a left-sided cervical node, consistent with metastatic disease.  4. A hypermetabolic peripheral right upper lobe nodule is concerning for pulmonary parenchymal metastasis.  5. Cystic pancreatic lesion without discernible hypermetabolic activity.  6. Uncomplicated distal colonic diverticulosis.    7. Hepatic steatosis.  8. Status post cholecystectomy.  9. Postoperative changes of hysterectomy.    10. Lesser incidental findings as above.         LABS:  Lab Results   Component Value Date    WBC 10.0 09/23/2024    RBC 4.38 09/23/2024    HGB 12.9 09/23/2024    HCT 38.9 09/23/2024    MCV 88.8 09/23/2024    MCH 29.5 09/23/2024    MCHC 33.2 09/23/2024    MPV 9.9 04/08/2011     No results for input(s): \"GLU\", \"BUN\", \"CREATSERUM\", \"GFRAA\", \"GFRNAA\", \"EGFRCR\", \"CA\", \"ALB\", \"NA\", \"K\", \"CL\", \"CO2\", \"ALKPHO\", \"AST\", \"ALT\", \"BILT\", \"TP\" in the last 168 hours.       PFTS none on record       ASSESSMENT/PLAN:  1.Malignant right pleural effusion and pleural mets c/w metastatic thyroid CA  -S/p pleurex catheter placement  -Drainage has decreased significantly. Will ask HomeCare to drain once a week from here on and update us on drainage amount  -Site looks healed and dry  -Pleural mets likely the cause of her chronic back and chest pain. Was seen by Palliative team while hospitalized. Recommend she contact them for pain control input  -Follow up with Oncology.    RTC in 3 months.        Thank you for the opportunity to care for Camelia Markham.     I spent a total of 25 minutes in direct contact with the patient and reviewing pertinent outside records on the day of the encounter.     ALDA Brooks DO, MPH  Pulmonary Critical Care Medicine

## 2024-10-03 NOTE — PATIENT INSTRUCTIONS
We will send orders to home care to have the right pleurex catheter drained once a week.     Call the Palliative Care Team to help with the back pain which is likely coming from the pleural metastasis.    I will send my note to the Oncology Team.     Come back in 3 months.

## 2024-10-04 NOTE — PROGRESS NOTES
I spoke with Camelia Figueroa via phone re: continued cancer related pain. Camelia Figueroa reports that she is experiencing intermittent sharp cancer related mid back pain and occasional pain around her PleurX cath site. Pain is worse at night and wakes her up at times. She states that she is not able to get comfortable when she is in bed because of her back pain. She denies fever, chills, changes in bowel/bladder, saddle anesthesia.    Camelia Figueroa reports that OTC ES Tylenol 500-1000mg does not reduce/relive pain. She takes Norco 5/325mg at bedtime occasionally (few tomes per week, not daily) which does reduce pain enough to help her sleep well. However, Camelia Figueroa does not want to take Norco because it makes her feel tired in the morning. She has not taken any OTC or prescription pain meds in 2 days.     I discussed other pain med regimen options which include:  -ES Tylenol 500-1000mg Q6H/PRN for mild cancer pain; MAX daily Tylenol dose is 3,000mg/day  -Ibuprofen 400-600mg Q8H/PRN for moderate cancer pain/MAS daily dose is 1800mg/day  -Norco 5/325mg Q6H/PRN for moderate - severe cancer related pain; MAX daily dose is 6 tabs/day  -Offered to start Tramadol instead of Norco  -OK to alternate ES Tylenol and Ibuprofen  -Recommended taking pain meds with a snack to avoid stomach upset    Camelia Figueroa hesitant and not sure if she wants to take any more meds than she has to. I explained the importance of controlling cancer related pain so her performance/functional status does not decline. I also explained that it seems her pain is affecting her QOL - she is unable to work, unable to sleep well, and is anxious that pain is not controlled. I explained the importance of not chasing pain, but staying on top of it and taking pain meds if needed.     Camelia Figueroa was thankful for information and states that she will think about the options discussed above and will be in to see me in the office on Tuesday, October 8th.     Will forward to team.

## 2024-10-08 ENCOUNTER — NURSE ONLY (OUTPATIENT)
Dept: HEMATOLOGY/ONCOLOGY | Facility: HOSPITAL | Age: 64
End: 2024-10-08
Attending: INTERNAL MEDICINE
Payer: COMMERCIAL

## 2024-10-08 VITALS
OXYGEN SATURATION: 97 % | DIASTOLIC BLOOD PRESSURE: 72 MMHG | BODY MASS INDEX: 29.81 KG/M2 | RESPIRATION RATE: 16 BRPM | SYSTOLIC BLOOD PRESSURE: 143 MMHG | HEIGHT: 62 IN | HEART RATE: 77 BPM | WEIGHT: 162 LBS | TEMPERATURE: 98 F

## 2024-10-08 VITALS
HEART RATE: 77 BPM | RESPIRATION RATE: 16 BRPM | OXYGEN SATURATION: 97 % | WEIGHT: 162 LBS | TEMPERATURE: 98 F | DIASTOLIC BLOOD PRESSURE: 72 MMHG | BODY MASS INDEX: 29.81 KG/M2 | HEIGHT: 62 IN | SYSTOLIC BLOOD PRESSURE: 143 MMHG

## 2024-10-08 DIAGNOSIS — T40.2X5A THERAPEUTIC OPIOID INDUCED CONSTIPATION: ICD-10-CM

## 2024-10-08 DIAGNOSIS — R45.89 ANXIETY ABOUT HEALTH: ICD-10-CM

## 2024-10-08 DIAGNOSIS — J90 PLEURAL EFFUSION: ICD-10-CM

## 2024-10-08 DIAGNOSIS — G89.3 CANCER RELATED PAIN: ICD-10-CM

## 2024-10-08 DIAGNOSIS — Z51.81 THERAPEUTIC DRUG MONITORING: ICD-10-CM

## 2024-10-08 DIAGNOSIS — Z71.89 GOALS OF CARE, COUNSELING/DISCUSSION: ICD-10-CM

## 2024-10-08 DIAGNOSIS — C78.01 MALIGNANT NEOPLASM METASTATIC TO RIGHT LUNG (HCC): ICD-10-CM

## 2024-10-08 DIAGNOSIS — K59.03 THERAPEUTIC OPIOID INDUCED CONSTIPATION: ICD-10-CM

## 2024-10-08 DIAGNOSIS — C73 THYROID CANCER (HCC): Primary | ICD-10-CM

## 2024-10-08 DIAGNOSIS — C73 THYROID CANCER (HCC): ICD-10-CM

## 2024-10-08 DIAGNOSIS — Z51.5 PALLIATIVE CARE ENCOUNTER: Primary | ICD-10-CM

## 2024-10-08 LAB
ALBUMIN SERPL-MCNC: 4.3 G/DL (ref 3.2–4.8)
ALBUMIN/GLOB SERPL: 1.1 {RATIO} (ref 1–2)
ALP LIVER SERPL-CCNC: 133 U/L
ALT SERPL-CCNC: 25 U/L
ANION GAP SERPL CALC-SCNC: 5 MMOL/L (ref 0–18)
AST SERPL-CCNC: 23 U/L (ref ?–34)
BASOPHILS # BLD AUTO: 0.04 X10(3) UL (ref 0–0.2)
BASOPHILS NFR BLD AUTO: 0.6 %
BILIRUB SERPL-MCNC: 1.2 MG/DL (ref 0.2–1.1)
BUN BLD-MCNC: 20 MG/DL (ref 9–23)
BUN/CREAT SERPL: 23.8 (ref 10–20)
CALCIUM BLD-MCNC: 9.5 MG/DL (ref 8.7–10.4)
CHLORIDE SERPL-SCNC: 108 MMOL/L (ref 98–112)
CO2 SERPL-SCNC: 28 MMOL/L (ref 21–32)
CREAT BLD-MCNC: 0.84 MG/DL
DEPRECATED RDW RBC AUTO: 42.8 FL (ref 35.1–46.3)
EGFRCR SERPLBLD CKD-EPI 2021: 78 ML/MIN/1.73M2 (ref 60–?)
EOSINOPHIL # BLD AUTO: 0.28 X10(3) UL (ref 0–0.7)
EOSINOPHIL NFR BLD AUTO: 3.9 %
ERYTHROCYTE [DISTWIDTH] IN BLOOD BY AUTOMATED COUNT: 13.6 % (ref 11–15)
GLOBULIN PLAS-MCNC: 3.9 G/DL (ref 2–3.5)
GLUCOSE BLD-MCNC: 122 MG/DL (ref 70–99)
HCT VFR BLD AUTO: 39.6 %
HGB BLD-MCNC: 13.1 G/DL
IMM GRANULOCYTES # BLD AUTO: 0.02 X10(3) UL (ref 0–1)
IMM GRANULOCYTES NFR BLD: 0.3 %
LYMPHOCYTES # BLD AUTO: 2.16 X10(3) UL (ref 1–4)
LYMPHOCYTES NFR BLD AUTO: 30.2 %
MCH RBC QN AUTO: 29 PG (ref 26–34)
MCHC RBC AUTO-ENTMCNC: 33.1 G/DL (ref 31–37)
MCV RBC AUTO: 87.6 FL
MONOCYTES # BLD AUTO: 0.42 X10(3) UL (ref 0.1–1)
MONOCYTES NFR BLD AUTO: 5.9 %
NEUTROPHILS # BLD AUTO: 4.24 X10 (3) UL (ref 1.5–7.7)
NEUTROPHILS # BLD AUTO: 4.24 X10(3) UL (ref 1.5–7.7)
NEUTROPHILS NFR BLD AUTO: 59.1 %
OSMOLALITY SERPL CALC.SUM OF ELEC: 296 MOSM/KG (ref 275–295)
PLATELET # BLD AUTO: 175 10(3)UL (ref 150–450)
POTASSIUM SERPL-SCNC: 3.9 MMOL/L (ref 3.5–5.1)
PROT SERPL-MCNC: 8.2 G/DL (ref 5.7–8.2)
RBC # BLD AUTO: 4.52 X10(6)UL
SODIUM SERPL-SCNC: 141 MMOL/L (ref 136–145)
WBC # BLD AUTO: 7.2 X10(3) UL (ref 4–11)

## 2024-10-08 PROCEDURE — 99214 OFFICE O/P EST MOD 30 MIN: CPT | Performed by: NURSE PRACTITIONER

## 2024-10-08 PROCEDURE — 80053 COMPREHEN METABOLIC PANEL: CPT

## 2024-10-08 PROCEDURE — 99215 OFFICE O/P EST HI 40 MIN: CPT | Performed by: NURSE PRACTITIONER

## 2024-10-08 PROCEDURE — 84432 ASSAY OF THYROGLOBULIN: CPT

## 2024-10-08 PROCEDURE — 36415 COLL VENOUS BLD VENIPUNCTURE: CPT

## 2024-10-08 PROCEDURE — 86800 THYROGLOBULIN ANTIBODY: CPT

## 2024-10-08 PROCEDURE — 85025 COMPLETE CBC W/AUTO DIFF WBC: CPT

## 2024-10-08 NOTE — PROGRESS NOTES
Palliative Care Follow Up Note     Patient Name: Camelia Markham   YOB: 1960   Medical Record Number: [id number]   Date of visit: 10/8/2024     The 21st Century Cures Act makes medical notes like these available to patients in the interest of transparency. Please be advised this is a medical document. Medical documents are intended to carry relevant information, facts as evident, and the clinical opinion of the practitioner. The medical note is intended as peer to peer communication and may appear blunt or direct. It is written in medical language and may contain abbreviations or verbiage that are unfamiliar.     Chief Complaint/Reason for Visit:  Chief Complaint   Patient presents with    Palliative Care        History of Present Illness:         Camelia Markham is a 64 year old female with  history of metastatic thyroid ca s/p thyroidectomy 2010, radioactive iodine tx with recent metastatic disease to lung, LN follows with Dr. Chen, hypothyroidism, HTN, HLD, GERD and anxiety.    Camelia Figueroa was recently hospitalized with SOB and cough caused by large R pleural effusion. R PleurX drain was placed during hospitalization on 9/13/2024. She was seen by inpatient Palliative team for cancer related pain and anxiety.     PCP: Dr. Marie  Onc: Dr. Chen    Patient seen and examined with no family present present today. Camelia Figueroa is awake, alert, oriented x 4, answers questions appropriately, is a reliable historian, and is in NAD today.    Simi is on a leave from work. Her  was laid off from his job late last week. Camelia Figueroa is concerned about her finances.     See ROS.     Problem List:  Patient Active Problem List   Diagnosis    Thyroid cancer (HCC)    Hypothyroidism    Exposure to medical therapeutic radiation    Acute gastritis    Posterior tibial tendinitis of right leg    Acute tonsillitis    Backache    Bursitis of shoulder    Hypertensive disorder    Fatigue    Gastroesophageal reflux disease     Nasal mucosa dry    Nonspecific syndrome suggestive of viral illness    Psychophysiologic insomnia    Primary osteoarthritis of left knee    Internal derangement of left knee    Internal derangement of right knee    Other instability, right knee    Acute meniscal tear, medial    Abnormal gait    Heartburn    Anxiety state    Dysthymia    Primary osteoarthritis of right knee    Incontinence of feces    History of colon polyps    Cyst of pancreas (HCC)    Intestinal disaccharidase deficiency    Headache    Generalized anxiety disorder    Familial combined hyperlipidemia    Dyspnea    Diverticulitis    Disability of walking    Contact dermatitis    Calcaneal spur    Bilateral plantar fasciitis    Pain in thoracic spine    Obesity    Rogers's metatarsalgia    Mixed hyperlipidemia    Mass of uterine adnexa    Malaise and fatigue    Lumbosacral radiculopathy    Itching    Irritable bowel syndrome    Obesity with body mass index 30 or greater    Type 2 diabetes mellitus without complication (HCC)    Tinea corporis    Seasonal allergic rhinitis    Pyuria    Psoriasis    Posterior rhinorrhea    Pleuritic pain    Plantar fascial fibromatosis    Viral infection    Vitamin D deficiency    Osteoarthritis of knee    Malignant neoplasm of thyroid gland (HCC)    Influenza vaccine needed    Preoperative testing    Malignant neoplasm metastatic to right lung (HCC)    Pleural effusion    Malignant pleural effusion (HCC)    Goals of care, counseling/discussion    Advance care planning    Palliative care by specialist    Thyroid carcinoma (HCC)    Cancer related pain    Anxiety        Medical History:  Past Medical History:    Anxiety state, unspecified    Cancer (HCC)    Colon adenomas    x3    Disorder of thyroid    thyroidectomy    Diverticulosis of large intestine    Esophageal reflux    Exposure to medical therapeutic radiation    thyroid    High blood pressure    High cholesterol    Hypercholesteremia    Hypothyroidism     Osteoarthrosis, unspecified whether generalized or localized, unspecified site    Thyroid cancer (HCC)    papillary thyroid; s/p surgery resection with Asher and BRIDGES    Unspecified essential hypertension       Surgical History:  Past Surgical History:   Procedure Laterality Date    Colonoscopy N/A 2022    Procedure: COLONOSCOPY;  Surgeon: Neeraj No MD;  Location: [facility] ENDOSCOPY    Colonoscopy  2024    Colonoscopy N/A 2024    Procedure: COLONOSCOPY;  Surgeon: Neeraj oN MD;  Location: [facility] ENDOSCOPY    Egd  2022    Egd  2024    Esophagogastroduodenoscopy    Endoscopic ultrasound - internal  2022    Endoscopic ultrasound exam  2024    Endoscopic Ultrasound    Eye surgery  2024    Eye surgery Left 2024    Hysterectomy      Knee surgery Left 2022    no associated bleeding complications          40 week 7 lb(s) 5 oz Male; 6 hr labor           40 week 7 lb(s) 3 oz Female          41 week 9 lb(s) 8 oz Male    Other surgical history      Injection Tendon Sheath, Ligament    Thyroidectomy      total    Total abdom hysterectomy         Allergies:  Allergies   Allergen Reactions    Latex HIVES    Peanut-Containing Drug Products SWELLING     Closes Throat  Closes Throat  Closes Throat      Peanuts SWELLING     Closes Throat    Adhesive Tape OTHER (SEE COMMENTS)    Seasonal        Family History:  Family History   Problem Relation Age of Onset    Heart Disorder Mother     Breast Cancer Maternal Cousin Female 57    Cancer Daughter 29        Hodgkin's Lymphoma    Ovarian Cancer Neg     Bleeding Disorders Neg     DVT/VTE Neg     Pancreatic Cancer Neg     Prostate Cancer Neg        Social History:  Social History     Socioeconomic History    Marital status:    Tobacco Use    Smoking status: Never    Smokeless tobacco: Never   Vaping Use    Vaping status: Never Used   Substance and Sexual Activity    Alcohol use: Yes      Comment: social    Drug use: No       Medications:  Current Outpatient Medications   Medication Sig Dispense Refill    DULoxetine 20 MG Oral Cap DR Particles Take 1 capsule (20 mg total) by mouth daily. 30 capsule 0    dicyclomine 20 MG Oral Tab Take 1 tablet (20 mg total) by mouth 4 (four) times daily as needed. 30 tablet 0    levothyroxine 150 MCG Oral Tab Take 1 tablet (150 mcg total) by mouth before breakfast.      Lenvatinib, 24 MG Daily Dose, (LENVIMA, 24 MG DAILY DOSE,) 2 x 10 MG & 4 MG Oral Capsule Therapy Pack Take 24 mg by mouth daily. 30 each 5    prochlorperazine (COMPAZINE) 10 mg tablet Take 1 tablet (10 mg total) by mouth every 6 (six) hours as needed for Nausea. 30 tablet 3    ondansetron (ZOFRAN) 8 MG tablet Take 1 tablet (8 mg total) by mouth every 8 (eight) hours as needed for Nausea. 30 tablet 3    HYDROcodone-acetaminophen 5-325 MG Oral Tab Take 1-2 tablets by mouth every 6 (six) hours as needed for Pain. 60 tablet 0    prednisoLONE 1 % Ophthalmic Suspension Place 1 drop into the left eye 3 (three) times daily.      Olmesartan Medoxomil 40 MG Oral Tab Take 1 tablet (40 mg total) by mouth daily.      Omeprazole 40 MG Oral Capsule Delayed Release Take 1 capsule (40 mg total) by mouth every other day.      amLODIPine 10 MG Oral Tab Take 1 tablet (10 mg total) by mouth daily.      clonazePAM 2 MG Oral Tab Take 0.5 tablets (1 mg total) by mouth nightly.         Review of Systems:  Review of Systems   Constitutional:  Positive for malaise/fatigue (Fatigue begins mid day; Pt reports that she has more energy in the mornings) and weight loss.   HENT: Negative.          Xerostomia     Eyes: Negative.    Respiratory:  Negative for cough, hemoptysis, sputum production, shortness of breath and wheezing.         Cough and SOB have improved since PleurX placed    R PleurX in place - per Pt., PleurX catheter is drained weekly       Cardiovascular: Negative.  Negative for chest pain, palpitations and leg  swelling.   Gastrointestinal:  Negative for constipation (Denies; Senna-S or Miralax PRN).   Genitourinary: Negative.    Musculoskeletal:         Cancer related R chest wall pain, pleuritic type pain which radiates to her back, pain is sharp in nature when present    Denies pain today during visit    Takes half tab Norco 5/325mg at bedtime/PRN, tried not to take Norco during the day   Skin: Negative.    Neurological: Negative.    Psychiatric/Behavioral:  Negative for hallucinations, memory loss, substance abuse and suicidal ideas. The patient is nervous/anxious (Anxiety about health; has appt with Polina Kelly LCSW in early October; Takes Duloxetine daily and Klonopin 1mg QHS to help with sleep). The patient does not have insomnia.         Physical Examination:  Physical Exam  Vitals reviewed.   Constitutional:       Appearance: Normal appearance. She is not ill-appearing.   HENT:      Head: Normocephalic and atraumatic.      Right Ear: External ear normal.      Left Ear: External ear normal.      Nose: Nose normal.      Mouth/Throat:      Mouth: Mucous membranes are moist.      Pharynx: Oropharynx is clear.   Eyes:      General: No scleral icterus.     Conjunctiva/sclera: Conjunctivae normal.   Pulmonary:      Effort: Pulmonary effort is normal. No respiratory distress.      Comments: R PleurX drain present and covered in a dressing    Abdominal:      General: There is no distension.   Musculoskeletal:         General: Normal range of motion.      Cervical back: Normal range of motion and neck supple.      Right lower leg: No edema.      Left lower leg: No edema.   Skin:     General: Skin is warm and dry.      Coloration: Skin is not jaundiced or pale.   Neurological:      General: No focal deficit present.      Mental Status: She is alert and oriented to person, place, and time. Mental status is at baseline.      Motor: No weakness.      Gait: Gait normal.   Psychiatric:         Mood and Affect: Mood  normal.         Behavior: Behavior normal.         Thought Content: Thought content normal.         Judgment: Judgment normal.       Palliative Care Goals of Care:  Discussed with patient/family today: Yes  Patient's preference about sharing medical information: Patient and family may receive information  Patient's decision making preferences: Fully involved and speak with family  Code status: FULL CODE  Have advanced directives been discussed with patient or healthcare power of : Yes                         Palliative Care Assessment/Plan:  1. Palliative care encounter    2. Cancer related pain    3. Therapeutic opioid induced constipation    4. Anxiety about health    5. Goals of care, counseling/discussion    6. Thyroid cancer (HCC)    7. Malignant neoplasm metastatic to right lung (HCC)        Cancer Related Pain  -Discussed addiction vs tolerance  -Reviewed IL   -Discussed MOA and explained side effects of pain medications  -Max daily Tylenol limit is 3,000mg  -Pt reports pain is controlled at this time on OTC Tylenol and 1/2 tab Norco at bedtime  -Ibuprofen caused stomach upset - pt would like to avoid  -Pt states she is very sensitive to opioids  -Continue Norco 5/325 mg 1-2 tabs po Q 4 hrs prn  -Continue splinting and heat packs prn  -Discussed indication for opioid medications for cancer related pain per NCCN guidelines  -Discussed common SE of opioid meds which include, but are not limited to: drowsiness, n/v, stomach upset, constipation  -Excessive or misuse of opioid medications may cause respiratory depression, CNS depression, and possibly death  -Discussed to NEVER self adjust pain med/opioid doses or stop these meds abruptly as this may lead to opioid withdrawal  -Discussed signs and symptoms of withdrawal which include, but are not limited to: rhinorrhea, diarrhea, irritability, chills, sweating, insomnia, mood swings, anxiety, increased pain      Therapeutic opioid induced  constipation  -A side effect of opioids is constipation   -Continue Senna 2 tabs po BID and Miralax prn   -Increase water intake  -Goal is for a bowel movement at least every other day  -HOLD Senna-S/Miralax for 1-2 doses if you are having loose stool or diarrhea        Xerostomia  -Continue Biotene - swish and spit 4 times/day  -Increase water intake     Anxiety about health  -CONTINUE Duloxetine 30mg QAM  -There was an interaction (risk for prolonged QT interval) between Lexapro and oral anti cancer med   -CONTINUE Klonopin 1mg at bedtime/PRN  -[facility]  -Increased stress r/t illness, caring for her elderly Mother, fear of losing insurance if she is not able to continue working FT  -[facility]     Goals of care counseling/discussion  -Pt is FULL CODE  -Continue supportive medical treatments; pt plans to continue pursuing cancer treatment  -Patient is agreeable to hospitalization, if indicated  -Patient agreeable to following up with outpatient palliative care  -Provided emotional support to pt/family who appear to be coping adequately  -See above narrative for further details      Advance care planning counseling/discussion  -Pt is FULL CODE  -Health Surrogate: Baldemar Markham ()      Palliative Performance Scale 80%    Emotional support provided to patient/family: Yes    Palliative Care Follow-up:  I spent a total of  30 minutes with the patient today, which included all of the following:direct face to face contact, history taking, physical examination, and >50% was spent counseling and coordinating care.    Thank you for allowing the Palliative Care Team to participate in the care of your patient. I will continue to follow clinically.    BEBE FALK DNP, FNP-BC, [facility]  [phone]  10/8/2024

## 2024-10-08 NOTE — PROGRESS NOTES
[facility] Progress Note    Patient Name: Camelia Markham   YOB: 1960   Medical Record Number: [id number]   CSN: [id number]   Consulting Physician: Inocente Chen MD  Referring Physician(s): Dr Dana Sands MD  Date of Visit: 10/8/24    Reason for Consultation:  Metastatic  Thyroid Cancer   Cancer Staging   Malignant neoplasm of thyroid gland (HCC)  Staging form: Thyroid - Differentiated, AJCC 8th Edition  - Clinical: Stage IVB (rcT2, cN1, cM1, Age at diagnosis: >= 55 years) - Signed by Inocente Chen MD on 8/28/2024    Current Therapy  Lenvatinib - started 9/18/24    Interval History  Patient returns for follow-up.      She says she has been doing fairly well overall.  She is tolerating her lenvatinib well, she has noticed some mouth dryness/discomfort and is using Biotene for this.  She has not lost weight. Patient also notes voice hoarseness, denies throat pain or difficulty swallowing.     She says she is draining her Pleurx now 1-2x / week and having less output.  Pt reports back pain on her right side is controlled with Norco though causes her mild drowsiness; pt is currently on a leave of absence from work.  She states she occasionally has nausea, does not use anti-emetics and prefers to sleep instead.     Denies headache, weight loss, fever/chills, vision change, sore throat, worsening SOB/VAUGHN, chest pain, cough, and swelling.      History of Present Illness:   Camelia Markham is a 64 year old female that was seen today in the Cancer Center for metastatic thyroid cancer. Her oncologic history is detailed below    11/3/2010 was found to have an enlarged thyroid gland ultrasound-guided FNA showed papillary thyroid cancer.  Underwent total thyroidectomy with Dr. Asher.  Final pathology showed a 2.5 cm tumor and 2 positive lymph nodes.    12/2010 BRIDGES uptake study showed no evidence of abnormal uptake outside the neck.  Underwent 207 miCu of radioactive iodine.  She had then been  maintained on levothyroxine.  Subsequently was lost to follow-up after 2018.    6/17/2024 Thyroglobulin increased to 10, prompted an ultrasound that showed an oval hypoechoic nodule in the superior aspect of the left thyroid measuring 1 x 0.6 x 0.6 cm thought to represent an indeterminate lymph node.    8/6/2024 ultrasound-guided FNA showed papillary carcinoma.   NGS testing showed a BRAF [id number] mutation    8/16/2024 [facility] thyroid scan showed no discernible pathologic activity increased uptake in the thyroid bed.  Given the biopsy-proven recurrence and absence of I-131 uptake this was thought to represent dedifferentiated recurrent thyroid malignancy.      8/22/2024 PET/CT fusion study showed extensive hypermetabolic pleural-based nodularity, large pleural effusion as well as hypermetabolic nodularity in the left neck soft tissues as well as peripheral right upper nodule all of which was concerning for metastatic disease.  Previously seen cystic pancreatic lesion did not demonstrate any hypermetabolic activity.    8/29/24 had thoracentesis with cytology showing metastatic thyroid carcinoma.    9/15/24 Underwent pleur-X for recurrent pleural effusion.    9/18/24 started lenvatinib      Past Medical History:  Past Medical History:    Anxiety state, unspecified    Cancer (HCC)    Colon adenomas    x3    Disorder of thyroid    thyroidectomy    Diverticulosis of large intestine    Esophageal reflux    Exposure to medical therapeutic radiation    thyroid    High blood pressure    High cholesterol    Hypercholesteremia    Hypothyroidism    Osteoarthrosis, unspecified whether generalized or localized, unspecified site    Thyroid cancer (HCC)    papillary thyroid; s/p surgery resection with Asher and BRIDGES    Unspecified essential hypertension       Past Surgical History:  Past Surgical History:   Procedure Laterality Date    Colonoscopy N/A 12/29/2022    Procedure: COLONOSCOPY;  Surgeon: Neeraj No MD;  Location: [facility]  ENDOSCOPY    Colonoscopy  2024    Colonoscopy N/A 2024    Procedure: COLONOSCOPY;  Surgeon: Neeraj No MD;  Location: [facility] ENDOSCOPY    Egd  2022    Egd  2024    Esophagogastroduodenoscopy    Endoscopic ultrasound - internal  2022    Endoscopic ultrasound exam  2024    Endoscopic Ultrasound    Eye surgery  2024    Eye surgery Left 2024    Hysterectomy      Knee surgery Left 2022    no associated bleeding complications          40 week 7 lb(s) 5 oz Male; 6 hr labor           40 week 7 lb(s) 3 oz Female          41 week 9 lb(s) 8 oz Male    Other surgical history      Injection Tendon Sheath, Ligament    Thyroidectomy      total    Total abdom hysterectomy         Family Medical History:  Family History   Problem Relation Age of Onset    Heart Disorder Mother     Breast Cancer Maternal Cousin Female 57    Cancer Daughter 29        Hodgkin's Lymphoma    Ovarian Cancer Neg     Bleeding Disorders Neg     DVT/VTE Neg     Pancreatic Cancer Neg     Prostate Cancer Neg        Gyne History:  OB History    Para Term  AB Living   3 3 0 0 0 0   SAB IAB Ectopic Multiple Live Births   0 0 0 0 0       Social History:  Social History     Socioeconomic History    Marital status:      Spouse name: Not on file    Number of children: Not on file    Years of education: Not on file    Highest education level: Not on file   Occupational History    Not on file   Tobacco Use    Smoking status: Never    Smokeless tobacco: Never   Vaping Use    Vaping status: Never Used   Substance and Sexual Activity    Alcohol use: Yes     Comment: social    Drug use: No    Sexual activity: Not on file   Other Topics Concern     Service Not Asked    Blood Transfusions Not Asked    Caffeine Concern Yes     Comment: 1 cup of coffee     Occupational Exposure Not Asked    Hobby Hazards Not Asked    Sleep Concern Not Asked    Stress Concern Not  Asked    Weight Concern Not Asked    Special Diet Not Asked    Back Care Not Asked    Exercise Not Asked    Bike Helmet Not Asked    Seat Belt Not Asked    Self-Exams Not Asked   Social History Narrative    Camelia Figueroa is  to Baldemar x30 yrs. She has 3 adult children. 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 She lives with her , her mom, and 1 of the children in [place], [place].     Social Determinants of Health     Financial Resource Strain: Not on file   Food Insecurity: No Food Insecurity (9/13/2024)    Food Insecurity     Food Insecurity: Never true   Transportation Needs: No Transportation Needs (9/13/2024)    Transportation Needs     Lack of Transportation: No     Car Seat: Not on file   Physical Activity: Not on file   Stress: Not on file   Social Connections: Not on file   Housing Stability: Low Risk  (9/13/2024)    Housing Stability     Housing Instability: No     Housing Instability Emergency: Not on file     Crib or Bassinette: Not on file       Allergies:   Allergies   Allergen Reactions    Latex HIVES    Peanut-Containing Drug Products SWELLING     Closes Throat  Closes Throat  Closes Throat      Peanuts SWELLING     Closes Throat    Adhesive Tape OTHER (SEE COMMENTS)    Seasonal        Current Medications:   DULoxetine 20 MG Oral Cap DR Particles Take 1 capsule (20 mg total) by mouth daily. 30 capsule 0    dicyclomine 20 MG Oral Tab Take 1 tablet (20 mg total) by mouth 4 (four) times daily as needed. 30 tablet 0    levothyroxine 150 MCG Oral Tab Take 1 tablet (150 mcg total) by mouth before breakfast.      Lenvatinib, 24 MG Daily Dose, (LENVIMA, 24 MG DAILY DOSE,) 2 x 10 MG & 4 MG Oral Capsule Therapy Pack Take 24 mg by mouth daily. 30 each 5    ondansetron (ZOFRAN) 8 MG tablet Take 1 tablet (8 mg total) by mouth every 8 (eight) hours as needed for Nausea. 30 tablet 3    HYDROcodone-acetaminophen 5-325 MG Oral Tab Take 1-2 tablets by mouth every 6 (six) hours as needed for Pain. 60 tablet 0     prednisoLONE 1 % Ophthalmic Suspension Place 1 drop into the left eye 3 (three) times daily.      Olmesartan Medoxomil 40 MG Oral Tab Take 1 tablet (40 mg total) by mouth daily.      Omeprazole 40 MG Oral Capsule Delayed Release Take 1 capsule (40 mg total) by mouth every other day.      amLODIPine 10 MG Oral Tab Take 1 tablet (10 mg total) by mouth daily.      clonazePAM 2 MG Oral Tab Take 0.5 tablets (1 mg total) by mouth nightly.         Review of Systems:  A comprehensive 14 point review of systems was completed.  Pertinent positives and negatives noted in the the HPI.     Vital Signs:  /72 (BP Location: Right arm, Patient Position: Sitting, Cuff Size: large)   Pulse 77   Temp 97.7 °F (36.5 °C) (Oral)   Resp 16   Ht 1.575 m (5' 2\")   Wt 73.5 kg (162 lb)   SpO2 97%   BMI 29.63 kg/m²    Physical Examination:  General: No apparent distress, alert and oriented x 3  ENT: Conjunctiva clear, EOM intact. No erythema or mouth sores/ulcers noted in oral cavity.   Lymphatics:  No palpable lymphadenopathy .   Chest: + R sided PleurX. Normal respiratory effort.   CV: Regular rate and rhythm, S1 and S2 heard  Abdomen:  Soft, not distended, no tenderness with palpation. Bowel sounds present in all 4 quadrants  Extremities: No edema noted  Skin: No lesions, rashes or nodules  Neurological: grossly intact  Psych: Appropriate mood and affect     Performance Status:  ECOG-1    Labs:    Lab Results   Component Value Date/Time    WBC 7.2 10/08/2024 08:21 AM    RBC 4.52 10/08/2024 08:21 AM    HGB 13.1 10/08/2024 08:21 AM    HCT 39.6 10/08/2024 08:21 AM    MCV 87.6 10/08/2024 08:21 AM    MCH 29.0 10/08/2024 08:21 AM    MCHC 33.1 10/08/2024 08:21 AM    RDW 13.6 10/08/2024 08:21 AM    NEPRELIM 4.24 10/08/2024 08:21 AM    .0 10/08/2024 08:21 AM       Lab Results   Component Value Date/Time     (H) 10/08/2024 08:21 AM    BUN 20 10/08/2024 08:21 AM    CREATSERUM 0.84 10/08/2024 08:21 AM    GFRNAA 67 02/16/2021  12:09 PM    CA 9.5 10/08/2024 08:21 AM    ALB 4.3 10/08/2024 08:21 AM     10/08/2024 08:21 AM    K 3.9 10/08/2024 08:21 AM     10/08/2024 08:21 AM    CO2 28.0 10/08/2024 08:21 AM    ALKPHO 133 (H) 10/08/2024 08:21 AM    AST 23 10/08/2024 08:21 AM    ALT 25 10/08/2024 08:21 AM       Imaging:  PET films previously reviewed with pt/spouse -impression as above    Impression:    64-year-old with a long history of thyroid cancer s/p thyroidectomy followed by BRIDGES and levothyroxine suppression now with biopsy-proven recurrence around the neck as well as pulmonary parenchymal mets and pleural nodules all worrisome for metastatic thyroid cancer.  BRIDGES uptake scan was negative suggesting dedifferentiated thyroid cancer.    Dr. Chen previously had a long discussion with patient explained that unfortunately this represents an incurable Stage IV malignancy.  However this is certainly very treatable with oral tyrosine kinase inhibitors with good disease control    Plan:  Recommended continued treatment with lenvatinib.  We discussed the anticipated side effects of this therapy including HTN, proteinuria, diarrhea skin rash etc.  Repeat thyroglobulin will monitor this throughout her treatment and plan reimaging in about 3 months.  continue hydrocodone for cancer related pain, continue follow up with palliative care,  if pain uncontrolled will refer to Radiation oncology for palliative XRT  Pleux-X needing to be drained less frequently, continue follow up with pulmonology  RTC in 1 month  HTN: well controlled today ,monitor      SANJIV Rodriguez  Hematology/Oncology

## 2024-10-09 LAB
THYROGLOB AB: <1 IU/ML
THYROGLOB IMA: 2.3 NG/ML

## 2024-10-14 ENCOUNTER — PATIENT MESSAGE (OUTPATIENT)
Dept: PULMONOLOGY | Facility: CLINIC | Age: 64
End: 2024-10-14

## 2024-10-14 NOTE — TELEPHONE ENCOUNTER
Order was placed on 10/3/24. Left message for [facility] to call back and confirm they received order

## 2024-10-15 NOTE — TELEPHONE ENCOUNTER
[facility] states that patient will be assessed today and they will determine how often patient will need to be seen. Please call.

## 2024-10-21 ENCOUNTER — TELEPHONE (OUTPATIENT)
Dept: PULMONOLOGY | Facility: CLINIC | Age: 64
End: 2024-10-21

## 2024-10-21 DIAGNOSIS — J91.0 MALIGNANT PLEURAL EFFUSION (HCC): Primary | ICD-10-CM

## 2024-10-21 NOTE — TELEPHONE ENCOUNTER
Dank Brooks, DO  [employer] now (4:23 PM)     MA  So drainage is about a total of 25 ml per week? If so, then can we order a CXR to make sure the catheter is still in the right place and the fluid is minimal.      Thanks    Mike Brooks

## 2024-10-21 NOTE — TELEPHONE ENCOUNTER
Spoke with patient and informed of Dr. Brooks's message/order below. Provided number to central scheduling.     Dr. Brooks: Patient wondering if she needs to be seen in the office after completing chest x-ray. Last office visit was 10/3/24 and has appointment scheduled on 1/6/25.

## 2024-10-21 NOTE — TELEPHONE ENCOUNTER
Robina[facility] calling to report how much drainage from pleurx drain, approximate 25 ML. Last week was about the same, thanks.

## 2024-10-22 ENCOUNTER — HOSPITAL ENCOUNTER (OUTPATIENT)
Dept: GENERAL RADIOLOGY | Age: 64
Discharge: HOME | End: 2024-10-22
Attending: INTERNAL MEDICINE
Payer: COMMERCIAL

## 2024-10-22 DIAGNOSIS — J91.0 MALIGNANT PLEURAL EFFUSION (HCC): ICD-10-CM

## 2024-10-22 PROCEDURE — 71046 X-RAY EXAM CHEST 2 VIEWS: CPT | Performed by: INTERNAL MEDICINE

## 2024-10-22 NOTE — TELEPHONE ENCOUNTER
Dank Brooks, DO  [facility] Clinical Staff12 hours ago (8:10 PM)     MA  She doesn't need to be seen in clinic sooner. If the CXR shows the effusion has decreased and is minimal with continued minimal drainage, then we can send orders for IR to remove in the future. In the meantime, can we ask home health to drain the pleurex once every 2 weeks? (Current orders are for draining once a week).    Thanks,    Mike Brooks

## 2024-10-22 NOTE — TELEPHONE ENCOUNTER
Spoke with patient and informed of Dr. Brooks's message below. Patient verbalized understanding and will be getting chest x-ray this afternoon.     Dr. Brooks: Please review/sign pended home health order.

## 2024-10-23 NOTE — TELEPHONE ENCOUNTER
Dank Brooks DO  10/22/2024  5:07 PM CDT Back to Top      CXR shows the right pleural effusion has significantly decreased. If the drainage remains < 50 ml per week over the next 2 weeks, then we can ask IR to remove the pleurex catheter.  Please notify home health of this plan.     Thanks     Will send a message to the pt     ALDA Brooks DO, MPH  Pulmonary Critical Care Medicine  [facility]

## 2024-10-23 NOTE — TELEPHONE ENCOUNTER
Attempted to contact Robina at [facility] regarding updated order below. Left detailed message.     Spoke with patient and informed of Dr. Brooks's result note/messge/order below.

## 2024-10-30 ENCOUNTER — TELEPHONE (OUTPATIENT)
Dept: HEMATOLOGY/ONCOLOGY | Facility: HOSPITAL | Age: 64
End: 2024-10-30

## 2024-10-30 ENCOUNTER — OFFICE VISIT (OUTPATIENT)
Dept: HEMATOLOGY/ONCOLOGY | Facility: HOSPITAL | Age: 64
End: 2024-10-30
Attending: INTERNAL MEDICINE
Payer: COMMERCIAL

## 2024-10-30 VITALS
WEIGHT: 158.5 LBS | RESPIRATION RATE: 18 BRPM | DIASTOLIC BLOOD PRESSURE: 88 MMHG | SYSTOLIC BLOOD PRESSURE: 156 MMHG | BODY MASS INDEX: 29 KG/M2 | TEMPERATURE: 98 F | HEART RATE: 96 BPM

## 2024-10-30 DIAGNOSIS — C78.01 MALIGNANT NEOPLASM METASTATIC TO RIGHT LUNG (HCC): ICD-10-CM

## 2024-10-30 DIAGNOSIS — C78.01 MALIGNANT NEOPLASM METASTATIC TO RIGHT LUNG (HCC): Primary | ICD-10-CM

## 2024-10-30 DIAGNOSIS — E86.0 DEHYDRATION: ICD-10-CM

## 2024-10-30 DIAGNOSIS — C73 THYROID CANCER (HCC): Primary | ICD-10-CM

## 2024-10-30 DIAGNOSIS — R11.0 NAUSEA: ICD-10-CM

## 2024-10-30 DIAGNOSIS — C73 THYROID CANCER (HCC): ICD-10-CM

## 2024-10-30 DIAGNOSIS — R14.0 BLOATING: ICD-10-CM

## 2024-10-30 DIAGNOSIS — Z51.81 THERAPEUTIC DRUG MONITORING: ICD-10-CM

## 2024-10-30 LAB
ALBUMIN SERPL-MCNC: 4.6 G/DL (ref 3.2–4.8)
ALBUMIN/GLOB SERPL: 1.2 {RATIO} (ref 1–2)
ALP LIVER SERPL-CCNC: 135 U/L
ALT SERPL-CCNC: 49 U/L
ANION GAP SERPL CALC-SCNC: 9 MMOL/L (ref 0–18)
AST SERPL-CCNC: 46 U/L (ref ?–34)
BASOPHILS # BLD AUTO: 0.03 X10(3) UL (ref 0–0.2)
BASOPHILS NFR BLD AUTO: 0.4 %
BILIRUB SERPL-MCNC: 2 MG/DL (ref 0.2–1.1)
BUN BLD-MCNC: 14 MG/DL (ref 9–23)
BUN/CREAT SERPL: 18.4 (ref 10–20)
CALCIUM BLD-MCNC: 9.6 MG/DL (ref 8.7–10.4)
CHLORIDE SERPL-SCNC: 105 MMOL/L (ref 98–112)
CO2 SERPL-SCNC: 26 MMOL/L (ref 21–32)
CREAT BLD-MCNC: 0.76 MG/DL
DEPRECATED RDW RBC AUTO: 46.4 FL (ref 35.1–46.3)
EGFRCR SERPLBLD CKD-EPI 2021: 88 ML/MIN/1.73M2 (ref 60–?)
EOSINOPHIL # BLD AUTO: 0.15 X10(3) UL (ref 0–0.7)
EOSINOPHIL NFR BLD AUTO: 1.8 %
ERYTHROCYTE [DISTWIDTH] IN BLOOD BY AUTOMATED COUNT: 14.7 % (ref 11–15)
GLOBULIN PLAS-MCNC: 4 G/DL (ref 2–3.5)
GLUCOSE BLD-MCNC: 107 MG/DL (ref 70–99)
HCT VFR BLD AUTO: 44 %
HGB BLD-MCNC: 14.7 G/DL
IMM GRANULOCYTES # BLD AUTO: 0.02 X10(3) UL (ref 0–1)
IMM GRANULOCYTES NFR BLD: 0.2 %
LYMPHOCYTES # BLD AUTO: 2.57 X10(3) UL (ref 1–4)
LYMPHOCYTES NFR BLD AUTO: 31.5 %
MAGNESIUM SERPL-MCNC: 2.1 MG/DL (ref 1.6–2.6)
MCH RBC QN AUTO: 28.9 PG (ref 26–34)
MCHC RBC AUTO-ENTMCNC: 33.4 G/DL (ref 31–37)
MCV RBC AUTO: 86.4 FL
MONOCYTES # BLD AUTO: 0.41 X10(3) UL (ref 0.1–1)
MONOCYTES NFR BLD AUTO: 5 %
NEUTROPHILS # BLD AUTO: 4.98 X10 (3) UL (ref 1.5–7.7)
NEUTROPHILS # BLD AUTO: 4.98 X10(3) UL (ref 1.5–7.7)
NEUTROPHILS NFR BLD AUTO: 61.1 %
OSMOLALITY SERPL CALC.SUM OF ELEC: 291 MOSM/KG (ref 275–295)
PLATELET # BLD AUTO: 136 10(3)UL (ref 150–450)
PLATELETS.RETICULATED NFR BLD AUTO: 5.9 % (ref 0–7)
POTASSIUM SERPL-SCNC: 3.7 MMOL/L (ref 3.5–5.1)
PROT SERPL-MCNC: 8.6 G/DL (ref 5.7–8.2)
RBC # BLD AUTO: 5.09 X10(6)UL
SODIUM SERPL-SCNC: 140 MMOL/L (ref 136–145)
WBC # BLD AUTO: 8.2 X10(3) UL (ref 4–11)

## 2024-10-30 PROCEDURE — 96374 THER/PROPH/DIAG INJ IV PUSH: CPT

## 2024-10-30 PROCEDURE — 80053 COMPREHEN METABOLIC PANEL: CPT

## 2024-10-30 PROCEDURE — 85025 COMPLETE CBC W/AUTO DIFF WBC: CPT

## 2024-10-30 PROCEDURE — 99214 OFFICE O/P EST MOD 30 MIN: CPT | Performed by: INTERNAL MEDICINE

## 2024-10-30 PROCEDURE — 96361 HYDRATE IV INFUSION ADD-ON: CPT

## 2024-10-30 PROCEDURE — 83735 ASSAY OF MAGNESIUM: CPT

## 2024-10-30 RX ORDER — LORAZEPAM 1 MG/1
1 TABLET ORAL EVERY 6 HOURS PRN
Qty: 60 TABLET | Refills: 2 | Status: ON HOLD | OUTPATIENT
Start: 2024-10-30

## 2024-10-30 NOTE — PROGRESS NOTES
[facility] Progress Note    Patient Name: Camelia Markham   YOB: 1960   Medical Record Number: [id number]   CSN: [id number]   Consulting Physician: Inocente Chen MD  Referring Physician(s): Dr Dana Sands MD  Date of Visit: 10/30/24    Reason for Consultation:  Metastatic  Thyroid Cancer   Cancer Staging   Malignant neoplasm of thyroid gland (HCC)  Staging form: Thyroid - Differentiated, AJCC 8th Edition  - Clinical: Stage IVB (rcT2, cN1, cM1, Age at diagnosis: >= 55 years) - Signed by Inocente Chen MD on 8/28/2024    Current Therapy  Lenvatinib - started 9/18/24    Interval History  Patient returns for an acute care visit.  Over the last 2 weeks she has developed increasing increasing nausea, mucositis and abdominal bloating.  Her Compazine/Zofran only helps somewhat.  Dyspnea is improved significantly.  She is having a Pleurx drained once every week and did not have any drainage last time.  Chest x-ray done last week showed significant improvement of her pleural effusion.  Thyroglobulin levels have been improving.  She is also lost some weight and is orthostatic today.    Past Oncologic History   Camelia Markham is a 64 year old female that was seen today in the Cancer Center for metastatic thyroid cancer. Her oncologic history is detailed below    11/3/2010 was found to have an enlarged thyroid gland ultrasound-guided FNA showed papillary thyroid cancer.  Underwent total thyroidectomy with Dr. Asher.  Final pathology showed a 2.5 cm tumor and 2 positive lymph nodes.    12/2010 BRIDGES uptake study showed no evidence of abnormal uptake outside the neck.  Underwent 207 miCu of radioactive iodine.  She had then been maintained on levothyroxine.  Subsequently was lost to follow-up after 2018.    6/17/2024 Thyroglobulin increased to 10, prompted an ultrasound that showed an oval hypoechoic nodule in the superior aspect of the left thyroid measuring 1 x 0.6 x 0.6 cm thought to represent an  indeterminate lymph node.    8/6/2024 ultrasound-guided FNA showed papillary carcinoma. NGS testing showed a BRAF V600E mutation    8/16/2024 [facility] thyroid scan showed no discernible pathologic activity increased uptake in the thyroid bed.  Given the biopsy-proven recurrence and absence of I-131 uptake this was thought to represent dedifferentiated recurrent thyroid malignancy.      8/22/2024 PET/CT fusion study showed extensive hypermetabolic pleural-based nodularity, large pleural effusion as well as hypermetabolic nodularity in the left neck soft tissues as well as peripheral right upper nodule all of which was concerning for metastatic disease.  Previously seen cystic pancreatic lesion did not demonstrate any hypermetabolic activity.    8/29/24 had thoracentesis with cytology showing metastatic thyroid carcinoma.    9/15/24 Underwent pleur-X for recurrent pleural effusion.    9/18/24 started lenvatinib      Past Medical History:  Past Medical History:    Anxiety state, unspecified    Cancer (HCC)    Colon adenomas    x3    Disorder of thyroid    thyroidectomy    Diverticulosis of large intestine    Esophageal reflux    Exposure to medical therapeutic radiation    thyroid    High blood pressure    High cholesterol    Hypercholesteremia    Hypothyroidism    Osteoarthrosis, unspecified whether generalized or localized, unspecified site    Thyroid cancer (HCC)    papillary thyroid; s/p surgery resection with Asher and BRIDGES    Unspecified essential hypertension       Past Surgical History:  Past Surgical History:   Procedure Laterality Date    Colonoscopy N/A 12/29/2022    Procedure: COLONOSCOPY;  Surgeon: Neeraj No MD;  Location: [facility] ENDOSCOPY    Colonoscopy  07/29/2024    Colonoscopy N/A 07/29/2024    Procedure: COLONOSCOPY;  Surgeon: Neeraj No MD;  Location: [facility] ENDOSCOPY    Egd  12/29/2022    Egd  07/29/2024    Esophagogastroduodenoscopy    Endoscopic ultrasound - internal  12/29/2022     Endoscopic ultrasound exam  2024    Endoscopic Ultrasound    Eye surgery  2024    Eye surgery Left 2024    Hysterectomy      Knee surgery Left 2022    no associated bleeding complications          40 week 7 lb(s) 5 oz Male; 6 hr labor           40 week 7 lb(s) 3 oz Female          41 week 9 lb(s) 8 oz Male    Other surgical history      Injection Tendon Sheath, Ligament    Thyroidectomy      total    Total abdom hysterectomy         Family Medical History:  Family History   Problem Relation Age of Onset    Heart Disorder Mother     Breast Cancer Maternal Cousin Female 57    Cancer Daughter 29        Hodgkin's Lymphoma    Ovarian Cancer Neg     Bleeding Disorders Neg     DVT/VTE Neg     Pancreatic Cancer Neg     Prostate Cancer Neg        Gyne History:  OB History    Para Term  AB Living   3 3 0 0 0 0   SAB IAB Ectopic Multiple Live Births   0 0 0 0 0       Social History:  Social History     Socioeconomic History    Marital status:      Spouse name: Not on file    Number of children: Not on file    Years of education: Not on file    Highest education level: Not on file   Occupational History    Not on file   Tobacco Use    Smoking status: Never    Smokeless tobacco: Never   Vaping Use    Vaping status: Never Used   Substance and Sexual Activity    Alcohol use: Yes     Comment: social    Drug use: No    Sexual activity: Not on file   Other Topics Concern     Service Not Asked    Blood Transfusions Not Asked    Caffeine Concern Yes     Comment: 1 cup of coffee     Occupational Exposure Not Asked    Hobby Hazards Not Asked    Sleep Concern Not Asked    Stress Concern Not Asked    Weight Concern Not Asked    Special Diet Not Asked    Back Care Not Asked    Exercise Not Asked    Bike Helmet Not Asked    Seat Belt Not Asked    Self-Exams Not Asked   Social History Narrative    Camelia Figueroa is  to Baldemar x30 yrs. She has 3 adult children.  Patient works in accounting in book keeping. She lives with her , her mom, and 1 of the children in [place], [place].     Social Drivers of Health     Financial Resource Strain: Not on file   Food Insecurity: No Food Insecurity (9/13/2024)    Food Insecurity     Food Insecurity: Never true   Transportation Needs: No Transportation Needs (9/13/2024)    Transportation Needs     Lack of Transportation: No     Car Seat: Not on file   Physical Activity: Not on file   Stress: Not on file   Social Connections: Not on file   Housing Stability: Low Risk  (9/13/2024)    Housing Stability     Housing Instability: No     Housing Instability Emergency: Not on file     Crib or Bassinette: Not on file       Allergies:   Allergies   Allergen Reactions    Latex HIVES    Peanut-Containing Drug Products SWELLING     Closes Throat  Closes Throat  Closes Throat      Peanuts SWELLING     Closes Throat    Adhesive Tape OTHER (SEE COMMENTS)    Seasonal        Current Medications:  No outpatient medications have been marked as taking for the 10/30/24 encounter (Appointment) with Inocente Chen MD.       Review of Systems:  A comprehensive 14 point review of systems was completed.  Pertinent positives and negatives noted in the the HPI.     Vital Signs:  /88 (BP Location: Right arm, Patient Position: Sitting, Cuff Size: large)   Pulse 96   Temp 97.7 °F (36.5 °C) (Oral)   Resp 18   Wt 71.9 kg (158 lb 8 oz)   BMI 28.99 kg/m²    Physical Examination:  General: No apparent distress, very tired  ENT: Conjunctiva clear, EOM intact. Dry oral mucosa  Lymphatics:  No palpable lymphadenopathy .   Chest: + R sided PleurX. Normal respiratory effort.   CV: Regular rate and rhythm, S1 and S2 heard  Extremities: No edema noted  Skin: No lesions, rashes or nodules  Neurological: grossly intact  Psych: Appropriate mood and affect     Performance Status:  ECOG-1    Labs:    Lab Results   Component Value Date/Time    WBC 7.2 10/08/2024 08:21 AM    RBC  4.52 10/08/2024 08:21 AM    HGB 13.1 10/08/2024 08:21 AM    HCT 39.6 10/08/2024 08:21 AM    MCV 87.6 10/08/2024 08:21 AM    MCH 29.0 10/08/2024 08:21 AM    MCHC 33.1 10/08/2024 08:21 AM    RDW 13.6 10/08/2024 08:21 AM    NEPRELIM 4.24 10/08/2024 08:21 AM    .0 10/08/2024 08:21 AM       Lab Results   Component Value Date/Time     (H) 10/08/2024 08:21 AM    BUN 20 10/08/2024 08:21 AM    CREATSERUM 0.84 10/08/2024 08:21 AM    GFRNAA 67 02/16/2021 12:09 PM    CA 9.5 10/08/2024 08:21 AM    ALB 4.3 10/08/2024 08:21 AM     10/08/2024 08:21 AM    K 3.9 10/08/2024 08:21 AM     10/08/2024 08:21 AM    CO2 28.0 10/08/2024 08:21 AM    ALKPHO 133 (H) 10/08/2024 08:21 AM    AST 23 10/08/2024 08:21 AM    ALT 25 10/08/2024 08:21 AM       Imaging:  PET films previously reviewed with pt/spouse -impression as above    Impression:  64-year-old with a long history of thyroid cancer s/p thyroidectomy followed by BRIDGES and levothyroxine suppression now with biopsy-proven recurrence around the neck as well as pulmonary parenchymal mets and pleural nodules all worrisome for metastatic thyroid cancer.  BRIDGES uptake scan was negative suggesting dedifferentiated thyroid cancer.    I've previously had a long discussion with patient explained that unfortunately this represents an incurable Stage IV malignancy.  However this is certainly very treatable with oral tyrosine kinase inhibitors with good disease control.  She was started lenvatinib but has developed increasing mucositis nausea and appears dehydrated today.    Plan:  Hydrate with 1 L of normal saline and push oral fluids at home  Stop lenvatinib for 1 week and will reassess in the clinic.  If her symptoms have improved then will resume at 20 mg daily [ previously on 24 mg.]  CBC shows no significant anemia  Thyroglobulin has improved and her pleural effusions regressed all suggestive good early response to treatment.  Will plan CT scan in December  Pleux-X needing  to be drained less frequently, noted plans for removal if drainage less than 50 mL per pulmonology   Continue oral antiemetics.    Inocente Chen MD  Hematology/Oncology

## 2024-10-30 NOTE — PATIENT INSTRUCTIONS
Hold Lenvatinib for 1 week until you see Twin on 11/6.   Can Take Ativan for Nausea if Compazine and Zofran do not help.

## 2024-10-30 NOTE — PROGRESS NOTES
Pt here for hydration and antiemetics after ACV. Tolerated without difficulty or complaint. Reviewed next appt date/time via printed AVS. Discharge home Ambulating independently     Ordering MD: Inocente Chen    Education Record  Learner:  Patient  Barriers / Limitations:  None  Method:  Reinforcement  General Topics:  Plan of care reviewed  Outcome:  Shows understanding

## 2024-10-30 NOTE — TELEPHONE ENCOUNTER
Polina Kelly contacted our office as Camelia Figueroa is not feeling well. She placed Camelia Figueroa on the phone, she has not been able to eat, stating everything smells bad, she is feeling bloated, she did have a bowel movement this am, and has had some nausea with some episodes of vomiting. She is not having much vomit, vomiting some saliva. Booked a lab appointment at 11:45 with an Acute care visit with Dr. Chen at 1:00.

## 2024-10-31 ENCOUNTER — TELEPHONE (OUTPATIENT)
Dept: HEMATOLOGY/ONCOLOGY | Facility: HOSPITAL | Age: 64
End: 2024-10-31

## 2024-10-31 ENCOUNTER — TELEPHONE (OUTPATIENT)
Dept: PULMONOLOGY | Facility: CLINIC | Age: 64
End: 2024-10-31

## 2024-10-31 NOTE — TELEPHONE ENCOUNTER
Received fax from [facility] order [id number]. Placed in Dr. Brooks folder to sign

## 2024-10-31 NOTE — TELEPHONE ENCOUNTER
Order signed by Dr. Kong and faxed back to [facility] with confirmation and order sent for scanning.

## 2024-10-31 NOTE — TELEPHONE ENCOUNTER
Called Camelia Figueroa to see if she was feeling any better from yesterday .Pt states she is feeling better, has no nausea, dizziness has resolved. She has had two episodes of Diarrhea this am, states it is liquid. Encouraged her to take Imodium. Stressed the importance of staying hydrated and calling if the diarrhea gets any worse. Also, discussed that if she takes Ativan for nausea she has to hold Klonopin as the two combined will be too sedating. She voiced understanding and thanked me for the call.

## 2024-10-31 NOTE — TELEPHONE ENCOUNTER
[facility] in [place] is calling regarding patient's LORazepam 1 MG Oral Tab prescribed by Dr. Chen. They are stating patient is also prescribed clonazePAM (KlonoPIN) tab 2 mg by Dr. Marie. They stated they just want to make sure Dr. Chen is aware of this as patient just refilled this medication on Oct. 10 for 30 days. [facility] in [place] is reachable at [phone]. Called 10/31/24 GA

## 2024-11-04 ENCOUNTER — APPOINTMENT (OUTPATIENT)
Dept: CT IMAGING | Facility: HOSPITAL | Age: 64
End: 2024-11-04
Attending: EMERGENCY MEDICINE
Payer: COMMERCIAL

## 2024-11-04 ENCOUNTER — TELEPHONE (OUTPATIENT)
Dept: HEMATOLOGY/ONCOLOGY | Facility: HOSPITAL | Age: 64
End: 2024-11-04

## 2024-11-04 ENCOUNTER — APPOINTMENT (OUTPATIENT)
Dept: GENERAL RADIOLOGY | Facility: HOSPITAL | Age: 64
End: 2024-11-04
Payer: COMMERCIAL

## 2024-11-04 ENCOUNTER — HOSPITAL ENCOUNTER (INPATIENT)
Facility: HOSPITAL | Age: 64
LOS: 5 days | Discharge: HOME HEALTH | End: 2024-11-11
Attending: EMERGENCY MEDICINE | Admitting: HOSPITALIST
Payer: COMMERCIAL

## 2024-11-04 DIAGNOSIS — C73 THYROID CARCINOMA (HCC): ICD-10-CM

## 2024-11-04 DIAGNOSIS — R55 SYNCOPE AND COLLAPSE: Primary | ICD-10-CM

## 2024-11-04 DIAGNOSIS — J91.0 MALIGNANT PLEURAL EFFUSION (HCC): ICD-10-CM

## 2024-11-04 PROBLEM — R73.9 HYPERGLYCEMIA: Status: ACTIVE | Noted: 2024-11-04

## 2024-11-04 LAB
ALBUMIN SERPL-MCNC: 3.5 G/DL (ref 3.2–4.8)
ALP LIVER SERPL-CCNC: 90 U/L
ALT SERPL-CCNC: 16 U/L
ANION GAP SERPL CALC-SCNC: 6 MMOL/L (ref 0–18)
AST SERPL-CCNC: 16 U/L (ref ?–34)
BASOPHILS # BLD AUTO: 0.04 X10(3) UL (ref 0–0.2)
BASOPHILS NFR BLD AUTO: 0.5 %
BILIRUB DIRECT SERPL-MCNC: 0.3 MG/DL (ref ?–0.3)
BILIRUB SERPL-MCNC: 1 MG/DL (ref 0.2–1.1)
BUN BLD-MCNC: 14 MG/DL (ref 9–23)
BUN/CREAT SERPL: 19.2 (ref 10–20)
CALCIUM BLD-MCNC: 9 MG/DL (ref 8.7–10.4)
CHLORIDE SERPL-SCNC: 109 MMOL/L (ref 98–112)
CO2 SERPL-SCNC: 25 MMOL/L (ref 21–32)
CREAT BLD-MCNC: 0.73 MG/DL
DEPRECATED RDW RBC AUTO: 53.5 FL (ref 35.1–46.3)
EGFRCR SERPLBLD CKD-EPI 2021: 92 ML/MIN/1.73M2 (ref 60–?)
EOSINOPHIL # BLD AUTO: 0.24 X10(3) UL (ref 0–0.7)
EOSINOPHIL NFR BLD AUTO: 2.9 %
ERYTHROCYTE [DISTWIDTH] IN BLOOD BY AUTOMATED COUNT: 15.9 % (ref 11–15)
GLUCOSE BLD-MCNC: 100 MG/DL (ref 70–99)
HCT VFR BLD AUTO: 39.8 %
HGB BLD-MCNC: 12.8 G/DL
IMM GRANULOCYTES # BLD AUTO: 0.02 X10(3) UL (ref 0–1)
IMM GRANULOCYTES NFR BLD: 0.2 %
LYMPHOCYTES # BLD AUTO: 2.72 X10(3) UL (ref 1–4)
LYMPHOCYTES NFR BLD AUTO: 32.7 %
MCH RBC QN AUTO: 29.5 PG (ref 26–34)
MCHC RBC AUTO-ENTMCNC: 32.2 G/DL (ref 31–37)
MCV RBC AUTO: 91.7 FL
MONOCYTES # BLD AUTO: 0.59 X10(3) UL (ref 0.1–1)
MONOCYTES NFR BLD AUTO: 7.1 %
NEUTROPHILS # BLD AUTO: 4.7 X10 (3) UL (ref 1.5–7.7)
NEUTROPHILS # BLD AUTO: 4.7 X10(3) UL (ref 1.5–7.7)
NEUTROPHILS NFR BLD AUTO: 56.6 %
OSMOLALITY SERPL CALC.SUM OF ELEC: 291 MOSM/KG (ref 275–295)
PLATELET # BLD AUTO: 235 10(3)UL (ref 150–450)
POTASSIUM SERPL-SCNC: 3.8 MMOL/L (ref 3.5–5.1)
PROT SERPL-MCNC: 6.8 G/DL (ref 5.7–8.2)
RBC # BLD AUTO: 4.34 X10(6)UL
SODIUM SERPL-SCNC: 140 MMOL/L (ref 136–145)
TROPONIN I SERPL HS-MCNC: <3 NG/L
WBC # BLD AUTO: 8.3 X10(3) UL (ref 4–11)

## 2024-11-04 PROCEDURE — 71045 X-RAY EXAM CHEST 1 VIEW: CPT | Performed by: EMERGENCY MEDICINE

## 2024-11-04 PROCEDURE — 70450 CT HEAD/BRAIN W/O DYE: CPT | Performed by: EMERGENCY MEDICINE

## 2024-11-04 PROCEDURE — 99223 1ST HOSP IP/OBS HIGH 75: CPT | Performed by: HOSPITALIST

## 2024-11-04 PROCEDURE — 71260 CT THORAX DX C+: CPT | Performed by: EMERGENCY MEDICINE

## 2024-11-04 PROCEDURE — 72125 CT NECK SPINE W/O DYE: CPT | Performed by: EMERGENCY MEDICINE

## 2024-11-04 RX ORDER — ONDANSETRON 2 MG/ML
4 INJECTION INTRAMUSCULAR; INTRAVENOUS EVERY 6 HOURS PRN
Status: DISCONTINUED | OUTPATIENT
Start: 2024-11-04 | End: 2024-11-11

## 2024-11-04 RX ORDER — SODIUM CHLORIDE 9 MG/ML
INJECTION, SOLUTION INTRAVENOUS CONTINUOUS
Status: DISCONTINUED | OUTPATIENT
Start: 2024-11-04 | End: 2024-11-11

## 2024-11-04 RX ORDER — TEMAZEPAM 15 MG/1
15 CAPSULE ORAL NIGHTLY PRN
Status: DISCONTINUED | OUTPATIENT
Start: 2024-11-04 | End: 2024-11-11

## 2024-11-04 RX ORDER — PROCHLORPERAZINE EDISYLATE 5 MG/ML
5 INJECTION INTRAMUSCULAR; INTRAVENOUS EVERY 8 HOURS PRN
Status: DISCONTINUED | OUTPATIENT
Start: 2024-11-04 | End: 2024-11-11

## 2024-11-04 RX ORDER — ACETAMINOPHEN 500 MG
500 TABLET ORAL EVERY 4 HOURS PRN
Status: DISCONTINUED | OUTPATIENT
Start: 2024-11-04 | End: 2024-11-05

## 2024-11-04 RX ORDER — HEPARIN SODIUM 5000 [USP'U]/ML
5000 INJECTION, SOLUTION INTRAVENOUS; SUBCUTANEOUS EVERY 12 HOURS SCHEDULED
Status: DISCONTINUED | OUTPATIENT
Start: 2024-11-04 | End: 2024-11-11

## 2024-11-04 NOTE — ED PROVIDER NOTES
Patient Seen in: [facility] Emergency Department      History     Chief Complaint   Patient presents with    Syncope    Chest Pain Angina     Stated Complaint: sent by oncology for testing    Subjective:    Pt with Metastatic thyroid CA, malignant pleural effusion, pleurex catheter here for 3 syncopal episodes that been going on for about the last 2 weeks.  The most recent was 2 days ago.  She said everything goes black and then she remembers waking up.  She did hit her head and scraped her elbows 2 days ago.  About a week ago she was having vomiting and diarrhea so they stopped her Lenvema and states she has not really had vomiting or diarrhea in a week but she is not having a good appetite.  In the last 3 days she is having mostly some right sided chest pain which does get worse with breathing.  She has had a cough for several days since the fall.  No current shortness of breath on the bed but feels worse when she exerts.  No leg swelling.  No fevers.  No focal weakness or numbness.  She does have some neck pain as well.  No visual disturbances.  No slurred speech              Objective:     Past Medical History:    Anxiety state, unspecified    Cancer (HCC)    Colon adenomas    x3    Disorder of thyroid    thyroidectomy    Diverticulosis of large intestine    Esophageal reflux    Exposure to medical therapeutic radiation    thyroid    High blood pressure    High cholesterol    Hypercholesteremia    Hypothyroidism    Osteoarthrosis, unspecified whether generalized or localized, unspecified site    Thyroid cancer (HCC)    papillary thyroid; s/p surgery resection with Asher and BRIDGES    Unspecified essential hypertension              Past Surgical History:   Procedure Laterality Date    Colonoscopy N/A 12/29/2022    Procedure: COLONOSCOPY;  Surgeon: Neeraj No MD;  Location: [facility] ENDOSCOPY    Colonoscopy  07/29/2024    Colonoscopy N/A 07/29/2024    Procedure: COLONOSCOPY;  Surgeon: Oneal  MD Neeraj;  Location: [facility] ENDOSCOPY    Egd  2022    Egd  2024    Esophagogastroduodenoscopy    Endoscopic ultrasound - internal  2022    Endoscopic ultrasound exam  2024    Endoscopic Ultrasound    Eye surgery  2024    Eye surgery Left 2024    Hysterectomy      Knee surgery Left 2022    no associated bleeding complications          40 week 7 lb(s) 5 oz Male; 6 hr labor           40 week 7 lb(s) 3 oz Female          41 week 9 lb(s) 8 oz Male    Other surgical history      Injection Tendon Sheath, Ligament    Thyroidectomy      total    Total abdom hysterectomy                  Social History     Socioeconomic History    Marital status:    Tobacco Use    Smoking status: Never    Smokeless tobacco: Never   Vaping Use    Vaping status: Never Used   Substance and Sexual Activity    Alcohol use: Yes     Comment: social    Drug use: No   Other Topics Concern    Caffeine Concern Yes     Comment: 1 cup of coffee    Social History Narrative    Camelia Figueroa is  to Baldemar x30 yrs. She has 3 adult children. Patient works in accounting in book keeping. She lives with her , her mom, and 1 of the children in [place], [place].     Social Drivers of Health     Food Insecurity: No Food Insecurity (2024)    Food Insecurity     Food Insecurity: Never true   Transportation Needs: No Transportation Needs (2024)    Transportation Needs     Lack of Transportation: No   Housing Stability: Low Risk  (2024)    Housing Stability     Housing Instability: No                  Physical Exam     ED Triage Vitals [24 1143]   /78   Pulse 98   Resp 18   Temp 98.2 °F (36.8 °C)   Temp src Oral   SpO2 94 %   O2 Device None (Room air)       Current Vitals:   Vital Signs  BP: 140/71  Pulse: 88  Resp: 20  Temp: 98.8 °F (37.1 °C)  Temp src: Oral  MAP (mmHg): 92    Oxygen Therapy  SpO2: 92 %  O2 Device: None (Room air)        Physical Exam  HENT:       Head: Normocephalic.      Mouth/Throat:      Mouth: Mucous membranes are moist.      Pharynx: Oropharynx is clear.   Eyes:      Extraocular Movements: Extraocular movements intact.      Pupils: Pupils are equal, round, and reactive to light.   Cardiovascular:      Rate and Rhythm: Normal rate and regular rhythm.      Heart sounds: Normal heart sounds.   Pulmonary:      Effort: Pulmonary effort is normal.      Comments: Pulse ox low at 94%.  Diminished breath sounds right base.  Abdominal:      Palpations: Abdomen is soft.      Tenderness: There is no abdominal tenderness.   Musculoskeletal:         General: No swelling. Normal range of motion.      Cervical back: Normal range of motion. Tenderness present.      Comments: Full range of motion of all extremities.  No bony tenderness to the extremities.  Superficial abrasions bilateral elbows over the olecranon   Lymphadenopathy:      Cervical: No cervical adenopathy.   Skin:     General: Skin is warm.      Capillary Refill: Capillary refill takes less than 2 seconds.   Neurological:      General: No focal deficit present.      Mental Status: She is alert.             ED Course     Labs Reviewed   CBC WITH DIFFERENTIAL WITH PLATELET - Abnormal; Notable for the following components:       Result Value    RDW-SD 53.5 (*)     RDW 15.9 (*)     All other components within normal limits   BASIC METABOLIC PANEL (8) - Abnormal; Notable for the following components:    Glucose 100 (*)     All other components within normal limits   TROPONIN I HIGH SENSITIVITY - Normal   HEPATIC FUNCTION PANEL (7) - Normal   RAINBOW DRAW LAVENDER   RAINBOW DRAW LIGHT GREEN   RAINBOW DRAW BLUE     EKG    Rate, intervals and axes as noted on EKG Report.  Rate: 89  Rhythm: Sinus Rhythm  Reading: EKG normal sinus rhythm and rate.  Normal axis and intervals.  Normal ST segments.  This EKG was interpreted by me.  Normal           ED Course as of 11/04/24  2026  ------------------------------------------------------------  Time: 11/04 1330  Comment: Chest x-ray independently interpreted by me.  Awaiting for CT to get additional information.  Labs independently interpreted by me.  CBC normal, CMP normal, troponin normal  ------------------------------------------------------------  Time: 11/04 1448  Comment: Shows no acute CT independently interpreted by me.  CT trauma to the head or C-spine.  Chest CT shows loculated effusions as well as some groundglass opacities.              MDM      CT CHEST PE AORTA (IV ONLY) (CPT=71260)    Result Date: 11/4/2024  CONCLUSION:   1. Large loculated right-sided pleural effusion with associated underlying pleural-based nodularity and atelectasis.  There is a PleurX catheter within a free-flowing segment of this pleural effusion.  2. Peribronchial ground-glass nodular opacities within the right lung and left lung base raising the possibility of a superimposed infectious process.  Additionally, there is a very small left pleural effusion with adjacent atelectasis which could be reactive in etiology.  3. Nonacute findings are also present and are described within the body of the report.     Dictated by (CST): Srinivasa Trujillo MD on 11/04/2024 at 2:34 PM     Finalized by (CST): Srinivasa Trujillo MD on 11/04/2024 at 2:39 PM          CT SPINE CERVICAL (CPT=72125)    Result Date: 11/4/2024  CONCLUSION:  1. Multilevel disc and facet degeneration.  No evidence of osseous metastasis. 2. PET avid soft tissue thickening left neck along posterior margin of cricoid cartilage is unchanged. 3. Loculated right a pickle pleural effusion. 4. Adduction of left vocal cord .  Correlate with direct exam for vocal cord paralysis.    Dictated by (CST): Jose Smith MD on 11/04/2024 at 2:24 PM     Finalized by (CST): Jose Smith MD on 11/04/2024 at 2:38 PM          CT BRAIN OR HEAD (CPT=70450)    Result Date: 11/4/2024  CONCLUSION: No CT evidence of acute  intracranial abnormality.    Dictated by (CST): Srinivasa Trujillo MD on 11/04/2024 at 2:23 PM     Finalized by (CST): Srinivasa Trujillo MD on 11/04/2024 at 2:25 PM          XR CHEST AP PORTABLE  (CPT=71045)    Result Date: 11/4/2024  CONCLUSION:  1. Interval worsening in the chest with a large area of opacity in the right upper lobe suggesting an area of pneumonia and or atelectasis.  Worsening right basal pneumonia and or atelectasis.  Right basal chest tube noted.  No well-defined pneumothorax.   Mild left basal atelectasis with mild blunting of the left costophrenic angle.    Dictated by (CST): Nicolás Caicedo MD on 11/04/2024 at 1:00 PM     Finalized by (CST): Nicolás Caicedo MD on 11/04/2024 at 1:11 PM             Admission disposition: 11/4/2024  3:11 PM           Medical Decision Making  Patient with malignant pleural effusion from thyroid cancer here with recurrent syncopal events 1 that led to a head injury 2 days ago.  She does have some C-spine tenderness as well.  She has abrasion on her elbows but no tenderness to the elbows.  Differential could include but is not limited to cardiac syncope, dehydration, medication side effects, bleeding, infectious, electrolyte disturbances and many other possibilities.  Will be getting CTs of the head and C-spine for trauma and CT of the chest to rule out PE which could also cause syncope.  Initial EKG is normal.  Does not seem to be ACS type of chest discomfort but this needs to be considered as well.  Troponin pending    Amount and/or Complexity of Data Reviewed  External Data Reviewed: labs and notes.     Details: Recent notes reviewed as well as laboratory studies compared  Labs: ordered. Decision-making details documented in ED Course.  Radiology: ordered and independent interpretation performed. Decision-making details documented in ED Course.  ECG/medicine tests: ordered and independent interpretation performed. Decision-making details documented in ED  Course.  Discussion of management or test interpretation with external provider(s): Please see ED course for discussion.  Discussion had with hospitalist.  Does not seem to be acute pneumonia so we will not be giving antibiotics at this time.  I did consult patient's hematologist    Risk  Decision regarding hospitalization.  Risk Details: Thyroid cancer        Disposition and Plan     Clinical Impression:  1. Syncope and collapse    2. Malignant pleural effusion (HCC)         Disposition:  Admit  11/4/2024  3:11 pm    Follow-up:  No follow-up provider specified.        Medications Prescribed:  Current Discharge Medication List              Supplementary Documentation:         Hospital Problems       Present on Admission  Date Reviewed: 10/30/2024            ICD-10-CM Noted POA    * (Principal) Syncope and collapse R55 11/4/2024 Unknown    Hyperglycemia R73.9 11/4/2024 Yes    Malignant pleural effusion (HCC) J91.0 9/13/2024 Unknown    Syncope R55 11/4/2024 Unknown

## 2024-11-04 NOTE — H&P
[facility]    PATIENT'S NAME: DEANA SHINE   ATTENDING PHYSICIAN: Nic Schreiber MD   PATIENT ACCOUNT#:   [id number]    LOCATION:  [address]  MEDICAL RECORD #:   [id number]       YOB: 1960  ADMISSION DATE:       11/04/2024    HISTORY AND PHYSICAL EXAMINATION    CHIEF COMPLAINT:  Syncope.    HISTORY OF PRESENT ILLNESS:  The patient is a 64-year-old  female with history of recurrent metastatic thyroid papillary carcinoma with right malignant pleural effusion post right PleurX catheter and currently on lenvatinib, tyrosine kinase inhibitor treatment.  Lenvatinib was held by her oncologist around 5 days ago for poor appetite and she was receiving IV fluid infusion at the cancer center.  Yesterday, she had a syncopal episode coming out of a car without prodrome.  Today, she felt fatigued and she was sent to the emergency department by her oncologist.  CBC, chemistry, and liver function tests were unremarkable.  CT scan of the cervical spine and CT scan of the brain showed no acute finding.  Chest x-ray showed interval worsening in the chest with large area of opacity in the right upper lobe.  CT scan of the chest showed large loculated right-sided pleural effusion with associated underlying pleural-based nodularity and atelectasis.  There is a PleurX catheter within a free flowing segment of this pleural effusion.  Peribronchial ground-glass opacities within the right lung and left lung base raising possibility of superimposed infectious process.    PAST MEDICAL HISTORY:  History of papillary thyroid carcinoma status post total thyroidectomy in 2010.  She had recent rising thyroglobulin and imaging studies showed recurrence at the thyroidectomy bed in the neck area with right lung effusion positive for malignancy.  She had PleurX catheter placed and started on lenvatinib with decreased level of thyroglobulin.  She had history of generalized osteoarthritis, hyperlipidemia,  hypertension, gastroesophageal reflux disease, hypothyroidism, and diverticulosis.    PAST SURGICAL HISTORY:  Total thyroidectomy, hysterectomy, left knee arthroscopic procedure.    MEDICATIONS:  Please see medication reconciliation list.     ALLERGIES:  Latex and peanuts.  No known drug allergies.    FAMILY HISTORY:  Mother had heart disease.    SOCIAL HISTORY:  No tobacco, alcohol, or drug use.  Lives with her family.  Independent for basic activities of daily living.    REVIEW OF SYSTEMS:  The patient said she had poor appetite for the last week and her lenvatinib had been on hold for the last 5 days.  She has been feeling lightheaded and dizzy when she stands up.  She denies any cough, fever, or chills.  Yesterday coming out of a car she had a brief syncopal episode and fell down.  She did not have a head injury.  The patient said she continued to take her blood pressure medications including Benicar and amlodipine.  No chest pain.  Other 12-point review of systems negative.      PHYSICAL EXAMINATION:    GENERAL:  Alert, oriented to time, place, and person, appears fatigued, no acute distress.   VITAL SIGNS:  Temperature 98.2, pulse 94, respiratory rate 25, blood pressure 118/74, pulse ox 92% on room air.  HEENT:  Atraumatic.  Oropharynx clear.  Dry mucous membranes.  Normal hard and soft palate.  Eyes:  Anicteric sclerae.  NECK:  Supple.  No lymphadenopathy.  Trachea midline.  Full range of motion.  LUNGS:  Clear to auscultation bilaterally.  Diminished breathing sounds right lung base.  PleurX catheter in place on the right side without complications.  HEART:  Regular rate and rhythm.  S1 and S2 auscultated.  No murmur.  ABDOMEN:  Soft, nondistended.  No tenderness.  Positive bowel sounds.  EXTREMITIES:  No edema, clubbing, or cyanosis.  NEUROLOGIC:  Motor and sensory intact.    ASSESSMENT:    1.   Syncopal episode.  Suspect poor appetite, blood pressure medications, and orthostatic hypotension.  Rule out  cardiac arrhythmia or other circulatory abnormality.  2.   Recurrent metastatic thyroid cancer with large loculated right pleural effusion.  PleurX catheter in place.  3.   History of essential hypertension.    PLAN:  The patient will be admitted to general medical floor.  Remote telemetry.  Drain PleurX catheter.  IV fluids.  Hold blood pressure medications.  Fall precautions.  Orthostatic blood pressure measurements.  We will notify her oncologist.  Monitor her rhythm.  Obtain 2D echocardiogram with Doppler and carotid ultrasound.  Further recommendations to follow.     Dictated By Dara Campos MD  d: 11/04/2024 15:15:33  t: 11/04/2024 15:45:12  Job [id number]  FB/    cc: Nic Schreiber MD

## 2024-11-04 NOTE — ED QUICK NOTES
Orders for admission, patient is aware of plan and ready to go upstairs. Any questions, please call ED RN kaia at extension [phone].     Patient Covid vaccination status: Fully vaccinated     COVID Test Ordered in ED: None    COVID Suspicion at Admission: N/A    Running Infusions:      Mental Status/LOC at time of transport: AXOX4    Other pertinent information:   CIWA score: N/A   NIH score:  N/A

## 2024-11-04 NOTE — TELEPHONE ENCOUNTER
Called Camelia Figueroa regarding her my chart message reqarding falling this weekend. She states she felt well the day after fluids. However, she fell yesterday, she was not dizzy, when it occurred she just \"Blacked out\". She is having having chest pain when she takes a deep breath. She is very thirsty, having some wheezing and coughing, nurse is supposed to come tomorrow to drain her pleurex, however last week she had minimal output from the pleurex. She is having a lot of pain in her head since her fall, she did hit her head, she took two norco for the pain in her head.     Placed patient on hold, Discussed with Dr. Chen, he is recommending an Evaluation in the ER. Went back on the phone and patient was no longer on the phone, she was not answering to me calling out her name. Called her , he was walking the dog. Gave him the recommendation to bring her to the ER, instructed him that she should not be driving as she needs a imaging of her chest and head. HE states he will be back in the house in minutes and will check on her and bring her to the ER. He voiced understanding of the plan.

## 2024-11-04 NOTE — ED INITIAL ASSESSMENT (HPI)
Pt c/o of dizziness and CP and SOB. Pt states she passed out at home. Pt did hit her head. Denies n/v. Pt states her oncologist told her to come to ED. Hx on cancer pt states she is currently taking Lenvima (chemo pill).

## 2024-11-04 NOTE — TELEPHONE ENCOUNTER
Camelia harding [phone] .  Would like to know what you want her do. Please return call. Thanks Beth

## 2024-11-04 NOTE — TELEPHONE ENCOUNTER
Called Camelia Figueroa back and gave her Dr. Chen's recommendation to go to the ER for further evaluation for imaging of chest and head. She voiced understanding, all instructed her it would be best for her to get a ride there and not drive herself. She voiced understanding and thanked me for the call. She will go to ER shortly.

## 2024-11-05 ENCOUNTER — APPOINTMENT (OUTPATIENT)
Dept: ULTRASOUND IMAGING | Facility: HOSPITAL | Age: 64
End: 2024-11-05
Attending: HOSPITALIST
Payer: COMMERCIAL

## 2024-11-05 ENCOUNTER — APPOINTMENT (OUTPATIENT)
Dept: CV DIAGNOSTICS | Facility: HOSPITAL | Age: 64
End: 2024-11-05
Attending: HOSPITALIST
Payer: COMMERCIAL

## 2024-11-05 LAB
ANION GAP SERPL CALC-SCNC: 2 MMOL/L (ref 0–18)
ATRIAL RATE: 89 BPM
BASOPHILS # BLD AUTO: 0.04 X10(3) UL (ref 0–0.2)
BASOPHILS NFR BLD AUTO: 0.5 %
BUN BLD-MCNC: 8 MG/DL (ref 9–23)
BUN/CREAT SERPL: 13.1 (ref 10–20)
CALCIUM BLD-MCNC: 8.5 MG/DL (ref 8.7–10.4)
CHLORIDE SERPL-SCNC: 112 MMOL/L (ref 98–112)
CO2 SERPL-SCNC: 26 MMOL/L (ref 21–32)
CREAT BLD-MCNC: 0.61 MG/DL
DEPRECATED RDW RBC AUTO: 54.4 FL (ref 35.1–46.3)
EGFRCR SERPLBLD CKD-EPI 2021: 100 ML/MIN/1.73M2 (ref 60–?)
EOSINOPHIL # BLD AUTO: 0.32 X10(3) UL (ref 0–0.7)
EOSINOPHIL NFR BLD AUTO: 4.3 %
ERYTHROCYTE [DISTWIDTH] IN BLOOD BY AUTOMATED COUNT: 16.1 % (ref 11–15)
GLUCOSE BLD-MCNC: 108 MG/DL (ref 70–99)
HCT VFR BLD AUTO: 35.7 %
HGB BLD-MCNC: 11.7 G/DL
IMM GRANULOCYTES # BLD AUTO: 0.03 X10(3) UL (ref 0–1)
IMM GRANULOCYTES NFR BLD: 0.4 %
LYMPHOCYTES # BLD AUTO: 2.36 X10(3) UL (ref 1–4)
LYMPHOCYTES NFR BLD AUTO: 32 %
MCH RBC QN AUTO: 30.5 PG (ref 26–34)
MCHC RBC AUTO-ENTMCNC: 32.8 G/DL (ref 31–37)
MCV RBC AUTO: 93.2 FL
MONOCYTES # BLD AUTO: 0.49 X10(3) UL (ref 0.1–1)
MONOCYTES NFR BLD AUTO: 6.6 %
NEUTROPHILS # BLD AUTO: 4.14 X10 (3) UL (ref 1.5–7.7)
NEUTROPHILS # BLD AUTO: 4.14 X10(3) UL (ref 1.5–7.7)
NEUTROPHILS NFR BLD AUTO: 56.2 %
OSMOLALITY SERPL CALC.SUM OF ELEC: 289 MOSM/KG (ref 275–295)
P AXIS: 14 DEGREES
P-R INTERVAL: 138 MS
PLATELET # BLD AUTO: 229 10(3)UL (ref 150–450)
POTASSIUM SERPL-SCNC: 3.9 MMOL/L (ref 3.5–5.1)
Q-T INTERVAL: 386 MS
QRS DURATION: 90 MS
QTC CALCULATION (BEZET): 469 MS
R AXIS: -23 DEGREES
RBC # BLD AUTO: 3.83 X10(6)UL
SODIUM SERPL-SCNC: 140 MMOL/L (ref 136–145)
T AXIS: 10 DEGREES
VENTRICULAR RATE: 89 BPM
WBC # BLD AUTO: 7.4 X10(3) UL (ref 4–11)

## 2024-11-05 PROCEDURE — 93306 TTE W/DOPPLER COMPLETE: CPT | Performed by: HOSPITALIST

## 2024-11-05 PROCEDURE — 93880 EXTRACRANIAL BILAT STUDY: CPT | Performed by: HOSPITALIST

## 2024-11-05 PROCEDURE — 99223 1ST HOSP IP/OBS HIGH 75: CPT | Performed by: INTERNAL MEDICINE

## 2024-11-05 PROCEDURE — 99233 SBSQ HOSP IP/OBS HIGH 50: CPT | Performed by: HOSPITALIST

## 2024-11-05 RX ORDER — ACETAMINOPHEN 500 MG
500 TABLET ORAL EVERY 4 HOURS PRN
Status: DISCONTINUED | OUTPATIENT
Start: 2024-11-05 | End: 2024-11-11

## 2024-11-05 RX ORDER — DIPHENHYDRAMINE HYDROCHLORIDE 50 MG/ML
25 INJECTION INTRAMUSCULAR; INTRAVENOUS ONCE
Status: COMPLETED | OUTPATIENT
Start: 2024-11-05 | End: 2024-11-05

## 2024-11-05 NOTE — PLAN OF CARE
Received patient at 0119. VSS. RA. Fall precautions in place. Call light within reach. Bed locked and lowered. Frequent rounding by staff.     Problem: Patient Centered Care  Goal: Patient preferences are identified and integrated in the patient's plan of care  Description: Interventions:  - What would you like us to know as we care for you? From Home w/    - Provide timely, complete, and accurate information to patient/family  - Incorporate patient and family knowledge, values, beliefs, and cultural backgrounds into the planning and delivery of care  - Encourage patient/family to participate in care and decision-making at the level they choose  - Honor patient and family perspectives and choices  Outcome: Progressing     Problem: CARDIOVASCULAR - ADULT  Goal: Maintains optimal cardiac output and hemodynamic stability  Description: INTERVENTIONS:  - Monitor vital signs, rhythm, and trends  - Monitor for bleeding, hypotension and signs of decreased cardiac output  - Evaluate effectiveness of vasoactive medications to optimize hemodynamic stability  - Monitor arterial and/or venous puncture sites for bleeding and/or hematoma  - Assess quality of pulses, skin color and temperature  - Assess for signs of decreased coronary artery perfusion - ex. Angina  - Evaluate fluid balance, assess for edema, trend weights  Outcome: Progressing  Goal: Absence of cardiac arrhythmias or at baseline  Description: INTERVENTIONS:  - Continuous cardiac monitoring, monitor vital signs, obtain 12 lead EKG if indicated  - Evaluate effectiveness of antiarrhythmic and heart rate control medications as ordered  - Initiate emergency measures for life threatening arrhythmias  - Monitor electrolytes and administer replacement therapy as ordered  Outcome: Progressing

## 2024-11-05 NOTE — CONSULTS
[facility]  part of [facility]    Report of Consultation    Camelia Markham Patient Status:  Observation    1960 MRN [id number]   Location [facility] 3W/SW Attending Fidel Henderson MD   Hosp Day # 0 PCP FINA MABRY, MD WENDY     Date of Admission:  2024    Reason for Consultation:   Dyspnea, loculated pleural effusion    History of Present Illness:   Patient is a 64-year-old female with history of recurrent metastatic thyroid papillary carcinoma with malignant right pleural effusion with prior Pleurx catheter placement who presented with chief complaint of syncopal episode.  Admits to gradual worsening right-sided anterior chest wall discomfort with associated dyspnea.  CT chest performed with evidence of new loculated area of fluid collection seen in addition to previous effusion.  200 cc pleural fluid drained yesterday.  Patient denies significant improvement in dyspnea afterwards.  Denies significant productive cough.  Saturation stable.  Hemodynamically stable.    Past Medical History  Past Medical History:    Anxiety state, unspecified    Cancer (HCC)    Colon adenomas    x3    Disorder of thyroid    thyroidectomy    Diverticulosis of large intestine    Esophageal reflux    Exposure to medical therapeutic radiation    thyroid    High blood pressure    High cholesterol    Hypercholesteremia    Hypothyroidism    Osteoarthrosis, unspecified whether generalized or localized, unspecified site    Thyroid cancer (HCC)    papillary thyroid; s/p surgery resection with Asher and BRIDGES    Unspecified essential hypertension       Past Surgical History  Past Surgical History:   Procedure Laterality Date    Colonoscopy N/A 2022    Procedure: COLONOSCOPY;  Surgeon: Neeraj No MD;  Location: [facility] ENDOSCOPY    Colonoscopy  2024    Colonoscopy N/A 2024    Procedure: COLONOSCOPY;  Surgeon: Neeraj No MD;  Location: [facility] ENDOSCOPY    Egd  2022     Egd  2024    Esophagogastroduodenoscopy    Endoscopic ultrasound - internal  2022    Endoscopic ultrasound exam  2024    Endoscopic Ultrasound    Eye surgery  2024    Eye surgery Left 2024    Hysterectomy      Knee surgery Left 2022    no associated bleeding complications          40 week 7 lb(s) 5 oz Male; 6 hr labor           40 week 7 lb(s) 3 oz Female          41 week 9 lb(s) 8 oz Male    Other surgical history      Injection Tendon Sheath, Ligament    Thyroidectomy      total    Total abdom hysterectomy         Family History  Family History   Problem Relation Age of Onset    Heart Disorder Mother     Breast Cancer Maternal Cousin Female 57    Cancer Daughter 29        Hodgkin's Lymphoma    Ovarian Cancer Neg     Bleeding Disorders Neg     DVT/VTE Neg     Pancreatic Cancer Neg     Prostate Cancer Neg        Social History  Social History     Socioeconomic History    Marital status:    Tobacco Use    Smoking status: Never    Smokeless tobacco: Never   Vaping Use    Vaping status: Never Used   Substance and Sexual Activity    Alcohol use: Yes     Comment: social    Drug use: No   Other Topics Concern    Caffeine Concern Yes     Comment: 1 cup of coffee            Current Medications:  Current Facility-Administered Medications   Medication Dose Route Frequency    acetaminophen (Tylenol Extra Strength) tab 500 mg  500 mg Oral Q4H PRN    sodium chloride 0.9% infusion   Intravenous Continuous    heparin (Porcine) 5000 UNIT/ML injection 5,000 Units  5,000 Units Subcutaneous 2 times per day    temazepam (Restoril) cap 15 mg  15 mg Oral Nightly PRN    ondansetron (Zofran) 4 MG/2ML injection 4 mg  4 mg Intravenous Q6H PRN    prochlorperazine (Compazine) 10 MG/2ML injection 5 mg  5 mg Intravenous Q8H PRN     Medications Prior to Admission   Medication Sig    DULoxetine 20 MG Oral Cap DR Particles Take 1 capsule (20 mg total) by mouth daily.     levothyroxine 150 MCG Oral Tab Take 1 tablet (150 mcg total) by mouth before breakfast.    Lenvatinib, 24 MG Daily Dose, (LENVIMA, 24 MG DAILY DOSE,) 2 x 10 MG & 4 MG Oral Capsule Therapy Pack Take 24 mg by mouth daily.    ondansetron (ZOFRAN) 8 MG tablet Take 1 tablet (8 mg total) by mouth every 8 (eight) hours as needed for Nausea.    HYDROcodone-acetaminophen 5-325 MG Oral Tab Take 1-2 tablets by mouth every 6 (six) hours as needed for Pain.    Olmesartan Medoxomil 40 MG Oral Tab Take 1 tablet (40 mg total) by mouth daily.    Omeprazole 40 MG Oral Capsule Delayed Release Take 1 capsule (40 mg total) by mouth every other day.    amLODIPine 10 MG Oral Tab Take 1 tablet (10 mg total) by mouth daily.    LORazepam 1 MG Oral Tab Take 1 tablet (1 mg total) by mouth every 6 (six) hours as needed (nausea).    prochlorperazine (COMPAZINE) 10 mg tablet Take 1 tablet (10 mg total) by mouth every 6 (six) hours as needed for Nausea.       Allergies  Allergies[1]    Review of Systems:   Constitutional: denies fevers, chills, weakness, fatigue, recent illness  HEENT: denies headache, sore throat, vision loss  Cardio: denies chest pain, chest pressure, palpitations  Respiratory: dyspnea, cough, denies wheezing, hemoptysis   GI: denies nausea, vomiting, abdominal pain  : denies dysuria, hematuria  Musculoskeletal: denies arthralgia, myalgia  Integumentary: denies rash, itching  Neurological: denies syncope, weakness, dizziness,   Psychiatric: denies depression, anxiety  Hematologic: denies bruising        Physical Exam:   Blood pressure 150/82, pulse 85, temperature 98.2 °F (36.8 °C), temperature source Oral, resp. rate 16, weight 163 lb (73.9 kg), SpO2 93%.    Constitutional: no acute distress  Eyes: PERRL  ENT: nares patent  Neck: neck supple, no JVD  Cardio: RRR, S1 S2  Respiratory: Diminished right-sided breath sounds  GI: abdomen soft, non tender, active bowel souds, no organomegaly  Extremities: no clubbing, cyanosis,  edema  Neurologic: no gross motor deficits  Skin: warm, dry    Results:   Laboratory Data  Lab Results   Component Value Date    WBC 7.4 11/05/2024    HGB 11.7 (L) 11/05/2024    HCT 35.7 11/05/2024    .0 11/05/2024    CREATSERUM 0.61 11/05/2024    BUN 8 (L) 11/05/2024     11/05/2024    K 3.9 11/05/2024     11/05/2024    CO2 26.0 11/05/2024     (H) 11/05/2024    CA 8.5 (L) 11/05/2024    ALB 3.5 11/04/2024    ALKPHO 90 11/04/2024    TP 6.8 11/04/2024    AST 16 11/04/2024    ALT 16 11/04/2024    PTT 26.0 12/17/2022    INR 0.98 03/13/2023    PTP 12.9 03/13/2023    T4F 1.9 (H) 07/17/2024    TSH 0.048 (L) 07/17/2024    LIP 33 05/23/2024    DDIMER 0.55 05/23/2024    MG 2.1 10/30/2024    PHOS 4.2 09/16/2024         Imaging  US CAROTID DOPPLER BILAT - DIAG IMG (CPT=93880)    Result Date: 11/5/2024  CONCLUSION:  1. Atherosclerosis of proximal internal carotids.  No significant stenosis.  Velocities are in normal range. 2. Antegrade patent vertebral arteries bilaterally.    Dictated by (CST): Jose Smith MD on 11/05/2024 at 12:53 PM     Finalized by (CST): Jose Smith MD on 11/05/2024 at 12:56 PM          CT CHEST PE AORTA (IV ONLY) (CPT=71260)    Result Date: 11/4/2024  CONCLUSION:   1. Large loculated right-sided pleural effusion with associated underlying pleural-based nodularity and atelectasis.  There is a PleurX catheter within a free-flowing segment of this pleural effusion.  2. Peribronchial ground-glass nodular opacities within the right lung and left lung base raising the possibility of a superimposed infectious process.  Additionally, there is a very small left pleural effusion with adjacent atelectasis which could be reactive in etiology.  3. Nonacute findings are also present and are described within the body of the report.     Dictated by (CST): Srinivasa Trujillo MD on 11/04/2024 at 2:34 PM     Finalized by (CST): Srinivasa Trujillo MD on 11/04/2024 at 2:39 PM          CT SPINE  CERVICAL (CPT=72125)    Result Date: 11/4/2024  CONCLUSION:  1. Multilevel disc and facet degeneration.  No evidence of osseous metastasis. 2. PET avid soft tissue thickening left neck along posterior margin of cricoid cartilage is unchanged. 3. Loculated right a pickle pleural effusion. 4. Adduction of left vocal cord .  Correlate with direct exam for vocal cord paralysis.    Dictated by (CST): Jose Smith MD on 11/04/2024 at 2:24 PM     Finalized by (CST): Jose Smith MD on 11/04/2024 at 2:38 PM          CT BRAIN OR HEAD (CPT=70450)    Result Date: 11/4/2024  CONCLUSION: No CT evidence of acute intracranial abnormality.    Dictated by (CST): Srinivasa Trujillo MD on 11/04/2024 at 2:23 PM     Finalized by (CST): Srinivasa Trujillo MD on 11/04/2024 at 2:25 PM          XR CHEST AP PORTABLE  (CPT=71045)    Result Date: 11/4/2024  CONCLUSION:  1. Interval worsening in the chest with a large area of opacity in the right upper lobe suggesting an area of pneumonia and or atelectasis.  Worsening right basal pneumonia and or atelectasis.  Right basal chest tube noted.  No well-defined pneumothorax.   Mild left basal atelectasis with mild blunting of the left costophrenic angle.    Dictated by (CST): Nicolás Caicedo MD on 11/04/2024 at 1:00 PM     Finalized by (CST): Nicolás Caicedo MD on 11/04/2024 at 1:11 PM           Assessment   1.  Metastatic papillary thyroid cancer  2.  Malignant right pleural effusion  3.  New loculated right pleural effusion  4.  Dyspnea  5.  Syncopal episode    Plan   -Patient presents after syncopal episode.  Admits to ongoing progressive dyspnea with anterior right chest wall discomfort.  -Patient with previous Pleurx catheter placement most recently drained 1 day prior with 200 cc pleural fluid.  -CT chest on 11/4/2024 with large loculated right-sided pleural effusion with some pleural-based nodularity seen.  Pleurx catheter seen within free-flowing pleural effusion at right lung base.  Some  groundglass opacities seen with in the right and left lungs.  -Reviewed imaging with interventional radiology.  Plan for n.p.o. after midnight and likely IR drain for loculated pleural fluid collection tomorrow.  -Pain control  -Drain Pleurx catheter as necessary  -Reviewed vitals, labs imaging    Ramesh Kong DO  Pulmonary Critical Care Medicine  [facility]  11/5/2024  4:13 PM        [1]   Allergies  Allergen Reactions    Latex HIVES    Peanut-Containing Drug Products SWELLING     Closes Throat  Closes Throat  Closes Throat      Peanuts SWELLING     Closes Throat    Adhesive Tape OTHER (SEE COMMENTS)    Seasonal

## 2024-11-05 NOTE — PLAN OF CARE
Patient alert and oriented x 4. Vitals stable on room air. Patient's headache managed with 1 time dose IV benadryl. Plan for Pleurex placement tomorrow. Call light within reach.    Problem: Patient Centered Care  Goal: Patient preferences are identified and integrated in the patient's plan of care  Description: Interventions:  - What would you like us to know as we care for you? From Home w/    - Provide timely, complete, and accurate information to patient/family  - Incorporate patient and family knowledge, values, beliefs, and cultural backgrounds into the planning and delivery of care  - Encourage patient/family to participate in care and decision-making at the level they choose  - Honor patient and family perspectives and choices  Outcome: Progressing     Problem: CARDIOVASCULAR - ADULT  Goal: Maintains optimal cardiac output and hemodynamic stability  Description: INTERVENTIONS:  - Monitor vital signs, rhythm, and trends  - Monitor for bleeding, hypotension and signs of decreased cardiac output  - Evaluate effectiveness of vasoactive medications to optimize hemodynamic stability  - Monitor arterial and/or venous puncture sites for bleeding and/or hematoma  - Assess quality of pulses, skin color and temperature  - Assess for signs of decreased coronary artery perfusion - ex. Angina  - Evaluate fluid balance, assess for edema, trend weights  Outcome: Progressing  Goal: Absence of cardiac arrhythmias or at baseline  Description: INTERVENTIONS:  - Continuous cardiac monitoring, monitor vital signs, obtain 12 lead EKG if indicated  - Evaluate effectiveness of antiarrhythmic and heart rate control medications as ordered  - Initiate emergency measures for life threatening arrhythmias  - Monitor electrolytes and administer replacement therapy as ordered  Outcome: Progressing

## 2024-11-05 NOTE — CM/SW NOTE
Per RN rounds, pt is from home w/ her spouse and her mother. Per RN, pt reports being current w/ HH services.    Per chart review, recent documentation from [facility]. SW confirmed w/ liaison Ivon, pt is current w/ [facility] for RN/PT services. HILLARY order entered.      PLAN: Home w/ [facility] - pending med clear      SW/CM to remain available for support and/or discharge planning.         MS NildaW, LSW [id number]

## 2024-11-05 NOTE — CONSULTS
[facility]  Initial Inpatient Consult Note    Camelia Markham Patient Status:  Observation    1960 MRN [id number]   Location [facility] 3W/SW Attending Fidel Henderson MD   Hosp Day # 0 PCP FINA MABRY, MD WENDY     Reason for consultation: Met thyroid cancer      History of Present Illness  Camelia Markham is a 64 year old female with known metastatic thyroid cancer with malignant pleural effusion.  She had been on lenvatinib with improvement of her effusion but was having trouble tolerating 24 mg dose.  Had increased nausea and vomiting with decreased p.o. intake.  lenvatinib was held last week  She developed a syncopal episode and increasing fatigue as well as some chest discomfort hence referred to the emergency room.  CT of the brain and C-spine showed no acute findings.  Chest x-ray suggested large opacity in the right upper lobe.  CT scan showed a large loculated right-sided pleural effusion with associated pleural-based nodularity.  Pleurx catheter in the free-flowing segment of the pleural effusion with improvement was noted.  There were peribronchial groundglass opacities as well.    A Pleurx was drained and yielded 200 mL of fluid.  She was started on IV hydration and hospitalized.still feels very tired      Review of Systems:  Pt denies fevers, chills, night sweats, HA, vision changes, CP, SOB, cough, n/v/d, abd pain, urinary or bowel complaints  Hematology/Oncology ROS performed and negative except as above in HPI    History/Other:   Past Medical History:  Past Medical History:    Anxiety state, unspecified    Cancer (HCC)    Colon adenomas    x3    Disorder of thyroid    thyroidectomy    Diverticulosis of large intestine    Esophageal reflux    Exposure to medical therapeutic radiation    thyroid    High blood pressure    High cholesterol    Hypercholesteremia    Hypothyroidism    Osteoarthrosis, unspecified whether generalized or localized,  unspecified site    Thyroid cancer (HCC)    papillary thyroid; s/p surgery resection with Asher and BRIDGES    Unspecified essential hypertension       Past Surgical History:  Past Surgical History:   Procedure Laterality Date    Colonoscopy N/A 2022    Procedure: COLONOSCOPY;  Surgeon: Neeraj No MD;  Location: [facility] ENDOSCOPY    Colonoscopy  2024    Colonoscopy N/A 2024    Procedure: COLONOSCOPY;  Surgeon: Neeraj No MD;  Location: [facility] ENDOSCOPY    Egd  2022    Egd  2024    Esophagogastroduodenoscopy    Endoscopic ultrasound - internal  2022    Endoscopic ultrasound exam  2024    Endoscopic Ultrasound    Eye surgery  2024    Eye surgery Left 2024    Hysterectomy      Knee surgery Left 2022    no associated bleeding complications          40 week 7 lb(s) 5 oz Male; 6 hr labor           40 week 7 lb(s) 3 oz Female          41 week 9 lb(s) 8 oz Male    Other surgical history      Injection Tendon Sheath, Ligament    Thyroidectomy      total    Total abdom hysterectomy         Current Medications:   acetaminophen (Tylenol Extra Strength) tab 500 mg  500 mg Oral Q4H PRN    [COMPLETED] diphenhydrAMINE (Benadryl) 50 mg/mL  injection 25 mg  25 mg Intravenous Once    [COMPLETED] sodium chloride 0.9 % IV bolus 1,000 mL  1,000 mL Intravenous Once    [COMPLETED] iopamidol 76% (ISOVUE-370) injection for power injector  80 mL Intravenous ONCE PRN    sodium chloride 0.9% infusion   Intravenous Continuous    heparin (Porcine) 5000 UNIT/ML injection 5,000 Units  5,000 Units Subcutaneous 2 times per day    temazepam (Restoril) cap 15 mg  15 mg Oral Nightly PRN    ondansetron (Zofran) 4 MG/2ML injection 4 mg  4 mg Intravenous Q6H PRN    prochlorperazine (Compazine) 10 MG/2ML injection 5 mg  5 mg Intravenous Q8H PRN       Allergies:   Allergies[1]    Family Medical History:  Family History   Problem Relation Age of Onset    Heart  Disorder Mother     Breast Cancer Maternal Cousin Female 57    Cancer Daughter 29        Hodgkin's Lymphoma    Ovarian Cancer Neg     Bleeding Disorders Neg     DVT/VTE Neg     Pancreatic Cancer Neg     Prostate Cancer Neg        Social History:  Social History     Socioeconomic History    Marital status:      Spouse name: Not on file    Number of children: Not on file    Years of education: Not on file    Highest education level: Not on file   Occupational History    Not on file   Tobacco Use    Smoking status: Never    Smokeless tobacco: Never   Vaping Use    Vaping status: Never Used   Substance and Sexual Activity    Alcohol use: Yes     Comment: social    Drug use: No    Sexual activity: Not on file   Other Topics Concern     Service Not Asked    Blood Transfusions Not Asked    Caffeine Concern Yes     Comment: 1 cup of coffee     Occupational Exposure Not Asked    Hobby Hazards Not Asked    Sleep Concern Not Asked    Stress Concern Not Asked    Weight Concern Not Asked    Special Diet Not Asked    Back Care Not Asked    Exercise Not Asked    Bike Helmet Not Asked    Seat Belt Not Asked    Self-Exams Not Asked   Social History Narrative    Camelia Figueroa is  to Baldemar x30 yrs. She has 3 adult children. Patient works in [employer] in [employer]. She lives with her , her mom, and 1 of the children in [place], [place].     Social Drivers of Health     Financial Resource Strain: Not on file   Food Insecurity: No Food Insecurity (11/5/2024)    Food Insecurity     Food Insecurity: Never true   Transportation Needs: No Transportation Needs (11/5/2024)    Transportation Needs     Lack of Transportation: No     Car Seat: Not on file   Physical Activity: Not on file   Stress: Not on file   Social Connections: Not on file   Housing Stability: Low Risk  (11/5/2024)    Housing Stability     Housing Instability: No     Housing Instability Emergency: Not on file     Crib or Bassinette: Not on file        Gyn History:  OB History    Para Term  AB Living   3 3 0 0 0 0   SAB IAB Ectopic Multiple Live Births   0 0 0 0 0       Objective:    /82 (BP Location: Right arm)   Pulse 85   Temp 98.2 °F (36.8 °C) (Oral)   Resp 16   Wt 73.9 kg (163 lb)   SpO2 93%   BMI 29.81 kg/m²   Physical Exam:  General: A&Ox3, NAD, appears tired  HEENT: PERRL, OP clear  Neck: supple, no LAD or JVD  CV: RRR, no murmurs, + pulses  Pulm: CTA b/l, no w/r/r, normal effort  Lymph: no palpable lymphadenopathy throughout the cervical, supraclavicular, axillary, or inguinal regions  Extremities: no edema or calf tenderness  Neurological: Grossly intact    Limited exam since undergoing bedside echo    Labs:  Lab Results   Component Value Date/Time    WBC 7.4 2024 06:57 AM    RBC 3.83 2024 06:57 AM    HGB 11.7 (L) 2024 06:57 AM    HCT 35.7 2024 06:57 AM    MCV 93.2 2024 06:57 AM    MCH 30.5 2024 06:57 AM    MCHC 32.8 2024 06:57 AM    RDW 16.1 (H) 2024 06:57 AM    NEPRELIM 4.14 2024 06:57 AM    .0 2024 06:57 AM       Lab Results   Component Value Date/Time     (H) 2024 06:57 AM    BUN 8 (L) 2024 06:57 AM    CREATSERUM 0.61 2024 06:57 AM    GFRNAA 67 2021 12:09 PM    CA 8.5 (L) 2024 06:57 AM    ALB 3.5 2024 12:27 PM     2024 06:57 AM    K 3.9 2024 06:57 AM     2024 06:57 AM    CO2 26.0 2024 06:57 AM    ALKPHO 90 2024 12:27 PM    AST 16 2024 12:27 PM    ALT 16 2024 12:27 PM       Imaging:Assessment & Plan:    Camelia Markham is a 64 year old female with metastatic thyroid cancer hospitalized here with syncopal episode CT scan shows loculated pleural effusion as well as new groundglass opacities.    # Await cardiac workup    # Consider pulmonary evaluation for new GGO, and possible percutaneous drainage of her loculated effusion    # Met thyroid cancer: intolerant  of lenvatinib 24mg dose, on hold, will rechallenge with 20mg once her PS has improved     Thank you Dr FNIA MABRY, MD WENDY for the opportunity to participate in the care of this interesting patient. Please do contact me if I may be of any further assistance    Inocente Chen MD  [facility] Hematology/Oncology  [facility]    This note was created using a voice-recognition transcribing system. Incorrect words or phrases may have been missed during proofreading. Please interpret accordingly.         [1]   Allergies  Allergen Reactions    Latex HIVES    Peanut-Containing Drug Products SWELLING     Closes Throat  Closes Throat  Closes Throat      Peanuts SWELLING     Closes Throat    Adhesive Tape OTHER (SEE COMMENTS)    Seasonal

## 2024-11-05 NOTE — PROGRESS NOTES
[facility]  part of [facility]    Progress Note    Camelia Markham Patient Status:  Observation    1960 MRN [id number]   Location [facility] 3W/SW Attending Fidel Henderson MD   Hosp Day # 0 PCP FINA MABRY, MD WENDY     Chief Complaint:     Syncope    Subjective:   Subjective:    Patient seen and examined  Hypertensive, afebrile  No new complaints at this time.       Objective:   Blood pressure 150/80, pulse 88, temperature 98.2 °F (36.8 °C), temperature source Oral, resp. rate 16, weight 163 lb (73.9 kg), SpO2 93%.  Physical Exam    General: Patient is alert and oriented x3 does not appear to be in acute distress at this time  HEENT: EOMI PERRLA, atraumatic normocephalic  Cardiac: S1-S2 appreciated  Lungs: Good air entry bilaterally clear to auscultation, PleurX in place R side  Abdomen: Soft nontender nondistended positive bowel sounds  Ext: Peripheral pulses are positive  Neuro: No focal deficits noted  Psych: Normal mood  Skin: No rashes noted  MSK: Full range of motion intact      Results:   Lab Results   Component Value Date    WBC 7.4 2024    HGB 11.7 (L) 2024    HCT 35.7 2024    .0 2024    CREATSERUM 0.61 2024    BUN 8 (L) 2024     2024    K 3.9 2024     2024    CO2 26.0 2024     (H) 2024    CA 8.5 (L) 2024    ALB 3.5 2024    ALKPHO 90 2024    BILT 1.0 2024    TP 6.8 2024    AST 16 2024    ALT 16 2024    PTT 26.0 2022    INR 0.98 2023    T4F 1.9 (H) 2024    TSH 0.048 (L) 2024    LIP 33 2024    DDIMER 0.55 2024    MG 2.1 10/30/2024    PHOS 4.2 2024    TROPHS <3 2024       CT CHEST PE AORTA (IV ONLY) (CPT=71260)    Result Date: 2024  CONCLUSION:   1. Large loculated right-sided pleural effusion with associated underlying pleural-based nodularity and atelectasis.  There is a PleurX  catheter within a free-flowing segment of this pleural effusion.  2. Peribronchial ground-glass nodular opacities within the right lung and left lung base raising the possibility of a superimposed infectious process.  Additionally, there is a very small left pleural effusion with adjacent atelectasis which could be reactive in etiology.  3. Nonacute findings are also present and are described within the body of the report.     Dictated by (CST): Srinivasa Trujillo MD on 11/04/2024 at 2:34 PM     Finalized by (CST): Srinivasa Trujillo MD on 11/04/2024 at 2:39 PM          CT SPINE CERVICAL (CPT=72125)    Result Date: 11/4/2024  CONCLUSION:  1. Multilevel disc and facet degeneration.  No evidence of osseous metastasis. 2. PET avid soft tissue thickening left neck along posterior margin of cricoid cartilage is unchanged. 3. Loculated right a pickle pleural effusion. 4. Adduction of left vocal cord .  Correlate with direct exam for vocal cord paralysis.    Dictated by (CST): Jose Smith MD on 11/04/2024 at 2:24 PM     Finalized by (CST): Jose Smith MD on 11/04/2024 at 2:38 PM          CT BRAIN OR HEAD (CPT=70450)    Result Date: 11/4/2024  CONCLUSION: No CT evidence of acute intracranial abnormality.    Dictated by (CST): Srinivasa Trujillo MD on 11/04/2024 at 2:23 PM     Finalized by (CST): Srinivasa Trujillo MD on 11/04/2024 at 2:25 PM          XR CHEST AP PORTABLE  (CPT=71045)    Result Date: 11/4/2024  CONCLUSION:  1. Interval worsening in the chest with a large area of opacity in the right upper lobe suggesting an area of pneumonia and or atelectasis.  Worsening right basal pneumonia and or atelectasis.  Right basal chest tube noted.  No well-defined pneumothorax.   Mild left basal atelectasis with mild blunting of the left costophrenic angle.    Dictated by (CST): Nicolás Caicedo MD on 11/04/2024 at 1:00 PM     Finalized by (CST): Nicolás Caicedo MD on 11/04/2024 at 1:11 PM         EKG 12 Lead    Result Date:  11/5/2024  Normal sinus rhythm Normal ECG When compared with ECG of 13-SEP-2024 13:14, No significant change was found Confirmed by ODILIA MARTINEZ ([id number]) on 11/5/2024 7:15:04 AM     Assessment & Plan:     Syncope and collapse  -possibly due to poor PO intake,   -orthostasis.   -continue current IV hydration will monitor closely    Malignant pleural effusion (HCC)  -Patient with hx of recurrent metastatic Papillary thyroid cancer.   -has a pleurX in place but new imaging reveals a loculated pleural effusion that is away from the pleurX  -will have pulmonology placed on consult  -appreciate hem/onc recs.   -will monitor closely    Hyperglycemia  -continue Accuchecks qachs,  -supplement with sliding scale    HTN  -montior BP  -titrate meds as needed.     VTE ppx: heparin subcutaneous.   Full Code    Global A/P  -pulm consult placed  -echo pending.   -will monitor closely  -appreciate hem/onc recs  -Reviewed previous consultant notes  -Reviewed CBC, BMP, Mag, and Phos  -Reviewed tests ordered  -Repeat labs in am  -MDM: High, severe exacerbation of chronic illness posing a threat to life. IV medications requiring close inpatient monitoring.             Fidel Henderson MD

## 2024-11-05 NOTE — HOME CARE LIAISON
Patient is current with [facility] for RN and PT services. Will need HILLARY order if patient status changed to inpatient. Adam Munguia notified

## 2024-11-05 NOTE — PLAN OF CARE
Ra. Pleurx drained today, 200 mL removed. IV fluids infusing. Call light within reach. Safety precautions in place. Plan: US carotids and 2D echo tomorrow.   Problem: Patient Centered Care  Goal: Patient preferences are identified and integrated in the patient's plan of care  Description: Interventions:  - What would you like us to know as we care for you? From Home w/    - Provide timely, complete, and accurate information to patient/family  - Incorporate patient and family knowledge, values, beliefs, and cultural backgrounds into the planning and delivery of care  - Encourage patient/family to participate in care and decision-making at the level they choose  - Honor patient and family perspectives and choices  Outcome: Progressing     Problem: CARDIOVASCULAR - ADULT  Goal: Maintains optimal cardiac output and hemodynamic stability  Description: INTERVENTIONS:  - Monitor vital signs, rhythm, and trends  - Monitor for bleeding, hypotension and signs of decreased cardiac output  - Evaluate effectiveness of vasoactive medications to optimize hemodynamic stability  - Monitor arterial and/or venous puncture sites for bleeding and/or hematoma  - Assess quality of pulses, skin color and temperature  - Assess for signs of decreased coronary artery perfusion - ex. Angina  - Evaluate fluid balance, assess for edema, trend weights  Outcome: Progressing  Goal: Absence of cardiac arrhythmias or at baseline  Description: INTERVENTIONS:  - Continuous cardiac monitoring, monitor vital signs, obtain 12 lead EKG if indicated  - Evaluate effectiveness of antiarrhythmic and heart rate control medications as ordered  - Initiate emergency measures for life threatening arrhythmias  - Monitor electrolytes and administer replacement therapy as ordered  Outcome: Progressing

## 2024-11-06 ENCOUNTER — APPOINTMENT (OUTPATIENT)
Dept: HEMATOLOGY/ONCOLOGY | Facility: HOSPITAL | Age: 64
End: 2024-11-06
Attending: INTERNAL MEDICINE
Payer: COMMERCIAL

## 2024-11-06 ENCOUNTER — APPOINTMENT (OUTPATIENT)
Dept: HEMATOLOGY/ONCOLOGY | Facility: HOSPITAL | Age: 64
End: 2024-11-06
Attending: PHYSICIAN ASSISTANT
Payer: COMMERCIAL

## 2024-11-06 ENCOUNTER — APPOINTMENT (OUTPATIENT)
Dept: INTERVENTIONAL RADIOLOGY/VASCULAR | Facility: HOSPITAL | Age: 64
End: 2024-11-06
Attending: CLINICAL NURSE SPECIALIST
Payer: COMMERCIAL

## 2024-11-06 LAB
ANION GAP SERPL CALC-SCNC: 7 MMOL/L (ref 0–18)
BASOPHILS # BLD AUTO: 0.04 X10(3) UL (ref 0–0.2)
BASOPHILS NFR BLD AUTO: 0.5 %
BASOPHILS NFR PLR: 0 %
BUN BLD-MCNC: 7 MG/DL (ref 9–23)
BUN/CREAT SERPL: 10.8 (ref 10–20)
CALCIUM BLD-MCNC: 8.5 MG/DL (ref 8.7–10.4)
CHLORIDE SERPL-SCNC: 110 MMOL/L (ref 98–112)
CO2 SERPL-SCNC: 24 MMOL/L (ref 21–32)
CREAT BLD-MCNC: 0.65 MG/DL
DEPRECATED RDW RBC AUTO: 52.8 FL (ref 35.1–46.3)
EGFRCR SERPLBLD CKD-EPI 2021: 99 ML/MIN/1.73M2 (ref 60–?)
EOSINOPHIL # BLD AUTO: 0.34 X10(3) UL (ref 0–0.7)
EOSINOPHIL NFR BLD AUTO: 4.4 %
EOSINOPHIL NFR PLR: 3 %
ERYTHROCYTE [DISTWIDTH] IN BLOOD BY AUTOMATED COUNT: 15.8 % (ref 11–15)
GLUCOSE BLD-MCNC: 106 MG/DL (ref 70–99)
GLUCOSE PLR-MCNC: 89 MG/DL
HCT VFR BLD AUTO: 34.7 %
HGB BLD-MCNC: 11.6 G/DL
IMM GRANULOCYTES # BLD AUTO: 0.02 X10(3) UL (ref 0–1)
IMM GRANULOCYTES NFR BLD: 0.3 %
LDH FLD L TO P-CCNC: 451 U/L
LDH SERPL L TO P-CCNC: 164 U/L
LYMPHOCYTES # BLD AUTO: 2.48 X10(3) UL (ref 1–4)
LYMPHOCYTES NFR BLD AUTO: 32.3 %
LYMPHOCYTES NFR PLR: 17 %
MAGNESIUM SERPL-MCNC: 1.9 MG/DL (ref 1.6–2.6)
MCH RBC QN AUTO: 30.7 PG (ref 26–34)
MCHC RBC AUTO-ENTMCNC: 33.4 G/DL (ref 31–37)
MCV RBC AUTO: 91.8 FL
MONOCYTES # BLD AUTO: 0.44 X10(3) UL (ref 0.1–1)
MONOCYTES NFR BLD AUTO: 5.7 %
MONOS+MACROS NFR PLR: 27 %
NEUTROPHILS # BLD AUTO: 4.35 X10 (3) UL (ref 1.5–7.7)
NEUTROPHILS # BLD AUTO: 4.35 X10(3) UL (ref 1.5–7.7)
NEUTROPHILS NFR BLD AUTO: 56.8 %
NEUTROPHILS NFR PLR: 53 %
OSMOLALITY SERPL CALC.SUM OF ELEC: 290 MOSM/KG (ref 275–295)
PHOSPHATE SERPL-MCNC: 3.8 MG/DL (ref 2.4–5.1)
PLATELET # BLD AUTO: 243 10(3)UL (ref 150–450)
POTASSIUM SERPL-SCNC: 3.7 MMOL/L (ref 3.5–5.1)
PROT PLR-MCNC: 4 G/DL
PROT SERPL-MCNC: 5.8 G/DL (ref 5.7–8.2)
RBC # BLD AUTO: 3.78 X10(6)UL
RBC # FLD: 3598 /CUMM (ref ?–1)
SODIUM SERPL-SCNC: 141 MMOL/L (ref 136–145)
TOTAL CELLS COUNTED FLD: 100
TOTAL CELLS COUNTED PLR: 13 /CUMM (ref ?–1)
WBC # BLD AUTO: 7.7 X10(3) UL (ref 4–11)
WBC # PLR: 13 /CUMM

## 2024-11-06 PROCEDURE — 99233 SBSQ HOSP IP/OBS HIGH 50: CPT | Performed by: INTERNAL MEDICINE

## 2024-11-06 PROCEDURE — 99233 SBSQ HOSP IP/OBS HIGH 50: CPT | Performed by: HOSPITALIST

## 2024-11-06 PROCEDURE — 0W9930Z DRAINAGE OF RIGHT PLEURAL CAVITY WITH DRAINAGE DEVICE, PERCUTANEOUS APPROACH: ICD-10-PCS | Performed by: RADIOLOGY

## 2024-11-06 RX ORDER — TRAMADOL HYDROCHLORIDE 50 MG/1
50 TABLET ORAL EVERY 6 HOURS PRN
Status: DISCONTINUED | OUTPATIENT
Start: 2024-11-06 | End: 2024-11-11

## 2024-11-06 RX ORDER — MIDAZOLAM HYDROCHLORIDE 1 MG/ML
INJECTION INTRAMUSCULAR; INTRAVENOUS
Status: DISCONTINUED
Start: 2024-11-06 | End: 2024-11-06 | Stop reason: WASHOUT

## 2024-11-06 RX ORDER — LIDOCAINE HYDROCHLORIDE 20 MG/ML
INJECTION, SOLUTION INFILTRATION; PERINEURAL
Status: COMPLETED
Start: 2024-11-06 | End: 2024-11-06

## 2024-11-06 NOTE — PROGRESS NOTES
[facility]  part of [facility]    Progress Note    Camelia Markham Patient Status:  Observation    1960 MRN [id number]   Location [facility] 3W/SW Attending Fidel Henderson MD   Hosp Day # 0 PCP FINA MABRY, MD WENDY     Chief Complaint:     Syncope    Subjective:   Subjective:    Patient seen and examined  Hypertensive, afebrile  No new complaints at this time.   Chest tube placement.      Objective:   Blood pressure (!) 168/92, pulse 81, temperature 98.2 °F (36.8 °C), temperature source Oral, resp. rate 18, weight 163 lb (73.9 kg), SpO2 95%.  Physical Exam    General: Patient is alert and oriented x3 does not appear to be in acute distress at this time  HEENT: EOMI PERRLA, atraumatic normocephalic  Cardiac: S1-S2 appreciated  Lungs: Good air entry bilaterally clear to auscultation, PleurX in place R side  Abdomen: Soft nontender nondistended positive bowel sounds  Ext: Peripheral pulses are positive  Neuro: No focal deficits noted  Psych: Normal mood  Skin: No rashes noted  MSK: Full range of motion intact      Results:   Lab Results   Component Value Date    WBC 7.7 2024    HGB 11.6 (L) 2024    HCT 34.7 (L) 2024    .0 2024    CREATSERUM 0.65 2024    BUN 7 (L) 2024     2024    K 3.7 2024     2024    CO2 24.0 2024     (H) 2024    CA 8.5 (L) 2024    ALB 3.5 2024    ALKPHO 90 2024    BILT 1.0 2024    TP 5.8 2024    AST 16 2024    ALT 16 2024    PTT 26.0 2022    INR 0.98 2023    T4F 1.9 (H) 2024    TSH 0.048 (L) 2024    LIP 33 2024    DDIMER 0.55 2024    MG 1.9 2024    PHOS 3.8 2024    TROPHS <3 2024       US CAROTID DOPPLER BILAT - DIAG IMG (CPT=93880)    Result Date: 2024  CONCLUSION:  1. Atherosclerosis of proximal internal carotids.  No significant stenosis.  Velocities are in normal  range. 2. Antegrade patent vertebral arteries bilaterally.    Dictated by (CST): Jose Smith MD on 11/05/2024 at 12:53 PM     Finalized by (CST): Jose Smith MD on 11/05/2024 at 12:56 PM          CT CHEST PE AORTA (IV ONLY) (CPT=71260)    Result Date: 11/4/2024  CONCLUSION:   1. Large loculated right-sided pleural effusion with associated underlying pleural-based nodularity and atelectasis.  There is a PleurX catheter within a free-flowing segment of this pleural effusion.  2. Peribronchial ground-glass nodular opacities within the right lung and left lung base raising the possibility of a superimposed infectious process.  Additionally, there is a very small left pleural effusion with adjacent atelectasis which could be reactive in etiology.  3. Nonacute findings are also present and are described within the body of the report.     Dictated by (CST): Srinivasa Trujillo MD on 11/04/2024 at 2:34 PM     Finalized by (CST): Srinivasa Trujillo MD on 11/04/2024 at 2:39 PM          CT SPINE CERVICAL (CPT=72125)    Result Date: 11/4/2024  CONCLUSION:  1. Multilevel disc and facet degeneration.  No evidence of osseous metastasis. 2. PET avid soft tissue thickening left neck along posterior margin of cricoid cartilage is unchanged. 3. Loculated right a pickle pleural effusion. 4. Adduction of left vocal cord .  Correlate with direct exam for vocal cord paralysis.    Dictated by (CST): Jose Smith MD on 11/04/2024 at 2:24 PM     Finalized by (CST): Jose Smith MD on 11/04/2024 at 2:38 PM          CT BRAIN OR HEAD (CPT=70450)    Result Date: 11/4/2024  CONCLUSION: No CT evidence of acute intracranial abnormality.    Dictated by (CST): Srinivasa Trujillo MD on 11/04/2024 at 2:23 PM     Finalized by (CST): Srinivasa Trujillo MD on 11/04/2024 at 2:25 PM               Assessment & Plan:     Syncope and collapse  -possibly due to poor PO intake,   -orthostasis.   -continue current IV hydration will monitor closely    Malignant  pleural effusion (HCC)  -Patient with hx of recurrent metastatic Papillary thyroid cancer.   -has a pleurX in place but new imaging reveals a loculated pleural effusion that is away from the pleurX  -will have pulmonology placed on consult  -appreciate hem/onc recs.   -will monitor closely    Hyperglycemia  -continue Accuchecks qachs,  -supplement with sliding scale    HTN  -montior BP  -titrate meds as needed.     VTE ppx: heparin subcutaneous.   Full Code    Global A/P  -pulm consult placed - appreciate recs  -chest tube placement  -appreciate IR and Pulm recs.  -will monitor closely  -appreciate hem/onc recs  -Reviewed previous consultant notes  -Reviewed CBC, BMP, Mag, and Phos  -Reviewed tests ordered  -Repeat labs in am  -MDM: High, severe exacerbation of chronic illness posing a threat to life. IV medications requiring close inpatient monitoring.             Fidel Henderson MD

## 2024-11-06 NOTE — CM/SW NOTE
Received MDO for Finances from RN.    SW consulted w/ RN to inquire specific questions/concerns from pt. Per RN, pt received Medicare Outpatient Observation Notice (MOON) letter and was asking about this.    SW reviewed chart, pt has BCBS PPO plan only and is 64 yrs old.    Met w/ pt at bedside and reviewed CHISHOLM letter pt received - it was completely blank. When SW inquired who may have brought it to her, pt stated \"an older man.\"    SW confirmed the letter was provided to her by mistake. SW confirmed pt's only coverage currently BC[employer] PPO.     SW was interrupted by Dr. Chen at bedside to f/up w/ pt and chest tube output.    Once MD left, SW explained that [facility] sends clinical to her BCBS plan for review for approval/denial. Pt expressed understanding.    Pt informed RAND she has attempted to apply for early jail and Medicare due to her medical needs and was denied.    Pt's BCBS plan terms at the end of November. Pt also informed RAND taht her spouse was laid off and just started a new job.    Pt confirmed she has already applied and been approved for alternate insurance through Healthcare.gov.  Pt confirmed she needs to review plans and select one.    Pt confirmed no further questions at this time.        Nilda, MSW, LSW [id number]

## 2024-11-06 NOTE — PLAN OF CARE
Patient A&O x4, received chest tube placement in IR today. Site clean, dry and intact. On continuous low suction, draining red, clear, thin blood. Patient provided heat packs and repositioning for discomfort. Received new orders for q6 tramadol for pain management. Effective. Remains on IVF. Bed rest completed. Pending cxr tomorrow AM. POC updated to patient.     IR for chest tube placement.  Problem: CARDIOVASCULAR - ADULT  Goal: Maintains optimal cardiac output and hemodynamic stability  Description: INTERVENTIONS:  - Monitor vital signs, rhythm, and trends  - Monitor for bleeding, hypotension and signs of decreased cardiac output  - Evaluate effectiveness of vasoactive medications to optimize hemodynamic stability  - Monitor arterial and/or venous puncture sites for bleeding and/or hematoma  - Assess quality of pulses, skin color and temperature  - Assess for signs of decreased coronary artery perfusion - ex. Angina  - Evaluate fluid balance, assess for edema, trend weights  Outcome: Progressing  Goal: Absence of cardiac arrhythmias or at baseline  Description: INTERVENTIONS:  - Continuous cardiac monitoring, monitor vital signs, obtain 12 lead EKG if indicated  - Evaluate effectiveness of antiarrhythmic and heart rate control medications as ordered  - Initiate emergency measures for life threatening arrhythmias  - Monitor electrolytes and administer replacement therapy as ordered  Outcome: Progressing     Problem: METABOLIC/FLUID AND ELECTROLYTES - ADULT  Goal: Electrolytes maintained within normal limits  Description: INTERVENTIONS:  - Monitor labs and rhythm and assess patient for signs and symptoms of electrolyte imbalances  - Administer electrolyte replacement as ordered  - Monitor response to electrolyte replacements, including rhythm and repeat lab results as appropriate  - Fluid restriction as ordered  - Instruct patient on fluid and nutrition restrictions as appropriate  Outcome: Progressing  Goal:  Hemodynamic stability and optimal renal function maintained  Description: INTERVENTIONS:  - Monitor labs and assess for signs and symptoms of volume excess or deficit  - Monitor intake, output and patient weight  - Monitor urine specific gravity, serum osmolarity and serum sodium as indicated or ordered  - Monitor response to interventions for patient's volume status, including labs, urine output, blood pressure (other measures as available)  - Encourage oral intake as appropriate  - Instruct patient on fluid and nutrition restrictions as appropriate  Outcome: Progressing     Problem: MUSCULOSKELETAL - ADULT  Goal: Return mobility to safest level of function  Description: INTERVENTIONS:  - Assess patient stability and activity tolerance for standing, transferring and ambulating w/ or w/o assistive devices  - Assist with transfers and ambulation using safe patient handling equipment as needed  - Ensure adequate protection for wounds/incisions during mobilization  - Obtain PT/OT consults as needed  - Advance activity as appropriate  - Communicate ordered activity level and limitations with patient/family  Outcome: Progressing  Goal: Maintain proper alignment of affected body part  Description: INTERVENTIONS:  - Support and protect limb and body alignment per provider's orders  - Instruct and reinforce with patient and family use of appropriate assistive device and precautions (e.g. spinal or hip dislocation precautions)  Outcome: Progressing

## 2024-11-06 NOTE — PRE-SEDATION ASSESSMENT
[facility]  part of [facility] Pre-Procedure Sedation Assessment    History of snoring or sleep or apnea?   No    History of previous problems with anesthesia or sedation  No    Physical Findings:  Neck: nl ROM  CV: rrr  PULM: decreased breath sounds on right    Mallampati Score:  II (hard and soft palate, upper portion of tonsils anduvula visible)    ASA Classification:   3. Patient with severe systemic disease    Plan:   -IV moderate sedation    Tj Berman MD

## 2024-11-06 NOTE — PROGRESS NOTES
[facility]ematology/Oncology [facility]  Inpatient Progress Note    Camelia Markham Patient Status:  Observation    1960 MRN [id number]   Location [facility] 3W/SW Attending Fidel Henderson MD   Hosp Day # 0 PCP FINA MABRY, MD WENDY     Reason for consultation: Met thyroid cancer    INTERVAL HISTORY  24 she was seen by pulmonology and underwent chest tube placement earlier today for evacuation of the loculated pleural effusion.  Her dyspnea has improved.  Still feels very tired.  Appetite is decreased.  No febrile illness.      History of Present Illness  Camelia Markham is a 64 year old female with known metastatic thyroid cancer with malignant pleural effusion.  She had been on lenvatinib with improvement of her effusion but was having trouble tolerating 24 mg dose.  Had increased nausea and vomiting with decreased p.o. intake.  lenvatinib was held last week  She developed a syncopal episode and increasing fatigue as well as some chest discomfort hence referred to the emergency room.  CT of the brain and C-spine showed no acute findings.  Chest x-ray suggested large opacity in the right upper lobe.  CT scan showed a large loculated right-sided pleural effusion with associated pleural-based nodularity.  Pleurx catheter in the free-flowing segment of the pleural effusion with improvement was noted.  There were peribronchial groundglass opacities as well.    A Pleurx was drained and yielded 200 mL of fluid.  She was started on IV hydration and hospitalized.still feels very tired      Review of Systems:  Pt denies fevers, chills, night sweats, HA, vision changes, CP, SOB, cough, n/v/d, abd pain, urinary or bowel complaints  Hematology/Oncology ROS performed and negative except as above in HPI    History/Other:   Past Medical History:  Past Medical History:    Anxiety state, unspecified    Cancer (HCC)    Colon adenomas    x3    Disorder of thyroid    thyroidectomy    Diverticulosis of  large intestine    Esophageal reflux    Exposure to medical therapeutic radiation    thyroid    High blood pressure    High cholesterol    Hypercholesteremia    Hypothyroidism    Osteoarthrosis, unspecified whether generalized or localized, unspecified site    Thyroid cancer (HCC)    papillary thyroid; s/p surgery resection with Asher and BRIDGES    Unspecified essential hypertension       Past Surgical History:  Past Surgical History:   Procedure Laterality Date    Colonoscopy N/A 2022    Procedure: COLONOSCOPY;  Surgeon: Neeraj No MD;  Location: [facility] ENDOSCOPY    Colonoscopy  2024    Colonoscopy N/A 2024    Procedure: COLONOSCOPY;  Surgeon: Neeraj No MD;  Location: [facility] ENDOSCOPY    Egd  2022    Egd  2024    Esophagogastroduodenoscopy    Endoscopic ultrasound - internal  2022    Endoscopic ultrasound exam  2024    Endoscopic Ultrasound    Eye surgery  2024    Eye surgery Left 2024    Hysterectomy      Knee surgery Left 2022    no associated bleeding complications          40 week 7 lb(s) 5 oz Male; 6 hr labor           40 week 7 lb(s) 3 oz Female          41 week 9 lb(s) 8 oz Male    Other surgical history      Injection Tendon Sheath, Ligament    Thyroidectomy      total    Total abdom hysterectomy         Current Medications:   [COMPLETED] lidocaine (Xylocaine) 2 % injection        [COMPLETED] fentaNYL (Sublimaze) 50 mcg/mL injection        acetaminophen (Tylenol Extra Strength) tab 500 mg  500 mg Oral Q4H PRN    [COMPLETED] diphenhydrAMINE (Benadryl) 50 mg/mL  injection 25 mg  25 mg Intravenous Once    [COMPLETED] sodium chloride 0.9 % IV bolus 1,000 mL  1,000 mL Intravenous Once    [COMPLETED] iopamidol 76% (ISOVUE-370) injection for power injector  80 mL Intravenous ONCE PRN    sodium chloride 0.9% infusion   Intravenous Continuous    heparin (Porcine) 5000 UNIT/ML injection 5,000 Units  5,000 Units  Subcutaneous 2 times per day    temazepam (Restoril) cap 15 mg  15 mg Oral Nightly PRN    ondansetron (Zofran) 4 MG/2ML injection 4 mg  4 mg Intravenous Q6H PRN    prochlorperazine (Compazine) 10 MG/2ML injection 5 mg  5 mg Intravenous Q8H PRN       Allergies:   Allergies[1]    Family Medical History:  Family History   Problem Relation Age of Onset    Heart Disorder Mother     Breast Cancer Maternal Cousin Female 57    Cancer Daughter 29        Hodgkin's Lymphoma    Ovarian Cancer Neg     Bleeding Disorders Neg     DVT/VTE Neg     Pancreatic Cancer Neg     Prostate Cancer Neg        Social History:  Social History     Socioeconomic History    Marital status:      Spouse name: Not on file    Number of children: Not on file    Years of education: Not on file    Highest education level: Not on file   Occupational History    Not on file   Tobacco Use    Smoking status: Never    Smokeless tobacco: Never   Vaping Use    Vaping status: Never Used   Substance and Sexual Activity    Alcohol use: Yes     Comment: social    Drug use: No    Sexual activity: Not on file   Other Topics Concern     Service Not Asked    Blood Transfusions Not Asked    Caffeine Concern Yes     Comment: 1 cup of coffee     Occupational Exposure Not Asked    Hobby Hazards Not Asked    Sleep Concern Not Asked    Stress Concern Not Asked    Weight Concern Not Asked    Special Diet Not Asked    Back Care Not Asked    Exercise Not Asked    Bike Helmet Not Asked    Seat Belt Not Asked    Self-Exams Not Asked   Social History Narrative    Camelia Figueroa is  to Baldemar x30 yrs. She has 3 adult children. 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 She lives with her , her mom, and 1 of the children in [place], [place].     Social Drivers of Health     Financial Resource Strain: Not on file   Food Insecurity: No Food Insecurity (11/5/2024)    Food Insecurity     Food Insecurity: Never true   Transportation Needs: No Transportation Needs  (2024)    Transportation Needs     Lack of Transportation: No     Car Seat: Not on file   Physical Activity: Not on file   Stress: Not on file   Social Connections: Not on file   Housing Stability: Low Risk  (2024)    Housing Stability     Housing Instability: No     Housing Instability Emergency: Not on file     Crib or Bassinette: Not on file       Gyn History:  OB History    Para Term  AB Living   3 3 0 0 0 0   SAB IAB Ectopic Multiple Live Births   0 0 0 0 0       Objective:    BP (!) 170/86 (BP Location: Right arm)   Pulse 83   Temp 98 °F (36.7 °C) (Oral)   Resp 16   Wt 73.9 kg (163 lb)   SpO2 96%   BMI 29.81 kg/m²   Physical Exam:  General: A&Ox3, NAD, appears tired  HEENT: PERRL, OP clear  Neck: supple, no LAD or JVD  CV: RRR, no murmurs, + pulses  Pulm: CTA b/l, no w/r/r, normal effort  Right sided c/t in situ  Lymph: no palpable lymphadenopathy throughout the cervical, supraclavicular, axillary, or inguinal regions  Extremities: no edema or calf tenderness  Neurological: Grossly intact    Limited exam since undergoing bedside echo    Labs:  Lab Results   Component Value Date/Time    WBC 7.7 2024 06:47 AM    RBC 3.78 (L) 2024 06:47 AM    HGB 11.6 (L) 2024 06:47 AM    HCT 34.7 (L) 2024 06:47 AM    MCV 91.8 2024 06:47 AM    MCH 30.7 2024 06:47 AM    MCHC 33.4 2024 06:47 AM    RDW 15.8 (H) 2024 06:47 AM    NEPRELIM 4.35 2024 06:47 AM    .0 2024 06:47 AM       Lab Results   Component Value Date/Time     (H) 2024 06:47 AM    BUN 7 (L) 2024 06:47 AM    CREATSERUM 0.65 2024 06:47 AM    GFRNAA 67 2021 12:09 PM    CA 8.5 (L) 2024 06:47 AM    ALB 3.5 2024 12:27 PM     2024 06:47 AM    K 3.7 2024 06:47 AM     2024 06:47 AM    CO2 24.0 2024 06:47 AM    ALKPHO 90 2024 12:27 PM    AST 16 2024 12:27 PM    ALT 16 2024 12:27 PM        Imaging:Assessment & Plan:    Camelia Markham is a 64 year old female with metastatic thyroid cancer hospitalized here with syncopal episode CT scan shows loculated pleural effusion as well as new groundglass opacities.    # right loculated effusion: s/p CT placement, await cytology, and CXR in AM    # Met thyroid cancer: intolerant of lenvatinib 24mg dose, on hold, will rechallenge with 20mg once her PS has improved as an outpatient    Thank you Dr FINA MABRY, MD WENDY for the opportunity to participate in the care of this interesting patient. Please do contact me if I may be of any further assistance    Inocente Chen MD  [facility] Hematology/Oncology  [facility]    This note was created using a voice-recognition transcribing system. Incorrect words or phrases may have been missed during proofreading. Please interpret accordingly.         [1]   Allergies  Allergen Reactions    Latex HIVES    Peanut-Containing Drug Products SWELLING     Closes Throat  Closes Throat  Closes Throat      Peanuts SWELLING     Closes Throat    Adhesive Tape OTHER (SEE COMMENTS)    Seasonal

## 2024-11-06 NOTE — PROGRESS NOTES
Pulmonary Medicine Inpatient Progress Note                 Subjective:  Admitted with syncope.  Chest CT noted new loculated right effusion.   Plan for chest tube placement by IR today.   Afebrile, on RA.       ALLERGIES:  Allergies[1]     MEDS:  Home Medications:  Medications Taking[2]  Scheduled Medication:   heparin  5,000 Units Subcutaneous 2 times per day     Continuous Infusing Medication:   sodium chloride 100 mL/hr at 11/05/24 0426     PRN Medications:    acetaminophen    temazepam    ondansetron    prochlorperazine       PHYSICAL EXAM:  /73 (BP Location: Right arm)   Pulse 89   Temp 98.1 °F (36.7 °C) (Oral)   Resp 16   Wt 163 lb (73.9 kg)   SpO2 90%   BMI 29.81 kg/m²   CONSTITUTIONAL: alert, oriented, no apparent distress  HEENT: atraumatic normocephalic  MOUTH: mucous membranes are moist. No OP exudates  NECK/THROAT: no JVD. Trachea midline. No obvious thyromegaly  LUNG: clear b/l no wheezing, + basilar crackles. Chest symmetric with respiratory motion. + pleurex catheter in place  HEART: regular rate and rhythm, no obvious murmers or gallops note  ABD: soft non tender. + bowel sounds. No organomegaly noted  EXT: no clubbing, cyanosis, or edema noted. Pulses intact grossly  NEURO/MUSCULOSKELETAL: no gross deficits  SKIN: warm, dry. No obvious lesions noted  LYMPH: no obvious LAD       IMAGES:  Chest CT 11/4/24  FINDINGS:   The following findings were made:   1. There is a large loculated right-sided pleural effusion with associated underlying pleural nodularity.  The finding is associated with scattered areas of subsegmental atelectasis throughout the right lung.  There is a PleurX catheter at the right lung   base within a free-flowing segment of this loculated pleural effusion.   2. There are scattered ground-glass nodular peribronchial densities within the right lung as well as at the left lung base raising the possibility of a superimposed infectious process.  There is a very small  free-flowing left pleural effusion with   adjacent compressive atelectasis.   3. The visualized aspects of the liver demonstrates decreased attenuation compatible with fatty infiltration.  There are no focal hepatic lesions identified within the imaged segments of the liver.   4. There are slight to moderate degenerative changes within the thoracic spine.   The remainder of the examination is unremarkable.  Specifically, on vascular windows, the main pulmonary arteries and segmental branches are normal in their course and caliber with no intraluminal filling defects identified to suggest pulmonary embolus.   The thoracic aorta is normal in its course and caliber with no aneurysmal dilatation, no dissection, and no secondary signs of acute aortic injury.  On lung windows, there is no pneumothorax.  On mediastinal windows, there is no pericardial effusion or   significant adenopathy.  Within the upper abdomen, there is no adrenal mass.       LABS:  Recent Labs   Lab 10/30/24  1153 11/04/24  1227 11/05/24  0657   RBC 5.09 4.34 3.83   HGB 14.7 12.8 11.7*   HCT 44.0 39.8 35.7   MCV 86.4 91.7 93.2   MCH 28.9 29.5 30.5   MCHC 33.4 32.2 32.8   RDW 14.7 15.9* 16.1*   NEPRELIM 4.98 4.70 4.14   WBC 8.2 8.3 7.4   .0* 235.0 229.0       Recent Labs   Lab 10/30/24  1153 11/04/24  1227 11/05/24  0657   * 100* 108*   BUN 14 14 8*   CREATSERUM 0.76 0.73 0.61   EGFRCR 88 92 100   CA 9.6 9.0 8.5*   ALB 4.6 3.5  --     140 140   K 3.7 3.8 3.9    109 112   CO2 26.0 25.0 26.0   ALKPHO 135* 90  --    AST 46* 16  --    ALT 49 16  --    BILT 2.0* 1.0  --    TP 8.6* 6.8  --        ASSESSMENT/PLAN:  Metastatic thyroid papillary carcinoma s/p malignant right pleural effusion with pleurex placement.  -chest CT with new loculated right pleural effusion and increasing dyspnea and chest pain  -plan for IR chest tube placement. Check fluid cytology and cultures  -continue to drain right pleurex intermittently (last drained  200 ml a couple of days ago)    Syncope  -CT brain unremarkable for acute abnormalities    Proph:  -DVT: hep subcutaneous      Thank you for the opportunity to care for Camelia MarkhamMaverick Brooks DO, MPH  Pulmonary Critical Care Medicine  [facility]          [1]   Allergies  Allergen Reactions    Latex HIVES    Peanut-Containing Drug Products SWELLING     Closes Throat  Closes Throat  Closes Throat      Peanuts SWELLING     Closes Throat    Adhesive Tape OTHER (SEE COMMENTS)    Seasonal    [2]   Outpatient Medications Marked as Taking for the 11/4/24 encounter (Hospital Encounter)   Medication Sig Dispense Refill    DULoxetine 20 MG Oral Cap DR Particles Take 1 capsule (20 mg total) by mouth daily. 30 capsule 0    levothyroxine 150 MCG Oral Tab Take 1 tablet (150 mcg total) by mouth before breakfast.      Lenvatinib, 24 MG Daily Dose, (LENVIMA, 24 MG DAILY DOSE,) 2 x 10 MG & 4 MG Oral Capsule Therapy Pack Take 24 mg by mouth daily. 30 each 5    ondansetron (ZOFRAN) 8 MG tablet Take 1 tablet (8 mg total) by mouth every 8 (eight) hours as needed for Nausea. 30 tablet 3    HYDROcodone-acetaminophen 5-325 MG Oral Tab Take 1-2 tablets by mouth every 6 (six) hours as needed for Pain. 60 tablet 0    Olmesartan Medoxomil 40 MG Oral Tab Take 1 tablet (40 mg total) by mouth daily.      Omeprazole 40 MG Oral Capsule Delayed Release Take 1 capsule (40 mg total) by mouth every other day.      amLODIPine 10 MG Oral Tab Take 1 tablet (10 mg total) by mouth daily.

## 2024-11-06 NOTE — PAYOR COMM NOTE
--------------  ADMISSION REVIEW     Payor: FLORINDA RODRIGUEZ  Subscriber #:  [id number]  Authorization Number: [id number]    Admit date: 11/6/24  Admit time: 11:47 AM       REVIEW DOCUMENTATION:     ED Provider Notes          Stated Complaint: sent by oncology for testing    Subjective:    Pt with Metastatic thyroid CA, malignant pleural effusion, pleurex catheter here for 3 syncopal episodes that been going on for about the last 2 weeks.  The most recent was 2 days ago.  She said everything goes black and then she remembers waking up.  She did hit her head and scraped her elbows 2 days ago.  About a week ago she was having vomiting and diarrhea so they stopped her Lenvema and states she has not really had vomiting or diarrhea in a week but she is not having a good appetite.  In the last 3 days she is having mostly some right sided chest pain which does get worse with breathing.  She has had a cough for several days since the fall.  No current shortness of breath on the bed but feels worse when she exerts.  No leg swelling.  No fevers.  No focal weakness or numbness.  She does have some neck pain as well.  No visual disturbances.  No slurred speech    Physical Exam     ED Triage Vitals [11/04/24 1143]   /78   Pulse 98   Resp 18   Temp 98.2 °F (36.8 °C)   Temp src Oral   SpO2 94 %   O2 Device None (Room air)       Current Vitals:   Vital Signs  BP: 140/71  Pulse: 88  Resp: 20  Temp: 98.8 °F (37.1 °C)  Temp src: Oral  MAP (mmHg): 92    Oxygen Therapy  SpO2: 92 %  O2 Device: None (Room air)        Physical Exam  HENT:      Head: Normocephalic.      Mouth/Throat:      Mouth: Mucous membranes are moist.      Pharynx: Oropharynx is clear.   Eyes:      Extraocular Movements: Extraocular movements intact.      Pupils: Pupils are equal, round, and reactive to light.   Cardiovascular:      Rate and Rhythm: Normal rate and regular rhythm.      Heart sounds: Normal heart sounds.   Pulmonary:      Effort: Pulmonary effort is  normal.      Comments: Pulse ox low at 94%.  Diminished breath sounds right base.  Abdominal:      Palpations: Abdomen is soft.      Tenderness: There is no abdominal tenderness.   Musculoskeletal:         General: No swelling. Normal range of motion.      Cervical back: Normal range of motion. Tenderness present.      Comments: Full range of motion of all extremities.  No bony tenderness to the extremities.  Superficial abrasions bilateral elbows over the olecranon   Lymphadenopathy:      Cervical: No cervical adenopathy.   Skin:     General: Skin is warm.      Capillary Refill: Capillary refill takes less than 2 seconds.   Neurological:      General: No focal deficit present.      Mental Status: She is alert.             ED Course     Labs Reviewed   CBC WITH DIFFERENTIAL WITH PLATELET - Abnormal; Notable for the following components:       Result Value    RDW-SD 53.5 (*)     RDW 15.9 (*)     All other components within normal limits   BASIC METABOLIC PANEL (8) - Abnormal; Notable for the following components:    Glucose 100 (*)    EKG    Rate, intervals and axes as noted on EKG Report.  Rate: 89  Rhythm: Sinus Rhythm  Reading: EKG normal sinus rhythm and rate.  Normal axis and intervals.  Normal ST segments.  This EKG was interpreted by me.  Normal    ED Course as of 11/04/24 2026  ------------------------------------------------------------  Time: 11/04 1330  Comment: Chest x-ray independently interpreted by me.  Awaiting for CT to get additional information.  Labs independently interpreted by me.  CBC normal, CMP normal, troponin normal  ------------------------------------------------------------  Time: 11/04 1448  Comment: Shows no acute CT independently interpreted by me.  CT trauma to the head or C-spine.  Chest CT shows loculated effusions as well as some groundglass opacities.             MDM      CT CHEST PE AORTA (IV ONLY) (CPT=71260)    Result Date: 11/4/2024  CONCLUSION:   1. Large loculated  right-sided pleural effusion with associated underlying pleural-based nodularity and atelectasis.  There is a PleurX catheter within a free-flowing segment of this pleural effusion.  2. Peribronchial ground-glass nodular opacities within the right lung and left lung base raising the possibility of a superimposed infectious process.  Additionally, there is a very small left pleural effusion with adjacent atelectasis which could be reactive in etiology.  3. Nonacute findings are also present and are described within the body of the report.     Dictated by (CST): Srinivasa Trujillo MD on 11/04/2024 at 2:34 PM     Finalized by (CST): Srinivasa Trujillo MD on 11/04/2024 at 2:39 PM          CT SPINE CERVICAL (CPT=72125)    Result Date: 11/4/2024  CONCLUSION:  1. Multilevel disc and facet degeneration.  No evidence of osseous metastasis. 2. PET avid soft tissue thickening left neck along posterior margin of cricoid cartilage is unchanged. 3. Loculated right a pickle pleural effusion. 4. Adduction of left vocal cord .  Correlate with direct exam for vocal cord paralysis.    Dictated by (CST): Jose Smith MD on 11/04/2024 at 2:24 PM     Finalized by (CST): Jose Smith MD on 11/04/2024 at 2:38 PM          CT BRAIN OR HEAD (CPT=70450)    Result Date: 11/4/2024  CONCLUSION: No CT evidence of acute intracranial abnormality.    Dictated by (CST): Srinivasa Trujillo MD on 11/04/2024 at 2:23 PM     Finalized by (CST): Srinivasa Trujillo MD on 11/04/2024 at 2:25 PM          XR CHEST AP PORTABLE  (CPT=71045)    Result Date: 11/4/2024  CONCLUSION:  1. Interval worsening in the chest with a large area of opacity in the right upper lobe suggesting an area of pneumonia and or atelectasis.  Worsening right basal pneumonia and or atelectasis.  Right basal chest tube noted.  No well-defined pneumothorax.   Mild left basal atelectasis with mild blunting of the left costophrenic angle.    Dictated by (CST): Nicolás Caicedo MD on 11/04/2024 at  1:00 PM     Finalized by (CST): Nicolás Caicedo MD on 11/04/2024 at 1:11 PM             Admission disposition: 11/4/2024  3:11 PM    Medical Decision Making  Patient with malignant pleural effusion from thyroid cancer here with recurrent syncopal events 1 that led to a head injury 2 days ago.  She does have some C-spine tenderness as well.  She has abrasion on her elbows but no tenderness to the elbows.  Differential could include but is not limited to cardiac syncope, dehydration, medication side effects, bleeding, infectious, electrolyte disturbances and many other possibilities.  Will be getting CTs of the head and C-spine for trauma and CT of the chest to rule out PE which could also cause syncope.  Initial EKG is normal.  Does not seem to be ACS type of chest discomfort but this needs to be considered as well.  Troponin pending    Amount and/or Complexity of Data Reviewed  External Data Reviewed: labs and notes.     Details: Recent notes reviewed as well as laboratory studies compared  Labs: ordered. Decision-making details documented in ED Course.  Radiology: ordered and independent interpretation performed. Decision-making details documented in ED Course.  ECG/medicine tests: ordered and independent interpretation performed. Decision-making details documented in ED Course.  Discussion of management or test interpretation with external provider(s): Please see ED course for discussion.  Discussion had with hospitalist.  Does not seem to be acute pneumonia so we will not be giving antibiotics at this time.  I did consult patient's hematologist    Risk  Decision regarding hospitalization.  Risk Details: Thyroid cancer        Disposition and Plan     Clinical Impression:  1. Syncope and collapse    2. Malignant pleural effusion (HCC)         Disposition:  Admit  11/4/2024  3:11 pm     Hospital Problems       Present on Admission  Date Reviewed: 10/30/2024            ICD-10-CM Noted POA    * (Principal) Syncope and  collapse R55 11/4/2024 Unknown    Hyperglycemia R73.9 11/4/2024 Yes    Malignant pleural effusion (HCC) J91.0 9/13/2024 Unknown    Syncope R55 11/4/2024 Unknown                  HISTORY AND PHYSICAL EXAMINATION     CHIEF COMPLAINT:  Syncope.     HISTORY OF PRESENT ILLNESS:  The patient is a 64-year-old  female with history of recurrent metastatic thyroid papillary carcinoma with right malignant pleural effusion post right PleurX catheter and currently on lenvatinib, tyrosine kinase inhibitor treatment.  Lenvatinib was held by her oncologist around 5 days ago for poor appetite and she was receiving IV fluid infusion at the cancer center.  Yesterday, she had a syncopal episode coming out of a car without prodrome.  Today, she felt fatigued and she was sent to the emergency department by her oncologist.  CBC, chemistry, and liver function tests were unremarkable.  CT scan of the cervical spine and CT scan of the brain showed no acute finding.  Chest x-ray showed interval worsening in the chest with large area of opacity in the right upper lobe.  CT scan of the chest showed large loculated right-sided pleural effusion with associated underlying pleural-based nodularity and atelectasis.  There is a PleurX catheter within a free flowing segment of this pleural effusion.  Peribronchial ground-glass opacities within the right lung and left lung base raising possibility of superimposed infectious process.     PAST MEDICAL HISTORY:  History of papillary thyroid carcinoma status post total thyroidectomy in 2010.  She had recent rising thyroglobulin and imaging studies showed recurrence at the thyroidectomy bed in the neck area with right lung effusion positive for malignancy.  She had PleurX catheter placed and started on lenvatinib with decreased level of thyroglobulin.  She had history of generalized osteoarthritis, hyperlipidemia, hypertension, gastroesophageal reflux disease, hypothyroidism, and  diverticulosis.           ASSESSMENT:    1.       Syncopal episode.  Suspect poor appetite, blood pressure medications, and orthostatic hypotension.  Rule out cardiac arrhythmia or other circulatory abnormality.  2.       Recurrent metastatic thyroid cancer with large loculated right pleural effusion.  PleurX catheter in place.  3.       History of essential hypertension.     PLAN:  The patient will be admitted to general medical floor.  Remote telemetry.  Drain PleurX catheter.  IV fluids.  Hold blood pressure medications.  Fall precautions.  Orthostatic blood pressure measurements.  We will notify her oncologist.  Monitor her rhythm.  Obtain 2D echocardiogram with Doppler and carotid ultrasound.  Further recommendations to follow.      Dictated By Dara Campos MD  d:11/04/2024 15:15:33    11/5 PULMONARY:    Date of Admission:  11/4/2024     Reason for Consultation:   Dyspnea, loculated pleural effusion     History of Present Illness:   Patient is a 64-year-old female with history of recurrent metastatic thyroid papillary carcinoma with malignant right pleural effusion with prior Pleurx catheter placement who presented with chief complaint of syncopal episode.  Admits to gradual worsening right-sided anterior chest wall discomfort with associated dyspnea.  CT chest performed with evidence of new loculated area of fluid collection seen in addition to previous effusion.  200 cc pleural fluid drained yesterday.  Patient denies significant improvement in dyspnea afterwards.  Denies significant productive cough.  Saturation stable.  Hemodynamically stable.       Assessment   1.  Metastatic papillary thyroid cancer  2.  Malignant right pleural effusion  3.  New loculated right pleural effusion  4.  Dyspnea  5.  Syncopal episode     Plan   -Patient presents after syncopal episode.  Admits to ongoing progressive dyspnea with anterior right chest wall discomfort.  -Patient with previous Pleurx catheter placement most  recently drained 1 day prior with 200 cc pleural fluid.  -CT chest on 11/4/2024 with large loculated right-sided pleural effusion with some pleural-based nodularity seen.  Pleurx catheter seen within free-flowing pleural effusion at right lung base.  Some groundglass opacities seen with in the right and left lungs.  -Reviewed imaging with interventional radiology.  Plan for n.p.o. after midnight and likely IR drain for loculated pleural fluid collection tomorrow.  -Pain control  -Drain Pleurx catheter as necessary  -Reviewed vitals, labs imaging     Waamisha Kong,       11/6 IR:    Procedure: right apical chest tube placement     Pre-Procedure Diagnosis:  loculated right pleural effusion     Post-Procedure Diagnosis: same     Anesthesia:  Local     Findings:  loculated right pleural effusion     Specimens: straw colored fluid removed     Blood Loss:  minimal                         Complications:  None  Drains:  10 fr        Tj Berman MD  11/6/2024 11/6 HOSPITALIST:    Chief Complaint:      Syncope        Subjective:  Subjective:     Patient seen and examined  Hypertensive, afebrile  No new complaints at this time.   Chest tube placement.           Objective:  Blood pressure (!) 168/92, pulse 81, temperature 98.2 °F (36.8 °C), temperature source Oral, resp. rate 18, weight 163 lb (73.9 kg), SpO2 95%.  Physical Exam     General: Patient is alert and oriented x3 does not appear to be in acute distress at this time  HEENT: EOMI PERRLA, atraumatic normocephalic  Cardiac: S1-S2 appreciated  Lungs: Good air entry bilaterally clear to auscultation, PleurX in place R side  Abdomen: Soft nontender nondistended positive bowel sounds  Ext: Peripheral pulses are positive  Neuro: No focal deficits noted  Psych: Normal mood  Skin: No rashes noted  MSK: Full range of motion intact     Assessment & Plan:  Syncope and collapse  -possibly due to poor PO intake,   -orthostasis.   -continue current IV hydration will  monitor closely     Malignant pleural effusion (HCC)  -Patient with hx of recurrent metastatic Papillary thyroid cancer.   -has a pleurX in place but new imaging reveals a loculated pleural effusion that is away from the pleurX  -will have pulmonology placed on consult  -appreciate hem/onc recs.   -will monitor closely     Hyperglycemia  -continue Accuchecks qachs,  -supplement with sliding scale     HTN  -montior BP  -titrate meds as needed.      VTE ppx: heparin subcutaneous.   Full Code     Global A/P  -pulm consult placed - appreciate recs  -chest tube placement  -appreciate IR and Pulm recs.  -will monitor closely  -appreciate hem/onc recs  -Reviewed previous consultant notes  -Reviewed CBC, BMP, Mag, and Phos  -Reviewed tests ordered  -Repeat labs in am  -MDM: High, severe exacerbation of chronic illness posing a threat to life. IV medications requiring close inpatient monitoring.                  Fidel Henderson MD             MEDICATIONS ADMINISTERED IN LAST 1 DAY:  acetaminophen (Tylenol Extra Strength) tab 500 mg       Date Action Dose Route User    11/5/2024 2306 Given 500 mg Oral Nette Lee, JANEL          heparin (Porcine) 5000 UNIT/ML injection 5,000 Units       Date Action Dose Route User    11/5/2024 2158 Given 5,000 Units Subcutaneous (Left Lower Abdomen) Nette Lee RN          sodium chloride 0.9% infusion       Date Action Dose Route User    11/6/2024 0833 New Bag (none) Intravenous Nguyễn Dafne          temazepam (Restoril) cap 15 mg       Date Action Dose Route User    11/5/2024 2306 Given 15 mg Oral Nette Lee RN            Vitals (last day)       Date/Time Temp Pulse Resp BP SpO2 Weight O2 Device O2 Flow Rate (L/min) Who    11/06/24 1312 98.2 °F (36.8 °C) 81 18 168/92 95 % -- Nasal cannula 2 L/min CS    11/06/24 1242 98.2 °F (36.8 °C) 82 18 151/87 95 % -- Nasal cannula 2 L/min CS    11/06/24 1212 98.3 °F (36.8 °C) 83 20 138/68 98 % -- Nasal cannula 2 L/min CS     11/06/24 1157 98.3 °F (36.8 °C) 82 18 138/72 95 % -- Nasal cannula 2 L/min CS    11/06/24 1138 -- -- -- -- 95 % -- Nasal cannula 2 L/min CS    11/06/24 1136 -- -- -- -- 89 % -- None (Room air) -- CS    11/06/24 1127 99.3 °F (37.4 °C) -- 18 161/91 91 % -- None (Room air) -- CS    11/06/24 0831 98 °F (36.7 °C) 83 16 149/77 91 % -- None (Room air) -- CS    11/06/24 0636 98.1 °F (36.7 °C) 89 16 135/73 90 % -- None (Room air) -- IM    11/05/24 2153 97.7 °F (36.5 °C) 92 16 144/73 91 % -- None (Room air) -- IM    11/05/24 1744 98.4 °F (36.9 °C) 93 16 148/80 94 % -- None (Room air) -- NN    11/05/24 1500 -- 90 -- -- -- -- -- -- NN    11/05/24 1201 -- 85 -- 150/82 -- -- -- -- NN    11/05/24 0931 98.2 °F (36.8 °C) 88 16 150/80 93 % -- None (Room air) -- NN    11/05/24 0500 -- -- -- -- -- 163 lb (73.9 kg) -- -- AV    11/05/24 0425 98 °F (36.7 °C) 69 16 110/58 91 % -- None (Room air) -- VELIA

## 2024-11-06 NOTE — PLAN OF CARE
IV fluids infusing. NPO since midnight. Plan for pleurex placement today    Problem: Patient Centered Care  Goal: Patient preferences are identified and integrated in the patient's plan of care  Description: Interventions:  - What would you like us to know as we care for you? From Home w/    - Provide timely, complete, and accurate information to patient/family  - Incorporate patient and family knowledge, values, beliefs, and cultural backgrounds into the planning and delivery of care  - Encourage patient/family to participate in care and decision-making at the level they choose  - Honor patient and family perspectives and choices  Outcome: Progressing     Problem: CARDIOVASCULAR - ADULT  Goal: Maintains optimal cardiac output and hemodynamic stability  Description: INTERVENTIONS:  - Monitor vital signs, rhythm, and trends  - Monitor for bleeding, hypotension and signs of decreased cardiac output  - Evaluate effectiveness of vasoactive medications to optimize hemodynamic stability  - Monitor arterial and/or venous puncture sites for bleeding and/or hematoma  - Assess quality of pulses, skin color and temperature  - Assess for signs of decreased coronary artery perfusion - ex. Angina  - Evaluate fluid balance, assess for edema, trend weights  Outcome: Progressing  Goal: Absence of cardiac arrhythmias or at baseline  Description: INTERVENTIONS:  - Continuous cardiac monitoring, monitor vital signs, obtain 12 lead EKG if indicated  - Evaluate effectiveness of antiarrhythmic and heart rate control medications as ordered  - Initiate emergency measures for life threatening arrhythmias  - Monitor electrolytes and administer replacement therapy as ordered  Outcome: Progressing

## 2024-11-06 NOTE — PROCEDURES
[facility]  part of [facility]  Procedure Note    Camelia Markham Patient Status:  Observation    1960 MRN [id number]   Location [facility] INTERVENTIONAL SUITES Attending Fidel Henderson MD   Hosp Day # 0 PCP FINA MABRY, MD WENDY     Procedure: right apical chest tube placement    Pre-Procedure Diagnosis:  loculated right pleural effusion    Post-Procedure Diagnosis: same    Anesthesia:  Local    Findings:  loculated right pleural effusion    Specimens: straw colored fluid removed    Blood Loss:  minimal       Complications:  None  Drains:  10 fr       Tj Berman MD  2024

## 2024-11-07 ENCOUNTER — APPOINTMENT (OUTPATIENT)
Dept: GENERAL RADIOLOGY | Facility: HOSPITAL | Age: 64
End: 2024-11-07
Attending: CLINICAL NURSE SPECIALIST
Payer: COMMERCIAL

## 2024-11-07 PROBLEM — C79.9 METASTASIS FROM THYROID CANCER (HCC): Status: ACTIVE | Noted: 2024-11-07

## 2024-11-07 PROBLEM — C73 METASTASIS FROM THYROID CANCER (HCC): Status: ACTIVE | Noted: 2024-11-07

## 2024-11-07 LAB
ANION GAP SERPL CALC-SCNC: 7 MMOL/L (ref 0–18)
BASOPHILS # BLD AUTO: 0.05 X10(3) UL (ref 0–0.2)
BASOPHILS NFR BLD AUTO: 0.6 %
BUN BLD-MCNC: 5 MG/DL (ref 9–23)
BUN/CREAT SERPL: 8.6 (ref 10–20)
CALCIUM BLD-MCNC: 8.7 MG/DL (ref 8.7–10.4)
CHLORIDE SERPL-SCNC: 108 MMOL/L (ref 98–112)
CO2 SERPL-SCNC: 25 MMOL/L (ref 21–32)
CREAT BLD-MCNC: 0.58 MG/DL
DEPRECATED RDW RBC AUTO: 51.9 FL (ref 35.1–46.3)
EGFRCR SERPLBLD CKD-EPI 2021: 102 ML/MIN/1.73M2 (ref 60–?)
EOSINOPHIL # BLD AUTO: 0.36 X10(3) UL (ref 0–0.7)
EOSINOPHIL NFR BLD AUTO: 4.1 %
ERYTHROCYTE [DISTWIDTH] IN BLOOD BY AUTOMATED COUNT: 15.6 % (ref 11–15)
GLUCOSE BLD-MCNC: 109 MG/DL (ref 70–99)
HCT VFR BLD AUTO: 35.9 %
HGB BLD-MCNC: 11.9 G/DL
IMM GRANULOCYTES # BLD AUTO: 0.03 X10(3) UL (ref 0–1)
IMM GRANULOCYTES NFR BLD: 0.3 %
LYMPHOCYTES # BLD AUTO: 2.68 X10(3) UL (ref 1–4)
LYMPHOCYTES NFR BLD AUTO: 30.5 %
MAGNESIUM SERPL-MCNC: 1.8 MG/DL (ref 1.6–2.6)
MCH RBC QN AUTO: 30.6 PG (ref 26–34)
MCHC RBC AUTO-ENTMCNC: 33.1 G/DL (ref 31–37)
MCV RBC AUTO: 92.3 FL
MONOCYTES # BLD AUTO: 0.53 X10(3) UL (ref 0.1–1)
MONOCYTES NFR BLD AUTO: 6 %
NEUTROPHILS # BLD AUTO: 5.15 X10 (3) UL (ref 1.5–7.7)
NEUTROPHILS # BLD AUTO: 5.15 X10(3) UL (ref 1.5–7.7)
NEUTROPHILS NFR BLD AUTO: 58.5 %
OSMOLALITY SERPL CALC.SUM OF ELEC: 288 MOSM/KG (ref 275–295)
PHOSPHATE SERPL-MCNC: 3.8 MG/DL (ref 2.4–5.1)
PLATELET # BLD AUTO: 263 10(3)UL (ref 150–450)
POTASSIUM SERPL-SCNC: 3.6 MMOL/L (ref 3.5–5.1)
RBC # BLD AUTO: 3.89 X10(6)UL
SODIUM SERPL-SCNC: 140 MMOL/L (ref 136–145)
WBC # BLD AUTO: 8.8 X10(3) UL (ref 4–11)

## 2024-11-07 PROCEDURE — 99233 SBSQ HOSP IP/OBS HIGH 50: CPT | Performed by: HOSPITALIST

## 2024-11-07 PROCEDURE — 71045 X-RAY EXAM CHEST 1 VIEW: CPT | Performed by: CLINICAL NURSE SPECIALIST

## 2024-11-07 PROCEDURE — 99233 SBSQ HOSP IP/OBS HIGH 50: CPT | Performed by: INTERNAL MEDICINE

## 2024-11-07 PROCEDURE — 99232 SBSQ HOSP IP/OBS MODERATE 35: CPT | Performed by: INTERNAL MEDICINE

## 2024-11-07 RX ORDER — PANTOPRAZOLE SODIUM 40 MG/1
40 TABLET, DELAYED RELEASE ORAL
Status: DISCONTINUED | OUTPATIENT
Start: 2024-11-07 | End: 2024-11-11

## 2024-11-07 RX ORDER — HYDROCODONE BITARTRATE AND ACETAMINOPHEN 5; 325 MG/1; MG/1
1 TABLET ORAL EVERY 6 HOURS PRN
Status: DISCONTINUED | OUTPATIENT
Start: 2024-11-07 | End: 2024-11-11

## 2024-11-07 RX ORDER — BUTALBITAL, ACETAMINOPHEN AND CAFFEINE 50; 325; 40 MG/1; MG/1; MG/1
1 TABLET ORAL EVERY 6 HOURS PRN
Status: DISCONTINUED | OUTPATIENT
Start: 2024-11-07 | End: 2024-11-11

## 2024-11-07 RX ORDER — MAGNESIUM OXIDE 400 MG/1
400 TABLET ORAL ONCE
Status: COMPLETED | OUTPATIENT
Start: 2024-11-07 | End: 2024-11-07

## 2024-11-07 RX ORDER — DIPHENHYDRAMINE HYDROCHLORIDE 50 MG/ML
25 INJECTION INTRAMUSCULAR; INTRAVENOUS EVERY 6 HOURS PRN
Status: DISCONTINUED | OUTPATIENT
Start: 2024-11-07 | End: 2024-11-11

## 2024-11-07 NOTE — PLAN OF CARE
Pt A/Ox4. RA. Chest tube to low continuous suction. No pain, air leaking, or emphysema at site. I/O's recorded. Pulm cleared to ambulate without suction for brief time. Tolerated 10ft walk. Mg replaced. Fiorcet given for migraine. Call  light within reach and safety precautions in place.     Problem: Patient Centered Care  Goal: Patient preferences are identified and integrated in the patient's plan of care  Description: Interventions:  - What would you like us to know as we care for you? From Home w/    - Provide timely, complete, and accurate information to patient/family  - Incorporate patient and family knowledge, values, beliefs, and cultural backgrounds into the planning and delivery of care  - Encourage patient/family to participate in care and decision-making at the level they choose  - Honor patient and family perspectives and choices  Outcome: Progressing     Problem: CARDIOVASCULAR - ADULT  Goal: Maintains optimal cardiac output and hemodynamic stability  Description: INTERVENTIONS:  - Monitor vital signs, rhythm, and trends  - Monitor for bleeding, hypotension and signs of decreased cardiac output  - Evaluate effectiveness of vasoactive medications to optimize hemodynamic stability  - Monitor arterial and/or venous puncture sites for bleeding and/or hematoma  - Assess quality of pulses, skin color and temperature  - Assess for signs of decreased coronary artery perfusion - ex. Angina  - Evaluate fluid balance, assess for edema, trend weights  Outcome: Progressing  Goal: Absence of cardiac arrhythmias or at baseline  Description: INTERVENTIONS:  - Continuous cardiac monitoring, monitor vital signs, obtain 12 lead EKG if indicated  - Evaluate effectiveness of antiarrhythmic and heart rate control medications as ordered  - Initiate emergency measures for life threatening arrhythmias  - Monitor electrolytes and administer replacement therapy as ordered  Outcome: Progressing     Problem:  METABOLIC/FLUID AND ELECTROLYTES - ADULT  Goal: Electrolytes maintained within normal limits  Description: INTERVENTIONS:  - Monitor labs and rhythm and assess patient for signs and symptoms of electrolyte imbalances  - Administer electrolyte replacement as ordered  - Monitor response to electrolyte replacements, including rhythm and repeat lab results as appropriate  - Fluid restriction as ordered  - Instruct patient on fluid and nutrition restrictions as appropriate  Outcome: Progressing  Goal: Hemodynamic stability and optimal renal function maintained  Description: INTERVENTIONS:  - Monitor labs and assess for signs and symptoms of volume excess or deficit  - Monitor intake, output and patient weight  - Monitor urine specific gravity, serum osmolarity and serum sodium as indicated or ordered  - Monitor response to interventions for patient's volume status, including labs, urine output, blood pressure (other measures as available)  - Encourage oral intake as appropriate  - Instruct patient on fluid and nutrition restrictions as appropriate  Outcome: Progressing     Problem: MUSCULOSKELETAL - ADULT  Goal: Return mobility to safest level of function  Description: INTERVENTIONS:  - Assess patient stability and activity tolerance for standing, transferring and ambulating w/ or w/o assistive devices  - Assist with transfers and ambulation using safe patient handling equipment as needed  - Ensure adequate protection for wounds/incisions during mobilization  - Obtain PT/OT consults as needed  - Advance activity as appropriate  - Communicate ordered activity level and limitations with patient/family  Outcome: Progressing  Goal: Maintain proper alignment of affected body part  Description: INTERVENTIONS:  - Support and protect limb and body alignment per provider's orders  - Instruct and reinforce with patient and family use of appropriate assistive device and precautions (e.g. spinal or hip dislocation  precautions)  Outcome: Progressing

## 2024-11-07 NOTE — PLAN OF CARE
Patient report headache and incision site pain - PRN pain med provided. Restoril given to aid rest per patient request. IVF 0.9 running at 100 ml/hr. On 1L NC for comfort. Chest tube - I&Os Q12hr per MD order. Safety precaution maintained. Plan for repeat CXR in AM.     Problem: Patient Centered Care  Goal: Patient preferences are identified and integrated in the patient's plan of care  Description: Interventions:  - What would you like us to know as we care for you? From Home w/    - Provide timely, complete, and accurate information to patient/family  - Incorporate patient and family knowledge, values, beliefs, and cultural backgrounds into the planning and delivery of care  - Encourage patient/family to participate in care and decision-making at the level they choose  - Honor patient and family perspectives and choices  Outcome: Progressing     Problem: CARDIOVASCULAR - ADULT  Goal: Maintains optimal cardiac output and hemodynamic stability  Description: INTERVENTIONS:  - Monitor vital signs, rhythm, and trends  - Monitor for bleeding, hypotension and signs of decreased cardiac output  - Evaluate effectiveness of vasoactive medications to optimize hemodynamic stability  - Monitor arterial and/or venous puncture sites for bleeding and/or hematoma  - Assess quality of pulses, skin color and temperature  - Assess for signs of decreased coronary artery perfusion - ex. Angina  - Evaluate fluid balance, assess for edema, trend weights  Outcome: Progressing  Goal: Absence of cardiac arrhythmias or at baseline  Description: INTERVENTIONS:  - Continuous cardiac monitoring, monitor vital signs, obtain 12 lead EKG if indicated  - Evaluate effectiveness of antiarrhythmic and heart rate control medications as ordered  - Initiate emergency measures for life threatening arrhythmias  - Monitor electrolytes and administer replacement therapy as ordered  Outcome: Progressing     Problem: METABOLIC/FLUID AND ELECTROLYTES -  ADULT  Goal: Electrolytes maintained within normal limits  Description: INTERVENTIONS:  - Monitor labs and rhythm and assess patient for signs and symptoms of electrolyte imbalances  - Administer electrolyte replacement as ordered  - Monitor response to electrolyte replacements, including rhythm and repeat lab results as appropriate  - Fluid restriction as ordered  - Instruct patient on fluid and nutrition restrictions as appropriate  Outcome: Progressing  Goal: Hemodynamic stability and optimal renal function maintained  Description: INTERVENTIONS:  - Monitor labs and assess for signs and symptoms of volume excess or deficit  - Monitor intake, output and patient weight  - Monitor urine specific gravity, serum osmolarity and serum sodium as indicated or ordered  - Monitor response to interventions for patient's volume status, including labs, urine output, blood pressure (other measures as available)  - Encourage oral intake as appropriate  - Instruct patient on fluid and nutrition restrictions as appropriate  Outcome: Progressing     Problem: MUSCULOSKELETAL - ADULT  Goal: Return mobility to safest level of function  Description: INTERVENTIONS:  - Assess patient stability and activity tolerance for standing, transferring and ambulating w/ or w/o assistive devices  - Assist with transfers and ambulation using safe patient handling equipment as needed  - Ensure adequate protection for wounds/incisions during mobilization  - Obtain PT/OT consults as needed  - Advance activity as appropriate  - Communicate ordered activity level and limitations with patient/family  Outcome: Progressing  Goal: Maintain proper alignment of affected body part  Description: INTERVENTIONS:  - Support and protect limb and body alignment per provider's orders  - Instruct and reinforce with patient and family use of appropriate assistive device and precautions (e.g. spinal or hip dislocation precautions)  Outcome: Progressing

## 2024-11-07 NOTE — PROGRESS NOTES
[facility]  part of [facility]    Progress Note    Camelia Markham Patient Status:  Observation    1960 MRN [id number]   Location [facility] 3W/SW Attending Fidel Henderson MD   Hosp Day # 1 PCP FINA MABRY, MD WENDY     Chief Complaint:     Syncope    Subjective:   Subjective:    Patient seen and examined  Hypertensive, afebrile  No new complaints at this time.   Endorsing pain at the new chest tube insertion site.  States benadryl helped with her headache and nausea      Objective:   Blood pressure 143/86, pulse 85, temperature 97.5 °F (36.4 °C), temperature source Oral, resp. rate 18, weight 163 lb (73.9 kg), SpO2 92%.  Physical Exam    General: Patient is alert and oriented x3 does not appear to be in acute distress at this time  HEENT: EOMI PERRLA, atraumatic normocephalic  Cardiac: S1-S2 appreciated  Lungs: Good air entry bilaterally clear to auscultation, PleurX in place R side  Abdomen: Soft nontender nondistended positive bowel sounds  Ext: Peripheral pulses are positive  Neuro: No focal deficits noted  Psych: Normal mood  Skin: No rashes noted  MSK: Full range of motion intact      Results:   Lab Results   Component Value Date    WBC 8.8 2024    HGB 11.9 (L) 2024    HCT 35.9 2024    .0 2024    CREATSERUM 0.58 2024    BUN 5 (L) 2024     2024    K 3.6 2024     2024    CO2 25.0 2024     (H) 2024    CA 8.7 2024    ALB 3.5 2024    ALKPHO 90 2024    BILT 1.0 2024    TP 5.8 2024    AST 16 2024    ALT 16 2024    PTT 26.0 2022    INR 0.98 2023    T4F 1.9 (H) 2024    TSH 0.048 (L) 2024    LIP 33 2024    DDIMER 0.55 2024    MG 1.8 2024    PHOS 3.8 2024    TROPHS <3 2024       XR CHEST AP PORTABLE  (CPT=71045)    Result Date: 2024  CONCLUSION:  1. Interval placement of a short pigtail  chest tube in the right apex with successful drainage of the loculated pleural effusion. 2. Right basilar chest tube remains with interval decrease in loculated right pleural effusion and slight improvement in atelectasis and or pneumonia. 3. Subsegmental atelectasis in the left lower lobe. 4. Top-normal heart size with normal pulmonary vascularity. 5. Atherosclerotic calcification aorta. 6. Internal fixation of an healed right clavicle fracture.    Dictated by (CST): Jose Smiht MD on 11/07/2024 at 8:35 AM     Finalized by (CST): Jose Smith MD on 11/07/2024 at 8:38 AM          US CAROTID DOPPLER BILAT - DIAG IMG (CPT=93880)    Result Date: 11/5/2024  CONCLUSION:  1. Atherosclerosis of proximal internal carotids.  No significant stenosis.  Velocities are in normal range. 2. Antegrade patent vertebral arteries bilaterally.    Dictated by (CST): Jose Smith MD on 11/05/2024 at 12:53 PM     Finalized by (CST): Jose Smith MD on 11/05/2024 at 12:56 PM               Assessment & Plan:     Syncope and collapse  -possibly due to poor PO intake,   -orthostasis.   -continue current IV hydration will monitor closely    Malignant pleural effusion (HCC)  -Patient with hx of recurrent metastatic Papillary thyroid cancer.   -has a pleurX in place but new imaging reveals a loculated pleural effusion that is away from the pleurX  -will have pulmonology placed on consult  -appreciate hem/onc recs.   -will monitor closely    Hyperglycemia  -continue Accuchecks qachs,  -supplement with sliding scale    HTN  -montior BP  -titrate meds as needed.     VTE ppx: heparin subcutaneous.   Full Code    Global A/P  -CXR with improvement.  -chest tube placement - drained well thus far  -per Pulm - possible removal of posterior chest tube tomorrow.  -monitor closely.  -appreciate IR and Pulm recs.  -will monitor closely  -appreciate hem/onc recs  -Reviewed previous consultant notes  -Reviewed CBC, BMP, Mag, and Phos  -Reviewed tests  ordered  -Repeat labs in am  -MDM: High, severe exacerbation of chronic illness posing a threat to life. IV medications requiring close inpatient monitoring.             Fidel Henderson MD

## 2024-11-07 NOTE — PROGRESS NOTES
Pulmonary Medicine Inpatient Progress Note                 Subjective:  S/p right posterior chest tube placed by IR yesterday.  460 ml drained thus far  Afebrile, on RA.       ALLERGIES:  Allergies[1]       MEDS:  Home Medications:  Medications Taking[2]  Scheduled Medication:   heparin  5,000 Units Subcutaneous 2 times per day     Continuous Infusing Medication:   sodium chloride 100 mL/hr at 11/07/24 0735     PRN Medications:    HYDROcodone-acetaminophen    traMADol    acetaminophen    temazepam    ondansetron    prochlorperazine       PHYSICAL EXAM:  /86 (BP Location: Right arm)   Pulse 85   Temp 97.5 °F (36.4 °C) (Oral)   Resp 18   Wt 163 lb (73.9 kg)   SpO2 92%   BMI 29.81 kg/m²   CONSTITUTIONAL: alert, oriented, no apparent distress  HEENT: atraumatic normocephalic  MOUTH: mucous membranes are moist. No OP exudates  NECK/THROAT: no JVD. Trachea midline. No obvious thyromegaly  LUNG: clear b/l no wheezing, + basilar crackles. Chest symmetric with respiratory motion. + pleurex catheter in place. + right posterior chest tube also in place  HEART: regular rate and rhythm, no obvious murmers or gallops note  ABD: soft non tender. + bowel sounds. No organomegaly noted  EXT: no clubbing, cyanosis, or edema noted. Pulses intact grossly  NEURO/MUSCULOSKELETAL: no gross deficits  SKIN: warm, dry. No obvious lesions noted  LYMPH: no obvious LAD       IMAGES:  CXR this am appears right upper loculated effusion is improved  CONCLUSION:   1. Interval placement of a short pigtail chest tube in the right apex with successful drainage of the loculated pleural effusion.   2. Right basilar chest tube remains with interval decrease in loculated right pleural effusion and slight improvement in atelectasis and or pneumonia.   3. Subsegmental atelectasis in the left lower lobe.   4. Top-normal heart size with normal pulmonary vascularity.   5. Atherosclerotic calcification aorta.   6. Internal fixation of an healed right  clavicle fracture.       Chest CT 11/4/24  FINDINGS:   The following findings were made:   1. There is a large loculated right-sided pleural effusion with associated underlying pleural nodularity.  The finding is associated with scattered areas of subsegmental atelectasis throughout the right lung.  There is a PleurX catheter at the right lung   base within a free-flowing segment of this loculated pleural effusion.   2. There are scattered ground-glass nodular peribronchial densities within the right lung as well as at the left lung base raising the possibility of a superimposed infectious process.  There is a very small free-flowing left pleural effusion with   adjacent compressive atelectasis.   3. The visualized aspects of the liver demonstrates decreased attenuation compatible with fatty infiltration.  There are no focal hepatic lesions identified within the imaged segments of the liver.   4. There are slight to moderate degenerative changes within the thoracic spine.   The remainder of the examination is unremarkable.  Specifically, on vascular windows, the main pulmonary arteries and segmental branches are normal in their course and caliber with no intraluminal filling defects identified to suggest pulmonary embolus.   The thoracic aorta is normal in its course and caliber with no aneurysmal dilatation, no dissection, and no secondary signs of acute aortic injury.  On lung windows, there is no pneumothorax.  On mediastinal windows, there is no pericardial effusion or   significant adenopathy.  Within the upper abdomen, there is no adrenal mass.       LABS:  Recent Labs   Lab 11/05/24  0657 11/06/24  0647 11/07/24  0623   RBC 3.83 3.78* 3.89   HGB 11.7* 11.6* 11.9*   HCT 35.7 34.7* 35.9   MCV 93.2 91.8 92.3   MCH 30.5 30.7 30.6   MCHC 32.8 33.4 33.1   RDW 16.1* 15.8* 15.6*   NEPRELIM 4.14 4.35 5.15   WBC 7.4 7.7 8.8   .0 243.0 263.0       Recent Labs   Lab 11/04/24  1227 11/05/24  0657 11/06/24  0647  11/07/24  0623   * 108* 106* 109*   BUN 14 8* 7* 5*   CREATSERUM 0.73 0.61 0.65 0.58   EGFRCR 92 100 99 102   CA 9.0 8.5* 8.5* 8.7   ALB 3.5  --   --   --     140 141 140   K 3.8 3.9 3.7 3.6    112 110 108   CO2 25.0 26.0 24.0 25.0   ALKPHO 90  --   --   --    AST 16  --   --   --    ALT 16  --   --   --    BILT 1.0  --   --   --    TP 6.8  --  5.8  --        ASSESSMENT/PLAN:  Metastatic thyroid papillary carcinoma s/p malignant right pleural effusion with pleurex placement.  -chest CT with new loculated right pleural effusion and increasing dyspnea and chest pain  -s/p IR chest tube placement. Check fluid cytology and cultures.  -repeat CXR this am shows improvement. If there is minimal drainage over the next 24 hrs, then can likely remove the posterior chest tube tomorrow  -continue to drain right pleurex intermittently (last drained 200 ml a couple of days ago)  -pain control  -oncology following    Syncope  -CT brain unremarkable for acute abnormalities    Proph:  -DVT: hep subcutaneous    Discussed with the pt      Thank you for the opportunity to care for Camelia Markham.     ALDA Brooks DO, MPH  Pulmonary Critical Care Medicine  [facility]        [1]   Allergies  Allergen Reactions    Latex HIVES    Peanut-Containing Drug Products SWELLING     Closes Throat  Closes Throat  Closes Throat      Peanuts SWELLING     Closes Throat    Adhesive Tape OTHER (SEE COMMENTS)    Seasonal    [2]   Outpatient Medications Marked as Taking for the 11/4/24 encounter (Hospital Encounter)   Medication Sig Dispense Refill    DULoxetine 20 MG Oral Cap DR Particles Take 1 capsule (20 mg total) by mouth daily. 30 capsule 0    levothyroxine 150 MCG Oral Tab Take 1 tablet (150 mcg total) by mouth before breakfast.      Lenvatinib, 24 MG Daily Dose, (LENVIMA, 24 MG DAILY DOSE,) 2 x 10 MG & 4 MG Oral Capsule Therapy Pack Take 24 mg by mouth daily. 30 each 5     ondansetron (ZOFRAN) 8 MG tablet Take 1 tablet (8 mg total) by mouth every 8 (eight) hours as needed for Nausea. 30 tablet 3    HYDROcodone-acetaminophen 5-325 MG Oral Tab Take 1-2 tablets by mouth every 6 (six) hours as needed for Pain. 60 tablet 0    Olmesartan Medoxomil 40 MG Oral Tab Take 1 tablet (40 mg total) by mouth daily.      Omeprazole 40 MG Oral Capsule Delayed Release Take 1 capsule (40 mg total) by mouth every other day.      amLODIPine 10 MG Oral Tab Take 1 tablet (10 mg total) by mouth daily.

## 2024-11-07 NOTE — SPIRITUAL CARE NOTE
Spiritual Care Visit Note    Patient Name: Camelia Markham Date of Spiritual Care Visit: 24   : 1960 Primary Dx: Syncope and collapse       Referred By: Referral From: Nurse    Spiritual Care Taxonomy:    Intended Effects: Build relationship of care and support    Methods: Assist with spiritual/Islam practices;Explore shadi and values;Exploring hope    Interventions: Acknowledge current situation;Acknowledge response to difficult experience;Active listening;Ask guided questions;Ask guided questions about shadi;[place] for care team member(s);Explain  role;Identify supportive relationship(s);Prayer for healing;Provide a Islam item(s)    Visit Type/Summary:     - Spiritual Care: Consulted with RN prior to visit. Offered empathic listening and emotional support. Patient and family expressed appreciation for  visit. Provided information regarding how to contact Spiritual Care and left a Spiritual Care information card. Provided support for Patient's spiritual/Islam requests. Coordinated [facility] and verified NPO status. Offered prayer. Provided Patient with a rosary. Patient's supported by family especially her brothers.  remains available for follow up.    Spiritual Care support can be requested via an Epic consult. For urgent/immediate needs, please contact the On Call  at: [place]: ext [phone]    Chaplain Resident, Selina Castrejon PhD

## 2024-11-07 NOTE — PROGRESS NOTES
[facility] Hematology/Oncology Group  Inpatient Progress Note    Camelia Makrham Patient Status:  Observation    1960 MRN [id number]   Location [facility] 3W/SW Attending Fidel Henderson MD   Hosp Day # 1 PCP FINA MABRY, MD WENDY     Reason for consultation: Met thyroid cancer    INTERVAL HISTORY  24 450 ml of fluid from right chest tube, dyspnea improved, appetite also better. Pain under good control    Past Oncologic History   Camelia Markham is a 64 year old female with known metastatic thyroid cancer with malignant pleural effusion.  She had been on lenvatinib with improvement of her effusion but was having trouble tolerating 24 mg dose.  Had increased nausea and vomiting with decreased p.o. intake.  lenvatinib was held last week  She developed a syncopal episode and increasing fatigue as well as some chest discomfort hence referred to the emergency room.  CT of the brain and C-spine showed no acute findings.  Chest x-ray suggested large opacity in the right upper lobe.  CT scan showed a large loculated right-sided pleural effusion with associated pleural-based nodularity.  Pleurx catheter in the free-flowing segment of the pleural effusion with improvement was noted.  There were peribronchial groundglass opacities as well.    A Pleurx was drained and yielded 200 mL of fluid.  She was started on IV hydration and hospitalized.still feels very tired      Review of Systems:  Pt denies fevers, chills, night sweats, HA, vision changes, CP, SOB, cough, n/v/d, abd pain, urinary or bowel complaints  Hematology/Oncology ROS performed and negative except as above in HPI    History/Other:   Past Medical History:  Past Medical History:    Anxiety state, unspecified    Cancer (HCC)    Colon adenomas    x3    Disorder of thyroid    thyroidectomy    Diverticulosis of large intestine    Esophageal reflux    Exposure to medical therapeutic radiation    thyroid    High blood pressure    High  cholesterol    Hypercholesteremia    Hypothyroidism    Osteoarthrosis, unspecified whether generalized or localized, unspecified site    Thyroid cancer (HCC)    papillary thyroid; s/p surgery resection with Asher and BRIDGES    Unspecified essential hypertension       Past Surgical History:  Past Surgical History:   Procedure Laterality Date    Colonoscopy N/A 2022    Procedure: COLONOSCOPY;  Surgeon: Neeraj No MD;  Location: [facility] ENDOSCOPY    Colonoscopy  2024    Colonoscopy N/A 2024    Procedure: COLONOSCOPY;  Surgeon: Neeraj No MD;  Location: [facility] ENDOSCOPY    Egd  2022    Egd  2024    Esophagogastroduodenoscopy    Endoscopic ultrasound - internal  2022    Endoscopic ultrasound exam  2024    Endoscopic Ultrasound    Eye surgery  2024    Eye surgery Left 2024    Hysterectomy      Knee surgery Left 2022    no associated bleeding complications          40 week 7 lb(s) 5 oz Male; 6 hr labor           40 week 7 lb(s) 3 oz Female          41 week 9 lb(s) 8 oz Male    Other surgical history      Injection Tendon Sheath, Ligament    Thyroidectomy      total    Total abdom hysterectomy         Current Medications:   HYDROcodone-acetaminophen (Norco) 5-325 MG per tab 1 tablet  1 tablet Oral Q6H PRN    [COMPLETED] magnesium oxide (Mag-Ox) tab 400 mg  400 mg Oral Once    pantoprazole (Protonix) DR tab 40 mg  40 mg Oral QAM AC    butalbital-acetaminophen-caffeine (Fioricet) -40 MG per tab 1 tablet  1 tablet Oral Q6H PRN    diphenhydrAMINE (Benadryl) 50 mg/mL  injection 25 mg  25 mg Intravenous Q6H PRN    [COMPLETED] lidocaine (Xylocaine) 2 % injection        [COMPLETED] fentaNYL (Sublimaze) 50 mcg/mL injection        traMADol (Ultram) tab 50 mg  50 mg Oral Q6H PRN    acetaminophen (Tylenol Extra Strength) tab 500 mg  500 mg Oral Q4H PRN    [COMPLETED] diphenhydrAMINE (Benadryl) 50 mg/mL  injection 25 mg  25 mg  Intravenous Once    [COMPLETED] sodium chloride 0.9 % IV bolus 1,000 mL  1,000 mL Intravenous Once    [COMPLETED] iopamidol 76% (ISOVUE-370) injection for power injector  80 mL Intravenous ONCE PRN    sodium chloride 0.9% infusion   Intravenous Continuous    heparin (Porcine) 5000 UNIT/ML injection 5,000 Units  5,000 Units Subcutaneous 2 times per day    temazepam (Restoril) cap 15 mg  15 mg Oral Nightly PRN    ondansetron (Zofran) 4 MG/2ML injection 4 mg  4 mg Intravenous Q6H PRN    prochlorperazine (Compazine) 10 MG/2ML injection 5 mg  5 mg Intravenous Q8H PRN       Allergies:   Allergies[1]    Family Medical History:  Family History   Problem Relation Age of Onset    Heart Disorder Mother     Breast Cancer Maternal Cousin Female 57    Cancer Daughter 29        Hodgkin's Lymphoma    Ovarian Cancer Neg     Bleeding Disorders Neg     DVT/VTE Neg     Pancreatic Cancer Neg     Prostate Cancer Neg        Social History:  Social History     Socioeconomic History    Marital status:      Spouse name: Not on file    Number of children: Not on file    Years of education: Not on file    Highest education level: Not on file   Occupational History    Not on file   Tobacco Use    Smoking status: Never    Smokeless tobacco: Never   Vaping Use    Vaping status: Never Used   Substance and Sexual Activity    Alcohol use: Yes     Comment: social    Drug use: No    Sexual activity: Not on file   Other Topics Concern     Service Not Asked    Blood Transfusions Not Asked    Caffeine Concern Yes     Comment: 1 cup of coffee     Occupational Exposure Not Asked    Hobby Hazards Not Asked    Sleep Concern Not Asked    Stress Concern Not Asked    Weight Concern Not Asked    Special Diet Not Asked    Back Care Not Asked    Exercise Not Asked    Bike Helmet Not Asked    Seat Belt Not Asked    Self-Exams Not Asked   Social History Narrative    Camelia Figueroa is  to Baldemar x30 yrs. She has 3 adult children. Patient works in  accounting in book keeping. She lives with her , her mom, and 1 of the children in [place], [place].     Social Drivers of Health     Financial Resource Strain: Not on file   Food Insecurity: No Food Insecurity (2024)    Food Insecurity     Food Insecurity: Never true   Transportation Needs: No Transportation Needs (2024)    Transportation Needs     Lack of Transportation: No     Car Seat: Not on file   Physical Activity: Not on file   Stress: Not on file   Social Connections: Not on file   Housing Stability: Low Risk  (2024)    Housing Stability     Housing Instability: No     Housing Instability Emergency: Not on file     Crib or Bassinette: Not on file       Gyn History:  OB History    Para Term  AB Living   3 3 0 0 0 0   SAB IAB Ectopic Multiple Live Births   0 0 0 0 0       Objective:    /83 (BP Location: Right arm)   Pulse 85   Temp 97.8 °F (36.6 °C) (Oral)   Resp 19   Wt 73.9 kg (163 lb)   SpO2 93%   BMI 29.81 kg/m²   Physical Exam:  General: A&Ox3, NAD, appears tired  HEENT: PERRL, OP clear  Neck: supple, no LAD or JVD  CV: RRR, no murmurs, + pulses  Pulm: CTA b/l, no w/r/r, normal effort  Right sided c/t in situ  Lymph: no palpable lymphadenopathy throughout the cervical, supraclavicular, axillary, or inguinal regions  Extremities: no edema or calf tenderness  Neurological: Grossly intact    Limited exam since undergoing bedside echo    Labs:  Lab Results   Component Value Date/Time    WBC 8.8 2024 06:23 AM    RBC 3.89 2024 06:23 AM    HGB 11.9 (L) 2024 06:23 AM    HCT 35.9 2024 06:23 AM    MCV 92.3 2024 06:23 AM    MCH 30.6 2024 06:23 AM    MCHC 33.1 2024 06:23 AM    RDW 15.6 (H) 2024 06:23 AM    NEPRELIM 5.15 2024 06:23 AM    .0 2024 06:23 AM       Lab Results   Component Value Date/Time     (H) 2024 06:23 AM    BUN 5 (L) 2024 06:23 AM    CREATSERUM 0.58 2024 06:23 AM     GFRNAA 67 02/16/2021 12:09 PM    CA 8.7 11/07/2024 06:23 AM    ALB 3.5 11/04/2024 12:27 PM     11/07/2024 06:23 AM    K 3.6 11/07/2024 06:23 AM     11/07/2024 06:23 AM    CO2 25.0 11/07/2024 06:23 AM    ALKPHO 90 11/04/2024 12:27 PM    AST 16 11/04/2024 12:27 PM    ALT 16 11/04/2024 12:27 PM       Imaging:Assessment & Plan:    Camelia Markham is a 64 year old female with metastatic thyroid cancer hospitalized here with syncopal episode CT scan shows loculated pleural effusion as well as new groundglass opacities.    # right loculated effusion: s/p CT placement, await cytology, noted plans for possible removal in AM    # Met thyroid cancer: intolerant of lenvatinib 24mg dose, on hold, will rechallenge with 20mg once her PS has improved as an outpatient    If dc planned soon, she has oncology fu as outpt.    Thank you Dr FINA MABRY, MD WENDY for the opportunity to participate in the care of this interesting patient. Please do contact me if I may be of any further assistance    Inocente Chen MD  [facility] Hematology/Oncology  [facility]    This note was created using a voice-recognition transcribing system. Incorrect words or phrases may have been missed during proofreading. Please interpret accordingly.         [1]   Allergies  Allergen Reactions    Latex HIVES    Peanut-Containing Drug Products SWELLING     Closes Throat  Closes Throat  Closes Throat      Peanuts SWELLING     Closes Throat    Adhesive Tape OTHER (SEE COMMENTS)    Seasonal

## 2024-11-07 NOTE — PAYOR COMM NOTE
--------------  11/7:  CONTINUED STAY REVIEW    [facility]  Subscriber #:  [id number]  Authorization Number: [id number]      PULMONARY:    Admit date: 11/6/24  Admit time: 11:47 AM       Subjective:  S/p right posterior chest tube placed by IR yesterday.  460 ml drained thus far  Afebrile, on RA.        PHYSICAL EXAM:  /86 (BP Location: Right arm)   Pulse 85   Temp 97.5 °F (36.4 °C) (Oral)   Resp 18   Wt 163 lb (73.9 kg)   SpO2 92%   BMI 29.81 kg/m²   CONSTITUTIONAL: alert, oriented, no apparent distress  HEENT: atraumatic normocephalic  MOUTH: mucous membranes are moist. No OP exudates  NECK/THROAT: no JVD. Trachea midline. No obvious thyromegaly  LUNG: clear b/l no wheezing, + basilar crackles. Chest symmetric with respiratory motion. + pleurex catheter in place. + right posterior chest tube also in place  HEART: regular rate and rhythm, no obvious murmers or gallops note  ABD: soft non tender. + bowel sounds. No organomegaly noted  EXT: no clubbing, cyanosis, or edema noted. Pulses intact grossly  NEURO/MUSCULOSKELETAL: no gross deficits  SKIN: warm, dry. No obvious lesions noted  LYMPH: no obvious LAD        IMAGES:  CXR this am appears right upper loculated effusion is improved  CONCLUSION:   1. Interval placement of a short pigtail chest tube in the right apex with successful drainage of the loculated pleural effusion.   2. Right basilar chest tube remains with interval decrease in loculated right pleural effusion and slight improvement in atelectasis and or pneumonia.   3. Subsegmental atelectasis in the left lower lobe.   4. Top-normal heart size with normal pulmonary vascularity.   5. Atherosclerotic calcification aorta.   6. Internal fixation of an healed right clavicle fracture.         Chest CT 11/4/24  FINDINGS:   The following findings were made:   1. There is a large loculated right-sided pleural effusion with associated underlying pleural nodularity.  The finding is associated with  scattered areas of subsegmental atelectasis throughout the right lung.  There is a PleurX catheter at the right lung   base within a free-flowing segment of this loculated pleural effusion.   2. There are scattered ground-glass nodular peribronchial densities within the right lung as well as at the left lung base raising the possibility of a superimposed infectious process.  There is a very small free-flowing left pleural effusion with   adjacent compressive atelectasis.   3. The visualized aspects of the liver demonstrates decreased attenuation compatible with fatty infiltration.  There are no focal hepatic lesions identified within the imaged segments of the liver.   4. There are slight to moderate degenerative changes within the thoracic spine.   The remainder of the examination is unremarkable.  Specifically, on vascular windows, the main pulmonary arteries and segmental branches are normal in their course and caliber with no intraluminal filling defects identified to suggest pulmonary embolus.   The thoracic aorta is normal in its course and caliber with no aneurysmal dilatation, no dissection, and no secondary signs of acute aortic injury.  On lung windows, there is no pneumothorax.  On mediastinal windows, there is no pericardial effusion or   significant adenopathy.  Within the upper abdomen, there is no adrenal mass.         LABS:        Recent Labs   Lab 11/05/24  0657 11/06/24  0647 11/07/24  0623   RBC 3.83 3.78* 3.89   HGB 11.7* 11.6* 11.9*   HCT 35.7 34.7* 35.9   MCV 93.2 91.8 92.3   MCH 30.5 30.7 30.6   MCHC 32.8 33.4 33.1   RDW 16.1* 15.8* 15.6*   NEPRELIM 4.14 4.35 5.15   WBC 7.4 7.7 8.8   .0 243.0 263.0                Recent Labs   Lab 11/04/24  1227 11/05/24  0657 11/06/24  0647 11/07/24  0623   * 108* 106* 109*   BUN 14 8* 7* 5*   CREATSERUM 0.73 0.61 0.65 0.58   EGFRCR 92 100 99 102   CA 9.0 8.5* 8.5* 8.7   ALB 3.5  --   --   --     140 141 140   K 3.8 3.9 3.7 3.6     112 110 108   CO2 25.0 26.0 24.0 25.0   ALKPHO 90  --   --   --    AST 16  --   --   --    ALT 16  --   --   --    BILT 1.0  --   --   --    TP 6.8  --  5.8  --          ASSESSMENT/PLAN:  Metastatic thyroid papillary carcinoma s/p malignant right pleural effusion with pleurex placement.  -chest CT with new loculated right pleural effusion and increasing dyspnea and chest pain  -s/p IR chest tube placement. Check fluid cytology and cultures.  -repeat CXR this am shows improvement. If there is minimal drainage over the next 24 hrs, then can likely remove the posterior chest tube tomorrow  -continue to drain right pleurex intermittently (last drained 200 ml a couple of days ago)  -pain control  -oncology following     Syncope  -CT brain unremarkable for acute abnormalities     Proph:  -DVT: hep subcutaneous     Discussed with the pt        Thank you for the opportunity to care for Camelia Markham.     ALDA Brooks DO, MPH  Pulmonary Critical Care Medicine         MEDICATIONS ADMINISTERED IN LAST 1 DAY:  heparin (Porcine) 5000 UNIT/ML injection 5,000 Units       Date Action Dose Route User    11/7/2024 0922 Given 5,000 Units Subcutaneous (Bilateral Lower Abdomen) Julianna Epps RN    11/6/2024 2305 Given 5,000 Units Subcutaneous (Left Lower Abdomen) Do Sullivan RN          HYDROcodone-acetaminophen (Norco) 5-325 MG per tab 1 tablet       Date Action Dose Route User    11/7/2024 0421 Given 1 tablet Oral Do Sullivan RN          magnesium oxide (Mag-Ox) tab 400 mg       Date Action Dose Route User    11/7/2024 0922 Given 400 mg Oral Julianna Epps RN          sodium chloride 0.9% infusion       Date Action Dose Route User    11/7/2024 0735 New Bag (none) Intravenous Do Sullivan RN    11/6/2024 2112 New Bag (none) Intravenous Do Sullivan RN          temazepam (Restoril) cap 15 mg       Date Action Dose Route User    11/7/2024 0002 Given 15 mg Oral Do Sullivan RN          traMADol (Ultram)  tab 50 mg       Date Action Dose Route User    11/7/2024 0002 Given 50 mg Oral Do Sullivan, RN    11/6/2024 1755 Given 50 mg Oral Dafne Adrian            Vitals (last day)       Date/Time Temp Pulse Resp BP SpO2 Weight O2 Device O2 Flow Rate (L/min) [facility]    11/07/24 0921 97.5 °F (36.4 °C) 85 18 143/86 92 % -- None (Room air) -- MS    11/07/24 0420 98.1 °F (36.7 °C) 89 16 155/80 93 % -- Nasal cannula 1 L/min     11/07/24 0420 -- -- -- -- -- 163 lb (73.9 kg) -- -- RA    11/06/24 2203 99.4 °F (37.4 °C) 87 16 156/84 96 % -- Nasal cannula 1 L/min     11/06/24 1512 98 °F (36.7 °C) 83 16 170/86 96 % -- Nasal cannula 1 L/min     11/06/24 1412 98.1 °F (36.7 °C) 87 16 157/75 96 % -- Nasal cannula 1 L/min     11/06/24 1312 98.2 °F (36.8 °C) 81 18 168/92 95 % -- Nasal cannula 2 L/min     11/06/24 1242 98.2 °F (36.8 °C) 82 18 151/87 95 % -- Nasal cannula 2 L/min     11/06/24 1212 98.3 °F (36.8 °C) 83 20 138/68 98 % -- Nasal cannula 2 L/min     11/06/24 1157 98.3 °F (36.8 °C) 82 18 138/72 95 % -- Nasal cannula 2 L/min     11/06/24 1138 -- -- -- -- 95 % -- Nasal cannula 2 L/min     11/06/24 1136 -- -- -- -- 89 % -- None (Room air) --     11/06/24 1127 99.3 °F (37.4 °C) -- 18 161/91 91 % -- None (Room air) --     11/06/24 0831 98 °F (36.7 °C) 83 16 149/77 91 % -- None (Room air) -- CS    11/06/24 0636 98.1 °F (36.7 °C) 89 16 135/73 90 % -- None (Room air) -- IM

## 2024-11-08 ENCOUNTER — APPOINTMENT (OUTPATIENT)
Dept: GENERAL RADIOLOGY | Facility: HOSPITAL | Age: 64
End: 2024-11-08
Attending: CLINICAL NURSE SPECIALIST
Payer: COMMERCIAL

## 2024-11-08 LAB
ANION GAP SERPL CALC-SCNC: 7 MMOL/L (ref 0–18)
BASOPHILS # BLD AUTO: 0.03 X10(3) UL (ref 0–0.2)
BASOPHILS NFR BLD AUTO: 0.4 %
BUN BLD-MCNC: 7 MG/DL (ref 9–23)
BUN/CREAT SERPL: 10.6 (ref 10–20)
CALCIUM BLD-MCNC: 8.7 MG/DL (ref 8.7–10.4)
CHLORIDE SERPL-SCNC: 109 MMOL/L (ref 98–112)
CO2 SERPL-SCNC: 26 MMOL/L (ref 21–32)
CREAT BLD-MCNC: 0.66 MG/DL
DEPRECATED RDW RBC AUTO: 53.1 FL (ref 35.1–46.3)
EGFRCR SERPLBLD CKD-EPI 2021: 99 ML/MIN/1.73M2 (ref 60–?)
EOSINOPHIL # BLD AUTO: 0.36 X10(3) UL (ref 0–0.7)
EOSINOPHIL NFR BLD AUTO: 4.5 %
ERYTHROCYTE [DISTWIDTH] IN BLOOD BY AUTOMATED COUNT: 15.7 % (ref 11–15)
GLUCOSE BLD-MCNC: 110 MG/DL (ref 70–99)
HCT VFR BLD AUTO: 35.4 %
HGB BLD-MCNC: 11.4 G/DL
IMM GRANULOCYTES # BLD AUTO: 0.01 X10(3) UL (ref 0–1)
IMM GRANULOCYTES NFR BLD: 0.1 %
LYMPHOCYTES # BLD AUTO: 2.48 X10(3) UL (ref 1–4)
LYMPHOCYTES NFR BLD AUTO: 30.9 %
MAGNESIUM SERPL-MCNC: 2 MG/DL (ref 1.6–2.6)
MCH RBC QN AUTO: 29.8 PG (ref 26–34)
MCHC RBC AUTO-ENTMCNC: 32.2 G/DL (ref 31–37)
MCV RBC AUTO: 92.7 FL
MONOCYTES # BLD AUTO: 0.52 X10(3) UL (ref 0.1–1)
MONOCYTES NFR BLD AUTO: 6.5 %
NEUTROPHILS # BLD AUTO: 4.62 X10 (3) UL (ref 1.5–7.7)
NEUTROPHILS # BLD AUTO: 4.62 X10(3) UL (ref 1.5–7.7)
NEUTROPHILS NFR BLD AUTO: 57.6 %
OSMOLALITY SERPL CALC.SUM OF ELEC: 293 MOSM/KG (ref 275–295)
PHOSPHATE SERPL-MCNC: 4.1 MG/DL (ref 2.4–5.1)
PLATELET # BLD AUTO: 278 10(3)UL (ref 150–450)
POTASSIUM SERPL-SCNC: 3.8 MMOL/L (ref 3.5–5.1)
RBC # BLD AUTO: 3.82 X10(6)UL
SODIUM SERPL-SCNC: 142 MMOL/L (ref 136–145)
WBC # BLD AUTO: 8 X10(3) UL (ref 4–11)

## 2024-11-08 PROCEDURE — 99233 SBSQ HOSP IP/OBS HIGH 50: CPT | Performed by: INTERNAL MEDICINE

## 2024-11-08 PROCEDURE — 71045 X-RAY EXAM CHEST 1 VIEW: CPT | Performed by: CLINICAL NURSE SPECIALIST

## 2024-11-08 PROCEDURE — 99233 SBSQ HOSP IP/OBS HIGH 50: CPT | Performed by: HOSPITALIST

## 2024-11-08 NOTE — PAYOR COMM NOTE
--------------  CONTINUED STAY REVIEW    Payor: FLORINDA RODRIGUEZ  Subscriber #:  [id number]  Authorization Number: [id number]    Admit date: 11/6/24  Admit time: 11:47 AM    REVIEW DOCUMENTATION:    11/8 IM     Chief Complaint:      Syncope        Subjective:  Subjective:     Patient seen and examined  Hypertensive, afebrile  No new complaints at this time.   Endorsing pain at the new chest tube insertion site.  Still endorsing headaches.            Objective:  Blood pressure 125/75, pulse 75, temperature 98.8 °F (37.1 °C), temperature source Oral, resp. rate 18, weight 164 lb 9.6 oz (74.7 kg), SpO2 91%.  Physical Exam     General: Patient is alert and oriented x3 does not appear to be in acute distress at this time  HEENT: EOMI PERRLA, atraumatic normocephalic  Cardiac: S1-S2 appreciated  Lungs: Good air entry bilaterally clear to auscultation, PleurX in place R side  Abdomen: Soft nontender nondistended positive bowel sounds  Ext: Peripheral pulses are positive  Neuro: No focal deficits noted  Psych: Normal mood  Skin: No rashes noted  MSK: Full range of motion intact           Results:        Lab Results   Component Value Date     WBC 8.0 11/08/2024     HGB 11.4 (L) 11/08/2024     HCT 35.4 11/08/2024     .0 11/08/2024     CREATSERUM 0.66 11/08/2024     BUN 7 (L) 11/08/2024      11/08/2024     K 3.8 11/08/2024      11/08/2024     CO2 26.0 11/08/2024      (H) 11/08/2024     CA 8.7 11/08/2024     ALB 3.5 11/04/2024     ALKPHO 90 11/04/2024     BILT 1.0 11/04/2024     TP 5.8 11/06/2024     AST 16 11/04/2024     ALT 16 11/04/2024     PTT 26.0 12/17/2022     INR 0.98 03/13/2023     T4F 1.9 (H) 07/17/2024     TSH 0.048 (L) 07/17/2024     LIP 33 05/23/2024     DDIMER 0.55 05/23/2024     MG 2.0 11/08/2024     PHOS 4.1 11/08/2024     TROPHS <3 11/04/2024         XR CHEST AP PORTABLE  (CPT=71045)     Result Date: 11/8/2024  CONCLUSION:          No significant change to the right approach pleural pigtail  catheter.  Persistent small to moderate right pleural effusion and right basilar opacity.  Question small left pleural effusion.  No new abnormality.    Dictated by (CST): Jose Miguel Velazquez MD on 11/08/2024 at 7:39 AM     Finalized by (CST): Jose Miguel Velazquez MD on 11/08/2024 at 7:43 AM           XR CHEST AP PORTABLE  (CPT=71045)     Result Date: 11/7/2024  CONCLUSION:         1. Interval placement of a short pigtail chest tube in the right apex with successful drainage of the loculated pleural effusion. 2. Right basilar chest tube remains with interval decrease in loculated right pleural effusion and slight improvement in atelectasis and or pneumonia. 3. Subsegmental atelectasis in the left lower lobe. 4. Top-normal heart size with normal pulmonary vascularity. 5. Atherosclerotic calcification aorta. 6. Internal fixation of an healed right clavicle fracture.    Dictated by (CST): Jose Smith MD on 11/07/2024 at 8:35 AM     Finalized by (CST): Jose Smith MD on 11/07/2024 at 8:38 AM                   Assessment & Plan:  Syncope and collapse  -possibly due to poor PO intake,   -orthostasis.   -continue current IV hydration will monitor closely     Malignant pleural effusion (HCC)  -Patient with hx of recurrent metastatic Papillary thyroid cancer.   -has a pleurX in place but new imaging reveals a loculated pleural effusion that is away from the pleurX  -will have pulmonology placed on consult  -appreciate hem/onc recs.   -will monitor closely     Hyperglycemia  -continue Accuchecks qachs,  -supplement with sliding scale     HTN  -montior BP  -titrate meds as needed.      VTE ppx: heparin subcutaneous.   Full Code     Global A/P  -CXR with improvement.  -chest tube placement - drained well thus far  -per Pulm - possible removal of posterior chest tube tomorrow.  -monitor closely.  -appreciate IR and Pulm recs.  -will monitor closely  -appreciate hem/onc recs  -Reviewed previous consultant notes  -Reviewed CBC, BMP, Mag,  and Phos  -Reviewed tests ordered  -Repeat labs in am  -MDM: High, severe exacerbation of chronic illness posing a threat to life. IV medications requiring close inpatient monitoring.       11/8 Pulmonary          Subjective:  Afebrile, on RA  Chest tube not draining much over the past 24 hrs        PHYSICAL EXAM:  /75 (BP Location: Right arm)   Pulse 75   Temp 98.8 °F (37.1 °C) (Oral)   Resp 18   Wt 164 lb 9.6 oz (74.7 kg)   SpO2 91%   BMI 30.11 kg/m²   CONSTITUTIONAL: alert, oriented, no apparent distress  HEENT: atraumatic normocephalic  MOUTH: mucous membranes are moist. No OP exudates  NECK/THROAT: no JVD. Trachea midline. No obvious thyromegaly  LUNG: clear b/l no wheezing, + right basilar crackles. Chest symmetric with respiratory motion. + pleurex catheter in place. + right posterior chest tube also in place  HEART: regular rate and rhythm, no obvious murmers or gallops note  ABD: soft non tender. + bowel sounds. No organomegaly noted  EXT: no clubbing, cyanosis, or edema noted. Pulses intact grossly  NEURO/MUSCULOSKELETAL: no gross deficits  SKIN: warm, dry. No obvious lesions noted  LYMPH: no obvious LAD     ASSESSMENT/PLAN:  Metastatic thyroid papillary carcinoma s/p malignant right pleural effusion with pleurex placement.  -chest CT with new loculated right pleural effusion and increasing dyspnea and chest pain  -s/p IR chest tube placement. Checking fluid cytology and cultures  -repeat CXR this am shows improvement. There is minimal drainage over the next 24 hrs, therefore will remove the chest tube this am  -continue to drain right pleurex intermittently (last drained 200 ml a few days ago). Will ask RN to drain again this am  -pain control  -oncology following             MEDICATIONS ADMINISTERED IN LAST 1 DAY:  butalbital-acetaminophen-caffeine (Fioricet) -40 MG per tab 1 tablet       Date Action Dose Route User    11/8/2024 1340 Given 1 tablet Oral Merissa Hastings, RN           heparin (Porcine) 5000 UNIT/ML injection 5,000 Units       Date Action Dose Route User    11/8/2024 0808 Given 5,000 Units Subcutaneous (Right Lower Abdomen) Merissa Hastings RN    11/7/2024 2100 Given 5,000 Units Subcutaneous (Left Lower Abdomen) Jackie Durbin RN          HYDROcodone-acetaminophen (Norco) 5-325 MG per tab 1 tablet       Date Action Dose Route User    11/8/2024 0358 Given 1 tablet Oral Jackie Durbin RN    11/7/2024 2248 Given 1 tablet Oral Jackie Durbin RN          pantoprazole (Protonix) DR tab 40 mg       Date Action Dose Route User    11/8/2024 0357 Given 40 mg Oral Jackie Durbin RN          sodium chloride 0.9% infusion       Date Action Dose Route User    11/8/2024 1625 New Bag (none) Intravenous Merissa Hastings RN    11/8/2024 0357 New Bag (none) Intravenous Jackie Durbin RN    11/7/2024 1721 New Bag (none) Intravenous Julianna Epps RN            Vitals (last day)       Date/Time Temp Pulse Resp BP SpO2 Weight O2 Device O2 Flow Rate (L/min) Who    11/08/24 1345 98.4 °F (36.9 °C) 84 18 169/79 92 % -- None (Room air) -- RP    11/08/24 0805 98.8 °F (37.1 °C) 75 18 125/75 91 % -- None (Room air) -- RP    11/08/24 0406 -- -- -- -- -- 164 lb 9.6 oz (74.7 kg) -- -- RA    11/08/24 0359 98.7 °F (37.1 °C) -- 16 147/84 91 % -- None (Room air) -- MW    11/07/24 2246 98.7 °F (37.1 °C) -- 16 177/86 93 % -- None (Room air) -- MW    11/07/24 1405 97.8 °F (36.6 °C) 85 19 157/83 93 % -- None (Room air) -- MS    11/07/24 0921 97.5 °F (36.4 °C) 85 18 143/86 92 % -- None (Room air) -- MS    11/07/24 0420 98.1 °F (36.7 °C) 89 16 155/80 93 % -- Nasal cannula 1 L/min SS    11/07/24 0420 -- -- -- -- -- 163 lb (73.9 kg) -- -- RA

## 2024-11-08 NOTE — PROGRESS NOTES
Pulmonary Medicine Inpatient Progress Note                 Subjective:  Afebrile, on RA  Chest tube not draining much over the past 24 hrs       ALLERGIES:  Allergies[1]       MEDS:  Home Medications:  Medications Taking[2]  Scheduled Medication:   pantoprazole  40 mg Oral QAM AC    heparin  5,000 Units Subcutaneous 2 times per day     Continuous Infusing Medication:   sodium chloride 100 mL/hr at 11/08/24 0357     PRN Medications:    HYDROcodone-acetaminophen    butalbital-acetaminophen-caffeine    diphenhydrAMINE    traMADol    acetaminophen    temazepam    ondansetron    prochlorperazine       PHYSICAL EXAM:  /75 (BP Location: Right arm)   Pulse 75   Temp 98.8 °F (37.1 °C) (Oral)   Resp 18   Wt 164 lb 9.6 oz (74.7 kg)   SpO2 91%   BMI 30.11 kg/m²   CONSTITUTIONAL: alert, oriented, no apparent distress  HEENT: atraumatic normocephalic  MOUTH: mucous membranes are moist. No OP exudates  NECK/THROAT: no JVD. Trachea midline. No obvious thyromegaly  LUNG: clear b/l no wheezing, + right basilar crackles. Chest symmetric with respiratory motion. + pleurex catheter in place. + right posterior chest tube also in place  HEART: regular rate and rhythm, no obvious murmers or gallops note  ABD: soft non tender. + bowel sounds. No organomegaly noted  EXT: no clubbing, cyanosis, or edema noted. Pulses intact grossly  NEURO/MUSCULOSKELETAL: no gross deficits  SKIN: warm, dry. No obvious lesions noted  LYMPH: no obvious LAD       IMAGES:  CXR 11/8/24  CONCLUSION:   No significant change to the right approach pleural pigtail catheter.  Persistent small to moderate right pleural effusion and right basilar opacity.   Question small left pleural effusion.   No new abnormality.       CXR appears right upper loculated effusion is improved  CONCLUSION:   1. Interval placement of a short pigtail chest tube in the right apex with successful drainage of the loculated pleural effusion.   2. Right basilar chest tube remains with  interval decrease in loculated right pleural effusion and slight improvement in atelectasis and or pneumonia.   3. Subsegmental atelectasis in the left lower lobe.   4. Top-normal heart size with normal pulmonary vascularity.   5. Atherosclerotic calcification aorta.   6. Internal fixation of an healed right clavicle fracture.       Chest CT 11/4/24  FINDINGS:   The following findings were made:   1. There is a large loculated right-sided pleural effusion with associated underlying pleural nodularity.  The finding is associated with scattered areas of subsegmental atelectasis throughout the right lung.  There is a PleurX catheter at the right lung   base within a free-flowing segment of this loculated pleural effusion.   2. There are scattered ground-glass nodular peribronchial densities within the right lung as well as at the left lung base raising the possibility of a superimposed infectious process.  There is a very small free-flowing left pleural effusion with   adjacent compressive atelectasis.   3. The visualized aspects of the liver demonstrates decreased attenuation compatible with fatty infiltration.  There are no focal hepatic lesions identified within the imaged segments of the liver.   4. There are slight to moderate degenerative changes within the thoracic spine.   The remainder of the examination is unremarkable.  Specifically, on vascular windows, the main pulmonary arteries and segmental branches are normal in their course and caliber with no intraluminal filling defects identified to suggest pulmonary embolus.   The thoracic aorta is normal in its course and caliber with no aneurysmal dilatation, no dissection, and no secondary signs of acute aortic injury.  On lung windows, there is no pneumothorax.  On mediastinal windows, there is no pericardial effusion or   significant adenopathy.  Within the upper abdomen, there is no adrenal mass.       LABS:  Recent Labs   Lab 11/06/24  0647 11/07/24  0623  11/08/24  0609   RBC 3.78* 3.89 3.82   HGB 11.6* 11.9* 11.4*   HCT 34.7* 35.9 35.4   MCV 91.8 92.3 92.7   MCH 30.7 30.6 29.8   MCHC 33.4 33.1 32.2   RDW 15.8* 15.6* 15.7*   NEPRELIM 4.35 5.15 4.62   WBC 7.7 8.8 8.0   .0 263.0 278.0       Recent Labs   Lab 11/04/24  1227 11/05/24  0657 11/06/24  0647 11/07/24  0623 11/08/24  0609   *   < > 106* 109* 110*   BUN 14   < > 7* 5* 7*   CREATSERUM 0.73   < > 0.65 0.58 0.66   EGFRCR 92   < > 99 102 99   CA 9.0   < > 8.5* 8.7 8.7   ALB 3.5  --   --   --   --       < > 141 140 142   K 3.8   < > 3.7 3.6 3.8      < > 110 108 109   CO2 25.0   < > 24.0 25.0 26.0   ALKPHO 90  --   --   --   --    AST 16  --   --   --   --    ALT 16  --   --   --   --    BILT 1.0  --   --   --   --    TP 6.8  --  5.8  --   --     < > = values in this interval not displayed.       ASSESSMENT/PLAN:  Metastatic thyroid papillary carcinoma s/p malignant right pleural effusion with pleurex placement.  -chest CT with new loculated right pleural effusion and increasing dyspnea and chest pain  -s/p IR chest tube placement. Checking fluid cytology and cultures  -repeat CXR this am shows improvement. There is minimal drainage over the next 24 hrs, therefore will remove the chest tube this am  -continue to drain right pleurex intermittently (last drained 200 ml a few days ago). Will ask RN to drain again this am  -pain control  -oncology following    Syncope  -CT brain unremarkable for acute abnormalities    Proph:  -DVT: hep subcutaneous    Discussed with the pt      Thank you for the opportunity to care for Camelia Markham.     ALDA Brooks DO, MPH  Pulmonary Critical Care Medicine  [facility]     Addendum:  Abiding by sterile precautions and wearing gloves, the right chest tube was placed to water seal. Dressing was removed and chest tube site was cleaned thoroughly  using alcohol. Then using a sterile suture removal kit, the lone  suture was cut and removed. The pt was asked to hum and the chest tube was cut and removed w/o issues. The chest tube site was immediately cleaned and sterile gauze was applied to cover it. The tape was used to cover the gauze. Pt tolerated the procedure w/o issues.       ALDA Brooks DO, MPH  Pulmonary Critical Care Medicine  [facility]          [1]   Allergies  Allergen Reactions    Latex HIVES    Peanut-Containing Drug Products SWELLING     Closes Throat  Closes Throat  Closes Throat      Peanuts SWELLING     Closes Throat    Adhesive Tape OTHER (SEE COMMENTS)    Seasonal    [2]   Outpatient Medications Marked as Taking for the 11/4/24 encounter (Hospital Encounter)   Medication Sig Dispense Refill    DULoxetine 20 MG Oral Cap DR Particles Take 1 capsule (20 mg total) by mouth daily. 30 capsule 0    levothyroxine 150 MCG Oral Tab Take 1 tablet (150 mcg total) by mouth before breakfast.      Lenvatinib, 24 MG Daily Dose, (LENVIMA, 24 MG DAILY DOSE,) 2 x 10 MG & 4 MG Oral Capsule Therapy Pack Take 24 mg by mouth daily. 30 each 5    ondansetron (ZOFRAN) 8 MG tablet Take 1 tablet (8 mg total) by mouth every 8 (eight) hours as needed for Nausea. 30 tablet 3    HYDROcodone-acetaminophen 5-325 MG Oral Tab Take 1-2 tablets by mouth every 6 (six) hours as needed for Pain. 60 tablet 0    Olmesartan Medoxomil 40 MG Oral Tab Take 1 tablet (40 mg total) by mouth daily.      Omeprazole 40 MG Oral Capsule Delayed Release Take 1 capsule (40 mg total) by mouth every other day.      amLODIPine 10 MG Oral Tab Take 1 tablet (10 mg total) by mouth daily.

## 2024-11-08 NOTE — PROGRESS NOTES
[facility]  part of [facility]    Progress Note    Camelia Markham Patient Status:  Observation    1960 MRN [id number]   Location [facility] 3W/SW Attending Fidel Henderson MD   Hosp Day # 2 PCP FINA MABRY, MD WENDY     Chief Complaint:     Syncope    Subjective:   Subjective:    Patient seen and examined  Hypertensive, afebrile  No new complaints at this time.   Endorsing pain at the new chest tube insertion site.  Still endorsing headaches.       Objective:   Blood pressure 125/75, pulse 75, temperature 98.8 °F (37.1 °C), temperature source Oral, resp. rate 18, weight 164 lb 9.6 oz (74.7 kg), SpO2 91%.  Physical Exam    General: Patient is alert and oriented x3 does not appear to be in acute distress at this time  HEENT: EOMI PERRLA, atraumatic normocephalic  Cardiac: S1-S2 appreciated  Lungs: Good air entry bilaterally clear to auscultation, PleurX in place R side  Abdomen: Soft nontender nondistended positive bowel sounds  Ext: Peripheral pulses are positive  Neuro: No focal deficits noted  Psych: Normal mood  Skin: No rashes noted  MSK: Full range of motion intact      Results:   Lab Results   Component Value Date    WBC 8.0 2024    HGB 11.4 (L) 2024    HCT 35.4 2024    .0 2024    CREATSERUM 0.66 2024    BUN 7 (L) 2024     2024    K 3.8 2024     2024    CO2 26.0 2024     (H) 2024    CA 8.7 2024    ALB 3.5 2024    ALKPHO 90 2024    BILT 1.0 2024    TP 5.8 2024    AST 16 2024    ALT 16 2024    PTT 26.0 2022    INR 0.98 2023    T4F 1.9 (H) 2024    TSH 0.048 (L) 2024    LIP 33 2024    DDIMER 0.55 2024    MG 2.0 2024    PHOS 4.1 2024    TROPHS <3 2024       XR CHEST AP PORTABLE  (CPT=71045)    Result Date: 2024  CONCLUSION:   No significant change to the right approach pleural pigtail  catheter.  Persistent small to moderate right pleural effusion and right basilar opacity.  Question small left pleural effusion.  No new abnormality.    Dictated by (CST): Jose Miguel Velazquez MD on 11/08/2024 at 7:39 AM     Finalized by (CST): Jose Miguel Velazquez MD on 11/08/2024 at 7:43 AM          XR CHEST AP PORTABLE  (CPT=71045)    Result Date: 11/7/2024  CONCLUSION:  1. Interval placement of a short pigtail chest tube in the right apex with successful drainage of the loculated pleural effusion. 2. Right basilar chest tube remains with interval decrease in loculated right pleural effusion and slight improvement in atelectasis and or pneumonia. 3. Subsegmental atelectasis in the left lower lobe. 4. Top-normal heart size with normal pulmonary vascularity. 5. Atherosclerotic calcification aorta. 6. Internal fixation of an healed right clavicle fracture.    Dictated by (CST): Jose Smith MD on 11/07/2024 at 8:35 AM     Finalized by (CST): Jose Smith MD on 11/07/2024 at 8:38 AM               Assessment & Plan:     Syncope and collapse  -possibly due to poor PO intake,   -orthostasis.   -continue current IV hydration will monitor closely    Malignant pleural effusion (HCC)  -Patient with hx of recurrent metastatic Papillary thyroid cancer.   -has a pleurX in place but new imaging reveals a loculated pleural effusion that is away from the pleurX  -will have pulmonology placed on consult  -appreciate hem/onc recs.   -will monitor closely    Hyperglycemia  -continue Accuchecks qachs,  -supplement with sliding scale    HTN  -montior BP  -titrate meds as needed.     VTE ppx: heparin subcutaneous.   Full Code    Global A/P  -CXR with improvement.  -chest tube placement - drained well thus far  -per Pulm - possible removal of posterior chest tube tomorrow.  -monitor closely.  -appreciate IR and Pulm recs.  -will monitor closely  -appreciate hem/onc recs  -Reviewed previous consultant notes  -Reviewed CBC, BMP, Mag, and  Phos  -Reviewed tests ordered  -Repeat labs in am  -MDM: High, severe exacerbation of chronic illness posing a threat to life. IV medications requiring close inpatient monitoring.             Fidel Henderson MD

## 2024-11-08 NOTE — PLAN OF CARE
Problem: Patient Centered Care  Goal: Patient preferences are identified and integrated in the patient's plan of care  Description: Interventions:  - What would you like us to know as we care for you? From Home w/    - Provide timely, complete, and accurate information to patient/family  - Incorporate patient and family knowledge, values, beliefs, and cultural backgrounds into the planning and delivery of care  - Encourage patient/family to participate in care and decision-making at the level they choose  - Honor patient and family perspectives and choices  Outcome: Progressing     Problem: CARDIOVASCULAR - ADULT  Goal: Maintains optimal cardiac output and hemodynamic stability  Description: INTERVENTIONS:  - Monitor vital signs, rhythm, and trends  - Monitor for bleeding, hypotension and signs of decreased cardiac output  - Evaluate effectiveness of vasoactive medications to optimize hemodynamic stability  - Monitor arterial and/or venous puncture sites for bleeding and/or hematoma  - Assess quality of pulses, skin color and temperature  - Assess for signs of decreased coronary artery perfusion - ex. Angina  - Evaluate fluid balance, assess for edema, trend weights  Outcome: Progressing  Goal: Absence of cardiac arrhythmias or at baseline  Description: INTERVENTIONS:  - Continuous cardiac monitoring, monitor vital signs, obtain 12 lead EKG if indicated  - Evaluate effectiveness of antiarrhythmic and heart rate control medications as ordered  - Initiate emergency measures for life threatening arrhythmias  - Monitor electrolytes and administer replacement therapy as ordered  Outcome: Progressing     Problem: METABOLIC/FLUID AND ELECTROLYTES - ADULT  Goal: Electrolytes maintained within normal limits  Description: INTERVENTIONS:  - Monitor labs and rhythm and assess patient for signs and symptoms of electrolyte imbalances  - Administer electrolyte replacement as ordered  - Monitor response to electrolyte  replacements, including rhythm and repeat lab results as appropriate  - Fluid restriction as ordered  - Instruct patient on fluid and nutrition restrictions as appropriate  Outcome: Progressing  Goal: Hemodynamic stability and optimal renal function maintained  Description: INTERVENTIONS:  - Monitor labs and assess for signs and symptoms of volume excess or deficit  - Monitor intake, output and patient weight  - Monitor urine specific gravity, serum osmolarity and serum sodium as indicated or ordered  - Monitor response to interventions for patient's volume status, including labs, urine output, blood pressure (other measures as available)  - Encourage oral intake as appropriate  - Instruct patient on fluid and nutrition restrictions as appropriate  Outcome: Progressing     Problem: MUSCULOSKELETAL - ADULT  Goal: Return mobility to safest level of function  Description: INTERVENTIONS:  - Assess patient stability and activity tolerance for standing, transferring and ambulating w/ or w/o assistive devices  - Assist with transfers and ambulation using safe patient handling equipment as needed  - Ensure adequate protection for wounds/incisions during mobilization  - Obtain PT/OT consults as needed  - Advance activity as appropriate  - Communicate ordered activity level and limitations with patient/family  Outcome: Progressing  Goal: Maintain proper alignment of affected body part  Description: INTERVENTIONS:  - Support and protect limb and body alignment per provider's orders  - Instruct and reinforce with patient and family use of appropriate assistive device and precautions (e.g. spinal or hip dislocation precautions)  Outcome: Progressing

## 2024-11-09 ENCOUNTER — APPOINTMENT (OUTPATIENT)
Dept: GENERAL RADIOLOGY | Facility: HOSPITAL | Age: 64
End: 2024-11-09
Attending: CLINICAL NURSE SPECIALIST
Payer: COMMERCIAL

## 2024-11-09 LAB
BASOPHILS # BLD AUTO: 0.04 X10(3) UL (ref 0–0.2)
BASOPHILS NFR BLD AUTO: 0.5 %
DEPRECATED RDW RBC AUTO: 52.7 FL (ref 35.1–46.3)
EOSINOPHIL # BLD AUTO: 0.36 X10(3) UL (ref 0–0.7)
EOSINOPHIL NFR BLD AUTO: 4.6 %
ERYTHROCYTE [DISTWIDTH] IN BLOOD BY AUTOMATED COUNT: 15.9 % (ref 11–15)
HCT VFR BLD AUTO: 35.2 %
HGB BLD-MCNC: 11.7 G/DL
IMM GRANULOCYTES # BLD AUTO: 0.02 X10(3) UL (ref 0–1)
IMM GRANULOCYTES NFR BLD: 0.3 %
LYMPHOCYTES # BLD AUTO: 2.38 X10(3) UL (ref 1–4)
LYMPHOCYTES NFR BLD AUTO: 30.2 %
MAGNESIUM SERPL-MCNC: 1.9 MG/DL (ref 1.6–2.6)
MCH RBC QN AUTO: 30.5 PG (ref 26–34)
MCHC RBC AUTO-ENTMCNC: 33.2 G/DL (ref 31–37)
MCV RBC AUTO: 91.9 FL
MONOCYTES # BLD AUTO: 0.47 X10(3) UL (ref 0.1–1)
MONOCYTES NFR BLD AUTO: 6 %
NEUTROPHILS # BLD AUTO: 4.62 X10 (3) UL (ref 1.5–7.7)
NEUTROPHILS # BLD AUTO: 4.62 X10(3) UL (ref 1.5–7.7)
NEUTROPHILS NFR BLD AUTO: 58.4 %
PHOSPHATE SERPL-MCNC: 4 MG/DL (ref 2.4–5.1)
PLATELET # BLD AUTO: 270 10(3)UL (ref 150–450)
RBC # BLD AUTO: 3.83 X10(6)UL
WBC # BLD AUTO: 7.9 X10(3) UL (ref 4–11)

## 2024-11-09 PROCEDURE — 99232 SBSQ HOSP IP/OBS MODERATE 35: CPT | Performed by: INTERNAL MEDICINE

## 2024-11-09 PROCEDURE — 71045 X-RAY EXAM CHEST 1 VIEW: CPT | Performed by: CLINICAL NURSE SPECIALIST

## 2024-11-09 PROCEDURE — 99233 SBSQ HOSP IP/OBS HIGH 50: CPT | Performed by: HOSPITALIST

## 2024-11-09 NOTE — PROGRESS NOTES
[facility]  part of [facility]    Progress Note    Camelia Markham Patient Status:  Observation    1960 MRN [id number]   Location [facility] 3W/SW Attending Fidel Henderson MD   Hosp Day # 3 PCP FINA MABRY, MD WENDY     Chief Complaint:     Syncope    Subjective:   Subjective:    Patient seen and examined  Hypertensive, afebrile  No new complaints at this time.   Endorsing pain at the new chest tube insertion site.  Still endorsing headaches.       Objective:   Blood pressure (!) 163/89, pulse 82, temperature 98.6 °F (37 °C), temperature source Oral, resp. rate 18, weight 166 lb 3.2 oz (75.4 kg), SpO2 93%.  Physical Exam    General: Patient is alert and oriented x3 does not appear to be in acute distress at this time  HEENT: EOMI PERRLA, atraumatic normocephalic  Cardiac: S1-S2 appreciated  Lungs: Good air entry bilaterally clear to auscultation, PleurX in place R side  Abdomen: Soft nontender nondistended positive bowel sounds  Ext: Peripheral pulses are positive  Neuro: No focal deficits noted  Psych: Normal mood  Skin: No rashes noted  MSK: Full range of motion intact      Results:   Lab Results   Component Value Date    WBC 7.9 2024    HGB 11.7 (L) 2024    HCT 35.2 2024    .0 2024    CREATSERUM 0.66 2024    BUN 7 (L) 2024     2024    K 3.8 2024     2024    CO2 26.0 2024     (H) 2024    CA 8.7 2024    ALB 3.5 2024    ALKPHO 90 2024    BILT 1.0 2024    TP 5.8 2024    AST 16 2024    ALT 16 2024    PTT 26.0 2022    INR 0.98 2023    T4F 1.9 (H) 2024    TSH 0.048 (L) 2024    LIP 33 2024    DDIMER 0.55 2024    MG 1.9 2024    PHOS 4.0 2024    TROPHS <3 2024       XR CHEST AP PORTABLE  (CPT=71045)    Result Date: 2024  CONCLUSION:   Status post removal of right chest tube.  No  pneumothorax.  Unchanged small to moderate loculated right pleural effusion.  Unchanged right lower lung alveolar opacity.  Left retrocardiac opacity unchanged.      Dictated by (CST): Maggie Marques MD on 11/09/2024 at 8:51 AM     Finalized by (CST): Maggie Marques MD on 11/09/2024 at 8:54 AM          XR CHEST AP PORTABLE  (CPT=71045)    Result Date: 11/8/2024  CONCLUSION:   No significant change to the right approach pleural pigtail catheter.  Persistent small to moderate right pleural effusion and right basilar opacity.  Question small left pleural effusion.  No new abnormality.    Dictated by (CST): Jose Miguel Velazquez MD on 11/08/2024 at 7:39 AM     Finalized by (CST): Jose Miguel Velazquez MD on 11/08/2024 at 7:43 AM               Assessment & Plan:     Syncope and collapse  -possibly due to poor PO intake,   -orthostasis.   -continue current IV hydration will monitor closely    Malignant pleural effusion (HCC)  -Patient with hx of recurrent metastatic Papillary thyroid cancer.   -has a pleurX in place but new imaging reveals a loculated pleural effusion that is away from the pleurX  -will have pulmonology placed on consult  -appreciate hem/onc recs.   -will monitor closely    Hyperglycemia  -continue Accuchecks qachs,  -supplement with sliding scale    HTN  -montior BP  -titrate meds as needed.     VTE ppx: heparin subcutaneous.   Full Code    Global A/P  -CXR with improvement.  -R chest tube placement removed.  -monitor closely.  -appreciate IR and Pulm recs.  -will monitor closely  -appreciate hem/onc recs  -Reviewed previous consultant notes  -Reviewed CBC, BMP, Mag, and Phos  -Reviewed tests ordered  -Repeat labs in am  -MDM: High, severe exacerbation of chronic illness posing a threat to life. IV medications requiring close inpatient monitoring.             Fidel Henderson MD

## 2024-11-09 NOTE — PLAN OF CARE
A/Ox4. Ctest tube removed by pulmonology. No output from pleurex.  Problem: CARDIOVASCULAR - ADULT  Goal: Maintains optimal cardiac output and hemodynamic stability  Description: INTERVENTIONS:  - Monitor vital signs, rhythm, and trends  - Monitor for bleeding, hypotension and signs of decreased cardiac output  - Evaluate effectiveness of vasoactive medications to optimize hemodynamic stability  - Monitor arterial and/or venous puncture sites for bleeding and/or hematoma  - Assess quality of pulses, skin color and temperature  - Assess for signs of decreased coronary artery perfusion - ex. Angina  - Evaluate fluid balance, assess for edema, trend weights  Outcome: Progressing  Goal: Absence of cardiac arrhythmias or at baseline  Description: INTERVENTIONS:  - Continuous cardiac monitoring, monitor vital signs, obtain 12 lead EKG if indicated  - Evaluate effectiveness of antiarrhythmic and heart rate control medications as ordered  - Initiate emergency measures for life threatening arrhythmias  - Monitor electrolytes and administer replacement therapy as ordered  Outcome: Progressing     Problem: METABOLIC/FLUID AND ELECTROLYTES - ADULT  Goal: Electrolytes maintained within normal limits  Description: INTERVENTIONS:  - Monitor labs and rhythm and assess patient for signs and symptoms of electrolyte imbalances  - Administer electrolyte replacement as ordered  - Monitor response to electrolyte replacements, including rhythm and repeat lab results as appropriate  - Fluid restriction as ordered  - Instruct patient on fluid and nutrition restrictions as appropriate  Outcome: Progressing  Goal: Hemodynamic stability and optimal renal function maintained  Description: INTERVENTIONS:  - Monitor labs and assess for signs and symptoms of volume excess or deficit  - Monitor intake, output and patient weight  - Monitor urine specific gravity, serum osmolarity and serum sodium as indicated or ordered  - Monitor response to  interventions for patient's volume status, including labs, urine output, blood pressure (other measures as available)  - Encourage oral intake as appropriate  - Instruct patient on fluid and nutrition restrictions as appropriate  Outcome: Progressing     Problem: MUSCULOSKELETAL - ADULT  Goal: Return mobility to safest level of function  Description: INTERVENTIONS:  - Assess patient stability and activity tolerance for standing, transferring and ambulating w/ or w/o assistive devices  - Assist with transfers and ambulation using safe patient handling equipment as needed  - Ensure adequate protection for wounds/incisions during mobilization  - Obtain PT/OT consults as needed  - Advance activity as appropriate  - Communicate ordered activity level and limitations with patient/family  Outcome: Progressing  Goal: Maintain proper alignment of affected body part  Description: INTERVENTIONS:  - Support and protect limb and body alignment per provider's orders  - Instruct and reinforce with patient and family use of appropriate assistive device and precautions (e.g. spinal or hip dislocation precautions)  Outcome: Progressing

## 2024-11-09 NOTE — PLAN OF CARE
A/Ox4.  Walked in hallway with staff SBA with walker.  Denies pain.   Problem: CARDIOVASCULAR - ADULT  Goal: Maintains optimal cardiac output and hemodynamic stability  Description: INTERVENTIONS:  - Monitor vital signs, rhythm, and trends  - Monitor for bleeding, hypotension and signs of decreased cardiac output  - Evaluate effectiveness of vasoactive medications to optimize hemodynamic stability  - Monitor arterial and/or venous puncture sites for bleeding and/or hematoma  - Assess quality of pulses, skin color and temperature  - Assess for signs of decreased coronary artery perfusion - ex. Angina  - Evaluate fluid balance, assess for edema, trend weights  Outcome: Progressing  Goal: Absence of cardiac arrhythmias or at baseline  Description: INTERVENTIONS:  - Continuous cardiac monitoring, monitor vital signs, obtain 12 lead EKG if indicated  - Evaluate effectiveness of antiarrhythmic and heart rate control medications as ordered  - Initiate emergency measures for life threatening arrhythmias  - Monitor electrolytes and administer replacement therapy as ordered  Outcome: Progressing     Problem: METABOLIC/FLUID AND ELECTROLYTES - ADULT  Goal: Electrolytes maintained within normal limits  Description: INTERVENTIONS:  - Monitor labs and rhythm and assess patient for signs and symptoms of electrolyte imbalances  - Administer electrolyte replacement as ordered  - Monitor response to electrolyte replacements, including rhythm and repeat lab results as appropriate  - Fluid restriction as ordered  - Instruct patient on fluid and nutrition restrictions as appropriate  Outcome: Progressing  Goal: Hemodynamic stability and optimal renal function maintained  Description: INTERVENTIONS:  - Monitor labs and assess for signs and symptoms of volume excess or deficit  - Monitor intake, output and patient weight  - Monitor urine specific gravity, serum osmolarity and serum sodium as indicated or ordered  - Monitor response to  interventions for patient's volume status, including labs, urine output, blood pressure (other measures as available)  - Encourage oral intake as appropriate  - Instruct patient on fluid and nutrition restrictions as appropriate  Outcome: Progressing     Problem: MUSCULOSKELETAL - ADULT  Goal: Return mobility to safest level of function  Description: INTERVENTIONS:  - Assess patient stability and activity tolerance for standing, transferring and ambulating w/ or w/o assistive devices  - Assist with transfers and ambulation using safe patient handling equipment as needed  - Ensure adequate protection for wounds/incisions during mobilization  - Obtain PT/OT consults as needed  - Advance activity as appropriate  - Communicate ordered activity level and limitations with patient/family  Outcome: Progressing  Goal: Maintain proper alignment of affected body part  Description: INTERVENTIONS:  - Support and protect limb and body alignment per provider's orders  - Instruct and reinforce with patient and family use of appropriate assistive device and precautions (e.g. spinal or hip dislocation precautions)  Outcome: Progressing

## 2024-11-09 NOTE — PHYSICAL THERAPY NOTE
PHYSICAL THERAPY EVALUATION - INPATIENT     Room Number: 315/315-A  Evaluation Date: 11/9/2024  Type of Evaluation: Initial   Physician Order: PT Eval and Treat    Presenting Problem: Syncope and collapse  Co-Morbidities : Thyroid CA  Reason for Therapy: Mobility Dysfunction and Discharge Planning    PHYSICAL THERAPY ASSESSMENT   Patient is a 64 year old female admitted 11/4/2024 for syncope and collapse - found to have pleural effusion s/p chest tube placement, since removed on 11/8/24.  Prior to admission, patient's baseline is independent with use of walker more recently at home, drives, helps elderly mother who she lives with.  Patient is currently functioning below baseline with bed mobility, transfers, gait, and stair negotiation.  Patient is requiring contact guard assist as a result of the following impairments: decreased functional strength, decreased endurance/aerobic capacity, impaired dynamic standing balance, and decreased muscular endurance.  Physical Therapy will continue to follow for duration of hospitalization.    Patient will benefit from continued skilled PT Services at discharge to promote prior level of function.  Anticipate patient will return home with home health PT.    PLAN DURING HOSPITALIZATION  Nursing Mobility Recommendation : 1 Assist  PT Device Recommendation: Rolling walker;Gait belt  PT Treatment Plan: Endurance;Bed mobility;Energy conservation;Patient education;Family education;Gait training;Strengthening;Stair training;Transfer training;Balance training;Other (Comment) (Rule out BPPV)  Rehab Potential : Good  Frequency (Obs): 5x/week     PHYSICAL THERAPY MEDICAL/SOCIAL HISTORY   History related to current admission: Pt with hx of thyroid cancer      Problem List  Principal Problem:    Syncope and collapse  Active Problems:    Malignant pleural effusion (HCC)    Hyperglycemia    Syncope    Metastasis from thyroid cancer (HCC)      HOME SITUATION  Type of Home: House  Home Layout:  One level;Able to live on main level (3 steps to laundry in lower level)  Stairs to Enter : 6   Railing: Yes    Stairs to Bedroom: 0         Lives With: Parent(s) (Elderly mother)    Drives: Yes   Patient Regularly Uses: Glasses     Prior Level of [place]: Independent for all mobility, more recently has been needing support of walker at home. Drives but states doctor told her she should not be now.     SUBJECTIVE  \"When I look up I get dizzy, it's been going on for awhile.\"    PHYSICAL THERAPY EXAMINATION   OBJECTIVE  Precautions:  (Chest tube recent removal)  Fall Risk: Standard fall risk    WEIGHT BEARING RESTRICTION       PAIN ASSESSMENT  Rating:  (No complaints of pain)          COGNITION  Overall Cognitive Status:  WFL - within functional limits    RANGE OF MOTION AND STRENGTH ASSESSMENT  Upper extremity ROM and strength are within functional limits   Lower extremity ROM is within functional limits   Lower extremity strength is with mild strength deficits to bilateral lower extremities    BALANCE  Static Sitting: Fair +  Dynamic Sitting: Fair  Static Standing: Fair -  Dynamic Standing: Fair -         ACTIVITY TOLERANCE           BP: (!) 159/91  BP Location: Left arm  BP Method: Automatic  Patient Position: Sitting    O2 WALK       AM-PAC '6-Clicks' INPATIENT SHORT FORM - BASIC MOBILITY  How much difficulty does the patient currently have...  Patient Difficulty: Turning over in bed (including adjusting bedclothes, sheets and blankets)?: A Little   Patient Difficulty: Sitting down on and standing up from a chair with arms (e.g., wheelchair, bedside commode, etc.): A Little   Patient Difficulty: Moving from lying on back to sitting on the side of the bed?: A Little   How much help from another person does the patient currently need...   Help from Another: Moving to and from a bed to a chair (including a wheelchair)?: A Little   Help from Another: Need to walk in hospital room?: A Little   Help from Another:  Climbing 3-5 steps with a railing?: A Little     AM-PAC Score:  Raw Score: 18   Approx Degree of Impairment: 46.58%   Standardized Score (AM-PAC Scale): 43.63   CMS Modifier (G-Code): CK    FUNCTIONAL ABILITY STATUS  Functional Mobility/Gait Assessment  Gait Assistance: Contact guard assist  Distance (ft): 20', 8'  Assistive Device: Rolling walker  Pattern:  (Slow shukri, forward flexed posture, pushes walker too far anteriorly at times needs cues to stay within walker ASCENCION especially with turning around)  Supine to Sit: stand-by assist  Sit to Stand: contact guard assist    Exercise/Education Provided:  Bed mobility  Body mechanics  Energy conservation  Functional activity tolerated  Gait training  Strengthening  Transfer training  Role of IP PT, role of HH PT, energy conservation tips/tricks, use of walker    Skilled Therapy Provided: Pt received supine in bed, agreeable to PT evaluation. Reports she has been having occasional dizziness when she looks upwards - has not been evaluated for vertigo. She just had chest tube removed yesterday and does have soreness at insertion site. Discussed having PT rule out inner ear tomorrow if appropriate/patient able to tolerate positioning.   Pt was able to ambulate around in room (10-20' bouts). Pt requiring primarily SBA/CGA with support of walker. Able to get on/off toilet with SBA. Encouraged mobilization with nursing staff, removed purewick and notified RN. Pt ended session up in chair with all needs in reach.     The patient's Approx Degree of Impairment: 46.58% has been calculated based on documentation in the [facility] '6 clicks' Inpatient Basic Mobility Short Form.  Research supports that patients with this level of impairment may benefit from HH PT.  Final disposition will be made by interdisciplinary medical team.    Patient End of Session: Up in chair;Needs met;Call light within reach;RN aware of session/findings;All patient questions and concerns addressed    CURRENT  GOALS  Goals to be met by: 11/23/24  Patient Goal Patient's self-stated goal is: return home   Goal #1 Patient is able to demonstrate supine - sit EOB @ level: independent     Goal #1   Current Status    Goal #2 Patient is able to demonstrate transfers Sit to/from Stand at assistance level: modified independent with walker - rolling     Goal #2  Current Status    Goal #3 Patient is able to ambulate 150 feet with assist device: walker - rolling at assistance level: modified independent   Goal #3   Current Status    Goal #4 Patient will negotiate 6 stairs/one curb w/ assistive device and supervision   Goal #4   Current Status    Goal #5 Patient to demonstrate independence with home activity/exercise instructions provided to patient in preparation for discharge.   Goal #5   Current Status    Goal #6    Goal #6  Current Status      Patient Evaluation Complexity Level:  History High - 3 or more personal factors and/or co-morbidities   Examination of body systems High - addressing a total of 4 or more elements   Clinical Presentation  Moderate - Evolving   Clinical Decision Making  Moderate Complexity     Therapeutic Activity:  24 minutes  PT Mod Complex Eval

## 2024-11-09 NOTE — OCCUPATIONAL THERAPY NOTE
OCCUPATIONAL THERAPY EVALUATION - INPATIENT     Room Number: 315/315-A  Evaluation Date: 11/9/2024  Type of Evaluation: Initial  Presenting Problem: syncope/collapse    Physician Order: IP Consult to Occupational Therapy  Reason for Therapy: ADL/IADL Dysfunction and Discharge Planning    OCCUPATIONAL THERAPY ASSESSMENT   Patient is a 64 year old female admitted 11/4/2024.  Prior to admission, patient's baseline is mod I-I.  Patient is currently functioning below baseline with ADLs/mobility.  Patient is requiring modified independent and contact guard assist as a result of the following impairments: decreased functional strength, decreased endurance, impaired standing balance, and decreased muscular endurance. Occupational Therapy will continue to follow for duration of hospitalization.    Patient will benefit from continued skilled OT Services at discharge to promote prior level of function and safety with additional support and return home with home health OT.    PLAN DURING HOSPITALIZATION  OT Device Recommendations: Reacher  OT Treatment Plan: Balance activities;Energy conservation/work simplification techniques;ADL training;Functional transfer training;IADL training;Endurance training;Patient/Family education;Patient/Family training;Equipment eval/education;Compensatory technique education     OCCUPATIONAL THERAPY MEDICAL/SOCIAL HISTORY   Problem List  Principal Problem:    Syncope and collapse  Active Problems:    Malignant pleural effusion (HCC)    Hyperglycemia    Syncope    Metastasis from thyroid cancer (HCC)    HOME SITUATION  Type of Home: House  Home Layout: One level; Able to live on main level (3 steps to laundry in lower level)  Lives With: Parent(s) (Elderly mother);spouse works everyday  Toilet and Equipment: Comfort height toilet  Shower/Tub and Equipment: Tub-shower combo; Shower chair  Other Equipment: Other (Comment) (owns rollator/RW but not using at baseline)  Drives: Yes  Patient Regularly  Uses: Glasses      Prior Level of [place]: mod I-I; reported recent falls at home with dizziness with position changes/rapid head movement    SUBJECTIVE  \"My dtr may be able to come for a little bit to help me when I get home\"    OCCUPATIONAL THERAPY EXAMINATION      OBJECTIVE  Precautions: -- (Chest tube recent removal)  Fall Risk: Standard fall risk      PAIN ASSESSMENT  Rating: Unable to rate  Location: recently removed chest tube site  Management Techniques: -- (denied need for intervention)        O2 SATURATIONS  Oxygen Therapy  SPO2% on Room Air at Rest: 95  SPO2% Ambulation on Room Air: 92    COGNITION  Overall Cognitive Status:  WFL - within functional limits    VISION  Current Vision: no visual deficits    Communication: WFL    Behavioral/Emotional/Social: [facility]    RANGE OF MOTION   Upper extremity ROM is within functional limits     STRENGTH ASSESSMENT  Upper extremity strength is within functional limits     COORDINATION  Gross Motor: [facility]   Fine Motor: [facility]     ACTIVITIES OF DAILY LIVING ASSESSMENT  AM-PAC ‘6-Clicks’ Inpatient Daily Activity Short Form  How much help from another person does the patient currently need…  -   Putting on and taking off regular lower body clothing?: None  -   Bathing (including washing, rinsing, drying)?: A Little  -   Toileting, which includes using toilet, bedpan or urinal? : None  -   Putting on and taking off regular upper body clothing?: None  -   Taking care of personal grooming such as brushing teeth?: A Little  -   Eating meals?: None    AM-PAC Score:  Score: 22  Approx Degree of Impairment: 25.8%  Standardized Score (AM-PAC Scale): 47.1  CMS Modifier (G-Code): CJ    FUNCTIONAL TRANSFER ASSESSMENT  Sit to Stand: Edge of Bed  Edge of Bed: Stand-by Assist, increased time, pt reported gets dizzy with position change  Toilet Transfer: Stand-by Assist  Functional mobility bathroom distances with Sba and FWW, fatigues quickly, 1 standing rest break    BED MOBILITY  Supine  to Sit : Supervision    BALANCE ASSESSMENT  Static Sitting: Independent  Static Standing: Stand-by Assist    FUNCTIONAL ADL ASSESSMENT  Eating: Independent  Grooming Standing: Stand-by Assist  UB Dressing Seated: Contact Guard Assist (donning posterior gown simulating jacket)  LB Dressing Seated: Modified Independent (socks modified figure four)  Toileting Seated: Modified Independent       Skilled Therapy Provided: RN approved session. Handoff from PT following gait training, pt assisted with ADLs in bathroom as stated above and then assisted to bedside chair. Vitals: BP elevated 159/91. Reported min dizziness with position change/rapid head movements but with increased time to neutralize position change, dizziness improved. Pt educated on how to compensate for ADLs and to avoid rapid head movement to decreased dizziness and receptive to education. Also rec a reacher and use shower chair for bathing/energy conservation. Performed ADLs/functional mobility/transfers as stated above. At the end of session, patient left in chair with needs met, alarm on and RN aware of session.    EDUCATION PROVIDED  Patient Education : Role of Occupational Therapy; Plan of Care; Discharge Recommendations; DME Recommendations; Functional Transfer Techniques; Fall Prevention; Posture/Positioning; Energy Conservation; Other (compensatory techs for ADLs as pt reported dizziness with position changes)  Patient's Response to Education: Verbalized Understanding; Returned Demonstration    The patient's Approx Degree of Impairment: 25.8% has been calculated based on documentation in the [facility] '6 clicks' Inpatient Daily Activity Short Form.  Research supports that patients with this level of impairment may benefit from HHOT vs no OT needs pending progress/level of assist able to have at home if dtr can stay with pt as pt only lives with elderly mother who has a caregiver herself.  Final disposition will be made by interdisciplinary medical  team.     Patient End of Session: Up in chair;Needs met;Call light within reach;RN aware of session/findings;All patient questions and concerns addressed;Alarm set    OT Goals  Patients self stated goal is: increased independence     Patient will complete functional transfer with mod I  Comment:     Patient will complete bathing seated with mod I  Comment:     Patient will tolerate standing for 10 minutes in prep for adls with mod I   Comment:    Patient will complete item retrieval with mod I  Comment:          Goals  on: 2024  Frequency: 3-5x/wk    Patient Evaluation Complexity Level:   Occupational Profile/Medical History MODERATE - Expanded review of history including review of medical or therapy record   Specific performance deficits impacting engagement in ADL/IADL LOW  1 - 3 performance deficits    Client Assessment/Performance Deficits MODERATE - Comorbidities and min to mod modifications of tasks    Clinical Decision Making LOW - Analysis of occupational profile, problem-focused assessments, limited treatment options    Overall Complexity LOW     OT Session Time: 25 minutes  Self-Care Home Management: 15 minutes  10 min brenda Bryson, OTR/L

## 2024-11-09 NOTE — PROGRESS NOTES
[facility]  part of [facility]    Progress Note      Assessment and Plan:   1.  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  Pleural fluid cytology at this time shows no malignant cells.  Prior cytology had been positive for malignancy.  The other chest tube is now out.    Recommendations:  1.  Drain the Pleurx catheter 1-2 times per week and as needed.  2.  Will follow clinically.  3.  Follow-up oncology.    2.  DVT prophylaxis-subcutaneous heparin    3.  Syncope--associated with orthostatic hypotension.    Subjective:   Camelia Markham is a(n) 64 year old female w/o new complaint    Objective:   Blood pressure (!) 164/93, pulse 86, temperature 98 °F (36.7 °C), temperature source Axillary, resp. rate 18, weight 166 lb 3.2 oz (75.4 kg), SpO2 93%.    Physical Exam Alert WF  HEENT examination is unremarkable with pupils equal round and reactive to light and accommodation.   Neck without adenopathy, thyromegaly, JVD nor bruit.   Lungs clear to auscultation and percussion.  Cardiac regular rate and rhythm no murmur.   Abdomen nontender, without hepatosplenomegaly and no mass appreciable.   Extremities without clubbing cyanosis nor edema.   Neurologic grossly intact with symmetric tone and strength and reflex.  Skin without gross abnormality     Results:     Lab Results   Component Value Date    WBC 7.9 11/09/2024    HGB 11.7 11/09/2024    HCT 35.2 11/09/2024    .0 11/09/2024    MG 1.9 11/09/2024    PHOS 4.0 11/09/2024       Ry Rivas MD  Medical Director, Critical Care, [facility]  Medical Director, [facility]  Pager: [phone]

## 2024-11-09 NOTE — PLAN OF CARE
Ambulated in hallway w/ RW and SBA; mildly SOB. IV fluids continued. Pain @ old chest tube insertion site relieved w/ Norco. Plan: PT/OT ordered    Call light within reach, safety precautions in place  Problem: Patient Centered Care  Goal: Patient preferences are identified and integrated in the patient's plan of care  Description: Interventions:  - What would you like us to know as we care for you? From Home w/    - Provide timely, complete, and accurate information to patient/family  - Incorporate patient and family knowledge, values, beliefs, and cultural backgrounds into the planning and delivery of care  - Encourage patient/family to participate in care and decision-making at the level they choose  - Honor patient and family perspectives and choices  Outcome: Progressing     Problem: CARDIOVASCULAR - ADULT  Goal: Maintains optimal cardiac output and hemodynamic stability  Description: INTERVENTIONS:  - Monitor vital signs, rhythm, and trends  - Monitor for bleeding, hypotension and signs of decreased cardiac output  - Evaluate effectiveness of vasoactive medications to optimize hemodynamic stability  - Monitor arterial and/or venous puncture sites for bleeding and/or hematoma  - Assess quality of pulses, skin color and temperature  - Assess for signs of decreased coronary artery perfusion - ex. Angina  - Evaluate fluid balance, assess for edema, trend weights  Outcome: Progressing  Goal: Absence of cardiac arrhythmias or at baseline  Description: INTERVENTIONS:  - Continuous cardiac monitoring, monitor vital signs, obtain 12 lead EKG if indicated  - Evaluate effectiveness of antiarrhythmic and heart rate control medications as ordered  - Initiate emergency measures for life threatening arrhythmias  - Monitor electrolytes and administer replacement therapy as ordered  Outcome: Progressing     Problem: METABOLIC/FLUID AND ELECTROLYTES - ADULT  Goal: Electrolytes maintained within normal  limits  Description: INTERVENTIONS:  - Monitor labs and rhythm and assess patient for signs and symptoms of electrolyte imbalances  - Administer electrolyte replacement as ordered  - Monitor response to electrolyte replacements, including rhythm and repeat lab results as appropriate  - Fluid restriction as ordered  - Instruct patient on fluid and nutrition restrictions as appropriate  Outcome: Progressing  Goal: Hemodynamic stability and optimal renal function maintained  Description: INTERVENTIONS:  - Monitor labs and assess for signs and symptoms of volume excess or deficit  - Monitor intake, output and patient weight  - Monitor urine specific gravity, serum osmolarity and serum sodium as indicated or ordered  - Monitor response to interventions for patient's volume status, including labs, urine output, blood pressure (other measures as available)  - Encourage oral intake as appropriate  - Instruct patient on fluid and nutrition restrictions as appropriate  Outcome: Progressing     Problem: MUSCULOSKELETAL - ADULT  Goal: Return mobility to safest level of function  Description: INTERVENTIONS:  - Assess patient stability and activity tolerance for standing, transferring and ambulating w/ or w/o assistive devices  - Assist with transfers and ambulation using safe patient handling equipment as needed  - Ensure adequate protection for wounds/incisions during mobilization  - Obtain PT/OT consults as needed  - Advance activity as appropriate  - Communicate ordered activity level and limitations with patient/family  Outcome: Progressing

## 2024-11-10 ENCOUNTER — APPOINTMENT (OUTPATIENT)
Dept: GENERAL RADIOLOGY | Facility: HOSPITAL | Age: 64
End: 2024-11-10
Attending: CLINICAL NURSE SPECIALIST
Payer: COMMERCIAL

## 2024-11-10 LAB
BASOPHILS # BLD AUTO: 0.06 X10(3) UL (ref 0–0.2)
BASOPHILS NFR BLD AUTO: 0.6 %
DEPRECATED RDW RBC AUTO: 52 FL (ref 35.1–46.3)
EOSINOPHIL # BLD AUTO: 0.38 X10(3) UL (ref 0–0.7)
EOSINOPHIL NFR BLD AUTO: 4 %
ERYTHROCYTE [DISTWIDTH] IN BLOOD BY AUTOMATED COUNT: 15.9 % (ref 11–15)
HCT VFR BLD AUTO: 35.2 %
HGB BLD-MCNC: 11.8 G/DL
IMM GRANULOCYTES # BLD AUTO: 0.02 X10(3) UL (ref 0–1)
IMM GRANULOCYTES NFR BLD: 0.2 %
LYMPHOCYTES # BLD AUTO: 3.32 X10(3) UL (ref 1–4)
LYMPHOCYTES NFR BLD AUTO: 34.5 %
MAGNESIUM SERPL-MCNC: 2 MG/DL (ref 1.6–2.6)
MCH RBC QN AUTO: 30.4 PG (ref 26–34)
MCHC RBC AUTO-ENTMCNC: 33.5 G/DL (ref 31–37)
MCV RBC AUTO: 90.7 FL
MONOCYTES # BLD AUTO: 0.56 X10(3) UL (ref 0.1–1)
MONOCYTES NFR BLD AUTO: 5.8 %
NEUTROPHILS # BLD AUTO: 5.27 X10 (3) UL (ref 1.5–7.7)
NEUTROPHILS # BLD AUTO: 5.27 X10(3) UL (ref 1.5–7.7)
NEUTROPHILS NFR BLD AUTO: 54.9 %
PHOSPHATE SERPL-MCNC: 4 MG/DL (ref 2.4–5.1)
PLATELET # BLD AUTO: 308 10(3)UL (ref 150–450)
RBC # BLD AUTO: 3.88 X10(6)UL
WBC # BLD AUTO: 9.6 X10(3) UL (ref 4–11)

## 2024-11-10 PROCEDURE — 99233 SBSQ HOSP IP/OBS HIGH 50: CPT | Performed by: HOSPITALIST

## 2024-11-10 PROCEDURE — 99232 SBSQ HOSP IP/OBS MODERATE 35: CPT | Performed by: INTERNAL MEDICINE

## 2024-11-10 PROCEDURE — 71045 X-RAY EXAM CHEST 1 VIEW: CPT | Performed by: CLINICAL NURSE SPECIALIST

## 2024-11-10 RX ORDER — AMLODIPINE BESYLATE 10 MG/1
10 TABLET ORAL DAILY
Status: DISCONTINUED | OUTPATIENT
Start: 2024-11-10 | End: 2024-11-11

## 2024-11-10 RX ORDER — OMEPRAZOLE 40 MG/1
40 CAPSULE, DELAYED RELEASE ORAL EVERY OTHER DAY
Status: DISCONTINUED | OUTPATIENT
Start: 2024-11-10 | End: 2024-11-10

## 2024-11-10 RX ORDER — LEVOTHYROXINE SODIUM 75 UG/1
150 TABLET ORAL
Status: DISCONTINUED | OUTPATIENT
Start: 2024-11-10 | End: 2024-11-11

## 2024-11-10 RX ORDER — DULOXETIN HYDROCHLORIDE 20 MG/1
20 CAPSULE, DELAYED RELEASE ORAL DAILY
Status: DISCONTINUED | OUTPATIENT
Start: 2024-11-10 | End: 2024-11-11

## 2024-11-10 NOTE — PROGRESS NOTES
[facility]  part of [facility]    Progress Note    Camelia Markham Patient Status:  Observation    1960 MRN [id number]   Location [facility] 3W/SW Attending Fidel Henderson MD   Hosp Day # 4 PCP FINA MABRY, MD WENDY     Chief Complaint:     Syncope    Subjective:   Subjective:    Patient seen and examined  normotensive afebrile  No new complaints at this time.   Overall with clinical improvement      Objective:   Blood pressure 135/81, pulse 84, temperature 98 °F (36.7 °C), temperature source Oral, resp. rate 16, weight 166 lb 3.2 oz (75.4 kg), SpO2 93%.  Physical Exam    General: Patient is alert and oriented x3 does not appear to be in acute distress at this time  HEENT: EOMI PERRLA, atraumatic normocephalic  Cardiac: S1-S2 appreciated  Lungs: Good air entry bilaterally clear to auscultation, PleurX in place R side  Abdomen: Soft nontender nondistended positive bowel sounds  Ext: Peripheral pulses are positive  Neuro: No focal deficits noted  Psych: Normal mood  Skin: No rashes noted  MSK: Full range of motion intact      Results:   Lab Results   Component Value Date    WBC 9.6 11/10/2024    HGB 11.8 (L) 11/10/2024    HCT 35.2 11/10/2024    .0 11/10/2024    CREATSERUM 0.66 2024    BUN 7 (L) 2024     2024    K 3.8 2024     2024    CO2 26.0 2024     (H) 2024    CA 8.7 2024    ALB 3.5 2024    ALKPHO 90 2024    BILT 1.0 2024    TP 5.8 2024    AST 16 2024    ALT 16 2024    PTT 26.0 2022    INR 0.98 2023    T4F 1.9 (H) 2024    TSH 0.048 (L) 2024    LIP 33 2024    DDIMER 0.55 2024    MG 2.0 11/10/2024    PHOS 4.0 11/10/2024    TROPHS <3 2024       XR CHEST AP PORTABLE  (CPT=71045)    Result Date: 11/10/2024  CONCLUSION:   No new abnormality.  Unchanged right basilar chest tube and moderate loculated right pleural effusion.  No  appreciable pneumothorax.  Persistent small left pleural effusion and left retrocardiac opacity.      Dictated by (CST): Jose Miguel Velazquez MD on 11/10/2024 at 9:40 AM     Finalized by (CST): Jose Miguel Velazquez MD on 11/10/2024 at 9:42 AM          XR CHEST AP PORTABLE  (CPT=71045)    Result Date: 11/9/2024  CONCLUSION:   Status post removal of right chest tube.  No pneumothorax.  Unchanged small to moderate loculated right pleural effusion.  Unchanged right lower lung alveolar opacity.  Left retrocardiac opacity unchanged.      Dictated by (CST): Maggie Marques MD on 11/09/2024 at 8:51 AM     Finalized by (CST): Maggie Marques MD on 11/09/2024 at 8:54 AM               Assessment & Plan:     Syncope and collapse  -possibly due to poor PO intake,   -orthostasis.   -continue current IV hydration will monitor closely    Malignant pleural effusion (HCC)  -Patient with hx of recurrent metastatic Papillary thyroid cancer.   -has a pleurX in place but new imaging reveals a loculated pleural effusion that is away from the pleurX  -will have pulmonology placed on consult  -appreciate hem/onc recs.   -will monitor closely    Hyperglycemia  -continue Accuchecks qachs,  -supplement with sliding scale    HTN  -montior BP  -titrate meds as needed.     VTE ppx: heparin subcutaneous.   Full Code    Global A/P  -CXR with improvement.  -R chest tube placement removed.  -transferred to 4th floor  -DC planning.  -monitor closely.  -appreciate IR and Pulm recs.  -will monitor closely  -appreciate hem/onc recs  -Reviewed previous consultant notes  -Reviewed CBC, BMP, Mag, and Phos  -Reviewed tests ordered  -Repeat labs in am  -MDM: High, severe exacerbation of chronic illness posing a threat to life. IV medications requiring close inpatient monitoring.             Fidel Henderson MD

## 2024-11-10 NOTE — PROGRESS NOTES
[facility]  part of [facility]    Progress Note      Assessment and Plan:   1.  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  Pleural fluid cytology at this time shows no malignant cells.  Prior cytology had been positive for malignancy.  The other chest tube is now out.  Likely has moderate loculations.  Conservative management at this juncture.  Mild chest discomforts.  Chest x-ray unchanged.    Recommendations:  1.  Drain the Pleurx catheter 1 time per week and as needed.  2.  Will follow clinically.  3.  Follow-up oncology.  4.  Discharge planning.    2.  DVT prophylaxis-subcutaneous heparin    3.  Syncope--associated with orthostatic hypotension.    Subjective:   Camelia Markham is a(n) 64 year old female w/o new complaint    Objective:   Blood pressure 135/81, pulse 84, temperature 98 °F (36.7 °C), temperature source Oral, resp. rate 16, weight 166 lb 3.2 oz (75.4 kg), SpO2 93%.    Physical Exam Alert WF  HEENT examination is unremarkable with pupils equal round and reactive to light and accommodation.   Neck without adenopathy, thyromegaly, JVD nor bruit.   Lungs clear to auscultation and percussion.  Cardiac regular rate and rhythm no murmur.   Abdomen nontender, without hepatosplenomegaly and no mass appreciable.   Extremities without clubbing cyanosis nor edema.   Neurologic grossly intact with symmetric tone and strength and reflex.  Skin without gross abnormality     Results:     Lab Results   Component Value Date    WBC 9.6 11/10/2024    HGB 11.8 11/10/2024    HCT 35.2 11/10/2024    .0 11/10/2024    MG 2.0 11/10/2024    PHOS 4.0 11/10/2024       Ry Rivas MD  Medical Director, Critical Care, [facility]  Medical Director, [facility]  Pager: [phone]

## 2024-11-10 NOTE — PLAN OF CARE
Transferred from W. Report received from JAENL Ivan. AxO4. Pt remains on RA. C/o Dyspnea with exertion. Old chest tube site is clean/dry/itnact. Pleurx cath covered with gauze and in place to right chest. Norco and heat packs provided for pain at old chest tube site. Tolerating diet. Denies nausea/vomiting. Voiding freely, using the Purewick at night due to urgency. No BM. Ambulating x1 assist/standby + RW. C/o dizziness when looking up and walking. Fall precautions in place. Plan to discharge home with HH when medically cleared. Appropriate safety measures maintained.      Problem: Patient Centered Care  Goal: Patient preferences are identified and integrated in the patient's plan of care  Description: Interventions:  - What would you like us to know as we care for you? From Home w/    - Provide timely, complete, and accurate information to patient/family  - Incorporate patient and family knowledge, values, beliefs, and cultural backgrounds into the planning and delivery of care  - Encourage patient/family to participate in care and decision-making at the level they choose  - Honor patient and family perspectives and choices  Outcome: Progressing     Problem: CARDIOVASCULAR - ADULT  Goal: Maintains optimal cardiac output and hemodynamic stability  Description: INTERVENTIONS:  - Monitor vital signs, rhythm, and trends  - Monitor for bleeding, hypotension and signs of decreased cardiac output  - Evaluate effectiveness of vasoactive medications to optimize hemodynamic stability  - Monitor arterial and/or venous puncture sites for bleeding and/or hematoma  - Assess quality of pulses, skin color and temperature  - Assess for signs of decreased coronary artery perfusion - ex. Angina  - Evaluate fluid balance, assess for edema, trend weights  Outcome: Progressing  Goal: Absence of cardiac arrhythmias or at baseline  Description: INTERVENTIONS:  - Continuous cardiac monitoring, monitor vital signs, obtain 12 lead  EKG if indicated  - Evaluate effectiveness of antiarrhythmic and heart rate control medications as ordered  - Initiate emergency measures for life threatening arrhythmias  - Monitor electrolytes and administer replacement therapy as ordered  Outcome: Progressing     Problem: METABOLIC/FLUID AND ELECTROLYTES - ADULT  Goal: Electrolytes maintained within normal limits  Description: INTERVENTIONS:  - Monitor labs and rhythm and assess patient for signs and symptoms of electrolyte imbalances  - Administer electrolyte replacement as ordered  - Monitor response to electrolyte replacements, including rhythm and repeat lab results as appropriate  - Fluid restriction as ordered  - Instruct patient on fluid and nutrition restrictions as appropriate  Outcome: Progressing  Goal: Hemodynamic stability and optimal renal function maintained  Description: INTERVENTIONS:  - Monitor labs and assess for signs and symptoms of volume excess or deficit  - Monitor intake, output and patient weight  - Monitor urine specific gravity, serum osmolarity and serum sodium as indicated or ordered  - Monitor response to interventions for patient's volume status, including labs, urine output, blood pressure (other measures as available)  - Encourage oral intake as appropriate  - Instruct patient on fluid and nutrition restrictions as appropriate  Outcome: Progressing     Problem: MUSCULOSKELETAL - ADULT  Goal: Return mobility to safest level of function  Description: INTERVENTIONS:  - Assess patient stability and activity tolerance for standing, transferring and ambulating w/ or w/o assistive devices  - Assist with transfers and ambulation using safe patient handling equipment as needed  - Ensure adequate protection for wounds/incisions during mobilization  - Obtain PT/OT consults as needed  - Advance activity as appropriate  - Communicate ordered activity level and limitations with patient/family  Outcome: Progressing  Goal: Maintain proper  alignment of affected body part  Description: INTERVENTIONS:  - Support and protect limb and body alignment per provider's orders  - Instruct and reinforce with patient and family use of appropriate assistive device and precautions (e.g. spinal or hip dislocation precautions)  Outcome: Progressing

## 2024-11-11 ENCOUNTER — APPOINTMENT (OUTPATIENT)
Dept: GENERAL RADIOLOGY | Facility: HOSPITAL | Age: 64
End: 2024-11-11
Attending: CLINICAL NURSE SPECIALIST
Payer: COMMERCIAL

## 2024-11-11 VITALS
RESPIRATION RATE: 20 BRPM | WEIGHT: 166.19 LBS | HEART RATE: 109 BPM | SYSTOLIC BLOOD PRESSURE: 159 MMHG | BODY MASS INDEX: 30 KG/M2 | DIASTOLIC BLOOD PRESSURE: 94 MMHG | TEMPERATURE: 98 F | OXYGEN SATURATION: 94 %

## 2024-11-11 LAB
BILIRUB UR QL: NEGATIVE
CLARITY UR: CLEAR
GLUCOSE UR-MCNC: NORMAL MG/DL
HGB UR QL STRIP.AUTO: NEGATIVE
KETONES UR-MCNC: NEGATIVE MG/DL
LEUKOCYTE ESTERASE UR QL STRIP.AUTO: 75
NITRITE UR QL STRIP.AUTO: NEGATIVE
PH UR: 6.5 [PH] (ref 5–8)
PROT UR-MCNC: NEGATIVE MG/DL
SP GR UR STRIP: 1.01 (ref 1–1.03)
UROBILINOGEN UR STRIP-ACNC: NORMAL

## 2024-11-11 PROCEDURE — 99239 HOSP IP/OBS DSCHRG MGMT >30: CPT | Performed by: HOSPITALIST

## 2024-11-11 PROCEDURE — 71045 X-RAY EXAM CHEST 1 VIEW: CPT | Performed by: CLINICAL NURSE SPECIALIST

## 2024-11-11 PROCEDURE — 99232 SBSQ HOSP IP/OBS MODERATE 35: CPT | Performed by: INTERNAL MEDICINE

## 2024-11-11 PROCEDURE — 99233 SBSQ HOSP IP/OBS HIGH 50: CPT | Performed by: INTERNAL MEDICINE

## 2024-11-11 RX ORDER — PANTOPRAZOLE SODIUM 40 MG/1
40 TABLET, DELAYED RELEASE ORAL
Qty: 30 TABLET | Refills: 0 | Status: SHIPPED | OUTPATIENT
Start: 2024-11-12

## 2024-11-11 RX ORDER — BUTALBITAL, ACETAMINOPHEN AND CAFFEINE 50; 325; 40 MG/1; MG/1; MG/1
1 TABLET ORAL EVERY 6 HOURS PRN
Qty: 30 TABLET | Refills: 0 | Status: SHIPPED | OUTPATIENT
Start: 2024-11-11

## 2024-11-11 NOTE — PHYSICAL THERAPY NOTE
PHYSICAL THERAPY TREATMENT NOTE - INPATIENT     Room Number: 465/465-A       Presenting Problem: Syncope and collapse  Co-Morbidities : Thyroid CA    Problem List  Principal Problem:    Syncope and collapse  Active Problems:    Malignant pleural effusion (HCC)    Hyperglycemia    Syncope    Metastasis from thyroid cancer (HCC)      PHYSICAL THERAPY ASSESSMENT   Patient demonstrates good  progress this session, goals  progressing with 2/5 met this session.      Patient is requiring contact guard assist as a result of the following impairments: decreased functional strength, decreased endurance/aerobic capacity, decreased muscular endurance, medical status, and intermittent dizziness .     Patient continues to function below baseline with bed mobility, transfers, gait, stair negotiation, maintaining seated position, standing prolonged periods, and performing household tasks.  Next session anticipate patient to progress gait, stair negotiation, and standing prolonged periods.  Physical Therapy will continue to follow patient for duration of hospitalization.    Patient continues to benefit from continued skilled PT services: at discharge to promote prior level of function and safety with additional support and return home with home health PT.    PLAN DURING HOSPITALIZATION  Nursing Mobility Recommendation : 1 Assist  PT Device Recommendation: Rolling walker;Gait belt  PT Treatment Plan: Endurance;Bed mobility;Energy conservation;Patient education;Family education;Gait training;Strengthening;Stair training;Transfer training;Balance training;Other (Comment) (Rule out BPPV)  Frequency (Obs): 5x/week     SUBJECTIVE  \"I just get dizzy when looking at my phone or the TV so I have to stop. It bothers me to look up too\"    OBJECTIVE  Precautions:  (Chest tube recent removal)      PAIN ASSESSMENT   Rating:  (No complaints of pain)          BALANCE  Static Sitting: Fair +  Dynamic Sitting: Fair  Static Standing: Fair -  Dynamic  Standing: Fair -    AM-PAC '6-Clicks' INPATIENT SHORT FORM - BASIC MOBILITY  How much difficulty does the patient currently have...  Patient Difficulty: Turning over in bed (including adjusting bedclothes, sheets and blankets)?: None   Patient Difficulty: Sitting down on and standing up from a chair with arms (e.g., wheelchair, bedside commode, etc.): A Little   Patient Difficulty: Moving from lying on back to sitting on the side of the bed?: A Little   How much help from another person does the patient currently need...   Help from Another: Moving to and from a bed to a chair (including a wheelchair)?: A Little   Help from Another: Need to walk in hospital room?: A Little   Help from Another: Climbing 3-5 steps with a railing?: A Little     AM-PAC Score:  Raw Score: 19   Approx Degree of Impairment: 41.77%   Standardized Score (AM-PAC Scale): 45.44   CMS Modifier (G-Code): CK    FUNCTIONAL ABILITY STATUS  Functional Mobility/Gait Assessment  Gait Assistance: Supervision  Distance (ft): 200'x2  Assistive Device: Rolling walker  Pattern: Within Functional Limits (slow pace, training in gaze fixation strategy)  Stairs: Stairs  How Many Stairs: 8  Device: 2 Rails (pt reports 2 railings she cna use at home)  Assist: Supervision  Pattern: Ascend and Descend  Ascend and Descend : Step to  Rolling: modified independent  Supine to Sit: supervision cues for keeping eyes focused during transitional movements and on on horizon  Sit to Supine: supervision cues for keeping eyes focused during transitional movements and on on horizon  Sit to Stand: supervision cues for keeping eyes focused during transitional movements and on on horizon    Skilled Therapy Provided: JANEL Steen approves participation, pt going home today. Patient presents up in room with RN, agreeable to therapy. Extensive education and discussion about her dizziness and reveals she has motion sensitivity when looking at her phone or the TV as well as when moving  her head too quickly up and down. Did NOT screen for BPPV today as she is returning home shortly but she is aware of how to manage short bursts of dizziness - to stop and focus until it passes and also using gaze fixation strategy to compensate. Education in HEP for VOR in seated for L/R as well as up and down, also seated head/eye turning to target for L/R as well as up/down. She is to do them for 30-60 seconds and try to provoke the dizziness and then do a gaze holding until dizziness resolves/returns to 0/10. She is able to walk in halls with cues for gaze fixation/focusing and training on stairs for how to navigate using feet to feel steps/edges and go slowly step too pattern with good return demo. Also demonstrated scooting up/down the steps on her bottom as a back up plan if she becomes dizzy and needs to sit on stairs. She verbalizes good understanding and return demo, encouraged her to continue using RW for now at all times and have someone with her on the steps. She will benefit from HHPT to progress the vestibular HEP and help better manage the dizziness as well as screen for BPPV as appropriate. All questions answered and needs in reach,  in room and pt getting ready to go home. HANDOUT issued for HEP and information regarding dizziness and reviewed with patient. RN in room, pt ready for dc home with family support and HHPT at this time. Patient has RW at home as well as rollator.    The patient's Approx Degree of Impairment: 41.77% has been calculated based on documentation in the [facility] '6 clicks' Inpatient Daily Activity Short Form.  Research supports that patients with this level of impairment may benefit from home with increased family support and HHPT. Final disposition will be made by interdisciplinary medical team.    Patient End of Session: Up in chair;Needs met;RN aware of session/findings;Call light within reach;All patient questions and concerns addressed;Hospital anti-slip socks;Family  present;Discussed recommendations with /    CURRENT GOALS     Goals to be met by: 24  Patient Goal Patient's self-stated goal is: return home   Goal #1 Patient is able to demonstrate supine - sit EOB @ level: independent      Goal #1   Current Status  in progress- supervision/cues and training during session and demos independent at end of session using gaze focusing/fixation   Goal #2 Patient is able to demonstrate transfers Sit to/from Stand at assistance level: modified independent with walker - rolling      Goal #2  Current Status  in progress -meets by end of session after training in gaze focusing/fixation strategy   Goal #3 Patient is able to ambulate 150 feet with assist device: walker - rolling at assistance level: modified independent   Goal #3   Current Status  in progress - ambulating with RW and ongoing training for gaze fixation strategy when ambulating, pacing and not walking when still dizzy/stand until it passes and then walk   Goal #4 Patient will negotiate 6 stairs/one curb w/ assistive device and supervision   Goal #4   Current Status  In progress - CGA with both railings, up and down with step to pattern and using feet to feel steps, aware to have help   Goal #5 Patient to demonstrate independence with home activity/exercise instructions provided to patient in preparation for discharge.   Goal #5   Current Status  In progress - handout issued and will need ongoing training and progression with    Goal #6     Goal #6  Current Status         Gait Trainin minutes  Therapeutic Activity: 18 minutes  Therapeutic Exercise: 12 minutes

## 2024-11-11 NOTE — PLAN OF CARE
Elevated BP. MD aware states stable for discharge. Patient to resume home BP meds. Follow up with oncologist next week and pulm in 2-3 weeks.   O2 walk test. Ambulated gonzalez with this RN, SpO2 92% throughout entire walk, she tolerated well.   Pleurx drain site with dressing in place.   Discharge instructions and follow ups discussed with patient, she verbalized understanding.   IV discontinued.     Problem: Patient Centered Care  Goal: Patient preferences are identified and integrated in the patient's plan of care  Description: Interventions:  - What would you like us to know as we care for you? From Home w/    - Provide timely, complete, and accurate information to patient/family  - Incorporate patient and family knowledge, values, beliefs, and cultural backgrounds into the planning and delivery of care  - Encourage patient/family to participate in care and decision-making at the level they choose  - Honor patient and family perspectives and choices  Outcome: Adequate for Discharge     Problem: CARDIOVASCULAR - ADULT  Goal: Maintains optimal cardiac output and hemodynamic stability  Description: INTERVENTIONS:  - Monitor vital signs, rhythm, and trends  - Monitor for bleeding, hypotension and signs of decreased cardiac output  - Evaluate effectiveness of vasoactive medications to optimize hemodynamic stability  - Monitor arterial and/or venous puncture sites for bleeding and/or hematoma  - Assess quality of pulses, skin color and temperature  - Assess for signs of decreased coronary artery perfusion - ex. Angina  - Evaluate fluid balance, assess for edema, trend weights  Outcome: Adequate for Discharge  Goal: Absence of cardiac arrhythmias or at baseline  Description: INTERVENTIONS:  - Continuous cardiac monitoring, monitor vital signs, obtain 12 lead EKG if indicated  - Evaluate effectiveness of antiarrhythmic and heart rate control medications as ordered  - Initiate emergency measures for life threatening  arrhythmias  - Monitor electrolytes and administer replacement therapy as ordered  Outcome: Adequate for Discharge     Problem: METABOLIC/FLUID AND ELECTROLYTES - ADULT  Goal: Electrolytes maintained within normal limits  Description: INTERVENTIONS:  - Monitor labs and rhythm and assess patient for signs and symptoms of electrolyte imbalances  - Administer electrolyte replacement as ordered  - Monitor response to electrolyte replacements, including rhythm and repeat lab results as appropriate  - Fluid restriction as ordered  - Instruct patient on fluid and nutrition restrictions as appropriate  Outcome: Adequate for Discharge  Goal: Hemodynamic stability and optimal renal function maintained  Description: INTERVENTIONS:  - Monitor labs and assess for signs and symptoms of volume excess or deficit  - Monitor intake, output and patient weight  - Monitor urine specific gravity, serum osmolarity and serum sodium as indicated or ordered  - Monitor response to interventions for patient's volume status, including labs, urine output, blood pressure (other measures as available)  - Encourage oral intake as appropriate  - Instruct patient on fluid and nutrition restrictions as appropriate  Outcome: Adequate for Discharge     Problem: MUSCULOSKELETAL - ADULT  Goal: Return mobility to safest level of function  Description: INTERVENTIONS:  - Assess patient stability and activity tolerance for standing, transferring and ambulating w/ or w/o assistive devices  - Assist with transfers and ambulation using safe patient handling equipment as needed  - Ensure adequate protection for wounds/incisions during mobilization  - Obtain PT/OT consults as needed  - Advance activity as appropriate  - Communicate ordered activity level and limitations with patient/family  Outcome: Adequate for Discharge  Goal: Maintain proper alignment of affected body part  Description: INTERVENTIONS:  - Support and protect limb and body alignment per  provider's orders  - Instruct and reinforce with patient and family use of appropriate assistive device and precautions (e.g. spinal or hip dislocation precautions)  Outcome: Adequate for Discharge

## 2024-11-11 NOTE — PLAN OF CARE
Patient is AxO4. Pt remains on RA. C/o Dyspnea with exertion. Old chest tube site is clean/dry/itnact. Pleurx cath covered with gauze and in place to right chest. Heat packs provided for pain at old chest tube site. Fioricet provided for migraine.  Tolerating diet. Denies nausea/vomiting. Voiding freely. No BM. Ambulating frequently, independent. Dizziness improved, per pt report. Plan to discharge home with HH when medically cleared, possibly today. Appropriate safety measures maintained.         Problem: Patient Centered Care  Goal: Patient preferences are identified and integrated in the patient's plan of care  Description: Interventions:  - What would you like us to know as we care for you? From Home w/    - Provide timely, complete, and accurate information to patient/family  - Incorporate patient and family knowledge, values, beliefs, and cultural backgrounds into the planning and delivery of care  - Encourage patient/family to participate in care and decision-making at the level they choose  - Honor patient and family perspectives and choices  Outcome: Progressing     Problem: CARDIOVASCULAR - ADULT  Goal: Maintains optimal cardiac output and hemodynamic stability  Description: INTERVENTIONS:  - Monitor vital signs, rhythm, and trends  - Monitor for bleeding, hypotension and signs of decreased cardiac output  - Evaluate effectiveness of vasoactive medications to optimize hemodynamic stability  - Monitor arterial and/or venous puncture sites for bleeding and/or hematoma  - Assess quality of pulses, skin color and temperature  - Assess for signs of decreased coronary artery perfusion - ex. Angina  - Evaluate fluid balance, assess for edema, trend weights  Outcome: Progressing  Goal: Absence of cardiac arrhythmias or at baseline  Description: INTERVENTIONS:  - Continuous cardiac monitoring, monitor vital signs, obtain 12 lead EKG if indicated  - Evaluate effectiveness of antiarrhythmic and heart rate  control medications as ordered  - Initiate emergency measures for life threatening arrhythmias  - Monitor electrolytes and administer replacement therapy as ordered  Outcome: Progressing     Problem: METABOLIC/FLUID AND ELECTROLYTES - ADULT  Goal: Electrolytes maintained within normal limits  Description: INTERVENTIONS:  - Monitor labs and rhythm and assess patient for signs and symptoms of electrolyte imbalances  - Administer electrolyte replacement as ordered  - Monitor response to electrolyte replacements, including rhythm and repeat lab results as appropriate  - Fluid restriction as ordered  - Instruct patient on fluid and nutrition restrictions as appropriate  Outcome: Progressing  Goal: Hemodynamic stability and optimal renal function maintained  Description: INTERVENTIONS:  - Monitor labs and assess for signs and symptoms of volume excess or deficit  - Monitor intake, output and patient weight  - Monitor urine specific gravity, serum osmolarity and serum sodium as indicated or ordered  - Monitor response to interventions for patient's volume status, including labs, urine output, blood pressure (other measures as available)  - Encourage oral intake as appropriate  - Instruct patient on fluid and nutrition restrictions as appropriate  Outcome: Progressing     Problem: MUSCULOSKELETAL - ADULT  Goal: Return mobility to safest level of function  Description: INTERVENTIONS:  - Assess patient stability and activity tolerance for standing, transferring and ambulating w/ or w/o assistive devices  - Assist with transfers and ambulation using safe patient handling equipment as needed  - Ensure adequate protection for wounds/incisions during mobilization  - Obtain PT/OT consults as needed  - Advance activity as appropriate  - Communicate ordered activity level and limitations with patient/family  Outcome: Progressing  Goal: Maintain proper alignment of affected body part  Description: INTERVENTIONS:  - Support and  protect limb and body alignment per provider's orders  - Instruct and reinforce with patient and family use of appropriate assistive device and precautions (e.g. spinal or hip dislocation precautions)  Outcome: Progressing

## 2024-11-11 NOTE — DISCHARGE SUMMARY
[facility]  part of [facility]     Discharge Summary    Camelia Markham Patient Status:  Inpatient    1960 MRN [id number]   Location [facility] 4W/SW/SE Attending Fidel Henderson MD   Hosp Day # 5 PCP FINA MABRY, MD WENDY     Date of Admission: 2024    Date of Discharge: 2024    Admitting Diagnosis: Syncope and collapse [R55]  Malignant pleural effusion (HCC) [J91.0]    Discharge Diagnosis:   Patient Active Problem List   Diagnosis    Thyroid cancer (HCC)    Hypothyroidism    Exposure to medical therapeutic radiation    Acute gastritis    Posterior tibial tendinitis of right leg    Acute tonsillitis    Backache    Bursitis of shoulder    Hypertensive disorder    Fatigue    Gastroesophageal reflux disease    Nasal mucosa dry    Nonspecific syndrome suggestive of viral illness    Psychophysiologic insomnia    Primary osteoarthritis of left knee    Internal derangement of left knee    Internal derangement of right knee    Other instability, right knee    Acute meniscal tear, medial    Abnormal gait    Heartburn    Anxiety state    Dysthymia    Primary osteoarthritis of right knee    Incontinence of feces    History of colon polyps    Cyst of pancreas (HCC)    Intestinal disaccharidase deficiency    Headache    Generalized anxiety disorder    Familial combined hyperlipidemia    Dyspnea    Diverticulitis    Disability of walking    Contact dermatitis    Calcaneal spur    Bilateral plantar fasciitis    Pain in thoracic spine    Obesity    Rogers's metatarsalgia    Mixed hyperlipidemia    Mass of uterine adnexa    Malaise and fatigue    Lumbosacral radiculopathy    Itching    Irritable bowel syndrome    Obesity with body mass index 30 or greater    Type 2 diabetes mellitus without complication (HCC)    Tinea corporis    Seasonal allergic rhinitis    Pyuria    Psoriasis    Posterior rhinorrhea    Pleuritic pain    Plantar fascial fibromatosis    Viral infection    Vitamin  D deficiency    Osteoarthritis of knee    Malignant neoplasm of thyroid gland (HCC)    Influenza vaccine needed    Preoperative testing    Malignant neoplasm metastatic to right lung (HCC)    Pleural effusion    Malignant pleural effusion (HCC)    Goals of care, counseling/discussion    Advance care planning    Palliative care by specialist    Thyroid carcinoma (HCC)    Cancer related pain    Anxiety    Syncope and collapse    Hyperglycemia    Syncope    Metastasis from thyroid cancer (HCC)       Reason for Admission:     Syncope and collapse    Physical Exam:     General: Patient is alert and oriented x3 does not appear to be in acute distress at this time  HEENT: EOMI PERRLA, atraumatic normocephalic  Cardiac: S1-S2 appreciated  Lungs: Good air entry bilaterally clear to auscultation  Abdomen: Soft nontender nondistended positive bowel sounds  Ext: Peripheral pulses are positive  Neuro: No focal deficits noted  Psych: Normal mood  Skin: No rashes noted  MSK: Full range of motion intact      Hospital Course:     Syncope and collapse  -possibly due to poor PO intake,   -orthostasis.   -continue current IV hydration will monitor closely     Malignant pleural effusion (HCC)  -Patient with hx of recurrent metastatic Papillary thyroid cancer.   -has a pleurX in place but new imaging reveals a loculated pleural effusion that is away from the pleurX  -will have pulmonology placed on consult  -appreciate hem/onc recs.   -will monitor closely     Hyperglycemia  -continue Accuchecks qachs,  -supplement with sliding scale     HTN  -montior BP  -titrate meds as needed.      VTE ppx: heparin subcutaneous.   Full Code     Global A/P  -CXR with improvement.  -R chest tube placement removed.  -transferred to 4th floor  -DC planning.    History of Present Illness:     Per admitting:   The patient is a 64-year-old  female with history of recurrent metastatic thyroid papillary carcinoma with right malignant pleural effusion  post right PleurX catheter and currently on lenvatinib, tyrosine kinase inhibitor treatment. Lenvatinib was held by her oncologist around 5 days ago for poor appetite and she was receiving IV fluid infusion at the cancer center. Yesterday, she had a syncopal episode coming out of a car without prodrome. Today, she felt fatigued and she was sent to the emergency department by her oncologist. CBC, chemistry, and liver function tests were unremarkable. CT scan of the cervical spine and CT scan of the brain showed no acute finding. Chest x-ray showed interval worsening in the chest with large area of opacity in the right upper lobe. CT scan of the chest showed large loculated right-sided pleural effusion with associated underlying pleural-based nodularity and atelectasis. There is a PleurX catheter within a free flowing segment of this pleural effusion. Peribronchial ground-glass opacities within the right lung and left lung base raising possibility of superimposed infectious process.     Disposition: Home or Self Care    Discharge Condition: Fair    Discharge Medications:   Current Discharge Medication List        START taking these medications    Details   pantoprazole 40 MG Oral Tab EC Take 1 tablet (40 mg total) by mouth every morning before breakfast.  Qty: 30 tablet, Refills: 0           CONTINUE these medications which have NOT CHANGED    Details   DULoxetine 20 MG Oral Cap DR Particles Take 1 capsule (20 mg total) by mouth daily.  Qty: 30 capsule, Refills: 0    Associated Diagnoses: Anxiety about health      levothyroxine 150 MCG Oral Tab Take 1 tablet (150 mcg total) by mouth before breakfast.      Lenvatinib, 24 MG Daily Dose, (LENVIMA, 24 MG DAILY DOSE,) 2 x 10 MG & 4 MG Oral Capsule Therapy Pack Take 24 mg by mouth daily.  Qty: 30 each, Refills: 5      ondansetron (ZOFRAN) 8 MG tablet Take 1 tablet (8 mg total) by mouth every 8 (eight) hours as needed for Nausea.  Qty: 30 tablet, Refills: 3    Associated  Diagnoses: Nausea      HYDROcodone-acetaminophen 5-325 MG Oral Tab Take 1-2 tablets by mouth every 6 (six) hours as needed for Pain.  Qty: 60 tablet, Refills: 0    Associated Diagnoses: Thyroid cancer (HCC); Malignant neoplasm metastatic to right lung (HCC); Pleural effusion; Cancer related pain      Olmesartan Medoxomil 40 MG Oral Tab Take 1 tablet (40 mg total) by mouth daily.      Omeprazole 40 MG Oral Capsule Delayed Release Take 1 capsule (40 mg total) by mouth every other day.      amLODIPine 10 MG Oral Tab Take 1 tablet (10 mg total) by mouth daily.    Associated Diagnoses: Internal derangement of left knee; Primary osteoarthritis of left knee      LORazepam 1 MG Oral Tab Take 1 tablet (1 mg total) by mouth every 6 (six) hours as needed (nausea).  Qty: 60 tablet, Refills: 2    Associated Diagnoses: Thyroid cancer (HCC); Malignant neoplasm metastatic to right lung (HCC); Therapeutic drug monitoring; Nausea; Bloating; Dehydration      prochlorperazine (COMPAZINE) 10 mg tablet Take 1 tablet (10 mg total) by mouth every 6 (six) hours as needed for Nausea.  Qty: 30 tablet, Refills: 3    Associated Diagnoses: Nausea             Total DC time > 30 min    Fidel Henderson MD  11/11/2024  10:32 AM     Hospital Discharge Diagnoses: syncope and collapse    Lace+ Score: 81  59-90 High Risk  29-58 Medium Risk  0-28   Low Risk.    TCM Follow-Up Recommendation:  LACE > 58: High Risk of readmission after discharge from the hospital.

## 2024-11-11 NOTE — PLAN OF CARE
Camelia Figueroa is A&Ox4 and on room air. Pt ambulates standby assist with a walker. Used the purewick and toilet throughout the day to urinate. Pt is saline locked, tolerating diets-no complaints of nausea. Prn pain medications given. Frequent rounding by nursing staff. Safety precautions maintained/call light within reach. Plan: discharge with home health once medically cleared.   Problem: Patient Centered Care  Goal: Patient preferences are identified and integrated in the patient's plan of care  Description: Interventions:  - What would you like us to know as we care for you? From Home w/    - Provide timely, complete, and accurate information to patient/family  - Incorporate patient and family knowledge, values, beliefs, and cultural backgrounds into the planning and delivery of care  - Encourage patient/family to participate in care and decision-making at the level they choose  - Honor patient and family perspectives and choices  Outcome: Progressing     Problem: CARDIOVASCULAR - ADULT  Goal: Maintains optimal cardiac output and hemodynamic stability  Description: INTERVENTIONS:  - Monitor vital signs, rhythm, and trends  - Monitor for bleeding, hypotension and signs of decreased cardiac output  - Evaluate effectiveness of vasoactive medications to optimize hemodynamic stability  - Monitor arterial and/or venous puncture sites for bleeding and/or hematoma  - Assess quality of pulses, skin color and temperature  - Assess for signs of decreased coronary artery perfusion - ex. Angina  - Evaluate fluid balance, assess for edema, trend weights  Outcome: Progressing  Goal: Absence of cardiac arrhythmias or at baseline  Description: INTERVENTIONS:  - Continuous cardiac monitoring, monitor vital signs, obtain 12 lead EKG if indicated  - Evaluate effectiveness of antiarrhythmic and heart rate control medications as ordered  - Initiate emergency measures for life threatening arrhythmias  - Monitor electrolytes and  administer replacement therapy as ordered  Outcome: Progressing     Problem: METABOLIC/FLUID AND ELECTROLYTES - ADULT  Goal: Electrolytes maintained within normal limits  Description: INTERVENTIONS:  - Monitor labs and rhythm and assess patient for signs and symptoms of electrolyte imbalances  - Administer electrolyte replacement as ordered  - Monitor response to electrolyte replacements, including rhythm and repeat lab results as appropriate  - Fluid restriction as ordered  - Instruct patient on fluid and nutrition restrictions as appropriate  Outcome: Progressing  Goal: Hemodynamic stability and optimal renal function maintained  Description: INTERVENTIONS:  - Monitor labs and assess for signs and symptoms of volume excess or deficit  - Monitor intake, output and patient weight  - Monitor urine specific gravity, serum osmolarity and serum sodium as indicated or ordered  - Monitor response to interventions for patient's volume status, including labs, urine output, blood pressure (other measures as available)  - Encourage oral intake as appropriate  - Instruct patient on fluid and nutrition restrictions as appropriate  Outcome: Progressing     Problem: MUSCULOSKELETAL - ADULT  Goal: Return mobility to safest level of function  Description: INTERVENTIONS:  - Assess patient stability and activity tolerance for standing, transferring and ambulating w/ or w/o assistive devices  - Assist with transfers and ambulation using safe patient handling equipment as needed  - Ensure adequate protection for wounds/incisions during mobilization  - Obtain PT/OT consults as needed  - Advance activity as appropriate  - Communicate ordered activity level and limitations with patient/family  Outcome: Progressing  Goal: Maintain proper alignment of affected body part  Description: INTERVENTIONS:  - Support and protect limb and body alignment per provider's orders  - Instruct and reinforce with patient and family use of appropriate  assistive device and precautions (e.g. spinal or hip dislocation precautions)  Outcome: Progressing

## 2024-11-11 NOTE — PROGRESS NOTES
[facility] Hematology/Oncology Group  Inpatient Progress Note    Camelia Markham Patient Status:  Observation    1960 MRN [id number]   Location [facility] 3W/SW Attending Fidel Henderson MD   Hosp Day # 5 PCP FINA MABRY, MD WENDY     Reason for consultation: Met thyroid cancer    INTERVAL HISTORY   chest tube has been removed.  Dyspnea has improved.  She is able to ambulate without assistance.  Anticipating going home soon.    Past Oncologic History   Camelia Markham is a 64 year old female with known metastatic thyroid cancer with malignant pleural effusion.  She had been on lenvatinib with improvement of her effusion but was having trouble tolerating 24 mg dose.  Had increased nausea and vomiting with decreased p.o. intake.  lenvatinib was held last week  She developed a syncopal episode and increasing fatigue as well as some chest discomfort hence referred to the emergency room.  CT of the brain and C-spine showed no acute findings.  Chest x-ray suggested large opacity in the right upper lobe.  CT scan showed a large loculated right-sided pleural effusion with associated pleural-based nodularity.  Pleurx catheter in the free-flowing segment of the pleural effusion with improvement was noted.  There were peribronchial groundglass opacities as well.    A Pleurx was drained and yielded 200 mL of fluid.  She was started on IV hydration and hospitalized.still feels very tired     underwent chest tube placement and drainage of loculated effusion.  Cytology was benign    Review of Systems:  Pt denies fevers, chills, night sweats, HA, vision changes, CP, SOB, cough, n/v/d, abd pain, urinary or bowel complaints  Hematology/Oncology ROS performed and negative except as above in HPI    History/Other:   Past Medical History:  Past Medical History:    Anxiety state, unspecified    Cancer (HCC)    Colon adenomas    x3    Disorder of thyroid    thyroidectomy    Diverticulosis of large  intestine    Esophageal reflux    Exposure to medical therapeutic radiation    thyroid    High blood pressure    High cholesterol    Hypercholesteremia    Hypothyroidism    Osteoarthrosis, unspecified whether generalized or localized, unspecified site    Thyroid cancer (HCC)    papillary thyroid; s/p surgery resection with Asher and BRIDGES    Unspecified essential hypertension       Past Surgical History:  Past Surgical History:   Procedure Laterality Date    Colonoscopy N/A 2022    Procedure: COLONOSCOPY;  Surgeon: Neeraj No MD;  Location: [facility] ENDOSCOPY    Colonoscopy  2024    Colonoscopy N/A 2024    Procedure: COLONOSCOPY;  Surgeon: Neeraj No MD;  Location: [facility] ENDOSCOPY    Egd  2022    Egd  2024    Esophagogastroduodenoscopy    Endoscopic ultrasound - internal  2022    Endoscopic ultrasound exam  2024    Endoscopic Ultrasound    Eye surgery  2024    Eye surgery Left 2024    Hysterectomy      Knee surgery Left 2022    no associated bleeding complications          40 week 7 lb(s) 5 oz Male; 6 hr labor           40 week 7 lb(s) 3 oz Female          41 week 9 lb(s) 8 oz Male    Other surgical history      Injection Tendon Sheath, Ligament    Thyroidectomy      total    Total abdom hysterectomy         Current Medications:   melatonin tab 3 mg  3 mg Oral Nightly PRN    amLODIPine (Norvasc) tab 10 mg  10 mg Oral Daily    DULoxetine (Cymbalta) DR cap 20 mg  20 mg Oral Daily    levothyroxine (Synthroid) tab 150 mcg  150 mcg Oral Before breakfast    HYDROcodone-acetaminophen (Norco) 5-325 MG per tab 1 tablet  1 tablet Oral Q6H PRN    [COMPLETED] magnesium oxide (Mag-Ox) tab 400 mg  400 mg Oral Once    pantoprazole (Protonix) DR tab 40 mg  40 mg Oral QAM AC    butalbital-acetaminophen-caffeine (Fioricet) -40 MG per tab 1 tablet  1 tablet Oral Q6H PRN    diphenhydrAMINE (Benadryl) 50 mg/mL  injection 25 mg  25 mg  Intravenous Q6H PRN    [COMPLETED] lidocaine (Xylocaine) 2 % injection        [COMPLETED] fentaNYL (Sublimaze) 50 mcg/mL injection        traMADol (Ultram) tab 50 mg  50 mg Oral Q6H PRN    acetaminophen (Tylenol Extra Strength) tab 500 mg  500 mg Oral Q4H PRN    [COMPLETED] diphenhydrAMINE (Benadryl) 50 mg/mL  injection 25 mg  25 mg Intravenous Once    [COMPLETED] sodium chloride 0.9 % IV bolus 1,000 mL  1,000 mL Intravenous Once    [COMPLETED] iopamidol 76% (ISOVUE-370) injection for power injector  80 mL Intravenous ONCE PRN    sodium chloride 0.9% infusion   Intravenous Continuous    heparin (Porcine) 5000 UNIT/ML injection 5,000 Units  5,000 Units Subcutaneous 2 times per day    temazepam (Restoril) cap 15 mg  15 mg Oral Nightly PRN    ondansetron (Zofran) 4 MG/2ML injection 4 mg  4 mg Intravenous Q6H PRN    prochlorperazine (Compazine) 10 MG/2ML injection 5 mg  5 mg Intravenous Q8H PRN       Allergies:   Allergies[1]    Family Medical History:  Family History   Problem Relation Age of Onset    Heart Disorder Mother     Breast Cancer Maternal Cousin Female 57    Cancer Daughter 29        Hodgkin's Lymphoma    Ovarian Cancer Neg     Bleeding Disorders Neg     DVT/VTE Neg     Pancreatic Cancer Neg     Prostate Cancer Neg        Social History:  Social History     Socioeconomic History    Marital status:      Spouse name: Not on file    Number of children: Not on file    Years of education: Not on file    Highest education level: Not on file   Occupational History    Not on file   Tobacco Use    Smoking status: Never    Smokeless tobacco: Never   Vaping Use    Vaping status: Never Used   Substance and Sexual Activity    Alcohol use: Yes     Comment: social    Drug use: No    Sexual activity: Not on file   Other Topics Concern     Service Not Asked    Blood Transfusions Not Asked    Caffeine Concern Yes     Comment: 1 cup of coffee     Occupational Exposure Not Asked    Hobby Hazards Not Asked     Sleep Concern Not Asked    Stress Concern Not Asked    Weight Concern Not Asked    Special Diet Not Asked    Back Care Not Asked    Exercise Not Asked    Bike Helmet Not Asked    Seat Belt Not Asked    Self-Exams Not Asked   Social History Narrative    Camelia Figueroa is  to Baldemar x30 yrs. She has 3 adult children. 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 She lives with her , her mom, and 1 of the children in [place], [place].     Social Drivers of Health     Financial Resource Strain: Not on file   Food Insecurity: No Food Insecurity (2024)    Food Insecurity     Food Insecurity: Never true   Transportation Needs: No Transportation Needs (2024)    Transportation Needs     Lack of Transportation: No     Car Seat: Not on file   Physical Activity: Not on file   Stress: Not on file   Social Connections: Not on file   Housing Stability: Low Risk  (2024)    Housing Stability     Housing Instability: No     Housing Instability Emergency: Not on file     Crib or Bassinette: Not on file       Gyn History:  OB History    Para Term  AB Living   3 3 0 0 0 0   SAB IAB Ectopic Multiple Live Births   0 0 0 0 0       Objective:    /88 (BP Location: Left arm)   Pulse 98   Temp 98.4 °F (36.9 °C) (Oral)   Resp 18   Wt 75.4 kg (166 lb 3.2 oz)   SpO2 90%   BMI 30.40 kg/m²   Physical Exam:  General: A&Ox3, NAD, appears tired  HEENT: PERRL, OP clear  Neck: supple, no LAD or JVD  CV: RRR, no murmurs, + pulses  Pulm: CTA b/l, no w/r/r, normal effort  Lymph: no palpable lymphadenopathy throughout the cervical, supraclavicular, axillary, or inguinal regions  Extremities: no edema or calf tenderness  Neurological: Grossly intact    Limited exam since undergoing bedside echo    Labs:  Lab Results   Component Value Date/Time    WBC 9.6 11/10/2024 06:24 AM    RBC 3.88 11/10/2024 06:24 AM    HGB 11.8 (L) 11/10/2024 06:24 AM    HCT 35.2 11/10/2024 06:24 AM    MCV 90.7 11/10/2024 06:24 AM    MCH 30.4  11/10/2024 06:24 AM    MCHC 33.5 11/10/2024 06:24 AM    RDW 15.9 (H) 11/10/2024 06:24 AM    NEPRELIM 5.27 11/10/2024 06:24 AM    .0 11/10/2024 06:24 AM       Lab Results   Component Value Date/Time     (H) 11/08/2024 06:09 AM    BUN 7 (L) 11/08/2024 06:09 AM    CREATSERUM 0.66 11/08/2024 06:09 AM    GFRNAA 67 02/16/2021 12:09 PM    CA 8.7 11/08/2024 06:09 AM    ALB 3.5 11/04/2024 12:27 PM     11/08/2024 06:09 AM    K 3.8 11/08/2024 06:09 AM     11/08/2024 06:09 AM    CO2 26.0 11/08/2024 06:09 AM    ALKPHO 90 11/04/2024 12:27 PM    AST 16 11/04/2024 12:27 PM    ALT 16 11/04/2024 12:27 PM       Imaging:Assessment & Plan:    Camelia Markham is a 64 year old female with metastatic thyroid cancer hospitalized here with syncopal episode CT scan shows loculated pleural effusion as well as new groundglass opacities.    # right loculated effusion: s/p CT placement and removal, cytology is negative.     # Met thyroid cancer: intolerant of lenvatinib 24mg dose, on hold, will rechallenge with 20mg once her PS has improved as an outpatient    If dc planned soon, will arrange oncology fu next week    Thank you Dr FINA MABRY, MD WENDY for the opportunity to participate in the care of this interesting patient. Please do contact me if I may be of any further assistance    Inocente Chen MD  [facility] Hematology/Oncology  [facility]    This note was created using a voice-recognition transcribing system. Incorrect words or phrases may have been missed during proofreading. Please interpret accordingly.         [1]   Allergies  Allergen Reactions    Latex HIVES    Peanut-Containing Drug Products SWELLING     Closes Throat  Closes Throat  Closes Throat      Peanuts SWELLING     Closes Throat    Adhesive Tape OTHER (SEE COMMENTS)    Seasonal

## 2024-11-11 NOTE — CM/SW NOTE
11/11/24 1300   Discharge disposition   Expected discharge disposition Home-Health   Post Acute Care Provider Residential   Discharge transportation Private car     Pt discussed during nursing rounds. Pt is stable for [place] today. [place] order entered. [facility] will resume RN and therapy services at [place], liaison Olinda notified of [place] home today. Pt's spouse will provide transport at [place].    Plan: Home w/spouse with [facility] today.    / to remain available for support and/or discharge planning.     MARIEL SaldivarN    [phone]

## 2024-11-11 NOTE — PROGRESS NOTES
Pulmonary Medicine Inpatient Progress Note                 Subjective:  Afebrile, on RA  Sitting in a chair. Feels well       ALLERGIES:  Allergies[1]       MEDS:  Home Medications:  Medications Taking[2]  Scheduled Medication:   amLODIPine  10 mg Oral Daily    DULoxetine  20 mg Oral Daily    levothyroxine  150 mcg Oral Before breakfast    pantoprazole  40 mg Oral QAM AC    heparin  5,000 Units Subcutaneous 2 times per day     Continuous Infusing Medication:   sodium chloride 100 mL/hr at 11/09/24 0407     PRN Medications:    melatonin    HYDROcodone-acetaminophen    butalbital-acetaminophen-caffeine    diphenhydrAMINE    traMADol    acetaminophen    temazepam    ondansetron    prochlorperazine       PHYSICAL EXAM:  BP (!) 168/94 (BP Location: Left arm)   Pulse 109   Temp 98.4 °F (36.9 °C) (Oral)   Resp 18   Wt 166 lb 3.2 oz (75.4 kg)   SpO2 94%   BMI 30.40 kg/m²   CONSTITUTIONAL: alert, oriented, no apparent distress  HEENT: atraumatic normocephalic  MOUTH: mucous membranes are moist. No OP exudates  NECK/THROAT: no JVD. Trachea midline. No obvious thyromegaly  LUNG: clear b/l no wheezing, + right basilar crackles. Chest symmetric with respiratory motion. + pleurex catheter in place. + right posterior chest tube site clean, dry  HEART: regular rate and rhythm, no obvious murmers or gallops note  ABD: soft non tender. + bowel sounds. No organomegaly noted  EXT: no clubbing, cyanosis, or edema noted. Pulses intact grossly  NEURO/MUSCULOSKELETAL: no gross deficits  SKIN: warm, dry. No obvious lesions noted  LYMPH: no obvious LAD       IMAGES:  CXR 11/11/24  Impression   CONCLUSION: Moderate-sized right basal pleural effusion with chest tube.  No significant interval change in size of the effusion.  Stable right basal pulmonary opacity which may represent secondary compressive atelectasis.  Stable trace left basal  pleural effusion.       CXR 11/8/24  CONCLUSION:   No significant change to the right approach  pleural pigtail catheter.  Persistent small to moderate right pleural effusion and right basilar opacity.   Question small left pleural effusion.   No new abnormality.       CXR appears right upper loculated effusion is improved  CONCLUSION:   1. Interval placement of a short pigtail chest tube in the right apex with successful drainage of the loculated pleural effusion.   2. Right basilar chest tube remains with interval decrease in loculated right pleural effusion and slight improvement in atelectasis and or pneumonia.   3. Subsegmental atelectasis in the left lower lobe.   4. Top-normal heart size with normal pulmonary vascularity.   5. Atherosclerotic calcification aorta.   6. Internal fixation of an healed right clavicle fracture.       Chest CT 11/4/24  FINDINGS:   The following findings were made:   1. There is a large loculated right-sided pleural effusion with associated underlying pleural nodularity.  The finding is associated with scattered areas of subsegmental atelectasis throughout the right lung.  There is a PleurX catheter at the right lung   base within a free-flowing segment of this loculated pleural effusion.   2. There are scattered ground-glass nodular peribronchial densities within the right lung as well as at the left lung base raising the possibility of a superimposed infectious process.  There is a very small free-flowing left pleural effusion with   adjacent compressive atelectasis.   3. The visualized aspects of the liver demonstrates decreased attenuation compatible with fatty infiltration.  There are no focal hepatic lesions identified within the imaged segments of the liver.   4. There are slight to moderate degenerative changes within the thoracic spine.   The remainder of the examination is unremarkable.  Specifically, on vascular windows, the main pulmonary arteries and segmental branches are normal in their course and caliber with no intraluminal filling defects identified to  suggest pulmonary embolus.   The thoracic aorta is normal in its course and caliber with no aneurysmal dilatation, no dissection, and no secondary signs of acute aortic injury.  On lung windows, there is no pneumothorax.  On mediastinal windows, there is no pericardial effusion or   significant adenopathy.  Within the upper abdomen, there is no adrenal mass.       LABS:  Recent Labs   Lab 11/08/24  0609 11/09/24  0741 11/10/24  0624   RBC 3.82 3.83 3.88   HGB 11.4* 11.7* 11.8*   HCT 35.4 35.2 35.2   MCV 92.7 91.9 90.7   MCH 29.8 30.5 30.4   MCHC 32.2 33.2 33.5   RDW 15.7* 15.9* 15.9*   NEPRELIM 4.62 4.62 5.27   WBC 8.0 7.9 9.6   .0 270.0 308.0       Recent Labs   Lab 11/06/24  0647 11/07/24  0623 11/08/24  0609   * 109* 110*   BUN 7* 5* 7*   CREATSERUM 0.65 0.58 0.66   EGFRCR 99 102 99   CA 8.5* 8.7 8.7    140 142   K 3.7 3.6 3.8    108 109   CO2 24.0 25.0 26.0   TP 5.8  --   --        ASSESSMENT/PLAN:  Metastatic thyroid papillary carcinoma s/p malignant right pleural effusion with pleurex placement.  -chest CT with new loculated right pleural effusion and increasing dyspnea and chest pain  -s/p IR chest tube placement. Checking fluid cytology and cultures  -repeat CXR showed improvement and chest tube was removed  -continue to drain right pleurex once every 2 weeks (last drained 200 ml a few days ago)  -pain control  -oncology following  -home 02 eval prior to discharge    Syncope  -CT brain unremarkable for acute abnormalities    Proph:  -DVT: hep subcutaneous    Discussed with the pt  Stable for discharge home today from pulmonary standpt. Should f/u with me in 2-3 weeks.         Thank you for the opportunity to care for Camelia Markham.     ALDA Brooks DO, MPH  Pulmonary Critical Care Medicine  [facility]          [1]   Allergies  Allergen Reactions    Latex HIVES    Peanut-Containing Drug Products SWELLING     Closes Throat  Closes  Throat  Closes Throat      Peanuts SWELLING     Closes Throat    Adhesive Tape OTHER (SEE COMMENTS)    Seasonal    [2]   Outpatient Medications Marked as Taking for the 11/4/24 encounter (Hospital Encounter)   Medication Sig Dispense Refill    [START ON 11/12/2024] pantoprazole 40 MG Oral Tab EC Take 1 tablet (40 mg total) by mouth every morning before breakfast. 30 tablet 0    butalbital-acetaminophen-caffeine -40 MG Oral Tab Take 1 tablet by mouth every 6 (six) hours as needed. 30 tablet 0    DULoxetine 20 MG Oral Cap DR Particles Take 1 capsule (20 mg total) by mouth daily. 30 capsule 0    levothyroxine 150 MCG Oral Tab Take 1 tablet (150 mcg total) by mouth before breakfast.      Lenvatinib, 24 MG Daily Dose, (LENVIMA, 24 MG DAILY DOSE,) 2 x 10 MG & 4 MG Oral Capsule Therapy Pack Take 24 mg by mouth daily. 30 each 5    ondansetron (ZOFRAN) 8 MG tablet Take 1 tablet (8 mg total) by mouth every 8 (eight) hours as needed for Nausea. 30 tablet 3    HYDROcodone-acetaminophen 5-325 MG Oral Tab Take 1-2 tablets by mouth every 6 (six) hours as needed for Pain. 60 tablet 0    Olmesartan Medoxomil 40 MG Oral Tab Take 1 tablet (40 mg total) by mouth daily.      Omeprazole 40 MG Oral Capsule Delayed Release Take 1 capsule (40 mg total) by mouth every other day.      amLODIPine 10 MG Oral Tab Take 1 tablet (10 mg total) by mouth daily.

## 2024-11-12 NOTE — PAYOR COMM NOTE
--------------  CONTINUED STAY REVIEW-----REQUESTING ADDITIONAL DAY 11/10 WITH DISCHARGE ON 11/11      Payor: FLORINDA RODRIGUEZ  Subscriber #:  [id number]  Authorization Number: [id number]    Admit date: 11/6/24  Admit time: 11:47 AM      11/10       Date of Service: 11/10/2024 12:10 PM     Signed            [facility]  part of [facility]     Progress Note        Chief Complaint:      Syncope     Subjective:   Subjective:     Patient seen and examined  normotensive afebrile  No new complaints at this time.   Overall with clinical improvement        Objective:   Blood pressure 135/81, pulse 84, temperature 98 °F (36.7 °C), temperature source Oral, resp. rate 16, weight 166 lb 3.2 oz (75.4 kg), SpO2 93%.  Physical Exam     General: Patient is alert and oriented x3 does not appear to be in acute distress at this time  HEENT: EOMI PERRLA, atraumatic normocephalic  Cardiac: S1-S2 appreciated  Lungs: Good air entry bilaterally clear to auscultation, PleurX in place R side  Abdomen: Soft nontender nondistended positive bowel sounds  Ext: Peripheral pulses are positive  Neuro: No focal deficits noted  Psych: Normal mood  Skin: No rashes noted  MSK: Full range of motion intact        Results:         Lab Results   Component Value Date     WBC 9.6 11/10/2024     HGB 11.8 (L) 11/10/2024     HCT 35.2 11/10/2024     .0 11/10/2024     CREATSERUM 0.66 11/08/2024     BUN 7 (L) 11/08/2024      11/08/2024     K 3.8 11/08/2024      11/08/2024     CO2 26.0 11/08/2024      (H) 11/08/2024     CA 8.7 11/08/2024     ALB 3.5 11/04/2024     ALKPHO 90 11/04/2024     BILT 1.0 11/04/2024     TP 5.8 11/06/2024     AST 16 11/04/2024     ALT 16 11/04/2024     PTT 26.0 12/17/2022     INR 0.98 03/13/2023     T4F 1.9 (H) 07/17/2024     TSH 0.048 (L) 07/17/2024     LIP 33 05/23/2024     DDIMER 0.55 05/23/2024     MG 2.0 11/10/2024     PHOS 4.0 11/10/2024     TROPHS <3 11/04/2024         XR CHEST AP PORTABLE   (CPT=71045)     Result Date: 11/10/2024  CONCLUSION:          No new abnormality.  Unchanged right basilar chest tube and moderate loculated right pleural effusion.  No appreciable pneumothorax.  Persistent small left pleural effusion and left retrocardiac opacity.      Dictated by (CST): Jose Miguel Velazquez MD on 11/10/2024 at 9:40 AM     Finalized by (CST): Jose Miguel Velazquez MD on 11/10/2024 at 9:42 AM           XR CHEST AP PORTABLE  (CPT=71045)     Result Date: 11/9/2024  CONCLUSION:          Status post removal of right chest tube.  No pneumothorax.  Unchanged small to moderate loculated right pleural effusion.  Unchanged right lower lung alveolar opacity.  Left retrocardiac opacity unchanged.      Dictated by (CST): Maggie Marques MD on 11/09/2024 at 8:51 AM     Finalized by (CST): Maggie Marques MD on 11/09/2024 at 8:54 AM                Assessment & Plan:      Syncope and collapse  -possibly due to poor PO intake,   -orthostasis.   -continue current IV hydration will monitor closely     Malignant pleural effusion (HCC)  -Patient with hx of recurrent metastatic Papillary thyroid cancer.   -has a pleurX in place but new imaging reveals a loculated pleural effusion that is away from the pleurX  -will have pulmonology placed on consult  -appreciate hem/onc recs.   -will monitor closely     Hyperglycemia  -continue Accuchecks qachs,  -supplement with sliding scale     HTN  -montior BP  -titrate meds as needed.      VTE ppx: heparin subcutaneous.   Full Code     Global A/P  -CXR with improvement.  -R chest tube placement removed.  -transferred to 4th floor  -DC planning.  -monitor closely.  -appreciate IR and Pulm recs.  -will monitor closely  -appreciate hem/onc recs  -Reviewed previous consultant notes  -Reviewed CBC, BMP, Mag, and Phos  -Reviewed tests ordered  -Repeat labs in am  -MDM: High, severe exacerbation of chronic illness posing a threat to life. IV medications requiring close inpatient monitoring.                   Fidel Henderson MD                          Vitals (last day) before discharge       Date/Time Temp Pulse Resp BP SpO2 Weight O2 Device O2 Flow Rate (L/min) [facility]    11/11/24 1405 97.8 °F (36.6 °C) -- 20 159/94 94 % -- None (Room air) -- CV    11/11/24 1124 -- 109 -- 168/94 94 % -- None (Room air) -- AC    11/11/24 0859 -- 89 -- 170/96 93 % -- None (Room air) -- AC    11/11/24 0512 98.4 °F (36.9 °C) 98 18 154/88 90 % -- None (Room air) -- RG    11/10/24 2020 98 °F (36.7 °C) 98 18 148/85 94 % -- None (Room air) -- AA    11/10/24 1343 -- -- -- 152/89 -- -- -- -- AH    11/10/24 1230 98.3 °F (36.8 °C) 95 18 156/102 94 % -- None (Room air) -- JR    11/10/24 0900 -- -- -- 135/81 93 % -- None (Room air) --     11/10/24 0520 98 °F (36.7 °C) 84 16 140/82 92 % -- None (Room air) -- AA            Medication Administration Report  for Camelia Markham as of 11/02/24 through 11/11/24  Legend:       Medications 11/02 11/03 11/04 11/05 11/06 11/07 11/08 11/09 11/10 11/11   Completed Medications   diphenhydrAMINE (Benadryl) 50 mg/mL injection 25 mg  Dose: 25 mg  Freq: Once Route: IV  Start: 11/05/24 1445 End: 11/05/24 1439   Order specific questions:          97049                           iopamidol 76% (ISOVUE-370) injection for power injector  Dose: 80 mL  Freq: IMG once as needed Route: IV  PRN Reason: contrast  Start: 11/04/24 1422 End: 11/04/24 1420      77817                            magnesium oxide (Mag-Ox) tab 400 mg  Dose: 400 mg  Freq: Once Route: OR  Start: 11/07/24 0745 End: 11/07/24 0922         47489            sodium chloride 0.9 % IV bolus 1,000 mL  Dose: 1,000 mL  Freq: Once Route: IV  Start: 11/04/24 1240 End: 11/04/24 1550      53430     71764               Discontinued Medications   Medications 11/02 11/03 11/04 11/05 11/06 11/07 11/08 11/09 11/10 11/11   acetaminophen (Tylenol Extra Strength) tab 500 mg  Dose: 500 mg  Freq: Every 4 hours PRN Route: OR  PRN Reason: Fever  PRN Comment: equal to or  greater than 100.4  Indications of Use: PAIN  Indications Comment: headache  Start: 11/05/24 0000 End: 11/11/24 1626   Admin Instructions:   do not initiate oral therapy until 6-8 hours after the last IV acetaminophen dose if IV acetaminophen was used previously       29625 77397 7649 [C]8             acetaminophen (Tylenol Extra Strength) tab 500 mg  Dose: 500 mg  Freq: Every 4 hours PRN Route: OR  PRN Reason: Fever  PRN Comment: equal to or greater than 100.4  Start: 11/04/24 1616 End: 11/05/24 0000   Admin Instructions:   do not initiate oral therapy until 6-8 hours after the last IV acetaminophen dose if IV acetaminophen was used previously      80316 (2012) [C]31 778291               amLODIPine (Norvasc) tab 10 mg  Dose: 10 mg  Freq: Daily Route: OR  Start: 11/10/24 1330 End: 11/11/24 1626            [id number]      084768        butalbital-acetaminophen-caffeine (Fioricet) -40 MG per tab 1 tablet  Dose: 1 tablet  Freq: Every 6 hours PRN Route: OR  PRN Reasons: Headaches,Migraine  Start: 11/07/24 1234 End: 11/11/24 1626         335495      446105      492628      498430      440796                                               DULoxetine (Cymbalta) DR cap 20 mg  Dose: 20 mg  Freq: Daily Route: OR  Start: 11/10/24 1330 End: 11/11/24 1626   Admin Instructions:   Capsule may be opened; do not crush contents. May mix with applesauce            [phone] [phone]        heparin (Porcine) 5000 UNIT/ML injection 5,000 Units  Dose: 5,000 Units  Freq: 2 times per day Route: SC  Start: 11/04/24 2100 End: 11/11/24 1626      197163      212438     2505480 [phone] 376526 852226     647402      949818     984066      297466     077987      877028     [id number] [phone]        HYDROcodone-acetaminophen (Norco) 5-325 MG per tab 1 tablet  Dose: 1 tablet  Freq: Every 6 hours PRN Route: OR  PRN Reason: moderate pain  Start: 11/07/24 0414 End: 11/11/24 1626         952957     044284       168516     397529       954239         levothyroxine (Synthroid) tab 150 mcg  Dose: 150 mcg  Freq: Before breakfast Route: OR  Start: 11/10/24 1330 End: 11/11/24 1626   Admin Instructions:   Give on an empty stomach. Hold tube feedings 1 hour before AND after.            (1330) [C]40      311500                                               ondansetron (Zofran) 4 MG/2ML injection 4 mg  Dose: 4 mg  Freq: Every 6 hours PRN Route: IV  PRN Reasons: Nausea,vomiting  Start: 11/04/24 1616 End: 11/11/24 1626   Admin Instructions:   Default antiemetic sequence (unless otherwise preferred by patient):  1. ondansetron (Zofran)  2. prochlorperazine (Compazine). Wait 15 minutes before proceeding to next medication in sequence.  Follow therapeutic duplication policy.      215460               pantoprazole (Protonix) DR tab 40 mg  Dose: 40 mg  Freq: Every morning before breakfast Route: OR  Start: 11/07/24 1115 End: 11/11/24 1626   Admin Instructions:   Do not crush         639342      521323      412671     46      276611      727844                     sodium chloride 0.9% infusion  Rate: 100 mL/hr  Freq: Continuous Route: IV  Start: 11/04/24 1630 End: 11/11/24 1626      775869      129627      351898     554314      394980     897359      794031     934838      239826          temazepam (Restoril) cap 15 mg  Dose: 15 mg  Freq: Nightly PRN Route: OR  PRN Reason: Sleep  Start: 11/04/24 1616 End: 11/11/24 1626   Admin Instructions:   CAUTION: REPRODUCTIVE RISK      585956      735210       234837      954448      568808      495787         traMADol (Ultram) tab 50 mg  Dose: 50 mg  Freq: Every 6 hours PRN Route: OR  PRN Reasons: moderate pain,severe pain  Start: 11/06/24 1743 End: 11/11/24 1626   Admin Instructions:   Max dose 400 mg/day        796055      229199      360739           Medications 11/02 11/03 11/04 11/05 11/06 11/07 11/08 11/09 11/10 11/11

## 2024-11-12 NOTE — PAYOR COMM NOTE
--------------  DISCHARGE REVIEW    Payor: [facility] PPO  Subscriber #:  [id number]  Authorization Number: [id number]    Admit date: 11/6/24  Admit time:  11:47 AM  Discharge Date: 11/11/2024  2:26 PM     Admitting Physician: Dara Campos MD  Attending Physician:  No att. providers found  Primary Care Physician: Alton Marie MD

## 2024-11-13 ENCOUNTER — APPOINTMENT (OUTPATIENT)
Dept: HEMATOLOGY/ONCOLOGY | Facility: HOSPITAL | Age: 64
End: 2024-11-13
Attending: INTERNAL MEDICINE
Payer: COMMERCIAL

## 2024-11-13 ENCOUNTER — APPOINTMENT (OUTPATIENT)
Dept: HEMATOLOGY/ONCOLOGY | Facility: HOSPITAL | Age: 64
End: 2024-11-13
Attending: NURSE PRACTITIONER
Payer: COMMERCIAL

## 2024-11-13 ENCOUNTER — PATIENT MESSAGE (OUTPATIENT)
Dept: PULMONOLOGY | Facility: CLINIC | Age: 64
End: 2024-11-13

## 2024-11-15 ENCOUNTER — TELEPHONE (OUTPATIENT)
Dept: PULMONOLOGY | Facility: CLINIC | Age: 64
End: 2024-11-15

## 2024-11-15 ENCOUNTER — SOCIAL WORK SERVICES (OUTPATIENT)
Dept: HEMATOLOGY/ONCOLOGY | Facility: HOSPITAL | Age: 64
End: 2024-11-15

## 2024-11-15 ENCOUNTER — TELEPHONE (OUTPATIENT)
Dept: HEMATOLOGY/ONCOLOGY | Facility: HOSPITAL | Age: 64
End: 2024-11-15

## 2024-11-15 NOTE — TELEPHONE ENCOUNTER
Called Camelia Figueroa, status check, patient is doing well since being in hospital, however feeling fatigued and run down since discharged. Trying to get some energy back. No pain and appetite is better. Cough is still present intermittently. Will follow up in office next week she is aware of the appointment.

## 2024-11-15 NOTE — TELEPHONE ENCOUNTER
Follow up with Dr. Brooks  Received: Today  Angelique Becerra, RN  P  [facility] Clinical Staff  This patient was supposed to be seen sooner with Dr. Brooks and she was waiting to hear back about a post hospital follow up appointment.    Thanks, Angelique

## 2024-11-15 NOTE — PROGRESS NOTES
RAND called and spoke with patient regarding her insurance terming due to her exhausting her FMLA. Patient states that she was told by her employer that she would only be protected for 12 weeks  from 9/19/24. Patient is not really clear on when her insurance will end.She is estimating it to be around 12/10. Patient's spouse added her to his plan effective 1/1/25 in the meantime, patient is worried about being able to continue her medications. She reports that she does have 2 full boxes.    RAND also discussed reasonable accommodations under ADA. SW emailed patient a form for the [facility] Reasonable Accommodation Request for Employees. RAND will meet with patient on Tuesday to assist with completing this form.

## 2024-11-18 NOTE — TELEPHONE ENCOUNTER
Message  Received: 3 days ago  Dank Brooks DO Marler, Jennifer, RN; JEAN Grider [facility] Clinical Staff  Caller: Unspecified (3 days ago,  4:44 PM)  How about next Thursday 11/21 @ 1:30 pm?    Thanks    Mike Brooks

## 2024-11-19 ENCOUNTER — NURSE ONLY (OUTPATIENT)
Dept: HEMATOLOGY/ONCOLOGY | Facility: HOSPITAL | Age: 64
End: 2024-11-19
Attending: INTERNAL MEDICINE
Payer: COMMERCIAL

## 2024-11-19 ENCOUNTER — SOCIAL WORK SERVICES (OUTPATIENT)
Dept: HEMATOLOGY/ONCOLOGY | Facility: HOSPITAL | Age: 64
End: 2024-11-19

## 2024-11-19 VITALS
WEIGHT: 161.63 LBS | OXYGEN SATURATION: 96 % | HEART RATE: 93 BPM | HEIGHT: 62 IN | RESPIRATION RATE: 18 BRPM | TEMPERATURE: 98 F | DIASTOLIC BLOOD PRESSURE: 64 MMHG | SYSTOLIC BLOOD PRESSURE: 128 MMHG | BODY MASS INDEX: 29.74 KG/M2

## 2024-11-19 VITALS
SYSTOLIC BLOOD PRESSURE: 128 MMHG | RESPIRATION RATE: 18 BRPM | OXYGEN SATURATION: 96 % | BODY MASS INDEX: 29.74 KG/M2 | HEART RATE: 93 BPM | HEIGHT: 62 IN | DIASTOLIC BLOOD PRESSURE: 64 MMHG | WEIGHT: 161.63 LBS | TEMPERATURE: 98 F

## 2024-11-19 DIAGNOSIS — Z51.81 THERAPEUTIC DRUG MONITORING: ICD-10-CM

## 2024-11-19 DIAGNOSIS — C78.01 MALIGNANT NEOPLASM METASTATIC TO RIGHT LUNG (HCC): ICD-10-CM

## 2024-11-19 DIAGNOSIS — J90 PLEURAL EFFUSION: ICD-10-CM

## 2024-11-19 DIAGNOSIS — C73 THYROID CANCER (HCC): Primary | ICD-10-CM

## 2024-11-19 DIAGNOSIS — C73 THYROID CANCER (HCC): ICD-10-CM

## 2024-11-19 DIAGNOSIS — R45.89 ANXIETY ABOUT HEALTH: ICD-10-CM

## 2024-11-19 DIAGNOSIS — Z71.89 GOALS OF CARE, COUNSELING/DISCUSSION: ICD-10-CM

## 2024-11-19 DIAGNOSIS — J91.0 MALIGNANT PLEURAL EFFUSION (HCC): ICD-10-CM

## 2024-11-19 DIAGNOSIS — Z51.5 PALLIATIVE CARE ENCOUNTER: Primary | ICD-10-CM

## 2024-11-19 DIAGNOSIS — G89.3 CANCER RELATED PAIN: ICD-10-CM

## 2024-11-19 DIAGNOSIS — Z51.5 PALLIATIVE CARE ENCOUNTER: ICD-10-CM

## 2024-11-19 LAB
ALBUMIN SERPL-MCNC: 4.3 G/DL (ref 3.2–4.8)
ALBUMIN/GLOB SERPL: 1.1 {RATIO} (ref 1–2)
ALP LIVER SERPL-CCNC: 97 U/L
ALT SERPL-CCNC: 10 U/L
ANION GAP SERPL CALC-SCNC: 7 MMOL/L (ref 0–18)
AST SERPL-CCNC: 17 U/L (ref ?–34)
BASOPHILS # BLD AUTO: 0.08 X10(3) UL (ref 0–0.2)
BASOPHILS NFR BLD AUTO: 0.9 %
BILIRUB SERPL-MCNC: 0.4 MG/DL (ref 0.2–1.1)
BUN BLD-MCNC: 12 MG/DL (ref 9–23)
BUN/CREAT SERPL: 14.6 (ref 10–20)
CALCIUM BLD-MCNC: 9.5 MG/DL (ref 8.7–10.4)
CHLORIDE SERPL-SCNC: 108 MMOL/L (ref 98–112)
CO2 SERPL-SCNC: 25 MMOL/L (ref 21–32)
CREAT BLD-MCNC: 0.82 MG/DL
DEPRECATED RDW RBC AUTO: 54.4 FL (ref 35.1–46.3)
EGFRCR SERPLBLD CKD-EPI 2021: 80 ML/MIN/1.73M2 (ref 60–?)
EOSINOPHIL # BLD AUTO: 0.52 X10(3) UL (ref 0–0.7)
EOSINOPHIL NFR BLD AUTO: 5.5 %
ERYTHROCYTE [DISTWIDTH] IN BLOOD BY AUTOMATED COUNT: 15.6 % (ref 11–15)
FASTING STATUS PATIENT QL REPORTED: NO
GLOBULIN PLAS-MCNC: 3.9 G/DL (ref 2–3.5)
GLUCOSE BLD-MCNC: 128 MG/DL (ref 70–99)
HCT VFR BLD AUTO: 38.3 %
HGB BLD-MCNC: 11.9 G/DL
IMM GRANULOCYTES # BLD AUTO: 0.04 X10(3) UL (ref 0–1)
IMM GRANULOCYTES NFR BLD: 0.4 %
LYMPHOCYTES # BLD AUTO: 2.37 X10(3) UL (ref 1–4)
LYMPHOCYTES NFR BLD AUTO: 25.3 %
MCH RBC QN AUTO: 29.4 PG (ref 26–34)
MCHC RBC AUTO-ENTMCNC: 31.1 G/DL (ref 31–37)
MCV RBC AUTO: 94.6 FL
MONOCYTES # BLD AUTO: 0.54 X10(3) UL (ref 0.1–1)
MONOCYTES NFR BLD AUTO: 5.8 %
NEUTROPHILS # BLD AUTO: 5.82 X10 (3) UL (ref 1.5–7.7)
NEUTROPHILS # BLD AUTO: 5.82 X10(3) UL (ref 1.5–7.7)
NEUTROPHILS NFR BLD AUTO: 62.1 %
OSMOLALITY SERPL CALC.SUM OF ELEC: 291 MOSM/KG (ref 275–295)
PLATELET # BLD AUTO: 432 10(3)UL (ref 150–450)
POTASSIUM SERPL-SCNC: 3.8 MMOL/L (ref 3.5–5.1)
PROT SERPL-MCNC: 8.2 G/DL (ref 5.7–8.2)
RBC # BLD AUTO: 4.05 X10(6)UL
SODIUM SERPL-SCNC: 140 MMOL/L (ref 136–145)
WBC # BLD AUTO: 9.4 X10(3) UL (ref 4–11)

## 2024-11-19 PROCEDURE — 86800 THYROGLOBULIN ANTIBODY: CPT

## 2024-11-19 PROCEDURE — 36415 COLL VENOUS BLD VENIPUNCTURE: CPT

## 2024-11-19 PROCEDURE — 80053 COMPREHEN METABOLIC PANEL: CPT

## 2024-11-19 PROCEDURE — 99214 OFFICE O/P EST MOD 30 MIN: CPT | Performed by: INTERNAL MEDICINE

## 2024-11-19 PROCEDURE — 99214 OFFICE O/P EST MOD 30 MIN: CPT | Performed by: NURSE PRACTITIONER

## 2024-11-19 PROCEDURE — 84432 ASSAY OF THYROGLOBULIN: CPT

## 2024-11-19 PROCEDURE — 85025 COMPLETE CBC W/AUTO DIFF WBC: CPT

## 2024-11-19 RX ORDER — CLONAZEPAM 2 MG/1
1 TABLET ORAL NIGHTLY PRN
COMMUNITY

## 2024-11-19 NOTE — PROGRESS NOTES
RAND met with patient and her spouse after visiting with Dr Chen. RAND updated that patient is being told that her employment is ending along with her insurance coverage. Patient has not been able to speak with anyone in her HR department to get clarity on exactly when her last day of coverage will be. Patient will be added to her spouse's insurance effective 1/1/2025. Patient spouse indicated that they are able to pay for the cobra plan to prevent lapse in her coverage.

## 2024-11-19 NOTE — PROGRESS NOTES
[facility] Progress Note    Patient Name: Camelia Markham   YOB: 1960   Medical Record Number: [id number]   CSN: [id number]   Consulting Physician: Inocente Chen MD  Referring Physician(s): Dr Dana Sands MD    Reason for Consultation:  Metastatic  Thyroid Cancer   Cancer Staging   Malignant neoplasm of thyroid gland (HCC)  Staging form: Thyroid - Differentiated, AJCC 8th Edition  - Clinical: Stage IVB (rcT2, cN1, cM1, Age at diagnosis: >= 55 years) - Signed by Inocente Chen MD on 8/28/2024    Current Therapy  Lenvatinib - started 9/18/24   (To resume at 20mg daily)    Interval History  Patient returns for follow-up.  Recall she was hospitalized with dehydration and syncopal episode.  She was found to have a loculated effusion that was distant from her previously placed a Pleurx.  She had a chest tube placed and this effusion was drained.  Respiration improved.  She has since been home for the last week and has been doing well.  Her mucositis has resolved.  Dyspnea has improved.  Planning to have Pleurx drain sometime this week     Past Oncologic History   Camelia Markham is a 64 year old female that was seen today in the Cancer Center for metastatic thyroid cancer. Her oncologic history is detailed below    11/3/2010 was found to have an enlarged thyroid gland ultrasound-guided FNA showed papillary thyroid cancer.  Underwent total thyroidectomy with Dr. Asher.  Final pathology showed a 2.5 cm tumor and 2 positive lymph nodes.    12/2010 BRIDGES uptake study showed no evidence of abnormal uptake outside the neck.  Underwent 207 miCu of radioactive iodine.  She had then been maintained on levothyroxine.  Subsequently was lost to follow-up after 2018.    6/17/2024 Thyroglobulin increased to 10, prompted an ultrasound that showed an oval hypoechoic nodule in the superior aspect of the left thyroid measuring 1 x 0.6 x 0.6 cm thought to represent an indeterminate lymph node.    8/6/2024  ultrasound-guided FNA showed papillary carcinoma. NGS testing showed a BRAF V600E mutation    8/16/2024 [facility] thyroid scan showed no discernible pathologic activity increased uptake in the thyroid bed.  Given the biopsy-proven recurrence and absence of I-131 uptake this was thought to represent dedifferentiated recurrent thyroid malignancy.      8/22/2024 PET/CT fusion study showed extensive hypermetabolic pleural-based nodularity, large pleural effusion as well as hypermetabolic nodularity in the left neck soft tissues as well as peripheral right upper nodule all of which was concerning for metastatic disease.  Previously seen cystic pancreatic lesion did not demonstrate any hypermetabolic activity.    8/29/24 had thoracentesis with cytology showing metastatic thyroid carcinoma.    9/15/24 Underwent pleur-X for recurrent pleural effusion.    9/18/24 started lenvatinib at 24mg daily    11/2024 had significant mucositis decreased oral intake and diarrhea with lenvatinib.  This was held.  She required hospitalization and chest tube placement for evacuation loculated effusion.  Lenvatinib is being resumed at 20 mg daily      Past Medical History:  Past Medical History:    Anxiety state, unspecified    Cancer (HCC)    Colon adenomas    x3    Disorder of thyroid    thyroidectomy    Diverticulosis of large intestine    Esophageal reflux    Exposure to medical therapeutic radiation    thyroid    High blood pressure    High cholesterol    Hypercholesteremia    Hypothyroidism    Osteoarthrosis, unspecified whether generalized or localized, unspecified site    Thyroid cancer (HCC)    papillary thyroid; s/p surgery resection with Asher and BRIDGES    Unspecified essential hypertension       Past Surgical History:  Past Surgical History:   Procedure Laterality Date    Colonoscopy N/A 12/29/2022    Procedure: COLONOSCOPY;  Surgeon: Neeraj No MD;  Location: [facility] ENDOSCOPY    Colonoscopy  07/29/2024    Colonoscopy N/A  2024    Procedure: COLONOSCOPY;  Surgeon: Neeraj No MD;  Location: [facility] ENDOSCOPY    Egd  2022    Egd  2024    Esophagogastroduodenoscopy    Endoscopic ultrasound - internal  2022    Endoscopic ultrasound exam  2024    Endoscopic Ultrasound    Eye surgery  2024    Eye surgery Left 2024    Hysterectomy      Knee surgery Left 2022    no associated bleeding complications          40 week 7 lb(s) 5 oz Male; 6 hr labor           40 week 7 lb(s) 3 oz Female          41 week 9 lb(s) 8 oz Male    Other surgical history      Injection Tendon Sheath, Ligament    Thyroidectomy      total    Total abdom hysterectomy         Family Medical History:  Family History   Problem Relation Age of Onset    Heart Disorder Mother     Breast Cancer Maternal Cousin Female 57    Cancer Daughter 29        Hodgkin's Lymphoma    Ovarian Cancer Neg     Bleeding Disorders Neg     DVT/VTE Neg     Pancreatic Cancer Neg     Prostate Cancer Neg        Gyne History:  OB History    Para Term  AB Living   3 3 0 0 0 0   SAB IAB Ectopic Multiple Live Births   0 0 0 0 0       Social History:  Social History     Socioeconomic History    Marital status:      Spouse name: Not on file    Number of children: Not on file    Years of education: Not on file    Highest education level: Not on file   Occupational History    Not on file   Tobacco Use    Smoking status: Never    Smokeless tobacco: Never   Vaping Use    Vaping status: Never Used   Substance and Sexual Activity    Alcohol use: Yes     Comment: social    Drug use: No    Sexual activity: Not on file   Other Topics Concern     Service Not Asked    Blood Transfusions Not Asked    Caffeine Concern Yes     Comment: 1 cup of coffee     Occupational Exposure Not Asked    Hobby Hazards Not Asked    Sleep Concern Not Asked    Stress Concern Not Asked    Weight Concern Not Asked    Special Diet Not Asked     Back Care Not Asked    Exercise Not Asked    Bike Helmet Not Asked    Seat Belt Not Asked    Self-Exams Not Asked   Social History Narrative    Camelia Figueroa is  to Baldemar x30 yrs. She has 3 adult children. 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 She lives with her , her mom, and 1 of the children in [place], [place].     Social Drivers of Health     Financial Resource Strain: Not on file   Food Insecurity: No Food Insecurity (11/5/2024)    Food Insecurity     Food Insecurity: Never true   Transportation Needs: No Transportation Needs (11/5/2024)    Transportation Needs     Lack of Transportation: No     Car Seat: Not on file   Physical Activity: Not on file   Stress: Not on file   Social Connections: Not on file   Housing Stability: Low Risk  (11/5/2024)    Housing Stability     Housing Instability: No     Housing Instability Emergency: Not on file     Crib or Bassinette: Not on file       Allergies:   Allergies   Allergen Reactions    Latex HIVES    Peanut-Containing Drug Products SWELLING     Closes Throat  Closes Throat  Closes Throat      Peanuts SWELLING     Closes Throat    Adhesive Tape OTHER (SEE COMMENTS)    Seasonal        Current Medications:   clonazePAM 2 MG Oral Tab Take 0.5 tablets (1 mg total) by mouth nightly as needed for Anxiety.      Lenvatinib, 20 MG Daily Dose, 2 x 10 MG Oral Capsule Therapy Pack Take 20 mg by mouth daily. 60 each 5    pantoprazole 40 MG Oral Tab EC Take 1 tablet (40 mg total) by mouth every morning before breakfast. 30 tablet 0    butalbital-acetaminophen-caffeine -40 MG Oral Tab Take 1 tablet by mouth every 6 (six) hours as needed. 30 tablet 0    levothyroxine 150 MCG Oral Tab Take 1 tablet (150 mcg total) by mouth before breakfast.      prochlorperazine (COMPAZINE) 10 mg tablet Take 1 tablet (10 mg total) by mouth every 6 (six) hours as needed for Nausea. 30 tablet 3    ondansetron (ZOFRAN) 8 MG tablet Take 1 tablet (8 mg total) by mouth every 8 (eight)  hours as needed for Nausea. 30 tablet 3    HYDROcodone-acetaminophen 5-325 MG Oral Tab Take 1-2 tablets by mouth every 6 (six) hours as needed for Pain. 60 tablet 0    Olmesartan Medoxomil 40 MG Oral Tab Take 1 tablet (40 mg total) by mouth daily.      amLODIPine 10 MG Oral Tab Take 1 tablet (10 mg total) by mouth daily.         Review of Systems:  A comprehensive 14 point review of systems was completed.  Pertinent positives and negatives noted in the the HPI.     Vital Signs:  /64 (BP Location: Left arm, Patient Position: Sitting, Cuff Size: large)   Pulse 93   Temp 98.4 °F (36.9 °C) (Oral)   Resp 18   Ht 1.575 m (5' 2\")   Wt 73.3 kg (161 lb 9.6 oz)   SpO2 96%   BMI 29.56 kg/m²    Physical Examination:  General: No apparent distress, very tired  ENT: Conjunctiva clear, EOM intact. Dry oral mucosa  Lymphatics:  No palpable lymphadenopathy .   Chest: + R sided PleurX. Normal respiratory effort.   CV: Regular rate and rhythm, S1 and S2 heard  Extremities: No edema noted  Skin: No lesions, rashes or nodules  Neurological: grossly intact  Psych: Appropriate mood and affect     Performance Status:  ECOG-1    Labs:    Lab Results   Component Value Date/Time    WBC 9.4 11/19/2024 08:20 AM    RBC 4.05 11/19/2024 08:20 AM    HGB 11.9 (L) 11/19/2024 08:20 AM    HCT 38.3 11/19/2024 08:20 AM    MCV 94.6 11/19/2024 08:20 AM    MCH 29.4 11/19/2024 08:20 AM    MCHC 31.1 11/19/2024 08:20 AM    RDW 15.6 (H) 11/19/2024 08:20 AM    NEPRELIM 5.82 11/19/2024 08:20 AM    .0 11/19/2024 08:20 AM       Lab Results   Component Value Date/Time     (H) 11/19/2024 08:20 AM    BUN 12 11/19/2024 08:20 AM    CREATSERUM 0.82 11/19/2024 08:20 AM    GFRNAA 67 02/16/2021 12:09 PM    CA 9.5 11/19/2024 08:20 AM    ALB 4.3 11/19/2024 08:20 AM     11/19/2024 08:20 AM    K 3.8 11/19/2024 08:20 AM     11/19/2024 08:20 AM    CO2 25.0 11/19/2024 08:20 AM    ALKPHO 97 11/19/2024 08:20 AM    AST 17 11/19/2024 08:20 AM     ALT 10 11/19/2024 08:20 AM       Imaging:  PET films previously reviewed with pt/spouse -impression as above    Impression:  64-year-old with a long history of thyroid cancer s/p thyroidectomy followed by BRIDGES and levothyroxine suppression now with biopsy-proven recurrence around the neck as well as pulmonary parenchymal mets and pleural nodules all worrisome for metastatic thyroid cancer.  BRIDGES uptake scan was negative suggesting dedifferentiated thyroid cancer.    I've previously had a long discussion with patient explained that unfortunately this represents an incurable Stage IV malignancy.  However this is certainly very treatable with oral tyrosine kinase inhibitors with good disease control.  She was started lenvatinib but has developed increasing mucositis nausea and dehydration    Plan:  Since her symptoms have improved  will resume lenvatinib at 20 mg daily [ previously on 24 mg.]  CBC shows no significant anemia  Thyroglobulin has improved and her pleural effusions regressed all suggestive good early response to treatment.  Will plan CT scan in December  Pleux-X needing to be drained less frequently, has fu with pulmonology to discuss removal  Discussed oral anti-emetics  She will fu with Oncology NP next week to evaluate her tolerance of lenvima. IF she has continued trouble tolerating lenvatinib may need to switch to a BRAF inhibitor. (Since her tumor does have a BRAF-V600E mutation)    Inocente Chen MD  Hematology/Oncology

## 2024-11-19 NOTE — PROGRESS NOTES
Palliative Care Follow Up Note     Patient Name: Camelia Markham   YOB: 1960   Medical Record Number: [id number]   Date of visit: 11/19/2024     The 21st Century Cures Act makes medical notes like these available to patients in the interest of transparency. Please be advised this is a medical document. Medical documents are intended to carry relevant information, facts as evident, and the clinical opinion of the practitioner. The medical note is intended as peer to peer communication and may appear blunt or direct. It is written in medical language and may contain abbreviations or verbiage that are unfamiliar.     Chief Complaint/Reason for Visit:  Chief Complaint   Patient presents with    Palliative Care        History of Present Illness:         Camelia Markham is a 64 year old female with  history of metastatic thyroid ca s/p thyroidectomy 2010, radioactive iodine tx with recent metastatic disease to lung, LN follows with Dr. Chen, hypothyroidism, HTN, HLD, GERD and anxiety.    Camelia Figueroa was recently hospitalized with SOB and cough caused by RUL pleural effusion - a chest tube was placed during the most recent admission and RUL effusion resolved. Camelia Figueroa does still have a R PleurX drain in place that drains minimal fluid. She has been instructed by Pulm to drain R PleurX Q2W. HH follows Camelia Figueroa at home currently.     PCP: Dr. Marie  Onc: Dr. Chen    Patient seen and examined with  present today. Camelia Figueroa is awake, alert, oriented x 4, answers questions appropriately, is a reliable historian, and is in NAD today.    Simi is on a leave from work. Her  was laid off from his job late last week. Camelia Figueroa is concerned about her finances.     See ROS.     Problem List:  Patient Active Problem List   Diagnosis    Thyroid cancer (HCC)    Hypothyroidism    Exposure to medical therapeutic radiation    Acute gastritis    Posterior tibial tendinitis of right leg    Acute tonsillitis    Backache     Bursitis of shoulder    Hypertensive disorder    Fatigue    Gastroesophageal reflux disease    Nasal mucosa dry    Nonspecific syndrome suggestive of viral illness    Psychophysiologic insomnia    Primary osteoarthritis of left knee    Internal derangement of left knee    Internal derangement of right knee    Other instability, right knee    Acute meniscal tear, medial    Abnormal gait    Heartburn    Anxiety state    Dysthymia    Primary osteoarthritis of right knee    Incontinence of feces    History of colon polyps    Cyst of pancreas (HCC)    Intestinal disaccharidase deficiency    Headache    Generalized anxiety disorder    Familial combined hyperlipidemia    Dyspnea    Diverticulitis    Disability of walking    Contact dermatitis    Calcaneal spur    Bilateral plantar fasciitis    Pain in thoracic spine    Obesity    Rogers's metatarsalgia    Mixed hyperlipidemia    Mass of uterine adnexa    Malaise and fatigue    Lumbosacral radiculopathy    Itching    Irritable bowel syndrome    Obesity with body mass index 30 or greater    Type 2 diabetes mellitus without complication (HCC)    Tinea corporis    Seasonal allergic rhinitis    Pyuria    Psoriasis    Posterior rhinorrhea    Pleuritic pain    Plantar fascial fibromatosis    Viral infection    Vitamin D deficiency    Osteoarthritis of knee    Malignant neoplasm of thyroid gland (HCC)    Influenza vaccine needed    Preoperative testing    Malignant neoplasm metastatic to right lung (HCC)    Pleural effusion    Malignant pleural effusion (HCC)    Goals of care, counseling/discussion    Advance care planning    Palliative care by specialist    Thyroid carcinoma (HCC)    Cancer related pain    Anxiety    Syncope and collapse    Hyperglycemia    Syncope    Metastasis from thyroid cancer (HCC)        Medical History:  Past Medical History:    Anxiety state, unspecified    Cancer (HCC)    Colon adenomas    x3    Disorder of thyroid    thyroidectomy    Diverticulosis of  large intestine    Esophageal reflux    Exposure to medical therapeutic radiation    thyroid    High blood pressure    High cholesterol    Hypercholesteremia    Hypothyroidism    Osteoarthrosis, unspecified whether generalized or localized, unspecified site    Thyroid cancer (HCC)    papillary thyroid; s/p surgery resection with Asher and BRIDGES    Unspecified essential hypertension       Surgical History:  Past Surgical History:   Procedure Laterality Date    Colonoscopy N/A 2022    Procedure: COLONOSCOPY;  Surgeon: Neeraj No MD;  Location: [facility] ENDOSCOPY    Colonoscopy  2024    Colonoscopy N/A 2024    Procedure: COLONOSCOPY;  Surgeon: Neeraj No MD;  Location: [facility] ENDOSCOPY    Egd  2022    Egd  2024    Esophagogastroduodenoscopy    Endoscopic ultrasound - internal  2022    Endoscopic ultrasound exam  2024    Endoscopic Ultrasound    Eye surgery  2024    Eye surgery Left 2024    Hysterectomy      Knee surgery Left 2022    no associated bleeding complications          40 week 7 lb(s) 5 oz Male; 6 hr labor           40 week 7 lb(s) 3 oz Female          41 week 9 lb(s) 8 oz Male    Other surgical history      Injection Tendon Sheath, Ligament    Thyroidectomy      total    Total abdom hysterectomy         Allergies:  Allergies   Allergen Reactions    Latex HIVES    Peanut-Containing Drug Products SWELLING     Closes Throat  Closes Throat  Closes Throat      Peanuts SWELLING     Closes Throat    Adhesive Tape OTHER (SEE COMMENTS)    Seasonal        Family History:  Family History   Problem Relation Age of Onset    Heart Disorder Mother     Breast Cancer Maternal Cousin Female 57    Cancer Daughter 29        Hodgkin's Lymphoma    Ovarian Cancer Neg     Bleeding Disorders Neg     DVT/VTE Neg     Pancreatic Cancer Neg     Prostate Cancer Neg        Social History:  Social History     Socioeconomic History    Marital  status:    Tobacco Use    Smoking status: Never    Smokeless tobacco: Never   Vaping Use    Vaping status: Never Used   Substance and Sexual Activity    Alcohol use: Yes     Comment: social    Drug use: No       Medications:  Current Outpatient Medications   Medication Sig Dispense Refill    clonazePAM 2 MG Oral Tab Take 0.5 tablets (1 mg total) by mouth nightly as needed for Anxiety.      pantoprazole 40 MG Oral Tab EC Take 1 tablet (40 mg total) by mouth every morning before breakfast. 30 tablet 0    butalbital-acetaminophen-caffeine -40 MG Oral Tab Take 1 tablet by mouth every 6 (six) hours as needed. 30 tablet 0    levothyroxine 150 MCG Oral Tab Take 1 tablet (150 mcg total) by mouth before breakfast.      prochlorperazine (COMPAZINE) 10 mg tablet Take 1 tablet (10 mg total) by mouth every 6 (six) hours as needed for Nausea. 30 tablet 3    ondansetron (ZOFRAN) 8 MG tablet Take 1 tablet (8 mg total) by mouth every 8 (eight) hours as needed for Nausea. 30 tablet 3    HYDROcodone-acetaminophen 5-325 MG Oral Tab Take 1-2 tablets by mouth every 6 (six) hours as needed for Pain. 60 tablet 0    Olmesartan Medoxomil 40 MG Oral Tab Take 1 tablet (40 mg total) by mouth daily.      amLODIPine 10 MG Oral Tab Take 1 tablet (10 mg total) by mouth daily.      Lenvatinib, 20 MG Daily Dose, 2 x 10 MG Oral Capsule Therapy Pack Take 20 mg by mouth daily. 60 each 5       Review of Systems:  Review of Systems   Constitutional:  Negative for malaise/fatigue (Improved since being off Lenvantinib) and weight loss.   HENT: Negative.          Xerostomia     Eyes: Negative.    Respiratory:  Negative for cough, hemoptysis, sputum production, shortness of breath and wheezing.         Cough and SOB have reduced in frequency since most recent hospital visit    R PleurX in place - per Pt., PleurX catheter is drained Q2W    Following with Pulm       Cardiovascular: Negative.  Negative for chest pain, palpitations and leg swelling.    Gastrointestinal:  Negative for constipation (Denies; Senna-S or Miralax PRN).   Genitourinary: Negative.    Musculoskeletal:         Cancer related R chest wall pain, pleuritic type pain which radiates to her back, pain is sharp in nature when present    Denies pain today during visit; reports pain is occasional     Takes half tab Norco 5/325mg PRN (not daily) when pain increases     Skin: Negative.    Neurological: Negative.    Psychiatric/Behavioral:  Negative for hallucinations, memory loss, substance abuse and suicidal ideas. The patient is nervous/anxious (Anxiety about health; previosuly saw Polina Kelly LCSW; stopped taking Duloxetine \"it makes me feel sad\"; takes Klonopin 1mg QHS to help with sleep). The patient does not have insomnia.         Physical Examination:  Physical Exam  Vitals reviewed.   Constitutional:       Appearance: Normal appearance. She is not ill-appearing.   HENT:      Head: Normocephalic and atraumatic.      Right Ear: External ear normal.      Left Ear: External ear normal.      Nose: Nose normal.      Mouth/Throat:      Mouth: Mucous membranes are moist.      Pharynx: Oropharynx is clear.   Eyes:      General: No scleral icterus.     Conjunctiva/sclera: Conjunctivae normal.   Cardiovascular:      Rate and Rhythm: Normal rate and regular rhythm.      Pulses: Normal pulses.   Pulmonary:      Effort: Pulmonary effort is normal. No respiratory distress.      Comments: R PleurX drain present and covered in a dressing    Diminished lung sounds noted in RLL    Abdominal:      General: Bowel sounds are normal. There is no distension.      Palpations: Abdomen is soft.   Musculoskeletal:         General: Normal range of motion.      Cervical back: Normal range of motion and neck supple.      Right lower leg: No edema.      Left lower leg: No edema.   Skin:     General: Skin is warm and dry.      Coloration: Skin is not jaundiced or pale.   Neurological:      General: No focal deficit  present.      Mental Status: She is alert and oriented to person, place, and time. Mental status is at baseline.      Motor: No weakness.      Gait: Gait normal.   Psychiatric:         Mood and Affect: Mood normal.         Behavior: Behavior normal.         Thought Content: Thought content normal.         Judgment: Judgment normal.       Palliative Care Goals of Care:  Discussed with patient/family today: Yes  Patient's preference about sharing medical information: Patient and family may receive information  Patient's decision making preferences: Fully involved and speak with family  Code status: FULL CODE  Have advanced directives been discussed with patient or healthcare power of : Yes                         Palliative Care Assessment/Plan:  1. Palliative care encounter    2. Malignant pleural effusion (HCC)    3. Anxiety about health    4. Goals of care, counseling/discussion    5. Thyroid cancer (HCC)    6. Malignant neoplasm metastatic to right lung (HCC)        Cancer Related Pain  -Discussed addiction vs tolerance  -Reviewed IL   -Discussed MOA and explained side effects of pain medications  -Max daily Tylenol limit is 3,000mg  -Pt reports pain is controlled at this time on OTC Tylenol and 1/2 tab Norco at bedtime  -Ibuprofen caused stomach upset - pt would like to avoid  -Pt states she is very sensitive to opioids  -Continue Norco 5/325 mg 1-2 tabs po Q 4 hrs prn  -Continue splinting and heat packs prn  -Discussed indication for opioid medications for cancer related pain per NCCN guidelines  -Discussed common SE of opioid meds which include, but are not limited to: drowsiness, n/v, stomach upset, constipation  -Excessive or misuse of opioid medications may cause respiratory depression, CNS depression, and possibly death  -Discussed to NEVER self adjust pain med/opioid doses or stop these meds abruptly as this may lead to opioid withdrawal  -Discussed signs and symptoms of withdrawal which  include, but are not limited to: rhinorrhea, diarrhea, irritability, chills, sweating, insomnia, mood swings, anxiety, increased pain      Therapeutic opioid induced constipation  -A side effect of opioids is constipation   -Continue Senna 2 tabs po BID and Miralax prn   -Increase water intake  -Goal is for a bowel movement at least every other day  -HOLD Senna-S/Miralax for 1-2 doses if you are having loose stool or diarrhea        Xerostomia  -Continue Biotene - swish and spit 4 times/day  -Increase water intake     Anxiety about health  -Camelia Figueroa discontinued Duloxetine - she felt that it made her feel sad  -There was an interaction (risk for prolonged QT interval) between Lexapro and oral anti cancer med - Lexapro was previously discontinued   -CONTINUE Klonopin 1mg at bedtime/PRN - prescribed by PCP   -Previously met with Polina Kelly LCSW - I encouraged continued follow up  -Met with Juliana Nixon LCSW today re: insurance   -Camelia Figueroa is currently not working due to illness; on [employer] until mid December  -Will be covered on her 's insurance starting 1/1/2025; will enroll in y prime until then     Goals of care counseling/discussion  -Pt is FULL CODE  -Continue supportive medical treatments; pt plans to continue pursuing cancer treatment  -Patient is agreeable to hospitalization, if indicated  -Patient agreeable to following up with outpatient palliative care  -Provided emotional support to pt/family who appear to be coping adequately  -See above narrative for further details      Advance care planning counseling/discussion  -Pt is FULL CODE  -Health Surrogate: Baldemar Markham ()      Palliative Performance Scale 70%    Emotional support provided to patient/family: Yes    Palliative Care Follow-up:  I spent a total of  30 minutes with the patient today, which included all of the following:direct face to face contact, history taking, physical examination, and >50% was spent counseling and  coordinating care.    Thank you for allowing the Palliative Care Team to participate in the care of your patient. I will continue to follow clinically.    BEBE FALK DNP, FNP-BC, [facility]  [phone]  11/19/2024

## 2024-11-20 ENCOUNTER — TELEPHONE (OUTPATIENT)
Dept: PULMONOLOGY | Facility: CLINIC | Age: 64
End: 2024-11-20

## 2024-11-20 LAB
THYROGLOB AB: <1 IU/ML
THYROGLOB IMA: 31 NG/ML

## 2024-11-20 NOTE — TELEPHONE ENCOUNTER
Attempted to speak with [facility].   Patient has upcoming appointment on 11/27/2024.    Dr. [facility] drained 75 ml's today. ELIN

## 2024-11-20 NOTE — TELEPHONE ENCOUNTER
Robina[facility] calling to inform she drained 75 ML of plural drainage today. Please call at [phone],thanks.

## 2024-11-25 ENCOUNTER — TELEPHONE (OUTPATIENT)
Dept: HEMATOLOGY/ONCOLOGY | Facility: HOSPITAL | Age: 64
End: 2024-11-25

## 2024-11-25 ENCOUNTER — OFFICE VISIT (OUTPATIENT)
Dept: HEMATOLOGY/ONCOLOGY | Facility: HOSPITAL | Age: 64
End: 2024-11-25
Attending: INTERNAL MEDICINE
Payer: COMMERCIAL

## 2024-11-25 VITALS
HEART RATE: 82 BPM | WEIGHT: 157 LBS | RESPIRATION RATE: 18 BRPM | OXYGEN SATURATION: 96 % | SYSTOLIC BLOOD PRESSURE: 166 MMHG | DIASTOLIC BLOOD PRESSURE: 85 MMHG | BODY MASS INDEX: 28.89 KG/M2 | TEMPERATURE: 98 F | HEIGHT: 62 IN

## 2024-11-25 DIAGNOSIS — C78.01 MALIGNANT NEOPLASM METASTATIC TO RIGHT LUNG (HCC): Primary | ICD-10-CM

## 2024-11-25 DIAGNOSIS — C73 THYROID CANCER (HCC): ICD-10-CM

## 2024-11-25 DIAGNOSIS — C73 THYROID CANCER (HCC): Primary | ICD-10-CM

## 2024-11-25 DIAGNOSIS — E86.0 DEHYDRATION: ICD-10-CM

## 2024-11-25 DIAGNOSIS — R11.0 NAUSEA: ICD-10-CM

## 2024-11-25 LAB
ALBUMIN SERPL-MCNC: 4.7 G/DL (ref 3.2–4.8)
ALBUMIN/GLOB SERPL: 1.1 {RATIO} (ref 1–2)
ALP LIVER SERPL-CCNC: 113 U/L
ALT SERPL-CCNC: 17 U/L
ANION GAP SERPL CALC-SCNC: 9 MMOL/L (ref 0–18)
AST SERPL-CCNC: 23 U/L (ref ?–34)
BASOPHILS # BLD AUTO: 0.06 X10(3) UL (ref 0–0.2)
BASOPHILS NFR BLD AUTO: 0.7 %
BILIRUB SERPL-MCNC: 0.7 MG/DL (ref 0.2–1.1)
BUN BLD-MCNC: 15 MG/DL (ref 9–23)
BUN/CREAT SERPL: 19 (ref 10–20)
CALCIUM BLD-MCNC: 9.8 MG/DL (ref 8.7–10.4)
CHLORIDE SERPL-SCNC: 105 MMOL/L (ref 98–112)
CO2 SERPL-SCNC: 26 MMOL/L (ref 21–32)
CREAT BLD-MCNC: 0.79 MG/DL
DEPRECATED RDW RBC AUTO: 46.6 FL (ref 35.1–46.3)
EGFRCR SERPLBLD CKD-EPI 2021: 84 ML/MIN/1.73M2 (ref 60–?)
EOSINOPHIL # BLD AUTO: 0.13 X10(3) UL (ref 0–0.7)
EOSINOPHIL NFR BLD AUTO: 1.4 %
ERYTHROCYTE [DISTWIDTH] IN BLOOD BY AUTOMATED COUNT: 14.4 % (ref 11–15)
GLOBULIN PLAS-MCNC: 4.1 G/DL (ref 2–3.5)
GLUCOSE BLD-MCNC: 111 MG/DL (ref 70–99)
HCT VFR BLD AUTO: 40.4 %
HGB BLD-MCNC: 13.4 G/DL
IMM GRANULOCYTES # BLD AUTO: 0.04 X10(3) UL (ref 0–1)
IMM GRANULOCYTES NFR BLD: 0.4 %
LYMPHOCYTES # BLD AUTO: 2.35 X10(3) UL (ref 1–4)
LYMPHOCYTES NFR BLD AUTO: 25.5 %
MAGNESIUM SERPL-MCNC: 2.1 MG/DL (ref 1.6–2.6)
MCH RBC QN AUTO: 29.5 PG (ref 26–34)
MCHC RBC AUTO-ENTMCNC: 33.2 G/DL (ref 31–37)
MCV RBC AUTO: 88.8 FL
MONOCYTES # BLD AUTO: 0.51 X10(3) UL (ref 0.1–1)
MONOCYTES NFR BLD AUTO: 5.5 %
NEUTROPHILS # BLD AUTO: 6.11 X10 (3) UL (ref 1.5–7.7)
NEUTROPHILS # BLD AUTO: 6.11 X10(3) UL (ref 1.5–7.7)
NEUTROPHILS NFR BLD AUTO: 66.5 %
OSMOLALITY SERPL CALC.SUM OF ELEC: 292 MOSM/KG (ref 275–295)
PLATELET # BLD AUTO: 293 10(3)UL (ref 150–450)
POTASSIUM SERPL-SCNC: 4.1 MMOL/L (ref 3.5–5.1)
PROT SERPL-MCNC: 8.8 G/DL (ref 5.7–8.2)
RBC # BLD AUTO: 4.55 X10(6)UL
SODIUM SERPL-SCNC: 140 MMOL/L (ref 136–145)
WBC # BLD AUTO: 9.2 X10(3) UL (ref 4–11)

## 2024-11-25 PROCEDURE — 99215 OFFICE O/P EST HI 40 MIN: CPT | Performed by: NURSE PRACTITIONER

## 2024-11-25 PROCEDURE — 85025 COMPLETE CBC W/AUTO DIFF WBC: CPT

## 2024-11-25 PROCEDURE — 96374 THER/PROPH/DIAG INJ IV PUSH: CPT

## 2024-11-25 PROCEDURE — 96361 HYDRATE IV INFUSION ADD-ON: CPT

## 2024-11-25 PROCEDURE — 80053 COMPREHEN METABOLIC PANEL: CPT

## 2024-11-25 PROCEDURE — 83735 ASSAY OF MAGNESIUM: CPT

## 2024-11-25 RX ORDER — ONDANSETRON 2 MG/ML
8 INJECTION INTRAMUSCULAR; INTRAVENOUS ONCE
Status: CANCELLED
Start: 2024-11-25 | End: 2024-11-25

## 2024-11-25 RX ORDER — ONDANSETRON 2 MG/ML
INJECTION INTRAMUSCULAR; INTRAVENOUS
Status: COMPLETED
Start: 2024-11-25 | End: 2024-11-25

## 2024-11-25 RX ORDER — ONDANSETRON 2 MG/ML
8 INJECTION INTRAMUSCULAR; INTRAVENOUS ONCE
Status: COMPLETED | OUTPATIENT
Start: 2024-11-25 | End: 2024-11-25

## 2024-11-25 RX ADMIN — ONDANSETRON 8 MG: 2 INJECTION INTRAMUSCULAR; INTRAVENOUS at 10:46:00

## 2024-11-25 NOTE — TELEPHONE ENCOUNTER
Lenvatinib    Nausea/vomiting/Headache/weakness/not eating    Patient denies fevers but cold on and off.  Not sleeping. Denies sob or chest pain. Having some nausea and had vomited bile on Sunday.  Not eating or taking medication due to nausea and stomach discomfort.  Feeling weak.    Nausea - Grade 3 - unable to take anything for nausea, states feels too much to even take in anything orally.  Also having stomach upset.  Vomiting - Grade 2 - had vomited on Sunday, not much oral intake since.  Dehydration - Grade 2 - having weakness, denies lightheadedness    Denies diarrhea, maybe some constipation, last BM yesterday. Denies lightheadedness but feels weak.  Has had a headache which was the whole head, now in the front of head.  Denies sore throat or nasal issues.  Vision was \"not Focused\" when at store over the weekend to where she sat down.  Since then no further visual issues.  No urinary complaints.    ACV 1000 am today

## 2024-11-25 NOTE — PROGRESS NOTES
[facility] Progress Note    Patient Name: Camelia Markham   YOB: 1960   Medical Record Number: [id number]   CSN: [id number]   Consulting Physician: Inocente Chen MD  Referring Physician(s): Dr Dana Sands MD    Reason for Consultation:  Metastatic  Thyroid Cancer   Cancer Staging   Malignant neoplasm of thyroid gland (HCC)  Staging form: Thyroid - Differentiated, AJCC 8th Edition  - Clinical: Stage IVB (rcT2, cN1, cM1, Age at diagnosis: >= 55 years) - Signed by Inocente Chen MD on 8/28/2024      Current Therapy  Lenvatinib - started 9/18/24   (Currently on 20mg daily)    Interval History  Patient is here for an Acute Care visit after she reported having nausea and weakness x 2 days.  She said she did have one episode of vomiting after taking her thyroid medication yesterday.  Patient has taken one dose Zofran since Saturday and does not feel it helped because her nausea continued.  Prior to this past Saturday, she was eating and drinking very well but this has stopped since then due to her nausea.  Patient denies fever/chills, SOB, cough, chest pain, diarrhea, pain.   Denies concern regarding PleurX. Reports non-acute headaches that is managed by taking Fioricet.  She has been compliant with taking 20mg Lenvatinib daily.        Past Oncologic History   Camelia Markham is a 64 year old female that was seen today in the Cancer [facility] for metastatic thyroid cancer. Her oncologic history is detailed below    11/3/2010 was found to have an enlarged thyroid gland ultrasound-guided FNA showed papillary thyroid cancer.  Underwent total thyroidectomy with Dr. Asher.  Final pathology showed a 2.5 cm tumor and 2 positive lymph nodes.    12/2010 BRIDGES uptake study showed no evidence of abnormal uptake outside the neck.  Underwent 207 miCu of radioactive iodine.  She had then been maintained on levothyroxine.  Subsequently was lost to follow-up after 2018.    6/17/2024 Thyroglobulin increased to 10,  prompted an ultrasound that showed an oval hypoechoic nodule in the superior aspect of the left thyroid measuring 1 x 0.6 x 0.6 cm thought to represent an indeterminate lymph node.    8/6/2024 ultrasound-guided FNA showed papillary carcinoma. NGS testing showed a BRAF [id number] mutation    8/16/2024 [facility] thyroid scan showed no discernible pathologic activity increased uptake in the thyroid bed.  Given the biopsy-proven recurrence and absence of I-131 uptake this was thought to represent dedifferentiated recurrent thyroid malignancy.      8/22/2024 PET/CT fusion study showed extensive hypermetabolic pleural-based nodularity, large pleural effusion as well as hypermetabolic nodularity in the left neck soft tissues as well as peripheral right upper nodule all of which was concerning for metastatic disease.  Previously seen cystic pancreatic lesion did not demonstrate any hypermetabolic activity.    8/29/24 had thoracentesis with cytology showing metastatic thyroid carcinoma.    9/15/24 Underwent pleur-X for recurrent pleural effusion.    9/18/24 started lenvatinib at 24mg daily    11/2024 had significant mucositis decreased oral intake and diarrhea with lenvatinib.  This was held.  She required hospitalization and chest tube placement for evacuation loculated effusion.  Lenvatinib is being resumed at 20 mg daily      Past Medical History:  Past Medical History:    Anxiety state, unspecified    Cancer (HCC)    Colon adenomas    x3    Disorder of thyroid    thyroidectomy    Diverticulosis of large intestine    Esophageal reflux    Exposure to medical therapeutic radiation    thyroid    High blood pressure    High cholesterol    Hypercholesteremia    Hypothyroidism    Osteoarthrosis, unspecified whether generalized or localized, unspecified site    Thyroid cancer (HCC)    papillary thyroid; s/p surgery resection with Asher and BRIDGES    Unspecified essential hypertension       Past Surgical History:  Past Surgical History:    Procedure Laterality Date    Colonoscopy N/A 2022    Procedure: COLONOSCOPY;  Surgeon: Neeraj No MD;  Location: [facility] ENDOSCOPY    Colonoscopy  2024    Colonoscopy N/A 2024    Procedure: COLONOSCOPY;  Surgeon: Neeraj No MD;  Location: [facility] ENDOSCOPY    Egd  2022    Egd  2024    Esophagogastroduodenoscopy    Endoscopic ultrasound - internal  2022    Endoscopic ultrasound exam  2024    Endoscopic Ultrasound    Eye surgery  2024    Eye surgery Left 2024    Hysterectomy      Knee surgery Left 2022    no associated bleeding complications          40 week 7 lb(s) 5 oz Male; 6 hr labor           40 week 7 lb(s) 3 oz Female          41 week 9 lb(s) 8 oz Male    Other surgical history      Injection Tendon Sheath, Ligament    Thyroidectomy      total    Total abdom hysterectomy         Family Medical History:  Family History   Problem Relation Age of Onset    Heart Disorder Mother     Breast Cancer Maternal Cousin Female 57    Cancer Daughter 29        Hodgkin's Lymphoma    Ovarian Cancer Neg     Bleeding Disorders Neg     DVT/VTE Neg     Pancreatic Cancer Neg     Prostate Cancer Neg        Gyne History:  OB History    Para Term  AB Living   3 3 0 0 0 0   SAB IAB Ectopic Multiple Live Births   0 0 0 0 0       Social History:  Social History     Socioeconomic History    Marital status:      Spouse name: Not on file    Number of children: Not on file    Years of education: Not on file    Highest education level: Not on file   Occupational History    Not on file   Tobacco Use    Smoking status: Never    Smokeless tobacco: Never   Vaping Use    Vaping status: Never Used   Substance and Sexual Activity    Alcohol use: Yes     Comment: social    Drug use: No    Sexual activity: Not on file   Other Topics Concern     Service Not Asked    Blood Transfusions Not Asked    Caffeine Concern Yes      Comment: 1 cup of coffee     Occupational Exposure Not Asked    Hobby Hazards Not Asked    Sleep Concern Not Asked    Stress Concern Not Asked    Weight Concern Not Asked    Special Diet Not Asked    Back Care Not Asked    Exercise Not Asked    Bike Helmet Not Asked    Seat Belt Not Asked    Self-Exams Not Asked   Social History Narrative    Camelia Figueroa is  to Baldemar x30 yrs. She has 3 adult children. Patient works in [employer] in [employer]. She lives with her , her mom, and 1 of the children in [place], [place].     Social Drivers of Health     Financial Resource Strain: Not on file   Food Insecurity: No Food Insecurity (11/5/2024)    Food Insecurity     Food Insecurity: Never true   Transportation Needs: No Transportation Needs (11/5/2024)    Transportation Needs     Lack of Transportation: No     Car Seat: Not on file   Physical Activity: Not on file   Stress: Not on file   Social Connections: Not on file   Housing Stability: Low Risk  (11/5/2024)    Housing Stability     Housing Instability: No     Housing Instability Emergency: Not on file     Crib or Bassinette: Not on file       Allergies:   Allergies   Allergen Reactions    Latex HIVES    Peanut-Containing Drug Products SWELLING     Closes Throat  Closes Throat  Closes Throat      Peanuts SWELLING     Closes Throat    Adhesive Tape OTHER (SEE COMMENTS)    Seasonal        Current Medications:   clonazePAM 2 MG Oral Tab Take 0.5 tablets (1 mg total) by mouth nightly as needed for Anxiety.      Lenvatinib, 20 MG Daily Dose, 2 x 10 MG Oral Capsule Therapy Pack Take 20 mg by mouth daily. 60 each 5    pantoprazole 40 MG Oral Tab EC Take 1 tablet (40 mg total) by mouth every morning before breakfast. 30 tablet 0    butalbital-acetaminophen-caffeine -40 MG Oral Tab Take 1 tablet by mouth every 6 (six) hours as needed. 30 tablet 0    levothyroxine 150 MCG Oral Tab Take 1 tablet (150 mcg total) by mouth before breakfast.      prochlorperazine  (COMPAZINE) 10 mg tablet Take 1 tablet (10 mg total) by mouth every 6 (six) hours as needed for Nausea. 30 tablet 3    ondansetron (ZOFRAN) 8 MG tablet Take 1 tablet (8 mg total) by mouth every 8 (eight) hours as needed for Nausea. 30 tablet 3    HYDROcodone-acetaminophen 5-325 MG Oral Tab Take 1-2 tablets by mouth every 6 (six) hours as needed for Pain. 60 tablet 0    Olmesartan Medoxomil 40 MG Oral Tab Take 1 tablet (40 mg total) by mouth daily.      amLODIPine 10 MG Oral Tab Take 1 tablet (10 mg total) by mouth daily.         Review of Systems:  A comprehensive 14 point review of systems was completed.  Pertinent positives and negatives noted in the the HPI.     Vital Signs:  BP (!) 166/85 (BP Location: Right arm, Patient Position: Sitting, Cuff Size: large)   Pulse 82   Temp 98.1 °F (36.7 °C) (Oral)   Resp 18   Ht 1.575 m (5' 2\")   Wt 71.2 kg (157 lb)   SpO2 96%   BMI 28.72 kg/m²        Physical Examination:  General: No apparent distress  ENT: Conjunctiva clear, EOM intact. Dry oral mucosa   Chest: Normal respiratory effort.   CV: Regular rate   Extremities: No edema noted  Neurological: grossly intact  Psych: Appropriate mood and affect       Performance Status:  ECOG-1    Labs:    Lab Results   Component Value Date/Time    WBC 9.2 11/25/2024 10:38 AM    RBC 4.55 11/25/2024 10:38 AM    HGB 13.4 11/25/2024 10:38 AM    HCT 40.4 11/25/2024 10:38 AM    MCV 88.8 11/25/2024 10:38 AM    MCH 29.5 11/25/2024 10:38 AM    MCHC 33.2 11/25/2024 10:38 AM    RDW 14.4 11/25/2024 10:38 AM    NEPRELIM 6.11 11/25/2024 10:38 AM    .0 11/25/2024 10:38 AM       Lab Results   Component Value Date/Time     (H) 11/25/2024 10:38 AM    BUN 15 11/25/2024 10:38 AM    CREATSERUM 0.79 11/25/2024 10:38 AM    GFRNAA 67 02/16/2021 12:09 PM    CA 9.8 11/25/2024 10:38 AM    ALB 4.7 11/25/2024 10:38 AM     11/25/2024 10:38 AM    K 4.1 11/25/2024 10:38 AM     11/25/2024 10:38 AM    CO2 26.0 11/25/2024 10:38 AM     ALKPHO 113 11/25/2024 10:38 AM    AST 23 11/25/2024 10:38 AM    ALT 17 11/25/2024 10:38 AM       Imaging:  PET films previously reviewed with pt/spouse -impression as above    Impression:  64-year-old with a long history of thyroid cancer s/p thyroidectomy followed by BRIDGES and levothyroxine suppression now with biopsy-proven recurrence around the neck as well as pulmonary parenchymal mets and pleural nodules all worrisome for metastatic thyroid cancer.  BRIDGES uptake scan was negative suggesting dedifferentiated thyroid cancer.    I've previously had a long discussion with patient explained that unfortunately this represents an incurable Stage IV malignancy.  However this is certainly very treatable with oral tyrosine kinase inhibitors with good disease control.  She was started lenvatinib but has developed increasing mucositis nausea and dehydration    Plan:  Since her symptoms have improved  will resume lenvatinib at 20 mg daily [ previously on 24 mg.]  CBC shows no significant anemia  Thyroglobulin has improved and her pleural effusions regressed all suggestive good early response to treatment.  Will plan CT scan in December  Pleux-X needing to be drained less frequently, has fu with pulmonology to discuss removal  Discussed oral anti-emetics  She will fu with Oncology NP next week to evaluate her tolerance of lenvima. IF she has continued trouble tolerating lenvatinib may need to switch to a BRAF inhibitor. (Since her tumor does have a BRAF-V600E mutation)  Acute Care visit on 11/25/24: CBC and CMP unremarkable.  Patient given 1L 0.9 sodium chloride and 8mg Zofran IV; reported feeling much better after.  Patient instructed to pre-medicate alternating her anti-emetics at home consistently as needed and we will reassess how she feels with this change next week.  Dr. Chen aware.      SANJIV Rodriguez  Hematology/Oncology

## 2024-11-25 NOTE — PROGRESS NOTES
Pt here for hydration and antiemetics after ACV. Reports nausea since Saturday.  Tolerated without difficulty or complaint. Reviewed next appt date/time via printed AVS. Discharge home Ambulating independently     Ordering MD: Inocente Chen    Education Record  Learner:  Patient  Barriers / Limitations:  None  Method:  Reinforcement  General Topics:  Plan of care reviewed  Outcome:  Shows understanding

## 2024-11-27 ENCOUNTER — APPOINTMENT (OUTPATIENT)
Dept: HEMATOLOGY/ONCOLOGY | Facility: HOSPITAL | Age: 64
End: 2024-11-27
Attending: NURSE PRACTITIONER
Payer: COMMERCIAL

## 2024-11-30 ENCOUNTER — HOSPITAL ENCOUNTER (OUTPATIENT)
Dept: GENERAL RADIOLOGY | Age: 64
Discharge: HOME | End: 2024-11-30
Attending: INTERNAL MEDICINE
Payer: COMMERCIAL

## 2024-11-30 DIAGNOSIS — R10.9 UNSPECIFIED ABDOMINAL PAIN: ICD-10-CM

## 2024-11-30 PROCEDURE — 74019 RADEX ABDOMEN 2 VIEWS: CPT | Performed by: INTERNAL MEDICINE

## 2024-11-30 PROCEDURE — 72170 X-RAY EXAM OF PELVIS: CPT | Performed by: INTERNAL MEDICINE

## 2024-12-02 ENCOUNTER — TELEPHONE (OUTPATIENT)
Dept: HEMATOLOGY/ONCOLOGY | Facility: HOSPITAL | Age: 64
End: 2024-12-02

## 2024-12-02 NOTE — TELEPHONE ENCOUNTER
Called Camelia Figueroa to check in on her after her ACV last Monday. Camelia Figueroa states that she had an Xray on 11/30 due to abdominal pain. It showed a large amount of stool. She was prescriped Linzess and was told to do [employer]. She is still having small amounts of hard stool, bloating and some abdominal pain. Discussed with Dr. Chen. He suggested Miralax one cap with 8 ounces of fluid every 4 hours until she has a sufficient large BM. She voiced understanding and will  Miralax from [facility] today. Offered her an appointment to see us on Wednesday when she is seeing Pulmonology, she states she does not need to be seen at the time she will call if her constipation is not any better. She is aware of her F/U next week. She has been taking her Lenvantinib as ordered without any troubles.

## 2024-12-03 ENCOUNTER — APPOINTMENT (OUTPATIENT)
Dept: HEMATOLOGY/ONCOLOGY | Facility: HOSPITAL | Age: 64
End: 2024-12-03
Attending: INTERNAL MEDICINE
Payer: COMMERCIAL

## 2024-12-03 NOTE — PROGRESS NOTES
Pulmonary Medicine Outpatient Clinic Note           Reason for Consultation and CC: abnormal chest CT and new right pleural effusion.    Referring Physician: Dr. Marie and Dr. Sands       Subjective:  Seen for f/u on 12/4/24.  Feels she's constipated.   No longer on pain meds.   Breathing coreas feels well.   Not coughing,  No fevers, or chills.  Per home care RN last time pleurex catheter was drained was 75 ml on 12/20/24. Plans to have it drained.  Started on treatments for thyroid CA-follows with .  Since the last visit, was admitted to [facility] with respiratory symptoms and found to have a new right loculated pleural effusion requiring chest tube placement. Effusions were neg for cytology. Chest tube removed and pt was discharged. Previous pleurex remained in place and drained during the hospitalization.       From the previous visit.   Seen for f/u on 10/3/24.  Since the initial visit, underwent a right sided thoracentesis with pleural fluid c/w malignancy (thyroid cancer). Two weeks later was admitted to [facility] with shortness of breath and chest discomfort. CXR noted the effusion had re-accumulated so she had a pleurex catheter placed on 9/13/24. Was eventually discharged with home care to drain the effusion every 4 days. Also started on treatment Lenvatinib managed by oncology (Dr. Chen).  Now here for f/u. Pleurex drainage was 250ml on the 21st, then 75ml, then 50ml, and 25 ml two days ago. Repeat CXR showed improvement in the right pleural effusion.   Reports some discharge at the site but has since healed.   Reports back pain. Taking the pain medicine and using heat packs.       From the initial visit.    HPI: I had the pleasure of seeing Camelia Markham for a Pulmonary Medicine consultation on 8/29/24.  Very pleasant 63 yo woman with hx of metastatic thyroid CA s/p total thyroidectomy in 2010, s/p BRIDGES tx. Recently, thyroglobulin level increased and a thyroid US showed an oval  thyroid nodule thought to be a indeterminante lymph node. 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 Most recent PET scan notable showed extensive hypermetabolic pleural-based nodularity, large right pleural effusion as well as hypermetabolic nodularity in the left neck soft tissues as well as peripheral right upper nodule all of which was concerning for metastatic disease. She has been experiencing right chest pain (under her right breast) for 2 months. She thought it was MSK from sleeping the wrong way.   Also reports shortness of breath at rest and with exertion over the past 2 weeks.   Reports she coughs sometimes and wheezes. Cough is dry.   Denies fevers, chills. Does endorse night-time upper neck sweating about twice a week.        REVIEW OF SYSTEMS:  Positives and negatives as stated in HPI. Remainder of 10 pt review of systems otherwise are negative.       PAST MEDICAL HISTORY:  Past Medical History:    Anxiety state, unspecified    Cancer (HCC)    Colon adenomas    x3    Disorder of thyroid    thyroidectomy    Diverticulosis of large intestine    Esophageal reflux    Exposure to medical therapeutic radiation    thyroid    High blood pressure    High cholesterol    Hypercholesteremia    Hypothyroidism    Osteoarthrosis, unspecified whether generalized or localized, unspecified site    Thyroid cancer (HCC)    papillary thyroid; s/p surgery resection with Asher and BRIDGES    Unspecified essential hypertension   Denies hx of previous pulmonary conditions       PAST SURGICAL HISTORY:  Past Surgical History:   Procedure Laterality Date    Colonoscopy N/A 12/29/2022    Procedure: COLONOSCOPY;  Surgeon: Neeraj No MD;  Location: [facility] ENDOSCOPY    Colonoscopy  07/29/2024    Colonoscopy N/A 07/29/2024    Procedure: COLONOSCOPY;  Surgeon: Neeraj No MD;  Location: [facility] ENDOSCOPY    Egd  12/29/2022    Egd  07/29/2024     Esophagogastroduodenoscopy    Endoscopic ultrasound - internal  2022    Endoscopic ultrasound exam  2024    Endoscopic Ultrasound    Eye surgery  2024    Eye surgery Left 2024    Hysterectomy      Knee surgery Left 2022    no associated bleeding complications          40 week 7 lb(s) 5 oz Male; 6 hr labor           40 week 7 lb(s) 3 oz Female          41 week 9 lb(s) 8 oz Male    Other surgical history      Injection Tendon Sheath, Ligament    Thyroidectomy      total    Total abdom hysterectomy          PAST FAMILY HISTORY:  Family History   Problem Relation Age of Onset    Heart Disorder Mother     Breast Cancer Maternal Cousin Female 57    Cancer Daughter 29        Hodgkin's Lymphoma    Ovarian Cancer Neg     Bleeding Disorders Neg     DVT/VTE Neg     Pancreatic Cancer Neg     Prostate Cancer Neg         PAST SOCIAL HISTORY:  Social History     Socioeconomic History    Marital status:    Tobacco Use    Smoking status: Never    Smokeless tobacco: Never   Vaping Use    Vaping status: Never Used   Substance and Sexual Activity    Alcohol use: Yes     Comment: social    Drug use: No   Other Topics Concern    Caffeine Concern Yes     Comment: 1 cup of coffee    Never smoked  No hx of asbestos exposure  Lives with mom and   Has a dog  Employed: office setting environment      ALLERGIES:  Allergies   Allergen Reactions    Latex HIVES    Peanut-Containing Drug Products SWELLING     Closes Throat  Closes Throat  Closes Throat      Peanuts SWELLING     Closes Throat    Adhesive Tape OTHER (SEE COMMENTS)    Seasonal         MEDS:  Current Outpatient Medications on File Prior to Visit   Medication Sig Dispense Refill    clonazePAM 2 MG Oral Tab Take 0.5 tablets (1 mg total) by mouth nightly as needed for Anxiety.      Lenvatinib, 20 MG Daily Dose, 2 x 10 MG Oral Capsule Therapy Pack Take 20 mg by mouth daily. 60 each 5    pantoprazole 40 MG Oral Tab EC  Take 1 tablet (40 mg total) by mouth every morning before breakfast. 30 tablet 0    butalbital-acetaminophen-caffeine -40 MG Oral Tab Take 1 tablet by mouth every 6 (six) hours as needed. 30 tablet 0    levothyroxine 150 MCG Oral Tab Take 1 tablet (150 mcg total) by mouth before breakfast.      prochlorperazine (COMPAZINE) 10 mg tablet Take 1 tablet (10 mg total) by mouth every 6 (six) hours as needed for Nausea. 30 tablet 3    ondansetron (ZOFRAN) 8 MG tablet Take 1 tablet (8 mg total) by mouth every 8 (eight) hours as needed for Nausea. 30 tablet 3    HYDROcodone-acetaminophen 5-325 MG Oral Tab Take 1-2 tablets by mouth every 6 (six) hours as needed for Pain. 60 tablet 0    Olmesartan Medoxomil 40 MG Oral Tab Take 1 tablet (40 mg total) by mouth daily.      amLODIPine 10 MG Oral Tab Take 1 tablet (10 mg total) by mouth daily.       No current facility-administered medications on file prior to visit.       PHYSICAL EXAM:  BP (!) 157/96 (BP Location: Left arm)   Pulse 96   Resp 14   Ht 5' 3\" (1.6 m)   Wt 155 lb 9.6 oz (70.6 kg)   SpO2 97%   BMI 27.56 kg/m²   CONSTITUTIONAL: alert, oriented, no apparent distress  HEENT: atraumatic normocephalic  MOUTH: mucous membranes are moist. No OP exudates  NECK/THROAT: no JVD. Trachea midline. No obvious thyromegaly  LUNG: clear b/l no w/r/r. Chest symmetric with respiratory motion. Right pleurex site healed, no erythema or drainage, dressing dry  HEART: regular rate and rhythm, no obvious murmers or gallops note  ABD: soft non tender. + bowel sounds. No organomegaly noted  EXT: no clubbing, cyanosis, or edema noted. Pulses intact grossly  NEURO/MUSCULOSKELETAL: no gross deficits  SKIN: warm, dry. No obvious lesions noted  LYMPH: no obvious LAD       IMAGES:  CXR 11/11/24  Impression   CONCLUSION: Moderate-sized right basal pleural effusion with chest tube.  No significant interval change in size of the effusion.  Stable right basal pulmonary opacity which may  represent secondary compressive atelectasis.  Stable trace left basal pleural effusion.         CXR 11/8/24  CONCLUSION:   No significant change to the right approach pleural pigtail catheter.  Persistent small to moderate right pleural effusion and right basilar opacity.   Question small left pleural effusion.   No new abnormality.         CXR appears right upper loculated effusion is improved  CONCLUSION:   1. Interval placement of a short pigtail chest tube in the right apex with successful drainage of the loculated pleural effusion.   2. Right basilar chest tube remains with interval decrease in loculated right pleural effusion and slight improvement in atelectasis and or pneumonia.   3. Subsegmental atelectasis in the left lower lobe.   4. Top-normal heart size with normal pulmonary vascularity.   5. Atherosclerotic calcification aorta.   6. Internal fixation of an healed right clavicle fracture.         Chest CT 11/4/24  FINDINGS:   The following findings were made:   1. There is a large loculated right-sided pleural effusion with associated underlying pleural nodularity.  The finding is associated with scattered areas of subsegmental atelectasis throughout the right lung.  There is a PleurX catheter at the right lung base within a free-flowing segment of this loculated pleural effusion.   2. There are scattered ground-glass nodular peribronchial densities within the right lung as well as at the left lung base raising the possibility of a superimposed infectious process.  There is a very small free-flowing left pleural effusion with adjacent compressive atelectasis.   3. The visualized aspects of the liver demonstrates decreased attenuation compatible with fatty infiltration.  There are no focal hepatic lesions identified within the imaged segments of the liver.   4. There are slight to moderate degenerative changes within the thoracic spine.   The remainder of the examination is unremarkable.  Specifically,  on vascular windows, the main pulmonary arteries and segmental branches are normal in their course and caliber with no intraluminal filling defects identified to suggest pulmonary embolus. The thoracic aorta is normal in its course and caliber with no aneurysmal dilatation, no dissection, and no secondary signs of acute aortic injury.  On lung windows, there is no pneumothorax.  On mediastinal windows, there is no pericardial effusion or significant adenopathy.  Within the upper abdomen, there is no adrenal mass.       CXR 9/30/24  CONCLUSION:   Small right pleural effusion, moderately decreased from prior.   Decreased right basilar opacity, with persistent somewhat nodular opacity at the lateral right lung base, may in part relate to pleural metastatic disease.       CXR 9/14/24  CONCLUSION:   Large right pleural effusion with right lower lobe atelectasis or pneumonia has increased in size since 9/13/2024.   Small left lower lobe pleural effusion with left lower lobe atelectasis or pneumonia is new.   Enlarged cardiomediastinal silhouette, unchanged.       PET scan 8/2024  FINDINGS:  REFERENCE VALUES: The SUV max of the mediastinal blood pool is 3.3. The SUV max of the liver is 4.3.  HEAD/NECK: In the left neck soft tissues, a 1.5 x 1.7 cm hypodensity is seen (series 11, image 39) with SUV max of 18.4, increasing to 18.8 on delayed head neck imaging. A 0.6 x 0.7 cm node in the left neck (series 3, image 50) demonstrates SUV 5.6, increasing to 6.1.  Postoperative changes of thyroidectomy are demonstrated. There is metallic streak artifact from dental amalgam.  LUNGS/PLEURA: Extensive pleural nodularity is noted. A reference area of nodularity measuring 0.8 x 1.2 cm (series 3, image 109) has SUV max of 9.3. Hypermetabolic pleural nodularity is demonstrated along the right lateral pleural surfaces is seen with SUV max of 6.7-7.1. Extensive nodularity along the minor fissure is demonstrated, measuring 2.7 x 5.2 cm in  aggregate (series 3, image 135) with SUV max of 8.9-10.7. Posteriorly along the minor fissure, there is hypermetabolic activity with SUV max of 8.3. Anterior pleural nodularity measures up to 4.6 x 1.8 cm (series 3, image 161) and has SUV max of 12.3. Posterior pleural nodularity has SUV max of 7.8. Extensive nodularity extending into the right costophrenic sulcus has SUV max of 7.7-9.1. A peripheral right upper lobe nodule measures 1.4 x 1.1 cm (series 3, image 111) with SUV max of 6.1.Large right pleural effusion is seen with associated compressive atelectasis, with or without superimposed airspace consolidation. Additional scattered ground-glass and reticular opacities are present and may be atelectatic in origin.  MEDIASTINUM/SHAZIA: Epicardial nodularity is seen with reference nodules measuring 1.1 x 1.2 cm and 1.9 x 1.5 cm (series 3, image 54). The SUV max in this region is 4.1-5.3. Posterior and lateral epicardial activity is seen with SUV max of 6.2.  Periesophageal activity is seen with SUV max of 5.7.  CHEST WALL/AXILLA: No pathologic FDG activity.    ABDOMEN/PELVIS: Nonspecific low density of the hepatic parenchyma may represent underlying hepatic steatosis. The gallbladder is surgically absent with cholecystectomy clips in the gallbladder fossa. There is mild diffuse fatty replacement of the pancreas. A lobulated cystic pancreatic lesion the body measures 1.9 x 3.6 cm (series 23, image 182). Scattered colonic diverticula are present in the sigmoid colon. There is no colonic wall thickening or pericolonic fat stranding. Scattered  atherosclerotic vascular calcifications of the abdominal aorta are observed. The uterus is surgically absent. No pathologic FDG activity.    MUSCULOSKELETAL: There are degenerative changes of the shoulders bilaterally.  Degenerative changes of the hips are present bilaterally.  Postprocedural changes of prior right femoral intramedullary carmella and nail placement are partially  delineated with resultant artifactual degradation. No pathologic FDG activity. No suspicious lesions on CT imaging.    OTHER: There is a minute fat-containing umbilical hernia. There is transversely oriented lower abdominopelvic wall scarring, suggestive of prior Pfannenstiel incision.  Small bilateral fat containing inguinal hernias are suspected.    Impression   1. Extensive hypermetabolic pleural-based nodularity is compatible with metastatic disease. Given the absence of activity on prior I-131 imaging, these findings are strongly suggestive of dedifferentiated metastatic thyroid malignancy.  2. Large right pleural effusion, likely malignant.  3. Hypermetabolic nodularity in the left neck soft tissues and involving a left-sided cervical node, consistent with metastatic disease.  4. A hypermetabolic peripheral right upper lobe nodule is concerning for pulmonary parenchymal metastasis.  5. Cystic pancreatic lesion without discernible hypermetabolic activity.  6. Uncomplicated distal colonic diverticulosis.    7. Hepatic steatosis.  8. Status post cholecystectomy.  9. Postoperative changes of hysterectomy.    10. Lesser incidental findings as above.        LABS:  Lab Results   Component Value Date    WBC 9.2 11/25/2024    RBC 4.55 11/25/2024    HGB 13.4 11/25/2024    HCT 40.4 11/25/2024    MCV 88.8 11/25/2024    MCH 29.5 11/25/2024    MCHC 33.2 11/25/2024    MPV 9.9 04/08/2011     No results for input(s): \"GLU\", \"BUN\", \"CREATSERUM\", \"GFRAA\", \"GFRNAA\", \"EGFRCR\", \"CA\", \"ALB\", \"NA\", \"K\", \"CL\", \"CO2\", \"ALKPHO\", \"AST\", \"ALT\", \"BILT\", \"TP\" in the last 168 hours.       PFTS none on record       ASSESSMENT/PLAN:  1.Malignant right pleural effusion and pleural mets c/w metastatic thyroid CA  -S/p pleurex catheter placement  -Drainage has decreased significantly. Last drainage was on 12/20/24 with 75 ml removed. Plan to be drained by home health RN again today  -Will order a CXR for today as well  -Depending on drainage amt  today and CXR finginds, maybe able to remove the pleurex catheter soon  -follows with oncology    2.Recent admission for chest CT with new loculated right pleural effusion and increased dyspnea and chest pain  -s/p IR chest tube placement. Check fluid cytology and cultures were neg  -repeat CXR showed improvement and chest tube was removed  -Repeat CXR today    3.Pleural mets likely the cause of her chronic back and chest pain.   -Follow up with Oncology and Palliative    4.Constipation  -On bowel regimen managed by Dr. Marie    [facility] in 3-4 months.        Thank you for the opportunity to care for Camelia Markham.     I spent a total of 25 minutes in direct contact with the patient and reviewing pertinent outside records on the day of the encounter.     ALDA Brooks DO, MPH  Pulmonary Critical Care Medicine

## 2024-12-04 ENCOUNTER — HOSPITAL ENCOUNTER (OUTPATIENT)
Dept: GENERAL RADIOLOGY | Facility: HOSPITAL | Age: 64
Discharge: HOME | End: 2024-12-04
Attending: INTERNAL MEDICINE
Payer: COMMERCIAL

## 2024-12-04 ENCOUNTER — TELEPHONE (OUTPATIENT)
Dept: PULMONOLOGY | Facility: CLINIC | Age: 64
End: 2024-12-04

## 2024-12-04 ENCOUNTER — OFFICE VISIT (OUTPATIENT)
Dept: PULMONOLOGY | Facility: CLINIC | Age: 64
End: 2024-12-04
Payer: COMMERCIAL

## 2024-12-04 VITALS
WEIGHT: 155.63 LBS | RESPIRATION RATE: 14 BRPM | HEIGHT: 63 IN | DIASTOLIC BLOOD PRESSURE: 96 MMHG | BODY MASS INDEX: 27.57 KG/M2 | OXYGEN SATURATION: 97 % | HEART RATE: 96 BPM | SYSTOLIC BLOOD PRESSURE: 157 MMHG

## 2024-12-04 DIAGNOSIS — J91.0 MALIGNANT PLEURAL EFFUSION (HCC): Primary | ICD-10-CM

## 2024-12-04 DIAGNOSIS — J91.0 MALIGNANT PLEURAL EFFUSION (HCC): ICD-10-CM

## 2024-12-04 DIAGNOSIS — R06.02 SOB (SHORTNESS OF BREATH): ICD-10-CM

## 2024-12-04 DIAGNOSIS — J91.0 PLEURAL EFFUSION, MALIGNANT (HCC): Primary | ICD-10-CM

## 2024-12-04 PROCEDURE — 3077F SYST BP >= 140 MM HG: CPT | Performed by: INTERNAL MEDICINE

## 2024-12-04 PROCEDURE — 71046 X-RAY EXAM CHEST 2 VIEWS: CPT | Performed by: INTERNAL MEDICINE

## 2024-12-04 PROCEDURE — 3008F BODY MASS INDEX DOCD: CPT | Performed by: INTERNAL MEDICINE

## 2024-12-04 PROCEDURE — 3080F DIAST BP >= 90 MM HG: CPT | Performed by: INTERNAL MEDICINE

## 2024-12-04 PROCEDURE — 99214 OFFICE O/P EST MOD 30 MIN: CPT | Performed by: INTERNAL MEDICINE

## 2024-12-04 NOTE — PATIENT INSTRUCTIONS
Go to radiology and have a Chest X Ray done today    Have the right pleurex drained today and let us know how much is drained.     Depending on your Chest X Ray and the amount of fluid that is drained, we may be able to ask IR to remove the catheter soon.     Come back in 4 months.

## 2024-12-04 NOTE — TELEPHONE ENCOUNTER
Robina/MELISSA called to let this office now that she drained 50ml from pleural catheter.  Please call.

## 2024-12-04 NOTE — TELEPHONE ENCOUNTER
Dr. Brooks-Home health nurse drained 50 ml today. Please see chest xray completed today. Home health nurse asking if she should order more drainage kits (only has 1 remaining)?

## 2024-12-04 NOTE — PROGRESS NOTES
Spoke with [facility] nurse, Robina, to discuss pleurx catheter outputs. Last drain was 11/20/24 of 75mL and she will be seeing patient again later today. Robina will call the office back later today to notify of fluid output.

## 2024-12-05 NOTE — TELEPHONE ENCOUNTER
Order placed and patient informed. Spoke to IR who agreed to call patient once reviewed by MD.

## 2024-12-09 ENCOUNTER — TELEPHONE (OUTPATIENT)
Dept: HEMATOLOGY/ONCOLOGY | Facility: HOSPITAL | Age: 64
End: 2024-12-09

## 2024-12-09 ENCOUNTER — NURSE ONLY (OUTPATIENT)
Dept: HEMATOLOGY/ONCOLOGY | Facility: HOSPITAL | Age: 64
End: 2024-12-09
Attending: INTERNAL MEDICINE
Payer: COMMERCIAL

## 2024-12-09 VITALS
DIASTOLIC BLOOD PRESSURE: 101 MMHG | TEMPERATURE: 98 F | OXYGEN SATURATION: 95 % | BODY MASS INDEX: 28.16 KG/M2 | HEIGHT: 62 IN | RESPIRATION RATE: 16 BRPM | HEART RATE: 106 BPM | SYSTOLIC BLOOD PRESSURE: 158 MMHG | WEIGHT: 153 LBS

## 2024-12-09 VITALS
WEIGHT: 153.31 LBS | RESPIRATION RATE: 16 BRPM | HEART RATE: 106 BPM | HEIGHT: 62 IN | BODY MASS INDEX: 28.21 KG/M2 | SYSTOLIC BLOOD PRESSURE: 158 MMHG | DIASTOLIC BLOOD PRESSURE: 101 MMHG | OXYGEN SATURATION: 95 %

## 2024-12-09 DIAGNOSIS — F41.1 ANXIETY STATE: ICD-10-CM

## 2024-12-09 DIAGNOSIS — G89.3 CANCER RELATED PAIN: ICD-10-CM

## 2024-12-09 DIAGNOSIS — T45.1X5A CHEMOTHERAPY-INDUCED NAUSEA: ICD-10-CM

## 2024-12-09 DIAGNOSIS — Z71.89 GOALS OF CARE, COUNSELING/DISCUSSION: ICD-10-CM

## 2024-12-09 DIAGNOSIS — I15.8 OTHER SECONDARY HYPERTENSION: ICD-10-CM

## 2024-12-09 DIAGNOSIS — C78.01 MALIGNANT NEOPLASM METASTATIC TO RIGHT LUNG (HCC): ICD-10-CM

## 2024-12-09 DIAGNOSIS — J91.0 MALIGNANT PLEURAL EFFUSION (HCC): ICD-10-CM

## 2024-12-09 DIAGNOSIS — K59.00 CONSTIPATION, UNSPECIFIED CONSTIPATION TYPE: ICD-10-CM

## 2024-12-09 DIAGNOSIS — C73 THYROID CANCER (HCC): ICD-10-CM

## 2024-12-09 DIAGNOSIS — R45.89 DEPRESSED MOOD: ICD-10-CM

## 2024-12-09 DIAGNOSIS — Z51.5 PALLIATIVE CARE ENCOUNTER: Primary | ICD-10-CM

## 2024-12-09 DIAGNOSIS — J90 PLEURAL EFFUSION: ICD-10-CM

## 2024-12-09 DIAGNOSIS — K11.7 XEROSTOMIA: ICD-10-CM

## 2024-12-09 DIAGNOSIS — R11.0 CHEMOTHERAPY-INDUCED NAUSEA: ICD-10-CM

## 2024-12-09 DIAGNOSIS — C73 THYROID CANCER (HCC): Primary | ICD-10-CM

## 2024-12-09 LAB
ALBUMIN SERPL-MCNC: 4.7 G/DL (ref 3.2–4.8)
ALBUMIN/GLOB SERPL: 1.2 {RATIO} (ref 1–2)
ALP LIVER SERPL-CCNC: 113 U/L
ALT SERPL-CCNC: 29 U/L
ANION GAP SERPL CALC-SCNC: 11 MMOL/L (ref 0–18)
AST SERPL-CCNC: 31 U/L (ref ?–34)
BASOPHILS # BLD AUTO: 0.05 X10(3) UL (ref 0–0.2)
BASOPHILS NFR BLD AUTO: 0.5 %
BILIRUB SERPL-MCNC: 1.4 MG/DL (ref 0.2–1.1)
BUN BLD-MCNC: 12 MG/DL (ref 9–23)
BUN/CREAT SERPL: 15.8 (ref 10–20)
CALCIUM BLD-MCNC: 9.7 MG/DL (ref 8.7–10.4)
CHLORIDE SERPL-SCNC: 104 MMOL/L (ref 98–112)
CO2 SERPL-SCNC: 24 MMOL/L (ref 21–32)
CREAT BLD-MCNC: 0.76 MG/DL
DEPRECATED RDW RBC AUTO: 46.6 FL (ref 35.1–46.3)
EGFRCR SERPLBLD CKD-EPI 2021: 88 ML/MIN/1.73M2 (ref 60–?)
EOSINOPHIL # BLD AUTO: 0.3 X10(3) UL (ref 0–0.7)
EOSINOPHIL NFR BLD AUTO: 2.7 %
ERYTHROCYTE [DISTWIDTH] IN BLOOD BY AUTOMATED COUNT: 14.4 % (ref 11–15)
FASTING STATUS PATIENT QL REPORTED: NO
GLOBULIN PLAS-MCNC: 3.9 G/DL (ref 2–3.5)
GLUCOSE BLD-MCNC: 113 MG/DL (ref 70–99)
HCT VFR BLD AUTO: 44.5 %
HGB BLD-MCNC: 14.7 G/DL
IMM GRANULOCYTES # BLD AUTO: 0.02 X10(3) UL (ref 0–1)
IMM GRANULOCYTES NFR BLD: 0.2 %
LYMPHOCYTES # BLD AUTO: 2.77 X10(3) UL (ref 1–4)
LYMPHOCYTES NFR BLD AUTO: 25 %
MCH RBC QN AUTO: 29.5 PG (ref 26–34)
MCHC RBC AUTO-ENTMCNC: 33 G/DL (ref 31–37)
MCV RBC AUTO: 89.2 FL
MONOCYTES # BLD AUTO: 0.62 X10(3) UL (ref 0.1–1)
MONOCYTES NFR BLD AUTO: 5.6 %
NEUTROPHILS # BLD AUTO: 7.34 X10 (3) UL (ref 1.5–7.7)
NEUTROPHILS # BLD AUTO: 7.34 X10(3) UL (ref 1.5–7.7)
NEUTROPHILS NFR BLD AUTO: 66 %
OSMOLALITY SERPL CALC.SUM OF ELEC: 289 MOSM/KG (ref 275–295)
PLATELET # BLD AUTO: 182 10(3)UL (ref 150–450)
POTASSIUM SERPL-SCNC: 3.7 MMOL/L (ref 3.5–5.1)
PROT SERPL-MCNC: 8.6 G/DL (ref 5.7–8.2)
RBC # BLD AUTO: 4.99 X10(6)UL
SODIUM SERPL-SCNC: 139 MMOL/L (ref 136–145)
WBC # BLD AUTO: 11.1 X10(3) UL (ref 4–11)

## 2024-12-09 PROCEDURE — 99214 OFFICE O/P EST MOD 30 MIN: CPT | Performed by: NURSE PRACTITIONER

## 2024-12-09 PROCEDURE — 86800 THYROGLOBULIN ANTIBODY: CPT

## 2024-12-09 PROCEDURE — 80053 COMPREHEN METABOLIC PANEL: CPT

## 2024-12-09 PROCEDURE — 36415 COLL VENOUS BLD VENIPUNCTURE: CPT

## 2024-12-09 PROCEDURE — 85025 COMPLETE CBC W/AUTO DIFF WBC: CPT

## 2024-12-09 PROCEDURE — 99215 OFFICE O/P EST HI 40 MIN: CPT | Performed by: INTERNAL MEDICINE

## 2024-12-09 RX ORDER — HYDROCHLOROTHIAZIDE 25 MG/1
25 TABLET ORAL DAILY
Qty: 30 TABLET | Refills: 6 | Status: SHIPPED | OUTPATIENT
Start: 2024-12-09

## 2024-12-09 RX ORDER — OMEPRAZOLE 40 MG/1
40 CAPSULE, DELAYED RELEASE ORAL EVERY 8 HOURS
COMMUNITY
Start: 2024-12-06

## 2024-12-09 NOTE — PROGRESS NOTES
Palliative Care Follow Up Note     Patient Name: Camelia Markham   YOB: 1960   Medical Record Number: [id number]   Date of visit: 12/9/2024     The 21st Century Cures Act makes medical notes like these available to patients in the interest of transparency. Please be advised this is a medical document. Medical documents are intended to carry relevant information, facts as evident, and the clinical opinion of the practitioner. The medical note is intended as peer to peer communication and may appear blunt or direct. It is written in medical language and may contain abbreviations or verbiage that are unfamiliar.     Chief Complaint/Reason for Visit:  Chief Complaint   Patient presents with    Palliative Care        History of Present Illness:         Camelia Markham is a 64 year old female with  history of metastatic thyroid ca s/p thyroidectomy 2010, radioactive iodine tx with recent metastatic disease to lung, LN follows with Dr. Chen, hypothyroidism, HTN, HLD, GERD and anxiety.    Camelia Figueroa has c/o severe constipation for > 1 week. Abd XR done on 11/30/24 shows constipation. PCP started Linzess - not effective. Camelia Figueroa has tried an enema with minimal result in stimulating BM. + nausea, no vomiting. Decreased appetite. Passing gas, feels bloated. Denies abdominal distension.     Camelia Figueroa does still have a R PleurX drain in place that drains minimal fluid. Plan to remove R PleurX soon per Pulm ?    PCP: Dr. Marie  Onc: Dr. Chen    Patient seen and examined with no family present today. Camelia Figueroa is awake, alert, oriented x 4, answers questions appropriately, is a reliable historian, and is in minimal distress and tearful today r/t constipation.    See ROS.     Problem List:  Patient Active Problem List   Diagnosis    Thyroid cancer (HCC)    Hypothyroidism    Exposure to medical therapeutic radiation    Acute gastritis    Posterior tibial tendinitis of right leg    Acute tonsillitis    Backache    Bursitis  of shoulder    Hypertensive disorder    Fatigue    Gastroesophageal reflux disease    Nasal mucosa dry    Nonspecific syndrome suggestive of viral illness    Psychophysiologic insomnia    Primary osteoarthritis of left knee    Internal derangement of left knee    Internal derangement of right knee    Other instability, right knee    Acute meniscal tear, medial    Abnormal gait    Heartburn    Anxiety state    Dysthymia    Primary osteoarthritis of right knee    Incontinence of feces    History of colon polyps    Cyst of pancreas (HCC)    Intestinal disaccharidase deficiency    Headache    Generalized anxiety disorder    Familial combined hyperlipidemia    Dyspnea    Diverticulitis    Disability of walking    Contact dermatitis    Calcaneal spur    Bilateral plantar fasciitis    Pain in thoracic spine    Obesity    Rogers's metatarsalgia    Mixed hyperlipidemia    Mass of uterine adnexa    Malaise and fatigue    Lumbosacral radiculopathy    Itching    Irritable bowel syndrome    Obesity with body mass index 30 or greater    Type 2 diabetes mellitus without complication (HCC)    Tinea corporis    Seasonal allergic rhinitis    Pyuria    Psoriasis    Posterior rhinorrhea    Pleuritic pain    Plantar fascial fibromatosis    Viral infection    Vitamin D deficiency    Osteoarthritis of knee    Malignant neoplasm of thyroid gland (HCC)    Influenza vaccine needed    Preoperative testing    Malignant neoplasm metastatic to right lung (HCC)    Pleural effusion    Malignant pleural effusion (HCC)    Goals of care, counseling/discussion    Advance care planning    Palliative care by specialist    Thyroid carcinoma (HCC)    Cancer related pain    Anxiety    Syncope and collapse    Hyperglycemia    Syncope    Metastasis from thyroid cancer (HCC)        Medical History:  Past Medical History:    Anxiety state, unspecified    Cancer (HCC)    Colon adenomas    x3    Disorder of thyroid    thyroidectomy    Diverticulosis of large  intestine    Esophageal reflux    Exposure to medical therapeutic radiation    thyroid    High blood pressure    High cholesterol    Hypercholesteremia    Hypothyroidism    Osteoarthrosis, unspecified whether generalized or localized, unspecified site    Thyroid cancer (HCC)    papillary thyroid; s/p surgery resection with Asher and BRIDGES    Unspecified essential hypertension       Surgical History:  Past Surgical History:   Procedure Laterality Date    Colonoscopy N/A 2022    Procedure: COLONOSCOPY;  Surgeon: Neeraj No MD;  Location: [facility] ENDOSCOPY    Colonoscopy  2024    Colonoscopy N/A 2024    Procedure: COLONOSCOPY;  Surgeon: Neeraj No MD;  Location: [facility] ENDOSCOPY    Egd  2022    Egd  2024    Esophagogastroduodenoscopy    Endoscopic ultrasound - internal  2022    Endoscopic ultrasound exam  2024    Endoscopic Ultrasound    Eye surgery  2024    Eye surgery Left 2024    Hysterectomy      Knee surgery Left 2022    no associated bleeding complications          40 week 7 lb(s) 5 oz Male; 6 hr labor           40 week 7 lb(s) 3 oz Female          41 week 9 lb(s) 8 oz Male    Other surgical history      Injection Tendon Sheath, Ligament    Thyroidectomy      total    Total abdom hysterectomy         Allergies:  Allergies   Allergen Reactions    Latex HIVES    Peanut-Containing Drug Products SWELLING     Closes Throat  Closes Throat  Closes Throat      Peanuts SWELLING     Closes Throat    Adhesive Tape OTHER (SEE COMMENTS)    Seasonal        Family History:  Family History   Problem Relation Age of Onset    Heart Disorder Mother     Breast Cancer Maternal Cousin Female 57    Cancer Daughter 29        Hodgkin's Lymphoma    Ovarian Cancer Neg     Bleeding Disorders Neg     DVT/VTE Neg     Pancreatic Cancer Neg     Prostate Cancer Neg        Social History:  Social History     Socioeconomic History    Marital status:     Tobacco Use    Smoking status: Never    Smokeless tobacco: Never   Vaping Use    Vaping status: Never Used   Substance and Sexual Activity    Alcohol use: Yes     Comment: social    Drug use: No       Medications:  Current Outpatient Medications   Medication Sig Dispense Refill    Omeprazole 40 MG Oral Capsule Delayed Release Take 1 capsule (40 mg total) by mouth every 8 (eight) hours.      hydroCHLOROthiazide 25 MG Oral Tab Take 1 tablet (25 mg total) by mouth daily. 30 tablet 6    clonazePAM 2 MG Oral Tab Take 0.5 tablets (1 mg total) by mouth nightly as needed for Anxiety.      Lenvatinib, 20 MG Daily Dose, 2 x 10 MG Oral Capsule Therapy Pack Take 20 mg by mouth daily. 60 each 5    butalbital-acetaminophen-caffeine -40 MG Oral Tab Take 1 tablet by mouth every 6 (six) hours as needed. 30 tablet 0    levothyroxine 150 MCG Oral Tab Take 1 tablet (150 mcg total) by mouth before breakfast.      ondansetron (ZOFRAN) 8 MG tablet Take 1 tablet (8 mg total) by mouth every 8 (eight) hours as needed for Nausea. 30 tablet 3    HYDROcodone-acetaminophen 5-325 MG Oral Tab Take 1-2 tablets by mouth every 6 (six) hours as needed for Pain. 60 tablet 0    Olmesartan Medoxomil 40 MG Oral Tab Take 1 tablet (40 mg total) by mouth daily.      amLODIPine 10 MG Oral Tab Take 1 tablet (10 mg total) by mouth daily.         Review of Systems:  Review of Systems   Constitutional:  Positive for malaise/fatigue. Negative for weight loss.   HENT: Negative.          Xerostomia     Eyes: Negative.    Respiratory:  Negative for cough, hemoptysis, sputum production, shortness of breath and wheezing.         Cough and SOB have reduced in frequency     R PleurX in place - per Pt., minimal drainage being drained from PleurX; plan to remove R PluerX catheter soon    Following with Pulm       Cardiovascular: Negative.  Negative for chest pain, palpitations and leg swelling.   Gastrointestinal:  Positive for abdominal pain (mid-lower  abdominal pain; comes in waves, waxes and wanes in intensity; aching; started last week;), constipation (Constipated >7 days; reports that she has occasional diarrhea; PCP prescribed Linzess, no relief since starting this med; Has not taken Norco since she was discharged from hospital) and nausea (Denies vomiting).   Genitourinary: Negative.    Musculoskeletal:  Negative for back pain (Denies).        Cancer related R chest wall pain, pleuritic type pain which radiates to her back, pain is sharp in nature when present    Denies pain today during visit; reports pain is occasional     Takes half tab Norco 5/325mg PRN (not daily) when pain increases     Skin: Negative.    Neurological: Negative.    Psychiatric/Behavioral:  Negative for hallucinations, memory loss, substance abuse and suicidal ideas. The patient is nervous/anxious (Anxiety about health; previosuly saw Polina Kelly LCSW; stopped taking Duloxetine \"it makes me feel sad\"; takes Klonopin 1mg QHS to help with sleep; does not want to take an antidepressant) and has insomnia (Takes Clonopin 1mg QHS/PRN).         Physical Examination:  Physical Exam  Vitals reviewed.   Constitutional:       General: She is in acute distress (Related to abdominal pain and constipation symptom).      Appearance: Normal appearance. She is ill-appearing.   HENT:      Head: Normocephalic and atraumatic.      Right Ear: External ear normal.      Left Ear: External ear normal.      Nose: Nose normal.      Mouth/Throat:      Mouth: Mucous membranes are moist.      Pharynx: Oropharynx is clear.   Eyes:      General: No scleral icterus.     Conjunctiva/sclera: Conjunctivae normal.   Cardiovascular:      Rate and Rhythm: Normal rate and regular rhythm.      Pulses: Normal pulses.   Pulmonary:      Effort: Pulmonary effort is normal. No respiratory distress.      Comments: R PleurX drain present and covered in a dressing    Diminished lung sounds noted in RLL    Abdominal:       General: Bowel sounds are normal. There is no distension.      Palpations: Abdomen is soft. There is no mass.      Tenderness: There is abdominal tenderness (L quadrant (Upper and lower portions) tenderness reported with deep palpation). There is no right CVA tenderness, left CVA tenderness, guarding or rebound.      Hernia: No hernia is present.   Musculoskeletal:         General: Normal range of motion.      Cervical back: Normal range of motion and neck supple.      Right lower leg: No edema.      Left lower leg: No edema.   Skin:     General: Skin is warm and dry.      Coloration: Skin is not jaundiced or pale.   Neurological:      General: No focal deficit present.      Mental Status: She is alert and oriented to person, place, and time. Mental status is at baseline.      Motor: No weakness.      Gait: Gait normal.   Psychiatric:         Mood and Affect: Mood normal.         Behavior: Behavior normal.         Thought Content: Thought content normal.         Judgment: Judgment normal.       Palliative Care Goals of Care:  Discussed with patient/family today: Yes  Patient's preference about sharing medical information: Patient and family may receive information  Patient's decision making preferences: Fully involved and speak with family  Code status: FULL CODE  Have advanced directives been discussed with patient or healthcare power of : Yes                         Palliative Care Assessment/Plan:  1. Palliative care encounter    2. Constipation, unspecified constipation type    3. Anxiety state    4. Depressed mood    5. Goals of care, counseling/discussion    6. Thyroid cancer (HCC)    7. Malignant neoplasm metastatic to right lung (HCC)    8. Malignant pleural effusion (HCC)        Cancer Related Pain  -Discussed addiction vs tolerance  -Reviewed IL   -Discussed MOA and explained side effects of pain medications  -Max daily Tylenol limit is 3,000mg  -Pt reports pain is controlled at this time on OTC  Tylenol and 1/2 tab Norco PRN  -Has not needed to take Norco since she has been home from the hospital  -Ibuprofen caused stomach upset - pt would like to avoid  -Pt states she is very sensitive to opioids  -Continue Norco 5/325 mg 1-2 tabs po Q 4 hrs prn  -Continue splinting and heat packs prn  -Discussed indication for opioid medications for cancer related pain per NCCN guidelines  -Discussed common SE of opioid meds which include, but are not limited to: drowsiness, n/v, stomach upset, constipation  -Excessive or misuse of opioid medications may cause respiratory depression, CNS depression, and possibly death  -Discussed to NEVER self adjust pain med/opioid doses or stop these meds abruptly as this may lead to opioid withdrawal  -Discussed signs and symptoms of withdrawal which include, but are not limited to: rhinorrhea, diarrhea, irritability, chills, sweating, insomnia, mood swings, anxiety, increased pain         Constipation  -Starting today when you get home, take Milk of Magnesia take 30mL (2 full Tablespoons) every 4 hours until you start having bowel movements  -Once you start having bowel movements, STOP taking Milk of Magnesia  -Praveena will call you later today and tomorrow to check on you  -Please AVOID suppositories and enemas as they may put you at risk for infection  -STOP taking Linzess  -Once you are cleared out from stool, Praveena will provide instruction on a daily bowel regimen  -INCREASE your water intake - water is a natural lubricant for your bowels  -Advised Camelia Figueroa to report to ER for eval of bowel obstruction is any of the following (including, but not limited to) symptoms occur: intractable nausea, intractable vomiting, rectal bleeding, inability to pass gas, abdominal distension, intractable pain, fever/chills, SOB.           Nausea  -Chemo induced  -Nausea is most likely being caused by severe constipation  -HOLD Ondansetron (Zofran) for now  -Ok to take Prochlorperazine (Compazine) 10mg -  take 1 tablet every 6 hours as needed for nausea  -CONTINUE taking Omeprazole  -Nausea symptom should lessen/resolve when constipation resolves          Xerostomia  -Continue Biotene - swish and spit 4 times/day  -Increase water intake     Anxiety about health/depressed mood  -Camelia Figueroa discontinued Duloxetine - she felt that it made her feel sad  -There was an interaction (risk for prolonged QT interval) between Lexapro and oral anti cancer med - Lexapro was previously discontinued   -CONTINUE Klonopin 1mg at bedtime/PRN - prescribed by PCP   -Previously met with Polina Kelly LCSW - I encouraged continued follow up  -Met with Juliana Nixon LCSW today re: insurance   -Camelia Figueroa is currently not working due to illness; on [employer] until mid December  -Will be covered on her 's insurance starting 1/1/2025; will enroll in [email or web address] until then     Goals of care counseling/discussion  -Pt is FULL CODE  -Continue supportive medical treatments; pt plans to continue pursuing cancer treatment  -Patient is agreeable to hospitalization, if indicated  -Patient agreeable to following up with outpatient palliative care  -Provided emotional support to pt/family who appear to be coping adequately  -See above narrative for further details      Advance care planning counseling/discussion  -Pt is FULL CODE  -Health Surrogate: Baldemar Markham ()      Palliative Performance Scale 70%    Emotional support provided to patient/family: Yes    Palliative Care Follow-up:  I spent a total of  30 minutes with the patient today, which included all of the following:direct face to face contact, history taking, physical examination, and >50% was spent counseling and coordinating care.    Thank you for allowing the Palliative Care Team to participate in the care of your patient. I will continue to follow clinically.    KARELY SMITH, BEBE DNP, FNP-BC, [facility]  [phone]  12/9/2024

## 2024-12-09 NOTE — TELEPHONE ENCOUNTER
I called and spoke with Camelia Figueroa to check in on her.    She has taken 2 doses of MOM so far and started to move her bowels. She reports some watery stool and still feels like there is a piece of solid/hard stool that needs to pass.     I instructed her to take 2 tabs Colace (if she has any in the house) and one more dose of MOM tonight and I will call her in the morning with further instruction depending on how her night goes.     Camelia Figueroa states that she is drinking water and Gatorade to stay hydrated. She also had some soup this afternoon    She reports less nausea than this morning and able to hold liquids and soup down without feeling nauseated.     I will call Camelia Figueroa in the morning to check in.     Camelia Figueroa verbalized understanding.

## 2024-12-09 NOTE — PROGRESS NOTES
[facility] Progress Note    Patient Name: Camelia Markham   YOB: 1960   Medical Record Number: [id number]   CSN: [id number]   Consulting Physician: Inocente Chen MD  Referring Physician(s): Dr Dana Sands MD    Reason for Consultation:  Metastatic  Thyroid Cancer   Cancer Staging   Malignant neoplasm of thyroid gland (HCC)  Staging form: Thyroid - Differentiated, AJCC 8th Edition  - Clinical: Stage IVB (rcT2, cN1, cM1, Age at diagnosis: >= 55 years) - Signed by Inocente Chen MD on 8/28/2024    Current Therapy  Lenvatinib - started 9/18/24  (now at 20mg daily)    Interval History  Patient returns for follow-up.  Since her last visit here her Pleurx has not been draining much.  She saw pulmonology and was recommended removal later this week.  She has developed increasing constipation has been using Linzess without much improvement of her symptoms.  This is causing nausea and anorexia.  Also having some headaches and occasional vision issues.  Her blood pressure was elevated today.    Past Oncologic History   Camelia Markham is a 64 year old female that was seen today in the Cancer Center for metastatic thyroid cancer. Her oncologic history is detailed below    11/3/2010 was found to have an enlarged thyroid gland ultrasound-guided FNA showed papillary thyroid cancer.  Underwent total thyroidectomy with Dr. Asher.  Final pathology showed a 2.5 cm tumor and 2 positive lymph nodes.    12/2010 BRIDGES uptake study showed no evidence of abnormal uptake outside the neck.  Underwent 207 miCu of radioactive iodine.  She had then been maintained on levothyroxine.  Subsequently was lost to follow-up after 2018.    6/17/2024 Thyroglobulin increased to 10, prompted an ultrasound that showed an oval hypoechoic nodule in the superior aspect of the left thyroid measuring 1 x 0.6 x 0.6 cm thought to represent an indeterminate lymph node.    8/6/2024 ultrasound-guided FNA showed papillary carcinoma. NGS  testing showed a BRAF V600E mutation    8/16/2024 WBS thyroid scan showed no discernible pathologic activity increased uptake in the thyroid bed.  Given the biopsy-proven recurrence and absence of I-131 uptake this was thought to represent dedifferentiated recurrent thyroid malignancy.      8/22/2024 PET/CT fusion study showed extensive hypermetabolic pleural-based nodularity, large pleural effusion as well as hypermetabolic nodularity in the left neck soft tissues as well as peripheral right upper nodule all of which was concerning for metastatic disease.  Previously seen cystic pancreatic lesion did not demonstrate any hypermetabolic activity.    8/29/24 had thoracentesis with cytology showing metastatic thyroid carcinoma.    9/15/24 Underwent pleur-X for recurrent pleural effusion.    9/18/24 started lenvatinib at 24mg daily    11/2024 had significant mucositis decreased oral intake and diarrhea with lenvatinib.  This was held.  She required hospitalization and chest tube placement for evacuation loculated effusion.  Lenvatinib is being resumed at 20 mg daily      Past Medical History:  Past Medical History:    Anxiety state, unspecified    Cancer (HCC)    Colon adenomas    x3    Disorder of thyroid    thyroidectomy    Diverticulosis of large intestine    Esophageal reflux    Exposure to medical therapeutic radiation    thyroid    High blood pressure    High cholesterol    Hypercholesteremia    Hypothyroidism    Osteoarthrosis, unspecified whether generalized or localized, unspecified site    Thyroid cancer (HCC)    papillary thyroid; s/p surgery resection with Asher and BRIDGES    Unspecified essential hypertension       Past Surgical History:  Past Surgical History:   Procedure Laterality Date    Colonoscopy N/A 12/29/2022    Procedure: COLONOSCOPY;  Surgeon: Neeraj No MD;  Location: [facility] ENDOSCOPY    Colonoscopy  07/29/2024    Colonoscopy N/A 07/29/2024    Procedure: COLONOSCOPY;  Surgeon: Oneal  MD Neeraj;  Location: [facility] ENDOSCOPY    Egd  2022    Egd  2024    Esophagogastroduodenoscopy    Endoscopic ultrasound - internal  2022    Endoscopic ultrasound exam  2024    Endoscopic Ultrasound    Eye surgery  2024    Eye surgery Left 2024    Hysterectomy      Knee surgery Left 2022    no associated bleeding complications          40 week 7 lb(s) 5 oz Male; 6 hr labor           40 week 7 lb(s) 3 oz Female          41 week 9 lb(s) 8 oz Male    Other surgical history      Injection Tendon Sheath, Ligament    Thyroidectomy      total    Total abdom hysterectomy         Family Medical History:  Family History   Problem Relation Age of Onset    Heart Disorder Mother     Breast Cancer Maternal Cousin Female 57    Cancer Daughter 29        Hodgkin's Lymphoma    Ovarian Cancer Neg     Bleeding Disorders Neg     DVT/VTE Neg     Pancreatic Cancer Neg     Prostate Cancer Neg        Gyne History:  OB History    Para Term  AB Living   3 3 0 0 0 0   SAB IAB Ectopic Multiple Live Births   0 0 0 0 0       Social History:  Social History     Socioeconomic History    Marital status:      Spouse name: Not on file    Number of children: Not on file    Years of education: Not on file    Highest education level: Not on file   Occupational History    Not on file   Tobacco Use    Smoking status: Never    Smokeless tobacco: Never   Vaping Use    Vaping status: Never Used   Substance and Sexual Activity    Alcohol use: Yes     Comment: social    Drug use: No    Sexual activity: Not on file   Other Topics Concern     Service Not Asked    Blood Transfusions Not Asked    Caffeine Concern Yes     Comment: 1 cup of coffee     Occupational Exposure Not Asked    Hobby Hazards Not Asked    Sleep Concern Not Asked    Stress Concern Not Asked    Weight Concern Not Asked    Special Diet Not Asked    Back Care Not Asked    Exercise Not Asked    Bike  Helmet Not Asked    Seat Belt Not Asked    Self-Exams Not Asked   Social History Narrative    Camelia Figueroa is  to Baldemar x30 yrs. She has 3 adult children. Patient works in accounting in book keeping. She lives with her , her mom, and 1 of the children in [place], [place].     Social Drivers of Health     Financial Resource Strain: Not on file   Food Insecurity: No Food Insecurity (11/5/2024)    Food Insecurity     Food Insecurity: Never true   Transportation Needs: No Transportation Needs (11/5/2024)    Transportation Needs     Lack of Transportation: No     Car Seat: Not on file   Physical Activity: Not on file   Stress: Not on file   Social Connections: Not on file   Housing Stability: Low Risk  (11/5/2024)    Housing Stability     Housing Instability: No     Housing Instability Emergency: Not on file     Crib or Bassinette: Not on file       Allergies:   Allergies   Allergen Reactions    Latex HIVES    Peanut-Containing Drug Products SWELLING     Closes Throat  Closes Throat  Closes Throat      Peanuts SWELLING     Closes Throat    Adhesive Tape OTHER (SEE COMMENTS)    Seasonal        Current Medications:   hydroCHLOROthiazide 25 MG Oral Tab Take 1 tablet (25 mg total) by mouth daily. 30 tablet 6       Review of Systems:  A comprehensive 14 point review of systems was completed.  Pertinent positives and negatives noted in the the HPI.     Vital Signs:  BP (!) 158/101 (BP Location: Left arm, Patient Position: Sitting, Cuff Size: adult)   Pulse 106   Temp 98.1 °F (36.7 °C) (Oral)   Resp 16   Ht 1.575 m (5' 2\")   Wt 69.4 kg (153 lb)   SpO2 95%   BMI 27.98 kg/m²    Physical Examination:  General: No apparent distress, very tired  ENT: Conjunctiva clear, EOM intact. Dry oral mucosa  Lymphatics:  No palpable lymphadenopathy .   Chest: + R sided PleurX. Normal respiratory effort.   CV: Regular rate and rhythm, S1 and S2 heard  Extremities: No edema noted  Skin: No lesions, rashes or nodules  Neurological:  grossly intact  Psych: depressed mood and affect     Performance Status:  ECOG-1    Labs:    Lab Results   Component Value Date/Time    WBC 11.1 (H) 12/09/2024 08:50 AM    RBC 4.99 12/09/2024 08:50 AM    HGB 14.7 12/09/2024 08:50 AM    HCT 44.5 12/09/2024 08:50 AM    MCV 89.2 12/09/2024 08:50 AM    MCH 29.5 12/09/2024 08:50 AM    MCHC 33.0 12/09/2024 08:50 AM    RDW 14.4 12/09/2024 08:50 AM    NEPRELIM 7.34 12/09/2024 08:50 AM    .0 12/09/2024 08:50 AM       Lab Results   Component Value Date/Time     (H) 12/09/2024 08:50 AM    BUN 12 12/09/2024 08:50 AM    CREATSERUM 0.76 12/09/2024 08:50 AM    GFRNAA 67 02/16/2021 12:09 PM    CA 9.7 12/09/2024 08:50 AM    ALB 4.7 12/09/2024 08:50 AM     12/09/2024 08:50 AM    K 3.7 12/09/2024 08:50 AM     12/09/2024 08:50 AM    CO2 24.0 12/09/2024 08:50 AM    ALKPHO 113 12/09/2024 08:50 AM    AST 31 12/09/2024 08:50 AM    ALT 29 12/09/2024 08:50 AM       Imaging:    Impression:  64-year-old with a long history of thyroid cancer s/p thyroidectomy followed by BRIDGES and levothyroxine suppression now with biopsy-proven recurrence around the neck as well as pulmonary parenchymal mets and pleural nodules all worrisome for metastatic thyroid cancer.  BRIDGES uptake scan was negative suggesting dedifferentiated thyroid cancer.    I've previously had a long discussion with patient explained that unfortunately this represents an incurable Stage IV malignancy.  However this is certainly very treatable with oral tyrosine kinase inhibitors with good disease control.  She was started lenvatinib but has developed increasing mucositis nausea and dehydration    Plan:  Continue lenvatinib at 20 mg daily [ previously on 24 mg.], if GI issues do not improve, will plan to reduce to 14mg daily.  If she has continued trouble tolerating lenvatinib may need to switch to a BRAF inhibitor. (Since her tumor does have a BRAF-V600E mutation)  CBC shows no significant anemia  Thyroglobulin  has improved and her pleural effusions regressed all suggestive good early response to treatment.  Will plan CT scan next month. Await thyroglobulin  Pleux-X going to be removed later this week  She has discussed bowel regimen extensively with the palliative care  Elevated BP: ?lenvima induced, add hydrochlorothiazide to her anti-HTN regimen.     Thank you Dr FINA MABRY, MD WENDY for the opportunity to participate in the care of this interesting patient. Please do contact me if I may be of any further assistance    Inocente Chen MD  [facility] Hematology/Oncology    High complexity MDM. Our clinic is continuing focal point for all oncologic services the patient needs.

## 2024-12-09 NOTE — PATIENT INSTRUCTIONS
For constipation:  -Starting today when you get home, take Milk of Magnesia take 30mL (2 full Tablespoons) every 4 hours until you start having bowel movements  -Once you start having bowel movements, STOP taking Milk of Magnesia  -Praveena will call you later today and tomorrow to check on you  -Please AVOID suppositories and enemas as they may put you at risk for infection  -STOP taking Linzess  -Once you are cleared out from stool, Praveena will provide instruction on a daily bowel regimen  -INCREASE your water intake - water is a natural lubricant for your bowels    For nausea:  -Nausea is most likely being caused by severe constipation  -HOLD Ondansetron (Zofran) for now  -Ok to take Prochlorperazine (Compazine) 10mg - take 1 tablet every 6 hours as needed for nausea  -CONTINUE taking Omeprazole

## 2024-12-10 ENCOUNTER — TELEPHONE (OUTPATIENT)
Dept: HEMATOLOGY/ONCOLOGY | Facility: HOSPITAL | Age: 64
End: 2024-12-10

## 2024-12-10 ENCOUNTER — TELEPHONE (OUTPATIENT)
Dept: PULMONOLOGY | Facility: CLINIC | Age: 64
End: 2024-12-10

## 2024-12-10 DIAGNOSIS — K59.00 CONSTIPATION, UNSPECIFIED CONSTIPATION TYPE: Primary | ICD-10-CM

## 2024-12-10 LAB
THYROGLOB AB: <1 IU/ML
THYROGLOB IMA: 4.2 NG/ML

## 2024-12-10 RX ORDER — SENNA AND DOCUSATE SODIUM 50; 8.6 MG/1; MG/1
2 TABLET, FILM COATED ORAL NIGHTLY
Qty: 60 TABLET | Refills: 2 | Status: SHIPPED | OUTPATIENT
Start: 2024-12-10

## 2024-12-10 NOTE — TELEPHONE ENCOUNTER
Called patient, verified pleurix catheter is being removed on the 12th. Called JANEL Quarles, [facility], informed Robina patient is scheduled on the 12th to have Pleurix catheter removed. Call complete.

## 2024-12-10 NOTE — TELEPHONE ENCOUNTER
- Robina at   [phone] states that the patient's B/P is 148/82. Heart Rate 110 and she will pick new B/P medication later today

## 2024-12-10 NOTE — TELEPHONE ENCOUNTER
I spoke with Camelia Figueroa today to check in.    She took her last dose of MOM last night. She is having loose stool and some diarrhea. Last episode of diarrhea reported was mid morning. Camelia Figueroa has been staying hydrated with Gatorade and water. Eating more than she was. Less anxious.    I explained that she may continue to have loose stool/diarrhea until her bowels clear out from severe constipation. Camelia Figueroa does not feel pressure or hard stool in her rectum any longer.     Camelia Figueroa reports feeling gas/pressure in her lower abdomen prior to having a BM, then these symptoms resolve. Denies fever, chills, vomiting.     Stop MOM, stop Linzess. Will start Senna-S tonight (2 tabs at bedtime).     Provided emotional support. I will call her again to check in on her.     She knows the s/s of bowel obstruction and will report to ER if any s/s occur.    Camelia Figueroa requesting call from  re: financial assistance for medical costs/bills since she is not working and does not have a steady income coming in. Will contact Juliana Nixon LCSW to call Camelia Figueroa this week for guidance/resources if available.     Camelia Figueroa was appreciative of call.

## 2024-12-10 NOTE — TELEPHONE ENCOUNTER
Spoke to [facility] Robina patient has B/P 148/82 and  today, patient has not yet  hydrochlorothiazide, will get today.  Having some diarrhea post Linzest.

## 2024-12-11 ENCOUNTER — TELEPHONE (OUTPATIENT)
Dept: HEMATOLOGY/ONCOLOGY | Facility: HOSPITAL | Age: 64
End: 2024-12-11

## 2024-12-11 ENCOUNTER — SOCIAL WORK SERVICES (OUTPATIENT)
Dept: HEMATOLOGY/ONCOLOGY | Facility: HOSPITAL | Age: 64
End: 2024-12-11

## 2024-12-11 NOTE — PROGRESS NOTES
RAND called and spoke with patient regarding her financial hardships. Patient states she received a bill for services render on 5/23/24 for $1700, but the bill is date 9/3/24.Patient state her Blue [employer] Plan is requesting additional information and thus the claim is pending. SW directed patient to medical records to get records from her emergency room visit which took place on 5/23/24 sent to her insurance for review. Patient also stating that her  added her to his insurance at work that will start 1/1/25. Patient states her spouse is upset because adding patient is very expensive and taking up half of his check. RAND discussed the [employer] Financial Assistance Program with patient. Patient expressed interest in applying. Patient planned to meet with RAND tomorrow morning to complete the application. RAND reminded patient to bring in the household income including her social security early M[employer] income and her spouse income.

## 2024-12-11 NOTE — TELEPHONE ENCOUNTER
I spoke with Camelia Figueroa today to check in.       Less nausea. Eating solids, drinking liquids with no difficulties. Passing gas and belching. Reports abdominal pain has completely resolved.    Took 2 Senna-S last evening as directed. Advised Camelia Figueroa to increase Senna-S to 2 tabs BID. If this is too much, will reduce back down to 2 tabs at bedtime and add a dose of Miralax.    Camelia Figueroa did not want to add Miralax since she can not afford any other meds at this time. She would rather increase Senna-S dose.     Camelia Figueroa asking for a repeat abdominal XR to see if she is still constipated. I explained that since she is no longer experiecning constipation symptoms, there was no need for Abd XR at this time. She asked me to ask Dr. Chen if he is willing to order Abd XR. I spoke with Dr. Chen's RN who will relay request.      Provided emotional support and reassurance.     She knows the s/s of bowel obstruction and will report to ER if any s/s occur.     Encouraged follow up with GI for hx of constipation and explosive diarrhea.      Camelia Figueroa was appreciative of call and verbalized understanding.

## 2024-12-12 ENCOUNTER — HOSPITAL ENCOUNTER (OUTPATIENT)
Dept: INTERVENTIONAL RADIOLOGY/VASCULAR | Facility: HOSPITAL | Age: 64
Discharge: HOME | End: 2024-12-12
Attending: INTERNAL MEDICINE | Admitting: RADIOLOGY
Payer: COMMERCIAL

## 2024-12-12 ENCOUNTER — SOCIAL WORK SERVICES (OUTPATIENT)
Dept: HEMATOLOGY/ONCOLOGY | Facility: HOSPITAL | Age: 64
End: 2024-12-12

## 2024-12-12 VITALS
HEIGHT: 63 IN | HEART RATE: 80 BPM | WEIGHT: 155 LBS | DIASTOLIC BLOOD PRESSURE: 79 MMHG | BODY MASS INDEX: 27.46 KG/M2 | OXYGEN SATURATION: 94 % | SYSTOLIC BLOOD PRESSURE: 142 MMHG | TEMPERATURE: 98 F | RESPIRATION RATE: 19 BRPM

## 2024-12-12 DIAGNOSIS — J91.0 PLEURAL EFFUSION, MALIGNANT (HCC): ICD-10-CM

## 2024-12-12 PROCEDURE — 99152 MOD SED SAME PHYS/QHP 5/>YRS: CPT | Performed by: RADIOLOGY

## 2024-12-12 PROCEDURE — 36415 COLL VENOUS BLD VENIPUNCTURE: CPT

## 2024-12-12 PROCEDURE — 0WP930Z REMOVAL OF DRAINAGE DEVICE FROM RIGHT PLEURAL CAVITY, PERCUTANEOUS APPROACH: ICD-10-PCS | Performed by: RADIOLOGY

## 2024-12-12 PROCEDURE — 32552 REMOVE LUNG CATHETER: CPT | Performed by: RADIOLOGY

## 2024-12-12 RX ORDER — MIDAZOLAM HYDROCHLORIDE 1 MG/ML
INJECTION INTRAMUSCULAR; INTRAVENOUS
Status: DISCONTINUED
Start: 2024-12-12 | End: 2024-12-12 | Stop reason: WASHOUT

## 2024-12-12 RX ORDER — MIDAZOLAM HYDROCHLORIDE 1 MG/ML
INJECTION INTRAMUSCULAR; INTRAVENOUS
Status: COMPLETED
Start: 2024-12-12 | End: 2024-12-12

## 2024-12-12 RX ORDER — SODIUM CHLORIDE 9 MG/ML
INJECTION, SOLUTION INTRAVENOUS CONTINUOUS
Status: DISCONTINUED | OUTPATIENT
Start: 2024-12-12 | End: 2024-12-12

## 2024-12-12 RX ORDER — LIDOCAINE HYDROCHLORIDE 20 MG/ML
INJECTION, SOLUTION EPIDURAL; INFILTRATION; INTRACAUDAL; PERINEURAL
Status: DISCONTINUED
Start: 2024-12-12 | End: 2024-12-12 | Stop reason: WASHOUT

## 2024-12-12 RX ORDER — LIDOCAINE HYDROCHLORIDE 20 MG/ML
INJECTION, SOLUTION EPIDURAL; INFILTRATION; INTRACAUDAL; PERINEURAL
Status: COMPLETED
Start: 2024-12-12 | End: 2024-12-12

## 2024-12-12 NOTE — PROGRESS NOTES
SW met with patient and spouse to assist with the financial assistance  application. SW forwarded the application to the financial assistance at [email or web address].

## 2024-12-12 NOTE — PROCEDURES
[facility]  part of [facility]  Procedure Note    Camelia Markham Patient Status:  Outpatient    1960 MRN [id number]   Location [facility] INTERVENTIONAL SUITES Attending Dank Brooks, DO   Hosp Day # 0 PCP FINA MABRY, MD WENDY     Procedure: right pleurx catheter removal    Pre-Procedure Diagnosis:  malignant pleural effusion    Post-Procedure Diagnosis: same    Anesthesia:  Sedation    Procedure:  Uneventful removal of right permcath         Blood Loss:  minimal       Complications:  None       Tj Berman MD  2024

## 2024-12-12 NOTE — PRE-SEDATION ASSESSMENT
[facility]  part of [facility] Pre-Procedure Sedation Assessment    History of snoring or sleep or apnea?   No    History of previous problems with anesthesia or sedation  No    Physical Findings:  Neck: nl ROM  CV: rrr  PULM: normal respiratory rate/effort    Mallampati Score:  II (hard and soft palate, upper portion of tonsils anduvula visible)    ASA Classification:   3. Patient with severe systemic disease    Plan:   -IV moderate sedation    Tj Berman MD

## 2024-12-12 NOTE — IVS NOTE
DISCHARGE NOTE     Pt is able to sit up and ambulate without difficulty.   Pt voided and tolerated fluids.   Procedural site remains dry and intact with good circulation, motion and sensation.   No signs or symptoms of bleeding/hematoma noted.   Pt denies any pain or discomfort at this time.  IV access removed  Instructions provided, patient/family verbalizes understanding.   .     Pt discharge via wheelchair to Main Lobby with        Follow up Appointment: none    New Prescription: none           follows commands/alert

## 2024-12-12 NOTE — DISCHARGE INSTRUCTIONS
INTERVENTIONAL RADIOLOGY  [facility]  [phone]     Patient Name:  Camelia Markham    Procedure:  Pleurex Catheter Removal    Site Care: Dermabond (skin glue) has been applied to your incision.  Do not scrub the area.  Allow the Dermabond to flake off on its own.                                       Activity/Diet  No heavy lifting or strenuous activity for 48 hours.  Drink plenty of fluids, unless you have otherwise been told to restrict your fluid intake.  Do not drink alcohol for 24 hours.  Do not drive,  operate heavy machinery, make important decisions or sign legal documents today.    Medications:  Take acetaminophen if needed for pain. Do not exceed 4000mg of acetaminophen in a 24-hour period. and Make no changes to your existing medications.    Contact Interventional Radiology at [phone] if you have severe/unrelieved pain, fever, chills, dizziness/lightheadedness, or drainage/bleeding from your incision site.

## 2024-12-12 NOTE — INTERVAL H&P NOTE
The above referenced H&P was reviewed by Tj Berman MD on 12/12/2024, the patient was examined and no significant changes have occurred in the patient's condition since the H&P was performed.  Risks, benefits, alternative treatments and consequences of no treatment were discussed.  We will proceed with procedure as planned.      Tj Berman MD  12/12/2024  12:34 PM

## 2024-12-19 ENCOUNTER — APPOINTMENT (OUTPATIENT)
Dept: CT IMAGING | Facility: HOSPITAL | Age: 64
End: 2024-12-19
Attending: EMERGENCY MEDICINE
Payer: COMMERCIAL

## 2024-12-19 ENCOUNTER — OFFICE VISIT (OUTPATIENT)
Age: 64
End: 2024-12-19
Attending: INTERNAL MEDICINE
Payer: COMMERCIAL

## 2024-12-19 ENCOUNTER — NURSE ONLY (OUTPATIENT)
Age: 64
End: 2024-12-19
Attending: INTERNAL MEDICINE
Payer: COMMERCIAL

## 2024-12-19 ENCOUNTER — TELEPHONE (OUTPATIENT)
Age: 64
End: 2024-12-19

## 2024-12-19 ENCOUNTER — HOSPITAL ENCOUNTER (OUTPATIENT)
Facility: HOSPITAL | Age: 64
Setting detail: OBSERVATION
Discharge: HOME | End: 2024-12-22
Attending: EMERGENCY MEDICINE | Admitting: HOSPITALIST
Payer: COMMERCIAL

## 2024-12-19 VITALS
DIASTOLIC BLOOD PRESSURE: 92 MMHG | BODY MASS INDEX: 27.23 KG/M2 | SYSTOLIC BLOOD PRESSURE: 138 MMHG | TEMPERATURE: 98 F | HEART RATE: 112 BPM | OXYGEN SATURATION: 96 % | WEIGHT: 148 LBS | RESPIRATION RATE: 18 BRPM | HEIGHT: 62 IN

## 2024-12-19 DIAGNOSIS — E87.6 HYPOKALEMIA: ICD-10-CM

## 2024-12-19 DIAGNOSIS — C78.01 MALIGNANT NEOPLASM METASTATIC TO RIGHT LUNG (HCC): ICD-10-CM

## 2024-12-19 DIAGNOSIS — K59.00 CONSTIPATION, UNSPECIFIED CONSTIPATION TYPE: Primary | ICD-10-CM

## 2024-12-19 DIAGNOSIS — G89.3 CANCER RELATED PAIN: ICD-10-CM

## 2024-12-19 DIAGNOSIS — J90 PLEURAL EFFUSION: ICD-10-CM

## 2024-12-19 DIAGNOSIS — R10.84 ABDOMINAL PAIN, GENERALIZED: Primary | ICD-10-CM

## 2024-12-19 DIAGNOSIS — C73 THYROID CANCER (HCC): ICD-10-CM

## 2024-12-19 LAB
ALBUMIN SERPL-MCNC: 4.7 G/DL (ref 3.2–4.8)
ALBUMIN SERPL-MCNC: 4.8 G/DL (ref 3.2–4.8)
ALBUMIN/GLOB SERPL: 1.2 {RATIO} (ref 1–2)
ALBUMIN/GLOB SERPL: 1.3 {RATIO} (ref 1–2)
ALP LIVER SERPL-CCNC: 107 U/L
ALP LIVER SERPL-CCNC: 108 U/L
ALT SERPL-CCNC: 31 U/L
ALT SERPL-CCNC: 32 U/L
ANION GAP SERPL CALC-SCNC: 12 MMOL/L (ref 0–18)
ANION GAP SERPL CALC-SCNC: 14 MMOL/L (ref 0–18)
AST SERPL-CCNC: 29 U/L (ref ?–34)
AST SERPL-CCNC: 30 U/L (ref ?–34)
BASOPHILS # BLD AUTO: 0.07 X10(3) UL (ref 0–0.2)
BASOPHILS # BLD AUTO: 0.07 X10(3) UL (ref 0–0.2)
BASOPHILS NFR BLD AUTO: 0.7 %
BASOPHILS NFR BLD AUTO: 0.7 %
BILIRUB SERPL-MCNC: 1.4 MG/DL (ref 0.2–1.1)
BILIRUB SERPL-MCNC: 1.4 MG/DL (ref 0.2–1.1)
BILIRUB UR QL: NEGATIVE
BUN BLD-MCNC: 15 MG/DL (ref 9–23)
BUN BLD-MCNC: 16 MG/DL (ref 9–23)
BUN/CREAT SERPL: 17.2 (ref 10–20)
BUN/CREAT SERPL: 21.1 (ref 10–20)
CALCIUM BLD-MCNC: 10 MG/DL (ref 8.7–10.4)
CALCIUM BLD-MCNC: 10.1 MG/DL (ref 8.7–10.4)
CHLORIDE SERPL-SCNC: 102 MMOL/L (ref 98–112)
CHLORIDE SERPL-SCNC: 103 MMOL/L (ref 98–112)
CO2 SERPL-SCNC: 25 MMOL/L (ref 21–32)
CO2 SERPL-SCNC: 26 MMOL/L (ref 21–32)
COLOR UR: YELLOW
CREAT BLD-MCNC: 0.76 MG/DL
CREAT BLD-MCNC: 0.87 MG/DL
DEPRECATED RDW RBC AUTO: 44.6 FL (ref 35.1–46.3)
DEPRECATED RDW RBC AUTO: 44.7 FL (ref 35.1–46.3)
EGFRCR SERPLBLD CKD-EPI 2021: 75 ML/MIN/1.73M2 (ref 60–?)
EGFRCR SERPLBLD CKD-EPI 2021: 88 ML/MIN/1.73M2 (ref 60–?)
EOSINOPHIL # BLD AUTO: 0.07 X10(3) UL (ref 0–0.7)
EOSINOPHIL # BLD AUTO: 0.12 X10(3) UL (ref 0–0.7)
EOSINOPHIL NFR BLD AUTO: 0.7 %
EOSINOPHIL NFR BLD AUTO: 1.1 %
ERYTHROCYTE [DISTWIDTH] IN BLOOD BY AUTOMATED COUNT: 14 % (ref 11–15)
ERYTHROCYTE [DISTWIDTH] IN BLOOD BY AUTOMATED COUNT: 14.1 % (ref 11–15)
GLOBULIN PLAS-MCNC: 3.8 G/DL (ref 2–3.5)
GLOBULIN PLAS-MCNC: 3.9 G/DL (ref 2–3.5)
GLUCOSE BLD-MCNC: 111 MG/DL (ref 70–99)
GLUCOSE BLD-MCNC: 127 MG/DL (ref 70–99)
GLUCOSE UR-MCNC: NORMAL MG/DL
HCT VFR BLD AUTO: 44.2 %
HCT VFR BLD AUTO: 45.4 %
HGB BLD-MCNC: 15.4 G/DL
HGB BLD-MCNC: 15.5 G/DL
HGB UR QL STRIP.AUTO: NEGATIVE
HYALINE CASTS #/AREA URNS AUTO: PRESENT /LPF
IMM GRANULOCYTES # BLD AUTO: 0.01 X10(3) UL (ref 0–1)
IMM GRANULOCYTES # BLD AUTO: 0.03 X10(3) UL (ref 0–1)
IMM GRANULOCYTES NFR BLD: 0.1 %
IMM GRANULOCYTES NFR BLD: 0.3 %
KETONES UR-MCNC: 20 MG/DL
LEUKOCYTE ESTERASE UR QL STRIP.AUTO: 250
LIPASE SERPL-CCNC: 24 U/L (ref 12–53)
LYMPHOCYTES # BLD AUTO: 2.91 X10(3) UL (ref 1–4)
LYMPHOCYTES # BLD AUTO: 3.17 X10(3) UL (ref 1–4)
LYMPHOCYTES NFR BLD AUTO: 27.7 %
LYMPHOCYTES NFR BLD AUTO: 30 %
MCH RBC QN AUTO: 29.7 PG (ref 26–34)
MCH RBC QN AUTO: 30.6 PG (ref 26–34)
MCHC RBC AUTO-ENTMCNC: 33.9 G/DL (ref 31–37)
MCHC RBC AUTO-ENTMCNC: 35.1 G/DL (ref 31–37)
MCV RBC AUTO: 87.2 FL
MCV RBC AUTO: 87.5 FL
MONOCYTES # BLD AUTO: 0.48 X10(3) UL (ref 0.1–1)
MONOCYTES # BLD AUTO: 0.61 X10(3) UL (ref 0.1–1)
MONOCYTES NFR BLD AUTO: 4.6 %
MONOCYTES NFR BLD AUTO: 5.8 %
NEUTROPHILS # BLD AUTO: 6.55 X10 (3) UL (ref 1.5–7.7)
NEUTROPHILS # BLD AUTO: 6.55 X10(3) UL (ref 1.5–7.7)
NEUTROPHILS # BLD AUTO: 6.97 X10 (3) UL (ref 1.5–7.7)
NEUTROPHILS # BLD AUTO: 6.97 X10(3) UL (ref 1.5–7.7)
NEUTROPHILS NFR BLD AUTO: 62.1 %
NEUTROPHILS NFR BLD AUTO: 66.2 %
NITRITE UR QL STRIP.AUTO: NEGATIVE
OSMOLALITY SERPL CALC.SUM OF ELEC: 294 MOSM/KG (ref 275–295)
OSMOLALITY SERPL CALC.SUM OF ELEC: 294 MOSM/KG (ref 275–295)
PH UR: 5.5 [PH] (ref 5–8)
PLATELET # BLD AUTO: 246 10(3)UL (ref 150–450)
PLATELET # BLD AUTO: 252 10(3)UL (ref 150–450)
POTASSIUM SERPL-SCNC: 3.6 MMOL/L (ref 3.5–5.1)
POTASSIUM SERPL-SCNC: 3.7 MMOL/L (ref 3.5–5.1)
PROT SERPL-MCNC: 8.6 G/DL (ref 5.7–8.2)
PROT SERPL-MCNC: 8.6 G/DL (ref 5.7–8.2)
PROT UR-MCNC: 50 MG/DL
RBC # BLD AUTO: 5.07 X10(6)UL
RBC # BLD AUTO: 5.19 X10(6)UL
SODIUM SERPL-SCNC: 141 MMOL/L (ref 136–145)
SODIUM SERPL-SCNC: 141 MMOL/L (ref 136–145)
SP GR UR STRIP: 1.02 (ref 1–1.03)
UROBILINOGEN UR STRIP-ACNC: 3
WBC # BLD AUTO: 10.5 X10(3) UL (ref 4–11)
WBC # BLD AUTO: 10.6 X10(3) UL (ref 4–11)

## 2024-12-19 PROCEDURE — 99223 1ST HOSP IP/OBS HIGH 75: CPT | Performed by: HOSPITALIST

## 2024-12-19 PROCEDURE — 74177 CT ABD & PELVIS W/CONTRAST: CPT | Performed by: EMERGENCY MEDICINE

## 2024-12-19 RX ORDER — ONDANSETRON 2 MG/ML
4 INJECTION INTRAMUSCULAR; INTRAVENOUS EVERY 6 HOURS PRN
Status: DISCONTINUED | OUTPATIENT
Start: 2024-12-19 | End: 2024-12-22

## 2024-12-19 RX ORDER — PROCHLORPERAZINE EDISYLATE 5 MG/ML
5 INJECTION INTRAMUSCULAR; INTRAVENOUS EVERY 8 HOURS PRN
Status: DISCONTINUED | OUTPATIENT
Start: 2024-12-19 | End: 2024-12-22

## 2024-12-19 RX ORDER — HYDROMORPHONE HYDROCHLORIDE 1 MG/ML
0.1 INJECTION, SOLUTION INTRAMUSCULAR; INTRAVENOUS; SUBCUTANEOUS EVERY 2 HOUR PRN
Status: DISCONTINUED | OUTPATIENT
Start: 2024-12-19 | End: 2024-12-22

## 2024-12-19 RX ORDER — MORPHINE SULFATE 2 MG/ML
2 INJECTION, SOLUTION INTRAMUSCULAR; INTRAVENOUS EVERY 2 HOUR PRN
Status: DISCONTINUED | OUTPATIENT
Start: 2024-12-19 | End: 2024-12-22

## 2024-12-19 RX ORDER — HYDROMORPHONE HYDROCHLORIDE 1 MG/ML
0.4 INJECTION, SOLUTION INTRAMUSCULAR; INTRAVENOUS; SUBCUTANEOUS EVERY 2 HOUR PRN
Status: DISCONTINUED | OUTPATIENT
Start: 2024-12-19 | End: 2024-12-22

## 2024-12-19 RX ORDER — AMLODIPINE BESYLATE 10 MG/1
10 TABLET ORAL DAILY
Status: DISCONTINUED | OUTPATIENT
Start: 2024-12-20 | End: 2024-12-22

## 2024-12-19 RX ORDER — ONDANSETRON 2 MG/ML
4 INJECTION INTRAMUSCULAR; INTRAVENOUS ONCE
Status: COMPLETED | OUTPATIENT
Start: 2024-12-19 | End: 2024-12-19

## 2024-12-19 RX ORDER — HYDROCHLOROTHIAZIDE 25 MG/1
25 TABLET ORAL DAILY
Status: DISCONTINUED | OUTPATIENT
Start: 2024-12-20 | End: 2024-12-22

## 2024-12-19 RX ORDER — MORPHINE SULFATE 4 MG/ML
4 INJECTION, SOLUTION INTRAMUSCULAR; INTRAVENOUS EVERY 2 HOUR PRN
Status: DISCONTINUED | OUTPATIENT
Start: 2024-12-19 | End: 2024-12-22

## 2024-12-19 RX ORDER — LOSARTAN POTASSIUM 100 MG/1
100 TABLET ORAL DAILY
Status: DISCONTINUED | OUTPATIENT
Start: 2024-12-20 | End: 2024-12-22

## 2024-12-19 RX ORDER — MORPHINE SULFATE 2 MG/ML
1 INJECTION, SOLUTION INTRAMUSCULAR; INTRAVENOUS EVERY 2 HOUR PRN
Status: DISCONTINUED | OUTPATIENT
Start: 2024-12-19 | End: 2024-12-22

## 2024-12-19 RX ORDER — ACETAMINOPHEN 500 MG
500 TABLET ORAL EVERY 4 HOURS PRN
Status: DISCONTINUED | OUTPATIENT
Start: 2024-12-19 | End: 2024-12-22

## 2024-12-19 RX ORDER — CLONAZEPAM 1 MG/1
2 TABLET ORAL NIGHTLY PRN
Status: DISCONTINUED | OUTPATIENT
Start: 2024-12-19 | End: 2024-12-22

## 2024-12-19 RX ORDER — SODIUM CHLORIDE 9 MG/ML
INJECTION, SOLUTION INTRAVENOUS CONTINUOUS
Status: DISCONTINUED | OUTPATIENT
Start: 2024-12-19 | End: 2024-12-22

## 2024-12-19 RX ORDER — HYDROMORPHONE HYDROCHLORIDE 1 MG/ML
0.2 INJECTION, SOLUTION INTRAMUSCULAR; INTRAVENOUS; SUBCUTANEOUS EVERY 2 HOUR PRN
Status: DISCONTINUED | OUTPATIENT
Start: 2024-12-19 | End: 2024-12-22

## 2024-12-19 RX ORDER — ENOXAPARIN SODIUM 100 MG/ML
40 INJECTION SUBCUTANEOUS DAILY
Status: DISCONTINUED | OUTPATIENT
Start: 2024-12-20 | End: 2024-12-22

## 2024-12-19 RX ORDER — PANTOPRAZOLE SODIUM 40 MG/1
40 TABLET, DELAYED RELEASE ORAL
Status: DISCONTINUED | OUTPATIENT
Start: 2024-12-20 | End: 2024-12-22

## 2024-12-19 RX ORDER — LEVOTHYROXINE SODIUM 75 UG/1
150 TABLET ORAL
Status: DISCONTINUED | OUTPATIENT
Start: 2024-12-20 | End: 2024-12-22

## 2024-12-19 RX ORDER — TEMAZEPAM 15 MG/1
15 CAPSULE ORAL NIGHTLY PRN
Status: DISCONTINUED | OUTPATIENT
Start: 2024-12-19 | End: 2024-12-22

## 2024-12-19 NOTE — ED PROVIDER NOTES
Patient Seen in: [facility] Emergency Department      History     Chief Complaint   Patient presents with    Constipation     Stated Complaint: from cancer center: constipation no bm several wks, abd pain cancer patient    Subjective:   HPI      Patient is a 64-year-old female history as listed below.  She states that she has not had a normal bowel movement in several weeks.  She complains of abdominal pain she states she is vomited.  She states yesterday she did move very small amount of stool.  No fever.    Objective:     Past Medical History:    Anxiety state, unspecified    Cancer (HCC)    Colon adenomas    x3    Disorder of thyroid    thyroidectomy    Diverticulosis of large intestine    Esophageal reflux    Exposure to medical therapeutic radiation    thyroid    High blood pressure    High cholesterol    Hypercholesteremia    Hypothyroidism    Osteoarthrosis, unspecified whether generalized or localized, unspecified site    Thyroid cancer (HCC)    papillary thyroid; s/p surgery resection with Asher and BRIDGES    Unspecified essential hypertension              Past Surgical History:   Procedure Laterality Date    Colonoscopy N/A 2022    Procedure: COLONOSCOPY;  Surgeon: Neeraj No MD;  Location: [facility] ENDOSCOPY    Colonoscopy  2024    Colonoscopy N/A 2024    Procedure: COLONOSCOPY;  Surgeon: Neeraj No MD;  Location: [facility] ENDOSCOPY    Egd  2022    Egd  2024    Esophagogastroduodenoscopy    Endoscopic ultrasound - internal  2022    Endoscopic ultrasound exam  2024    Endoscopic Ultrasound    Eye surgery  2024    Eye surgery Left 2024    Hysterectomy      Knee surgery Left 2022    no associated bleeding complications          40 week 7 lb(s) 5 oz Male; 6 hr labor           40 week 7 lb(s) 3 oz Female          41 week 9 lb(s) 8 oz Male    Other surgical history      Injection Tendon Sheath, Ligament     Thyroidectomy  2010    total    Total abdom hysterectomy                  Social History     Socioeconomic History    Marital status:    Tobacco Use    Smoking status: Never    Smokeless tobacco: Never   Vaping Use    Vaping status: Never Used   Substance and Sexual Activity    Alcohol use: Yes     Comment: social    Drug use: No   Other Topics Concern    Caffeine Concern Yes     Comment: 1 cup of coffee    Social History Narrative    Camelia Figueroa is  to Baldemar x30 yrs. She has 3 adult children. Patient works in accounting in book keeping. She lives with her , her mom, and 1 of the children in [place], [place].     Social Drivers of Health     Food Insecurity: No Food Insecurity (11/5/2024)    Food Insecurity     Food Insecurity: Never true   Transportation Needs: No Transportation Needs (11/5/2024)    Transportation Needs     Lack of Transportation: No   Housing Stability: Low Risk  (11/5/2024)    Housing Stability     Housing Instability: No                  Physical Exam     ED Triage Vitals [12/19/24 1218]   /88   Pulse 111   Resp 18   Temp 97.6 °F (36.4 °C)   Temp src Oral   SpO2 96 %   O2 Device None (Room air)       Current Vitals:   Vital Signs  BP: (!) 149/94  Pulse: 101  Resp: 16  Temp: 97.6 °F (36.4 °C)  Temp src: Oral  MAP (mmHg): (!) 112    Oxygen Therapy  SpO2: 95 %  O2 Device: None (Room air)        Physical Exam  Constitutional: Oriented to person, place, and time. Appears well-developed and well-nourished.   HEENT:   Head: Normocephalic and atraumatic.   Right Ear: External ear normal.   Left Ear: External ear normal.   Nose: Nose normal.   Mouth/Throat: Oropharynx is clear and moist.   Eyes: Conjunctivae and EOM are normal. Pupils are equal, round, and reactive to light.   Neck: Neck supple.   Cardiovascular: Normal rate, regular rhythm, normal heart sounds and intact distal pulses.    Pulmonary/Chest: Effort normal and breath sounds normal. No respiratory distress.   Abdominal:  Soft. Bowel sounds are normal. Exhibits no distension and no mass. There is no tenderness. There is no rebound and no guarding.   Musculoskeletal: Normal range of motion. Exhibits no edema or tenderness.   Lymphadenopathy: No cervical adenopathy.   Neurological: Alert and oriented to person, place, and time. Normal reflexes. No cranial nerve deficit. No motor os sensory defecits noted Coordination normal.   Skin: Skin is warm and dry.   Psychiatric: Normal mood and affect. Behavior is normal. Judgment and thought content normal.   Nursing note and vitals reviewed.      ED Course     Labs Reviewed   COMP METABOLIC PANEL (14) - Abnormal; Notable for the following components:       Result Value    Glucose 111 (*)     BUN/CREA Ratio 21.1 (*)     Bilirubin, Total 1.4 (*)     Total Protein 8.6 (*)     Globulin  3.9 (*)     All other components within normal limits   URINALYSIS WITH CULTURE REFLEX - Abnormal; Notable for the following components:    Clarity Urine Turbid (*)     Ketones Urine 20 (*)     Protein Urine 50 (*)     Urobilinogen Urine 3 (*)     Leukocyte Esterase Urine 250 (*)     WBC Urine 11-20 (*)     Squamous Epi. Cells Few (*)     Transitional Cells Few (*)     Hyaline Casts Present (*)     All other components within normal limits   LIPASE - Normal   CBC WITH DIFFERENTIAL WITH PLATELET   RAINBOW DRAW LAVENDER   RAINBOW DRAW LIGHT GREEN   RAINBOW DRAW BLUE   URINE CULTURE, ROUTINE   C. DIFFICILE(TOXIGENIC)PCR   OCCULT BLOOD, STOOL   STOOL CULTURE W/SHIGATOXIN                   MDM      Use of independent historian:     I personally reviewed and interpreted the images :     CT ABDOMEN+PELVIS(CONTRAST ONLY)(CPT=74177)    Result Date: 12/19/2024  CONCLUSION:   Diffuse nondilated fluid-filled small and large bowel loops, with associated mucosal hyperenhancement as well as diffuse colonic wall thickening.  Findings are concerning for enterocolitis.  Given the appearance of pancolitis, Clostridium difficile is  within the differential as well.  Curvilinear consolidation at the right posterior lung base.  May represent small amount of increased atelectasis.  Superimposed pneumonia is the differential consideration.  Partially imaged pleural based nodularity use at the right lung base, decreased from the 08/22/2024 FDG PET-CT.  Trace right pleural effusion.  Diffuse hepatic steatosis.  Unchanged 2.6 cm fluid density lesion of the anterior body of the pancreas, without metabolic activity on recent PET-CT.  May represent an IPMN or other etiology.  Recommend outpatient follow-up with contrast enhanced MRI of the abdomen on a nonemergent basis.       Dictated by (CST): Maggie Marques MD on 12/19/2024 at 4:55 PM     Finalized by (CST): Maggie Marques MD on 12/19/2024 at 5:12 PM           Vitals:    12/19/24 1218 12/19/24 1219 12/19/24 1500   BP: 141/88  (!) 149/94   Pulse: 111  101   Resp: 18  16   Temp: 97.6 °F (36.4 °C)     TempSrc: Oral     SpO2: 96%  95%   Weight:  67.1 kg    Height:  160 cm (5' 3\")      *I personally reviewed and interpreted all ED vitals.    Pulse Ox: 95%, Room air, Normal     EKG interpretation above independently interpreted by me    Monitor Interpretation:   normal sinus rhythm independently interpreted by me    Differential Diagnosis/ Diagnostic Considerations: Patient complains of vomiting abdominal pain and no bowel movement.  Consider constipation consider bowel obstruction consider gastritis    Medical Record Review: I personally reviewed available prior medical records for any recent pertinent discharge summaries, testing, and procedures and reviewed those reports and found notes from oncology visit today reviewed.    Complicating Factors: The patient already has thyroid cancer lung cancer which contribute to the complexity of this ED evaluation.    Social determinants of health:    Prescription drug management:      Shared Decision Making:    ED Course: CT findings noted discussed possibility  of immunotherapy related colitis with hospitalist    Discussion of management with other healthcare providers: I discussed case with hospitalist Dr. Campos saw the patient in the ED consulted oncology and GI    Condition upon leaving the department: Stable      Admission disposition: 12/19/2024  6:15 PM           Medical Decision Making      Disposition and Plan     Clinical Impression:  1. Abdominal pain, generalized         Disposition:  Admit  12/19/2024  6:15 pm    Follow-up:  No follow-up provider specified.        Medications Prescribed:  Current Discharge Medication List              Supplementary Documentation:         Hospital Problems       Present on Admission  Date Reviewed: 12/19/2024            ICD-10-CM Noted POA    * (Principal) Abdominal pain, generalized R10.84 12/19/2024 Unknown

## 2024-12-19 NOTE — TELEPHONE ENCOUNTER
Called Camelia Figueroa back concerning her my chart message, she is having sore throat, vomitting phelgm, not able to tolerate much food. Hasn't had a bowel movement since Monday. This RN was having trouble hearing her and asked her to speak up a few times as she was talking very quietly. Booked her for her an ACV and lab visit at 1030 today and a visit with Riya at 1130.   [Acute] : an acute visit [FreeTextEntry1] : Follow up for meningioma surgery four years ago

## 2024-12-19 NOTE — ED INITIAL ASSESSMENT (HPI)
Pt presents to the ER with c/o constipation x 3 weeks.     Pt CA patient and states doctors have  been changing her pain medications often.     N/v x 3 days

## 2024-12-20 PROBLEM — K52.9 ENTERITIS: Status: ACTIVE | Noted: 2024-12-20

## 2024-12-20 LAB
ANION GAP SERPL CALC-SCNC: 9 MMOL/L (ref 0–18)
BASOPHILS # BLD AUTO: 0.07 X10(3) UL (ref 0–0.2)
BASOPHILS NFR BLD AUTO: 0.9 %
BUN BLD-MCNC: 16 MG/DL (ref 9–23)
BUN/CREAT SERPL: 19.3 (ref 10–20)
C DIFF TOX B STL QL: NEGATIVE
CALCIUM BLD-MCNC: 9.1 MG/DL (ref 8.7–10.4)
CHLORIDE SERPL-SCNC: 107 MMOL/L (ref 98–112)
CO2 SERPL-SCNC: 26 MMOL/L (ref 21–32)
CREAT BLD-MCNC: 0.83 MG/DL
DEPRECATED RDW RBC AUTO: 48.5 FL (ref 35.1–46.3)
EGFRCR SERPLBLD CKD-EPI 2021: 79 ML/MIN/1.73M2 (ref 60–?)
EOSINOPHIL # BLD AUTO: 0.22 X10(3) UL (ref 0–0.7)
EOSINOPHIL NFR BLD AUTO: 2.7 %
ERYTHROCYTE [DISTWIDTH] IN BLOOD BY AUTOMATED COUNT: 14.3 % (ref 11–15)
GLUCOSE BLD-MCNC: 112 MG/DL (ref 70–99)
HCT VFR BLD AUTO: 40.7 %
HEMOCCULT STL QL: NEGATIVE
HGB BLD-MCNC: 13.3 G/DL
IMM GRANULOCYTES # BLD AUTO: 0.01 X10(3) UL (ref 0–1)
IMM GRANULOCYTES NFR BLD: 0.1 %
LYMPHOCYTES # BLD AUTO: 3.32 X10(3) UL (ref 1–4)
LYMPHOCYTES NFR BLD AUTO: 41.2 %
MCH RBC QN AUTO: 30 PG (ref 26–34)
MCHC RBC AUTO-ENTMCNC: 32.7 G/DL (ref 31–37)
MCV RBC AUTO: 91.9 FL
MONOCYTES # BLD AUTO: 0.56 X10(3) UL (ref 0.1–1)
MONOCYTES NFR BLD AUTO: 7 %
NEUTROPHILS # BLD AUTO: 3.87 X10 (3) UL (ref 1.5–7.7)
NEUTROPHILS # BLD AUTO: 3.87 X10(3) UL (ref 1.5–7.7)
NEUTROPHILS NFR BLD AUTO: 48.1 %
OSMOLALITY SERPL CALC.SUM OF ELEC: 296 MOSM/KG (ref 275–295)
PLATELET # BLD AUTO: 230 10(3)UL (ref 150–450)
POTASSIUM SERPL-SCNC: 3.9 MMOL/L (ref 3.5–5.1)
RBC # BLD AUTO: 4.43 X10(6)UL
SODIUM SERPL-SCNC: 142 MMOL/L (ref 136–145)
WBC # BLD AUTO: 8.1 X10(3) UL (ref 4–11)

## 2024-12-20 PROCEDURE — 99223 1ST HOSP IP/OBS HIGH 75: CPT | Performed by: INTERNAL MEDICINE

## 2024-12-20 PROCEDURE — 99233 SBSQ HOSP IP/OBS HIGH 50: CPT | Performed by: INTERNAL MEDICINE

## 2024-12-20 NOTE — ED QUICK NOTES
Orders for admission, patient is aware of plan and ready to go upstairs. Any questions, please call ED JANEL concepcion at extension [phone].     Patient Covid vaccination status: Fully vaccinated     COVID Test Ordered in ED: None    COVID Suspicion at Admission: N/A    Running Infusions:  None    Mental Status/LOC at time of transport: AAOx4    Other pertinent information:   CIWA score: N/A   NIH score:  N/A

## 2024-12-20 NOTE — PLAN OF CARE
Admission completed and home meds reviewed. Patient oriented to the floor. Camelia Figueroa is alert and oriented x4, on RA. Will start on Lovenox. Had few Bms during the shift. Cdiff, GI stool, and OB stool sent. Had nausea, managed with zofran prn, has compazine prn if need. Trial some clears during the shift. Voiding up to bathroom, 1x-assist/stand-by. Pain in abdomen managed with Dilaudid prn. IVF infusing with 0.9 at 100 ml/hr. Plan pending course, safety measures in place, call light within reach, and support provided.     Problem: Patient Centered Care  Goal: Patient preferences are identified and integrated in the patient's plan of care  Description: Interventions:  - What would you like us to know as we care for you? From home with family  - Provide timely, complete, and accurate information to patient/family  - Incorporate patient and family knowledge, values, beliefs, and cultural backgrounds into the planning and delivery of care  - Encourage patient/family to participate in care and decision-making at the level they choose  - Honor patient and family perspectives and choices  Outcome: Progressing     Problem: ANXIETY  Goal: Will report anxiety at manageable levels  Description: INTERVENTIONS:  - Administer medication as ordered  - Teach and rehearse alternative coping skills  - Provide emotional support with 1:1 interaction with staff  Outcome: Progressing     Problem: COPING  Goal: Pt/Family able to verbalize concerns and demonstrate effective coping strategies  Description: INTERVENTIONS:  - Assist patient/family to identify coping skills, available support systems and cultural and spiritual values  - Provide emotional support, including active listening and acknowledgement of concerns of patient and caregivers  - Reduce environmental stimuli, as able  - Instruct patient/family in relaxation techniques, as appropriate  - Assess for spiritual and psychosocial needs and initiate Spiritual Care or Behavioral  Health consult as needed  Outcome: Progressing     Problem: PAIN - ADULT  Goal: Verbalizes/displays adequate comfort level or patient's stated pain goal  Description: INTERVENTIONS:  - Encourage pt to monitor pain and request assistance  - Assess pain using appropriate pain scale  - Administer analgesics based on type and severity of pain and evaluate response  - Implement non-pharmacological measures as appropriate and evaluate response  - Consider cultural and social influences on pain and pain management  - Manage/alleviate anxiety  - Utilize distraction and/or relaxation techniques  - Monitor for opioid side effects  - Notify MD/LIP if interventions unsuccessful or patient reports new pain  - Anticipate increased pain with activity and pre-medicate as appropriate  Outcome: Progressing     Problem: RISK FOR INFECTION - ADULT  Goal: Absence of fever/infection during anticipated neutropenic period  Description: INTERVENTIONS  - Monitor WBC  - Administer growth factors as ordered  - Implement neutropenic guidelines  Outcome: Progressing     Problem: SAFETY ADULT - FALL  Goal: Free from fall injury  Description: INTERVENTIONS:  - Assess pt frequently for physical needs  - Identify cognitive and physical deficits and behaviors that affect risk of falls.  - [place] fall precautions as indicated by assessment.  - Educate pt/family on patient safety including physical limitations  - Instruct pt to call for assistance with activity based on assessment  - Modify environment to reduce risk of injury  - Provide assistive devices as appropriate  - Consider OT/PT consult to assist with strengthening/mobility  - Encourage toileting schedule  Outcome: Progressing     Problem: DISCHARGE PLANNING  Goal: Discharge to home or other facility with appropriate resources  Description: INTERVENTIONS:  - Identify barriers to discharge w/pt and caregiver  - Include patient/family/discharge partner in discharge planning  - Arrange for  needed discharge resources and transportation as appropriate  - Identify discharge learning needs (meds, wound care, etc)  - Arrange for interpreters to assist at discharge as needed  - Consider post-discharge preferences of patient/family/discharge partner  - Complete POLST form as appropriate  - Assess patient's ability to be responsible for managing their own health  - Refer to Case Management Department for coordinating discharge planning if the patient needs post-hospital services based on physician/LIP order or complex needs related to functional status, cognitive ability or social support system  Outcome: Progressing     Problem: GASTROINTESTINAL - ADULT  Goal: Minimal or absence of nausea and vomiting  Description: INTERVENTIONS:  - Maintain adequate hydration with IV or PO as ordered and tolerated  - Nasogastric tube to low intermittent suction as ordered  - Evaluate effectiveness of ordered antiemetic medications  - Provide nonpharmacologic comfort measures as appropriate  - Advance diet as tolerated, if ordered  - Obtain nutritional consult as needed  - Evaluate fluid balance  Outcome: Progressing  Goal: Maintains or returns to baseline bowel function  Description: INTERVENTIONS:  - Assess bowel function  - Maintain adequate hydration with IV or PO as ordered and tolerated  - Evaluate effectiveness of GI medications  - Encourage mobilization and activity  - Obtain nutritional consult as needed  - Establish a toileting routine/schedule  - Consider collaborating with pharmacy to review patient's medication profile  Outcome: Progressing  Goal: Maintains adequate nutritional intake (undernourished)  Description: INTERVENTIONS:  - Monitor percentage of each meal consumed  - Identify factors contributing to decreased intake, treat as appropriate  - Assist with meals as needed  - Monitor I&O, WT and lab values  - Obtain nutritional consult as needed  - Optimize oral hygiene and moisture  - Encourage food from  home; allow for food preferences  - Enhance eating environment  Outcome: Progressing     Problem: METABOLIC/FLUID AND ELECTROLYTES - ADULT  Goal: Electrolytes maintained within normal limits  Description: INTERVENTIONS:  - Monitor labs and rhythm and assess patient for signs and symptoms of electrolyte imbalances  - Administer electrolyte replacement as ordered  - Monitor response to electrolyte replacements, including rhythm and repeat lab results as appropriate  - Fluid restriction as ordered  - Instruct patient on fluid and nutrition restrictions as appropriate  Outcome: Progressing     Problem: SKIN/TISSUE INTEGRITY - ADULT  Goal: Skin integrity remains intact  Description: INTERVENTIONS  - Assess and document risk factors for pressure ulcer development  - Assess and document skin integrity  - Monitor for areas of redness and/or skin breakdown  - Initiate interventions, skin care algorithm/standards of care as needed  Outcome: Progressing     Problem: HEMATOLOGIC - ADULT  Goal: Maintains hematologic stability  Description: INTERVENTIONS  - Assess for signs and symptoms of bleeding or hemorrhage  - Monitor labs and vital signs for trends  - Administer supportive blood products/factors, fluids and medications as ordered and appropriate  - Administer supportive blood products/factors as ordered and appropriate  Outcome: Progressing  Goal: Free from bleeding injury  Description: (Example usage: patient with low platelets)  INTERVENTIONS:  - Avoid intramuscular injections, enemas and rectal medication administration  - Ensure safe mobilization of patient  - Hold pressure on venipuncture sites to achieve adequate hemostasis  - Assess for signs and symptoms of internal bleeding  - Monitor lab trends  - Patient is to report abnormal signs of bleeding to staff  - Avoid use of toothpicks and dental floss  - Use electric shaver for shaving  - Use soft bristle tooth brush  - Limit straining and forceful nose  blowing  Outcome: Progressing

## 2024-12-20 NOTE — DISCHARGE INSTRUCTIONS
Probiotic Recommendations: Align or Florastor    Home Care Instructions for Colonoscopy with Sedation    Diet:  - Resume your regular diet as tolerated unless otherwise instructed.  - Start with light meals to minimize bloating.  - Do not drink alcohol today.    Medication:  - If you have questions about resuming your normal medications, please contact your Primary Care Physician.    Activities:  - Take it easy today. Do not return to work today.  - Do not drive today.  - Do not operate any machinery today (including kitchen equipment).    Colonoscopy:  - You may notice some rectal \"spotting\" (a little blood on the toilet tissue) for a day or two after the exam. This is normal.  - If you experience any rectal bleeding (not spotting), persistent tenderness or sharp severe abdominal pains, oral temperature over 100 degrees Fahrenheit, light-headedness or dizziness, or any other problems, contact your doctor.    **If unable to reach your doctor, please go to the [facility] Emergency Room**    - Your referring physician will receive a full report of your examination.  - If you do not hear from your doctor's office within two weeks of your biopsy, please call them for your results.    You may be able to see your laboratory results in Elite Form between 4 and 7 business days.  In some cases, your physician may not have viewed the results before they are released to Elite Form.  If you have questions regarding your results contact the physician who ordered the test/exam by phone or via Elite Form by choosing \"Ask a Medical Question.\"  Support Groups    Please visit [email or web address] for details.     Our support groups are free and open to any and all survivors and their families and friends. In these groups, people share thyroid cancer information, their experiences with their thyroid cancer, insights on how they are coping with thyroid cancer, and support.    Special Interest Support Groups  A list of groups  dedicated to a special topic. These groups all meet virtually, instead of in-person. If you still don't see a response, please email us at [email or web address] or call the Toll-free number, [phone].    Support Groups  This is a list of all our support groups, and has links to each group's web page with contact and meeting details. Groups meet in communities around the [place], and in other countries, plus support by telephone and email. NOTE: If you don't receive a reply from a [facility] facilitator, check your SPAM folder. If you still don't see a response, please email us at  [email or web address] or call the Toll-free number, [phone].    [place], [place] Support Group  Meets: [facility] provides support by email and phone.    Contacts: Freda Medrano [phone] (cell)  Email: [email or web address]  Please check your spam folder for an email if you don't hear from us.    Here is a list of organizations that provide support groups and/or peer matching programs for people coping with thyroid cancer:  ThyCa: Thyroid Cancer Survivors[employer] organizes support groups across the [place], monitors 12 online discussion groups, posts personal profiles and journals of survivors, facilitates an email support group, and runs the Person-to-Person Network, a peer matching program.  Support for People with Oral and Head and Neck Cancer (SPOHNC) strives to raise awareness of issues related to head and neck cancer.  Head and Neck Cancer [employer] (formerly the [facility] and [facility]ck [facility]) is a foundation that hosts an online forum, organizes support groups, and sponsors Head and Neck Cancer Awareness Week.  Cancer Hope Network provides free, one-on-one support to patients and their families. They match patients or family members with trained volunteers who have undergone and recovered from a similar cancer experience.  Oralia Kessler matches and individually pairs a person touched by  cancer with someone who has fought and survived the same type of cancer. Cancer caregivers (spouses, parents, children and loved ones) also receive one-on-one connections with other caregivers and survivors. The service is free and helps anyone touched by any type or stage of cancer, at any age, living anywhere in the world.

## 2024-12-20 NOTE — PLAN OF CARE
A/O x 4. Zofran for nausea. Dilaudid for pain. Minimal intake of clear liquids. Walking SBA. IVF infusing. Plan to start bowel prep this evening. Bed in lowest position, call light in reach, frequent rounding, nonskid footwear, fall precautions in place.

## 2024-12-20 NOTE — CONSULTS
[facility] Hematology/Oncology    Inpatient Consult Note    Camelia Markham Patient Status:  Inpatient    1960 MRN [id number]   Location [facility] 4W/SW/SE Attending Jacky Brower MD   Hosp Day # 1 PCP FINA MABRY, MD WENDY     Reason for consultation: Met thyroid cancer    History of Present Illness  Camelia Markham is a 64 year old female with metastatic thyroid cancer.  Her history is detailed below.  She is currently on lenvatinib 20 mg daily.  Presented to to the ER with increasing abdominal pain and constipation.    2024 CT scan shows diffuse nondilated fluid-filled small and large bowel movements with associated mucosal hyperenhancement concerning for enterocolitis.  Nodularity in the right lung base a decrease compared to prior PET scan from 2024.  Right-sided effusion had improved as well.    She is feeling better after IV hydration and intravenous antiemetics.'s testing for C. difficile was negative.    Past Oncologic History   Camelia Markham is a 64 year old female that was seen today in the Cancer Center for metastatic thyroid cancer. Her oncologic history is detailed below     11/3/2010 was found to have an enlarged thyroid gland ultrasound-guided FNA showed papillary thyroid cancer.  Underwent total thyroidectomy with Dr. Asher.  Final pathology showed a 2.5 cm tumor and 2 positive lymph nodes.     2010 BRIDGES uptake study showed no evidence of abnormal uptake outside the neck.  Underwent 207 miCu of radioactive iodine.  She had then been maintained on levothyroxine.  Subsequently was lost to follow-up after 2018.     2024 Thyroglobulin increased to 10, prompted an ultrasound that showed an oval hypoechoic nodule in the superior aspect of the left thyroid measuring 1 x 0.6 x 0.6 cm thought to represent an indeterminate lymph node.     2024 ultrasound-guided FNA showed papillary carcinoma. NGS testing showed a BRAF [id number] mutation     2024 [facility]BS thyroid  scan showed no discernible pathologic activity increased uptake in the thyroid bed.  Given the biopsy-proven recurrence and absence of I-131 uptake this was thought to represent dedifferentiated recurrent thyroid malignancy.        8/22/2024 PET/CT fusion study showed extensive hypermetabolic pleural-based nodularity, large pleural effusion as well as hypermetabolic nodularity in the left neck soft tissues as well as peripheral right upper nodule all of which was concerning for metastatic disease.  Previously seen cystic pancreatic lesion did not demonstrate any hypermetabolic activity.     8/29/24 had thoracentesis with cytology showing metastatic thyroid carcinoma.     9/15/24 Underwent pleur-X for recurrent pleural effusion.     9/18/24 started lenvatinib at 24mg daily     11/2024 had significant mucositis decreased oral intake and diarrhea with lenvatinib.  This was held.  She required hospitalization and chest tube placement for evacuation loculated effusion.  Lenvatinib is being resumed at 20 mg daily    Review of Systems:  Pt denies fevers, chills, night sweats, HA, vision changes, CP, SOB, cough, n/v/d, abd pain, urinary or bowel complaints  Hematology/Oncology ROS performed and negative except as above in HPI    History/Other:   Past Medical History:  Past Medical History:    Anxiety state, unspecified    Cancer (HCC)    Colon adenomas    x3    Disorder of thyroid    thyroidectomy    Diverticulosis of large intestine    Esophageal reflux    Exposure to medical therapeutic radiation    thyroid    High blood pressure    High cholesterol    Hypercholesteremia    Hypothyroidism    Osteoarthrosis, unspecified whether generalized or localized, unspecified site    Thyroid cancer (HCC)    papillary thyroid; s/p surgery resection with Asher and BRIDGES    Unspecified essential hypertension       Past Surgical History:  Past Surgical History:   Procedure Laterality Date    Colonoscopy N/A 12/29/2022    Procedure:  COLONOSCOPY;  Surgeon: Neeraj No MD;  Location: [facility] ENDOSCOPY    Colonoscopy  2024    Colonoscopy N/A 2024    Procedure: COLONOSCOPY;  Surgeon: Neeraj No MD;  Location: [facility] ENDOSCOPY    Egd  2022    Egd  2024    Esophagogastroduodenoscopy    Endoscopic ultrasound - internal  2022    Endoscopic ultrasound exam  2024    Endoscopic Ultrasound    Eye surgery  2024    Eye surgery Left 2024    Hysterectomy      Knee surgery Left 2022    no associated bleeding complications          40 week 7 lb(s) 5 oz Male; 6 hr labor           40 week 7 lb(s) 3 oz Female          41 week 9 lb(s) 8 oz Male    Other surgical history      Injection Tendon Sheath, Ligament    Thyroidectomy      total    Total abdom hysterectomy         Current Medications:   polyethylene glycol-electrolyte (Golytely) 236 g oral solution 2,000 mL  2,000 mL Oral Once per day on     [COMPLETED] ondansetron (Zofran) 4 MG/2ML injection 4 mg  4 mg Intravenous Once    [COMPLETED] pantoprazole (Protonix) 40 mg in sodium chloride 0.9% PF 10 mL IV push  40 mg Intravenous Once    [COMPLETED] iopamidol 76% (ISOVUE-370) injection for power injector  80 mL Intravenous ONCE PRN    sodium chloride 0.9% infusion   Intravenous Continuous    enoxaparin (Lovenox) 40 MG/0.4ML SUBQ injection 40 mg  40 mg Subcutaneous Daily    acetaminophen (Tylenol Extra Strength) tab 500 mg  500 mg Oral Q4H PRN    ondansetron (Zofran) 4 MG/2ML injection 4 mg  4 mg Intravenous Q6H PRN    prochlorperazine (Compazine) 10 MG/2ML injection 5 mg  5 mg Intravenous Q8H PRN    temazepam (Restoril) cap 15 mg  15 mg Oral Nightly PRN    morphINE PF 2 MG/ML injection 1 mg  1 mg Intravenous Q2H PRN    Or    morphINE PF 2 MG/ML injection 2 mg  2 mg Intravenous Q2H PRN    Or    morphINE PF 4 MG/ML injection 4 mg  4 mg Intravenous Q2H PRN    amLODIPine (Norvasc) tab 10 mg  10 mg Oral Daily     clonazePAM (KlonoPIN) tab 2 mg  2 mg Oral Nightly PRN    hydroCHLOROthiazide tab 25 mg  25 mg Oral Daily    levothyroxine (Synthroid) tab 150 mcg  150 mcg Oral Before breakfast    losartan (Cozaar) tab 100 mg  100 mg Oral Daily    pantoprazole (Protonix) DR tab 40 mg  40 mg Oral QAM AC    HYDROmorphone (Dilaudid) 1 MG/ML injection 0.1 mg  0.1 mg Intravenous Q2H PRN    Or    HYDROmorphone (Dilaudid) 1 MG/ML injection 0.2 mg  0.2 mg Intravenous Q2H PRN    Or    HYDROmorphone (Dilaudid) 1 MG/ML injection 0.4 mg  0.4 mg Intravenous Q2H PRN       Allergies:   Allergies[1]    Family Medical History:  Family History   Problem Relation Age of Onset    Heart Disorder Mother     Breast Cancer Maternal Cousin Female 57    Cancer Daughter 29        Hodgkin's Lymphoma    Ovarian Cancer Neg     Bleeding Disorders Neg     DVT/VTE Neg     Pancreatic Cancer Neg     Prostate Cancer Neg        Social History:  Social History     Socioeconomic History    Marital status:      Spouse name: Not on file    Number of children: Not on file    Years of education: Not on file    Highest education level: Not on file   Occupational History    Not on file   Tobacco Use    Smoking status: Never    Smokeless tobacco: Never   Vaping Use    Vaping status: Never Used   Substance and Sexual Activity    Alcohol use: Yes     Comment: social    Drug use: No    Sexual activity: Not on file   Other Topics Concern     Service Not Asked    Blood Transfusions Not Asked    Caffeine Concern Yes     Comment: 1 cup of coffee     Occupational Exposure Not Asked    Hobby Hazards Not Asked    Sleep Concern Not Asked    Stress Concern Not Asked    Weight Concern Not Asked    Special Diet Not Asked    Back Care Not Asked    Exercise Not Asked    Bike Helmet Not Asked    Seat Belt Not Asked    Self-Exams Not Asked   Social History Narrative    Camelia Figueroa is  to Baldemar x30 yrs. She has 3 adult children. Patient works in accounting in book keeping. She  lives with her , her mom, and 1 of the children in [place], [place].     Social Drivers of Health     Financial Resource Strain: Not on file   Food Insecurity: No Food Insecurity (2024)    Food Insecurity     Food Insecurity: Never true   Transportation Needs: No Transportation Needs (2024)    Transportation Needs     Lack of Transportation: No     Car Seat: Not on file   Physical Activity: Not on file   Stress: Not on file   Social Connections: Not on file   Housing Stability: Low Risk  (2024)    Housing Stability     Housing Instability: No     Housing Instability Emergency: Not on file     Crib or Bassinette: Not on file       Gyn History:  OB History    Para Term  AB Living   3 3 0 0 0 0   SAB IAB Ectopic Multiple Live Births   0 0 0 0 0       Objective:    /69 (BP Location: Left arm)   Pulse 75   Temp 97.5 °F (36.4 °C) (Oral)   Resp 20   Ht 1.6 m (5' 3\")   Wt 67.2 kg (148 lb 1.6 oz)   SpO2 94%   BMI 26.23 kg/m²   Physical Exam:  ECOG PS: 2  General: A&Ox3, NAD  HEENT: PERRL, OP clear  Neck: supple, no LAD or JVD  CV: RRR, no murmurs, + pulses  Pulm: CTA b/l, no w/r/r, normal effort  Abd: soft, mild diffuse tenderness.  No overt mass noted  Lymph: no palpable lymphadenopathy throughout the cervical, supraclavicular, or axillary regions  Extremities: no edema or calf tenderness  Neurological: Grossly intact    Labs:  Lab Results   Component Value Date/Time    WBC 8.1 2024 05:58 AM    RBC 4.43 2024 05:58 AM    HGB 13.3 2024 05:58 AM    HCT 40.7 2024 05:58 AM    MCV 91.9 2024 05:58 AM    MCH 30.0 2024 05:58 AM    MCHC 32.7 2024 05:58 AM    RDW 14.3 2024 05:58 AM    NEPRELIM 3.87 2024 05:58 AM    .0 2024 05:58 AM       Lab Results   Component Value Date/Time     (H) 2024 05:58 AM    BUN 16 2024 05:58 AM    CREATSERUM 0.83 2024 05:58 AM    GFRNAA 67 2021 12:09 PM    CA 9.1  12/20/2024 05:58 AM    ALB 4.7 12/19/2024 03:00 PM     12/20/2024 05:58 AM    K 3.9 12/20/2024 05:58 AM     12/20/2024 05:58 AM    CO2 26.0 12/20/2024 05:58 AM    ALKPHO 107 12/19/2024 03:00 PM    AST 30 12/19/2024 03:00 PM    ALT 31 12/19/2024 03:00 PM       Imaging:  CT ABDOMEN+PELVIS(CONTRAST ONLY)(CPT=74177)    Result Date: 12/19/2024  CONCLUSION:   Diffuse nondilated fluid-filled small and large bowel loops, with associated mucosal hyperenhancement as well as diffuse colonic wall thickening.  Findings are concerning for enterocolitis.  Given the appearance of pancolitis, Clostridium difficile is within the differential as well.  Curvilinear consolidation at the right posterior lung base.  May represent small amount of increased atelectasis.  Superimposed pneumonia is the differential consideration.  Partially imaged pleural based nodularity use at the right lung base, decreased from the 08/22/2024 FDG PET-CT.  Trace right pleural effusion.  Diffuse hepatic steatosis.  Unchanged 2.6 cm fluid density lesion of the anterior body of the pancreas, without metabolic activity on recent PET-CT.  May represent an IPMN or other etiology.  Recommend outpatient follow-up with contrast enhanced MRI of the abdomen on a nonemergent basis.       Dictated by (CST): Maggie Marques MD on 12/19/2024 at 4:55 PM     Finalized by (CST): Maggie Marques MD on 12/19/2024 at 5:12 PM            Assessment & Plan:    Camelia Markham is a 64 year old female with known metastatic thyroid cancer with lung mets and malignant effusion currently on lenvatinib therapy.  Her dose had previously been reduced.  She is now here with what appears to be enteritis that may be secondary to lenvatinib.    # Hold lenvatinib for now.  Continue supportive therapy with IV fluids and advance diet as tolerated.    # Continue as needed antiemetics.    # Once his symptoms improve and she is able to tolerate full diet she can go home and resume  lenvatinib at a dose of 14 mg daily.  She has outpatient oncology appointment 2 weeks that she can keep.    # She is also encouraged to continue outpatient follow-up with palliative care since she is having trouble with her prescribed opiods    Dr. Ludin Hernandez is covering the oncology service over the weekend please contact if any questions arise    Thank you for the opportunity to participate in the care of this interesting patient. Please do contact me if I may be of any further assistance    Inocente Chen MD  [facility]   [facility]    This note was created using a voice-recognition transcribing system. Incorrect words or phrases may have been missed during proofreading. Please interpret accordingly.         [1]   Allergies  Allergen Reactions    Latex HIVES    Peanut-Containing Drug Products SWELLING     Closes Throat  Closes Throat  Closes Throat      Peanuts SWELLING     Closes Throat    Adhesive Tape OTHER (SEE COMMENTS)    Seasonal

## 2024-12-20 NOTE — PROGRESS NOTES
[facility]  part of [facility]     Hospitalist Progress Note     Camelia Markham Patient Status:  Inpatient    1960 MRN [id number]   Location [facility] 4W/SW/SE Attending Jacky Brower MD   Hosp Day # 1 PCP FINA MABRY, MD WENDY     Subjective:     Patient resting comfortably and in NAD. Reports her pain is better controlled.       Objective:    Review of Systems:   ROS completed; pertinent positive and negatives stated in subjective.      Vital signs:  Temp:  [97.5 °F (36.4 °C)-98.3 °F (36.8 °C)] 97.5 °F (36.4 °C)  Pulse:  [] 75  Resp:  [16-20] 20  BP: (105-149)/(69-94) 105/69  SpO2:  [93 %-96 %] 94 %      Physical Exam:    Gen: NAD AO x3  Chest: good air entry CTABL  CVS: normal s1 and s2 RR  Abd: NABS soft NT ND  Neuro: CN 2-12 grossly intact  Ext: no edema in bilateral LE      Diagnostic Data:    Labs:  Recent Labs   Lab 24  1036 24  1500 24  0558   WBC 10.6 10.5 8.1   HGB 15.5 15.4 13.3   MCV 87.2 87.5 91.9   .0 246.0 230.0       Recent Labs   Lab 24  1036 24  1500 24  0558   * 111* 112*   BUN 15 16 16   CREATSERUM 0.87 0.76 0.83   CA 10.1 10.0 9.1   ALB 4.8 4.7  --     141 142   K 3.6 3.7 3.9    103 107   CO2 25.0 26.0 26.0   ALKPHO 108 107  --    AST 29 30  --    ALT 32 31  --    BILT 1.4* 1.4*  --    TP 8.6* 8.6*  --        Estimated Creatinine Clearance: 57.4 mL/min (based on SCr of 0.83 mg/dL).    No results for input(s): \"PTP\", \"INR\" in the last 168 hours.           Imaging: Imaging data reviewed in Epic.    Medications:    enoxaparin  40 mg Subcutaneous Daily    amLODIPine  10 mg Oral Daily    hydroCHLOROthiazide  25 mg Oral Daily    levothyroxine  150 mcg Oral Before breakfast    losartan  100 mg Oral Daily    pantoprazole  40 mg Oral QAM AC       Assessment & Plan:     Enterocolitis  Imaging reviewed  C Diff negative  Cultures pending  GI on consult  Colonoscopy in a.m.  CLD at this time, NPO after  midnight  PRN pain control and nausea medications  HemOnc on consult  Appreciate recs  Thyroid cancer  Currently on immunotherapy with lenvatinib  HTN  BP well controlled      Plan of care discussed with patient or family at bedside.      Supplementary Documentation:     Quality:  DVT Prophylaxis: Lovenox s/c  CODE status: Full       Estimated date of discharge: TBD  Discharge is dependent on: clinical stability  At this point Ms. Markham is expected to be discharge to: home                   Jacky Brower MD  Hospitalist    MDM: High, I personally reviewed the available laboratories, imaging. I discussed the case with RN. I ordered laboratories, studies including AM labs.  Medical decision making high, risk is high.       The 21st Century Cures Act makes medical notes like these available to patients in the interest of transparency. Please be advised this is a medical document. Medical documents are intended to carry relevant information, facts as evident, and the clinical opinion of the practitioner. The medical note is intended as peer to peer communication and may appear blunt or direct. It is written in medical language and may contain abbreviations or verbiage that are unfamiliar.

## 2024-12-20 NOTE — HOME CARE LIAISON
This pt is current with [facility] for RN&PT services. Pt will need a HILLARY entered and Quality data delivered by NGUYEN GORDILLO. Notified NGUYEN Mcduffie.

## 2024-12-20 NOTE — H&P
[facility]    PATIENT'S NAME: DEANA SHINE   ATTENDING PHYSICIAN: Mitch Allen MD   PATIENT ACCOUNT#:   [id number]    LOCATION:  [address]  MEDICAL RECORD #:   [id number]       YOB: 1960  ADMISSION DATE:       12/19/2024    HISTORY AND PHYSICAL EXAMINATION    CHIEF COMPLAINT:  Abdominal cramps, loose bowel movements and colitis.    HISTORY OF PRESENT ILLNESS:  The patient is a 64-year-old  female, currently undergoing oral immunotherapy with lenvatinib for recurrent metastatic thyroid cancer.  Came in today for abdominal cramps and discomfort.  She had a loose bowel movement in the emergency room.  Chemistry showed BUN and creatinine ratio of 21.1; otherwise, unremarkable.  Total bilirubin 1.4.  Urinalysis showed possible urinary tract infection.  CBC unremarkable.  CT scan of the abdomen showed diffuse nondilated fluid-filled small and large bowel loops with associated mucosal hyperenhancement as well as diffuse colonic wall thickening concerning for enterocolitis.  C difficile was ordered in the emergency room.  Also, she had right posterior lung base small pleural effusion known to be malignant.  Patient will be admitted to the hospital for further management.    PAST MEDICAL HISTORY:  In 2010, diagnosed with papillary thyroid carcinoma status post total thyroidectomy.  Recent imaging studies and rising thyroglobulin showed recurrent disease at the thyroidectomy bed.  She was started on lenvatinib.  Also was found to have right malignant pleural effusion, had a PleurX catheter placement and then removal because it was not retrieving any fluids.  She had history of generalized osteoarthritis, hyperlipidemia, hypertension, gastroesophageal reflux disease, hypothyroidism, and diverticulosis.    PAST SURGICAL HISTORY:  Total abdominal hysterectomy, total thyroidectomy, left knee arthroscopic procedure.    MEDICATIONS:  Please see medication reconciliation list.      ALLERGIES:  Latex and peanuts.  No known drug allergies.    FAMILY HISTORY:  Mother had heart disease.    SOCIAL HISTORY:  No tobacco, alcohol, or drug use.  Lives with her family.  At baseline independent for basic activities of daily living.     REVIEW OF SYSTEMS:  Patient reports that she has been constipated for last 2 to 3 weeks.  She was given milk of magnesia 2 weeks ago with profuse diarrhea and then she had to stop it.  She was started on another stool softener and since then for last 10 days, she has been having pebble bowel movements here and there, but unable to have a full bowel movement.  Because of her abdominal cramps today, she was sent to the emergency department for evaluation and had a loose bowel movement while she was in the emergency room.  She denies any fever or chills.  No recent antibiotic exposure.  No sick contacts.  Other 12-point review of systems is negative.       PHYSICAL EXAMINATION:    GENERAL:  Alert and oriented to time, place, and person.  Moderate distress.  VITAL SIGNS:  Temperature 97.6, pulse 101, respiratory rate 16, blood pressure 149/94, pulse ox 95% on room air.    HEENT:  Atraumatic.  Oropharynx clear.  Dry mucous membranes.  Ears, nose normal.  Eyes:  Anicteric sclerae.   NECK:  Supple.  No lymphadenopathy.  Trachea midline.  Full range of motion.  LUNGS:  Clear to auscultation bilaterally.  Normal respiratory effort.  Diminished breathing sounds right lung base.  HEART:  Regular rate, rhythm.  S1 and S2 auscultated.  No murmur.  ABDOMEN:  Soft, nondistended.  No tenderness.  Positive bowel sounds.    EXTREMITIES:  No peripheral edema, clubbing, or cyanosis.  NEUROLOGIC:  Motor and sensory intact.     ASSESSMENT:    1.   Enterocolitis, on imaging studies.  Rule out immune-mediated enterocolitis.  2.   Thyroid cancer as outlined above.  Currently on immunotherapy with lenvatinib.  3.   Essential hypertension.    PLAN:  Patient will be admitted to [facility]  floor.  Pain control.  Clear liquid diet.  IV fluids.  Follow up on C difficile and stool cultures.  Will obtain gastroenterology and hematology/oncology consult.  Further recommendations to follow.     Dictated By Dara Campos MD  d: 12/19/2024 17:41:26  t: 12/19/2024 19:13:12  [facility] [id number]  FB/

## 2024-12-20 NOTE — CONSULTS
Is this a shared or split note between Advanced Practice Provider and Physician? Yes      [facility]   Gastroenterology Consultation Note    Camelia Markham  Patient Status:    Inpatient  Date of Admission:         12/19/2024, Hospital day #1  Attending:   Jacky Brower MD  PCP:     FINA MABRY, MD WENDY    Reason for Consultation:  ABD cramps    History of Present Illness:  Camelia Markham is a 64 year old female w/ PMHx of BMI 26.23, papillary thyroid carcinoma s/p thyroidectomy with recent imaging demonstrating recurrent disease c/b R malignant pleural effusion with initiation of lenvatinib, generalized OA, hyperlipidemia, HTN, GERD, hypothyroidism, diverticulosis who presented to the ED with lower ABD cramps and fluctuating stool patterns.    Pt started on immunotherapy 1 week prior to onset of symptoms including nausea, vomiting, poor appetite and then change in bowel habits.    Pt states that she has been constipated for about 3 weeks.  She was advised to try milk of magnesia by oncologist with subsequent profuse diarrhea so stopped it.  She was given a stool softener with refractory constipation, stools described as ayaan.  She had a large loose stool in the ED.  She denies black/bloody stools, fever, chills, recent ABX use, sick contacts, suspicious foods or travel outside the [place].  No unintentional weight loss.  Her poor appetite persists but N/V improved.    Pertinent Family Hx:  - No known history of esophageal, gastric or colon cancers  - No known IBD  - No known liver pathologies    Endoscopy Hx:  - EGD/EUS/Colonoscopy 7/2024:   EGD findings:    1. Esophagus: The squamocolumnar junction was noted at 35 cm and appeared regular. The esophageal mucosa appeared unremarkable.  2. Stomach: The stomach distended normally. Normal rugal folds were seen. The pylorus was patent. The gastric mucosa appeared unremarkable. Retroflexion revealed a normal fundus and cardia.   3. Duodenum: The  duodenal mucosa appeared normal in the 1st and 2nd portion of the duodenum.      EUS findings:  A linear echoendoscope was used.  In the body of the pancreas there was a multiloculated anechoic cystic lesion measuring 3.9 cm in largest diameter.  The largest locule measured 2.1 x 2.2 cm.  FNA was performed using a 22-gauge needle with use of Doppler to avoid any intervening vessels. There was no solid component or mural nodule visualized. Aspiration of approximately 4 mL of somewhat yellow/brownish fluid that was aspirated.  The parenchyma in the head, neck, body and tail were unremarkable otherwise. The pancreatic duct was normal in size.      Impression:  A 3.9 cm pancreas body cyst s/p FNA    Impression:  1. 3 small colon polyps removed  2. Colon diverticulosis  3. Small hemorrhoids  4. Otherwise, unremarkable colonoscopy    Final Diagnosis:   Pancreas, body cyst; submitted fine needle aspiration:  Adequacy: Satisfactory for evaluation  General Category: Benign, inflammatory, reactive, or reparative changes  Diagnosis:  Benign cystic elements present  Benign pancreatic acinar cells present  Glandular cells and thin mucin present, favor GI contaminant  No malignant cells identified     Final Diagnosis:      A. Ascending colon polyp (x1):  Tubular adenoma.     B. Hepatic flexure colon polyp (x1):  Tubular adenoma.     C. Transverse colon polyp (x1):  Tubular adenoma.     Social Hx:  - No tobacco use  - No ETOH  - Denies cannabis/illicit drug use  - Denies NSAIDs  - Lives with      History:  Past Medical History:    Anxiety state, unspecified    Cancer (HCC)    Colon adenomas    x3    Disorder of thyroid    thyroidectomy    Diverticulosis of large intestine    Esophageal reflux    Exposure to medical therapeutic radiation    thyroid    High blood pressure    High cholesterol    Hypercholesteremia    Hypothyroidism    Osteoarthrosis, unspecified whether generalized or localized, unspecified site    Thyroid  cancer (HCC)    papillary thyroid; s/p surgery resection with Asher and BRIDGES    Unspecified essential hypertension     Past Surgical History:   Procedure Laterality Date    Colonoscopy N/A 2022    Procedure: COLONOSCOPY;  Surgeon: Neeraj No MD;  Location: [facility] ENDOSCOPY    Colonoscopy  2024    Colonoscopy N/A 2024    Procedure: COLONOSCOPY;  Surgeon: Neeraj No MD;  Location: [facility] ENDOSCOPY    Egd  2022    Egd  2024    Esophagogastroduodenoscopy    Endoscopic ultrasound - internal  2022    Endoscopic ultrasound exam  2024    Endoscopic Ultrasound    Eye surgery  2024    Eye surgery Left 2024    Hysterectomy      Knee surgery Left 2022    no associated bleeding complications          40 week 7 lb(s) 5 oz Male; 6 hr labor           40 week 7 lb(s) 3 oz Female          41 week 9 lb(s) 8 oz Male    Other surgical history      Injection Tendon Sheath, Ligament    Thyroidectomy      total    Total abdom hysterectomy       Family History   Problem Relation Age of Onset    Heart Disorder Mother     Breast Cancer Maternal Cousin Female 57    Cancer Daughter 29        Hodgkin's Lymphoma    Ovarian Cancer Neg     Bleeding Disorders Neg     DVT/VTE Neg     Pancreatic Cancer Neg     Prostate Cancer Neg       reports that she has never smoked. She has never used smokeless tobacco. She reports current alcohol use. She reports that she does not use drugs.    Allergies:  Allergies[1]    Medications:    Current Facility-Administered Medications:     sodium chloride 0.9% infusion, , Intravenous, Continuous    enoxaparin (Lovenox) 40 MG/0.4ML SUBQ injection 40 mg, 40 mg, Subcutaneous, Daily    acetaminophen (Tylenol Extra Strength) tab 500 mg, 500 mg, Oral, Q4H PRN    ondansetron (Zofran) 4 MG/2ML injection 4 mg, 4 mg, Intravenous, Q6H PRN    prochlorperazine (Compazine) 10 MG/2ML injection 5 mg, 5 mg, Intravenous, Q8H PRN     temazepam (Restoril) cap 15 mg, 15 mg, Oral, Nightly PRN    morphINE PF 2 MG/ML injection 1 mg, 1 mg, Intravenous, Q2H PRN **OR** morphINE PF 2 MG/ML injection 2 mg, 2 mg, Intravenous, Q2H PRN **OR** morphINE PF 4 MG/ML injection 4 mg, 4 mg, Intravenous, Q2H PRN    amLODIPine (Norvasc) tab 10 mg, 10 mg, Oral, Daily    clonazePAM (KlonoPIN) tab 2 mg, 2 mg, Oral, Nightly PRN    hydroCHLOROthiazide tab 25 mg, 25 mg, Oral, Daily    levothyroxine (Synthroid) tab 150 mcg, 150 mcg, Oral, Before breakfast    losartan (Cozaar) tab 100 mg, 100 mg, Oral, Daily    pantoprazole (Protonix) DR tab 40 mg, 40 mg, Oral, QAM AC    HYDROmorphone (Dilaudid) 1 MG/ML injection 0.1 mg, 0.1 mg, Intravenous, Q2H PRN **OR** HYDROmorphone (Dilaudid) 1 MG/ML injection 0.2 mg, 0.2 mg, Intravenous, Q2H PRN **OR** HYDROmorphone (Dilaudid) 1 MG/ML injection 0.4 mg, 0.4 mg, Intravenous, Q2H PRN    Review of Systems:   CONSTITUTIONAL:  negative for fevers, chills, unintentional weight loss   EYES:  negative for diplopia or change in vision   RESPIRATORY:  negative for severe shortness of breath  CARDIOVASCULAR:  negative for crushing sub-sternal chest pain  GASTROINTESTINAL:  see HPI  GENITOURINARY:  negative for dysuria or gross hematuria  INTEGUMENT/BREAST:  SKIN:  negative for jaundice or new rash   ALLERGIC/IMMUNOLOGIC:  negative for hay fever   ENDOCRINE:  negative for cold intolerance and heat intolerance  MUSCULOSKELETAL:  negative for joint effusion/severe erythema  NEURO: negative for new loss of consciousness or dizziness   BEHAVIOR/PSYCH:  negative for psychotic behavior    Physical Exam:    Blood pressure 105/69, pulse 75, temperature 97.5 °F (36.4 °C), temperature source Oral, resp. rate 20, height 5' 3\" (1.6 m), weight 148 lb 1.6 oz (67.2 kg), SpO2 94%. Body mass index is 26.23 kg/m².    Gen: awake, alert patient, NAD  HEENT: EOMI, the sclera appears anicteric, oropharynx clear, mucus membranes appear moist  CV: RRR  Lung: no  conversational dyspnea   Abdomen: soft, LLQ TTP without rebound or guarding, ND abdomen with NABS appreciated   Back: No CVA tenderness   Skin: dry, warm, no jaundice  Ext: no LE edema is evident  Neuro: Alert, oriented x4 and interactive  Psych: calm, cooperative    Laboratory Data:  Lab Results   Component Value Date    WBC 8.1 12/20/2024    HGB 13.3 12/20/2024    HCT 40.7 12/20/2024    .0 12/20/2024    CREATSERUM 0.83 12/20/2024    BUN 16 12/20/2024     12/20/2024    K 3.9 12/20/2024     12/20/2024    CO2 26.0 12/20/2024     12/20/2024    CA 9.1 12/20/2024    ALB 4.7 12/19/2024    ALKPHO 107 12/19/2024    BILT 1.4 12/19/2024    TP 8.6 12/19/2024    AST 30 12/19/2024    ALT 31 12/19/2024    LIP 24 12/19/2024       Imaging:  CT ABDOMEN+PELVIS(CONTRAST ONLY)(CPT=74177)    Result Date: 12/19/2024  CONCLUSION:   Diffuse nondilated fluid-filled small and large bowel loops, with associated mucosal hyperenhancement as well as diffuse colonic wall thickening.  Findings are concerning for enterocolitis.  Given the appearance of pancolitis, Clostridium difficile is within the differential as well.  Curvilinear consolidation at the right posterior lung base.  May represent small amount of increased atelectasis.  Superimposed pneumonia is the differential consideration.  Partially imaged pleural based nodularity use at the right lung base, decreased from the 08/22/2024 FDG PET-CT.  Trace right pleural effusion.  Diffuse hepatic steatosis.  Unchanged 2.6 cm fluid density lesion of the anterior body of the pancreas, without metabolic activity on recent PET-CT.  May represent an IPMN or other etiology.  Recommend outpatient follow-up with contrast enhanced MRI of the abdomen on a nonemergent basis.       Dictated by (CST): Maggie Marques MD on 12/19/2024 at 4:55 PM     Finalized by (CST): Maggie Marques MD on 12/19/2024 at 5:12 PM           Assessment & Plan   Camelia Markham is a 64 year old  female w/ PMHx of BMI 26.23, papillary thyroid carcinoma s/p thyroidectomy with recent imaging demonstrating recurrent disease c/b R malignant pleural effusion with initiation of lenvatinib, generalized OA, hyperlipidemia, HTN, GERD, hypothyroidism, diverticulosis who presented to the ED with lower ABD cramps and fluctuating stool patterns.    #ABD cramping  #Change in bowel habits  #Abnormal CT A/P  -Labs on admission  -CT A/P with diffuse nondilated fluid-filled small and large bowel loops with associated mucosal hyperenhancement as well as diffuse colonic wall thickening, c/f enterocolitis  -Colonoscopy 7/2024 with adenomatous polyps, diverticulosis and internal hemorrhoids  -C. Diff negative, stool culture in process  -Etiology possible check point inhibitor colitis given Pt started on lenvatinib for recurrence of thyroid CA.  Recommend repeat colonoscopy to further evaluate.  Risks/benefits of procedure discussed.  Pt states understanding and is willing to proceed.    Colonoscopy consent: I have discussed the risks, benefits, and alternatives to colonoscopy with the patient/primary decision maker [who demonstrated understanding], including but not limited to the risks of bleeding, infection, pain, death, as well as the risks of anesthesia and perforation all leading to prolonged hospitalization, surgical intervention, or even death. I also specifically mentioned the miss rate of colonoscopy of 5-10% in the best of all circumstances. The patient has agreed to sign an informed consent and elected to proceed with colonoscopy with possible intervention [i.e. polypectomy, stent placement, etc.] as indicated.     Recommend:  -Colonoscopy tomorrow  -Split dose Golytely, 1800/0300  -Clear liquid diet today, NPO at 0000  -Antiemetics and pain regimen per primary    Thank you for the opportunity to participate in the care of this patient.    Case discussed with Porsche Cruz MD and Angelique ISLAS.    Huyen Flores DNP,  FNP-BC  [facility]  12/20/2024          [1]   Allergies  Allergen Reactions    Latex HIVES    Peanut-Containing Drug Products SWELLING     Closes Throat  Closes Throat  Closes Throat      Peanuts SWELLING     Closes Throat    Adhesive Tape OTHER (SEE COMMENTS)    Seasonal

## 2024-12-20 NOTE — PROGRESS NOTES
[facility] .   Patient is a 64 year old female with a history of anxiety, dysthymia, due to recurrence of thyroid cancer metastasized to the lungs. Per chart, patient is established with individual therapist at [facility] [place], most recent visit in October. Patient is currently taking klonopin 2 mg for sleep and anxiety.      Pt will continue care with established providers for psychiatry and therapy following discharge. Pt provided with support group information in DC instructions, and copy provided in pt's physical chart.     ANSHU Buenrostro   [id number]

## 2024-12-20 NOTE — H&P (VIEW-ONLY)
Is this a shared or split note between Advanced Practice Provider and Physician? Yes      [facility]   Gastroenterology Consultation Note    Camelia Markham  Patient Status:    Inpatient  Date of Admission:         12/19/2024, Hospital day #1  Attending:   Jacky Brower MD  PCP:     FINA MABRY, MD WENDY    Reason for Consultation:  ABD cramps    History of Present Illness:  Camelia Markham is a 64 year old female w/ PMHx of BMI 26.23, papillary thyroid carcinoma s/p thyroidectomy with recent imaging demonstrating recurrent disease c/b R malignant pleural effusion with initiation of lenvatinib, generalized OA, hyperlipidemia, HTN, GERD, hypothyroidism, diverticulosis who presented to the ED with lower ABD cramps and fluctuating stool patterns.    Pt started on immunotherapy 1 week prior to onset of symptoms including nausea, vomiting, poor appetite and then change in bowel habits.    Pt states that she has been constipated for about 3 weeks.  She was advised to try milk of magnesia by oncologist with subsequent profuse diarrhea so stopped it.  She was given a stool softener with refractory constipation, stools described as ayaan.  She had a large loose stool in the ED.  She denies black/bloody stools, fever, chills, recent ABX use, sick contacts, suspicious foods or travel outside the [place].  No unintentional weight loss.  Her poor appetite persists but N/V improved.    Pertinent Family Hx:  - No known history of esophageal, gastric or colon cancers  - No known IBD  - No known liver pathologies    Endoscopy Hx:  - EGD/EUS/Colonoscopy 7/2024:   EGD findings:    1. Esophagus: The squamocolumnar junction was noted at 35 cm and appeared regular. The esophageal mucosa appeared unremarkable.  2. Stomach: The stomach distended normally. Normal rugal folds were seen. The pylorus was patent. The gastric mucosa appeared unremarkable. Retroflexion revealed a normal fundus and cardia.   3. Duodenum: The  duodenal mucosa appeared normal in the 1st and 2nd portion of the duodenum.      EUS findings:  A linear echoendoscope was used.  In the body of the pancreas there was a multiloculated anechoic cystic lesion measuring 3.9 cm in largest diameter.  The largest locule measured 2.1 x 2.2 cm.  FNA was performed using a 22-gauge needle with use of Doppler to avoid any intervening vessels. There was no solid component or mural nodule visualized. Aspiration of approximately 4 mL of somewhat yellow/brownish fluid that was aspirated.  The parenchyma in the head, neck, body and tail were unremarkable otherwise. The pancreatic duct was normal in size.      Impression:  A 3.9 cm pancreas body cyst s/p FNA    Impression:  1. 3 small colon polyps removed  2. Colon diverticulosis  3. Small hemorrhoids  4. Otherwise, unremarkable colonoscopy    Final Diagnosis:   Pancreas, body cyst; submitted fine needle aspiration:  Adequacy: Satisfactory for evaluation  General Category: Benign, inflammatory, reactive, or reparative changes  Diagnosis:  Benign cystic elements present  Benign pancreatic acinar cells present  Glandular cells and thin mucin present, favor GI contaminant  No malignant cells identified     Final Diagnosis:      A. Ascending colon polyp (x1):  Tubular adenoma.     B. Hepatic flexure colon polyp (x1):  Tubular adenoma.     C. Transverse colon polyp (x1):  Tubular adenoma.     Social Hx:  - No tobacco use  - No ETOH  - Denies cannabis/illicit drug use  - Denies NSAIDs  - Lives with      History:  Past Medical History:    Anxiety state, unspecified    Cancer (HCC)    Colon adenomas    x3    Disorder of thyroid    thyroidectomy    Diverticulosis of large intestine    Esophageal reflux    Exposure to medical therapeutic radiation    thyroid    High blood pressure    High cholesterol    Hypercholesteremia    Hypothyroidism    Osteoarthrosis, unspecified whether generalized or localized, unspecified site    Thyroid  cancer (HCC)    papillary thyroid; s/p surgery resection with Asher and BRIDGES    Unspecified essential hypertension     Past Surgical History:   Procedure Laterality Date    Colonoscopy N/A 2022    Procedure: COLONOSCOPY;  Surgeon: Neeraj No MD;  Location: [facility] ENDOSCOPY    Colonoscopy  2024    Colonoscopy N/A 2024    Procedure: COLONOSCOPY;  Surgeon: Neeraj No MD;  Location: [facility] ENDOSCOPY    Egd  2022    Egd  2024    Esophagogastroduodenoscopy    Endoscopic ultrasound - internal  2022    Endoscopic ultrasound exam  2024    Endoscopic Ultrasound    Eye surgery  2024    Eye surgery Left 2024    Hysterectomy      Knee surgery Left 2022    no associated bleeding complications          40 week 7 lb(s) 5 oz Male; 6 hr labor           40 week 7 lb(s) 3 oz Female          41 week 9 lb(s) 8 oz Male    Other surgical history      Injection Tendon Sheath, Ligament    Thyroidectomy      total    Total abdom hysterectomy       Family History   Problem Relation Age of Onset    Heart Disorder Mother     Breast Cancer Maternal Cousin Female 57    Cancer Daughter 29        Hodgkin's Lymphoma    Ovarian Cancer Neg     Bleeding Disorders Neg     DVT/VTE Neg     Pancreatic Cancer Neg     Prostate Cancer Neg       reports that she has never smoked. She has never used smokeless tobacco. She reports current alcohol use. She reports that she does not use drugs.    Allergies:  Allergies[1]    Medications:    Current Facility-Administered Medications:     sodium chloride 0.9% infusion, , Intravenous, Continuous    enoxaparin (Lovenox) 40 MG/0.4ML SUBQ injection 40 mg, 40 mg, Subcutaneous, Daily    acetaminophen (Tylenol Extra Strength) tab 500 mg, 500 mg, Oral, Q4H PRN    ondansetron (Zofran) 4 MG/2ML injection 4 mg, 4 mg, Intravenous, Q6H PRN    prochlorperazine (Compazine) 10 MG/2ML injection 5 mg, 5 mg, Intravenous, Q8H PRN     temazepam (Restoril) cap 15 mg, 15 mg, Oral, Nightly PRN    morphINE PF 2 MG/ML injection 1 mg, 1 mg, Intravenous, Q2H PRN **OR** morphINE PF 2 MG/ML injection 2 mg, 2 mg, Intravenous, Q2H PRN **OR** morphINE PF 4 MG/ML injection 4 mg, 4 mg, Intravenous, Q2H PRN    amLODIPine (Norvasc) tab 10 mg, 10 mg, Oral, Daily    clonazePAM (KlonoPIN) tab 2 mg, 2 mg, Oral, Nightly PRN    hydroCHLOROthiazide tab 25 mg, 25 mg, Oral, Daily    levothyroxine (Synthroid) tab 150 mcg, 150 mcg, Oral, Before breakfast    losartan (Cozaar) tab 100 mg, 100 mg, Oral, Daily    pantoprazole (Protonix) DR tab 40 mg, 40 mg, Oral, QAM AC    HYDROmorphone (Dilaudid) 1 MG/ML injection 0.1 mg, 0.1 mg, Intravenous, Q2H PRN **OR** HYDROmorphone (Dilaudid) 1 MG/ML injection 0.2 mg, 0.2 mg, Intravenous, Q2H PRN **OR** HYDROmorphone (Dilaudid) 1 MG/ML injection 0.4 mg, 0.4 mg, Intravenous, Q2H PRN    Review of Systems:   CONSTITUTIONAL:  negative for fevers, chills, unintentional weight loss   EYES:  negative for diplopia or change in vision   RESPIRATORY:  negative for severe shortness of breath  CARDIOVASCULAR:  negative for crushing sub-sternal chest pain  GASTROINTESTINAL:  see HPI  GENITOURINARY:  negative for dysuria or gross hematuria  INTEGUMENT/BREAST:  SKIN:  negative for jaundice or new rash   ALLERGIC/IMMUNOLOGIC:  negative for hay fever   ENDOCRINE:  negative for cold intolerance and heat intolerance  MUSCULOSKELETAL:  negative for joint effusion/severe erythema  NEURO: negative for new loss of consciousness or dizziness   BEHAVIOR/PSYCH:  negative for psychotic behavior    Physical Exam:    Blood pressure 105/69, pulse 75, temperature 97.5 °F (36.4 °C), temperature source Oral, resp. rate 20, height 5' 3\" (1.6 m), weight 148 lb 1.6 oz (67.2 kg), SpO2 94%. Body mass index is 26.23 kg/m².    Gen: awake, alert patient, NAD  HEENT: EOMI, the sclera appears anicteric, oropharynx clear, mucus membranes appear moist  CV: RRR  Lung: no  conversational dyspnea   Abdomen: soft, LLQ TTP without rebound or guarding, ND abdomen with NABS appreciated   Back: No CVA tenderness   Skin: dry, warm, no jaundice  Ext: no LE edema is evident  Neuro: Alert, oriented x4 and interactive  Psych: calm, cooperative    Laboratory Data:  Lab Results   Component Value Date    WBC 8.1 12/20/2024    HGB 13.3 12/20/2024    HCT 40.7 12/20/2024    .0 12/20/2024    CREATSERUM 0.83 12/20/2024    BUN 16 12/20/2024     12/20/2024    K 3.9 12/20/2024     12/20/2024    CO2 26.0 12/20/2024     12/20/2024    CA 9.1 12/20/2024    ALB 4.7 12/19/2024    ALKPHO 107 12/19/2024    BILT 1.4 12/19/2024    TP 8.6 12/19/2024    AST 30 12/19/2024    ALT 31 12/19/2024    LIP 24 12/19/2024       Imaging:  CT ABDOMEN+PELVIS(CONTRAST ONLY)(CPT=74177)    Result Date: 12/19/2024  CONCLUSION:   Diffuse nondilated fluid-filled small and large bowel loops, with associated mucosal hyperenhancement as well as diffuse colonic wall thickening.  Findings are concerning for enterocolitis.  Given the appearance of pancolitis, Clostridium difficile is within the differential as well.  Curvilinear consolidation at the right posterior lung base.  May represent small amount of increased atelectasis.  Superimposed pneumonia is the differential consideration.  Partially imaged pleural based nodularity use at the right lung base, decreased from the 08/22/2024 FDG PET-CT.  Trace right pleural effusion.  Diffuse hepatic steatosis.  Unchanged 2.6 cm fluid density lesion of the anterior body of the pancreas, without metabolic activity on recent PET-CT.  May represent an IPMN or other etiology.  Recommend outpatient follow-up with contrast enhanced MRI of the abdomen on a nonemergent basis.       Dictated by (CST): Maggie Marques MD on 12/19/2024 at 4:55 PM     Finalized by (CST): Maggie Marques MD on 12/19/2024 at 5:12 PM           Assessment & Plan   Camelia Markham is a 64 year old  female w/ PMHx of BMI 26.23, papillary thyroid carcinoma s/p thyroidectomy with recent imaging demonstrating recurrent disease c/b R malignant pleural effusion with initiation of lenvatinib, generalized OA, hyperlipidemia, HTN, GERD, hypothyroidism, diverticulosis who presented to the ED with lower ABD cramps and fluctuating stool patterns.    #ABD cramping  #Change in bowel habits  #Abnormal CT A/P  -Labs on admission  -CT A/P with diffuse nondilated fluid-filled small and large bowel loops with associated mucosal hyperenhancement as well as diffuse colonic wall thickening, c/f enterocolitis  -Colonoscopy 7/2024 with adenomatous polyps, diverticulosis and internal hemorrhoids  -C. Diff negative, stool culture in process  -Etiology possible check point inhibitor colitis given Pt started on lenvatinib for recurrence of thyroid CA.  Recommend repeat colonoscopy to further evaluate.  Risks/benefits of procedure discussed.  Pt states understanding and is willing to proceed.    Colonoscopy consent: I have discussed the risks, benefits, and alternatives to colonoscopy with the patient/primary decision maker [who demonstrated understanding], including but not limited to the risks of bleeding, infection, pain, death, as well as the risks of anesthesia and perforation all leading to prolonged hospitalization, surgical intervention, or even death. I also specifically mentioned the miss rate of colonoscopy of 5-10% in the best of all circumstances. The patient has agreed to sign an informed consent and elected to proceed with colonoscopy with possible intervention [i.e. polypectomy, stent placement, etc.] as indicated.     Recommend:  -Colonoscopy tomorrow  -Split dose Golytely, 1800/0300  -Clear liquid diet today, NPO at 0000  -Antiemetics and pain regimen per primary    Thank you for the opportunity to participate in the care of this patient.    Case discussed with Porsche Cruz MD and Angelique ISLAS.    Huyen Flores DNP,  FNP-BC  [facility]  12/20/2024          [1]   Allergies  Allergen Reactions    Latex HIVES    Peanut-Containing Drug Products SWELLING     Closes Throat  Closes Throat  Closes Throat      Peanuts SWELLING     Closes Throat    Adhesive Tape OTHER (SEE COMMENTS)    Seasonal

## 2024-12-21 ENCOUNTER — ANESTHESIA EVENT (OUTPATIENT)
Dept: ENDOSCOPY | Facility: HOSPITAL | Age: 64
End: 2024-12-21
Payer: COMMERCIAL

## 2024-12-21 ENCOUNTER — ANESTHESIA (OUTPATIENT)
Dept: ENDOSCOPY | Facility: HOSPITAL | Age: 64
End: 2024-12-21
Payer: COMMERCIAL

## 2024-12-21 LAB
ALBUMIN SERPL-MCNC: 3.4 G/DL (ref 3.2–4.8)
ALBUMIN/GLOB SERPL: 1.3 {RATIO} (ref 1–2)
ALP LIVER SERPL-CCNC: 78 U/L
ALT SERPL-CCNC: 19 U/L
ANION GAP SERPL CALC-SCNC: 8 MMOL/L (ref 0–18)
AST SERPL-CCNC: 17 U/L (ref ?–34)
BASOPHILS # BLD AUTO: 0.04 X10(3) UL (ref 0–0.2)
BASOPHILS NFR BLD AUTO: 0.5 %
BILIRUB SERPL-MCNC: 1.2 MG/DL (ref 0.2–1.1)
BUN BLD-MCNC: 10 MG/DL (ref 9–23)
BUN/CREAT SERPL: 14.5 (ref 10–20)
CALCIUM BLD-MCNC: 8.6 MG/DL (ref 8.7–10.4)
CHLORIDE SERPL-SCNC: 110 MMOL/L (ref 98–112)
CO2 SERPL-SCNC: 26 MMOL/L (ref 21–32)
CREAT BLD-MCNC: 0.69 MG/DL
DEPRECATED RDW RBC AUTO: 46.9 FL (ref 35.1–46.3)
EGFRCR SERPLBLD CKD-EPI 2021: 97 ML/MIN/1.73M2 (ref 60–?)
EOSINOPHIL # BLD AUTO: 0.17 X10(3) UL (ref 0–0.7)
EOSINOPHIL NFR BLD AUTO: 2.1 %
ERYTHROCYTE [DISTWIDTH] IN BLOOD BY AUTOMATED COUNT: 14.2 % (ref 11–15)
GLOBULIN PLAS-MCNC: 2.6 G/DL (ref 2–3.5)
GLUCOSE BLD-MCNC: 111 MG/DL (ref 70–99)
HCT VFR BLD AUTO: 36 %
HGB BLD-MCNC: 11.8 G/DL
IMM GRANULOCYTES # BLD AUTO: 0.01 X10(3) UL (ref 0–1)
IMM GRANULOCYTES NFR BLD: 0.1 %
LYMPHOCYTES # BLD AUTO: 2.5 X10(3) UL (ref 1–4)
LYMPHOCYTES NFR BLD AUTO: 30.2 %
MCH RBC QN AUTO: 29.5 PG (ref 26–34)
MCHC RBC AUTO-ENTMCNC: 32.8 G/DL (ref 31–37)
MCV RBC AUTO: 90 FL
MONOCYTES # BLD AUTO: 0.53 X10(3) UL (ref 0.1–1)
MONOCYTES NFR BLD AUTO: 6.4 %
NEUTROPHILS # BLD AUTO: 5.02 X10 (3) UL (ref 1.5–7.7)
NEUTROPHILS # BLD AUTO: 5.02 X10(3) UL (ref 1.5–7.7)
NEUTROPHILS NFR BLD AUTO: 60.7 %
OSMOLALITY SERPL CALC.SUM OF ELEC: 298 MOSM/KG (ref 275–295)
PLATELET # BLD AUTO: 187 10(3)UL (ref 150–450)
POTASSIUM SERPL-SCNC: 3.3 MMOL/L (ref 3.5–5.1)
PROT SERPL-MCNC: 6 G/DL (ref 5.7–8.2)
RBC # BLD AUTO: 4 X10(6)UL
SODIUM SERPL-SCNC: 144 MMOL/L (ref 136–145)
WBC # BLD AUTO: 8.3 X10(3) UL (ref 4–11)

## 2024-12-21 PROCEDURE — 0DBF8ZX EXCISION OF RIGHT LARGE INTESTINE, VIA NATURAL OR ARTIFICIAL OPENING ENDOSCOPIC, DIAGNOSTIC: ICD-10-PCS | Performed by: STUDENT IN AN ORGANIZED HEALTH CARE EDUCATION/TRAINING PROGRAM

## 2024-12-21 PROCEDURE — 99233 SBSQ HOSP IP/OBS HIGH 50: CPT | Performed by: INTERNAL MEDICINE

## 2024-12-21 PROCEDURE — 0DBG8ZX EXCISION OF LEFT LARGE INTESTINE, VIA NATURAL OR ARTIFICIAL OPENING ENDOSCOPIC, DIAGNOSTIC: ICD-10-PCS | Performed by: STUDENT IN AN ORGANIZED HEALTH CARE EDUCATION/TRAINING PROGRAM

## 2024-12-21 PROCEDURE — 45380 COLONOSCOPY AND BIOPSY: CPT | Performed by: STUDENT IN AN ORGANIZED HEALTH CARE EDUCATION/TRAINING PROGRAM

## 2024-12-21 RX ORDER — SODIUM CHLORIDE, SODIUM LACTATE, POTASSIUM CHLORIDE, CALCIUM CHLORIDE 600; 310; 30; 20 MG/100ML; MG/100ML; MG/100ML; MG/100ML
INJECTION, SOLUTION INTRAVENOUS CONTINUOUS PRN
Status: DISCONTINUED | OUTPATIENT
Start: 2024-12-21 | End: 2024-12-21 | Stop reason: SURG

## 2024-12-21 RX ORDER — LIDOCAINE HYDROCHLORIDE 10 MG/ML
INJECTION, SOLUTION EPIDURAL; INFILTRATION; INTRACAUDAL; PERINEURAL AS NEEDED
Status: DISCONTINUED | OUTPATIENT
Start: 2024-12-21 | End: 2024-12-21 | Stop reason: SURG

## 2024-12-21 RX ORDER — POTASSIUM CHLORIDE 1.5 G/1.58G
40 POWDER, FOR SOLUTION ORAL ONCE
Status: COMPLETED | OUTPATIENT
Start: 2024-12-21 | End: 2024-12-21

## 2024-12-21 RX ORDER — NALOXONE HYDROCHLORIDE 0.4 MG/ML
0.08 INJECTION, SOLUTION INTRAMUSCULAR; INTRAVENOUS; SUBCUTANEOUS ONCE AS NEEDED
Status: DISCONTINUED | OUTPATIENT
Start: 2024-12-21 | End: 2024-12-21 | Stop reason: HOSPADM

## 2024-12-21 RX ORDER — SODIUM CHLORIDE, SODIUM LACTATE, POTASSIUM CHLORIDE, CALCIUM CHLORIDE 600; 310; 30; 20 MG/100ML; MG/100ML; MG/100ML; MG/100ML
INJECTION, SOLUTION INTRAVENOUS CONTINUOUS
Status: DISCONTINUED | OUTPATIENT
Start: 2024-12-21 | End: 2024-12-22

## 2024-12-21 RX ADMIN — LIDOCAINE HYDROCHLORIDE 25 MG: 10 INJECTION, SOLUTION EPIDURAL; INFILTRATION; INTRACAUDAL; PERINEURAL at 08:25:00

## 2024-12-21 RX ADMIN — SODIUM CHLORIDE, SODIUM LACTATE, POTASSIUM CHLORIDE, CALCIUM CHLORIDE: 600; 310; 30; 20 INJECTION, SOLUTION INTRAVENOUS at 08:23:00

## 2024-12-21 RX ADMIN — SODIUM CHLORIDE, SODIUM LACTATE, POTASSIUM CHLORIDE, CALCIUM CHLORIDE: 600; 310; 30; 20 INJECTION, SOLUTION INTRAVENOUS at 08:44:00

## 2024-12-21 NOTE — INTERVAL H&P NOTE
Pre-op Diagnosis: Change in bowel patterns, possible colitis    The above referenced H&P was reviewed by Jair Murillo MD on 12/21/2024, the patient was examined and no significant changes have occurred in the patient's condition since the H&P was performed.  I discussed with the patient and/or legal representative the potential benefits, risks and side effects of this procedure; the likelihood of the patient achieving goals; and potential problems that might occur during recuperation.  I discussed reasonable alternatives to the procedure, including risks, benefits and side effects related to the alternatives and risks related to not receiving this procedure.  We will proceed with procedure as planned.    Colonoscopy today to rule out check point inhibitor colitis

## 2024-12-21 NOTE — ANESTHESIA PREPROCEDURE EVALUATION
Anesthesia PreOp Note    HPI:     Camelia Markham is a 64 year old female who presents for preoperative consultation requested by: Jair Murillo MD    Date of Surgery: 12/19/2024 - 12/21/2024    Procedure(s):  COLONOSCOPY  Indication: Change in bowel patterns, possible colitis    Relevant Problems   No relevant active problems       NPO:  Last Liquid Consumption Date: 12/21/24  Last Liquid Consumption Time: 0540  Last Solid Consumption Date: 12/21/24  Last Solid Consumption Time: 0000  Last Liquid Consumption Date: 12/21/24          History Review:  Patient Active Problem List    Diagnosis Date Noted    Enteritis 12/20/2024    Abdominal pain, generalized 12/19/2024    Metastasis from thyroid cancer (HCC) 11/07/2024    Syncope and collapse 11/04/2024    Hyperglycemia 11/04/2024    Syncope 11/04/2024    Goals of care, counseling/discussion 09/16/2024    Advance care planning 09/16/2024    Palliative care by specialist 09/16/2024    Thyroid carcinoma (HCC) 09/16/2024    Cancer related pain 09/16/2024    Anxiety 09/16/2024    Malignant pleural effusion (HCC) 09/13/2024    Malignant neoplasm metastatic to right lung (HCC) 08/27/2024    Pleural effusion 08/27/2024    Preoperative testing 02/28/2023    History of colon polyps     Cyst of pancreas (HCC) 12/02/2022    Diverticulitis 12/02/2022    Mass of uterine adnexa 12/02/2022    Exposure to medical therapeutic radiation 08/01/2022    Disability of walking 01/07/2022    Incontinence of feces 12/18/2021    Bilateral plantar fasciitis 12/18/2021    Irritable bowel syndrome 12/18/2021    Osteoarthritis of knee 12/18/2021    Rogers's metatarsalgia 12/14/2021    Plantar fascial fibromatosis 12/14/2021    Primary osteoarthritis of right knee 12/07/2021    Abnormal gait 11/29/2021    Heartburn 11/29/2021    Acute meniscal tear, medial 11/23/2021    Internal derangement of right knee 10/26/2021    Other instability, right knee 10/26/2021    Primary osteoarthritis of  left knee 10/12/2021    Internal derangement of left knee 10/12/2021    Backache 08/22/2021    Bursitis of shoulder 08/22/2021    Acute tonsillitis 07/25/2021    Psychophysiologic insomnia 04/04/2021    Fatigue 03/01/2021    Nonspecific syndrome suggestive of viral illness 02/12/2021    Viral infection 02/12/2021    Acute gastritis 01/16/2021    Gastroesophageal reflux disease 01/16/2021    Nasal mucosa dry 01/16/2021    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    Mixed hyperlipidemia 04/21/2020    Vitamin D deficiency 04/21/2020    Headache 04/12/2020    Posterior rhinorrhea 04/12/2020    Lumbosacral radiculopathy 03/22/2020    Dyspnea 03/12/2020    Malaise and fatigue 03/12/2020    Anxiety state 02/26/2020    Influenza vaccine needed 12/08/2019    Pain in thoracic spine 12/03/2019    Hypertensive disorder 11/03/2019    Obesity 11/03/2019    Calcaneal spur 10/02/2019    Pleuritic pain 10/02/2019    Type 2 diabetes mellitus without complication (HCC) 09/28/2019    Generalized anxiety disorder 08/23/2019    Obesity with body mass index 30 or greater 08/23/2019    Seasonal allergic rhinitis 11/27/2018    Pyuria 08/24/2018    Psoriasis 05/15/2018    Posterior tibial tendinitis of right leg 12/27/2017    Contact dermatitis 12/27/2017    Itching 12/27/2017    Tinea corporis 12/27/2017    Thyroid cancer (HCC) 12/17/2014    Hypothyroidism 12/17/2014    Malignant neoplasm of thyroid gland (HCC) 12/17/2014    Dysthymia 03/28/2012    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       Past Medical History:    Anxiety state, unspecified    Cancer (HCC)    Colon adenomas    x3    Disorder of thyroid    thyroidectomy    Diverticulosis of large intestine    Esophageal reflux    Exposure to medical therapeutic radiation    thyroid    High blood pressure    High cholesterol    Hypercholesteremia    Hypothyroidism    Osteoarthrosis, unspecified whether generalized or localized, unspecified site    Thyroid cancer (HCC)     papillary thyroid; s/p surgery resection with Asher and BRIDGES    Unspecified essential hypertension       Past Surgical History:   Procedure Laterality Date    Colonoscopy N/A 2022    Procedure: COLONOSCOPY;  Surgeon: Neeraj No MD;  Location: [facility] ENDOSCOPY    Colonoscopy  2024    Colonoscopy N/A 2024    Procedure: COLONOSCOPY;  Surgeon: Neeraj No MD;  Location: [facility] ENDOSCOPY    Egd  2022    Egd  2024    Esophagogastroduodenoscopy    Endoscopic ultrasound - internal  2022    Endoscopic ultrasound exam  2024    Endoscopic Ultrasound    Eye surgery  2024    Eye surgery Left 2024    Hysterectomy      Knee surgery Left 2022    no associated bleeding complications          40 week 7 lb(s) 5 oz Male; 6 hr labor           40 week 7 lb(s) 3 oz Female          41 week 9 lb(s) 8 oz Male    Other surgical history      Injection Tendon Sheath, Ligament    Thyroidectomy      total    Total abdom hysterectomy         Prescriptions Prior to Admission[1]  Current Medications and Prescriptions Ordered in Epic[2]    Allergies[3]    Family History   Problem Relation Age of Onset    Heart Disorder Mother     Breast Cancer Maternal Cousin Female 57    Cancer Daughter 29        Hodgkin's Lymphoma    Ovarian Cancer Neg     Bleeding Disorders Neg     DVT/VTE Neg     Pancreatic Cancer Neg     Prostate Cancer Neg      Social History     Socioeconomic History    Marital status:    Tobacco Use    Smoking status: Never    Smokeless tobacco: Never   Vaping Use    Vaping status: Never Used   Substance and Sexual Activity    Alcohol use: Yes     Comment: social    Drug use: No   Other Topics Concern    Caffeine Concern Yes     Comment: 1 cup of coffee        Available pre-op labs reviewed.  Lab Results   Component Value Date    WBC 8.3 2024    RBC 4.00 2024    HGB 11.8 (L) 2024    HCT 36.0 2024    MCV 90.0  12/21/2024    MCH 29.5 12/21/2024    MCHC 32.8 12/21/2024    RDW 14.2 12/21/2024    .0 12/21/2024     Lab Results   Component Value Date     12/21/2024    K 3.3 (L) 12/21/2024     12/21/2024    CO2 26.0 12/21/2024    BUN 10 12/21/2024    CREATSERUM 0.69 12/21/2024     (H) 12/21/2024    CA 8.6 (L) 12/21/2024          Vital Signs:  Body mass index is 26.23 kg/m².   height is 1.6 m (5' 3\") and weight is 67.2 kg (148 lb 1.6 oz). Her oral temperature is 98.1 °F (36.7 °C). Her blood pressure is 118/93 (abnormal) and her pulse is 83. Her respiration is 19 and oxygen saturation is 96%.   Vitals:    12/20/24 1205 12/20/24 2014 12/21/24 0312 12/21/24 0754   BP: 144/79 137/71 142/73 (!) 118/93   Pulse: 90 80 82 83   Resp: 18 20 20 19   Temp: 97.9 °F (36.6 °C) 98.2 °F (36.8 °C) 98.1 °F (36.7 °C)    TempSrc: Oral Oral Oral    SpO2: 94% 96% 96%    Weight:       Height:            Anesthesia Evaluation     Patient summary reviewed and Nursing notes reviewed    No history of anesthetic complications   Airway   Mallampati: II  Neck ROM: full  Dental      Pulmonary - negative ROS and normal exam   Cardiovascular - normal exam  (+) hypertension    Neuro/Psych - negative ROS     GI/Hepatic/Renal    (+) GERD    Endo/Other    (+) diabetes mellitus  Abdominal  - normal exam                 Anesthesia Plan:   ASA:  3  Plan:   MAC  Informed Consent Plan and Risks Discussed With:  Patient      I have informed Camelia Markham and/or legal guardian or family member of the nature of the anesthetic plan, benefits, risks including possible dental damage if relevant, major complications, and any alternative forms of anesthetic management.   All of the patient's questions were answered to the best of my ability. The patient desires the anesthetic management as planned.  Lui Gunderson MD  12/21/2024 8:15 AM  Present on Admission:  **None**           [1]   Medications Prior to Admission   Medication Sig Dispense Refill  Last Dose/Taking    senna-docusate 8.6-50 MG Oral Tab Take 2 tablets by mouth nightly. 60 tablet 2 12/18/2024 at 10:30 PM    Omeprazole 40 MG Oral Capsule Delayed Release Take 1 capsule (40 mg total) by mouth daily.   12/18/2024 at 10:00 AM    hydroCHLOROthiazide 25 MG Oral Tab Take 1 tablet (25 mg total) by mouth daily. 30 tablet 6 12/18/2024 at 11:00 AM    clonazePAM 2 MG Oral Tab Take 1 tablet (2 mg total) by mouth nightly as needed for Anxiety (Sleep as well).   12/18/2024 at 10:30 PM    Lenvatinib, 20 MG Daily Dose, 2 x 10 MG Oral Capsule Therapy Pack Take 20 mg by mouth daily. 60 each 5 12/18/2024 at  6:00 PM    levothyroxine 150 MCG Oral Tab Take 1 tablet (150 mcg total) by mouth before breakfast.   12/18/2024 at  8:00 AM    Olmesartan Medoxomil 40 MG Oral Tab Take 1 tablet (40 mg total) by mouth daily.   12/18/2024 at 11:00 AM    amLODIPine 10 MG Oral Tab Take 1 tablet (10 mg total) by mouth daily.   12/19/2024 at 11:00 AM    butalbital-acetaminophen-caffeine -40 MG Oral Tab Take 1 tablet by mouth every 6 (six) hours as needed. 30 tablet 0 More than a month    ondansetron (ZOFRAN) 8 MG tablet Take 1 tablet (8 mg total) by mouth every 8 (eight) hours as needed for Nausea. (Patient not taking: Reported on 12/19/2024) 30 tablet 3    [2]   Current Facility-Administered Medications Ordered in Epic   Medication Dose Route Frequency Provider Last Rate Last Admin    sodium chloride 0.9% infusion   Intravenous Continuous Dara Campos  mL/hr at 12/21/24 0317 New Bag at 12/21/24 0317    enoxaparin (Lovenox) 40 MG/0.4ML SUBQ injection 40 mg  40 mg Subcutaneous Daily Dara Campos MD   40 mg at 12/20/24 0847    acetaminophen (Tylenol Extra Strength) tab 500 mg  500 mg Oral Q4H PRN Dara Campos MD        ondansetron (Zofran) 4 MG/2ML injection 4 mg  4 mg Intravenous Q6H PRN Dara Campos MD   4 mg at 12/21/24 0735    prochlorperazine (Compazine) 10 MG/2ML injection 5 mg  5 mg Intravenous Q8H PRN  Dara Campos MD   5 mg at 12/20/24 0410    temazepam (Restoril) cap 15 mg  15 mg Oral Nightly PRN Dara Campos MD        morphINE PF 2 MG/ML injection 1 mg  1 mg Intravenous Q2H PRN Dara Campos MD        Or    morphINE PF 2 MG/ML injection 2 mg  2 mg Intravenous Q2H PRN Dara Campos MD        Or    morphINE PF 4 MG/ML injection 4 mg  4 mg Intravenous Q2H PRN Dara Campos MD        amLODIPine (Norvasc) tab 10 mg  10 mg Oral Daily Ching Raymond MD        clonazePAM (KlonoPIN) tab 2 mg  2 mg Oral Nightly PRN Ching Raymond MD        hydroCHLOROthiazide tab 25 mg  25 mg Oral Daily Ching Raymond MD        levothyroxine (Synthroid) tab 150 mcg  150 mcg Oral Before breakfast Ching Raymond MD   150 mcg at 12/20/24 0555    losartan (Cozaar) tab 100 mg  100 mg Oral Daily Ching Raymodn MD        pantoprazole (Protonix) DR tab 40 mg  40 mg Oral QAM AC Ching Raymond MD   40 mg at 12/20/24 0555    HYDROmorphone (Dilaudid) 1 MG/ML injection 0.1 mg  0.1 mg Intravenous Q2H PRN Ching Raymond MD   0.1 mg at 12/21/24 0735    Or    HYDROmorphone (Dilaudid) 1 MG/ML injection 0.2 mg  0.2 mg Intravenous Q2H PRN Ching Raymond MD   0.2 mg at 12/21/24 0314    Or    HYDROmorphone (Dilaudid) 1 MG/ML injection 0.4 mg  0.4 mg Intravenous Q2H PRN Ching Raymond MD   0.4 mg at 12/19/24 2202     No current Epic-ordered outpatient medications on file.   [3]   Allergies  Allergen Reactions    Latex HIVES    Peanut-Containing Drug Products SWELLING     Closes Throat  Closes Throat  Closes Throat      Peanuts SWELLING     Closes Throat    Adhesive Tape OTHER (SEE COMMENTS)    Seasonal

## 2024-12-21 NOTE — PLAN OF CARE
Camelia Figueroa is alert and oriented x4, on RA. On Lovenox. Have Bms during the shift d/t on bowel prep Golytely, finished 6pm dose, next is 3am dose. Had nausea, managed with zofran prn, has compazine prn if need. Not tolerating clears that much with jello during the shift. Voiding up to bathroom, 1x-assist/stand-by. Pain in abdomen managed with Dilaudid prn. IVF infusing with 0.9 at 100 ml/hr. Plan pending course, plan for colonoscopy in the morning, safety measures in place, call light within reach, and support provided.     Problem: Patient Centered Care  Goal: Patient preferences are identified and integrated in the patient's plan of care  Description: Interventions:  - What would you like us to know as we care for you? From home with family  - Provide timely, complete, and accurate information to patient/family  - Incorporate patient and family knowledge, values, beliefs, and cultural backgrounds into the planning and delivery of care  - Encourage patient/family to participate in care and decision-making at the level they choose  - Honor patient and family perspectives and choices  Outcome: Progressing     Problem: ANXIETY  Goal: Will report anxiety at manageable levels  Description: INTERVENTIONS:  - Administer medication as ordered  - Teach and rehearse alternative coping skills  - Provide emotional support with 1:1 interaction with staff  Outcome: Progressing     Problem: COPING  Goal: Pt/Family able to verbalize concerns and demonstrate effective coping strategies  Description: INTERVENTIONS:  - Assist patient/family to identify coping skills, available support systems and cultural and spiritual values  - Provide emotional support, including active listening and acknowledgement of concerns of patient and caregivers  - Reduce environmental stimuli, as able  - Instruct patient/family in relaxation techniques, as appropriate  - Assess for spiritual and psychosocial needs and initiate Spiritual Care or Behavioral  Health consult as needed  Outcome: Progressing     Problem: PAIN - ADULT  Goal: Verbalizes/displays adequate comfort level or patient's stated pain goal  Description: INTERVENTIONS:  - Encourage pt to monitor pain and request assistance  - Assess pain using appropriate pain scale  - Administer analgesics based on type and severity of pain and evaluate response  - Implement non-pharmacological measures as appropriate and evaluate response  - Consider cultural and social influences on pain and pain management  - Manage/alleviate anxiety  - Utilize distraction and/or relaxation techniques  - Monitor for opioid side effects  - Notify MD/LIP if interventions unsuccessful or patient reports new pain  - Anticipate increased pain with activity and pre-medicate as appropriate  Outcome: Progressing     Problem: RISK FOR INFECTION - ADULT  Goal: Absence of fever/infection during anticipated neutropenic period  Description: INTERVENTIONS  - Monitor WBC  - Administer growth factors as ordered  - Implement neutropenic guidelines  Outcome: Progressing     Problem: SAFETY ADULT - FALL  Goal: Free from fall injury  Description: INTERVENTIONS:  - Assess pt frequently for physical needs  - Identify cognitive and physical deficits and behaviors that affect risk of falls.  - [place] fall precautions as indicated by assessment.  - Educate pt/family on patient safety including physical limitations  - Instruct pt to call for assistance with activity based on assessment  - Modify environment to reduce risk of injury  - Provide assistive devices as appropriate  - Consider OT/PT consult to assist with strengthening/mobility  - Encourage toileting schedule  Outcome: Progressing     Problem: DISCHARGE PLANNING  Goal: Discharge to home or other facility with appropriate resources  Description: INTERVENTIONS:  - Identify barriers to discharge w/pt and caregiver  - Include patient/family/discharge partner in discharge planning  - Arrange for  needed discharge resources and transportation as appropriate  - Identify discharge learning needs (meds, wound care, etc)  - Arrange for interpreters to assist at discharge as needed  - Consider post-discharge preferences of patient/family/discharge partner  - Complete POLST form as appropriate  - Assess patient's ability to be responsible for managing their own health  - Refer to Case Management Department for coordinating discharge planning if the patient needs post-hospital services based on physician/LIP order or complex needs related to functional status, cognitive ability or social support system  Outcome: Progressing     Problem: GASTROINTESTINAL - ADULT  Goal: Minimal or absence of nausea and vomiting  Description: INTERVENTIONS:  - Maintain adequate hydration with IV or PO as ordered and tolerated  - Nasogastric tube to low intermittent suction as ordered  - Evaluate effectiveness of ordered antiemetic medications  - Provide nonpharmacologic comfort measures as appropriate  - Advance diet as tolerated, if ordered  - Obtain nutritional consult as needed  - Evaluate fluid balance  Outcome: Progressing  Goal: Maintains or returns to baseline bowel function  Description: INTERVENTIONS:  - Assess bowel function  - Maintain adequate hydration with IV or PO as ordered and tolerated  - Evaluate effectiveness of GI medications  - Encourage mobilization and activity  - Obtain nutritional consult as needed  - Establish a toileting routine/schedule  - Consider collaborating with pharmacy to review patient's medication profile  Outcome: Progressing  Goal: Maintains adequate nutritional intake (undernourished)  Description: INTERVENTIONS:  - Monitor percentage of each meal consumed  - Identify factors contributing to decreased intake, treat as appropriate  - Assist with meals as needed  - Monitor I&O, WT and lab values  - Obtain nutritional consult as needed  - Optimize oral hygiene and moisture  - Encourage food from  home; allow for food preferences  - Enhance eating environment  Outcome: Progressing     Problem: METABOLIC/FLUID AND ELECTROLYTES - ADULT  Goal: Electrolytes maintained within normal limits  Description: INTERVENTIONS:  - Monitor labs and rhythm and assess patient for signs and symptoms of electrolyte imbalances  - Administer electrolyte replacement as ordered  - Monitor response to electrolyte replacements, including rhythm and repeat lab results as appropriate  - Fluid restriction as ordered  - Instruct patient on fluid and nutrition restrictions as appropriate  Outcome: Progressing     Problem: SKIN/TISSUE INTEGRITY - ADULT  Goal: Skin integrity remains intact  Description: INTERVENTIONS  - Assess and document risk factors for pressure ulcer development  - Assess and document skin integrity  - Monitor for areas of redness and/or skin breakdown  - Initiate interventions, skin care algorithm/standards of care as needed  Outcome: Progressing     Problem: HEMATOLOGIC - ADULT  Goal: Maintains hematologic stability  Description: INTERVENTIONS  - Assess for signs and symptoms of bleeding or hemorrhage  - Monitor labs and vital signs for trends  - Administer supportive blood products/factors, fluids and medications as ordered and appropriate  - Administer supportive blood products/factors as ordered and appropriate  Outcome: Progressing  Goal: Free from bleeding injury  Description: (Example usage: patient with low platelets)  INTERVENTIONS:  - Avoid intramuscular injections, enemas and rectal medication administration  - Ensure safe mobilization of patient  - Hold pressure on venipuncture sites to achieve adequate hemostasis  - Assess for signs and symptoms of internal bleeding  - Monitor lab trends  - Patient is to report abnormal signs of bleeding to staff  - Avoid use of toothpicks and dental floss  - Use electric shaver for shaving  - Use soft bristle tooth brush  - Limit straining and forceful nose  blowing  Outcome: Progressing

## 2024-12-21 NOTE — ANESTHESIA POSTPROCEDURE EVALUATION
Patient: Camelia Markham    Procedure Summary       Date: 12/21/24 Room / Location: [facility] ENDOSCOPY 05 / EM ENDOSCOPY    Anesthesia Start: 0823 Anesthesia Stop: 0855    Procedure: COLONOSCOPY Diagnosis: (colitis)    Surgeons: Jair Murillo MD Anesthesiologist: Lui Gunderson MD    Anesthesia Type: MAC ASA Status: 3            Anesthesia Type: MAC    Vitals Value Taken Time   /76 12/21/24 0855   Temp 98 12/21/24 0855   Pulse 78 12/21/24 0855   Resp 16 12/21/24 0855   SpO2 98 12/21/24 0855       [facility] AN Post Evaluation:   Patient Evaluated in PACU  Patient Participation: complete - patient participated  Level of Consciousness: awake  Pain Management: adequate  Airway Patency:patent  Dental exam unchanged from preop  Yes    Cardiovascular Status: acceptable  Respiratory Status: acceptable  Postoperative Hydration acceptable      Lui Gunderson MD  12/21/2024 8:55 AM

## 2024-12-21 NOTE — PROGRESS NOTES
[facility]  part of [place] Hospitalist Progress Note     Camelai Markham Patient Status:  Inpatient    1960 MRN [id number]   Location [facility] 4W/SW/SE Attending Jacky Brower MD   Hosp Day # 0 PCP FINA MABRY, MD WENDY     Subjective:     Patient resting comfortably and in NAD. Reports her pain is better controlled.   S/p colonoscopy 2024.  No overnight events reported by the nursing staff.      Objective:    Review of Systems:   ROS completed; pertinent positive and negatives stated in subjective.      Vital signs:  Temp:  [97.9 °F (36.6 °C)-98.2 °F (36.8 °C)] 98.1 °F (36.7 °C)  Pulse:  [71-90] 85  Resp:  [17-20] 18  BP: (114-144)/(48-93) 130/71  SpO2:  [93 %-96 %] 95 %      Physical Exam:    Gen: NAD AO x3  Chest: good air entry CTABL  CVS: normal s1 and s2 RR  Abd: NABS soft NT ND  Neuro: CN 2-12 grossly intact  Ext: no edema in bilateral LE      Diagnostic Data:    Labs:  Recent Labs   Lab 24  1036 24  1500 24  0558 24  0634   WBC 10.6 10.5 8.1 8.3   HGB 15.5 15.4 13.3 11.8*   MCV 87.2 87.5 91.9 90.0   .0 246.0 230.0 187.0       Recent Labs   Lab 24  1036 24  1500 24  0558 24  0634   * 111* 112* 111*   BUN 15 16 16 10   CREATSERUM 0.87 0.76 0.83 0.69   CA 10.1 10.0 9.1 8.6*   ALB 4.8 4.7  --  3.4    141 142 144   K 3.6 3.7 3.9 3.3*    103 107 110   CO2 25.0 26.0 26.0 26.0   ALKPHO 108 107  --  78   AST 29 30  --  17   ALT 32 31  --  19   BILT 1.4* 1.4*  --  1.2*   TP 8.6* 8.6*  --  6.0       Estimated Creatinine Clearance: 69 mL/min (based on SCr of 0.69 mg/dL).    No results for input(s): \"PTP\", \"INR\" in the last 168 hours.           Imaging: Imaging data reviewed in Epic.    Medications:    enoxaparin  40 mg Subcutaneous Daily    amLODIPine  10 mg Oral Daily    hydroCHLOROthiazide  25 mg Oral Daily    levothyroxine  150 mcg Oral Before breakfast    losartan  100 mg Oral Daily     pantoprazole  40 mg Oral QAM AC       Assessment & Plan:     Enterocolitis  Imaging reviewed  C Diff negative  Cultures pending  GI on consult  S/p Colonoscopy - minimal inflammation. Await pathology prior to starting steroids  ADAT  PRN pain control and nausea medications  HemOnc on consult  Appreciate recs  Thyroid cancer  Currently on immunotherapy with lenvatinib  HTN  BP well controlled      Plan of care discussed with patient or family at bedside.      Supplementary Documentation:     Quality:  DVT Prophylaxis: Lovenox s/c  CODE status: Full       Estimated date of discharge: TBD  Discharge is dependent on: clinical stability  At this point Ms. Markham is expected to be discharge to: home                   Jacky Brower MD  Hospitalist    MDM: High, I personally reviewed the available laboratories, imaging. I discussed the case with RN. I ordered laboratories, studies including AM labs.  Medical decision making high, risk is high.       The 21st Century Cures Act makes medical notes like these available to patients in the interest of transparency. Please be advised this is a medical document. Medical documents are intended to carry relevant information, facts as evident, and the clinical opinion of the practitioner. The medical note is intended as peer to peer communication and may appear blunt or direct. It is written in medical language and may contain abbreviations or verbiage that are unfamiliar.

## 2024-12-21 NOTE — OPERATIVE REPORT
COLONOSCOPY REPORT    Camelia Markham     1960 Age 64 year old   PCP FINA MABRY, MD WENDY Endoscopist Jair Murillo MD       Date of procedure: 24    Procedure: Colonoscopy w/ possible polypectomy    Pre-operative diagnosis: altered bowel habits, abdominal cramping    Post-operative diagnosis: see impression    Medications: MAC    Withdrawal time: 14 minutes    Procedure:  Informed consent was obtained from the patient after the risks of the procedure were discussed, including but not limited to bleeding, perforation, aspiration, infection, or possibility of a missed lesion. After discussions of the risks/benefits and alternatives to this procedure, as well as the planned sedation, the patient was placed in the left lateral decubitus position and begun on continuous blood pressure pulse oximetry and EKG monitoring and this was maintained throughout the procedure. Once an adequate level of sedation was obtained a digital rectal exam was completed. Then the lubricated tip of the Pediatric [id number] diagnostic video colonoscope was inserted and advanced without difficulty to the cecum using water immersion and CO2 insufflation technique. The cecum was identified by localizing the trifold, the appendix and the ileocecal valve. Withdrawal was begun with thorough washing and careful examination of the colonic walls and folds. A routine second examination of the cecum/ascending colon was performed. Photodocumentation was obtained. The bowel prep was good. Views of the colon were good with washing. I then carefully withdrew the instrument from the patient who tolerated the procedure well.     Complications: none.    Findings:   1. No polyps detected    2. Diverticulosis: no large diverticula.    3. Ileocecal valve appeared healthy and normal.    4. The cecum, ascending colon, hepatic flexure, transverse colon, splenic flexure all appeared completely healthy and normal with glistening mucosa  and intact vascular pattern. Biopsies taken. The descending colon, sigmoid colon had VERY MILD mucosal congestion but no loss in vascular pattern. NO ulcers or erosions. NO erythema. Biopsies were taken with a cold forceps for histology. The rectum was completely normal with glistening mucosa and intact vascular pattern. There was spot dye/submucosal dye seen in the ascending colon from likely prior polypectomy.    5. Rectum was meticulously evaluated in antegrade view and was notable for small internal hemorrhoids.    6. SHAQUILLE: normal rectal tone, no masses palpated.     Impression:   No polyps.  Normal appearing ileocecal valve.  Normal right colon. Biopsied.  Very mild congestion in the descending colon/sigmoid colon, possibly related to early changes of developing colitis though there was no significant inflammation. Biopsied.  Rectum was normal.  Hemorrhoids.  May have developing check point inhibitor colitis which is most common in the left colon. However, her symptoms are a bit atypical for CPI as she is having mainly constipation with associated abdominal cramping. Could be the early stages of CPI colitis.     Recommend:  Await pathology.   Given minimal inflammatory changes would await pathology results prior to initiating steroids for treatment of possible CPI colitis.  Advance diet as tolerated.    >>>If tissue was obtained and you have not received your pathology results either by phone or letter within 2 weeks, please call our office at [phone].    Specimens: right colon, left colon.    Blood loss: <1 ml

## 2024-12-22 VITALS
HEART RATE: 92 BPM | TEMPERATURE: 99 F | RESPIRATION RATE: 16 BRPM | WEIGHT: 148.13 LBS | BODY MASS INDEX: 26.25 KG/M2 | HEIGHT: 63 IN | DIASTOLIC BLOOD PRESSURE: 68 MMHG | SYSTOLIC BLOOD PRESSURE: 123 MMHG | OXYGEN SATURATION: 92 %

## 2024-12-22 LAB
ALBUMIN SERPL-MCNC: 3.2 G/DL (ref 3.2–4.8)
ALBUMIN/GLOB SERPL: 1.2 {RATIO} (ref 1–2)
ALP LIVER SERPL-CCNC: 68 U/L
ALT SERPL-CCNC: 12 U/L
ANION GAP SERPL CALC-SCNC: 6 MMOL/L (ref 0–18)
AST SERPL-CCNC: 11 U/L (ref ?–34)
ATRIAL RATE: 93 BPM
BASOPHILS # BLD AUTO: 0.04 X10(3) UL (ref 0–0.2)
BASOPHILS NFR BLD AUTO: 0.5 %
BILIRUB SERPL-MCNC: 1 MG/DL (ref 0.2–1.1)
BUN BLD-MCNC: 7 MG/DL (ref 9–23)
BUN/CREAT SERPL: 9.5 (ref 10–20)
CALCIUM BLD-MCNC: 8.8 MG/DL (ref 8.7–10.4)
CHLORIDE SERPL-SCNC: 107 MMOL/L (ref 98–112)
CO2 SERPL-SCNC: 29 MMOL/L (ref 21–32)
CREAT BLD-MCNC: 0.74 MG/DL
DEPRECATED RDW RBC AUTO: 49.1 FL (ref 35.1–46.3)
EGFRCR SERPLBLD CKD-EPI 2021: 91 ML/MIN/1.73M2 (ref 60–?)
EOSINOPHIL # BLD AUTO: 0.13 X10(3) UL (ref 0–0.7)
EOSINOPHIL NFR BLD AUTO: 1.7 %
ERYTHROCYTE [DISTWIDTH] IN BLOOD BY AUTOMATED COUNT: 14.5 % (ref 11–15)
GLOBULIN PLAS-MCNC: 2.6 G/DL (ref 2–3.5)
GLUCOSE BLD-MCNC: 117 MG/DL (ref 70–99)
HCT VFR BLD AUTO: 35.8 %
HGB BLD-MCNC: 12.1 G/DL
IMM GRANULOCYTES # BLD AUTO: 0.03 X10(3) UL (ref 0–1)
IMM GRANULOCYTES NFR BLD: 0.4 %
LYMPHOCYTES # BLD AUTO: 2 X10(3) UL (ref 1–4)
LYMPHOCYTES NFR BLD AUTO: 25.9 %
MCH RBC QN AUTO: 31.1 PG (ref 26–34)
MCHC RBC AUTO-ENTMCNC: 33.8 G/DL (ref 31–37)
MCV RBC AUTO: 92 FL
MONOCYTES # BLD AUTO: 0.56 X10(3) UL (ref 0.1–1)
MONOCYTES NFR BLD AUTO: 7.2 %
NEUTROPHILS # BLD AUTO: 4.97 X10 (3) UL (ref 1.5–7.7)
NEUTROPHILS # BLD AUTO: 4.97 X10(3) UL (ref 1.5–7.7)
NEUTROPHILS NFR BLD AUTO: 64.3 %
OSMOLALITY SERPL CALC.SUM OF ELEC: 293 MOSM/KG (ref 275–295)
P AXIS: 11 DEGREES
P-R INTERVAL: 136 MS
PLATELET # BLD AUTO: 176 10(3)UL (ref 150–450)
POTASSIUM SERPL-SCNC: 3.4 MMOL/L (ref 3.5–5.1)
POTASSIUM SERPL-SCNC: 3.4 MMOL/L (ref 3.5–5.1)
PROT SERPL-MCNC: 5.8 G/DL (ref 5.7–8.2)
Q-T INTERVAL: 366 MS
QRS DURATION: 88 MS
QTC CALCULATION (BEZET): 455 MS
R AXIS: -24 DEGREES
RBC # BLD AUTO: 3.89 X10(6)UL
SODIUM SERPL-SCNC: 142 MMOL/L (ref 136–145)
T AXIS: 6 DEGREES
VENTRICULAR RATE: 93 BPM
WBC # BLD AUTO: 7.7 X10(3) UL (ref 4–11)

## 2024-12-22 PROCEDURE — 99233 SBSQ HOSP IP/OBS HIGH 50: CPT | Performed by: STUDENT IN AN ORGANIZED HEALTH CARE EDUCATION/TRAINING PROGRAM

## 2024-12-22 PROCEDURE — 99239 HOSP IP/OBS DSCHRG MGMT >30: CPT | Performed by: INTERNAL MEDICINE

## 2024-12-22 RX ORDER — POTASSIUM CHLORIDE 1.5 G/1.58G
40 POWDER, FOR SOLUTION ORAL ONCE
Status: DISCONTINUED | OUTPATIENT
Start: 2024-12-22 | End: 2024-12-22

## 2024-12-22 RX ORDER — POTASSIUM CHLORIDE 1500 MG/1
40 TABLET, EXTENDED RELEASE ORAL 2 TIMES DAILY
Qty: 20 TABLET | Refills: 0 | Status: SHIPPED | OUTPATIENT
Start: 2024-12-22 | End: 2024-12-29

## 2024-12-22 RX ORDER — POTASSIUM CHLORIDE 1500 MG/1
40 TABLET, EXTENDED RELEASE ORAL 2 TIMES DAILY
Qty: 20 TABLET | Refills: 0 | Status: SHIPPED | OUTPATIENT
Start: 2024-12-22 | End: 2024-12-22

## 2024-12-22 NOTE — DIETARY NOTE
Dietitian Note:     RN called RD this am to discuss patient. Patient wanting to meet with Dietitian to discuss food options for home.     Met with patient and family members this afternoon. Patient reports she is sensitive to the smell of foods and her intake has been sub-optimal as of late. Spent time listening to patient about her daily struggles and provided suggestions.   RD emphasized small, frequent feedings. Provided High Calorie, High Protein Nutrition Therapy handouts for use at home. Briefly reviewed information. Discussed some meal/snack options. RD brought 2 ONS options to trial: Ensure Clear and Ensure Max. Encouraged use of ONS between meals. Patient appeared receptive.     Name and phone number provided as needed.     Carina Madden RDN, LDN, CDE   Clinical Nutrition  Ext [phone]

## 2024-12-22 NOTE — DISCHARGE SUMMARY
[facility]  part of [facility]    DISCHARGE SUMMARY     Camelia Markham Patient Status:  Observation    1960 MRN [id number]   Location [facility] 4W/SW/SE Attending Jacky Brower MD   Hosp Day # 0 PCP FINA MABRY, MD WENDY     Date of Admission: 2024  Date of Discharge:  2024    Discharge Disposition: Home or Self Care    Discharge Diagnosis:     Enterocolitis  Thyroid cancer  HTN    History of Present Illness:     The patient is a 64-year-old  female, currently undergoing oral immunotherapy with lenvatinib for recurrent metastatic thyroid cancer. Came in today for abdominal cramps and discomfort. She had a loose bowel movement in the emergency room. Chemistry showed BUN and creatinine ratio of 21.1; otherwise, unremarkable. Total bilirubin 1.4. Urinalysis showed possible urinary tract infection. CBC unremarkable. CT scan of the abdomen showed diffuse nondilated fluid-filled small and large bowel loops with associated mucosal hyperenhancement as well as diffuse colonic wall thickening concerning for enterocolitis. C difficile was ordered in the emergency room. Also, she had right posterior lung base small pleural effusion known to be malignant. Patient will be admitted to the hospital for further management.     Brief Synopsis:     Enterocolitis  Imaging reviewed  C Diff negative  Cultures pending  GI on consult  S/p Colonoscopy - minimal inflammation. Await pathology prior to starting steroids  ADAT  PRN pain control and nausea medications  May start steroids as opt  Close opt follow up  HemOnc on consult  Okay to resume lenvatinib at 14 mg daily once symptoms are improved  Close opt follow up  Thyroid cancer  Currently on immunotherapy with lenvatinib  HTN  BP well controlled  Patient is to follow up with PCP and GI and HemOnc as opt.   Patient is to remain compliant with all discharge medications and instructions and to follow up as advised.   Patient  encouraged to make healthy lifestyle and dietary changes.    Lace+ Score: 78  59-90 High Risk  29-58 Medium Risk  0-28   Low Risk       TCM Follow-Up Recommendation:  LACE > 58: High Risk of readmission after discharge from the hospital.      Procedures during hospitalization:   CLN    Incidental or significant findings and recommendations (brief descriptions):  None    Lab/Test results pending at Discharge:   Pathology    Consultants:  VALERIE Titus    Discharge Medication List:     Discharge Medications        START taking these medications        Instructions Prescription details   potassium chloride 20 MEQ Tbcr  Commonly known as: Klor-Con M20      Take 2 tablets (40 mEq total) by mouth 2 (two) times daily for 5 days.   Stop taking on: December 27, 2024  Quantity: 20 tablet  Refills: 0            CONTINUE taking these medications        Instructions Prescription details   amLODIPine 10 MG Tabs  Commonly known as: Norvasc      Take 1 tablet (10 mg total) by mouth daily.   Refills: 0     butalbital-acetaminophen-caffeine -40 MG Tabs  Commonly known as: Fioricet      Take 1 tablet by mouth every 6 (six) hours as needed.   Quantity: 30 tablet  Refills: 0     clonazePAM 2 MG Tabs  Commonly known as: KlonoPIN      Take 1 tablet (2 mg total) by mouth nightly as needed for Anxiety (Sleep as well).   Refills: 0     hydroCHLOROthiazide 25 MG Tabs      Take 1 tablet (25 mg total) by mouth daily.   Quantity: 30 tablet  Refills: 6     Lenvatinib (20 MG Daily Dose) 2 x 10 MG Cppk      Take 20 mg by mouth daily.   Quantity: 60 each  Refills: 5     levothyroxine 150 MCG Tabs  Commonly known as: Synthroid      Take 1 tablet (150 mcg total) by mouth before breakfast.   Refills: 0     Olmesartan Medoxomil 40 MG Tabs  Commonly known as: BENICAR      Take 1 tablet (40 mg total) by mouth daily.   Refills: 0     Omeprazole 40 MG Cpdr      Take 1 capsule (40 mg total) by mouth daily.   Refills: 0     senna-docusate 8.6-50 MG  Tabs  Commonly known as: Senokot-S      Take 2 tablets by mouth nightly.   Quantity: 60 tablet  Refills: 2            STOP taking these medications      ondansetron 8 MG tablet  Commonly known as: Zofran                  Where to Get Your Medications        These medications were sent to [facility] - [place], [place] - [address] [phone], [phone]  [address], [facility] [address]      Phone: [phone]   potassium chloride 20 MEQ Tbcr         [place]    Follow-up appointment:   Jair Murillo MD  [address]  [facility] [address]  [phone]    Call  As needed    Alton Marie MD  [address] [facility] [address]  [phone]    Follow up in 1 week(s)      Ludin Hernandez MD  [address] [facility] [address]  [phone]    Follow up in 1 week(s)      Appointments for Next 30 Days 12/22/2024 - 1/21/2025        Date Arrival Time Visit Type Length Department Provider     1/6/2025  8:45 AM  LAB - [facility] [[id number]] 15 min [facility] Hematology Oncology [place] CMA RESOURCE    Patient Instructions:         Location Instructions:     Your appointment is at the [facility]. The address is [address] [place] [address]. The entrance is located on the East side of the [place] parking lot.  Masks are optional for all patients and visitors, unless otherwise indicated.               1/6/2025  9:00 AM  PALLIATIVE CARE FOLLOW UP [5071] 30 min [facility] Hematology Oncology [place] Sugar Miller APRN    Patient Instructions:         Location Instructions:     Your appointment is at the [facility]. The address is [address] [place] [address]. The entrance is located on the East side of the [place] parking lot.  Masks are optional for all patients and visitors, unless  otherwise indicated.               1/6/2025  9:45 AM  FOLLOW UP-HEM/ONC [8271] 15 min [facility] Hematology Oncology [place] Inocente Chen MD    Patient Instructions:         Location Instructions:     Your appointment is at the [facility]. The address is [address] [place] [address]. The entrance is located on the East side of the [place] parking lot.  Masks are optional for all patients and visitors, unless otherwise indicated.               1/6/2025 11:45 AM  FOLLOW UP VISIT [[id number]] 15 min [facility] Medical [facility], [facility], [place] ToddDank dubose DO    Patient Instructions:         Location Instructions:     Your appointment is located at [address] [facility] in [place], [place].&nbsp; Please park in the [place] lot, enter through the [facility] entrance, and go to [address].  Masks are optional for all patients and visitors, unless otherwise indicated.                      Vital signs:  Temp:  [98.5 °F (36.9 °C)-98.9 °F (37.2 °C)] 98.6 °F (37 °C)  Pulse:  [89-99] 92  Resp:  [16-20] 16  BP: (107-132)/(57-73) 123/68  SpO2:  [92 %-96 %] 92 %    Physical Exam:    Gen: NAD AO x3  Chest: good air entry CTABL  CVS: normal s1 and s2 RR  Abd: NABS soft NT ND  Neuro: CN 2-12 grossly intact  Ext: no edema in bilateral LE    -----------------------------------------------------------------------------------------------  PATIENT DISCHARGE INSTRUCTIONS: See electronic chart    Jacky Brower MD  Hospitalist    Time spent:  > 30 minutes    The 21st Century Cures Act makes medical notes like these available to patients in the interest of transparency. Please be advised this is a medical document. Medical documents are intended to carry relevant information, facts as evident, and the clinical opinion of the practitioner. The medical note is intended as peer to peer communication and may appear blunt or direct. It is written in medical  language and may contain abbreviations or verbiage that are unfamiliar.

## 2024-12-22 NOTE — PROGRESS NOTES
[facility]     Gastroenterology Progress Note    Camelia Markham Patient Status:  Observation    1960 MRN [id number]   Location [facility] 4W/SW/SE Attending Jacky Brower MD   Hosp Day # 0 PCP FINA MABRY, MD WENDY       Subjective:   Overall feeling much better. Eating without abdominal discomfort. No nausea or vomiting.      Objective:   Blood pressure 123/68, pulse 92, temperature 98.6 °F (37 °C), temperature source Oral, resp. rate 16, height 5' 3\" (1.6 m), weight 148 lb 1.6 oz (67.2 kg), SpO2 92%. Body mass index is 26.23 kg/m².    Gen- Patient appears comfortable and in no acute discomfort  HEENT: the sclera appears anicteric, oropharynx clear, mucus membranes appear moist  CV- regular rate and rhythm, the extremities are warm and well perfused   Lung- Moves air well; No labored breathing  Abdomen- soft, non-tender exam in all quadrants without rigidity or guarding, non-distended, no abnormal bowel sounds noted, no masses are palpated  Skin- No jaundice  Ext: no cyanosis, clubbing or edema is evident.   Neuro- Alert and interactive, and gross movements of extremities normal    Results:     Lab Results   Component Value Date    WBC 7.7 2024    HGB 12.1 2024    HCT 35.8 2024    .0 2024    CREATSERUM 0.74 2024    BUN 7 (L) 2024     2024    K 3.4 (L) 2024    K 3.4 (L) 2024     2024    CO2 29.0 2024     (H) 2024    CA 8.8 2024    ALB 3.2 2024    ALKPHO 68 2024    BILT 1.0 2024    TP 5.8 2024    AST 11 2024    ALT 12 2024    PTT 26.0 2022    INR 0.98 2023    T4F 1.9 (H) 2024    TSH 0.048 (L) 2024    LIP 24 2024    DDIMER 0.55 2024    MG 2.1 2024    PHOS 4.0 11/10/2024       No results found.  EKG 12 Lead    Result Date: 2024  Normal sinus rhythm Normal ECG When compared with ECG of  04-NOV-2024 11:54, No significant change was found     Assessment and Plan:   64F w/ papillary thyroid CA s/p thyroidectomy c/b R malignant pleural effusion on lenvatinib who presented with abdominal pain and colitis on imaging.    S/p colonoscopy 12/21 with essentially normal appearing colon. Biopsies are pending.    Patient overall doing well with no complaints today.    I suspect her chronic lack of appetite/weakness is related to malignancy and immunotherapy.    Okay to discharge from GI perspective. Will contact next week with biopsy results.    Jair Murillo MD  [facility] Gastroenterology

## 2024-12-22 NOTE — PLAN OF CARE
Patient alert and oriented, vital signs stable on room air, ambulating independently. Tolerating diet, denies pain/nausea. Voiding freely, having bowel movements. IV fluids running per order. Lovenox for DVT prophylaxis. Call light within reach, fall precautions in place, patient calling appropriately.     Problem: Patient Centered Care  Goal: Patient preferences are identified and integrated in the patient's plan of care  Description: Interventions:  - What would you like us to know as we care for you? From home with family  - Provide timely, complete, and accurate information to patient/family  - Incorporate patient and family knowledge, values, beliefs, and cultural backgrounds into the planning and delivery of care  - Encourage patient/family to participate in care and decision-making at the level they choose  - Honor patient and family perspectives and choices  Outcome: Progressing     Problem: ANXIETY  Goal: Will report anxiety at manageable levels  Description: INTERVENTIONS:  - Administer medication as ordered  - Teach and rehearse alternative coping skills  - Provide emotional support with 1:1 interaction with staff  Outcome: Progressing     Problem: COPING  Goal: Pt/Family able to verbalize concerns and demonstrate effective coping strategies  Description: INTERVENTIONS:  - Assist patient/family to identify coping skills, available support systems and cultural and spiritual values  - Provide emotional support, including active listening and acknowledgement of concerns of patient and caregivers  - Reduce environmental stimuli, as able  - Instruct patient/family in relaxation techniques, as appropriate  - Assess for spiritual and psychosocial needs and initiate Spiritual Care or Behavioral Health consult as needed  Outcome: Progressing     Problem: PAIN - ADULT  Goal: Verbalizes/displays adequate comfort level or patient's stated pain goal  Description: INTERVENTIONS:  - Encourage pt to monitor pain and  request assistance  - Assess pain using appropriate pain scale  - Administer analgesics based on type and severity of pain and evaluate response  - Implement non-pharmacological measures as appropriate and evaluate response  - Consider cultural and social influences on pain and pain management  - Manage/alleviate anxiety  - Utilize distraction and/or relaxation techniques  - Monitor for opioid side effects  - Notify MD/LIP if interventions unsuccessful or patient reports new pain  - Anticipate increased pain with activity and pre-medicate as appropriate  Outcome: Progressing     Problem: RISK FOR INFECTION - ADULT  Goal: Absence of fever/infection during anticipated neutropenic period  Description: INTERVENTIONS  - Monitor WBC  - Administer growth factors as ordered  - Implement neutropenic guidelines  Outcome: Progressing     Problem: SAFETY ADULT - FALL  Goal: Free from fall injury  Description: INTERVENTIONS:  - Assess pt frequently for physical needs  - Identify cognitive and physical deficits and behaviors that affect risk of falls.  - [place] fall precautions as indicated by assessment.  - Educate pt/family on patient safety including physical limitations  - Instruct pt to call for assistance with activity based on assessment  - Modify environment to reduce risk of injury  - Provide assistive devices as appropriate  - Consider OT/PT consult to assist with strengthening/mobility  - Encourage toileting schedule  Outcome: Progressing     Problem: DISCHARGE PLANNING  Goal: Discharge to home or other facility with appropriate resources  Description: INTERVENTIONS:  - Identify barriers to discharge w/pt and caregiver  - Include patient/family/discharge partner in discharge planning  - Arrange for needed discharge resources and transportation as appropriate  - Identify discharge learning needs (meds, wound care, etc)  - Arrange for interpreters to assist at discharge as needed  - Consider post-discharge preferences  of patient/family/discharge partner  - Complete POLST form as appropriate  - Assess patient's ability to be responsible for managing their own health  - Refer to Case Management Department for coordinating discharge planning if the patient needs post-hospital services based on physician/LIP order or complex needs related to functional status, cognitive ability or social support system  Outcome: Progressing     Problem: GASTROINTESTINAL - ADULT  Goal: Minimal or absence of nausea and vomiting  Description: INTERVENTIONS:  - Maintain adequate hydration with IV or PO as ordered and tolerated  - Nasogastric tube to low intermittent suction as ordered  - Evaluate effectiveness of ordered antiemetic medications  - Provide nonpharmacologic comfort measures as appropriate  - Advance diet as tolerated, if ordered  - Obtain nutritional consult as needed  - Evaluate fluid balance  Outcome: Progressing  Goal: Maintains or returns to baseline bowel function  Description: INTERVENTIONS:  - Assess bowel function  - Maintain adequate hydration with IV or PO as ordered and tolerated  - Evaluate effectiveness of GI medications  - Encourage mobilization and activity  - Obtain nutritional consult as needed  - Establish a toileting routine/schedule  - Consider collaborating with pharmacy to review patient's medication profile  Outcome: Progressing  Goal: Maintains adequate nutritional intake (undernourished)  Description: INTERVENTIONS:  - Monitor percentage of each meal consumed  - Identify factors contributing to decreased intake, treat as appropriate  - Assist with meals as needed  - Monitor I&O, WT and lab values  - Obtain nutritional consult as needed  - Optimize oral hygiene and moisture  - Encourage food from home; allow for food preferences  - Enhance eating environment  Outcome: Progressing     Problem: METABOLIC/FLUID AND ELECTROLYTES - ADULT  Goal: Electrolytes maintained within normal limits  Description: INTERVENTIONS:  -  Monitor labs and rhythm and assess patient for signs and symptoms of electrolyte imbalances  - Administer electrolyte replacement as ordered  - Monitor response to electrolyte replacements, including rhythm and repeat lab results as appropriate  - Fluid restriction as ordered  - Instruct patient on fluid and nutrition restrictions as appropriate  Outcome: Progressing     Problem: SKIN/TISSUE INTEGRITY - ADULT  Goal: Skin integrity remains intact  Description: INTERVENTIONS  - Assess and document risk factors for pressure ulcer development  - Assess and document skin integrity  - Monitor for areas of redness and/or skin breakdown  - Initiate interventions, skin care algorithm/standards of care as needed  Outcome: Progressing     Problem: HEMATOLOGIC - ADULT  Goal: Maintains hematologic stability  Description: INTERVENTIONS  - Assess for signs and symptoms of bleeding or hemorrhage  - Monitor labs and vital signs for trends  - Administer supportive blood products/factors, fluids and medications as ordered and appropriate  - Administer supportive blood products/factors as ordered and appropriate  Outcome: Progressing  Goal: Free from bleeding injury  Description: (Example usage: patient with low platelets)  INTERVENTIONS:  - Avoid intramuscular injections, enemas and rectal medication administration  - Ensure safe mobilization of patient  - Hold pressure on venipuncture sites to achieve adequate hemostasis  - Assess for signs and symptoms of internal bleeding  - Monitor lab trends  - Patient is to report abnormal signs of bleeding to staff  - Avoid use of toothpicks and dental floss  - Use electric shaver for shaving  - Use soft bristle tooth brush  - Limit straining and forceful nose blowing  Outcome: Progressing

## 2024-12-22 NOTE — PLAN OF CARE
Problem: Patient Centered Care  Goal: Patient preferences are identified and integrated in the patient's plan of care  Description: Interventions:  - What would you like us to know as we care for you? From home with family  - Provide timely, complete, and accurate information to patient/family  - Incorporate patient and family knowledge, values, beliefs, and cultural backgrounds into the planning and delivery of care  - Encourage patient/family to participate in care and decision-making at the level they choose  - Honor patient and family perspectives and choices  Outcome: Progressing     Problem: ANXIETY  Goal: Will report anxiety at manageable levels  Description: INTERVENTIONS:  - Administer medication as ordered  - Teach and rehearse alternative coping skills  - Provide emotional support with 1:1 interaction with staff  Outcome: Progressing     Problem: COPING  Goal: Pt/Family able to verbalize concerns and demonstrate effective coping strategies  Description: INTERVENTIONS:  - Assist patient/family to identify coping skills, available support systems and cultural and spiritual values  - Provide emotional support, including active listening and acknowledgement of concerns of patient and caregivers  - Reduce environmental stimuli, as able  - Instruct patient/family in relaxation techniques, as appropriate  - Assess for spiritual and psychosocial needs and initiate Spiritual Care or Behavioral Health consult as needed  Outcome: Progressing     Problem: PAIN - ADULT  Goal: Verbalizes/displays adequate comfort level or patient's stated pain goal  Description: INTERVENTIONS:  - Encourage pt to monitor pain and request assistance  - Assess pain using appropriate pain scale  - Administer analgesics based on type and severity of pain and evaluate response  - Implement non-pharmacological measures as appropriate and evaluate response  - Consider cultural and social influences on pain and pain management  -  Manage/alleviate anxiety  - Utilize distraction and/or relaxation techniques  - Monitor for opioid side effects  - Notify MD/LIP if interventions unsuccessful or patient reports new pain  - Anticipate increased pain with activity and pre-medicate as appropriate  Outcome: Progressing     Problem: RISK FOR INFECTION - ADULT  Goal: Absence of fever/infection during anticipated neutropenic period  Description: INTERVENTIONS  - Monitor WBC  - Administer growth factors as ordered  - Implement neutropenic guidelines  Outcome: Progressing     Problem: GASTROINTESTINAL - ADULT  Goal: Minimal or absence of nausea and vomiting  Description: INTERVENTIONS:  - Maintain adequate hydration with IV or PO as ordered and tolerated  - Evaluate effectiveness of ordered antiemetic medications  - Provide nonpharmacologic comfort measures as appropriate  - Advance diet as tolerated, if ordered  - Obtain nutritional consult as needed  - Evaluate fluid balance  Outcome: Progressing    Patient aoX4 with complaint of one momentary incident of  mild chest pressure, dizziness, and slight nausea at approximately 2315. Patient had been given PRN Restoril for sleep at 2258.  Patient also had slight drop in blood pressure (see flowsheet).  Patient states she had a similar feeling during day shift not long after dinner.  MD notified. STAT EKG done and showed NSR; no further orders received. VSS now on room air (2 liters given for comfort for awhile after initial complaint), no tele, IVF infusing, lovenox for VTE prophylaxis, voids per purewick at night, ambulatory (but did not get oob this shift.)  safety precautions maintained. Patient instructed to use call light for any needs and concerns; call light within reach.

## 2024-12-22 NOTE — PLAN OF CARE
Patient alert and oriented, vital signs stable on room air, ambulating independently, tolerating diet. Voiding freely. Denies pain/nausea. IV fluids infusing. Tolerating diet. Patient cleared for discharge. PIV removed, AVS (medication instructions and follow up appointments) discussed, patient discharged to home/self care via personal vehicle.    Problem: Patient Centered Care  Goal: Patient preferences are identified and integrated in the patient's plan of care  Description: Interventions:  - What would you like us to know as we care for you? From home with family  - Provide timely, complete, and accurate information to patient/family  - Incorporate patient and family knowledge, values, beliefs, and cultural backgrounds into the planning and delivery of care  - Encourage patient/family to participate in care and decision-making at the level they choose  - Honor patient and family perspectives and choices  Outcome: Adequate for Discharge     Problem: ANXIETY  Goal: Will report anxiety at manageable levels  Description: INTERVENTIONS:  - Administer medication as ordered  - Teach and rehearse alternative coping skills  - Provide emotional support with 1:1 interaction with staff  Outcome: Adequate for Discharge     Problem: COPING  Goal: Pt/Family able to verbalize concerns and demonstrate effective coping strategies  Description: INTERVENTIONS:  - Assist patient/family to identify coping skills, available support systems and cultural and spiritual values  - Provide emotional support, including active listening and acknowledgement of concerns of patient and caregivers  - Reduce environmental stimuli, as able  - Instruct patient/family in relaxation techniques, as appropriate  - Assess for spiritual and psychosocial needs and initiate Spiritual Care or Behavioral Health consult as needed  Outcome: Adequate for Discharge     Problem: PAIN - ADULT  Goal: Verbalizes/displays adequate comfort level or patient's stated pain  goal  Description: INTERVENTIONS:  - Encourage pt to monitor pain and request assistance  - Assess pain using appropriate pain scale  - Administer analgesics based on type and severity of pain and evaluate response  - Implement non-pharmacological measures as appropriate and evaluate response  - Consider cultural and social influences on pain and pain management  - Manage/alleviate anxiety  - Utilize distraction and/or relaxation techniques  - Monitor for opioid side effects  - Notify MD/LIP if interventions unsuccessful or patient reports new pain  - Anticipate increased pain with activity and pre-medicate as appropriate  Outcome: Adequate for Discharge     Problem: RISK FOR INFECTION - ADULT  Goal: Absence of fever/infection during anticipated neutropenic period  Description: INTERVENTIONS  - Monitor WBC  - Administer growth factors as ordered  - Implement neutropenic guidelines  Outcome: Adequate for Discharge     Problem: SAFETY ADULT - FALL  Goal: Free from fall injury  Description: INTERVENTIONS:  - Assess pt frequently for physical needs  - Identify cognitive and physical deficits and behaviors that affect risk of falls.  - [place] fall precautions as indicated by assessment.  - Educate pt/family on patient safety including physical limitations  - Instruct pt to call for assistance with activity based on assessment  - Modify environment to reduce risk of injury  - Provide assistive devices as appropriate  - Consider OT/PT consult to assist with strengthening/mobility  - Encourage toileting schedule  Outcome: Adequate for Discharge     Problem: DISCHARGE PLANNING  Goal: Discharge to home or other facility with appropriate resources  Description: INTERVENTIONS:  - Identify barriers to discharge w/pt and caregiver  - Include patient/family/discharge partner in discharge planning  - Arrange for needed discharge resources and transportation as appropriate  - Identify discharge learning needs (meds, wound care,  etc)  - Arrange for interpreters to assist at discharge as needed  - Consider post-discharge preferences of patient/family/discharge partner  - Complete POLST form as appropriate  - Assess patient's ability to be responsible for managing their own health  - Refer to Case Management Department for coordinating discharge planning if the patient needs post-hospital services based on physician/LIP order or complex needs related to functional status, cognitive ability or social support system  Outcome: Adequate for Discharge     Problem: GASTROINTESTINAL - ADULT  Goal: Minimal or absence of nausea and vomiting  Description: INTERVENTIONS:  - Maintain adequate hydration with IV or PO as ordered and tolerated  - Nasogastric tube to low intermittent suction as ordered  - Evaluate effectiveness of ordered antiemetic medications  - Provide nonpharmacologic comfort measures as appropriate  - Advance diet as tolerated, if ordered  - Obtain nutritional consult as needed  - Evaluate fluid balance  Outcome: Adequate for Discharge  Goal: Maintains or returns to baseline bowel function  Description: INTERVENTIONS:  - Assess bowel function  - Maintain adequate hydration with IV or PO as ordered and tolerated  - Evaluate effectiveness of GI medications  - Encourage mobilization and activity  - Obtain nutritional consult as needed  - Establish a toileting routine/schedule  - Consider collaborating with pharmacy to review patient's medication profile  Outcome: Adequate for Discharge  Goal: Maintains adequate nutritional intake (undernourished)  Description: INTERVENTIONS:  - Monitor percentage of each meal consumed  - Identify factors contributing to decreased intake, treat as appropriate  - Assist with meals as needed  - Monitor I&O, WT and lab values  - Obtain nutritional consult as needed  - Optimize oral hygiene and moisture  - Encourage food from home; allow for food preferences  - Enhance eating environment  Outcome: Adequate for  Discharge     Problem: METABOLIC/FLUID AND ELECTROLYTES - ADULT  Goal: Electrolytes maintained within normal limits  Description: INTERVENTIONS:  - Monitor labs and rhythm and assess patient for signs and symptoms of electrolyte imbalances  - Administer electrolyte replacement as ordered  - Monitor response to electrolyte replacements, including rhythm and repeat lab results as appropriate  - Fluid restriction as ordered  - Instruct patient on fluid and nutrition restrictions as appropriate  Outcome: Adequate for Discharge     Problem: SKIN/TISSUE INTEGRITY - ADULT  Goal: Skin integrity remains intact  Description: INTERVENTIONS  - Assess and document risk factors for pressure ulcer development  - Assess and document skin integrity  - Monitor for areas of redness and/or skin breakdown  - Initiate interventions, skin care algorithm/standards of care as needed  Outcome: Adequate for Discharge     Problem: HEMATOLOGIC - ADULT  Goal: Maintains hematologic stability  Description: INTERVENTIONS  - Assess for signs and symptoms of bleeding or hemorrhage  - Monitor labs and vital signs for trends  - Administer supportive blood products/factors, fluids and medications as ordered and appropriate  - Administer supportive blood products/factors as ordered and appropriate  Outcome: Adequate for Discharge  Goal: Free from bleeding injury  Description: (Example usage: patient with low platelets)  INTERVENTIONS:  - Avoid intramuscular injections, enemas and rectal medication administration  - Ensure safe mobilization of patient  - Hold pressure on venipuncture sites to achieve adequate hemostasis  - Assess for signs and symptoms of internal bleeding  - Monitor lab trends  - Patient is to report abnormal signs of bleeding to staff  - Avoid use of toothpicks and dental floss  - Use electric shaver for shaving  - Use soft bristle tooth brush  - Limit straining and forceful nose blowing  Outcome: Adequate for Discharge

## 2024-12-26 ENCOUNTER — PATIENT MESSAGE (OUTPATIENT)
Dept: PULMONOLOGY | Facility: CLINIC | Age: 64
End: 2024-12-26

## 2024-12-27 ENCOUNTER — HOSPITAL ENCOUNTER (OUTPATIENT)
Dept: GENERAL RADIOLOGY | Age: 64
Discharge: HOME | End: 2024-12-27
Attending: INTERNAL MEDICINE
Payer: COMMERCIAL

## 2024-12-27 ENCOUNTER — TELEPHONE (OUTPATIENT)
Dept: HEMATOLOGY/ONCOLOGY | Facility: HOSPITAL | Age: 64
End: 2024-12-27

## 2024-12-27 ENCOUNTER — HOSPITAL ENCOUNTER (INPATIENT)
Facility: HOSPITAL | Age: 64
LOS: 2 days | Discharge: HOME | End: 2024-12-29
Attending: EMERGENCY MEDICINE | Admitting: HOSPITALIST
Payer: COMMERCIAL

## 2024-12-27 ENCOUNTER — APPOINTMENT (OUTPATIENT)
Dept: CT IMAGING | Facility: HOSPITAL | Age: 64
End: 2024-12-27
Attending: EMERGENCY MEDICINE
Payer: COMMERCIAL

## 2024-12-27 ENCOUNTER — TELEPHONE (OUTPATIENT)
Dept: PULMONOLOGY | Facility: CLINIC | Age: 64
End: 2024-12-27

## 2024-12-27 DIAGNOSIS — R06.02 SHORTNESS OF BREATH: Primary | ICD-10-CM

## 2024-12-27 DIAGNOSIS — R06.02 SHORTNESS OF BREATH: ICD-10-CM

## 2024-12-27 DIAGNOSIS — C78.01 MALIGNANT NEOPLASM METASTATIC TO RIGHT LUNG (HCC): ICD-10-CM

## 2024-12-27 DIAGNOSIS — J91.0 MALIGNANT PLEURAL EFFUSION (HCC): Primary | ICD-10-CM

## 2024-12-27 LAB
ANION GAP SERPL CALC-SCNC: 8 MMOL/L (ref 0–18)
APTT PPP: 28.4 SECONDS (ref 23–36)
BASOPHILS # BLD AUTO: 0.05 X10(3) UL (ref 0–0.2)
BASOPHILS NFR BLD AUTO: 0.6 %
BUN BLD-MCNC: 12 MG/DL (ref 9–23)
BUN/CREAT SERPL: 16 (ref 10–20)
CALCIUM BLD-MCNC: 9.4 MG/DL (ref 8.7–10.4)
CHLORIDE SERPL-SCNC: 105 MMOL/L (ref 98–112)
CO2 SERPL-SCNC: 26 MMOL/L (ref 21–32)
CREAT BLD-MCNC: 0.75 MG/DL
DEPRECATED RDW RBC AUTO: 48.7 FL (ref 35.1–46.3)
EGFRCR SERPLBLD CKD-EPI 2021: 89 ML/MIN/1.73M2 (ref 60–?)
EOSINOPHIL # BLD AUTO: 0.2 X10(3) UL (ref 0–0.7)
EOSINOPHIL NFR BLD AUTO: 2.4 %
ERYTHROCYTE [DISTWIDTH] IN BLOOD BY AUTOMATED COUNT: 14.3 % (ref 11–15)
GLUCOSE BLD-MCNC: 124 MG/DL (ref 70–99)
HCT VFR BLD AUTO: 38.9 %
HGB BLD-MCNC: 12.4 G/DL
IMM GRANULOCYTES # BLD AUTO: 0.01 X10(3) UL (ref 0–1)
IMM GRANULOCYTES NFR BLD: 0.1 %
INR BLD: 1 (ref 0.8–1.2)
LYMPHOCYTES # BLD AUTO: 2.41 X10(3) UL (ref 1–4)
LYMPHOCYTES NFR BLD AUTO: 29 %
MCH RBC QN AUTO: 29.3 PG (ref 26–34)
MCHC RBC AUTO-ENTMCNC: 31.9 G/DL (ref 31–37)
MCV RBC AUTO: 92 FL
MONOCYTES # BLD AUTO: 0.58 X10(3) UL (ref 0.1–1)
MONOCYTES NFR BLD AUTO: 7 %
NEUTROPHILS # BLD AUTO: 5.06 X10 (3) UL (ref 1.5–7.7)
NEUTROPHILS # BLD AUTO: 5.06 X10(3) UL (ref 1.5–7.7)
NEUTROPHILS NFR BLD AUTO: 60.9 %
OSMOLALITY SERPL CALC.SUM OF ELEC: 289 MOSM/KG (ref 275–295)
PLATELET # BLD AUTO: 287 10(3)UL (ref 150–450)
POTASSIUM SERPL-SCNC: 3.5 MMOL/L (ref 3.5–5.1)
PROTHROMBIN TIME: 13.8 SECONDS (ref 11.6–14.8)
RBC # BLD AUTO: 4.23 X10(6)UL
SODIUM SERPL-SCNC: 139 MMOL/L (ref 136–145)
WBC # BLD AUTO: 8.3 X10(3) UL (ref 4–11)

## 2024-12-27 PROCEDURE — 99223 1ST HOSP IP/OBS HIGH 75: CPT | Performed by: INTERNAL MEDICINE

## 2024-12-27 PROCEDURE — 71046 X-RAY EXAM CHEST 2 VIEWS: CPT | Performed by: INTERNAL MEDICINE

## 2024-12-27 PROCEDURE — 99222 1ST HOSP IP/OBS MODERATE 55: CPT | Performed by: HOSPITALIST

## 2024-12-27 PROCEDURE — 71260 CT THORAX DX C+: CPT | Performed by: EMERGENCY MEDICINE

## 2024-12-27 RX ORDER — HYDROCODONE BITARTRATE AND ACETAMINOPHEN 5; 325 MG/1; MG/1
1 TABLET ORAL EVERY 4 HOURS PRN
Status: DISCONTINUED | OUTPATIENT
Start: 2024-12-27 | End: 2024-12-29

## 2024-12-27 RX ORDER — HYDROMORPHONE HYDROCHLORIDE 1 MG/ML
0.2 INJECTION, SOLUTION INTRAMUSCULAR; INTRAVENOUS; SUBCUTANEOUS EVERY 2 HOUR PRN
Status: DISCONTINUED | OUTPATIENT
Start: 2024-12-27 | End: 2024-12-29

## 2024-12-27 RX ORDER — HYDROCODONE BITARTRATE AND ACETAMINOPHEN 5; 325 MG/1; MG/1
2 TABLET ORAL EVERY 4 HOURS PRN
Status: DISCONTINUED | OUTPATIENT
Start: 2024-12-27 | End: 2024-12-29

## 2024-12-27 RX ORDER — PROCHLORPERAZINE EDISYLATE 5 MG/ML
5 INJECTION INTRAMUSCULAR; INTRAVENOUS EVERY 8 HOURS PRN
Status: DISCONTINUED | OUTPATIENT
Start: 2024-12-27 | End: 2024-12-28

## 2024-12-27 RX ORDER — ONDANSETRON 2 MG/ML
4 INJECTION INTRAMUSCULAR; INTRAVENOUS EVERY 6 HOURS PRN
Status: DISCONTINUED | OUTPATIENT
Start: 2024-12-27 | End: 2024-12-29

## 2024-12-27 RX ORDER — ACETAMINOPHEN 325 MG/1
650 TABLET ORAL EVERY 4 HOURS PRN
Status: DISCONTINUED | OUTPATIENT
Start: 2024-12-27 | End: 2024-12-29

## 2024-12-27 RX ORDER — MORPHINE SULFATE 4 MG/ML
4 INJECTION, SOLUTION INTRAMUSCULAR; INTRAVENOUS ONCE
Status: DISCONTINUED | OUTPATIENT
Start: 2024-12-27 | End: 2024-12-29

## 2024-12-27 RX ORDER — HYDROMORPHONE HYDROCHLORIDE 1 MG/ML
0.8 INJECTION, SOLUTION INTRAMUSCULAR; INTRAVENOUS; SUBCUTANEOUS EVERY 2 HOUR PRN
Status: DISCONTINUED | OUTPATIENT
Start: 2024-12-27 | End: 2024-12-29

## 2024-12-27 RX ORDER — HYDROCODONE BITARTRATE AND ACETAMINOPHEN 5; 325 MG/1; MG/1
1 TABLET ORAL ONCE
Status: DISCONTINUED | OUTPATIENT
Start: 2024-12-27 | End: 2024-12-29

## 2024-12-27 RX ORDER — HYDROMORPHONE HYDROCHLORIDE 1 MG/ML
0.4 INJECTION, SOLUTION INTRAMUSCULAR; INTRAVENOUS; SUBCUTANEOUS EVERY 2 HOUR PRN
Status: DISCONTINUED | OUTPATIENT
Start: 2024-12-27 | End: 2024-12-29

## 2024-12-27 RX ORDER — TEMAZEPAM 15 MG/1
15 CAPSULE ORAL NIGHTLY PRN
Status: DISCONTINUED | OUTPATIENT
Start: 2024-12-27 | End: 2024-12-29

## 2024-12-27 RX ORDER — ACETAMINOPHEN 500 MG
500 TABLET ORAL EVERY 4 HOURS PRN
Status: DISCONTINUED | OUTPATIENT
Start: 2024-12-27 | End: 2024-12-29

## 2024-12-27 NOTE — TELEPHONE ENCOUNTER
Patient wanted MD to know she has a call out to Pul for having pain on right side from front to back, some sob.  Also she has not restarted her Lenvatinib yet as per MD instructions to start after a week out of hospital.  She got the returned call from [facility] while on phone so no further information at this time.

## 2024-12-27 NOTE — ED INITIAL ASSESSMENT (HPI)
To ED with c/o shortness of breath since yesterday. Patient states she has lung CA and PCP wants her to come to ED for a workup. Patient states she has fluid in her lungs and has been getting worse for the past couple of days.

## 2024-12-27 NOTE — TELEPHONE ENCOUNTER
Hello,  This is Camelia Markham [birth year]  Last night I started with pain and sound like bubbling. The breathing is fine during the day but pain is on.     I just try calling but couldn’t take a message if is not an emergency.     Would someone please call me.     Otherwise, I’ll call back tomorrow      Thank you  Camelia Rodriguezalicia

## 2024-12-27 NOTE — TELEPHONE ENCOUNTER
Spoke to patient and recommendation given to go to ED. Patient agreed.  RN called ED to inform them patient is on their way. They verbalized understanding.   Dr. Frausto

## 2024-12-27 NOTE — TELEPHONE ENCOUNTER
Dr. Brooks-spoke to patient who reports pain to right side 8-9/10 around upper chest wall. Also reports crackling sound when breathing and inability to take a deep breath or get comfortable.  Patient requesting imaging to check for pleural effusion. RN suggested patient be seen in the ED for further assessment. Patients prefers to check with Dr. Brooks first.

## 2024-12-27 NOTE — CONSULTS
[facility]  part of [facility]    Report of Consultation    Camelia Markham Patient Status:  Emergency    1960 MRN [id number]   Location [facility] EMERGENCY DEPARTMENT Attending Shelly Small MD   Hosp Day # 0 PCP FINA MABRY, MD WENDY     Date of Admission:  2024    Reason for Consultation:   Dyspnea    History of Present Illness:   Patient is a 64-year-old female with history of recurrent metastatic thyroid papillary carcinoma with history of malignant right pleural effusion with prior Pleurx catheter placement presented with chief complaint of dyspnea.  Pleurx catheter removed earlier this month.  Recent hospitalization 2024 requiring additional IR chest tube placement for drainage.  Admits to some increased dyspnea with exertion and at rest.  No significant hypoxia on presentation.  Afebrile    Past Medical History  Past Medical History:    Anxiety state, unspecified    Cancer (HCC)    Colon adenomas    x3    Disorder of thyroid    thyroidectomy    Diverticulosis of large intestine    Esophageal reflux    Exposure to medical therapeutic radiation    thyroid    High blood pressure    High cholesterol    Hypercholesteremia    Hypothyroidism    Osteoarthrosis, unspecified whether generalized or localized, unspecified site    Thyroid cancer (HCC)    papillary thyroid; s/p surgery resection with Asher and BRIDGES    Unspecified essential hypertension       Past Surgical History  Past Surgical History:   Procedure Laterality Date    Colonoscopy N/A 2022    Procedure: COLONOSCOPY;  Surgeon: Neeraj No MD;  Location: [facility] ENDOSCOPY    Colonoscopy  2024    Colonoscopy N/A 2024    Procedure: COLONOSCOPY;  Surgeon: Neeraj No MD;  Location: [facility] ENDOSCOPY    Colonoscopy N/A 2024    Procedure: COLONOSCOPY;  Surgeon: Jair Murillo MD;  Location: [facility] ENDOSCOPY    Egd  2022    Egd  2024    Esophagogastroduodenoscopy     Endoscopic ultrasound - internal  2022    Endoscopic ultrasound exam  2024    Endoscopic Ultrasound    Eye surgery  2024    Eye surgery Left 2024    Hysterectomy      Knee surgery Left 2022    no associated bleeding complications          40 week 7 lb(s) 5 oz Male; 6 hr labor           40 week 7 lb(s) 3 oz Female          41 week 9 lb(s) 8 oz Male    Other surgical history      Injection Tendon Sheath, Ligament    Thyroidectomy      total    Total abdom hysterectomy         Family History  Family History   Problem Relation Age of Onset    Heart Disorder Mother     Breast Cancer Maternal Cousin Female 57    Cancer Daughter 29        Hodgkin's Lymphoma    Ovarian Cancer Neg     Bleeding Disorders Neg     DVT/VTE Neg     Pancreatic Cancer Neg     Prostate Cancer Neg        Social History  Social History     Socioeconomic History    Marital status:    Tobacco Use    Smoking status: Never    Smokeless tobacco: Never   Vaping Use    Vaping status: Never Used   Substance and Sexual Activity    Alcohol use: Yes     Comment: social    Drug use: No   Other Topics Concern    Caffeine Concern Yes     Comment: 1 cup of coffee            Current Medications:  No current facility-administered medications for this encounter.     (Not in a hospital admission)      Allergies  Allergies[1]    Review of Systems:   Constitutional: denies fevers, chills, weakness, fatigue, recent illness  HEENT: denies headache, sore throat, vision loss  Cardio: denies chest pain, chest pressure, palpitations  Respiratory: dyspnea, cough, denies wheezing, hemoptysis   GI: denies nausea, vomiting, abdominal pain  : denies dysuria, hematuria  Musculoskeletal: denies arthralgia, myalgia  Integumentary: denies rash, itching  Neurological: denies syncope, weakness, dizziness,   Psychiatric: denies depression, anxiety  Hematologic: denies bruising        Physical Exam:   Blood pressure 127/84,  pulse 113, temperature 97.2 °F (36.2 °C), resp. rate 16, height 5' 3\" (1.6 m), weight 148 lb (67.1 kg), SpO2 92%.    Constitutional: no acute distress  Eyes: PERRL  ENT: nares patent  Neck: neck supple, no JVD  Cardio: RRR, S1 S2  Respiratory: Diminished right basilar breath sounds  GI: abdomen soft, non tender, active bowel souds, no organomegaly  Extremities: no clubbing, cyanosis, edema  Neurologic: no gross motor deficits  Skin: warm, dry    Results:   Laboratory Data  Lab Results   Component Value Date    WBC 7.7 12/22/2024    HGB 12.1 12/22/2024    HCT 35.8 12/22/2024    .0 12/22/2024    CREATSERUM 0.74 12/22/2024    BUN 7 (L) 12/22/2024     12/22/2024    K 3.4 (L) 12/22/2024    K 3.4 (L) 12/22/2024     12/22/2024    CO2 29.0 12/22/2024     (H) 12/22/2024    CA 8.8 12/22/2024    ALB 3.2 12/22/2024    ALKPHO 68 12/22/2024    TP 5.8 12/22/2024    AST 11 12/22/2024    ALT 12 12/22/2024    PTT 26.0 12/17/2022    INR 0.98 03/13/2023    PTP 12.9 03/13/2023    T4F 1.9 (H) 07/17/2024    TSH 0.048 (L) 07/17/2024    LIP 24 12/19/2024    DDIMER 0.55 05/23/2024    MG 2.1 11/25/2024    PHOS 4.0 11/10/2024         Imaging  XR CHEST PA + LAT CHEST (CPT=71046)    Result Date: 12/27/2024  CONCLUSION: Interval removal of the previously seen right PleurX catheter with interval development of a small to moderate-sized pleural effusion, increased streaky opacity at the right lung base (which may represent atelectasis, infiltrate, or a combination close) as well as increased interstitial markings within the right lung.    Dictated by (CST): Srinivasa Trujillo MD on 12/27/2024 at 11:52 AM     Finalized by (CST): Srinivasa Trujillo MD on 12/27/2024 at 11:57 AM           Assessment   1.  Metastatic thyroid papillary carcinoma  2.  Malignant right pleural effusion      Plan   -Patient presents with evidence of some worsening dyspnea over the last 24 hours.  History of metastatic thyroid papillary carcinoma status  post malignant right pleural effusion with prior Pleurx catheter placement removed earlier this month.  -Chest x-ray on presentation with moderate size right pleural effusion with some atelectatic changes seen.  -Will obtain CT chest with contrast to further evaluate.  -Recent history of loculated pleural effusion requiring additional IR chest tube placement November 2024  -May need to consider Pleurx versus chest tube depending on CT results  -Hold off antibiotic therapy for now but may consider after reviewing CT chest  -Pain control  -Reviewed vitals, labs and imaging    Ramesh Kong DO  Pulmonary Critical Care Medicine  [facility]  12/27/2024  4:44 PM        [1]   Allergies  Allergen Reactions    Latex HIVES    Peanut-Containing Drug Products SWELLING     Closes Throat  Closes Throat  Closes Throat      Peanuts SWELLING     Closes Throat    Adhesive Tape OTHER (SEE COMMENTS)    Seasonal

## 2024-12-28 ENCOUNTER — APPOINTMENT (OUTPATIENT)
Dept: GENERAL RADIOLOGY | Facility: HOSPITAL | Age: 64
End: 2024-12-28
Attending: INTERNAL MEDICINE
Payer: COMMERCIAL

## 2024-12-28 LAB
ANION GAP SERPL CALC-SCNC: 9 MMOL/L (ref 0–18)
ATRIAL RATE: 109 BPM
BASOPHILS # BLD AUTO: 0.05 X10(3) UL (ref 0–0.2)
BASOPHILS NFR BLD AUTO: 0.6 %
BUN BLD-MCNC: 18 MG/DL (ref 9–23)
BUN/CREAT SERPL: 22 (ref 10–20)
CALCIUM BLD-MCNC: 9.1 MG/DL (ref 8.7–10.4)
CHLORIDE SERPL-SCNC: 106 MMOL/L (ref 98–112)
CO2 SERPL-SCNC: 26 MMOL/L (ref 21–32)
CREAT BLD-MCNC: 0.82 MG/DL
DEPRECATED RDW RBC AUTO: 48.7 FL (ref 35.1–46.3)
EGFRCR SERPLBLD CKD-EPI 2021: 80 ML/MIN/1.73M2 (ref 60–?)
EOSINOPHIL # BLD AUTO: 0.32 X10(3) UL (ref 0–0.7)
EOSINOPHIL NFR BLD AUTO: 3.9 %
ERYTHROCYTE [DISTWIDTH] IN BLOOD BY AUTOMATED COUNT: 14.4 % (ref 11–15)
GLUCOSE BLD-MCNC: 119 MG/DL (ref 70–99)
HCT VFR BLD AUTO: 35.4 %
HGB BLD-MCNC: 11.7 G/DL
IMM GRANULOCYTES # BLD AUTO: 0.03 X10(3) UL (ref 0–1)
IMM GRANULOCYTES NFR BLD: 0.4 %
LYMPHOCYTES # BLD AUTO: 2.3 X10(3) UL (ref 1–4)
LYMPHOCYTES NFR BLD AUTO: 28.3 %
MCH RBC QN AUTO: 30.5 PG (ref 26–34)
MCHC RBC AUTO-ENTMCNC: 33.1 G/DL (ref 31–37)
MCV RBC AUTO: 92.4 FL
MONOCYTES # BLD AUTO: 0.61 X10(3) UL (ref 0.1–1)
MONOCYTES NFR BLD AUTO: 7.5 %
NEUTROPHILS # BLD AUTO: 4.81 X10 (3) UL (ref 1.5–7.7)
NEUTROPHILS # BLD AUTO: 4.81 X10(3) UL (ref 1.5–7.7)
NEUTROPHILS NFR BLD AUTO: 59.3 %
OSMOLALITY SERPL CALC.SUM OF ELEC: 295 MOSM/KG (ref 275–295)
P AXIS: 20 DEGREES
P-R INTERVAL: 132 MS
PLATELET # BLD AUTO: 261 10(3)UL (ref 150–450)
POTASSIUM SERPL-SCNC: 3.9 MMOL/L (ref 3.5–5.1)
Q-T INTERVAL: 324 MS
QRS DURATION: 82 MS
QTC CALCULATION (BEZET): 436 MS
R AXIS: -13 DEGREES
RBC # BLD AUTO: 3.83 X10(6)UL
SODIUM SERPL-SCNC: 141 MMOL/L (ref 136–145)
T AXIS: 21 DEGREES
VENTRICULAR RATE: 109 BPM
WBC # BLD AUTO: 8.1 X10(3) UL (ref 4–11)

## 2024-12-28 PROCEDURE — 99233 SBSQ HOSP IP/OBS HIGH 50: CPT | Performed by: INTERNAL MEDICINE

## 2024-12-28 PROCEDURE — 74018 RADEX ABDOMEN 1 VIEW: CPT | Performed by: INTERNAL MEDICINE

## 2024-12-28 PROCEDURE — 99232 SBSQ HOSP IP/OBS MODERATE 35: CPT | Performed by: INTERNAL MEDICINE

## 2024-12-28 RX ORDER — OLMESARTAN MEDOXOMIL 40 MG/1
40 TABLET ORAL DAILY
Status: DISCONTINUED | OUTPATIENT
Start: 2024-12-28 | End: 2024-12-29

## 2024-12-28 RX ORDER — LACTOBACILLUS ACIDOPHILUS 500MM CELL
1 CAPSULE ORAL DAILY
Status: DISCONTINUED | OUTPATIENT
Start: 2024-12-28 | End: 2024-12-29

## 2024-12-28 RX ORDER — AMLODIPINE BESYLATE 10 MG/1
10 TABLET ORAL DAILY
Status: DISCONTINUED | OUTPATIENT
Start: 2024-12-28 | End: 2024-12-29

## 2024-12-28 RX ORDER — HYDROCHLOROTHIAZIDE 25 MG/1
25 TABLET ORAL DAILY
Status: DISCONTINUED | OUTPATIENT
Start: 2024-12-28 | End: 2024-12-29

## 2024-12-28 RX ORDER — CLONAZEPAM 1 MG/1
2 TABLET ORAL NIGHTLY PRN
Status: DISCONTINUED | OUTPATIENT
Start: 2024-12-28 | End: 2024-12-29

## 2024-12-28 RX ORDER — LEVOTHYROXINE SODIUM 75 UG/1
150 TABLET ORAL
Status: DISCONTINUED | OUTPATIENT
Start: 2024-12-28 | End: 2024-12-29

## 2024-12-28 RX ORDER — SENNA AND DOCUSATE SODIUM 50; 8.6 MG/1; MG/1
2 TABLET, FILM COATED ORAL
Status: DISCONTINUED | OUTPATIENT
Start: 2024-12-28 | End: 2024-12-29

## 2024-12-28 RX ORDER — ENOXAPARIN SODIUM 100 MG/ML
40 INJECTION SUBCUTANEOUS DAILY
Status: DISCONTINUED | OUTPATIENT
Start: 2024-12-28 | End: 2024-12-29

## 2024-12-28 RX ORDER — METOCLOPRAMIDE 5 MG/1
5 TABLET ORAL 3 TIMES DAILY
Status: DISCONTINUED | OUTPATIENT
Start: 2024-12-28 | End: 2024-12-29

## 2024-12-28 NOTE — PLAN OF CARE
Pt is alert and oriented x4. On room air. Vital signs stable. Ambulating independently. Advanced to regular diet. Tolerating well. Continent of bowel and bladder. Reports soft bowel movement. Complains of pain. Dilaudid given. Safety measures in place. Will continue to monitor.     Problem: Patient Centered Care  Goal: Patient preferences are identified and integrated in the patient's plan of care  Description: Interventions:  - Provide timely, complete, and accurate information to patient/family  - Incorporate patient and family knowledge, values, beliefs, and cultural backgrounds into the planning and delivery of care  - Encourage patient/family to participate in care and decision-making at the level they choose  - Honor patient and family perspectives and choices  Outcome: Progressing

## 2024-12-28 NOTE — H&P
[facility]    PATIENT'S NAME: DEANA SHINE   ATTENDING PHYSICIAN: Shelly Small MD   PATIENT ACCOUNT#:   [id number]    LOCATION:  [address]  MEDICAL RECORD #:   [id number]       YOB: 1960  ADMISSION DATE:       12/27/2024    HISTORY AND PHYSICAL EXAMINATION    CHIEF COMPLAINT:  Reaccumulation of malignant right pleural effusion.    HISTORY OF PRESENT ILLNESS:  The patient is a 64-year-old  female with history of metastatic thyroid carcinoma who was recently hospitalized with possible colitis.  Colonoscopy and biopsy showed no evidence of colitis.  Today came back to the emergency department for evaluation of progressive shortness of breath and right-sided pleuritic chest pain.  Chest x-ray showed moderate right pleural effusion.  CBC and chemistry unremarkable.  CT scan of the chest still pending.    PAST MEDICAL HISTORY:  History of papillary thyroid carcinoma status post total thyroidectomy with recent imaging study and rising thyroglobulin showed recurrent disease at the thyroidectomy bed.  She was started on lenvatinib.  Also was found to have right malignant pleural effusion, required Pleurx catheter placement which was eventually removed because it was not returning any fluids.  She had history of generalized osteoarthritis, hyperlipidemia, hypertension, gastroesophageal reflux disease, hypothyroidism, and diverticulosis.    PAST SURGICAL HISTORY:  Total abdominal hysterectomy, total thyroidectomy, and left knee arthroscopic procedure.    MEDICATIONS:  Please see medication reconciliation list.     ALLERGIES:  Latex, peanuts.  No known drug allergies.    FAMILY HISTORY:  Mother had heart disease.    SOCIAL HISTORY:  No tobacco, alcohol, or drug use.  Lives with her family.  Independent for basic activities of daily living.     REVIEW OF SYSTEMS:  Patient reports recurrent pleuritic-type chest pain in the anterior and posterior right hemithorax.  No fever or chills.   She does have dry cough.  Other 12-point review of systems is negative.       PHYSICAL EXAMINATION:    GENERAL:  Alert and oriented to time, place, and person.  Mild to moderate distress.  VITAL SIGNS:  Temperature 97.2, pulse 103, respiratory rate 16, blood pressure 118/70, pulse ox 92% on room air.    HEENT:  Atraumatic.  Oropharynx clear.  Moist mucous membranes.  Ears, nose normal.  Eyes:  Anicteric sclerae.   NECK:  Supple.  No lymphadenopathy.  Trachea midline.  Full range of motion.  LUNGS:  No breathing sounds auscultated right lower lung base.  HEART:  Regular rate, rhythm.  S1 and S2 auscultated.  No murmur.  ABDOMEN:  Soft, nondistended.  No tenderness.  Positive bowel sounds.    EXTREMITIES:  No peripheral edema, clubbing, or cyanosis.  NEUROLOGIC:  Motor and sensory intact.     ASSESSMENT AND PLAN:  Recurrent right malignant pleural effusion with pleuritic chest pain, underlying metastatic thyroid papillary carcinoma, recurrent.  Patient will be admitted to general medical floor.  Chest CT scan.  Pulmonary consult.  Further recommendations pending CT scan results.  Pain control.  Further recommendations to follow.     Dictated By Dara Campos MD  d: 12/27/2024 17:35:25  t: 12/27/2024 19:22:17  Job [id number]  FB/

## 2024-12-28 NOTE — PROGRESS NOTES
Progress Note     Camelia Markham Patient Status:  Inpatient    1960 MRN [id number]   Location [facility] 4W/SW/SE Attending Viri Foster MD   Hosp Day # 1 PCP FINA MABRY, MD WENDY     Chief Complaint: Pain secondary to pleural mets    Subjective:   S: Patient states that she is very apprehensive of taking Norco as she has stopped in November due to severe constipation leading to impactation and colitis.  She was just here last week and had a colonoscopy which was negative.  She has been having bowel movements but have been small and infrequent.  She has had intermittent irregular bowel movements even before taking the Norco.  She is in significant pain at night and has only been taking \"teas for pain control.    Review of Systems:   10 point ROS completed and was negative, except for pertinent positive and negatives stated in subjective.    Objective:   Vital signs:  Temp:  [97.2 °F (36.2 °C)-98.4 °F (36.9 °C)] 98.4 °F (36.9 °C)  Pulse:  [] 97  Resp:  [16] 16  BP: ()/(61-84) 97/61  SpO2:  [92 %-93 %] 92 %    Wt Readings from Last 6 Encounters:   24 148 lb (67.1 kg)   24 148 lb 1.6 oz (67.2 kg)   24 148 lb (67.1 kg)   24 155 lb (70.3 kg)   24 153 lb (69.4 kg)   24 153 lb 4.8 oz (69.5 kg)         Physical Exam:    General: No acute distress. Alert ,         Respiratory: Clear to auscultation bilaterally. No wheezes. No rhonchi.  Cardiovascular: S1, S2. Regular rate and rhythm. No murmurs, rubs or gallops.   Abdomen: Soft, nontender, nondistended.  Positive bowel sounds. No rebound or guarding.  Neurologic: No focal neurological deficits.   Musculoskeletal: Moves all extremities.  Extremities: No edema.    Results:   Diagnostic Data:      Labs:    Labs Last 24 Hours:   BMP     CBC    Other     Na 141 Cl 106 BUN 18 Glu 119   Hb 11.7   PTT 28.4 Procal -   K 3.9 CO2 26.0 Cr 0.82   WBC 8.1 >< .0  INR 1.00 CRP -   Renal Lytes Endo    Hct 35.4    Trop - D dim -   eGFR - Ca 9.1 POC Gluc  -    LFT   pBNP - Lactic -   eGFR AA - PO4 - A1c -   AST - APk - Prot -  LDL -     Mg - TSH -   ALT - T shaun - Alb -        COVID-19 Lab Results    COVID-19  Lab Results   Component Value Date    COVID19 Not Detected 02/25/2023    COVID19 Not Detected 12/27/2022       Pro-Calcitonin  No results for input(s): \"PCT\" in the last 168 hours.    Cardiac  No results for input(s): \"TROP\", \"PBNP\" in the last 168 hours.    Creatinine Kinase  No results for input(s): \"CK\" in the last 168 hours.    Inflammatory Markers  No results for input(s): \"CRP\", \"KOBE\", \"LDH\", \"DDIMER\" in the last 168 hours.    Imaging: Imaging data reviewed in Epic.    Medications:    morphINE  4 mg Intravenous Once    HYDROcodone-acetaminophen  1 tablet Oral Once       Assessment & Plan:   ASSESSMENT / PLAN:     #Recurrent right malignant pleural effusion with pleuritic chest pain, underlying metastatic thyroid papillary carcinoma, recurrent. Patient will be admitted to general medical floor. Chest CT scan. Pulmonary consult. Further recommendations pending CT scan results. Pain control. Further recommendations to follow.       Pain management/history of severe constipation worsened by opiate induced constipation:  Pt failed gabapentin   Check KUB to assess stool burden  Resume norco; start reglan tid, senna prn  Consider Gi conslt in AM (Dr Murillo saw on last admission and did colonoscopy which was negative)    Quality:  DVT Prophylaxis: Lovenox  CODE status: Full        Coordinated care with providers and counseling re: treatment plan and workup     Viri Foster MD    Supplementary Documentation:

## 2024-12-28 NOTE — PROGRESS NOTES
[facility]  part of [facility]     Progress Note    Camelia Markham Patient Status:  Inpatient    1960 MRN [id number]   Location [facility] 4W/SW/SE Attending Viri Foster MD   Hosp Day # 1 PCP FINA MABRY, MD WENDY       Subjective:   Patient seen and examined.  Dyspnea somewhat improved admits to some right-sided chest wall discomfort    Objective:   Blood pressure 108/63, pulse 100, temperature 98 °F (36.7 °C), temperature source Oral, resp. rate 18, height 5' 3\" (1.6 m), weight 148 lb (67.1 kg), SpO2 92%.  Intake/Output:   Last 3 shifts: No intake/output data recorded.   This shift: I/O this shift:  In: -   Out: 1 [Urine:1]     Vent Settings:      Hemodynamic parameters (last 24 hours):      Scheduled Meds:   Current Facility-Administered Medications   Medication Dose Route Frequency    [Held by provider] amLODIPine (Norvasc) tab 10 mg  10 mg Oral Daily    clonazePAM (KlonoPIN) tab 2 mg  2 mg Oral Nightly PRN    [Held by provider] hydroCHLOROthiazide tab 25 mg  25 mg Oral Daily    levothyroxine (Synthroid) tab 150 mcg  150 mcg Oral Before breakfast    [Held by provider] Olmesartan Medoxomil (BENICAR) tab 40 mg  40 mg Oral Daily    morphINE PF 4 MG/ML injection 4 mg  4 mg Intravenous Once    acetaminophen (Tylenol Extra Strength) tab 500 mg  500 mg Oral Q4H PRN    ondansetron (Zofran) 4 MG/2ML injection 4 mg  4 mg Intravenous Q6H PRN    prochlorperazine (Compazine) 10 MG/2ML injection 5 mg  5 mg Intravenous Q8H PRN    HYDROmorphone (Dilaudid) 1 MG/ML injection 0.2 mg  0.2 mg Intravenous Q2H PRN    Or    HYDROmorphone (Dilaudid) 1 MG/ML injection 0.4 mg  0.4 mg Intravenous Q2H PRN    Or    HYDROmorphone (Dilaudid) 1 MG/ML injection 0.8 mg  0.8 mg Intravenous Q2H PRN    acetaminophen (Tylenol) tab 650 mg  650 mg Oral Q4H PRN    Or    HYDROcodone-acetaminophen (Norco) 5-325 MG per tab 1 tablet  1 tablet Oral Q4H PRN    Or    HYDROcodone-acetaminophen (Norco) 5-325 MG per tab  2 tablet  2 tablet Oral Q4H PRN    temazepam (Restoril) cap 15 mg  15 mg Oral Nightly PRN    HYDROcodone-acetaminophen (Norco) 5-325 MG per tab 1 tablet  1 tablet Oral Once       Continuous Infusions:     Physical Exam  Constitutional: no acute distress  Eyes: PERRL  ENT: nares pateint  Neck: supple, no JVD  Cardio: RRR, S1 S2  Respiratory: Diminished right basilar breath sounds  GI: abdomen soft, non tender, active bowel sounds, no organomegaly  Extremities: no clubbing, cyanosis, edema  Neurologic: no gross motor deficits  Skin: warm, dry      Results:     Lab Results   Component Value Date    WBC 8.1 12/28/2024    HGB 11.7 12/28/2024    HCT 35.4 12/28/2024    .0 12/28/2024    CREATSERUM 0.82 12/28/2024    BUN 18 12/28/2024     12/28/2024    K 3.9 12/28/2024     12/28/2024    CO2 26.0 12/28/2024     12/28/2024    CA 9.1 12/28/2024    PTT 28.4 12/27/2024    INR 1.00 12/27/2024       CT CHEST PE AORTA (IV ONLY) (CPT=71260)    Result Date: 12/27/2024  CONCLUSION:  1. No pulmonary embolus. 2. Loculated right basilar pleural effusion with thickening of visceral and parietal pleura compatible with a malignant pleural effusion.  Coexistent infection should be excluded clinically.  Diffuse nodular thickening of the pleura in the right hemithorax.  Atelectasis in the right middle lobe and lower lobe. 3. Thickened septal lines in the right hemithorax concerning for lymphangitic spread of tumor. 4. Left basilar pleural effusion with compressive atelectasis.    Dictated by (CST): Jose Smith MD on 12/27/2024 at 6:39 PM     Finalized by (CST): Jose Smith MD on 12/27/2024 at 6:53 PM          XR CHEST PA + LAT CHEST (CPT=71046)    Result Date: 12/27/2024  CONCLUSION: Interval removal of the previously seen right PleurX catheter with interval development of a small to moderate-sized pleural effusion, increased streaky opacity at the right lung base (which may represent atelectasis, infiltrate, or  a combination close) as well as increased interstitial markings within the right lung.    Dictated by (CST): Srinivasa Trujillo MD on 12/27/2024 at 11:52 AM     Finalized by (CST): Srinivasa Trujillo MD on 12/27/2024 at 11:57 AM           EKG 12 Lead    Result Date: 12/28/2024  Sinus tachycardia Cannot rule out Anterior infarct , age undetermined Abnormal ECG When compared with ECG of 21-DEC-2024 23:15, No significant change was found Confirmed by DO Barrios Asif ([id number]) on 12/28/2024 7:33:57 AM     Assessment   1.  Metastatic thyroid papillary carcinoma  2.  Malignant right pleural effusion     Plan   -Patient presents with evidence of some worsening dyspnea over the last 24 hours.  History of metastatic thyroid papillary carcinoma status post malignant right pleural effusion with prior Pleurx catheter placement removed earlier this month.  -Chest x-ray on presentation with moderate size right pleural effusion with some atelectatic changes seen.  -CT chest on 12/27/2024 with loculated small right basilar pleural effusion with visceral and parietal pleural thickening seen nodular thickening of the pleura in the right hemithorax present.  -Recent history of loculated pleural effusion requiring additional IR chest tube placement November 2024  -Discussed CT findings with the patient.  I do not believe thoracentesis or chest tube would be indicated at this time given appearance of small loculated right pleural effusion.  Pain appears to be more of a concern than dyspnea.  Discussed with hospitalist.  Will try pain regimen with laxative and potentially discharge in next 24 hours if pain improved.  Pain likely from pleural involvement from underlying malignancy.    Ramesh Kong DO  Pulmonary Critical Care Medicine  [facility]

## 2024-12-28 NOTE — ED PROVIDER NOTES
Patient Seen in: [facility] Emergency Department      History     Chief Complaint   Patient presents with    Shortness Of Breath     Stated Complaint: pain, plural effusion, cxr, sent by dr Stephenson:   HPI  64-year-old female on oral immunotherapy for recurrent metastatic thyroid cancer who is presenting to the ER for right chest pain and shortness of breath.  Report symptoms started shortly after she was discharged from the hospital where she was being treated for enterocolitis.  She has pain at the right anterior chest wall.  She does have shortness of breath with any exertion.  No hemoptysis.  No fevers.  No leg swelling.  She has not taken any home analgesics.  Her pulmonologist asked her to come to the ED for workup.  Of note, Pleurx catheter on the right side was removed earlier this month.        Objective:     Past Medical History:    Anxiety state, unspecified    Cancer (HCC)    Colon adenomas    x3    Disorder of thyroid    thyroidectomy    Diverticulosis of large intestine    Esophageal reflux    Exposure to medical therapeutic radiation    thyroid    High blood pressure    High cholesterol    Hypercholesteremia    Hypothyroidism    Osteoarthrosis, unspecified whether generalized or localized, unspecified site    Thyroid cancer (HCC)    papillary thyroid; s/p surgery resection with Asher and BRIDGES    Unspecified essential hypertension              Past Surgical History:   Procedure Laterality Date    Colonoscopy N/A 12/29/2022    Procedure: COLONOSCOPY;  Surgeon: Neeraj No MD;  Location: [facility] ENDOSCOPY    Colonoscopy  07/29/2024    Colonoscopy N/A 07/29/2024    Procedure: COLONOSCOPY;  Surgeon: Neeraj No MD;  Location: [facility] ENDOSCOPY    Colonoscopy N/A 12/21/2024    Procedure: COLONOSCOPY;  Surgeon: Jair Murillo MD;  Location: [facility] ENDOSCOPY    Egd  12/29/2022    Egd  07/29/2024    Esophagogastroduodenoscopy    Endoscopic ultrasound - internal  12/29/2022     Endoscopic ultrasound exam  2024    Endoscopic Ultrasound    Eye surgery  2024    Eye surgery Left 2024    Hysterectomy      Knee surgery Left 2022    no associated bleeding complications          40 week 7 lb(s) 5 oz Male; 6 hr labor           40 week 7 lb(s) 3 oz Female          41 week 9 lb(s) 8 oz Male    Other surgical history      Injection Tendon Sheath, Ligament    Thyroidectomy  2010    total    Total abdom hysterectomy                  Social History     Socioeconomic History    Marital status:    Tobacco Use    Smoking status: Never    Smokeless tobacco: Never   Vaping Use    Vaping status: Never Used   Substance and Sexual Activity    Alcohol use: Yes     Comment: social    Drug use: No   Other Topics Concern    Caffeine Concern Yes     Comment: 1 cup of coffee    Social History Narrative    Camelia Figueroa is  to Baldemar x30 yrs. She has 3 adult children. Patient works in accounting in book keeping. She lives with her , her mom, and 1 of the children in [place], [place].     Social Drivers of Health     Food Insecurity: No Food Insecurity (2024)    Food Insecurity     Food Insecurity: Never true   Transportation Needs: No Transportation Needs (2024)    Transportation Needs     Lack of Transportation: No   Housing Stability: Low Risk  (2024)    Housing Stability     Housing Instability: No                  Physical Exam     ED Triage Vitals   BP 24 1420 127/84   Pulse 24 1420 113   Resp 24 1420 16   Temp 24 1420 97.2 °F (36.2 °C)   Temp src 24 2154 Oral   SpO2 24 1422 92 %   O2 Device 24 1422 None (Room air)       Current Vitals:   Vital Signs  BP: 132/76  Pulse: 107  Resp: 16  Temp: 98.3 °F (36.8 °C)  Temp src: Oral  MAP (mmHg): 93    Oxygen Therapy  SpO2: 93 %  O2 Device: None (Room air)  Pulse Oximetry Type: Intermittent  Pulse Ox Probe Location: Right hand        Physical Exam  Vitals and  nursing note reviewed.   Constitutional:       Appearance: She is well-developed.   HENT:      Head: Normocephalic and atraumatic.   Eyes:      Extraocular Movements: Extraocular movements intact.   Cardiovascular:      Rate and Rhythm: Normal rate and regular rhythm.      Heart sounds: Normal heart sounds.   Pulmonary:      Effort: Pulmonary effort is normal.      Comments: Diminished breath sounds at the right low to mid lung  Abdominal:      General: There is no distension.      Palpations: Abdomen is soft.      Tenderness: There is no abdominal tenderness.   Musculoskeletal:         General: Normal range of motion.      Cervical back: Normal range of motion.      Right lower leg: No edema.      Left lower leg: No edema.   Skin:     General: Skin is warm.   Neurological:      Mental Status: She is alert.      Comments: No focal deficits       Differential diagnosis includes but is not limited to recurrent malignant effusion, atelectasis, pneumonia, pleural-based metastasis    ED Course     Labs Reviewed   BASIC METABOLIC PANEL (8) - Abnormal; Notable for the following components:       Result Value    Glucose 124 (*)     All other components within normal limits   CBC WITH DIFFERENTIAL WITH PLATELET - Abnormal; Notable for the following components:    RDW-SD 48.7 (*)     All other components within normal limits   PROTHROMBIN TIME (PT) - Normal   PTT, ACTIVATED - Normal     EKG    Rate, intervals and axes as noted on EKG Report.  Rate: 109  Rhythm: Sinus Rhythm  Reading: No STEMI, no ectopy                CT CHEST PE AORTA (IV ONLY) (CPT=71260)    Result Date: 12/27/2024  CONCLUSION:  1. No pulmonary embolus. 2. Loculated right basilar pleural effusion with thickening of visceral and parietal pleura compatible with a malignant pleural effusion.  Coexistent infection should be excluded clinically.  Diffuse nodular thickening of the pleura in the right hemithorax.  Atelectasis in the right middle lobe and lower  lobe. 3. Thickened septal lines in the right hemithorax concerning for lymphangitic spread of tumor. 4. Left basilar pleural effusion with compressive atelectasis.    Dictated by (CST): Jose Smith MD on 12/27/2024 at 6:39 PM     Finalized by (CST): Jose Smith MD on 12/27/2024 at 6:53 PM          XR CHEST PA + LAT CHEST (CPT=71046)    Result Date: 12/27/2024  CONCLUSION: Interval removal of the previously seen right PleurX catheter with interval development of a small to moderate-sized pleural effusion, increased streaky opacity at the right lung base (which may represent atelectasis, infiltrate, or a combination close) as well as increased interstitial markings within the right lung.    Dictated by (CST): Srinivasa Trujillo MD on 12/27/2024 at 11:52 AM     Finalized by (CST): Srinivasa Trujillo MD on 12/27/2024 at 11:57 AM                MDM          Admission disposition: 12/27/2024  6:03 PM           Medical Decision Making  On arrival to the ED, O2 saturation 92% on room air, other vital signs stable.  She does have worsening dyspnea and right-sided chest pain.  Chest x-ray earlier today showed moderate recurrent right pleural effusion, likely malignant.  CT chest pending.  Discussed with Dr. Kong who evaluated patient at bedside and recommends admission, CT chest to determine whether she would benefit from Pleurx versus chest tube, and no antibiotics at this time.  D/w Dr Campos for admission.    Problems Addressed:  Malignant pleural effusion (HCC): complicated acute illness or injury with systemic symptoms that poses a threat to life or bodily functions    Amount and/or Complexity of Data Reviewed  External Data Reviewed: radiology.     Details: Reviewed chest x-ray from earlier today which shows moderate right pleural effusion  Labs: ordered. Decision-making details documented in ED Course.  Radiology: ordered.  ECG/medicine tests: ordered and independent interpretation performed. Decision-making  details documented in ED Course.  Discussion of management or test interpretation with external provider(s): As above    Risk  Parenteral controlled substances.  Decision regarding hospitalization.        Disposition and Plan     Clinical Impression:  1. Malignant pleural effusion (HCC)         Disposition:  Admit  12/27/2024  6:03 pm    Follow-up:  No follow-up provider specified.        Medications Prescribed:  Current Discharge Medication List              Supplementary Documentation:         Hospital Problems       Present on Admission  Date Reviewed: 12/19/2024            ICD-10-CM Noted POA    * (Principal) Malignant pleural effusion (HCC) J91.0 9/13/2024 Unknown

## 2024-12-28 NOTE — ED QUICK NOTES
Orders for admission, patient is aware of plan and ready to go upstairs. Any questions, please call ED RN zenaida at extension [phone].     Patient Covid vaccination status: Fully vaccinated     COVID Test Ordered in ED: None    COVID Suspicion at Admission: N/A    Running Infusions:  None    Mental Status/LOC at time of transport: a/ox4    Other pertinent information:   CIWA score: N/A   NIH score:  N/A

## 2024-12-28 NOTE — PLAN OF CARE
Patient is A & O X4.  Patient has right sided pleural pain, with fluid, recent stay with a pleurx drain that was removed 12/12.  Patient receiving dalauid for pain, patient waiting to get results from CT scan, patient has personal belongings and call light within reach, safety measures in place.  Problem: Patient Centered Care  Goal: Patient preferences are identified and integrated in the patient's plan of care  Description: Interventions:  - What would you like us to know as we care for you? I live at home with my mother I help take care of her.  - Provide timely, complete, and accurate information to patient/family  - Incorporate patient and family knowledge, values, beliefs, and cultural backgrounds into the planning and delivery of care  - Encourage patient/family to participate in care and decision-making at the level they choose  - Honor patient and family perspectives and choices  Outcome: Progressing     Problem: Patient/Family Goals  Goal: Patient/Family Long Term Goal  Description: Patient's Long Term Goal: To have no more fluid in my lungs so I can breathe    Interventions:  - Continue to follow plan of care.  - See additional Care Plan goals for specific interventions  Outcome: Progressing  Goal: Patient/Family Short Term Goal  Description: Patient's Short Term Goal: To go home.    Interventions:   - monitor Vs  -Ct scan  - See additional Care Plan goals for specific interventions  Outcome: Progressing

## 2024-12-29 VITALS
HEART RATE: 108 BPM | BODY MASS INDEX: 26.22 KG/M2 | DIASTOLIC BLOOD PRESSURE: 66 MMHG | HEIGHT: 63 IN | RESPIRATION RATE: 18 BRPM | SYSTOLIC BLOOD PRESSURE: 128 MMHG | WEIGHT: 148 LBS | OXYGEN SATURATION: 92 % | TEMPERATURE: 99 F

## 2024-12-29 PROCEDURE — 99239 HOSP IP/OBS DSCHRG MGMT >30: CPT | Performed by: INTERNAL MEDICINE

## 2024-12-29 PROCEDURE — 99232 SBSQ HOSP IP/OBS MODERATE 35: CPT | Performed by: INTERNAL MEDICINE

## 2024-12-29 RX ORDER — HYDROCODONE BITARTRATE AND ACETAMINOPHEN 5; 325 MG/1; MG/1
1 TABLET ORAL EVERY 4 HOURS PRN
Qty: 30 TABLET | Refills: 0 | Status: SHIPPED | OUTPATIENT
Start: 2024-12-29

## 2024-12-29 RX ORDER — METOCLOPRAMIDE 5 MG/1
5 TABLET ORAL 3 TIMES DAILY PRN
Qty: 45 TABLET | Refills: 0 | Status: SHIPPED | OUTPATIENT
Start: 2024-12-29 | End: 2025-01-28

## 2024-12-29 NOTE — PLAN OF CARE
A/O x 4. Up ad lashell. Tolerating diet. +BM. Pain managed with norco. Bed in lowest position, call light in reach, frequent rounding, nonskid footwear, fall precautions in place. Discharge instructions explained to patient. All medications reviewed, including which medications to start, continue, or stop taking. All follow up appointments reviewed. All discharge eduation explained to pt. Patient verbalized understanding, all questions answered. All belongings sent with patient.

## 2024-12-29 NOTE — PROGRESS NOTES
[facility]  part of [facility]     Progress Note    Camelia Markham Patient Status:  Inpatient    1960 MRN [id number]   Location [facility] 4W/SW/SE Attending Viri Foster MD   Hosp Day # 2 PCP FINA MABRY, MD WENDY       Subjective:   Patient seen and examined.  Denies significant dyspnea.  Pain better controlled over last 24 hours.    Objective:   Blood pressure 120/72, pulse 92, temperature 98.2 °F (36.8 °C), temperature source Oral, resp. rate 20, height 5' 3\" (1.6 m), weight 148 lb (67.1 kg), SpO2 92%.  Intake/Output:   Last 3 shifts: I/O last 3 completed shifts:  In: 240 [P.O.:240]  Out: 1 [Urine:1]   This shift: I/O this shift:  In: 240 [P.O.:240]  Out: -      Vent Settings:      Hemodynamic parameters (last 24 hours):      Scheduled Meds:   Current Facility-Administered Medications   Medication Dose Route Frequency    [Held by provider] amLODIPine (Norvasc) tab 10 mg  10 mg Oral Daily    clonazePAM (KlonoPIN) tab 2 mg  2 mg Oral Nightly PRN    [Held by provider] hydroCHLOROthiazide tab 25 mg  25 mg Oral Daily    levothyroxine (Synthroid) tab 150 mcg  150 mcg Oral Before breakfast    [Held by provider] Olmesartan Medoxomil (BENICAR) tab 40 mg  40 mg Oral Daily    metoclopramide (Reglan) tab 5 mg  5 mg Oral TID    acidophilus (Probiotic) cap/tab 1 each  1 each Oral Daily    senna-docusate (Senokot-S) 8.6-50 MG per tab 2 tablet  2 tablet Oral Daily PRN    enoxaparin (Lovenox) 40 MG/0.4ML SUBQ injection 40 mg  40 mg Subcutaneous Daily    morphINE PF 4 MG/ML injection 4 mg  4 mg Intravenous Once    acetaminophen (Tylenol Extra Strength) tab 500 mg  500 mg Oral Q4H PRN    ondansetron (Zofran) 4 MG/2ML injection 4 mg  4 mg Intravenous Q6H PRN    HYDROmorphone (Dilaudid) 1 MG/ML injection 0.2 mg  0.2 mg Intravenous Q2H PRN    Or    HYDROmorphone (Dilaudid) 1 MG/ML injection 0.4 mg  0.4 mg Intravenous Q2H PRN    Or    HYDROmorphone (Dilaudid) 1 MG/ML injection 0.8 mg  0.8 mg  Intravenous Q2H PRN    acetaminophen (Tylenol) tab 650 mg  650 mg Oral Q4H PRN    Or    HYDROcodone-acetaminophen (Norco) 5-325 MG per tab 1 tablet  1 tablet Oral Q4H PRN    Or    HYDROcodone-acetaminophen (Norco) 5-325 MG per tab 2 tablet  2 tablet Oral Q4H PRN    temazepam (Restoril) cap 15 mg  15 mg Oral Nightly PRN    HYDROcodone-acetaminophen (Norco) 5-325 MG per tab 1 tablet  1 tablet Oral Once       Continuous Infusions:     Physical Exam  Constitutional: no acute distress  Eyes: PERRL  ENT: nares pateint  Neck: supple, no JVD  Cardio: RRR, S1 S2  Respiratory: Diminished right basilar breath sounds  GI: abdomen soft, non tender, active bowel sounds, no organomegaly  Extremities: no clubbing, cyanosis, edema  Neurologic: no gross motor deficits  Skin: warm, dry      Results:            XR ABDOMEN (1 VIEW) (CPT=74018)    Result Date: 12/28/2024  CONCLUSION: Nonspecific-nonobstructive bowel gas pattern.    Dictated by (CST): Rolf Alexander MD on 12/28/2024 at 2:53 PM     Finalized by (CST): Rolf Alexander MD on 12/28/2024 at 2:54 PM          CT CHEST PE AORTA (IV ONLY) (CPT=71260)    Result Date: 12/27/2024  CONCLUSION:  1. No pulmonary embolus. 2. Loculated right basilar pleural effusion with thickening of visceral and parietal pleura compatible with a malignant pleural effusion.  Coexistent infection should be excluded clinically.  Diffuse nodular thickening of the pleura in the right hemithorax.  Atelectasis in the right middle lobe and lower lobe. 3. Thickened septal lines in the right hemithorax concerning for lymphangitic spread of tumor. 4. Left basilar pleural effusion with compressive atelectasis.    Dictated by (CST): Jose Smith MD on 12/27/2024 at 6:39 PM     Finalized by (CST): Jose Smith MD on 12/27/2024 at 6:53 PM          XR CHEST PA + LAT CHEST (CPT=71046)    Result Date: 12/27/2024  CONCLUSION: Interval removal of the previously seen right PleurX catheter with interval development of a small  to moderate-sized pleural effusion, increased streaky opacity at the right lung base (which may represent atelectasis, infiltrate, or a combination close) as well as increased interstitial markings within the right lung.    Dictated by (CST): Srinivasa Trujillo MD on 12/27/2024 at 11:52 AM     Finalized by (CST): Srinivasa Trujillo MD on 12/27/2024 at 11:57 AM           EKG 12 Lead    Result Date: 12/28/2024  Sinus tachycardia Cannot rule out Anterior infarct , age undetermined Abnormal ECG When compared with ECG of 21-DEC-2024 23:15, No significant change was found Confirmed by DO Sophie, Todd ([id number]) on 12/28/2024 7:33:57 AM     Assessment   1.  Metastatic thyroid papillary carcinoma  2.  Malignant right pleural effusion     Plan   -Patient presents with evidence of some worsening dyspnea over the last 24 hours.  History of metastatic thyroid papillary carcinoma status post malignant right pleural effusion with prior Pleurx catheter placement removed earlier this month.  -Chest x-ray on presentation with moderate size right pleural effusion with some atelectatic changes seen.  -CT chest on 12/27/2024 with loculated small right basilar pleural effusion with visceral and parietal pleural thickening seen nodular thickening of the pleura in the right hemithorax present.  -Recent history of loculated pleural effusion requiring additional IR chest tube placement November 2024  -Discussed CT findings with the patient.  I do not believe thoracentesis or chest tube would be indicated at this time given appearance of small loculated right pleural effusion.    -Pain control   -Okay for discharge from pulmonary perspective.    Ramesh Kong DO  Pulmonary Critical Care Medicine  [facility]

## 2024-12-29 NOTE — DISCHARGE SUMMARY
Discharge Summary     Camelia Markham Patient Status:  Inpatient    1960 MRN [id number]   Location [facility] 4W/SW/SE Attending Viri Foster MD   Hosp Day # 2 PCP FINA MABRY, MD WENDY     Date of Admission: 2024  Date of Discharge: 2024  Discharge Disposition: Home or Self Care    Discharge Diagnosis: ***    History of Present Illness: ***  {H&P Notes :8307}          Brief Synopsis: ***    Lace+ Score: 77  59-90 High Risk  29-58 Medium Risk  0-28   Low Risk       TCM Follow-Up Recommendation:  {Care Managers will evaluate the need for follow-up for all patients ages 50+, and high/moderate risk patients ages 25-49. Low risk patients (LACE < 29) will only be evaluated if the \"Still recommend for TCM follow-up\" option is selected from this list.:7396}    Procedures during hospitalization:   ***    Incidental or significant findings and recommendations (brief descriptions):  ***    Lab/Test results pending at Discharge:   ***    Consultants:  ***    Discharge Medication List:     Discharge Medications        ASK your doctor about these medications        Instructions Prescription details   amLODIPine 10 MG Tabs  Commonly known as: Norvasc      Take 1 tablet (10 mg total) by mouth daily.   Refills: 0     butalbital-acetaminophen-caffeine -40 MG Tabs  Commonly known as: Fioricet      Take 1 tablet by mouth every 6 (six) hours as needed.   Quantity: 30 tablet  Refills: 0     clonazePAM 2 MG Tabs  Commonly known as: KlonoPIN      Take 1 tablet (2 mg total) by mouth nightly as needed for Anxiety (Sleep as well).   Refills: 0     hydroCHLOROthiazide 25 MG Tabs      Take 1 tablet (25 mg total) by mouth daily.   Quantity: 30 tablet  Refills: 6     Lenvatinib (20 MG Daily Dose) 2 x 10 MG Cppk      Take 14 mg by mouth daily.   Refills: 0     levothyroxine 150 MCG Tabs  Commonly known as: Synthroid      Take 1 tablet (150 mcg total) by mouth before breakfast.   Refills: 0     Olmesartan  Medoxomil 40 MG Tabs  Commonly known as: BENICAR      Take 1 tablet (40 mg total) by mouth daily.   Refills: 0     Omeprazole 40 MG Cpdr      Take 1 capsule (40 mg total) by mouth daily.   Refills: 0     potassium chloride 20 MEQ Tbcr  Commonly known as: Klor-Con M20  Ask about: Should I take this medication?      Take 2 tablets (40 mEq total) by mouth 2 (two) times daily for 5 days.   Stop taking on: December 27, 2024  Quantity: 20 tablet  Refills: 0     senna-docusate 8.6-50 MG Tabs  Commonly known as: Senokot-S      Take 2 tablets by mouth nightly.   Quantity: 60 tablet  Refills: 2              Follow-up appointment:   Alton Marie MD  [address] [place] [address]  [phone]    Follow up in 1 week(s)      Appointments for Next 30 Days 12/29/2024 - 1/28/2025        Date Arrival Time Visit Type Length Department Provider     1/6/2025  8:45 AM  LAB - EM CC [[id number]] 15 min [facility] RESOURCE    Patient Instructions:         Location Instructions:     Your appointment is at the [facility]. The address is [address] [place] [address]. The entrance is located on the East side of the [place] parking [facility].  Masks are optional for all patients and visitors, unless otherwise indicated.               1/6/2025  9:00 AM  PALLIATIVE CARE FOLLOW UP [5071] 30 min [facility] [place] Sugar Miller APRN    Patient Instructions:         Location Instructions:     Your appointment is at the [facility]. The address is [address] [place] [address]. [facility]  Masks are optional for all patients and visitors, unless otherwise indicated.               1/6/2025  9:45 AM  FOLLOW UP-HEM/ONC [0691] 15 min [facility]  [place] Inocente Chen MD    Patient Instructions:         Location Instructions:     Your appointment is at the [facility]. The address is [address] [place] [address]. The entrance is located on the East side of the [place] parking lot.  Masks are optional for all patients and visitors, unless otherwise indicated.               1/6/2025 11:45 AM  FOLLOW UP VISIT [2828] 15 min [place], [place] Dank Brooks DO    Patient Instructions:         Location Instructions:     Your appointment is located at [address] [facility] in [place], [place].&nbsp; Please park in the [place] lot, enter through the [facility] entrance, and go to [address].  Masks are optional for all patients and visitors, unless otherwise indicated.                      Supplementary Documentation:   [place] reviewed[place] ***    Vital signs:  Temp:  [98.2 °F (36.8 °C)-99 °F (37.2 °C)] 99 °F (37.2 °C)  Pulse:  [] 108  Resp:  [18-20] 18  BP: (103-128)/(63-72) 128/66  SpO2:  [92 %] 92 %    Physical Exam:    General:  NAD  Cardiovascular:  S1, S2    -----------------------------------------------------------------------------------------------  PATIENT DISCHARGE INSTRUCTIONS: See electronic chart    Tip: Documentation requirements: For split shared discharge, BOTH providers need to document specific floor, unit, and time spent on the discharge.  The note needs to be signed by the provider with > 50% of time and bill under their NPI.   Time spent:  ***         Viri Foster MD         unless otherwise indicated.                      Supplementary Documentation:   [facility] reviewed: na    Vital signs:  Temp:  [98.2 °F (36.8 °C)-99 °F (37.2 °C)] 99 °F (37.2 °C)  Pulse:  [] 108  Resp:  [18-20] 18  BP: (103-128)/(63-72) 128/66  SpO2:  [92 %] 92 %    Physical Exam:    General:  NAD  Cardiovascular:  S1, S2    -----------------------------------------------------------------------------------------------  PATIENT DISCHARGE INSTRUCTIONS: See electronic chart    Tip: Documentation requirements: For split shared discharge, BOTH providers need to document specific floor, unit, and time spent on the discharge.  The note needs to be signed by the provider with > 50% of time and bill under their NPI.   Time spent:  45min         Viri Foster MD

## 2024-12-29 NOTE — PLAN OF CARE
Patient a+o x4, on RA. Ambulating self. Voiding freely. Pain controlled by prn norco. Anxiety controlled by prn clonazepam.     Problem: Patient Centered Care  Goal: Patient preferences are identified and integrated in the patient's plan of care  Description: Interventions:  - What would you like us to know as we care for you?   - Provide timely, complete, and accurate information to patient/family  - Incorporate patient and family knowledge, values, beliefs, and cultural backgrounds into the planning and delivery of care  - Encourage patient/family to participate in care and decision-making at the level they choose  - Honor patient and family perspectives and choices  Outcome: Progressing     Problem: Patient/Family Goals  Goal: Patient/Family Long Term Goal  Description: Patient's Long Term Goal:     Interventions:  - See additional Care Plan goals for specific interventions  Outcome: Progressing  Goal: Patient/Family Short Term Goal  Description: Patient's Short Term Goal:     Interventions:   - See additional Care Plan goals for specific interventions  Outcome: Progressing     Problem: PAIN - ADULT  Goal: Verbalizes/displays adequate comfort level or patient's stated pain goal  Description: INTERVENTIONS:  - Encourage pt to monitor pain and request assistance  - Assess pain using appropriate pain scale  - Administer analgesics based on type and severity of pain and evaluate response  - Implement non-pharmacological measures as appropriate and evaluate response  - Consider cultural and social influences on pain and pain management  - Manage/alleviate anxiety  - Utilize distraction and/or relaxation techniques  - Monitor for opioid side effects  - Notify MD/LIP if interventions unsuccessful or patient reports new pain  - Anticipate increased pain with activity and pre-medicate as appropriate  Outcome: Progressing     Problem: SAFETY ADULT - FALL  Goal: Free from fall injury  Description: INTERVENTIONS:  - Assess  pt frequently for physical needs  - Identify cognitive and physical deficits and behaviors that affect risk of falls.  - [place] fall precautions as indicated by assessment.  - Educate pt/family on patient safety including physical limitations  - Instruct pt to call for assistance with activity based on assessment  - Modify environment to reduce risk of injury  - Provide assistive devices as appropriate  - Consider OT/PT consult to assist with strengthening/mobility  - Encourage toileting schedule  Outcome: Progressing     Problem: DISCHARGE PLANNING  Goal: Discharge to home or other facility with appropriate resources  Description: INTERVENTIONS:  - Identify barriers to discharge w/pt and caregiver  - Include patient/family/discharge partner in discharge planning  - Arrange for needed discharge resources and transportation as appropriate  - Identify discharge learning needs (meds, wound care, etc)  - Arrange for interpreters to assist at discharge as needed  - Consider post-discharge preferences of patient/family/discharge partner  - Complete POLST form as appropriate  - Assess patient's ability to be responsible for managing their own health  - Refer to Case Management Department for coordinating discharge planning if the patient needs post-hospital services based on physician/LIP order or complex needs related to functional status, cognitive ability or social support system  Outcome: Progressing

## 2024-12-31 NOTE — PAYOR COMM NOTE
--------------  ADMISSION REVIEW     Payor: [facility]  Subscriber #:  [id number]  Authorization Number: [id number]    Admit date: 12/27/24  Admit time:  9:50 PM       REVIEW DOCUMENTATION:     ED Provider Notes        ED Provider Notes signed by Shelly Small MD at 12/27/2024 10:09 PM       Author: Shelly Small MD Service: -- Author Type: Physician    Filed: 12/27/2024 10:09 PM Date of Service: 12/27/2024  6:03 PM Status: Signed    : Shelly Small MD (Physician)           Patient Seen in: [facility] Emergency Department      History     Chief Complaint   Patient presents with    Shortness Of Breath     Stated Complaint: pain, plural effusion, cxr, sent by dr    Subjective:   HPI  64-year-old female on oral immunotherapy for recurrent metastatic thyroid cancer who is presenting to the ER for right chest pain and shortness of breath.  Report symptoms started shortly after she was discharged from the hospital where she was being treated for enterocolitis.  She has pain at the right anterior chest wall.  She does have shortness of breath with any exertion.  No hemoptysis.  No fevers.  No leg swelling.  She has not taken any home analgesics.  Her pulmonologist asked her to come to the ED for workup.  Of note, Pleurx catheter on the right side was removed earlier this month.        Objective:     Past Medical History:    Anxiety state, unspecified    Cancer (HCC)    Colon adenomas    x3    Disorder of thyroid    thyroidectomy    Diverticulosis of large intestine    Esophageal reflux    Exposure to medical therapeutic radiation    thyroid    High blood pressure    High cholesterol    Hypercholesteremia    Hypothyroidism    Osteoarthrosis, unspecified whether generalized or localized, unspecified site    Thyroid cancer (HCC)    papillary thyroid; s/p surgery resection with Asher and BRIDGES    Unspecified essential hypertension              Past Surgical History:   Procedure Laterality Date    Colonoscopy N/A  2022    Procedure: COLONOSCOPY;  Surgeon: Neeraj No MD;  Location: [facility] ENDOSCOPY    Colonoscopy  2024    Colonoscopy N/A 2024    Procedure: COLONOSCOPY;  Surgeon: Neeraj No MD;  Location: [facility] ENDOSCOPY    Colonoscopy N/A 2024    Procedure: COLONOSCOPY;  Surgeon: Jair Murillo MD;  Location: [facility] ENDOSCOPY    Egd  2022    Egd  2024    Esophagogastroduodenoscopy    Endoscopic ultrasound - internal  2022    Endoscopic ultrasound exam  2024    Endoscopic Ultrasound    Eye surgery  2024    Eye surgery Left 2024    Hysterectomy      Knee surgery Left 2022    no associated bleeding complications          40 week 7 lb(s) 5 oz Male; 6 hr labor           40 week 7 lb(s) 3 oz Female          41 week 9 lb(s) 8 oz Male    Other surgical history      Injection Tendon Sheath, Ligament    Thyroidectomy      total    Total abdom hysterectomy                  Social History     Socioeconomic History    Marital status:    Tobacco Use    Smoking status: Never    Smokeless tobacco: Never   Vaping Use    Vaping status: Never Used   Substance and Sexual Activity    Alcohol use: Yes     Comment: social    Drug use: No   Other Topics Concern    Caffeine Concern Yes     Comment: 1 cup of coffee    Social History Narrative    Camelia Figueroa is  to Baldemar x30 yrs. She has 3 adult children. Patient works in accounting in book keeping. She lives with her , her mom, and 1 of the children in [place], [place].     Social Drivers of Health     Food Insecurity: No Food Insecurity (2024)    Food Insecurity     Food Insecurity: Never true   Transportation Needs: No Transportation Needs (2024)    Transportation Needs     Lack of Transportation: No   Housing Stability: Low Risk  (2024)    Housing Stability     Housing Instability: No                  Physical Exam     ED Triage Vitals   BP 24 1420  127/84   Pulse 12/27/24 1420 113   Resp 12/27/24 1420 16   Temp 12/27/24 1420 97.2 °F (36.2 °C)   Temp src 12/27/24 2154 Oral   SpO2 12/27/24 1422 92 %   O2 Device 12/27/24 1422 None (Room air)       Current Vitals:   Vital Signs  BP: 132/76  Pulse: 107  Resp: 16  Temp: 98.3 °F (36.8 °C)  Temp src: Oral  MAP (mmHg): 93    Oxygen Therapy  SpO2: 93 %  O2 Device: None (Room air)  Pulse Oximetry Type: Intermittent  Pulse Ox Probe Location: Right hand        Physical Exam  Vitals and nursing note reviewed.   Constitutional:       Appearance: She is well-developed.   HENT:      Head: Normocephalic and atraumatic.   Eyes:      Extraocular Movements: Extraocular movements intact.   Cardiovascular:      Rate and Rhythm: Normal rate and regular rhythm.      Heart sounds: Normal heart sounds.   Pulmonary:      Effort: Pulmonary effort is normal.      Comments: Diminished breath sounds at the right low to mid lung  Abdominal:      General: There is no distension.      Palpations: Abdomen is soft.      Tenderness: There is no abdominal tenderness.   Musculoskeletal:         General: Normal range of motion.      Cervical back: Normal range of motion.      Right lower leg: No edema.      Left lower leg: No edema.   Skin:     General: Skin is warm.   Neurological:      Mental Status: She is alert.      Comments: No focal deficits       Differential diagnosis includes but is not limited to recurrent malignant effusion, atelectasis, pneumonia, pleural-based metastasis    ED Course     Labs Reviewed   BASIC METABOLIC PANEL (8) - Abnormal; Notable for the following components:       Result Value    Glucose 124 (*)     All other components within normal limits   CBC WITH DIFFERENTIAL WITH PLATELET - Abnormal; Notable for the following components:    RDW-SD 48.7 (*)     All other components within normal limits   PROTHROMBIN TIME (PT) - Normal   PTT, ACTIVATED - Normal     EKG    Rate, intervals and axes as noted on EKG Report.  Rate:  109  Rhythm: Sinus Rhythm  Reading: No STEMI, no ectopy                CT CHEST PE AORTA (IV ONLY) (CPT=71260)    Result Date: 12/27/2024  CONCLUSION:  1. No pulmonary embolus. 2. Loculated right basilar pleural effusion with thickening of visceral and parietal pleura compatible with a malignant pleural effusion.  Coexistent infection should be excluded clinically.  Diffuse nodular thickening of the pleura in the right hemithorax.  Atelectasis in the right middle lobe and lower lobe. 3. Thickened septal lines in the right hemithorax concerning for lymphangitic spread of tumor. 4. Left basilar pleural effusion with compressive atelectasis.    Dictated by (CST): Jose Smith MD on 12/27/2024 at 6:39 PM     Finalized by (CST): Jose Smith MD on 12/27/2024 at 6:53 PM          XR CHEST PA + LAT CHEST (CPT=71046)    Result Date: 12/27/2024  CONCLUSION: Interval removal of the previously seen right PleurX catheter with interval development of a small to moderate-sized pleural effusion, increased streaky opacity at the right lung base (which may represent atelectasis, infiltrate, or a combination close) as well as increased interstitial markings within the right lung.    Dictated by (CST): Srinivasa Trujillo MD on 12/27/2024 at 11:52 AM     Finalized by (CST): Srinivasa Trujillo MD on 12/27/2024 at 11:57 AM               MDM          Admission disposition: 12/27/2024  6:03 PM           Medical Decision Making  On arrival to the ED, O2 saturation 92% on room air, other vital signs stable.  She does have worsening dyspnea and right-sided chest pain.  Chest x-ray earlier today showed moderate recurrent right pleural effusion, likely malignant.  CT chest pending.  Discussed with Dr. Kong who evaluated patient at bedside and recommends admission, CT chest to determine whether she would benefit from Pleurx versus chest tube, and no antibiotics at this time.  D/w Dr Campos for admission.    Problems Addressed:  Malignant  pleural effusion (HCC): complicated acute illness or injury with systemic symptoms that poses a threat to life or bodily functions    Amount and/or Complexity of Data Reviewed  External Data Reviewed: radiology.     Details: Reviewed chest x-ray from earlier today which shows moderate right pleural effusion  Labs: ordered. Decision-making details documented in ED Course.  Radiology: ordered.  ECG/medicine tests: ordered and independent interpretation performed. Decision-making details documented in ED Course.  Discussion of management or test interpretation with external provider(s): As above    Risk  Parenteral controlled substances.  Decision regarding hospitalization.        Disposition and Plan     Clinical Impression:  1. Malignant pleural effusion (HCC)         Disposition:  Admit  12/27/2024  6:03 pm     Hospital Problems       Present on Admission  Date Reviewed: 12/19/2024            ICD-10-CM Noted POA    * (Principal) Malignant pleural effusion (HCC) J91.0 9/13/2024 Unknown            Signed by Shelly Small MD on 12/27/2024 10:09 PM            Date/Time Temp Pulse Resp BP SpO2 Weight O2 Device O2 Flow Rate (L/min) [facility]    12/29/24 1126 99 °F (37.2 °C) 108 18 128/66 92 % -- None (Room air) -- EM    12/29/24 0843 -- 92 20 120/72 92 % -- None (Room air) -- MK    12/29/24 0422 98.2 °F (36.8 °C) 87 18 105/63 92 % -- -- -- KJ    12/28/24 2000 -- -- -- -- -- -- None (Room air) -- KB    12/28/24 2000 98.5 °F (36.9 °C) 105 18 103/64 92 % -- -- -- MW    12/28/24 1139 98 °F (36.7 °C) 100 18 108/63 92 % -- None (Room air) -- EM    12/28/24 0505 98.4 °F (36.9 °C) 97 16 97/61 92 % -- None (Room air) -- IM     2/28/24 2000 -- -- -- -- -- -- None (Room air) -- KB   12/28/24 2000 98.5 °F (36.9 °C) 105 18 103/64 92 % -- -- -- MW   12/28/24 1139 98 °F (36.7 °C) 100 18 108/63 92 % -- None (Room air) -- EM   12/28/24 0505 98.4 °F (36.9 °C) 97 16 97/61 92 % -- None (Room air) -- IM   12/27/24 2223 -- -- -- -- -- 148 lb (67.1  kg) -- -- SR   12/27/24 2154 98.3 °F (36.8 °C) 107 16 132/76 93 % -- None (Room air) -- IM   12/27/24 2130 -- 111 -- 103/77 92 % -- None (Room air) -- KBA   12/27/24 1907 -- 98 16 114/71 92 % -- None (Room air) -- KBA   12/27/24 1645 -- 103 -- 118/70 92 % -- -- -- ML   12/27/24 1422 -- -- -- -- 92 % -- None (Room air) -- TP   12/27/24 1420 97.2 °F (36.2 °C) 113 16 127/84 -- 148 lb (67.1 kg) -- -- TP     H&P    HISTORY AND PHYSICAL EXAMINATION     CHIEF COMPLAINT:  Reaccumulation of malignant right pleural effusion.     HISTORY OF PRESENT ILLNESS:  The patient is a 64-year-old  female with history of metastatic thyroid carcinoma who was recently hospitalized with possible colitis.  Colonoscopy and biopsy showed no evidence of colitis.  Today came back to the emergency department for evaluation of progressive shortness of breath and right-sided pleuritic chest pain.  Chest x-ray showed moderate right pleural effusion.  CBC and chemistry unremarkable.  CT scan of the chest still pending.     ASSESSMENT AND PLAN:  Recurrent right malignant pleural effusion with pleuritic chest pain, underlying metastatic thyroid papillary carcinoma, recurrent.  Patient will be admitted to general medical floor.  Chest CT scan.  Pulmonary consult.  Further recommendations pending CT scan results.  Pain control.  Further recommendations to follow.     12/27 PULMONARY CONSULT NOTE    Reason for Consultation:   Dyspnea     History of Present Illness:   Patient is a 64-year-old female with history of recurrent metastatic thyroid papillary carcinoma with history of malignant right pleural effusion with prior Pleurx catheter placement presented with chief complaint of dyspnea.  Pleurx catheter removed earlier this month.  Recent hospitalization November 2024 requiring additional IR chest tube placement for drainage.  Admits to some increased dyspnea with exertion and at rest.  No significant hypoxia on presentation.  Afebrile           Assessment   1.  Metastatic thyroid papillary carcinoma  2.  Malignant right pleural effusion        Plan   -Patient presents with evidence of some worsening dyspnea over the last 24 hours.  History of metastatic thyroid papillary carcinoma status post malignant right pleural effusion with prior Pleurx catheter placement removed earlier this month.  -Chest x-ray on presentation with moderate size right pleural effusion with some atelectatic changes seen.  -Will obtain CT chest with contrast to further evaluate.  -Recent history of loculated pleural effusion requiring additional IR chest tube placement November 2024  -May need to consider Pleurx versus chest tube depending on CT results  -Hold off antibiotic therapy for now but may consider after reviewing CT chest  -Pain control  -Reviewed vitals, labs and imaging    12/28 HOSPITALIST NOTE    Chief Complaint: Pain secondary to pleural mets        Subjective:  S: Patient states that she is very apprehensive of taking Norco as she has stopped in November due to severe constipation leading to impactation and colitis.  She was just here last week and had a colonoscopy which was negative.  She has been having bowel movements but have been small and infrequent.  She has had intermittent irregular bowel movements even before taking the Norco.  She is in significant pain at night and has only been taking \"teas for pain control.       Objective:  Vital signs:  Temp:  [97.2 °F (36.2 °C)-98.4 °F (36.9 °C)] 98.4 °F (36.9 °C)  Pulse:  [] 97  Resp:  [16] 16  BP: ()/(61-84) 97/61  SpO2:  [92 %-93 %] 92 %     Labs:     Labs Last 24 Hours:                      BMP         CBC       Other      Na 141 Cl 106 BUN 18 Glu 119     Hb 11.7     PTT 28.4 Procal -    K 3.9 CO2 26.0 Cr 0.82     WBC 8.1 >< .0   INR 1.00 CRP -    Renal Lytes Endo       Hct 35.4     Trop - D dim -    eGFR - Ca 9.1 POC Gluc  -       LFT     pBNP - Lactic -    eGFR AA - PO4 - A1c -     AST -  APk - Prot -   LDL -        Mg - TSH -     ALT - T shaun - Alb -              Assessment & Plan:  ASSESSMENT / PLAN:      #Recurrent right malignant pleural effusion with pleuritic chest pain, underlying metastatic thyroid papillary carcinoma, recurrent. Patient will be admitted to general medical floor. Chest CT scan. Pulmonary consult. Further recommendations pending CT scan results. Pain control. Further recommendations to follow.         Pain management/history of severe constipation worsened by opiate induced constipation:  Pt failed gabapentin   Check KUB to assess stool burden  Resume norco; start reglan tid, senna prn  Consider Gi conslt in AM (Dr Murillo saw on last admission and did colonoscopy which was negative)     12/28 PULMONARY NOTE    Subjective:   Patient seen and examined.  Dyspnea somewhat improved admits to some right-sided chest wall discomfort     Objective:   Blood pressure 108/63, pulse 100, temperature 98 °F (36.7 °C), temperature source Oral, resp. rate 18, height 5' 3\" (1.6 m), weight 148 lb (67.1 kg), SpO2 92%.      Scheduled Meds:   Current Hospital Medications          Current Facility-Administered Medications   Medication Dose Route Frequency    [Held by provider] amLODIPine (Norvasc) tab 10 mg  10 mg Oral Daily    clonazePAM (KlonoPIN) tab 2 mg  2 mg Oral Nightly PRN    [Held by provider] hydroCHLOROthiazide tab 25 mg  25 mg Oral Daily    levothyroxine (Synthroid) tab 150 mcg  150 mcg Oral Before breakfast    [Held by provider] Olmesartan Medoxomil (BENICAR) tab 40 mg  40 mg Oral Daily    morphINE PF 4 MG/ML injection 4 mg  4 mg Intravenous Once    acetaminophen (Tylenol Extra Strength) tab 500 mg  500 mg Oral Q4H PRN    ondansetron (Zofran) 4 MG/2ML injection 4 mg  4 mg Intravenous Q6H PRN    prochlorperazine (Compazine) 10 MG/2ML injection 5 mg  5 mg Intravenous Q8H PRN    HYDROmorphone (Dilaudid) 1 MG/ML injection 0.2 mg  0.2 mg Intravenous Q2H PRN     Or    HYDROmorphone (Dilaudid)  1 MG/ML injection 0.4 mg  0.4 mg Intravenous Q2H PRN     Or    HYDROmorphone (Dilaudid) 1 MG/ML injection 0.8 mg  0.8 mg Intravenous Q2H PRN    acetaminophen (Tylenol) tab 650 mg  650 mg Oral Q4H PRN     Or    HYDROcodone-acetaminophen (Norco) 5-325 MG per tab 1 tablet  1 tablet Oral Q4H PRN     Or    HYDROcodone-acetaminophen (Norco) 5-325 MG per tab 2 tablet  2 tablet Oral Q4H PRN    temazepam (Restoril) cap 15 mg  15 mg Oral Nightly PRN    HYDROcodone-acetaminophen (Norco) 5-325 MG per tab 1 tablet  1 tablet Oral Once          Assessment   1.  Metastatic thyroid papillary carcinoma  2.  Malignant right pleural effusion      Plan   -Patient presents with evidence of some worsening dyspnea over the last 24 hours.  History of metastatic thyroid papillary carcinoma status post malignant right pleural effusion with prior Pleurx catheter placement removed earlier this month.  -Chest x-ray on presentation with moderate size right pleural effusion with some atelectatic changes seen.  -CT chest on 12/27/2024 with loculated small right basilar pleural effusion with visceral and parietal pleural thickening seen nodular thickening of the pleura in the right hemithorax present.  -Recent history of loculated pleural effusion requiring additional IR chest tube placement November 2024  -Discussed CT findings with the patient.  I do not believe thoracentesis or chest tube would be indicated at this time given appearance of small loculated right pleural effusion.  Pain appears to be more of a concern than dyspnea.  Discussed with hospitalist.  Will try pain regimen with laxative and potentially discharge in next 24 hours if pain improved.  Pain likely from pleural involvement from underlying malignancy.     12/29 PULMONARY NOTE       Subjective:   Patient seen and examined.  Denies significant dyspnea.  Pain better controlled over last 24 hours.     Objective:   Blood pressure 120/72, pulse 92, temperature 98.2 °F (36.8 °C),  temperature source Oral, resp. rate 20, height 5' 3\" (1.6 m), weight 148 lb (67.1 kg), SpO2 92%.      Scheduled Meds:   Current Hospital Medications          Current Facility-Administered Medications   Medication Dose Route Frequency    [Held by provider] amLODIPine (Norvasc) tab 10 mg  10 mg Oral Daily    clonazePAM (KlonoPIN) tab 2 mg  2 mg Oral Nightly PRN    [Held by provider] hydroCHLOROthiazide tab 25 mg  25 mg Oral Daily    levothyroxine (Synthroid) tab 150 mcg  150 mcg Oral Before breakfast    [Held by provider] Olmesartan Medoxomil (BENICAR) tab 40 mg  40 mg Oral Daily    metoclopramide (Reglan) tab 5 mg  5 mg Oral TID    acidophilus (Probiotic) cap/tab 1 each  1 each Oral Daily    senna-docusate (Senokot-S) 8.6-50 MG per tab 2 tablet  2 tablet Oral Daily PRN    enoxaparin (Lovenox) 40 MG/0.4ML SUBQ injection 40 mg  40 mg Subcutaneous Daily    morphINE PF 4 MG/ML injection 4 mg  4 mg Intravenous Once    acetaminophen (Tylenol Extra Strength) tab 500 mg  500 mg Oral Q4H PRN    ondansetron (Zofran) 4 MG/2ML injection 4 mg  4 mg Intravenous Q6H PRN    HYDROmorphone (Dilaudid) 1 MG/ML injection 0.2 mg  0.2 mg Intravenous Q2H PRN     Or    HYDROmorphone (Dilaudid) 1 MG/ML injection 0.4 mg  0.4 mg Intravenous Q2H PRN     Or    HYDROmorphone (Dilaudid) 1 MG/ML injection 0.8 mg  0.8 mg Intravenous Q2H PRN    acetaminophen (Tylenol) tab 650 mg  650 mg Oral Q4H PRN     Or    HYDROcodone-acetaminophen (Norco) 5-325 MG per tab 1 tablet  1 tablet Oral Q4H PRN     Or    HYDROcodone-acetaminophen (Norco) 5-325 MG per tab 2 tablet  2 tablet Oral Q4H PRN    temazepam (Restoril) cap 15 mg  15 mg Oral Nightly PRN    HYDROcodone-acetaminophen (Norco) 5-325 MG per tab 1 tablet  1 tablet Oral Once                 Physical Exam  Constitutional: no acute distress  Eyes: PERRL  ENT: nares pateint  Neck: supple, no JVD  Cardio: RRR, S1 S2  Respiratory: Diminished right basilar breath sounds  GI: abdomen soft, non tender, active  bowel sounds, no organomegaly  Extremities: no clubbing, cyanosis, edema  Neurologic: no gross motor deficits  Skin: warm, dry        Results:            XR ABDOMEN (1 VIEW) (CPT=74018)     Result Date: 12/28/2024  CONCLUSION:        Nonspecific-nonobstructive bowel gas pattern.    Dictated by (CST): Rolf Alexander MD on 12/28/2024 at 2:53 PM     Finalized by (CST): Rolf Alexander MD on 12/28/2024 at 2:54 PM         EKG 12 Lead     Result Date: 12/28/2024  Sinus tachycardia Cannot rule out Anterior infarct , age undetermined Abnormal ECG When compared with ECG of 21-DEC-2024 23:15, No significant change was found Confirmed by DO Barrios Asif ([id number]) on 12/28/2024 7:33:57 AM      Assessment   1.  Metastatic thyroid papillary carcinoma  2.  Malignant right pleural effusion      Plan   -Patient presents with evidence of some worsening dyspnea over the last 24 hours.  History of metastatic thyroid papillary carcinoma status post malignant right pleural effusion with prior Pleurx catheter placement removed earlier this month.  -Chest x-ray on presentation with moderate size right pleural effusion with some atelectatic changes seen.  -CT chest on 12/27/2024 with loculated small right basilar pleural effusion with visceral and parietal pleural thickening seen nodular thickening of the pleura in the right hemithorax present.  -Recent history of loculated pleural effusion requiring additional IR chest tube placement November 2024  -Discussed CT findings with the patient.  I do not believe thoracentesis or chest tube would be indicated at this time given appearance of small loculated right pleural effusion.    -Pain control   -Okay for discharge from pulmonary perspective.     --------------  DISCHARGE REVIEW    Payor: [facility]  Subscriber #:  [id number]  Authorization Number: [id number]    Admit date: 12/27/24  Admit time:   9:50 PM  Discharge Date: 12/29/2024  1:45 PM     Admitting Physician: Dara Campos MD  Attending  Physician:  No att. providers found  Primary Care Physician: Alton Marie MD       Discharge Summary Notes    No notes of this type exist for this encounter.

## 2025-01-06 ENCOUNTER — NURSE ONLY (OUTPATIENT)
Age: 65
End: 2025-01-06
Attending: INTERNAL MEDICINE
Payer: COMMERCIAL

## 2025-01-06 ENCOUNTER — OFFICE VISIT (OUTPATIENT)
Age: 65
End: 2025-01-06
Attending: INTERNAL MEDICINE
Payer: COMMERCIAL

## 2025-01-06 ENCOUNTER — OFFICE VISIT (OUTPATIENT)
Dept: PULMONOLOGY | Facility: CLINIC | Age: 65
End: 2025-01-06
Payer: COMMERCIAL

## 2025-01-06 VITALS
WEIGHT: 149.5 LBS | BODY MASS INDEX: 27.51 KG/M2 | SYSTOLIC BLOOD PRESSURE: 175 MMHG | TEMPERATURE: 99 F | DIASTOLIC BLOOD PRESSURE: 90 MMHG | OXYGEN SATURATION: 95 % | HEART RATE: 86 BPM | HEIGHT: 62 IN | RESPIRATION RATE: 18 BRPM

## 2025-01-06 VITALS
HEIGHT: 63 IN | OXYGEN SATURATION: 94 % | WEIGHT: 150 LBS | HEART RATE: 88 BPM | SYSTOLIC BLOOD PRESSURE: 189 MMHG | RESPIRATION RATE: 14 BRPM | BODY MASS INDEX: 26.58 KG/M2 | DIASTOLIC BLOOD PRESSURE: 101 MMHG

## 2025-01-06 VITALS
BODY MASS INDEX: 27.51 KG/M2 | SYSTOLIC BLOOD PRESSURE: 175 MMHG | OXYGEN SATURATION: 95 % | HEART RATE: 86 BPM | TEMPERATURE: 99 F | HEIGHT: 62 IN | RESPIRATION RATE: 18 BRPM | WEIGHT: 149.5 LBS | DIASTOLIC BLOOD PRESSURE: 90 MMHG

## 2025-01-06 DIAGNOSIS — C73 THYROID CANCER (HCC): ICD-10-CM

## 2025-01-06 DIAGNOSIS — I15.9 SECONDARY HYPERTENSION: ICD-10-CM

## 2025-01-06 DIAGNOSIS — Z71.89 ADVANCE CARE PLANNING: ICD-10-CM

## 2025-01-06 DIAGNOSIS — Z51.5 PALLIATIVE CARE ENCOUNTER: Primary | ICD-10-CM

## 2025-01-06 DIAGNOSIS — G89.3 CANCER RELATED PAIN: ICD-10-CM

## 2025-01-06 DIAGNOSIS — C73 THYROID CANCER (HCC): Primary | ICD-10-CM

## 2025-01-06 DIAGNOSIS — F41.9 ANXIETY: ICD-10-CM

## 2025-01-06 DIAGNOSIS — C78.01 MALIGNANT NEOPLASM METASTATIC TO RIGHT LUNG (HCC): ICD-10-CM

## 2025-01-06 DIAGNOSIS — R06.09 DOE (DYSPNEA ON EXERTION): ICD-10-CM

## 2025-01-06 DIAGNOSIS — K59.03 THERAPEUTIC OPIOID INDUCED CONSTIPATION: ICD-10-CM

## 2025-01-06 DIAGNOSIS — Z51.81 THERAPEUTIC DRUG MONITORING: ICD-10-CM

## 2025-01-06 DIAGNOSIS — T40.2X5A THERAPEUTIC OPIOID INDUCED CONSTIPATION: ICD-10-CM

## 2025-01-06 DIAGNOSIS — J90 PLEURAL EFFUSION: ICD-10-CM

## 2025-01-06 DIAGNOSIS — J90 PLEURAL EFFUSION ON RIGHT: Primary | ICD-10-CM

## 2025-01-06 DIAGNOSIS — Z71.89 GOALS OF CARE, COUNSELING/DISCUSSION: ICD-10-CM

## 2025-01-06 LAB
ALBUMIN SERPL-MCNC: 4.5 G/DL (ref 3.2–4.8)
ALBUMIN/GLOB SERPL: 1.3 {RATIO} (ref 1–2)
ALP LIVER SERPL-CCNC: 97 U/L
ALT SERPL-CCNC: 22 U/L
ANION GAP SERPL CALC-SCNC: 9 MMOL/L (ref 0–18)
AST SERPL-CCNC: 27 U/L (ref ?–34)
BASOPHILS # BLD AUTO: 0.06 X10(3) UL (ref 0–0.2)
BASOPHILS NFR BLD AUTO: 0.7 %
BILIRUB SERPL-MCNC: 0.6 MG/DL (ref 0.2–1.1)
BUN BLD-MCNC: 16 MG/DL (ref 9–23)
BUN/CREAT SERPL: 19.3 (ref 10–20)
CALCIUM BLD-MCNC: 9.8 MG/DL (ref 8.7–10.4)
CHLORIDE SERPL-SCNC: 106 MMOL/L (ref 98–112)
CO2 SERPL-SCNC: 25 MMOL/L (ref 21–32)
CREAT BLD-MCNC: 0.83 MG/DL
DEPRECATED RDW RBC AUTO: 45.1 FL (ref 35.1–46.3)
EGFRCR SERPLBLD CKD-EPI 2021: 79 ML/MIN/1.73M2 (ref 60–?)
EOSINOPHIL # BLD AUTO: 0.22 X10(3) UL (ref 0–0.7)
EOSINOPHIL NFR BLD AUTO: 2.5 %
ERYTHROCYTE [DISTWIDTH] IN BLOOD BY AUTOMATED COUNT: 13.4 % (ref 11–15)
GLOBULIN PLAS-MCNC: 3.6 G/DL (ref 2–3.5)
GLUCOSE BLD-MCNC: 117 MG/DL (ref 70–99)
HCT VFR BLD AUTO: 42.1 %
HGB BLD-MCNC: 13.5 G/DL
IMM GRANULOCYTES # BLD AUTO: 0.02 X10(3) UL (ref 0–1)
IMM GRANULOCYTES NFR BLD: 0.2 %
LYMPHOCYTES # BLD AUTO: 3.4 X10(3) UL (ref 1–4)
LYMPHOCYTES NFR BLD AUTO: 38.8 %
MCH RBC QN AUTO: 29.5 PG (ref 26–34)
MCHC RBC AUTO-ENTMCNC: 32.1 G/DL (ref 31–37)
MCV RBC AUTO: 91.9 FL
MONOCYTES # BLD AUTO: 0.5 X10(3) UL (ref 0.1–1)
MONOCYTES NFR BLD AUTO: 5.7 %
NEUTROPHILS # BLD AUTO: 4.56 X10 (3) UL (ref 1.5–7.7)
NEUTROPHILS # BLD AUTO: 4.56 X10(3) UL (ref 1.5–7.7)
NEUTROPHILS NFR BLD AUTO: 52.1 %
OSMOLALITY SERPL CALC.SUM OF ELEC: 292 MOSM/KG (ref 275–295)
PLATELET # BLD AUTO: 348 10(3)UL (ref 150–450)
POTASSIUM SERPL-SCNC: 3.7 MMOL/L (ref 3.5–5.1)
PROT SERPL-MCNC: 8.1 G/DL (ref 5.7–8.2)
RBC # BLD AUTO: 4.58 X10(6)UL
SODIUM SERPL-SCNC: 140 MMOL/L (ref 136–145)
WBC # BLD AUTO: 8.8 X10(3) UL (ref 4–11)

## 2025-01-06 NOTE — PROGRESS NOTES
Palliative Care Follow Up Note     Patient Name: Camelia Markham   YOB: 1960   Medical Record Number: [id number]   Date of visit: 1/6/2025     The 21st Century Cures Act makes medical notes like these available to patients in the interest of transparency. Please be advised this is a medical document. Medical documents are intended to carry relevant information, facts as evident, and the clinical opinion of the practitioner. The medical note is intended as peer to peer communication and may appear blunt or direct. It is written in medical language and may contain abbreviations or verbiage that are unfamiliar.     Chief Complaint/Reason for Visit:  Chief Complaint   Patient presents with    Palliative Care        History of Present Illness:         Camelia Markham is a 64 year old female with  history of metastatic thyroid ca s/p thyroidectomy 2010, radioactive iodine tx with recent metastatic disease to lung, LN follows with Dr. Chen, hypothyroidism, HTN, HLD, GERD and anxiety.    Camelia Figueroa was admitted to hospital from 12/27/24 - 12/29/24 for abdominal pain management and constipation. Had colonoscopy that was unremarkable.     Reports resolution of constipation since recent hospital stay. She is taking Reglan. Reglan was prescribed for Camelia Figueroa to take TID/PRN, she has been taking it twice daily with good relief of constipation. Needs to continue f/u with GI. States that she called GI office, but no appt available until 3/2025. Encouraged her to call GI office and make sooner appt with PA or APRN for post hospital follow up.     Camelia Figueroa reports that she no longer takes Norco for R chest wall cancer related pain since Tylenol PRN helps control her intermittent pain.    Denies dyspnea. R PleurX present - last drained at the end of December. Follows with Dr. Brooks (has f/u appt today).     Takes Fioricet on occasion for headache (prescribed by PCP).    Takes Clonazepam at bedtime for difficulty sleeping  (prescribed by PCP).     Camelia Figueroa discontinued anti depressants on her own previously since she felt she was not as anxious.     PCP: Dr. Marie  Onc: Dr. Chen    Patient seen and examined with no family present today. Camelia Figueroa is awake, alert, oriented x 4, answers questions appropriately, is a reliable historian, and is in NAD today.     See ROS.     Problem List:  Patient Active Problem List   Diagnosis    Thyroid cancer (HCC)    Hypothyroidism    Exposure to medical therapeutic radiation    Acute gastritis    Posterior tibial tendinitis of right leg    Acute tonsillitis    Backache    Bursitis of shoulder    Hypertensive disorder    Fatigue    Gastroesophageal reflux disease    Nasal mucosa dry    Nonspecific syndrome suggestive of viral illness    Psychophysiologic insomnia    Primary osteoarthritis of left knee    Internal derangement of left knee    Internal derangement of right knee    Other instability, right knee    Acute meniscal tear, medial    Abnormal gait    Heartburn    Anxiety state    Dysthymia    Primary osteoarthritis of right knee    Incontinence of feces    History of colon polyps    Cyst of pancreas (HCC)    Intestinal disaccharidase deficiency    Headache    Generalized anxiety disorder    Familial combined hyperlipidemia    Dyspnea    Diverticulitis    Disability of walking    Contact dermatitis    Calcaneal spur    Bilateral plantar fasciitis    Pain in thoracic spine    Obesity    Rogers's metatarsalgia    Mixed hyperlipidemia    Mass of uterine adnexa    Malaise and fatigue    Lumbosacral radiculopathy    Itching    Irritable bowel syndrome    Obesity with body mass index 30 or greater    Type 2 diabetes mellitus without complication (HCC)    Tinea corporis    Seasonal allergic rhinitis    Pyuria    Psoriasis    Posterior rhinorrhea    Pleuritic pain    Plantar fascial fibromatosis    Viral infection    Vitamin D deficiency    Osteoarthritis of knee    Malignant neoplasm of thyroid gland  (HCC)    Influenza vaccine needed    Preoperative testing    Malignant neoplasm metastatic to right lung (HCC)    Pleural effusion    Malignant pleural effusion (HCC)    Goals of care, counseling/discussion    Advance care planning    Palliative care by specialist    Thyroid carcinoma (HCC)    Cancer related pain    Anxiety    Syncope and collapse    Hyperglycemia    Syncope    Metastasis from thyroid cancer (HCC)    Abdominal pain, generalized    Enteritis        Medical History:  Past Medical History:    Anxiety state, unspecified    Cancer (HCC)    Colon adenomas    x3    Disorder of thyroid    thyroidectomy    Diverticulosis of large intestine    Esophageal reflux    Exposure to medical therapeutic radiation    thyroid    High blood pressure    High cholesterol    Hypercholesteremia    Hypothyroidism    Osteoarthrosis, unspecified whether generalized or localized, unspecified site    Thyroid cancer (HCC)    papillary thyroid; s/p surgery resection with Asher and BRIDGES    Unspecified essential hypertension       Surgical History:  Past Surgical History:   Procedure Laterality Date    Colonoscopy N/A 2022    Procedure: COLONOSCOPY;  Surgeon: Neeraj No MD;  Location: [facility] ENDOSCOPY    Colonoscopy  2024    Colonoscopy N/A 2024    Procedure: COLONOSCOPY;  Surgeon: Neeraj No MD;  Location: [facility] ENDOSCOPY    Colonoscopy N/A 2024    Procedure: COLONOSCOPY;  Surgeon: Jair Murillo MD;  Location: [facility] ENDOSCOPY    Egd  2022    Egd  2024    Esophagogastroduodenoscopy    Endoscopic ultrasound - internal  2022    Endoscopic ultrasound exam  2024    Endoscopic Ultrasound    Eye surgery  2024    Eye surgery Left 2024    Hysterectomy      Knee surgery Left 2022    no associated bleeding complications          40 week 7 lb(s) 5 oz Male; 6 hr labor           40 week 7 lb(s) 3 oz Female          41 week 9 lb(s) 8 oz  Male    Other surgical history      Injection Tendon Sheath, Ligament    Thyroidectomy  2010    total    Total abdom hysterectomy         Allergies:  Allergies   Allergen Reactions    Latex HIVES    Peanut-Containing Drug Products SWELLING     Closes Throat  Closes Throat  Closes Throat      Peanuts SWELLING     Closes Throat    Adhesive Tape OTHER (SEE COMMENTS)    Seasonal        Family History:  Family History   Problem Relation Age of Onset    Heart Disorder Mother     Breast Cancer Maternal Cousin Female 57    Cancer Daughter 29        Hodgkin's Lymphoma    Ovarian Cancer Neg     Bleeding Disorders Neg     DVT/VTE Neg     Pancreatic Cancer Neg     Prostate Cancer Neg        Social History:  Social History     Socioeconomic History    Marital status:    Tobacco Use    Smoking status: Never    Smokeless tobacco: Never   Vaping Use    Vaping status: Never Used   Substance and Sexual Activity    Alcohol use: Not Currently     Comment: social    Drug use: No       Medications:  Current Outpatient Medications   Medication Sig Dispense Refill    metoclopramide 5 MG Oral Tab Take 1 tablet (5 mg total) by mouth 3 (three) times daily as needed. 45 tablet 0    HYDROcodone-acetaminophen 5-325 MG Oral Tab Take 1 tablet by mouth every 4 (four) hours as needed. 30 tablet 0    Lenvatinib, 20 MG Daily Dose, 2 x 10 MG Oral Capsule Therapy Pack Take 14 mg by mouth daily.      senna-docusate 8.6-50 MG Oral Tab Take 2 tablets by mouth nightly. 60 tablet 2    Omeprazole 40 MG Oral Capsule Delayed Release Take 1 capsule (40 mg total) by mouth daily.      clonazePAM 2 MG Oral Tab Take 1 tablet (2 mg total) by mouth nightly as needed for Anxiety (Sleep as well).      butalbital-acetaminophen-caffeine -40 MG Oral Tab Take 1 tablet by mouth every 6 (six) hours as needed. 30 tablet 0    levothyroxine 150 MCG Oral Tab Take 1 tablet (150 mcg total) by mouth before breakfast.      Olmesartan Medoxomil 40 MG Oral Tab Take 1  tablet (40 mg total) by mouth daily.         Review of Systems:  Review of Systems   Constitutional:  Negative for malaise/fatigue and weight loss.   HENT: Negative.          Xerostomia     Eyes: Negative.    Respiratory:  Positive for shortness of breath (SOB going up/down stairs; transient; resolves on own after a short period of rest). Negative for cough, hemoptysis, sputum production and wheezing.         Cough and SOB have reduced in frequency     R PleurX in place - per Pt., minimal drainage being drained from PleurX    Following with Pulm       Cardiovascular: Negative.  Negative for chest pain, palpitations and leg swelling.   Gastrointestinal:  Negative for constipation (See HPI; constipation resolved; taking Reglan BID and has Senna-S to take in addition to Reglan is constipation occurs; recent colonoscopy unremarkable), nausea (Denies) and vomiting. Abdominal pain: Denies.  Genitourinary: Negative.    Musculoskeletal:  Negative for back pain (Denies).        Denies cancer related R chest wall pain today    R PleurX removed previously       Skin: Negative.    Neurological: Negative.    Psychiatric/Behavioral:  Negative for hallucinations, memory loss, substance abuse and suicidal ideas. The patient is not nervous/anxious (Anxiety about health; previosuly saw Polina Kelly LCSW; stopped taking Duloxetine \"it makes me feel sad\"; takes Klonopin QHS to help with sleep; does not want to take an antidepressant) and does not have insomnia (Takes Clonopin QHS/PRN prescribed by PCP).         Physical Examination:  Physical Exam  Vitals reviewed.   Constitutional:       General: She is not in acute distress.     Appearance: Normal appearance. She is not ill-appearing.   HENT:      Head: Normocephalic and atraumatic.      Right Ear: External ear normal.      Left Ear: External ear normal.      Nose: Nose normal.      Mouth/Throat:      Mouth: Mucous membranes are moist.      Pharynx: Oropharynx is clear.    Eyes:      General: No scleral icterus.     Conjunctiva/sclera: Conjunctivae normal.   Cardiovascular:      Rate and Rhythm: Normal rate and regular rhythm.      Pulses: Normal pulses.   Pulmonary:      Effort: Pulmonary effort is normal. No respiratory distress.      Comments: Diminished lung sounds noted in RLL    Abdominal:      General: Bowel sounds are normal. There is no distension.      Palpations: Abdomen is soft. There is no mass.      Tenderness: There is no abdominal tenderness. There is no right CVA tenderness, left CVA tenderness, guarding or rebound.      Hernia: No hernia is present.   Musculoskeletal:         General: Normal range of motion.      Cervical back: Normal range of motion and neck supple.      Right lower leg: No edema.      Left lower leg: No edema.   Skin:     General: Skin is warm and dry.      Coloration: Skin is not jaundiced or pale.   Neurological:      General: No focal deficit present.      Mental Status: She is alert and oriented to person, place, and time. Mental status is at baseline.      Motor: No weakness.      Gait: Gait normal.   Psychiatric:         Mood and Affect: Mood normal.         Behavior: Behavior normal.         Thought Content: Thought content normal.         Judgment: Judgment normal.       Palliative Care Goals of Care:  Discussed with patient/family today: Yes  Patient's preference about sharing medical information: Patient and family may receive information  Patient's decision making preferences: Fully involved and speak with family  Code status: FULL CODE  Have advanced directives been discussed with patient or healthcare power of : Yes                         Palliative Care Assessment/Plan:  1. Palliative care encounter    2. Cancer related pain    3. Therapeutic opioid induced constipation    4. Anxiety    5. Goals of care, counseling/discussion    6. Advance care planning    7. Thyroid cancer (HCC)    8. Malignant neoplasm metastatic to right  lung (HCC)        Cancer Related Pain  -Camelia Figueroa denies cancer related R chest wall cancer pain  -Takes OTC Acetaminophen PRN with good control of pain  -Discussed addiction vs tolerance  -Reviewed IL   -Discussed MOA and explained side effects of pain medications  -Max daily Tylenol limit is 3,000mg  -Has not needed to take Norco since she has been home from the hospital  -Ibuprofen caused stomach upset - pt would like to avoid  -Pt states she is very sensitive to opioids  -Continue Norco 5/325 mg 1 tab po Q 4 hrs prn for severe cancer related pain  -Needs aggressive bowel regimen if taking opioids - discussed this in detail with Camelia Figueroa today and previously  -Discussed indication for opioid medications for cancer related pain per NCCN guidelines  -Discussed common SE of opioid meds which include, but are not limited to: drowsiness, n/v, stomach upset, constipation  -Excessive or misuse of opioid medications may cause respiratory depression, CNS depression, and possibly death  -Discussed to NEVER self adjust pain med/opioid doses or stop these meds abruptly as this may lead to opioid withdrawal  -Discussed signs and symptoms of withdrawal which include, but are not limited to: rhinorrhea, diarrhea, irritability, chills, sweating, insomnia, mood swings, anxiety, increased pain         Constipation  -Needs aggressive bowel regimen if taking opioids - discussed this in detail with Camelia Figueroa today and previously  -Continue Reglan per hospital discharge instructions; this is helping control constipation symptom  -START Senns-S 2 tabs at bedtime/PRN if you take Norco  -Encouraged post hospital follow up with GI          Nausea  -Denies today  -Chemo induced  Ondansetron 8mg TID/PRN  -Prochlorperazine (Compazine) 10mg - take 1 tablet every 6 hours as needed for nausea  -CONTINUE taking Omeprazole  -Nausea symptom is less now that constipation has resolved          Xerostomia  -Continue Biotene - swish and spit 4  times/day  -Increase water intake     Anxiety about health/depressed mood  -Camelia Figueroa discontinued Duloxetine on her own previously - she felt that it made her feel sad  -There was an interaction (risk for prolonged QT interval) between Lexapro and oral anti cancer med - Lexapro was previously discontinued   -CONTINUE Klonopin 1mg at bedtime/PRN - prescribed by PCP   -Previously met with Polina Kelly LCSW - I encouraged continued follow up     Goals of care counseling/discussion  -Pt is FULL CODE  -Continue supportive medical treatments; pt plans to continue pursuing cancer treatment  -Patient is agreeable to hospitalization, if indicated  -Patient agreeable to following up with outpatient palliative care  -Provided emotional support to pt/family who appear to be coping adequately  -See above narrative for further details      Advance care planning counseling/discussion  -Pt is FULL CODE  -Health Surrogate: Baldemar Markham ()      Palliative Performance Scale 70%    Emotional support provided to patient/family: Yes    Palliative Care Follow-up:  I spent a total of  30 minutes with the patient today, which included all of the following:direct face to face contact, history taking, physical examination, and >50% was spent counseling and coordinating care.    Thank you for allowing the Palliative Care Team to participate in the care of your patient. I will continue to follow clinically.    BEBE FALK DNP, FNP-BC, [facility]  [phone]  1/6/2025

## 2025-01-06 NOTE — PATIENT INSTRUCTIONS
Schedule PULMONARY FUNCTION TEST at the hospital.    Depending on the test results, we may be able to qualify you for PULMONARY REHAB.    REPEAT A CXR IN 2 MONTHS    COME BACK IN 2-3 MONTHS

## 2025-01-06 NOTE — PROGRESS NOTES
[facility] Progress Note    Patient Name: Camelia Markham   YOB: 1960   Medical Record Number: [id number]   CSN: [id number]   Consulting Physician: Inocente Chen MD  Referring Physician(s): Dr aDna Sands MD    Reason for Consultation:  Metastatic  Thyroid Cancer   Cancer Staging   Malignant neoplasm of thyroid gland (HCC)  Staging form: Thyroid - Differentiated, AJCC 8th Edition  - Clinical: Stage IVB (rcT2, cN1, cM1, Age at diagnosis: >= 55 years) - Signed by Inocente Chen MD on 8/28/2024    Current Therapy  Lenvatinib - started 9/18/24  (now at 14mg daily)    Interval History  Patient returns for follow-up.  Since her last visit here she was hospitalized twice initially with what appeared to be colitis likely secondary to TKI therapy.  She underwent a colonoscopy on 12/21/2024 that showed very mild mucosal congestion with no ulcers or erosions.  Biopsies were normal.    12/19/2024 CT abdomen pelvis showed decrease in pleural-based nodularity compared to prior PET scan.    She has had no recurrence of abdominal discomfort and is now off the hydrocodone which has helped her constipation significantly.  She is now tolerating the 14 mg of lenvatinib (reduced previously to 20mg) without any major side effects.  Her blood pressure is a little high today but she has been off her antihypertensive since it was running low in the hospital.  She has an appoint with a PCP to address this soon    Past Oncologic History   Camelia Markham is a 64 year old female that was seen today in the Cancer Center for metastatic thyroid cancer. Her oncologic history is detailed below    11/3/2010 was found to have an enlarged thyroid gland ultrasound-guided FNA showed papillary thyroid cancer.  Underwent total thyroidectomy with Dr. Asher.  Final pathology showed a 2.5 cm tumor and 2 positive lymph nodes.    12/2010 BRIDGES uptake study showed no evidence of abnormal uptake outside the neck.  Underwent 207 miCu  of radioactive iodine.  She had then been maintained on levothyroxine.  Subsequently was lost to follow-up after 2018.    6/17/2024 Thyroglobulin increased to 10, prompted an ultrasound that showed an oval hypoechoic nodule in the superior aspect of the left thyroid measuring 1 x 0.6 x 0.6 cm thought to represent an indeterminate lymph node.    8/6/2024 ultrasound-guided FNA showed papillary carcinoma. NGS testing showed a BRAF V600E mutation    8/16/2024 [facility] thyroid scan showed no discernible pathologic activity increased uptake in the thyroid bed.  Given the biopsy-proven recurrence and absence of I-131 uptake this was thought to represent dedifferentiated recurrent thyroid malignancy.      8/22/2024 PET/CT fusion study showed extensive hypermetabolic pleural-based nodularity, large pleural effusion as well as hypermetabolic nodularity in the left neck soft tissues as well as peripheral right upper nodule all of which was concerning for metastatic disease.  Previously seen cystic pancreatic lesion did not demonstrate any hypermetabolic activity.    8/29/24 had thoracentesis with cytology showing metastatic thyroid carcinoma.    9/15/24 Underwent pleur-X for recurrent pleural effusion.    9/18/24 started lenvatinib at 24mg daily    11/2024 had significant mucositis decreased oral intake and diarrhea with lenvatinib.  This was held.  She required hospitalization and chest tube placement for evacuation loculated effusion.  Lenvatinib is being resumed at 20 mg daily      Past Medical History:  Past Medical History:    Anxiety state, unspecified    Cancer (HCC)    Colon adenomas    x3    Disorder of thyroid    thyroidectomy    Diverticulosis of large intestine    Esophageal reflux    Exposure to medical therapeutic radiation    thyroid    High blood pressure    High cholesterol    Hypercholesteremia    Hypothyroidism    Osteoarthrosis, unspecified whether generalized or localized, unspecified site    Thyroid cancer  (HCC)    papillary thyroid; s/p surgery resection with Asher and BRIDGES    Unspecified essential hypertension       Past Surgical History:  Past Surgical History:   Procedure Laterality Date    Colonoscopy N/A 2022    Procedure: COLONOSCOPY;  Surgeon: Neeraj No MD;  Location: [facility] ENDOSCOPY    Colonoscopy  2024    Colonoscopy N/A 2024    Procedure: COLONOSCOPY;  Surgeon: Neeraj No MD;  Location: [facility] ENDOSCOPY    Colonoscopy N/A 2024    Procedure: COLONOSCOPY;  Surgeon: Jair Murillo MD;  Location: [facility] ENDOSCOPY    Egd  2022    Egd  2024    Esophagogastroduodenoscopy    Endoscopic ultrasound - internal  2022    Endoscopic ultrasound exam  2024    Endoscopic Ultrasound    Eye surgery  2024    Eye surgery Left 2024    Hysterectomy      Knee surgery Left 2022    no associated bleeding complications          40 week 7 lb(s) 5 oz Male; 6 hr labor           40 week 7 lb(s) 3 oz Female          41 week 9 lb(s) 8 oz Male    Other surgical history      Injection Tendon Sheath, Ligament    Thyroidectomy      total    Total abdom hysterectomy         Family Medical History:  Family History   Problem Relation Age of Onset    Heart Disorder Mother     Breast Cancer Maternal Cousin Female 57    Cancer Daughter 29        Hodgkin's Lymphoma    Ovarian Cancer Neg     Bleeding Disorders Neg     DVT/VTE Neg     Pancreatic Cancer Neg     Prostate Cancer Neg        Gyne History:  OB History    Para Term  AB Living   3 3 0 0 0 0   SAB IAB Ectopic Multiple Live Births   0 0 0 0 0       Social History:  Social History     Socioeconomic History    Marital status:      Spouse name: Not on file    Number of children: Not on file    Years of education: Not on file    Highest education level: Not on file   Occupational History    Not on file   Tobacco Use    Smoking status: Never    Smokeless tobacco: Never    Vaping Use    Vaping status: Never Used   Substance and Sexual Activity    Alcohol use: Not Currently     Comment: social    Drug use: No    Sexual activity: Not on file   Other Topics Concern     Service Not Asked    Blood Transfusions Not Asked    Caffeine Concern Yes     Comment: 1 cup of coffee     Occupational Exposure Not Asked    Hobby Hazards Not Asked    Sleep Concern Not Asked    Stress Concern Not Asked    Weight Concern Not Asked    Special Diet Not Asked    Back Care Not Asked    Exercise Not Asked    Bike Helmet Not Asked    Seat Belt Not Asked    Self-Exams Not Asked   Social History Narrative    Camelia Figueroa is  to Baldemar x30 yrs. She has 3 adult children. Patient works in [employer] in [employer]. She lives with her , her mom, and 1 of the children in [place], [place].     Social Drivers of Health     Financial Resource Strain: Not on file   Food Insecurity: No Food Insecurity (12/27/2024)    Food Insecurity     Food Insecurity: Never true   Transportation Needs: No Transportation Needs (12/27/2024)    Transportation Needs     Lack of Transportation: No     Car Seat: Not on file   Physical Activity: Not on file   Stress: Not on file   Social Connections: Not on file   Housing Stability: Low Risk  (12/27/2024)    Housing Stability     Housing Instability: No     Housing Instability Emergency: Not on file     Crib or Bassinette: Not on file       Allergies:   Allergies   Allergen Reactions    Latex HIVES    Peanut-Containing Drug Products SWELLING     Closes Throat  Closes Throat  Closes Throat      Peanuts SWELLING     Closes Throat    Adhesive Tape OTHER (SEE COMMENTS)    Seasonal        Current Medications:  No outpatient medications have been marked as taking for the 1/6/25 encounter (Office Visit) with Inocente Chen MD.       Review of Systems:  A comprehensive 14 point review of systems was completed.  Pertinent positives and negatives noted in the the HPI.     Vital Signs:  BP (!)  175/90 (BP Location: Left arm, Patient Position: Sitting, Cuff Size: adult)   Pulse 86   Temp 98.6 °F (37 °C) (Oral)   Resp 18   Ht 1.575 m (5' 2\")   Wt 67.8 kg (149 lb 8 oz)   SpO2 95%   BMI 27.34 kg/m²    Physical Examination:  General: No apparent distress  ENT: Conjunctiva clear, EOM intact. Dry oral mucosa  Lymphatics:  No palpable lymphadenopathy .   Chest: + R sided PleurX. Normal respiratory effort.   CV: Regular rate and rhythm, S1 and S2 heard  Extremities: No edema noted  Skin: No lesions, rashes or nodules  Neurological: grossly intact  Psych: depressed mood and affect     Performance Status:  ECOG-1    Labs:    Lab Results   Component Value Date/Time    WBC 8.8 01/06/2025 08:43 AM    RBC 4.58 01/06/2025 08:43 AM    HGB 13.5 01/06/2025 08:43 AM    HCT 42.1 01/06/2025 08:43 AM    MCV 91.9 01/06/2025 08:43 AM    MCH 29.5 01/06/2025 08:43 AM    MCHC 32.1 01/06/2025 08:43 AM    RDW 13.4 01/06/2025 08:43 AM    NEPRELIM 4.56 01/06/2025 08:43 AM    .0 01/06/2025 08:43 AM       Lab Results   Component Value Date/Time     (H) 01/06/2025 08:43 AM    BUN 16 01/06/2025 08:43 AM    CREATSERUM 0.83 01/06/2025 08:43 AM    GFRNAA 67 02/16/2021 12:09 PM    CA 9.8 01/06/2025 08:43 AM    ALB 4.5 01/06/2025 08:43 AM     01/06/2025 08:43 AM    K 3.7 01/06/2025 08:43 AM     01/06/2025 08:43 AM    CO2 25.0 01/06/2025 08:43 AM    ALKPHO 97 01/06/2025 08:43 AM    AST 27 01/06/2025 08:43 AM    ALT 22 01/06/2025 08:43 AM       Imaging:    Impression:  64-year-old with a long history of thyroid cancer s/p thyroidectomy followed by BRIDGES and levothyroxine suppression now with biopsy-proven recurrence around the neck as well as pulmonary parenchymal mets and pleural nodules all worrisome for metastatic thyroid cancer.  BRIDGES uptake scan was negative suggesting dedifferentiated thyroid cancer.    I've previously had a long discussion with patient explained that unfortunately this represents an incurable  Stage IV malignancy.  However this is certainly very treatable with oral tyrosine kinase inhibitors with good disease control.  She was started lenvatinib but has developed increasing mucositis nausea and dehydration, so was dose reduced    Plan:  Continue lenvatinib at 14 mg daily [ previously on 24 mg & then 20mg.], Tolerating better.  If she has continued trouble tolerating lenvatinib may need to switch to a BRAF inhibitor. (Since her tumor does have a BRAF-V600E mutation)  Labs fine today  Thyroglobulin has improved and her pleural effusions regressed all suggestive good early response to treatment. CT 12/24 showed improvement, repeat CT C/A/P in 2 mos  She has discussed bowel regimen extensively with the palliative care and will fu with GI  Elevated BP: ?lenvima induced, off her meds, encouraged her to resume her Benicar and follow-up with her PCP    Thank you Dr FINA MABRY, MD WENDY for the opportunity to participate in the care of this interesting patient. Please do contact me if I may be of any further assistance    Inocente Chen MD  [facility]ematology/Oncology    High complexity MDM. Our clinic is continuing focal point for all oncologic services the patient needs.

## 2025-01-06 NOTE — PROGRESS NOTES
Pulmonary Medicine Outpatient Clinic Note           Reason for Consultation and CC: abnormal chest CT and new right pleural effusion.    Referring Physician: Dr. Marie and Dr. Sands       Subjective:  Seen for f/u on 1/6/25.  Since the last visit, right pleurex catheter removed d/t min fluid and improved CXR.  Then recently admitted to the [facility] with worsening shortness of breath.  Repeat chest imaging showed min pleural fluid with pleural thickening. Didn't need a repeat thoracentesis or pleurex placement.   Was given laxatives for constipation.  Saw Dr. Chen and her cancer treatment meds have been lowered.  Also moving her bowels better.   Feels much better.   Still short of breath with going up and stairs but resolved quickly.   No fevers or chills.       From previous visit.  Seen for f/u on 12/4/24.  Feels she's constipated.   No longer on pain meds.   Breathing coreas feels well.   Not coughing,  No fevers, or chills.  Per home care RN last time pleurex catheter was drained was 75 ml on 12/20/24. Plans to have it drained.  Started on treatments for thyroid CA-follows with .  Since the last visit, was admitted to [facility] with respiratory symptoms and found to have a new right loculated pleural effusion requiring chest tube placement. Effusions were neg for cytology. Chest tube removed and pt was discharged. Previous pleurex remained in place and drained during the hospitalization.       From the previous visit.   Seen for f/u on 10/3/24.  Since the initial visit, underwent a right sided thoracentesis with pleural fluid c/w malignancy (thyroid cancer). Two weeks later was admitted to [facility] with shortness of breath and chest discomfort. CXR noted the effusion had re-accumulated so she had a pleurex catheter placed on 9/13/24. Was eventually discharged with home care to drain the effusion every 4 days. Also started on treatment Lenvatinib managed by oncology (Dr. Chen).  Now here  for f/u. Pleurex drainage was 250ml on the 21st, then 75ml, then 50ml, and 25 ml two days ago. Repeat CXR showed improvement in the right pleural effusion.   Reports some discharge at the site but has since healed.   Reports back pain. Taking the pain medicine and using heat packs.       From the initial visit.    HPI: I had the pleasure of seeing Camelia Markham for a Pulmonary Medicine consultation on 8/29/24.  Very pleasant 65 yo woman with hx of metastatic thyroid CA s/p total thyroidectomy in 2010, s/p BRIDGES tx. Recently, thyroglobulin level increased and a thyroid US showed an oval thyroid nodule thought to be a indeterminante lymph node. 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 Most recent PET scan notable showed extensive hypermetabolic pleural-based nodularity, large right pleural effusion as well as hypermetabolic nodularity in the left neck soft tissues as well as peripheral right upper nodule all of which was concerning for metastatic disease. She has been experiencing right chest pain (under her right breast) for 2 months. She thought it was MSK from sleeping the wrong way.   Also reports shortness of breath at rest and with exertion over the past 2 weeks.   Reports she coughs sometimes and wheezes. Cough is dry.   Denies fevers, chills. Does endorse night-time upper neck sweating about twice a week.        REVIEW OF SYSTEMS:  Positives and negatives as stated in HPI. Remainder of 10 pt review of systems otherwise are negative.       PAST MEDICAL HISTORY:  Past Medical History:    Anxiety state, unspecified    Cancer (HCC)    Colon adenomas    x3    Disorder of thyroid    thyroidectomy    Diverticulosis of large intestine    Esophageal reflux    Exposure to medical therapeutic radiation    thyroid    High blood pressure    High cholesterol    Hypercholesteremia    Hypothyroidism    Osteoarthrosis, unspecified whether generalized or  localized, unspecified site    Thyroid cancer (HCC)    papillary thyroid; s/p surgery resection with Asher and BRIDGES    Unspecified essential hypertension   Denies hx of previous pulmonary conditions       PAST SURGICAL HISTORY:  Past Surgical History:   Procedure Laterality Date    Colonoscopy N/A 2022    Procedure: COLONOSCOPY;  Surgeon: Neeraj No MD;  Location: [facility] ENDOSCOPY    Colonoscopy  2024    Colonoscopy N/A 2024    Procedure: COLONOSCOPY;  Surgeon: Neeraj No MD;  Location: [facility] ENDOSCOPY    Colonoscopy N/A 2024    Procedure: COLONOSCOPY;  Surgeon: Jair Murillo MD;  Location: [facility] ENDOSCOPY    Egd  2022    Egd  2024    Esophagogastroduodenoscopy    Endoscopic ultrasound - internal  2022    Endoscopic ultrasound exam  2024    Endoscopic Ultrasound    Eye surgery  2024    Eye surgery Left 2024    Hysterectomy      Knee surgery Left 2022    no associated bleeding complications          40 week 7 lb(s) 5 oz Male; 6 hr labor           40 week 7 lb(s) 3 oz Female          41 week 9 lb(s) 8 oz Male    Other surgical history      Injection Tendon Sheath, Ligament    Thyroidectomy      total    Total abdom hysterectomy          PAST FAMILY HISTORY:  Family History   Problem Relation Age of Onset    Heart Disorder Mother     Breast Cancer Maternal Cousin Female 57    Cancer Daughter 29        Hodgkin's Lymphoma    Ovarian Cancer Neg     Bleeding Disorders Neg     DVT/VTE Neg     Pancreatic Cancer Neg     Prostate Cancer Neg         PAST SOCIAL HISTORY:  Social History     Socioeconomic History    Marital status:    Tobacco Use    Smoking status: Never    Smokeless tobacco: Never   Vaping Use    Vaping status: Never Used   Substance and Sexual Activity    Alcohol use: Not Currently     Comment: social    Drug use: No   Other Topics Concern    Caffeine Concern Yes     Comment: 1 cup of coffee     Never smoked  No hx of asbestos exposure  Lives with mom and   Has a dog  Employed: office setting environment      ALLERGIES:  Allergies   Allergen Reactions    Latex HIVES    Peanut-Containing Drug Products SWELLING     Closes Throat  Closes Throat  Closes Throat      Peanuts SWELLING     Closes Throat    Adhesive Tape OTHER (SEE COMMENTS)    Seasonal         MEDS:  Current Outpatient Medications on File Prior to Visit   Medication Sig Dispense Refill    metoclopramide 5 MG Oral Tab Take 1 tablet (5 mg total) by mouth 3 (three) times daily as needed. 45 tablet 0    HYDROcodone-acetaminophen 5-325 MG Oral Tab Take 1 tablet by mouth every 4 (four) hours as needed. 30 tablet 0    Lenvatinib, 20 MG Daily Dose, 2 x 10 MG Oral Capsule Therapy Pack Take 14 mg by mouth daily.      senna-docusate 8.6-50 MG Oral Tab Take 2 tablets by mouth nightly. 60 tablet 2    Omeprazole 40 MG Oral Capsule Delayed Release Take 1 capsule (40 mg total) by mouth daily.      clonazePAM 2 MG Oral Tab Take 1 tablet (2 mg total) by mouth nightly as needed for Anxiety (Sleep as well).      butalbital-acetaminophen-caffeine -40 MG Oral Tab Take 1 tablet by mouth every 6 (six) hours as needed. 30 tablet 0    levothyroxine 150 MCG Oral Tab Take 1 tablet (150 mcg total) by mouth before breakfast.      Olmesartan Medoxomil 40 MG Oral Tab Take 1 tablet (40 mg total) by mouth daily.       No current facility-administered medications on file prior to visit.       PHYSICAL EXAM:  BP (!) 180/99   Pulse 88   Resp 14   Ht 5' 3\" (1.6 m)   Wt 150 lb (68 kg)   SpO2 94%   BMI 26.57 kg/m²   CONSTITUTIONAL: alert, oriented, no apparent distress  HEENT: atraumatic normocephalic  MOUTH: mucous membranes are moist. No OP exudates  NECK/THROAT: no JVD. Trachea midline. No obvious thyromegaly  LUNG: clear upper with some diminished and some crackles right base. Chest symmetric with respiratory motion  HEART: regular rate and rhythm, no obvious  murmers or gallops note  ABD: soft non tender. + bowel sounds. No organomegaly noted  EXT: no clubbing, cyanosis, or edema noted. Pulses intact grossly  NEURO/MUSCULOSKELETAL: no gross deficits  SKIN: warm, dry. No obvious lesions noted  LYMPH: no obvious LAD       IMAGES:  Chest CT PE 12/27/24  CONCLUSION:   1. No pulmonary embolus.   2. Loculated right basilar pleural effusion with thickening of visceral and parietal pleura compatible with a malignant pleural effusion.  Coexistent infection should be excluded clinically.  Diffuse nodular thickening of the pleura in the right   hemithorax.  Atelectasis in the right middle lobe and lower lobe.   3. Thickened septal lines in the right hemithorax concerning for lymphangitic spread of tumor.   4. Left basilar pleural effusion with compressive atelectasis.         CXR 11/11/24  Impression   CONCLUSION: Moderate-sized right basal pleural effusion with chest tube.  No significant interval change in size of the effusion.  Stable right basal pulmonary opacity which may represent secondary compressive atelectasis.  Stable trace left basal pleural effusion.         CXR 11/8/24  CONCLUSION:   No significant change to the right approach pleural pigtail catheter.  Persistent small to moderate right pleural effusion and right basilar opacity.   Question small left pleural effusion.   No new abnormality.         CXR appears right upper loculated effusion is improved  CONCLUSION:   1. Interval placement of a short pigtail chest tube in the right apex with successful drainage of the loculated pleural effusion.   2. Right basilar chest tube remains with interval decrease in loculated right pleural effusion and slight improvement in atelectasis and or pneumonia.   3. Subsegmental atelectasis in the left lower lobe.   4. Top-normal heart size with normal pulmonary vascularity.   5. Atherosclerotic calcification aorta.   6. Internal fixation of an healed right clavicle fracture.          Chest CT 11/4/24  FINDINGS:   The following findings were made:   1. There is a large loculated right-sided pleural effusion with associated underlying pleural nodularity.  The finding is associated with scattered areas of subsegmental atelectasis throughout the right lung.  There is a PleurX catheter at the right lung base within a free-flowing segment of this loculated pleural effusion.   2. There are scattered ground-glass nodular peribronchial densities within the right lung as well as at the left lung base raising the possibility of a superimposed infectious process.  There is a very small free-flowing left pleural effusion with adjacent compressive atelectasis.   3. The visualized aspects of the liver demonstrates decreased attenuation compatible with fatty infiltration.  There are no focal hepatic lesions identified within the imaged segments of the liver.   4. There are slight to moderate degenerative changes within the thoracic spine.   The remainder of the examination is unremarkable.  Specifically, on vascular windows, the main pulmonary arteries and segmental branches are normal in their course and caliber with no intraluminal filling defects identified to suggest pulmonary embolus. The thoracic aorta is normal in its course and caliber with no aneurysmal dilatation, no dissection, and no secondary signs of acute aortic injury.  On lung windows, there is no pneumothorax.  On mediastinal windows, there is no pericardial effusion or significant adenopathy.  Within the upper abdomen, there is no adrenal mass.       CXR 9/30/24  CONCLUSION:   Small right pleural effusion, moderately decreased from prior.   Decreased right basilar opacity, with persistent somewhat nodular opacity at the lateral right lung base, may in part relate to pleural metastatic disease.       CXR 9/14/24  CONCLUSION:   Large right pleural effusion with right lower lobe atelectasis or pneumonia has increased in size since 9/13/2024.    Small left lower lobe pleural effusion with left lower lobe atelectasis or pneumonia is new.   Enlarged cardiomediastinal silhouette, unchanged.       PET scan 8/2024  FINDINGS:  REFERENCE VALUES: The SUV max of the mediastinal blood pool is 3.3. The SUV max of the liver is 4.3.  HEAD/NECK: In the left neck soft tissues, a 1.5 x 1.7 cm hypodensity is seen (series 11, image 39) with SUV max of 18.4, increasing to 18.8 on delayed head neck imaging. A 0.6 x 0.7 cm node in the left neck (series 3, image 50) demonstrates SUV 5.6, increasing to 6.1.  Postoperative changes of thyroidectomy are demonstrated. There is metallic streak artifact from dental amalgam.  LUNGS/PLEURA: Extensive pleural nodularity is noted. A reference area of nodularity measuring 0.8 x 1.2 cm (series 3, image 109) has SUV max of 9.3. Hypermetabolic pleural nodularity is demonstrated along the right lateral pleural surfaces is seen with SUV max of 6.7-7.1. Extensive nodularity along the minor fissure is demonstrated, measuring 2.7 x 5.2 cm in aggregate (series 3, image 135) with SUV max of 8.9-10.7. Posteriorly along the minor fissure, there is hypermetabolic activity with SUV max of 8.3. Anterior pleural nodularity measures up to 4.6 x 1.8 cm (series 3, image 161) and has SUV max of 12.3. Posterior pleural nodularity has SUV max of 7.8. Extensive nodularity extending into the right costophrenic sulcus has SUV max of 7.7-9.1. A peripheral right upper lobe nodule measures 1.4 x 1.1 cm (series 3, image 111) with SUV max of 6.1.Large right pleural effusion is seen with associated compressive atelectasis, with or without superimposed airspace consolidation. Additional scattered ground-glass and reticular opacities are present and may be atelectatic in origin.  MEDIASTINUM/SHAZIA: Epicardial nodularity is seen with reference nodules measuring 1.1 x 1.2 cm and 1.9 x 1.5 cm (series 3, image 54). The SUV max in this region is 4.1-5.3. Posterior and lateral  epicardial activity is seen with SUV max of 6.2.  Periesophageal activity is seen with SUV max of 5.7.  CHEST WALL/AXILLA: No pathologic FDG activity.    ABDOMEN/PELVIS: Nonspecific low density of the hepatic parenchyma may represent underlying hepatic steatosis. The gallbladder is surgically absent with cholecystectomy clips in the gallbladder fossa. There is mild diffuse fatty replacement of the pancreas. A lobulated cystic pancreatic lesion the body measures 1.9 x 3.6 cm (series 23, image 182). Scattered colonic diverticula are present in the sigmoid colon. There is no colonic wall thickening or pericolonic fat stranding. Scattered  atherosclerotic vascular calcifications of the abdominal aorta are observed. The uterus is surgically absent. No pathologic FDG activity.    MUSCULOSKELETAL: There are degenerative changes of the shoulders bilaterally.  Degenerative changes of the hips are present bilaterally.  Postprocedural changes of prior right femoral intramedullary carmella and nail placement are partially delineated with resultant artifactual degradation. No pathologic FDG activity. No suspicious lesions on CT imaging.    OTHER: There is a minute fat-containing umbilical hernia. There is transversely oriented lower abdominopelvic wall scarring, suggestive of prior Pfannenstiel incision.  Small bilateral fat containing inguinal hernias are suspected.    Impression   1. Extensive hypermetabolic pleural-based nodularity is compatible with metastatic disease. Given the absence of activity on prior I-131 imaging, these findings are strongly suggestive of dedifferentiated metastatic thyroid malignancy.  2. Large right pleural effusion, likely malignant.  3. Hypermetabolic nodularity in the left neck soft tissues and involving a left-sided cervical node, consistent with metastatic disease.  4. A hypermetabolic peripheral right upper lobe nodule is concerning for pulmonary parenchymal metastasis.  5. Cystic pancreatic  lesion without discernible hypermetabolic activity.  6. Uncomplicated distal colonic diverticulosis.    7. Hepatic steatosis.  8. Status post cholecystectomy.  9. Postoperative changes of hysterectomy.    10. Lesser incidental findings as above.        LABS:  Lab Results   Component Value Date    WBC 8.8 01/06/2025    RBC 4.58 01/06/2025    HGB 13.5 01/06/2025    HCT 42.1 01/06/2025    MCV 91.9 01/06/2025    MCH 29.5 01/06/2025    MCHC 32.1 01/06/2025    MPV 9.9 04/08/2011     Recent Labs   Lab 01/06/25  0843   *   BUN 16   CREATSERUM 0.83   EGFRCR 79   CA 9.8   ALB 4.5      K 3.7      CO2 25.0   ALKPHO 97   AST 27   ALT 22   BILT 0.6   TP 8.1          PFTS none on record       ASSESSMENT/PLAN:  1.Malignant right pleural effusion and pleural mets c/w metastatic thyroid CA  -S/p pleurex catheter placement  -Drainage has decreased significantly and pleurex catheter removed  -Repeat chest imaging with min fluid and nodular and pleural thickening  -Follows with oncology on chemo  -Repeat CXR in 2 months  -Check PFTs to see if qualifies for pulmonary rehab to help with her VAUGHN    2.Pleural mets likely the cause of her chronic back and chest pain.   -Follow up with Oncology and Palliative    3.Constipation/colitis  -On bowel regimen managed by Dr. Marie  -Saw GI during hospitalization in 12/2024    4. HTN today  -Reports antiHTN meds were adjusted during the recent hospitalization  -Advised to contact PCP about her elevated BP today    RTC in 2-3 months.        Thank you for the opportunity to care for Camelia Markham.     I spent a total of 25 minutes in direct contact with the patient and reviewing pertinent outside records on the day of the encounter.     ALDA Brooks DO, MPH  Pulmonary Critical Care Medicine

## 2025-01-07 LAB
THYROGLOB AB: <1 IU/ML
THYROGLOB IMA: 1.5 NG/ML

## 2025-01-08 NOTE — PROGRESS NOTES
[facility] - Gastroenterology                                                                                                      Clinic Follow-up Visit    Chief Complaint   Patient presents with    Follow - Up     From Hospital visit         Subjective/HPI:   Camelia Markham is a 64 year old year old female with active medical conditions including thyroid cancer with metastases to lung, hypertension, hyperlipidemia, type 2 diabetes, hypothyroidism, obesity, vitamin D deficiency, anxiety, pancreatic cyst, osteoarthritis, psoriasis and history listed in note table.     she is here today for hospital follow-up.   #constipation, opiate induced  -reports constipation since November, severe complications of impaction and colitis. She has been on opiates intermittently which were worsening the constipation. She had not taken any opiate pain medication since 12/29/24 when she was discharged from hospital  -since discharge she is having daily to every other day bowel movement of soft brown stool. She is taking metoclopramide (reglan) 2x daily which is helping  -s/p colonoscopy inpatient and colitis thought to be adverse effect from cancer treatment. Pt denies any ongoing abd or rectal pain    #pancreatic cyst  -benign biopsy of pancreatic cyst in July 2024 with recommendation for MRI MRCP in 6 months    #GERD  -hx of GERD for years, takes omeprazole every other day for hx of acid reflux mostly triggered by foods such as tomato, red pepper. Denies any recent symptoms since making diet changes    Negative for:   Abdominal/rectal pain, unintentional weight loss, dysphagia, anorexia, overt GI bleed/melena, fever.    PRIOR GI WORK UP:       Last colonoscopy: 12/21/24 Dr. Murillo  -no polyps  -Very mild congestion in the descending colon/sigmoid colon, possibly related to early changes of developing colitis though there was no  significant inflammation.   -May have developing check point inhibitor colitis which is most common in the left colon. However, her symptoms are a bit atypical for CPI as she is having mainly constipation with associated abdominal cramping. Could be the early stages of CPI colitis.     A. Right colon; biopsy:  Colonic mucosa without significant histopathology.  No active colitis, granulomas, dysplasia, or changes associated with chronicity identified.     B. Left colon; biopsy:  Colonic mucosa without significant histopathology.  No active colitis, granulomas, dysplasia, or changes associated with chronicity identified.    7/29/24 Dr. No - 3 year recall  -3 colon polyps: tubular adenomas   -diverticulosis  -hemorrhoids    Last EGD:   7/29/24 EGD & EUS Dr. No  A 3.9 cm pancreas body cyst s/p FNA   Pancreas, body cyst; submitted fine needle aspiration:  Adequacy: Satisfactory for evaluation  General Category: Benign, inflammatory, reactive, or reparative changes  Diagnosis:  Benign cystic elements present  Benign pancreatic acinar cells present  Glandular cells and thin mucin present, favor GI contaminant  No malignant cells identified      Wt Readings from Last 6 Encounters:   01/09/25 149 lb 14.4 oz (68 kg)   01/06/25 150 lb (68 kg)   01/06/25 149 lb 8 oz (67.8 kg)   01/06/25 149 lb 8 oz (67.8 kg)   12/27/24 148 lb (67.1 kg)   12/19/24 148 lb 1.6 oz (67.2 kg)        History, Medications, Allergies, ROS:      Past Medical History:    Anxiety state, unspecified    Cancer (HCC)    Colon adenomas    x3    Disorder of thyroid    thyroidectomy    Diverticulosis of large intestine    Esophageal reflux    Exposure to medical therapeutic radiation    thyroid    High blood pressure    High cholesterol    Hypercholesteremia    Hyperlipidemia    Hypothyroidism    Osteoarthrosis, unspecified whether generalized or localized, unspecified site    Thyroid cancer (HCC)    papillary thyroid; s/p surgery resection with  Asher and BRIDGES    Unspecified essential hypertension      Past Surgical History:   Procedure Laterality Date    Colonoscopy N/A 2022    Procedure: COLONOSCOPY;  Surgeon: Neeraj No MD;  Location: [facility] ENDOSCOPY    Colonoscopy  2024    Colonoscopy N/A 2024    Procedure: COLONOSCOPY;  Surgeon: Neeraj No MD;  Location: [facility] ENDOSCOPY    Colonoscopy N/A 2024    Procedure: COLONOSCOPY;  Surgeon: Jair Murillo MD;  Location: [facility] ENDOSCOPY    Egd  2022    Egd  2024    Esophagogastroduodenoscopy    Endoscopic ultrasound - internal  2022    Endoscopic ultrasound exam  2024    Endoscopic Ultrasound    Eye surgery  2024    Eye surgery Left 2024    Hysterectomy      Knee surgery Left 2022    no associated bleeding complications          40 week 7 lb(s) 5 oz Male; 6 hr labor           40 week 7 lb(s) 3 oz Female          41 week 9 lb(s) 8 oz Male    Other surgical history      Injection Tendon Sheath, Ligament    Thyroidectomy      total    Total abdom hysterectomy        Family History   Problem Relation Age of Onset    Heart Disorder Mother     Breast Cancer Maternal Cousin Female 57    Cancer Daughter 29        Hodgkin's Lymphoma    Ovarian Cancer Neg     Bleeding Disorders Neg     DVT/VTE Neg     Pancreatic Cancer Neg     Prostate Cancer Neg       Social History:   Social History     Socioeconomic History    Marital status:    Tobacco Use    Smoking status: Never    Smokeless tobacco: Never   Vaping Use    Vaping status: Never Used   Substance and Sexual Activity    Alcohol use: Not Currently     Comment: social    Drug use: No   Other Topics Concern    Caffeine Concern Yes     Comment: 1 cup of coffee    Social History Narrative    Camelia Figueroa is  to Baldemar x30 yrs. She has 3 adult children. 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 She lives with her , her mom, and 1 of the children in  [place].     Social Drivers of Health     Food Insecurity: No Food Insecurity (12/27/2024)    Food Insecurity     Food Insecurity: Never true   Transportation Needs: No Transportation Needs (12/27/2024)    Transportation Needs     Lack of Transportation: No   Housing Stability: Low Risk  (12/27/2024)    Housing Stability     Housing Instability: No        Medications (Active prior to today's visit):  Current Outpatient Medications   Medication Sig Dispense Refill    metoclopramide 5 MG Oral Tab Take 1 tablet (5 mg total) by mouth 3 (three) times daily as needed. 45 tablet 0    HYDROcodone-acetaminophen 5-325 MG Oral Tab Take 1 tablet by mouth every 4 (four) hours as needed. 30 tablet 0    Lenvatinib, 20 MG Daily Dose, 2 x 10 MG Oral Capsule Therapy Pack Take 14 mg by mouth daily.      senna-docusate 8.6-50 MG Oral Tab Take 2 tablets by mouth nightly. 60 tablet 2    Omeprazole 40 MG Oral Capsule Delayed Release Take 1 capsule (40 mg total) by mouth daily.      clonazePAM 2 MG Oral Tab Take 1 tablet (2 mg total) by mouth nightly as needed for Anxiety (Sleep as well).      butalbital-acetaminophen-caffeine -40 MG Oral Tab Take 1 tablet by mouth every 6 (six) hours as needed. 30 tablet 0    levothyroxine 150 MCG Oral Tab Take 1 tablet (150 mcg total) by mouth before breakfast.      Olmesartan Medoxomil 40 MG Oral Tab Take 1 tablet (40 mg total) by mouth daily.         Allergies:  Allergies[1]    ROS:   CONSTITUTIONAL: negative for fevers, chills, sweats  EYES Negative for scleral icterus or redness, and diplopia  HEENT: Negative for hoarseness  RESPIRATORY: Negative for cough and severe shortness of breath  CARDIOVASCULAR: Negative for crushing sub-sternal chest pain  GASTROINTESTINAL: See HPI  GENITOURINARY: Negative for dysuria  MUSCULOSKELETAL: Negative for arthralgias and myalgias  SKIN: Negative for jaundice, rash or pruritus  NEUROLOGICAL: Negative for dizziness and headaches  BEHAVIOR/PSYCH: Negative  for psychotic behavior    PHYSICAL EXAM:   Blood pressure 123/78, pulse 66, height 5' 3\" (1.6 m), weight 149 lb 14.4 oz (68 kg).    Gen- alert, no acute distress, well-nourished  HEENT: anicteric sclera, neck supple, trachea midline, MMM, no palpable or tender neck or supraclavicular lymph nodes  CV- RRR, the extremities are warm and well perfused   Lungs- No increased work of breathing. CTAB   Abdomen- Soft, symmetrical, non-tender without distention or guarding. No scars or lesions. Aorta is without bruit or visible pulsation. Umbilicus is midline without herniation. Normoactive bowel sounds are present, No masses, hepatomegaly or splenomegaly noted.  MSK: no erythema, warmth, no swelling of joints  Skin- No jaundice, erythema, rashes or lesions  Hematologic- no bleeding or bruising  Neuro- Alert and interactive, WHITMORE   Psych - appropriate mood & affect    Labs/Imaging:     Patient's labs and imaging were reviewed and discussed with patient today.     .  ASSESSMENT/PLAN:   Camelia Markham is a 64 year old year old female with active medical conditions including thyroid cancer with metastases to lung, hypertension, hyperlipidemia, type 2 diabetes, hypothyroidism, obesity, vitamin D deficiency, anxiety, pancreatic cyst, osteoarthritis, psoriasis and history listed in note table.     she is here today for hospital follow-up.   #constipation, opiate induced  -reports constipation since November, severe complications of impaction and colitis. She has been on opiates intermittently which were worsening the constipation. She had not taken any opiate pain medication since 12/29/24 when she was discharged from hospital  -since discharge she is having daily to every other day bowel movement of soft brown stool. She is taking metoclopramide (reglan) 2x daily which is helping  -s/p colonoscopy inpatient and colitis thought to be adverse effect from cancer treatment. Pt denies any ongoing abd or rectal pain  -recommend  continuing metoclopramide (reglan), avoid opiates. Miralax daily prn. And senna prn    #pancreatic cyst  -benign biopsy of pancreatic cyst in July 2024 with recommendation for MRI MRCP in 6 months  -MRI MRCP ordered    #GERD  -hx of GERD for years, takes omeprazole every other day for hx of acid reflux mostly triggered by foods such as tomato, red pepper. Denies any recent symptoms since making diet changes  -advised she can continue or stop PPI due to symptom improvement     Recommendations:  -continue metoclopramide (reglan) 2-3 x a day     -okay to use senna if needed    -if your constipation worsens or if you restart opiate pain medication you can add 1-2 capfuls of miralax daily.    -you can call to schedule your MRI MRCP #[phone]. This is for pancreatic cyst surveillance       Orders This Visit:  No orders of the defined types were placed in this encounter.      Meds This Visit:  Requested Prescriptions      No prescriptions requested or ordered in this encounter       Imaging & Referrals:  MRI MRCP (W+WO) ([id number])     BEBE Negro    [facility]  1/9/2025    The dictation was partially prepared using Dragon Medical voice recognition software. As a result, errors may occur. When identified, these errors have been corrected. While every attempt is made to correct errors during dictation, discrepancies may still exist.            [1]   Allergies  Allergen Reactions    Latex HIVES    Peanut-Containing Drug Products SWELLING     Closes Throat  Closes Throat  Closes Throat      Peanuts SWELLING     Closes Throat    Adhesive Tape OTHER (SEE COMMENTS)    Seasonal

## 2025-01-09 ENCOUNTER — OFFICE VISIT (OUTPATIENT)
Facility: CLINIC | Age: 65
End: 2025-01-09
Payer: COMMERCIAL

## 2025-01-09 VITALS
HEART RATE: 66 BPM | SYSTOLIC BLOOD PRESSURE: 123 MMHG | BODY MASS INDEX: 26.55 KG/M2 | HEIGHT: 63 IN | WEIGHT: 149.88 LBS | DIASTOLIC BLOOD PRESSURE: 78 MMHG

## 2025-01-09 DIAGNOSIS — K86.2 PANCREAS CYST (HCC): ICD-10-CM

## 2025-01-09 DIAGNOSIS — T40.2X5A OPIOID-INDUCED CONSTIPATION: Primary | ICD-10-CM

## 2025-01-09 DIAGNOSIS — K21.9 GASTROESOPHAGEAL REFLUX DISEASE, UNSPECIFIED WHETHER ESOPHAGITIS PRESENT: ICD-10-CM

## 2025-01-09 DIAGNOSIS — K59.03 OPIOID-INDUCED CONSTIPATION: Primary | ICD-10-CM

## 2025-01-09 PROCEDURE — 3074F SYST BP LT 130 MM HG: CPT

## 2025-01-09 PROCEDURE — 3008F BODY MASS INDEX DOCD: CPT

## 2025-01-09 PROCEDURE — 3078F DIAST BP <80 MM HG: CPT

## 2025-01-09 PROCEDURE — 99214 OFFICE O/P EST MOD 30 MIN: CPT

## 2025-01-09 NOTE — PATIENT INSTRUCTIONS
-continue metoclopramide (reglan) 2-3 x a day     -can continue omeprazole every other day or okay to stop if acid reflux symptoms are resolved    -okay to use senna if needed    -if your constipation worsens or if you restart opiate pain medication you can add 1-2 capfuls of miralax daily.    -you can call to schedule your MRI MRCP #[phone]. This is for pancreatic cyst surveillance

## 2025-01-13 ENCOUNTER — APPOINTMENT (OUTPATIENT)
Dept: CT IMAGING | Facility: HOSPITAL | Age: 65
End: 2025-01-13
Attending: EMERGENCY MEDICINE
Payer: COMMERCIAL

## 2025-01-13 ENCOUNTER — HOSPITAL ENCOUNTER (EMERGENCY)
Facility: HOSPITAL | Age: 65
Discharge: HOME | End: 2025-01-13
Attending: EMERGENCY MEDICINE
Payer: COMMERCIAL

## 2025-01-13 VITALS
WEIGHT: 149.94 LBS | DIASTOLIC BLOOD PRESSURE: 90 MMHG | RESPIRATION RATE: 18 BRPM | BODY MASS INDEX: 27 KG/M2 | OXYGEN SATURATION: 96 % | HEART RATE: 76 BPM | SYSTOLIC BLOOD PRESSURE: 182 MMHG | TEMPERATURE: 98 F

## 2025-01-13 DIAGNOSIS — S09.90XA INJURY OF HEAD, INITIAL ENCOUNTER: Primary | ICD-10-CM
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
## 2025-01-13 LAB — GLUCOSE BLDC GLUCOMTR-MCNC: 113 MG/DL (ref 70–99)

## 2025-01-13 PROCEDURE — 99284 EMERGENCY DEPT VISIT MOD MDM: CPT

## 2025-01-13 PROCEDURE — 70450 CT HEAD/BRAIN W/O DYE: CPT | Performed by: EMERGENCY MEDICINE

## 2025-01-13 PROCEDURE — 82962 GLUCOSE BLOOD TEST: CPT

## 2025-01-13 RX ORDER — ACETAMINOPHEN 500 MG
1000 TABLET ORAL ONCE
Status: COMPLETED | OUTPATIENT
Start: 2025-01-13 | End: 2025-01-13

## 2025-01-13 RX ORDER — IBUPROFEN 600 MG/1
600 TABLET, FILM COATED ORAL ONCE
Status: COMPLETED | OUTPATIENT
Start: 2025-01-13 | End: 2025-01-13
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
## 2025-01-14 ENCOUNTER — PATIENT MESSAGE (OUTPATIENT)
Dept: PULMONOLOGY | Facility: CLINIC | Age: 65
End: 2025-01-14
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
## 2025-01-20 ENCOUNTER — TELEPHONE (OUTPATIENT)
Age: 65
End: 2025-01-20

## 2025-01-20 ENCOUNTER — TELEPHONE (OUTPATIENT)
Dept: HEMATOLOGY/ONCOLOGY | Facility: HOSPITAL | Age: 65
End: 2025-01-20

## 2025-01-20 NOTE — TELEPHONE ENCOUNTER
Lenvatinib    Nausea Grade 2 - having nausea and a couple of times vomiting of yellow fluid.  Discussed with her use of antiemetics.  Vomiting - Grade 1 - has had yellow bile.    Denies fevers but has had chills and severe sweating to head. Denies diarrhea.  Some sob has PFT scheduled for tomorrow, no chest pain. Appetite decreased but taking in  soups.   Denies lightheadedness or dizziness. No  urinary complaints.  Feels tired, has not taken anything for symptoms but lie down. Symptoms started on Saturday.    Instructed to monitor for fever,  push fluids, small frequent meals, use of antiemetics.    Please advise.

## 2025-01-20 NOTE — TELEPHONE ENCOUNTER
Patient called. Sunday patient started vomiting. Bad headach today. Please call back.  Thank you.

## 2025-01-20 NOTE — TELEPHONE ENCOUNTER
Camelia Figueroa  [phone]  This weekend I was throwing up Yellow in color no food with right side pain.   Three times nothing but yellow I had soup and chills. I had chills so bad  I went from hot to cold at the same time!   Not sure if doctor needed to know this. Thanks Beth   Thank you for coming to see us in the Pediatric Neurology clinic today.       If he has any episodes concerning for seizures, please call us.

## 2025-01-21 ENCOUNTER — HOSPITAL ENCOUNTER (OUTPATIENT)
Dept: RESPIRATORY THERAPY | Facility: HOSPITAL | Age: 65
Discharge: HOME | End: 2025-01-21
Attending: INTERNAL MEDICINE
Payer: COMMERCIAL

## 2025-01-21 DIAGNOSIS — J90 PLEURAL EFFUSION ON RIGHT: ICD-10-CM

## 2025-01-21 PROCEDURE — 94726 PLETHYSMOGRAPHY LUNG VOLUMES: CPT | Performed by: INTERNAL MEDICINE

## 2025-01-21 PROCEDURE — 94729 DIFFUSING CAPACITY: CPT | Performed by: INTERNAL MEDICINE

## 2025-01-21 PROCEDURE — 94010 BREATHING CAPACITY TEST: CPT | Performed by: INTERNAL MEDICINE

## 2025-01-21 NOTE — PROCEDURES
PULMONARY FUNCTION TESTING INTERPRETATION        Spirometry:  Forced vital capacity (FVC) is: decreased   Forced expiratory volume in 1 second (FEV1) is: decreased   FEV1/FVC ratio: increased   Significant bronchodilator response? Not performed        Flow Volume Loop:  Normal        Lung Volume:  Total lung capacity (TLC) is: decreased   Residual volume (RV) is: decreased         Diffusing Capacity:  Decreased         Interpretation:  Decreased spirometry values.   Moderate restrictive ventilatory pattern.  Decrease in diffusing capacity maybe seen in conditions such as emphysema, interstitial lung disease, pulmonary vascular disorders, anemia.   Correlate clinically.  The PFT raw data is available in EPIC EMR under CHART REVIEW under the PROCEDURES tab. Please click on the the PFT and there should be an attached hyperlink which shows the attached test when clicked on.     Electronically signed by    ALDA Brooks DO, MPH  Pulmonary Critical Care Medicine  [facility]

## 2025-01-23 DIAGNOSIS — J98.4 RESTRICTIVE LUNG DISEASE: Primary | ICD-10-CM

## 2025-01-23 DIAGNOSIS — J90 PLEURAL EFFUSION: ICD-10-CM

## 2025-01-24 ENCOUNTER — TELEPHONE (OUTPATIENT)
Age: 65
End: 2025-01-24

## 2025-01-24 ENCOUNTER — OFFICE VISIT (OUTPATIENT)
Age: 65
End: 2025-01-24
Attending: INTERNAL MEDICINE
Payer: COMMERCIAL

## 2025-01-24 VITALS
DIASTOLIC BLOOD PRESSURE: 99 MMHG | BODY MASS INDEX: 27.23 KG/M2 | SYSTOLIC BLOOD PRESSURE: 171 MMHG | OXYGEN SATURATION: 96 % | HEIGHT: 62 IN | RESPIRATION RATE: 18 BRPM | WEIGHT: 148 LBS | TEMPERATURE: 98 F | HEART RATE: 66 BPM

## 2025-01-24 DIAGNOSIS — C78.01 MALIGNANT NEOPLASM METASTATIC TO RIGHT LUNG (HCC): ICD-10-CM

## 2025-01-24 DIAGNOSIS — K12.30 MUCOSITIS: Primary | ICD-10-CM

## 2025-01-24 DIAGNOSIS — C73 THYROID CARCINOMA (HCC): ICD-10-CM

## 2025-01-24 DIAGNOSIS — Z29.89 ENCOUNTER FOR IMMUNOTHERAPY: ICD-10-CM

## 2025-01-24 DIAGNOSIS — F41.1 ANXIETY STATE: ICD-10-CM

## 2025-01-24 RX ORDER — METOPROLOL SUCCINATE 25 MG/1
25 TABLET, EXTENDED RELEASE ORAL DAILY
COMMUNITY
Start: 2025-01-07

## 2025-01-24 RX ORDER — ONDANSETRON 4 MG/1
TABLET, ORALLY DISINTEGRATING ORAL
COMMUNITY
Start: 2025-01-20

## 2025-01-24 RX ORDER — LIDOCAINE HYDROCHLORIDE 20 MG/ML
5 SOLUTION OROPHARYNGEAL
Qty: 100 ML | Refills: 0 | Status: SHIPPED | OUTPATIENT
Start: 2025-01-24

## 2025-01-24 NOTE — TELEPHONE ENCOUNTER
Called Camelia Figueroa regarding her my chart message. Discussed with Dr. Chen he would like her mouth to be examined. Her nausea has resolved. Now she has a really dry mouth and cheek pain. Is not able to eat due to the dry mouth and pain in her mouth. She had a dental visit this week and they gave her dry mouth products to use I instructed her to bring what they encouraged her to use.

## 2025-01-24 NOTE — PATIENT INSTRUCTIONS
Recipe  Maalox liquid  Diphenhydramine (benadryl) liquid  Mix equal parts 1:1  Example: mix 1 tablespoon of each and rinse and spit prior to eating 4 times a day    If pain increases, call, and will consider viscous lidocaine

## 2025-01-24 NOTE — PROGRESS NOTES
[facility] Progress Note    Patient Name: Camelia Markham   YOB: 1960   Medical Record Number: [id number]   CSN: [id number]   Consulting Physician: Inocente Chen MD  Referring Physician(s): Dr Dana Sands MD    Chief Complaint:  mucositis, follow up  for Metastatic  Thyroid Cancer on lenvatinib     Cancer Staging   Malignant neoplasm of thyroid gland (HCC)  Staging form: Thyroid - Differentiated, AJCC 8th Edition  - Clinical: Stage IVB (rcT2, cN1, cM1, Age at diagnosis: >= 55 years) - Signed by Inocente Chen MD on 8/28/2024    Current Therapy  Lenvatinib - started 9/18/24  (now at 14mg daily)    Interval History    Patient called reporting dry tongue and cheeks hurting. Pt reports saw dentist and they gave her All day toothpaste, oral spray and oral gel for dry mouth (xylitol). She reports tried biotene and it made her nauseated. Wt stable, afebrile. Reports eating and drinking. Denies fever, chills, bleeding, vomiting, diarrhea.    On assessment noted two inner lower lip and one right cheek sore, back of tongue with some bumpy areas same color as tongue.  Oral mucosa pink, no erythema, no white or yellow areas, throat pink.    Continues  14 mg of lenvatinib (reduced previously to 20mg).   Give recipe for magic mouth wash(maalox, diphenhydramine) if no decrease in mouth pain, may add viscous lidocaine as ordered.     Past Oncologic History   Camelia Markham is a 64 year old female that was seen today in the Cancer Center for metastatic thyroid cancer. Her oncologic history is detailed below    11/3/2010 was found to have an enlarged thyroid gland ultrasound-guided FNA showed papillary thyroid cancer.  Underwent total thyroidectomy with Dr. Asher.  Final pathology showed a 2.5 cm tumor and 2 positive lymph nodes.    12/2010 BRIDGES uptake study showed no evidence of abnormal uptake outside the neck.  Underwent 207 miCu of radioactive iodine.  She had then been maintained on levothyroxine.   Subsequently was lost to follow-up after 2018.    6/17/2024 Thyroglobulin increased to 10, prompted an ultrasound that showed an oval hypoechoic nodule in the superior aspect of the left thyroid measuring 1 x 0.6 x 0.6 cm thought to represent an indeterminate lymph node.    8/6/2024 ultrasound-guided FNA showed papillary carcinoma. NGS testing showed a BRAF [id number] mutation    8/16/2024 [facility] thyroid scan showed no discernible pathologic activity increased uptake in the thyroid bed.  Given the biopsy-proven recurrence and absence of I-131 uptake this was thought to represent dedifferentiated recurrent thyroid malignancy.      8/22/2024 PET/CT fusion study showed extensive hypermetabolic pleural-based nodularity, large pleural effusion as well as hypermetabolic nodularity in the left neck soft tissues as well as peripheral right upper nodule all of which was concerning for metastatic disease.  Previously seen cystic pancreatic lesion did not demonstrate any hypermetabolic activity.    8/29/24 had thoracentesis with cytology showing metastatic thyroid carcinoma.    9/15/24 Underwent pleur-X for recurrent pleural effusion.    9/18/24 started lenvatinib at 24mg daily    11/2024 had significant mucositis decreased oral intake and diarrhea with lenvatinib.  This was held.  She required hospitalization and chest tube placement for evacuation loculated effusion.  Lenvatinib is being resumed at 20 mg daily      Past Medical History:  Past Medical History:    Anxiety state, unspecified    Cancer (HCC)    Colon adenomas    x3    Disorder of thyroid    thyroidectomy    Diverticulosis of large intestine    Esophageal reflux    Exposure to medical therapeutic radiation    thyroid    High blood pressure    High cholesterol    Hypercholesteremia    Hyperlipidemia    Hypothyroidism    Osteoarthrosis, unspecified whether generalized or localized, unspecified site    Thyroid cancer (HCC)    papillary thyroid; s/p surgery resection with  Asher and BRIDGES    Unspecified essential hypertension       Past Surgical History:  Past Surgical History:   Procedure Laterality Date    Colonoscopy N/A 2022    Procedure: COLONOSCOPY;  Surgeon: Neeraj No MD;  Location: [facility] ENDOSCOPY    Colonoscopy  2024    Colonoscopy N/A 2024    Procedure: COLONOSCOPY;  Surgeon: Neeraj No MD;  Location: [facility] ENDOSCOPY    Colonoscopy N/A 2024    Procedure: COLONOSCOPY;  Surgeon: Jair Murillo MD;  Location: [facility] ENDOSCOPY    Egd  2022    Egd  2024    Esophagogastroduodenoscopy    Endoscopic ultrasound - internal  2022    Endoscopic ultrasound exam  2024    Endoscopic Ultrasound    Eye surgery  2024    Eye surgery Left 2024    Hysterectomy      Knee surgery Left 2022    no associated bleeding complications          40 week 7 lb(s) 5 oz Male; 6 hr labor           40 week 7 lb(s) 3 oz Female          41 week 9 lb(s) 8 oz Male    Other surgical history      Injection Tendon Sheath, Ligament    Thyroidectomy      total    Total abdom hysterectomy         Family Medical History:  Family History   Problem Relation Age of Onset    Heart Disorder Mother     Breast Cancer Maternal Cousin Female 57    Cancer Daughter 29        Hodgkin's Lymphoma    Ovarian Cancer Neg     Bleeding Disorders Neg     DVT/VTE Neg     Pancreatic Cancer Neg     Prostate Cancer Neg        Gyne History:  OB History    Para Term  AB Living   3 3 0 0 0 0   SAB IAB Ectopic Multiple Live Births   0 0 0 0 0       Social History:  Social History     Socioeconomic History    Marital status:      Spouse name: Not on file    Number of children: Not on file    Years of education: Not on file    Highest education level: Not on file   Occupational History    Not on file   Tobacco Use    Smoking status: Never    Smokeless tobacco: Never   Vaping Use    Vaping status: Never Used   Substance  and Sexual Activity    Alcohol use: Not Currently     Comment: social    Drug use: No    Sexual activity: Not on file   Other Topics Concern     Service Not Asked    Blood Transfusions Not Asked    Caffeine Concern Yes     Comment: 1 cup of coffee     Occupational Exposure Not Asked    Hobby Hazards Not Asked    Sleep Concern Not Asked    Stress Concern Not Asked    Weight Concern Not Asked    Special Diet Not Asked    Back Care Not Asked    Exercise Not Asked    Bike Helmet Not Asked    Seat Belt Not Asked    Self-Exams Not Asked   Social History Narrative    Camelia Figueroa is  to Baldemar x30 yrs. She has 3 adult children. Patient works in [employer] in [place]. She lives with her , her mom, and 1 of the children in [place], [place].     Social Drivers of Health     Financial Resource Strain: Not on file   Food Insecurity: No Food Insecurity (12/27/2024)    Food Insecurity     Food Insecurity: Never true   Transportation Needs: No Transportation Needs (12/27/2024)    Transportation Needs     Lack of Transportation: No     Car Seat: Not on file   Physical Activity: Not on file   Stress: Not on file   Social Connections: Not on file   Housing Stability: Low Risk  (12/27/2024)    Housing Stability     Housing Instability: No     Housing Instability Emergency: Not on file     Crib or Bassinette: Not on file       Allergies:   Allergies   Allergen Reactions    Latex HIVES    Peanut-Containing Drug Products SWELLING     Closes Throat  Closes Throat  Closes Throat      Peanuts SWELLING     Closes Throat    Adhesive Tape OTHER (SEE COMMENTS)    Seasonal        Current Medications:   metoprolol succinate ER 25 MG Oral Tablet 24 Hr Take 1 tablet (25 mg total) by mouth daily.      ondansetron 4 MG Oral Tablet Dispersible       Lidocaine Viscous HCl 2 % Mouth/Throat Solution Take 5 mL by mouth every 3 (three) hours as needed for Pain. 100 mL 0    metoclopramide 5 MG Oral Tab Take 1 tablet (5 mg total) by mouth 3  (three) times daily as needed. 45 tablet 0    HYDROcodone-acetaminophen 5-325 MG Oral Tab Take 1 tablet by mouth every 4 (four) hours as needed. 30 tablet 0    Lenvatinib, 20 MG Daily Dose, 2 x 10 MG Oral Capsule Therapy Pack Take 14 mg by mouth daily.      Omeprazole 40 MG Oral Capsule Delayed Release Take 1 capsule (40 mg total) by mouth daily.      clonazePAM 2 MG Oral Tab Take 1 tablet (2 mg total) by mouth nightly as needed for Anxiety (Sleep as well).      butalbital-acetaminophen-caffeine -40 MG Oral Tab Take 1 tablet by mouth every 6 (six) hours as needed. 30 tablet 0    levothyroxine 150 MCG Oral Tab Take 1 tablet (150 mcg total) by mouth before breakfast.      Olmesartan Medoxomil 40 MG Oral Tab Take 1 tablet (40 mg total) by mouth daily.         Review of Systems:  A comprehensive 14 point review of systems was completed.  Pertinent positives and negatives noted in the the HPI.     Vital Signs:  BP (!) 171/99 (BP Location: Left arm, Patient Position: Sitting, Cuff Size: adult)   Pulse 66   Temp 97.9 °F (36.6 °C) (Oral)   Resp 18   Ht 1.575 m (5' 2\")   Wt 67.1 kg (148 lb)   SpO2 96%   BMI 27.07 kg/m²    Wt Readings from Last 6 Encounters:   01/24/25 67.1 kg (148 lb)   01/13/25 68 kg (149 lb 14.6 oz)   01/09/25 68 kg (149 lb 14.4 oz)   01/06/25 68 kg (150 lb)   01/06/25 67.8 kg (149 lb 8 oz)   01/06/25 67.8 kg (149 lb 8 oz)       Physical Examination:  General: No apparent distress  ENT: Conjunctiva clear, EOM intact. Dry oral mucosa  Lymphatics:  No palpable lymphadenopathy .   Chest: Normal respiratory effort.   CV: Regular rate and rhythm, S1 and S2 heard  Extremities: No edema noted  Skin: No lesions, rashes or nodules. +oral mucosa 2 sores inner lower lip, 1 sore right cheek  Neurological: grossly intact  Psych: depressed mood and affect     Performance Status:  ECOG-1    Labs:    Lab Results   Component Value Date/Time    WBC 8.8 01/06/2025 08:43 AM    RBC 4.58 01/06/2025 08:43 AM    HGB  13.5 01/06/2025 08:43 AM    HCT 42.1 01/06/2025 08:43 AM    MCV 91.9 01/06/2025 08:43 AM    MCH 29.5 01/06/2025 08:43 AM    MCHC 32.1 01/06/2025 08:43 AM    RDW 13.4 01/06/2025 08:43 AM    NEPRELIM 4.56 01/06/2025 08:43 AM    .0 01/06/2025 08:43 AM       Lab Results   Component Value Date/Time     (H) 01/06/2025 08:43 AM    BUN 16 01/06/2025 08:43 AM    CREATSERUM 0.83 01/06/2025 08:43 AM    GFRNAA 67 02/16/2021 12:09 PM    CA 9.8 01/06/2025 08:43 AM    ALB 4.5 01/06/2025 08:43 AM     01/06/2025 08:43 AM    K 3.7 01/06/2025 08:43 AM     01/06/2025 08:43 AM    CO2 25.0 01/06/2025 08:43 AM    ALKPHO 97 01/06/2025 08:43 AM    AST 27 01/06/2025 08:43 AM    ALT 22 01/06/2025 08:43 AM       Imaging:    Impression:  64-year-old with a long history of thyroid cancer s/p thyroidectomy followed by BRIGDES and levothyroxine suppression now with biopsy-proven recurrence around the neck as well as pulmonary parenchymal mets and pleural nodules all worrisome for metastatic thyroid cancer.  BRIDGES uptake scan was negative suggesting dedifferentiated thyroid cancer.    I've previously had a long discussion with patient explained that unfortunately this represents an incurable Stage IV malignancy.  However this is certainly very treatable with oral tyrosine kinase inhibitors with good disease control.  She was started lenvatinib but has developed increasing mucositis nausea and dehydration, so was dose reduced    Plan:  To use mouth wash swish and spit prior to eating up to 4 times a day, (Maalox liquid and diphenhydramine liquid, in 1 to 1 measurements, ex) 1 tablespoon of each, mix and swish and spit.  Use dry mouth products given from Dentist All day toothpaste, spray and gel as tolerated. Encourage protein shakes, boost, water and electrolyte drinks. Call if sore increase, get more painful or decreased eating and drinking.  Continue lenvatinib at 14 mg daily [ previously on 24 mg & then 20mg.], Tolerating.    Follow up 2/7/25 Dr. Chen with labs.    BEBE Guevara    [facility] Hemat[facility]/Oncology    High complexity MDM. Our clinic is continuing focal point for all oncologic services the patient needs.

## 2025-01-27 ENCOUNTER — DOCUMENTATION ONLY (OUTPATIENT)
Age: 65
End: 2025-01-27

## 2025-01-27 ENCOUNTER — TELEPHONE (OUTPATIENT)
Age: 65
End: 2025-01-27

## 2025-01-27 DIAGNOSIS — C78.01 MALIGNANT NEOPLASM METASTATIC TO RIGHT LUNG (HCC): ICD-10-CM

## 2025-01-27 DIAGNOSIS — C73 THYROID CARCINOMA (HCC): ICD-10-CM

## 2025-01-27 DIAGNOSIS — K12.30 MUCOSITIS: Primary | ICD-10-CM

## 2025-01-27 RX ORDER — DEXAMETHASONE 0.5 MG/5ML
ELIXIR ORAL
Qty: 237 ML | Refills: 0 | Status: SHIPPED | OUTPATIENT
Start: 2025-01-27

## 2025-01-27 NOTE — PROGRESS NOTES
1/27/25 Called patient and spoke on the phone. Asked if she picked up the maalox and diphenhydramine liquid to make the mouthwash as instructed, she did not. She reports using All day dry mouth products and feels nauseated. She reports continued difficult to eat and drink. Requested her to  the maalox and diphendramine as directed at last visit. Pt having difficulty following recipe instructions.  Ordered dexamethasone liquid (0.5 mg/5ml) with instructions to swish and spit for 2 min, 4 times a day and not to eat 1 hour after. Discussed with Dr. Chen and agreed to have pt hold lenvatinib for 1 week, take dexamethasone rinse as ordered, and cont to increase water, electrolyte drinks, protein shakes and oral intake.  Has follow up Dr. Chen on 2/7/25.  BEBE Guevara

## 2025-01-27 NOTE — TELEPHONE ENCOUNTER
Called Camelia Figueroa to check on her after her ACV to see how her mouth pain was. She said that she was still not able to eat food and not able to keep food down. Instructed for her per Dr. Chen to hold Lenvantinib for 1 week. Also, that Maral called in Dexamethasone elixir, she is to swish and spit can do up to 4 times a day. Instructed her that if she does not pick that up and start it she is not going to have much improvement. She needs to follow these instructions. Hold Lenvantinib and  Dexamethasone oral elixir. She voiced understanding and thanked me for the call.

## 2025-01-28 ENCOUNTER — TELEPHONE (OUTPATIENT)
Age: 65
End: 2025-01-28

## 2025-01-28 ENCOUNTER — TELEPHONE (OUTPATIENT)
Dept: PULMONOLOGY | Facility: CLINIC | Age: 65
End: 2025-01-28

## 2025-01-28 NOTE — TELEPHONE ENCOUNTER
Pulmonary rehab form reviewed and signed by Dr. Brooks, faxed to #[phone] with chart notes, confirmation received and order has been sent for scanning

## 2025-01-28 NOTE — TELEPHONE ENCOUNTER
1/28/25 Spoke with patient. She reports she went to 4 different pharmacies yesterday and was unable to find maalox, instead got mylanta liquid and benadryl liquid and started the mouth wash as ordered. She reports all day products are making her nauseated and she will stop those products. Requested her to take the dexamethasone rinse only, she requests to take the mylanta/benadryl mouthwash and is picking up lidocaine viscous today.  Notified her the dexamethasone will help heal

## 2025-01-28 NOTE — TELEPHONE ENCOUNTER
Called [facility] about Lidocaine to see if it was ready for  as Bruce Figueroa would prefer to do the Lidocaine mixture with Maalox and Benadryl. The Lidocaine is ready for pick for a copay of $1.57. Called bruce figueroa and let her know it is ready, she is on her way to pick it up. She thanked me for the call.

## 2025-02-03 ENCOUNTER — TELEPHONE (OUTPATIENT)
Age: 65
End: 2025-02-03

## 2025-02-03 NOTE — TELEPHONE ENCOUNTER
Called Camelia Figueroa, regarding her mouth sores. She states her mouth sores have improved and she no longer has mouth sores. She still has a dry mouth at times. She has congestion at night. Encouraged use of humidifier to moisten the air. Confirmed appointments for tomorrow. She will restart Lenvantinib tomorrow.

## 2025-02-07 ENCOUNTER — OFFICE VISIT (OUTPATIENT)
Age: 65
End: 2025-02-07
Attending: INTERNAL MEDICINE
Payer: COMMERCIAL

## 2025-02-07 ENCOUNTER — NURSE ONLY (OUTPATIENT)
Age: 65
End: 2025-02-07
Attending: INTERNAL MEDICINE
Payer: COMMERCIAL

## 2025-02-07 VITALS
WEIGHT: 149 LBS | TEMPERATURE: 98 F | SYSTOLIC BLOOD PRESSURE: 170 MMHG | DIASTOLIC BLOOD PRESSURE: 70 MMHG | HEART RATE: 65 BPM | RESPIRATION RATE: 18 BRPM | OXYGEN SATURATION: 96 % | HEIGHT: 62 IN | BODY MASS INDEX: 27.42 KG/M2

## 2025-02-07 VITALS
RESPIRATION RATE: 18 BRPM | HEIGHT: 62 IN | WEIGHT: 149.13 LBS | HEART RATE: 65 BPM | TEMPERATURE: 98 F | OXYGEN SATURATION: 96 % | SYSTOLIC BLOOD PRESSURE: 170 MMHG | BODY MASS INDEX: 27.44 KG/M2 | DIASTOLIC BLOOD PRESSURE: 70 MMHG

## 2025-02-07 DIAGNOSIS — Z51.5 PALLIATIVE CARE ENCOUNTER: Primary | ICD-10-CM

## 2025-02-07 DIAGNOSIS — C73 THYROID CANCER (HCC): ICD-10-CM

## 2025-02-07 DIAGNOSIS — J90 PLEURAL EFFUSION: ICD-10-CM

## 2025-02-07 DIAGNOSIS — K12.30 MUCOSITIS: ICD-10-CM

## 2025-02-07 DIAGNOSIS — C78.01 MALIGNANT NEOPLASM METASTATIC TO RIGHT LUNG (HCC): ICD-10-CM

## 2025-02-07 DIAGNOSIS — C73 THYROID CANCER (HCC): Primary | ICD-10-CM

## 2025-02-07 DIAGNOSIS — Z71.89 GOALS OF CARE, COUNSELING/DISCUSSION: ICD-10-CM

## 2025-02-07 DIAGNOSIS — K11.7 XEROSTOMIA: ICD-10-CM

## 2025-02-07 DIAGNOSIS — G89.3 CANCER RELATED PAIN: ICD-10-CM

## 2025-02-07 LAB
ALBUMIN SERPL-MCNC: 4.3 G/DL (ref 3.2–4.8)
ALBUMIN/GLOB SERPL: 1.3 {RATIO} (ref 1–2)
ALP LIVER SERPL-CCNC: 92 U/L
ALT SERPL-CCNC: 10 U/L
ANION GAP SERPL CALC-SCNC: 9 MMOL/L (ref 0–18)
AST SERPL-CCNC: 25 U/L (ref ?–34)
BASOPHILS # BLD AUTO: 0.04 X10(3) UL (ref 0–0.2)
BASOPHILS NFR BLD AUTO: 0.5 %
BILIRUB SERPL-MCNC: 0.7 MG/DL (ref 0.2–1.1)
BUN BLD-MCNC: 13 MG/DL (ref 9–23)
BUN/CREAT SERPL: 15.7 (ref 10–20)
CALCIUM BLD-MCNC: 9.3 MG/DL (ref 8.7–10.4)
CHLORIDE SERPL-SCNC: 105 MMOL/L (ref 98–112)
CO2 SERPL-SCNC: 27 MMOL/L (ref 21–32)
CREAT BLD-MCNC: 0.83 MG/DL
DEPRECATED RDW RBC AUTO: 43.7 FL (ref 35.1–46.3)
EGFRCR SERPLBLD CKD-EPI 2021: 79 ML/MIN/1.73M2 (ref 60–?)
EOSINOPHIL # BLD AUTO: 0.2 X10(3) UL (ref 0–0.7)
EOSINOPHIL NFR BLD AUTO: 2.7 %
ERYTHROCYTE [DISTWIDTH] IN BLOOD BY AUTOMATED COUNT: 13.4 % (ref 11–15)
GLOBULIN PLAS-MCNC: 3.4 G/DL (ref 2–3.5)
GLUCOSE BLD-MCNC: 107 MG/DL (ref 70–99)
HCT VFR BLD AUTO: 40.2 %
HGB BLD-MCNC: 13.4 G/DL
IMM GRANULOCYTES # BLD AUTO: 0.03 X10(3) UL (ref 0–1)
IMM GRANULOCYTES NFR BLD: 0.4 %
LYMPHOCYTES # BLD AUTO: 2.38 X10(3) UL (ref 1–4)
LYMPHOCYTES NFR BLD AUTO: 31.8 %
MCH RBC QN AUTO: 30 PG (ref 26–34)
MCHC RBC AUTO-ENTMCNC: 33.3 G/DL (ref 31–37)
MCV RBC AUTO: 89.9 FL
MONOCYTES # BLD AUTO: 0.44 X10(3) UL (ref 0.1–1)
MONOCYTES NFR BLD AUTO: 5.9 %
NEUTROPHILS # BLD AUTO: 4.4 X10 (3) UL (ref 1.5–7.7)
NEUTROPHILS # BLD AUTO: 4.4 X10(3) UL (ref 1.5–7.7)
NEUTROPHILS NFR BLD AUTO: 58.7 %
OSMOLALITY SERPL CALC.SUM OF ELEC: 293 MOSM/KG (ref 275–295)
PLATELET # BLD AUTO: 280 10(3)UL (ref 150–450)
POTASSIUM SERPL-SCNC: 3.8 MMOL/L (ref 3.5–5.1)
PROT SERPL-MCNC: 7.7 G/DL (ref 5.7–8.2)
RBC # BLD AUTO: 4.47 X10(6)UL
SODIUM SERPL-SCNC: 141 MMOL/L (ref 136–145)
WBC # BLD AUTO: 7.5 X10(3) UL (ref 4–11)

## 2025-02-07 RX ORDER — METOCLOPRAMIDE 5 MG/1
5 TABLET ORAL 2 TIMES DAILY
COMMUNITY
Start: 2025-02-03 | End: 2025-02-07

## 2025-02-07 NOTE — PROGRESS NOTES
Palliative Care Follow Up Note     Patient Name: Camelia Markham   YOB: 1960   Medical Record Number: [id number]   Date of visit: 2/7/2025     The 21st Century Cures Act makes medical notes like these available to patients in the interest of transparency. Please be advised this is a medical document. Medical documents are intended to carry relevant information, facts as evident, and the clinical opinion of the practitioner. The medical note is intended as peer to peer communication and may appear blunt or direct. It is written in medical language and may contain abbreviations or verbiage that are unfamiliar.     Chief Complaint/Reason for Visit:  Chief Complaint   Patient presents with    Palliative Care      History of Present Illness:         Camelia Markham is a 64 year old female with  history of metastatic thyroid ca s/p thyroidectomy 2010, radioactive iodine tx with recent metastatic disease to lung, LN follows with Dr. Chen, hypothyroidism, HTN, HLD, GERD and anxiety.    Camelia Figueroa was admitted to hospital from 12/27/24 - 12/29/24 for abdominal pain management and constipation. Had colonoscopy that was unremarkable.     Reports resolution of constipation since hospital stay in December. Needs to continue f/u with GI.     Camelia Figueroa reports that she no longer takes Norco for R chest wall cancer related pain since Tylenol PRN helps control her intermittent pain.    Denies dyspnea. R PleurX discontinued. Follows with Dr. Brooks.     Takes Fioricet on occasion for headache (prescribed by PCP).    Takes Clonazepam at bedtime for difficulty sleeping (prescribed by PCP).     Camelia Figueroa discontinued anti depressants on her own previously since she felt she was not as anxious.     PCP: Dr. Marie  Onc: Dr. Chen    Patient seen and examined with no family present today. Camelia Figueroa is awake, alert, oriented x 4, answers questions appropriately, is a reliable historian, and is in NAD today.     See ROS.     Problem  List:  Patient Active Problem List   Diagnosis    Thyroid cancer (HCC)    Hypothyroidism    Exposure to medical therapeutic radiation    Acute gastritis    Posterior tibial tendinitis of right leg    Acute tonsillitis    Backache    Bursitis of shoulder    Hypertensive disorder    Fatigue    Gastroesophageal reflux disease    Nasal mucosa dry    Nonspecific syndrome suggestive of viral illness    Psychophysiologic insomnia    Primary osteoarthritis of left knee    Internal derangement of left knee    Internal derangement of right knee    Other instability, right knee    Acute meniscal tear, medial    Abnormal gait    Heartburn    Anxiety state    Dysthymia    Primary osteoarthritis of right knee    Incontinence of feces    History of colon polyps    Cyst of pancreas (HCC)    Intestinal disaccharidase deficiency    Headache    Generalized anxiety disorder    Familial combined hyperlipidemia    Dyspnea    Diverticulitis    Disability of walking    Contact dermatitis    Calcaneal spur    Bilateral plantar fasciitis    Pain in thoracic spine    Obesity    Rogers's metatarsalgia    Mixed hyperlipidemia    Mass of uterine adnexa    Malaise and fatigue    Lumbosacral radiculopathy    Itching    Irritable bowel syndrome    Obesity with body mass index 30 or greater    Type 2 diabetes mellitus without complication (HCC)    Tinea corporis    Seasonal allergic rhinitis    Pyuria    Psoriasis    Posterior rhinorrhea    Pleuritic pain    Plantar fascial fibromatosis    Viral infection    Vitamin D deficiency    Osteoarthritis of knee    Malignant neoplasm of thyroid gland (HCC)    Influenza vaccine needed    Preoperative testing    Malignant neoplasm metastatic to right lung (HCC)    Pleural effusion on right    Malignant pleural effusion (HCC)    Goals of care, counseling/discussion    Advance care planning    Palliative care by specialist    Thyroid carcinoma (HCC)    Cancer related pain    Anxiety    Syncope and collapse     Hyperglycemia    Syncope    Metastasis from thyroid cancer (HCC)    Abdominal pain, generalized    Enteritis    Encounter for immunotherapy    Mucositis        Medical History:  Past Medical History:    Anxiety state, unspecified    Cancer (HCC)    Colon adenomas    x3    Disorder of thyroid    thyroidectomy    Diverticulosis of large intestine    Esophageal reflux    Exposure to medical therapeutic radiation    thyroid    High blood pressure    High cholesterol    Hypercholesteremia    Hyperlipidemia    Hypothyroidism    Osteoarthrosis, unspecified whether generalized or localized, unspecified site    Thyroid cancer (HCC)    papillary thyroid; s/p surgery resection with Asher and BRIDGES    Unspecified essential hypertension       Surgical History:  Past Surgical History:   Procedure Laterality Date    Colonoscopy N/A 2022    Procedure: COLONOSCOPY;  Surgeon: Neeraj No MD;  Location: [facility] ENDOSCOPY    Colonoscopy  2024    Colonoscopy N/A 2024    Procedure: COLONOSCOPY;  Surgeon: Neeraj No MD;  Location: [facility] ENDOSCOPY    Colonoscopy N/A 2024    Procedure: COLONOSCOPY;  Surgeon: Jair Murillo MD;  Location: [facility] ENDOSCOPY    Egd  2022    Egd  2024    Esophagogastroduodenoscopy    Endoscopic ultrasound - internal  2022    Endoscopic ultrasound exam  2024    Endoscopic Ultrasound    Eye surgery  2024    Eye surgery Left 2024    Hysterectomy      Knee surgery Left 2022    no associated bleeding complications          40 week 7 lb(s) 5 oz Male; 6 hr labor           40 week 7 lb(s) 3 oz Female          41 week 9 lb(s) 8 oz Male    Other surgical history      Injection Tendon Sheath, Ligament    Thyroidectomy      total    Total abdom hysterectomy         Allergies:  Allergies   Allergen Reactions    Latex HIVES    Peanut-Containing Drug Products SWELLING     Closes Throat  Closes Throat  Closes Throat       Peanuts SWELLING     Closes Throat    Adhesive Tape OTHER (SEE COMMENTS)    Seasonal        Family History:  Family History   Problem Relation Age of Onset    Heart Disorder Mother     Breast Cancer Maternal Cousin Female 57    Cancer Daughter 29        Hodgkin's Lymphoma    Ovarian Cancer Neg     Bleeding Disorders Neg     DVT/VTE Neg     Pancreatic Cancer Neg     Prostate Cancer Neg        Social History:  Social History     Socioeconomic History    Marital status:    Tobacco Use    Smoking status: Never    Smokeless tobacco: Never   Vaping Use    Vaping status: Never Used   Substance and Sexual Activity    Alcohol use: Not Currently     Comment: social    Drug use: No       Medications:  Current Outpatient Medications   Medication Sig Dispense Refill    dexAMETHasone 0.5 MG/5ML Oral Elixir Take 10 ml and swish in mouth for 2 minutes, then spit, do not eat for 1 hour after. May swish and spit 10 ml, 4 times a day. (Patient taking differently: nightly. Take 10 ml and swish in mouth for 2 minutes, then spit, do not eat for 1 hour after. May swish and spit 10 ml, 4 times a day.) 237 mL 0    metoprolol succinate ER 25 MG Oral Tablet 24 Hr Take 1 tablet (25 mg total) by mouth daily.      ondansetron 4 MG Oral Tablet Dispersible       Lidocaine Viscous HCl 2 % Mouth/Throat Solution Take 5 mL by mouth every 3 (three) hours as needed for Pain. 100 mL 0    metoclopramide 5 MG Oral Tab Take 1 tablet (5 mg total) by mouth 3 (three) times daily as needed. 45 tablet 0    HYDROcodone-acetaminophen 5-325 MG Oral Tab Take 1 tablet by mouth every 4 (four) hours as needed. 30 tablet 0    Lenvatinib, 20 MG Daily Dose, 2 x 10 MG Oral Capsule Therapy Pack Take 14 mg by mouth daily.      senna-docusate 8.6-50 MG Oral Tab Take 2 tablets by mouth nightly. 60 tablet 2    Omeprazole 40 MG Oral Capsule Delayed Release Take 1 capsule (40 mg total) by mouth daily.      clonazePAM 2 MG Oral Tab Take 1 tablet (2 mg total) by mouth  nightly as needed for Anxiety (Sleep as well).      butalbital-acetaminophen-caffeine -40 MG Oral Tab Take 1 tablet by mouth every 6 (six) hours as needed. 30 tablet 0    levothyroxine 150 MCG Oral Tab Take 1 tablet (150 mcg total) by mouth before breakfast.      Olmesartan Medoxomil 40 MG Oral Tab Take 1 tablet (40 mg total) by mouth daily.         Review of Systems:  Review of Systems   Constitutional:  Negative for malaise/fatigue and weight loss.   HENT: Negative.          Xerostomia     Eyes: Negative.    Respiratory:  Positive for shortness of breath (SOB going up/down stairs; transient; resolves on own after a short period of rest). Negative for cough, hemoptysis, sputum production and wheezing.         Cough and SOB have reduced in frequency     R PleurX idiscontinued    Following with Pulm       Cardiovascular: Negative.  Negative for chest pain, palpitations and leg swelling.   Gastrointestinal:  Negative for constipation (See HPI; constipation resolved; taking Reglan BID and has Senna-S to take in addition to Reglan is constipation occurs; recent colonoscopy unremarkable), nausea (Denies) and vomiting. Abdominal pain: Denies.  Genitourinary: Negative.    Musculoskeletal:  Negative for back pain (Denies).        Denies cancer related R chest wall pain today    R PleurX removed previously       Skin: Negative.    Neurological: Negative.    Psychiatric/Behavioral:  Negative for hallucinations, memory loss, substance abuse and suicidal ideas. The patient is not nervous/anxious (Anxiety about health; previosuly saw Polina Kelly LCSW; stopped taking Duloxetine \"it makes me feel sad\"; takes Klonopin QHS to help with sleep; does not want to take an antidepressant) and does not have insomnia (Takes Clonopin QHS/PRN prescribed by PCP).         Physical Examination:  Physical Exam  Vitals reviewed.   Constitutional:       General: She is not in acute distress.     Appearance: Normal appearance. She is  not ill-appearing.   HENT:      Head: Normocephalic and atraumatic.      Right Ear: External ear normal.      Left Ear: External ear normal.      Nose: Nose normal.      Mouth/Throat:      Mouth: Mucous membranes are moist.      Pharynx: Oropharynx is clear.   Eyes:      General: No scleral icterus.     Conjunctiva/sclera: Conjunctivae normal.   Cardiovascular:      Rate and Rhythm: Normal rate and regular rhythm.      Pulses: Normal pulses.   Pulmonary:      Effort: Pulmonary effort is normal. No respiratory distress.      Comments: Diminished lung sounds noted in RLL    Abdominal:      General: Bowel sounds are normal. There is no distension.      Palpations: Abdomen is soft. There is no mass.      Tenderness: There is no abdominal tenderness. There is no right CVA tenderness, left CVA tenderness, guarding or rebound.      Hernia: No hernia is present.   Musculoskeletal:         General: Normal range of motion.      Cervical back: Normal range of motion and neck supple.      Right lower leg: No edema.      Left lower leg: No edema.   Skin:     General: Skin is warm and dry.      Coloration: Skin is not jaundiced or pale.   Neurological:      General: No focal deficit present.      Mental Status: She is alert and oriented to person, place, and time. Mental status is at baseline.      Motor: No weakness.      Gait: Gait normal.   Psychiatric:         Mood and Affect: Mood normal.         Behavior: Behavior normal.         Thought Content: Thought content normal.         Judgment: Judgment normal.       Palliative Care Goals of Care:  Discussed with patient/family today: Yes  Patient's preference about sharing medical information: Patient and family may receive information  Patient's decision making preferences: Fully involved and speak with family  Code status: FULL CODE  Have advanced directives been discussed with patient or healthcare power of : Yes                         Palliative Care  Assessment/Plan:  1. Palliative care encounter    2. Mucositis    3. Xerostomia    4. Goals of care, counseling/discussion    5. Thyroid cancer (HCC)    6. Malignant neoplasm metastatic to right lung (HCC)        Cancer Related Pain  -Resolved         Constipation  -Resolved  -Needs GI f/u          Nausea  -Denies today  -Chemo induced  Ondansetron 8mg TID/PRN  -Prochlorperazine (Compazine) 10mg - take 1 tablet every 6 hours as needed for nausea  -CONTINUE taking Omeprazole  -Nausea symptom is less now that constipation has resolved          Xerostomia  -Continue Biotene - swish and spit 4 times/day  -Continue mouth rinse as directed by Onc for mucositis  -Increase water intake     Anxiety about health/depressed mood  -Camelia Figueroa discontinued Duloxetine on her own previously - she felt that it made her feel sad  -CONTINUE Klonopin 1mg at bedtime/PRN - prescribed by PCP   -Previously met with Polina Kelly LCSW - I encouraged continued follow up     Goals of care counseling/discussion  -Pt is FULL CODE  -Continue supportive medical treatments; pt plans to continue pursuing cancer treatment  -Patient is agreeable to hospitalization, if indicated  -Patient agreeable to following up with outpatient palliative care  -Provided emotional support to pt/family who appear to be coping adequately  -See above narrative for further details      Advance care planning counseling/discussion  -Pt is FULL CODE  -Health Surrogate: Baldemar Markham ()      Palliative Performance Scale 70%    Emotional support provided to patient/family: Yes    Palliative Care Follow-up:  I spent a total of  30 minutes with the patient today, which included all of the following:direct face to face contact, history taking, physical examination, and >50% was spent counseling and coordinating care.    Thank you for allowing the Palliative Care Team to participate in the care of your patient. I will continue to follow clinically.    KARELY SMITH,  APRN KALI, FNP-BC, [facility]  [phone]  2/7/2025

## 2025-02-07 NOTE — PROGRESS NOTES
[facility] Progress Note    Patient Name: Camelia Markham   YOB: 1960   Medical Record Number: [id number]   CSN: [id number]   Consulting Physician: Inocente Chen MD  Referring Physician(s): Dr Dana Sands MD    Reason for Consultation:  Metastatic  Thyroid Cancer   Cancer Staging   Malignant neoplasm of thyroid gland (HCC)  Staging form: Thyroid - Differentiated, AJCC 8th Edition  - Clinical: Stage IVB (rcT2, cN1, cM1, Age at diagnosis: >= 55 years) - Signed by Inocente Chen MD on 8/28/2024    Current Therapy  Lenvatinib - started 9/18/24  (now at 14mg daily)    Interval History  Patient returns for follow-up.  Since her last visit here she is currently on lenvatinib, tolerating current dose of 14mg with some oral mucositis.  This is improved with dexamethasone rinses and Magic mouthwash.  She is feeling much better.  Denies any dyspnea or wheezing.  Had PFTs that showed some restricted lung function.    Past Oncologic History   Camelia Markham is a 64 year old female that was seen today in the Cancer Center for metastatic thyroid cancer. Her oncologic history is detailed below    11/3/2010 was found to have an enlarged thyroid gland ultrasound-guided FNA showed papillary thyroid cancer.  Underwent total thyroidectomy with Dr. Asher.  Final pathology showed a 2.5 cm tumor and 2 positive lymph nodes.    12/2010 BRIDGES uptake study showed no evidence of abnormal uptake outside the neck.  Underwent 207 miCu of radioactive iodine.  She had then been maintained on levothyroxine.  Subsequently was lost to follow-up after 2018.    6/17/2024 Thyroglobulin increased to 10, prompted an ultrasound that showed an oval hypoechoic nodule in the superior aspect of the left thyroid measuring 1 x 0.6 x 0.6 cm thought to represent an indeterminate lymph node.    8/6/2024 ultrasound-guided FNA showed papillary carcinoma. NGS testing showed a BRAF V600E mutation    8/16/2024 WBS thyroid scan showed no  discernible pathologic activity increased uptake in the thyroid bed.  Given the biopsy-proven recurrence and absence of I-131 uptake this was thought to represent dedifferentiated recurrent thyroid malignancy.      8/22/2024 PET/CT fusion study showed extensive hypermetabolic pleural-based nodularity, large pleural effusion as well as hypermetabolic nodularity in the left neck soft tissues as well as peripheral right upper nodule all of which was concerning for metastatic disease.  Previously seen cystic pancreatic lesion did not demonstrate any hypermetabolic activity.    8/29/24 had thoracentesis with cytology showing metastatic thyroid carcinoma.    9/15/24 Underwent pleur-X for recurrent pleural effusion.    9/18/24 started lenvatinib at 24mg daily    11/2024 had significant mucositis decreased oral intake and diarrhea with lenvatinib.  This was held.  She required hospitalization and chest tube placement for evacuation loculated effusion.  Lenvatinib is being resumed at 20 mg daily    12/19/2024 CT abdomen pelvis showed decrease in pleural-based nodularity compared to prior PET scan.    Past Medical History:  Past Medical History:    Anxiety state, unspecified    Cancer (HCC)    Colon adenomas    x3    Disorder of thyroid    thyroidectomy    Diverticulosis of large intestine    Esophageal reflux    Exposure to medical therapeutic radiation    thyroid    High blood pressure    High cholesterol    Hypercholesteremia    Hyperlipidemia    Hypothyroidism    Osteoarthrosis, unspecified whether generalized or localized, unspecified site    Thyroid cancer (HCC)    papillary thyroid; s/p surgery resection with Asher and BRIDGES    Unspecified essential hypertension       Past Surgical History:  Past Surgical History:   Procedure Laterality Date    Colonoscopy N/A 12/29/2022    Procedure: COLONOSCOPY;  Surgeon: Neeraj No MD;  Location: [facility] ENDOSCOPY    Colonoscopy  07/29/2024    Colonoscopy N/A 07/29/2024     Procedure: COLONOSCOPY;  Surgeon: Neeraj No MD;  Location: [facility] ENDOSCOPY    Colonoscopy N/A 2024    Procedure: COLONOSCOPY;  Surgeon: Jair Murillo MD;  Location: [facility] ENDOSCOPY    Egd  2022    Egd  2024    Esophagogastroduodenoscopy    Endoscopic ultrasound - internal  2022    Endoscopic ultrasound exam  2024    Endoscopic Ultrasound    Eye surgery  2024    Eye surgery Left 2024    Hysterectomy      Knee surgery Left 2022    no associated bleeding complications          40 week 7 lb(s) 5 oz Male; 6 hr labor           40 week 7 lb(s) 3 oz Female          41 week 9 lb(s) 8 oz Male    Other surgical history      Injection Tendon Sheath, Ligament    Thyroidectomy      total    Total abdom hysterectomy         Family Medical History:  Family History   Problem Relation Age of Onset    Heart Disorder Mother     Breast Cancer Maternal Cousin Female 57    Cancer Daughter 29        Hodgkin's Lymphoma    Ovarian Cancer Neg     Bleeding Disorders Neg     DVT/VTE Neg     Pancreatic Cancer Neg     Prostate Cancer Neg        Gyne History:  OB History    Para Term  AB Living   3 3 0 0 0 0   SAB IAB Ectopic Multiple Live Births   0 0 0 0 0       Social History:  Social History     Socioeconomic History    Marital status:      Spouse name: Not on file    Number of children: Not on file    Years of education: Not on file    Highest education level: Not on file   Occupational History    Not on file   Tobacco Use    Smoking status: Never    Smokeless tobacco: Never   Vaping Use    Vaping status: Never Used   Substance and Sexual Activity    Alcohol use: Not Currently     Comment: social    Drug use: No    Sexual activity: Not on file   Other Topics Concern     Service Not Asked    Blood Transfusions Not Asked    Caffeine Concern Yes     Comment: 1 cup of coffee     Occupational Exposure Not Asked    Hobby Hazards Not  Asked    Sleep Concern Not Asked    Stress Concern Not Asked    Weight Concern Not Asked    Special Diet Not Asked    Back Care Not Asked    Exercise Not Asked    Bike Helmet Not Asked    Seat Belt Not Asked    Self-Exams Not Asked   Social History Narrative    Camelia Figueroa is  to Baldemar x30 yrs. She has 3 adult children. 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 She lives with her , her mom, and 1 of the children in [place], [place].     Social Drivers of Health     Food Insecurity: No Food Insecurity (12/27/2024)    Food Insecurity     Food Insecurity: Never true   Transportation Needs: No Transportation Needs (12/27/2024)    Transportation Needs     Lack of Transportation: No     Car Seat: Not on file   Housing Stability: Low Risk  (12/27/2024)    Housing Stability     Housing Instability: No     Housing Instability Emergency: Not on file     Crib or Bassinette: Not on file       Allergies:   Allergies   Allergen Reactions    Latex HIVES    Peanut-Containing Drug Products SWELLING     Closes Throat  Closes Throat  Closes Throat      Peanuts SWELLING     Closes Throat    Adhesive Tape OTHER (SEE COMMENTS)    Seasonal        Current Medications:  No outpatient medications have been marked as taking for the 2/7/25 encounter (Office Visit) with Inocente Chen MD.       Review of Systems:  A comprehensive 14 point review of systems was completed.  Pertinent positives and negatives noted in the the HPI.     Vital Signs:  BP (!) 170/70 (BP Location: Left arm, Patient Position: Sitting, Cuff Size: adult)   Pulse 65   Temp 98.3 °F (36.8 °C) (Oral)   Resp 18   Ht 1.575 m (5' 2\")   Wt 67.6 kg (149 lb)   SpO2 96%   BMI 27.25 kg/m²    Physical Examination:  General: No apparent distress  ENT: Conjunctiva clear, EOM intact. Dry oral mucosa  Lymphatics:  No palpable lymphadenopathy .   Chest: + R sided PleurX. Normal respiratory effort.   CV: Regular rate and rhythm, S1 and S2 heard  Extremities: No edema  noted  Skin: No lesions, rashes or nodules  Neurological: grossly intact  Psych: depressed mood and affect     Performance Status:  ECOG-1    Labs:    Lab Results   Component Value Date/Time    WBC 7.5 02/07/2025 08:48 AM    RBC 4.47 02/07/2025 08:48 AM    HGB 13.4 02/07/2025 08:48 AM    HCT 40.2 02/07/2025 08:48 AM    MCV 89.9 02/07/2025 08:48 AM    MCH 30.0 02/07/2025 08:48 AM    MCHC 33.3 02/07/2025 08:48 AM    RDW 13.4 02/07/2025 08:48 AM    NEPRELIM 4.40 02/07/2025 08:48 AM    .0 02/07/2025 08:48 AM       Lab Results   Component Value Date/Time     (H) 02/07/2025 08:48 AM    BUN 13 02/07/2025 08:48 AM    CREATSERUM 0.83 02/07/2025 08:48 AM    GFRNAA 67 02/16/2021 12:09 PM    CA 9.3 02/07/2025 08:48 AM    ALB 4.3 02/07/2025 08:48 AM     02/07/2025 08:48 AM    K 3.8 02/07/2025 08:48 AM     02/07/2025 08:48 AM    CO2 27.0 02/07/2025 08:48 AM    ALKPHO 92 02/07/2025 08:48 AM    AST 25 02/07/2025 08:48 AM    ALT 10 02/07/2025 08:48 AM       Imaging:    Impression:  64-year-old with a long history of thyroid cancer s/p thyroidectomy followed by BRIDGES and levothyroxine suppression now with biopsy-proven recurrence around the neck as well as pulmonary parenchymal mets and pleural nodules all worrisome for metastatic thyroid cancer.  BRIDGES uptake scan was negative suggesting dedifferentiated thyroid cancer.    I've previously had a long discussion with patient explained that unfortunately this represents an incurable Stage IV malignancy.  However this is certainly very treatable with oral tyrosine kinase inhibitors with good disease control.  She was started lenvatinib but has developed increasing mucositis nausea and dehydration, so was dose reduced    Plan:  Continue lenvatinib at 14 mg daily [ previously on 24 mg & then 20mg.], Tolerating better.  If she has continued trouble tolerating lenvatinib may need to switch to a BRAF inhibitor. (Since her tumor does have a BRAF-V600E mutation)  Labs  fine today  Thyroglobulin has improved significantly Skiest and her pleural effusions regressed all suggestive good response to treatment. CT 12/24 showed improvement, repeat CT Chest prior to next visit  She has discussed bowel regimen extensively with palliative care and will fu with GI  Elevated BP: ?lenvima induced,  currently on Benicar and metoprolol.  She is having this managed by her PCP    Thank you Dr FINA MABRY, MD WENDY for the opportunity to participate in the care of this interesting patient. Please do contact me if I may be of any further assistance    Inocente Chen MD  [facility] Hematology/Oncology    High complexity MDM. Our clinic is continuing focal point for all oncologic services the patient needs.

## 2025-02-10 ENCOUNTER — APPOINTMENT (OUTPATIENT)
Dept: CT IMAGING | Facility: HOSPITAL | Age: 65
End: 2025-02-10
Attending: EMERGENCY MEDICINE
Payer: COMMERCIAL

## 2025-02-10 ENCOUNTER — HOSPITAL ENCOUNTER (EMERGENCY)
Facility: HOSPITAL | Age: 65
Discharge: HOME | End: 2025-02-10
Attending: EMERGENCY MEDICINE
Payer: COMMERCIAL

## 2025-02-10 VITALS
OXYGEN SATURATION: 94 % | RESPIRATION RATE: 18 BRPM | TEMPERATURE: 98 F | WEIGHT: 149 LBS | HEIGHT: 63 IN | DIASTOLIC BLOOD PRESSURE: 87 MMHG | SYSTOLIC BLOOD PRESSURE: 170 MMHG | HEART RATE: 69 BPM | BODY MASS INDEX: 26.4 KG/M2

## 2025-02-10 DIAGNOSIS — I10 HYPERTENSION, UNSPECIFIED TYPE: Primary | ICD-10-CM

## 2025-02-10 LAB
ANION GAP SERPL CALC-SCNC: 11 MMOL/L (ref 0–18)
ATRIAL RATE: 71 BPM
BASOPHILS # BLD AUTO: 0.05 X10(3) UL (ref 0–0.2)
BASOPHILS NFR BLD AUTO: 0.6 %
BUN BLD-MCNC: 17 MG/DL (ref 9–23)
BUN/CREAT SERPL: 23.9 (ref 10–20)
CALCIUM BLD-MCNC: 9.2 MG/DL (ref 8.7–10.4)
CHLORIDE SERPL-SCNC: 106 MMOL/L (ref 98–112)
CO2 SERPL-SCNC: 24 MMOL/L (ref 21–32)
CREAT BLD-MCNC: 0.71 MG/DL
DEPRECATED RDW RBC AUTO: 44.2 FL (ref 35.1–46.3)
EGFRCR SERPLBLD CKD-EPI 2021: 95 ML/MIN/1.73M2 (ref 60–?)
EOSINOPHIL # BLD AUTO: 0.23 X10(3) UL (ref 0–0.7)
EOSINOPHIL NFR BLD AUTO: 2.8 %
ERYTHROCYTE [DISTWIDTH] IN BLOOD BY AUTOMATED COUNT: 13.8 % (ref 11–15)
GLUCOSE BLD-MCNC: 107 MG/DL (ref 70–99)
HCT VFR BLD AUTO: 41.1 %
HGB BLD-MCNC: 13.5 G/DL
IMM GRANULOCYTES # BLD AUTO: 0.03 X10(3) UL (ref 0–1)
IMM GRANULOCYTES NFR BLD: 0.4 %
LYMPHOCYTES # BLD AUTO: 2.64 X10(3) UL (ref 1–4)
LYMPHOCYTES NFR BLD AUTO: 32.4 %
MCH RBC QN AUTO: 29 PG (ref 26–34)
MCHC RBC AUTO-ENTMCNC: 32.8 G/DL (ref 31–37)
MCV RBC AUTO: 88.2 FL
MONOCYTES # BLD AUTO: 0.43 X10(3) UL (ref 0.1–1)
MONOCYTES NFR BLD AUTO: 5.3 %
NEUTROPHILS # BLD AUTO: 4.78 X10 (3) UL (ref 1.5–7.7)
NEUTROPHILS # BLD AUTO: 4.78 X10(3) UL (ref 1.5–7.7)
NEUTROPHILS NFR BLD AUTO: 58.5 %
OSMOLALITY SERPL CALC.SUM OF ELEC: 294 MOSM/KG (ref 275–295)
P AXIS: 21 DEGREES
P-R INTERVAL: 140 MS
PLATELET # BLD AUTO: 321 10(3)UL (ref 150–450)
POTASSIUM SERPL-SCNC: 3.7 MMOL/L (ref 3.5–5.1)
Q-T INTERVAL: 412 MS
QRS DURATION: 86 MS
QTC CALCULATION (BEZET): 447 MS
R AXIS: -9 DEGREES
RBC # BLD AUTO: 4.66 X10(6)UL
SODIUM SERPL-SCNC: 141 MMOL/L (ref 136–145)
T AXIS: 87 DEGREES
THYROGLOB AB: <1 IU/ML
THYROGLOB IMA: 2.3 NG/ML
VENTRICULAR RATE: 71 BPM
WBC # BLD AUTO: 8.2 X10(3) UL (ref 4–11)

## 2025-02-10 PROCEDURE — 93010 ELECTROCARDIOGRAM REPORT: CPT

## 2025-02-10 PROCEDURE — 99285 EMERGENCY DEPT VISIT HI MDM: CPT

## 2025-02-10 PROCEDURE — 70450 CT HEAD/BRAIN W/O DYE: CPT | Performed by: EMERGENCY MEDICINE

## 2025-02-10 PROCEDURE — 96374 THER/PROPH/DIAG INJ IV PUSH: CPT

## 2025-02-10 PROCEDURE — 85025 COMPLETE CBC W/AUTO DIFF WBC: CPT | Performed by: EMERGENCY MEDICINE

## 2025-02-10 PROCEDURE — 93005 ELECTROCARDIOGRAM TRACING: CPT

## 2025-02-10 PROCEDURE — 80048 BASIC METABOLIC PNL TOTAL CA: CPT | Performed by: EMERGENCY MEDICINE

## 2025-02-10 RX ORDER — AMLODIPINE BESYLATE 10 MG/1
5 TABLET ORAL DAILY
Qty: 30 TABLET | Refills: 0 | Status: SHIPPED | OUTPATIENT
Start: 2025-02-10 | End: 2025-02-10 | Stop reason: CLARIF

## 2025-02-10 RX ORDER — HYDRALAZINE HYDROCHLORIDE 10 MG/1
10 TABLET, FILM COATED ORAL 3 TIMES DAILY
Qty: 90 TABLET | Refills: 0 | Status: SHIPPED | OUTPATIENT
Start: 2025-02-10 | End: 2025-03-12

## 2025-02-10 RX ORDER — ENALAPRILAT 1.25 MG/ML
1.25 INJECTION INTRAVENOUS ONCE
Status: COMPLETED | OUTPATIENT
Start: 2025-02-10 | End: 2025-02-10

## 2025-02-10 NOTE — ED INITIAL ASSESSMENT (HPI)
Pt came in for elevated BP at 190/90 and took again 170/70 happened Friday.  Pt with headache, dizziness, shortness of breath, ringing in ears .  Pt is A/O x 4, breathing unlabored,

## 2025-02-10 NOTE — ED PROVIDER NOTES
Patient Seen in: [facility] Emergency Department    History     Chief Complaint   Patient presents with    Hypertension     Stated Complaint: Hypertension/Headache    HPI    Patient complains of uncontrolled hypoertension past 4 days.  + headache and ringing in ears.  Taking meds as directed. Sbp 170-190's at home. No cp or sob. No numbness tingling or weakness.    Alleviating factors: none  Exacerbating factors: none    Past Medical History:    Anxiety state, unspecified    Cancer (HCC)    Colon adenomas    x3    Disorder of thyroid    thyroidectomy    Diverticulosis of large intestine    Esophageal reflux    Exposure to medical therapeutic radiation    thyroid    High blood pressure    High cholesterol    Hypercholesteremia    Hyperlipidemia    Hypothyroidism    Osteoarthrosis, unspecified whether generalized or localized, unspecified site    Thyroid cancer (HCC)    papillary thyroid; s/p surgery resection with Asher and BRIDGES    Unspecified essential hypertension       Past Surgical History:   Procedure Laterality Date    Colonoscopy N/A 2022    Procedure: COLONOSCOPY;  Surgeon: Neeraj No MD;  Location: [facility] ENDOSCOPY    Colonoscopy  2024    Colonoscopy N/A 2024    Procedure: COLONOSCOPY;  Surgeon: Neeraj No MD;  Location: [facility] ENDOSCOPY    Colonoscopy N/A 2024    Procedure: COLONOSCOPY;  Surgeon: Jair Murillo MD;  Location: [facility] ENDOSCOPY    Egd  2022    Egd  2024    Esophagogastroduodenoscopy    Endoscopic ultrasound - internal  2022    Endoscopic ultrasound exam  2024    Endoscopic Ultrasound    Eye surgery  2024    Eye surgery Left 2024    Hysterectomy      Knee surgery Left 2022    no associated bleeding complications          40 week 7 lb(s) 5 oz Male; 6 hr labor           40 week 7 lb(s) 3 oz Female          41 week 9 lb(s) 8 oz Male    Other surgical history      Injection Tendon  Sheath, Ligament    Thyroidectomy  2010    total    Total abdom hysterectomy              Family History   Problem Relation Age of Onset    Heart Disorder Mother     Breast Cancer Maternal Cousin Female 57    Cancer Daughter 29        Hodgkin's Lymphoma    Ovarian Cancer Neg     Bleeding Disorders Neg     DVT/VTE Neg     Pancreatic Cancer Neg     Prostate Cancer Neg        Social History     Socioeconomic History    Marital status:    Tobacco Use    Smoking status: Never    Smokeless tobacco: Never   Vaping Use    Vaping status: Never Used   Substance and Sexual Activity    Alcohol use: Not Currently     Comment: social    Drug use: No   Other Topics Concern    Caffeine Concern Yes     Comment: 1 cup of coffee    Social History Narrative    Camelia Figueroa is  to Baldemar x30 yrs. She has 3 adult children. 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 She lives with her , her mom, and 1 of the children in [place], [place].     Social Drivers of Health     Food Insecurity: No Food Insecurity (12/27/2024)    Food Insecurity     Food Insecurity: Never true   Transportation Needs: No Transportation Needs (12/27/2024)    Transportation Needs     Lack of Transportation: No   Housing Stability: Low Risk  (12/27/2024)    Housing Stability     Housing Instability: No       Review of Systems    Positive for stated complaint: Hypertension/Headache  Other systems are as noted in HPI.  Constitutional and vital signs reviewed.      All other systems reviewed and negative except as noted above.    PSFH elements reviewed from today and agreed except as otherwise stated in HPI.    Physical Exam     ED Triage Vitals [02/10/25 0823]   BP (!) 168/98   Pulse 74   Resp 20   Temp 98.1 °F (36.7 °C)   Temp src Oral   SpO2 95 %   O2 Device None (Room air)       Current:BP (!) 164/84   Pulse 66   Temp 98.1 °F (36.7 °C) (Oral)   Resp 19   Ht 160 cm (5' 3\")   Wt 67.6 kg   SpO2 95%   BMI 26.39 kg/m²    PULSE OX nl  GENERAL: awake  alert   HEAD: normocephalic, atraumatic,   EYES: PERRLA, EOMI, conj sclera clear  THROAT: mmm, no lesions  NECK: supple, no meningeal signs  LUNGS: no resp distress, cta bilateral  CARDIO: RRR without murmur  GI: abdomen is soft and non tender, no masses, nl bowel sounds   EXTREMITIES: from, 5/5 strength in all 4 ext, no edema  NEURO: alert and oiented *3, 2-12 intact, no focal deficit noted  SKIN: good skin turgor, no  rashes  PSYCH: calm, cooperative,    Differential includes:uncont htn consider dietary noncompliance vs. Primary htn vs renal failure    ED Course     Labs Reviewed   BASIC METABOLIC PANEL (8) - Abnormal; Notable for the following components:       Result Value    Glucose 107 (*)     BUN/CREA Ratio 23.9 (*)     All other components within normal limits   CBC WITH DIFFERENTIAL WITH PLATELET     EKG    Rate, intervals and axes as noted on EKG Report.  Rate: 71  Rhythm: Sinus Rhythm  Reading: Normal intervals, normal EKG             MDM       Cardiac Monitor:   Pulse Readings from Last 1 Encounters:   02/10/25 66   , sinus, 66  interpreted by me.    Radiology findings:   CT BRAIN OR HEAD (CPT=70450)    Result Date: 2/10/2025  CONCLUSION:  1. No acute intracranial process by noncontrast CT technique. 2. Intracranial atherosclerosis.   elm-remote  Dictated by (CST): Billy Villanueva MD on 2/10/2025 at 9:36 AM     Finalized by (CST): Billy Villanueva MD on 2/10/2025 at 9:38 AM               Medical Decision Making  Problems Addressed:  Hypertension, unspecified type: acute illness or injury    Amount and/or Complexity of Data Reviewed  Independent Historian: spouse  Labs: ordered. Decision-making details documented in ED Course.  Radiology: ordered and independent interpretation performed. Decision-making details documented in ED Course.  ECG/medicine tests: ordered and independent interpretation performed. Decision-making details documented in ED Course.  Discussion of management or test interpretation with  external provider(s): Bp improved will rx norvasc fu with primary    Risk  Prescription drug management.  Decision regarding hospitalization.      I reviewed CT and noted no intracranial bleeding    Disposition and Plan     Clinical Impression:  1. Hypertension, unspecified type        Disposition:  Discharge    Follow-up:  Alton Marie MD  [address] [facility] [address]  [phone]    Follow up        Medications Prescribed:  Current Discharge Medication List        START taking these medications    Details   amLODIPine 10 MG Oral Tab Take 0.5 tablets (5 mg total) by mouth daily.  Qty: 30 tablet, Refills: 0

## 2025-02-12 ENCOUNTER — TELEPHONE (OUTPATIENT)
Age: 65
End: 2025-02-12

## 2025-02-12 NOTE — TELEPHONE ENCOUNTER
Patient is calling and states that Dr. Chen provided a new prescription for patient's Lenvatinib, but [facility] informed patient that the insurance needs another authorization/paperwork. Patient states she has only 9 more days of medication. [phone].

## 2025-02-13 ENCOUNTER — HOSPITAL ENCOUNTER (OUTPATIENT)
Dept: MRI IMAGING | Facility: HOSPITAL | Age: 65
Discharge: HOME | End: 2025-02-13
Payer: COMMERCIAL

## 2025-02-13 ENCOUNTER — TELEPHONE (OUTPATIENT)
Facility: CLINIC | Age: 65
End: 2025-02-13

## 2025-02-13 DIAGNOSIS — K86.2 PANCREAS CYST (HCC): ICD-10-CM

## 2025-02-13 PROCEDURE — A9575 INJ GADOTERATE MEGLUMI 0.1ML: HCPCS

## 2025-02-13 PROCEDURE — 74183 MRI ABD W/O CNTR FLWD CNTR: CPT

## 2025-02-13 RX ORDER — GADOTERATE MEGLUMINE 376.9 MG/ML
15 INJECTION INTRAVENOUS
Status: COMPLETED | OUTPATIENT
Start: 2025-02-13 | End: 2025-02-13

## 2025-02-13 RX ADMIN — GADOTERATE MEGLUMINE 14 ML: 376.9 INJECTION INTRAVENOUS at 10:45:00

## 2025-02-18 ENCOUNTER — TELEPHONE (OUTPATIENT)
Age: 65
End: 2025-02-18

## 2025-02-18 ENCOUNTER — APPOINTMENT (OUTPATIENT)
Dept: GENERAL RADIOLOGY | Facility: HOSPITAL | Age: 65
End: 2025-02-18
Attending: EMERGENCY MEDICINE
Payer: COMMERCIAL

## 2025-02-18 ENCOUNTER — HOSPITAL ENCOUNTER (EMERGENCY)
Facility: HOSPITAL | Age: 65
Discharge: HOME | End: 2025-02-18
Attending: EMERGENCY MEDICINE
Payer: COMMERCIAL

## 2025-02-18 ENCOUNTER — APPOINTMENT (OUTPATIENT)
Dept: CT IMAGING | Facility: HOSPITAL | Age: 65
End: 2025-02-18
Attending: EMERGENCY MEDICINE
Payer: COMMERCIAL

## 2025-02-18 VITALS
TEMPERATURE: 98 F | DIASTOLIC BLOOD PRESSURE: 86 MMHG | RESPIRATION RATE: 24 BRPM | OXYGEN SATURATION: 90 % | BODY MASS INDEX: 26.4 KG/M2 | WEIGHT: 149 LBS | HEART RATE: 82 BPM | HEIGHT: 63 IN | SYSTOLIC BLOOD PRESSURE: 159 MMHG

## 2025-02-18 DIAGNOSIS — I10 HYPERTENSION, UNSPECIFIED TYPE: Primary | ICD-10-CM

## 2025-02-18 DIAGNOSIS — F41.9 ANXIETY: ICD-10-CM

## 2025-02-18 LAB
ALBUMIN SERPL-MCNC: 4.6 G/DL (ref 3.2–4.8)
ALBUMIN/GLOB SERPL: 1.2 {RATIO} (ref 1–2)
ALP LIVER SERPL-CCNC: 117 U/L
ALT SERPL-CCNC: 15 U/L
ANION GAP SERPL CALC-SCNC: 12 MMOL/L (ref 0–18)
AST SERPL-CCNC: 21 U/L (ref ?–34)
ATRIAL RATE: 71 BPM
BASOPHILS # BLD AUTO: 0.08 X10(3) UL (ref 0–0.2)
BASOPHILS NFR BLD AUTO: 0.5 %
BILIRUB SERPL-MCNC: 1.2 MG/DL (ref 0.2–1.1)
BUN BLD-MCNC: 18 MG/DL (ref 9–23)
BUN/CREAT SERPL: 22.5 (ref 10–20)
CALCIUM BLD-MCNC: 9.2 MG/DL (ref 8.7–10.4)
CHLORIDE SERPL-SCNC: 100 MMOL/L (ref 98–112)
CO2 SERPL-SCNC: 25 MMOL/L (ref 21–32)
CREAT BLD-MCNC: 0.8 MG/DL
DEPRECATED RDW RBC AUTO: 44.3 FL (ref 35.1–46.3)
EGFRCR SERPLBLD CKD-EPI 2021: 83 ML/MIN/1.73M2 (ref 60–?)
EOSINOPHIL # BLD AUTO: 0.09 X10(3) UL (ref 0–0.7)
EOSINOPHIL NFR BLD AUTO: 0.6 %
ERYTHROCYTE [DISTWIDTH] IN BLOOD BY AUTOMATED COUNT: 13.8 % (ref 11–15)
GLOBULIN PLAS-MCNC: 3.7 G/DL (ref 2–3.5)
GLUCOSE BLD-MCNC: 104 MG/DL (ref 70–99)
HCT VFR BLD AUTO: 48.4 %
HGB BLD-MCNC: 15.6 G/DL
IMM GRANULOCYTES # BLD AUTO: 0.05 X10(3) UL (ref 0–1)
IMM GRANULOCYTES NFR BLD: 0.3 %
LYMPHOCYTES # BLD AUTO: 3.44 X10(3) UL (ref 1–4)
LYMPHOCYTES NFR BLD AUTO: 23.3 %
MCH RBC QN AUTO: 28.7 PG (ref 26–34)
MCHC RBC AUTO-ENTMCNC: 32.2 G/DL (ref 31–37)
MCV RBC AUTO: 89 FL
MONOCYTES # BLD AUTO: 0.6 X10(3) UL (ref 0.1–1)
MONOCYTES NFR BLD AUTO: 4.1 %
NEUTROPHILS # BLD AUTO: 10.52 X10 (3) UL (ref 1.5–7.7)
NEUTROPHILS # BLD AUTO: 10.52 X10(3) UL (ref 1.5–7.7)
NEUTROPHILS NFR BLD AUTO: 71.2 %
NT-PROBNP SERPL-MCNC: 225 PG/ML (ref ?–125)
OSMOLALITY SERPL CALC.SUM OF ELEC: 286 MOSM/KG (ref 275–295)
P AXIS: 20 DEGREES
P-R INTERVAL: 136 MS
PLATELET # BLD AUTO: 314 10(3)UL (ref 150–450)
POTASSIUM SERPL-SCNC: 3.5 MMOL/L (ref 3.5–5.1)
PROT SERPL-MCNC: 8.3 G/DL (ref 5.7–8.2)
Q-T INTERVAL: 410 MS
QRS DURATION: 90 MS
QTC CALCULATION (BEZET): 445 MS
R AXIS: -17 DEGREES
RBC # BLD AUTO: 5.44 X10(6)UL
SODIUM SERPL-SCNC: 137 MMOL/L (ref 136–145)
T AXIS: 62 DEGREES
TROPONIN I SERPL HS-MCNC: 4 NG/L
VENTRICULAR RATE: 71 BPM
WBC # BLD AUTO: 14.8 X10(3) UL (ref 4–11)

## 2025-02-18 PROCEDURE — 99285 EMERGENCY DEPT VISIT HI MDM: CPT

## 2025-02-18 PROCEDURE — 84484 ASSAY OF TROPONIN QUANT: CPT | Performed by: EMERGENCY MEDICINE

## 2025-02-18 PROCEDURE — 71045 X-RAY EXAM CHEST 1 VIEW: CPT | Performed by: EMERGENCY MEDICINE

## 2025-02-18 PROCEDURE — 83880 ASSAY OF NATRIURETIC PEPTIDE: CPT | Performed by: EMERGENCY MEDICINE

## 2025-02-18 PROCEDURE — 93010 ELECTROCARDIOGRAM REPORT: CPT

## 2025-02-18 PROCEDURE — 85025 COMPLETE CBC W/AUTO DIFF WBC: CPT | Performed by: EMERGENCY MEDICINE

## 2025-02-18 PROCEDURE — 70450 CT HEAD/BRAIN W/O DYE: CPT | Performed by: EMERGENCY MEDICINE

## 2025-02-18 PROCEDURE — 96375 TX/PRO/DX INJ NEW DRUG ADDON: CPT

## 2025-02-18 PROCEDURE — 93005 ELECTROCARDIOGRAM TRACING: CPT

## 2025-02-18 PROCEDURE — 80053 COMPREHEN METABOLIC PANEL: CPT | Performed by: EMERGENCY MEDICINE

## 2025-02-18 PROCEDURE — 96374 THER/PROPH/DIAG INJ IV PUSH: CPT

## 2025-02-18 RX ORDER — ONDANSETRON 2 MG/ML
4 INJECTION INTRAMUSCULAR; INTRAVENOUS ONCE
Status: COMPLETED | OUTPATIENT
Start: 2025-02-18 | End: 2025-02-18

## 2025-02-18 RX ORDER — ONDANSETRON 4 MG/1
4 TABLET, ORALLY DISINTEGRATING ORAL EVERY 4 HOURS PRN
Qty: 10 TABLET | Refills: 0 | Status: SHIPPED | OUTPATIENT
Start: 2025-02-18 | End: 2025-02-25

## 2025-02-18 RX ORDER — HYDRALAZINE HYDROCHLORIDE 20 MG/ML
20 INJECTION INTRAMUSCULAR; INTRAVENOUS ONCE
Status: COMPLETED | OUTPATIENT
Start: 2025-02-18 | End: 2025-02-18

## 2025-02-18 RX ORDER — LORAZEPAM 2 MG/ML
0.5 INJECTION INTRAMUSCULAR ONCE
Status: COMPLETED | OUTPATIENT
Start: 2025-02-18 | End: 2025-02-18

## 2025-02-18 NOTE — ED INITIAL ASSESSMENT (HPI)
Pt who was seen here Monday for Hypertension  came in for elevated BP.  With nausea, vomiting , mid chest pain,shortness of breath started last night.  Pt is tearful in triage.  Per pt she called her doctor today and told her to come to ER.

## 2025-02-18 NOTE — ED PROVIDER NOTES
Patient Seen in: [facility] Emergency Department    History     Chief Complaint   Patient presents with    Hypertension    Shortness Of Breath    Vomiting    Chest Pain       HPI    History is provided by patient/independent historian: Patient, patient's son  64 year old female with history of hypertension, recently seen in the emergency department  a week ago, started on hydralazine, sent back by her primary care doctor for ongoing hypertension, now with nausea and vomiting.  She also is having some shortness of breath but not complaining of a headache.  No numbness or tingling or weakness of the extremities.  No cough cold symptoms, no fevers.  She reports being compliant with the medications.  Son reports the last time she had uncontrolled hypertension, she had fluid on the lung.  No leg swelling.    History reviewed.   Past Medical History:    Anxiety state, unspecified    Cancer (HCC)    Colon adenomas    x3    Disorder of thyroid    thyroidectomy    Diverticulosis of large intestine    Esophageal reflux    Exposure to medical therapeutic radiation    thyroid    High blood pressure    High cholesterol    Hypercholesteremia    Hyperlipidemia    Hypothyroidism    Osteoarthrosis, unspecified whether generalized or localized, unspecified site    Thyroid cancer (HCC)    papillary thyroid; s/p surgery resection with Asher and BRIDGES    Unspecified essential hypertension         History reviewed.   Past Surgical History:   Procedure Laterality Date    Colonoscopy N/A 12/29/2022    Procedure: COLONOSCOPY;  Surgeon: Neeraj No MD;  Location: [facility] ENDOSCOPY    Colonoscopy  07/29/2024    Colonoscopy N/A 07/29/2024    Procedure: COLONOSCOPY;  Surgeon: Neeraj No MD;  Location: [facility] ENDOSCOPY    Colonoscopy N/A 12/21/2024    Procedure: COLONOSCOPY;  Surgeon: Jair Murillo MD;  Location: [facility] ENDOSCOPY    Egd  12/29/2022    Egd  07/29/2024    Esophagogastroduodenoscopy    Endoscopic ultrasound  - internal  2022    Endoscopic ultrasound exam  2024    Endoscopic Ultrasound    Eye surgery  2024    Eye surgery Left 2024    Hysterectomy      Knee surgery Left 2022    no associated bleeding complications          40 week 7 lb(s) 5 oz Male; 6 hr labor           40 week 7 lb(s) 3 oz Female          41 week 9 lb(s) 8 oz Male    Other surgical history      Injection Tendon Sheath, Ligament    Thyroidectomy      total    Total abdom hysterectomy           Home Medications reviewed :  Prescriptions Prior to Admission[1]      History reviewed.   Social History     Socioeconomic History    Marital status:    Tobacco Use    Smoking status: Never    Smokeless tobacco: Never   Vaping Use    Vaping status: Never Used   Substance and Sexual Activity    Alcohol use: Not Currently     Comment: social    Drug use: No   Other Topics Concern    Caffeine Concern Yes     Comment: 1 cup of coffee          ROS  Review of Systems   Respiratory:  Negative for shortness of breath.    Cardiovascular:  Negative for chest pain.   Neurological:  Positive for headaches.   All other systems reviewed and are negative.     All other pertinent organ systems are reviewed and are negative.      Physical Exam     ED Triage Vitals [25 1315]   BP (!) 220/111   Pulse 76   Resp 20   Temp 97.8 °F (36.6 °C)   Temp src Oral   SpO2 95 %   O2 Device None (Room air)     Vital signs reviewed.      Physical Exam  Vitals and nursing note reviewed.   Cardiovascular:      Pulses: Normal pulses.   Pulmonary:      Effort: No respiratory distress.   Abdominal:      General: There is no distension.   Neurological:      Mental Status: She is alert.   Psychiatric:         Mood and Affect: Mood is anxious.         ED Course       Labs:     Labs Reviewed   CBC WITH DIFFERENTIAL WITH PLATELET - Abnormal; Notable for the following components:       Result Value    WBC 14.8 (*)     RBC 5.44 (*)     HCT  48.4 (*)     Neutrophil Absolute Prelim 10.52 (*)     Neutrophil Absolute 10.52 (*)     All other components within normal limits   COMP METABOLIC PANEL (14) - Abnormal; Notable for the following components:    Glucose 104 (*)     BUN/CREA Ratio 22.5 (*)     Bilirubin, Total 1.2 (*)     Total Protein 8.3 (*)     Globulin  3.7 (*)     All other components within normal limits   PRO BETA NATRIURETIC PEPTIDE - Abnormal; Notable for the following components:    Pro-Beta Natriuretic Peptide 225 (*)     All other components within normal limits   TROPONIN I HIGH SENSITIVITY - Normal   RAINBOW DRAW BLUE         My EKG Interpretation: EKG    Rate, intervals and axes as noted on EKG Report.  Rate: 71  Rhythm: Sinus Rhythm  Reading: normal for rate, normal for rhythm, no acute ST elevation           As reviewed and Interpreted by me      Imaging Results Available and Reviewed while in ED:   CT BRAIN OR HEAD (CPT=70450)    Result Date: 2/18/2025  CONCLUSION:  1. No acute intracranial finding. 2. Large vessel intracranial atherosclerosis.    Dictated by (CST): Jose Smith MD on 2/18/2025 at 3:37 PM     Finalized by (CST): Jose Smith MD on 2/18/2025 at 3:40 PM          XR CHEST AP PORTABLE  (CPT=71045)    Result Date: 2/18/2025  CONCLUSION:  1. Stable small right pleural effusion, possibly malignant in nature given the history of metastatic thyroid carcinoma. 2. Unchanged chronic scarring at the right lung base.    Dictated by (CST): Terry Fung MD on 2/18/2025 at 3:04 PM     Finalized by (CST): Terry Fung MD on 2/18/2025 at 3:07 PM         My review and independent interpretation of CT images: no ICH. Radiology report corroborates this in addition to other details as reported by them.      Decision rules/scores evaluated: none      Diagnostic labs/tests considered but not ordered: none    ED Medications Administered:   Medications   hydrALAzine (Apresoline) 20 mg/mL injection 20 mg (20 mg Intravenous Given  2/18/25 1437)   ondansetron (Zofran) 4 MG/2ML injection 4 mg (4 mg Intravenous Given 2/18/25 1505)   LORazepam (Ativan) 2 mg/mL injection 0.5 mg (0.5 mg Intravenous Given 2/18/25 1528)            - BP improved  - pt anxious  - voicing concerns of being sent  back to ED by PCP - would like cardiology referral for BP management    MDM       Medical Decision Making      Differential Diagnosis: After obtaining the patient's history, performing the physical exam and reviewing the diagnostics, multiple initial diagnoses were considered based on the presenting problem including ACS, hypertensive emergency, CHF exacerbation    External document review: I personally reviewed available external medical records for any recent pertinent discharge summaries, testing, and procedures - the findings are as follows: 2/10/25 visit with Dr. Cantrell for hypertension    Complicating Factors: The patient already  has a past medical history of Anxiety state, unspecified, Cancer (HCC) (2024), Colon adenomas (07/29/2024), Disorder of thyroid, Diverticulosis of large intestine, Esophageal reflux, Exposure to medical therapeutic radiation (01/01/2011), High blood pressure, High cholesterol, Hypercholesteremia (01/01/2005), Hyperlipidemia, Hypothyroidism, Osteoarthrosis, unspecified whether generalized or localized, unspecified site, Thyroid cancer (HCC) (01/01/2011), and Unspecified essential hypertension. to contribute to the complexity of this ED evaluation.    Procedures performed: none    Discussed management with physician/appropriate source: none    Considered admission/deescalation of care for: hypertension    Social determinants of health affecting patient care: none    Prescription medications considered: zofran, discussed continuing current medication regimen    The patient requires continuous monitoring for: hypertension    Shared decision making: discussed possible admission      \"You had elevated blood pressure today and you need to  follow up with your doctor for a repeat blood pressure check and further discussion of lifestyle modifications that include Weight Reduction - Dietary Sodium Restriction - Increased Physical Activity and Moderation in alcohol (ETOH) Consumption. If possible check your pressure at home and keep a blood pressure log to bring to your physician.\"      Disposition and Plan     Clinical Impression:  1. Hypertension, unspecified type    2. Anxiety        Disposition:  Discharge    Follow-up:  Alton Marie MD  [address] [facility] [id number] [phone]    Follow up      Neeraj Walsh MD  [address]  [facility] [id number] [phone]    Follow up        Medications Prescribed:  Current Discharge Medication List                         [1] (Not in a hospital admission)

## 2025-02-18 NOTE — TELEPHONE ENCOUNTER
Lenvatinib    Patient called with elevated BP, had been in ER last week and sent home after medication.  Has elevated BP again and spoke to her PCP last week and today after change in medication.  He told her to present to ER.  She is crying on phone stating she is vomiting, can not keep pills down.  I reinforced she needed to go to ER as per PCP.  She verbalizes understanding.    BP today 196/110

## 2025-02-18 NOTE — TELEPHONE ENCOUNTER
Pt is calling in regards to her high BP and her PCP instructed to higher dosage of BP meds and now she throwing up yellow stuff and   196/110 is her BP today. Went to ER last Monday and they sent her back home. Feels dizzy and stomach hurting from all the throwing up.      Please contact patient.

## 2025-02-20 ENCOUNTER — TELEPHONE (OUTPATIENT)
Facility: CLINIC | Age: 65
End: 2025-02-20

## 2025-02-20 ENCOUNTER — TELEPHONE (OUTPATIENT)
Age: 65
End: 2025-02-20

## 2025-02-20 NOTE — TELEPHONE ENCOUNTER
----- Message from Ashanti Malone sent at 2/19/2025  9:58 AM CST -----  GI RNs - please recall for MRI MRCP in 1 year, February 2026. Thank you

## 2025-02-20 NOTE — TELEPHONE ENCOUNTER
Called Camelia Figueroa to follow up regarding her ER visits and that we wanted to see her in the office tomorrow to talk about her HTN and nausea. Instructed her to call back with a time that worked for tomorrow for an ACV.

## 2025-02-21 ENCOUNTER — TELEPHONE (OUTPATIENT)
Age: 65
End: 2025-02-21

## 2025-02-21 ENCOUNTER — TELEPHONE (OUTPATIENT)
Dept: PULMONOLOGY | Facility: CLINIC | Age: 65
End: 2025-02-21

## 2025-02-21 PROBLEM — F41.9 ANXIETY AND DEPRESSION: Status: ACTIVE | Noted: 2025-02-21

## 2025-02-21 PROBLEM — F32.A ANXIETY AND DEPRESSION: Status: ACTIVE | Noted: 2025-02-21

## 2025-02-21 NOTE — TELEPHONE ENCOUNTER
Patient recently in the ED on 2/18/25, completed chest x-ray.     Dr. Brooks: Do you still want patient to complete chest x-ray scheduled for 3/6/25?

## 2025-02-21 NOTE — TELEPHONE ENCOUNTER
Called Camelia Figueroa to check in on her blood pressure and nausea. Left message for her to call back to schedule an appointment to discuss her nausea and blood pressure management. Asked her to call back.

## 2025-02-21 NOTE — TELEPHONE ENCOUNTER
Camelia Figueroa returned my call. Nausea and vomiting resolved, diarrhea resolved. Blood pressure remains elevated, 167/103 right now. Offered ACV today, she accepted one for 11:30.

## 2025-02-21 NOTE — TELEPHONE ENCOUNTER
Patient calling states was recently in hospital on 2/18/25 and got a CT of the chest completed. Patient asking if still needing to complete xray that was ordered and scheduled for 3/6/25. Please call.

## 2025-02-24 ENCOUNTER — TELEPHONE (OUTPATIENT)
Dept: PULMONOLOGY | Facility: CLINIC | Age: 65
End: 2025-02-24

## 2025-02-24 ENCOUNTER — TELEPHONE (OUTPATIENT)
Age: 65
End: 2025-02-24

## 2025-02-24 NOTE — TELEPHONE ENCOUNTER
Dank Brooks DO [facility] Clinical StaffJust now (3:23 PM)       Can keep the appt to discuss pulmonary rehab and referral for it.    Thanks  Mike Brooks

## 2025-02-24 NOTE — TELEPHONE ENCOUNTER
Attempted to contact patient, left message to call back.     [email or web address] message also sent to patient.

## 2025-02-24 NOTE — TELEPHONE ENCOUNTER
Dr. Brooks: Patient wondering if she should keep follow-up appointment scheduled with you on 3/10/25?

## 2025-02-24 NOTE — TELEPHONE ENCOUNTER
195/110 Saturday morning, then took medications  1 hour later 167/95    Later Saturday, 165/95    Sunday morning 167/90    Sunday night 158/92    Pulse = 69-80-86    Continue Metoprolol, Telmisartan/hydrochlorothiazide 80/12.5 mg and INCREASE Hydralazine to 20 mg TID.  Keep consult with cardiology on 2/26/25.  Will follow-up with patient later this week.

## 2025-02-24 NOTE — TELEPHONE ENCOUNTER
Dank Brooks, DO  [facility] Clinical Staff23 hours ago (10:02 AM)       Doesn't need to repeat another CXR in early 3/2025. The most recent CXR shows there is very small amt of fluid so no need for draining it again or placing a pleurex catheter.    Thanks    Mike Brooks  Pulm/CCM

## 2025-02-27 ENCOUNTER — TELEPHONE (OUTPATIENT)
Age: 65
End: 2025-02-27

## 2025-02-27 ENCOUNTER — SOCIAL WORK SERVICES (OUTPATIENT)
Age: 65
End: 2025-02-27

## 2025-02-27 NOTE — TELEPHONE ENCOUNTER
Called patient for follow-up s/p cardiology consult on 2/26/25.  BP at cardiology clinic was 131/72.      Cannot see documentation in Epic.  Per patient, he revised medications:  Discontinued Metoprolol  Initiated Carvedilol 6.125 mg   Continue Hydralazine 20 mg TID, may need to reduce to 10 mg based on BP readings  Continue Telmisartan/hydrochlorothiazide 80/12.5 mg  Ordered a cardiac PET  Will follow-up in 2 months - April 2025    Saw PCP today, /100.  PCP inquired about flu and pneumonia vaccine.  Should be ok - await 3/7/25 labs when see Dr. Chen     Encouraged to call prn.

## 2025-02-27 NOTE — PROGRESS NOTES
SW called and spoke with patient regarding her bill. Patient states she was receiving 81% claude as she noticed a $1700 bill reduced to $600. Patient states she does not see any additional discounts on her bill and wanted to know if she is still receiving assistance. SW will follow up with financial assistance to check approval dates. Patient informed that she may have to submit a new application.

## 2025-02-28 ENCOUNTER — HOSPITAL ENCOUNTER (OUTPATIENT)
Dept: CT IMAGING | Facility: HOSPITAL | Age: 65
Discharge: HOME | End: 2025-02-28
Attending: INTERNAL MEDICINE
Payer: COMMERCIAL

## 2025-02-28 DIAGNOSIS — C78.01 MALIGNANT NEOPLASM METASTATIC TO RIGHT LUNG (HCC): ICD-10-CM

## 2025-02-28 DIAGNOSIS — J90 PLEURAL EFFUSION: ICD-10-CM

## 2025-02-28 DIAGNOSIS — C73 THYROID CANCER (HCC): ICD-10-CM

## 2025-02-28 PROCEDURE — 71260 CT THORAX DX C+: CPT | Performed by: INTERNAL MEDICINE

## 2025-03-07 ENCOUNTER — NURSE ONLY (OUTPATIENT)
Age: 65
End: 2025-03-07
Attending: INTERNAL MEDICINE
Payer: COMMERCIAL

## 2025-03-07 ENCOUNTER — OFFICE VISIT (OUTPATIENT)
Age: 65
End: 2025-03-07
Attending: INTERNAL MEDICINE
Payer: COMMERCIAL

## 2025-03-07 ENCOUNTER — LAB ENCOUNTER (OUTPATIENT)
Dept: LAB | Facility: HOSPITAL | Age: 65
End: 2025-03-07
Attending: INTERNAL MEDICINE
Payer: COMMERCIAL

## 2025-03-07 VITALS
HEIGHT: 62 IN | TEMPERATURE: 97 F | DIASTOLIC BLOOD PRESSURE: 87 MMHG | HEART RATE: 93 BPM | BODY MASS INDEX: 27.23 KG/M2 | SYSTOLIC BLOOD PRESSURE: 135 MMHG | RESPIRATION RATE: 18 BRPM | WEIGHT: 148 LBS | OXYGEN SATURATION: 96 %

## 2025-03-07 DIAGNOSIS — K12.30 MUCOSITIS: ICD-10-CM

## 2025-03-07 DIAGNOSIS — J90 PLEURAL EFFUSION: ICD-10-CM

## 2025-03-07 DIAGNOSIS — C73 THYROID CANCER (HCC): ICD-10-CM

## 2025-03-07 DIAGNOSIS — C78.01 MALIGNANT NEOPLASM METASTATIC TO RIGHT LUNG (HCC): ICD-10-CM

## 2025-03-07 DIAGNOSIS — G89.3 CANCER RELATED PAIN: ICD-10-CM

## 2025-03-07 DIAGNOSIS — F41.9 ANXIETY AND DEPRESSION: ICD-10-CM

## 2025-03-07 DIAGNOSIS — F32.A ANXIETY AND DEPRESSION: ICD-10-CM

## 2025-03-07 DIAGNOSIS — R50.9 FEBRILE ILLNESS: ICD-10-CM

## 2025-03-07 DIAGNOSIS — I15.9 SECONDARY HYPERTENSION: ICD-10-CM

## 2025-03-07 DIAGNOSIS — C73 THYROID CANCER (HCC): Primary | ICD-10-CM

## 2025-03-07 LAB
ALBUMIN SERPL-MCNC: 4.2 G/DL (ref 3.2–4.8)
ALBUMIN/GLOB SERPL: 1.3 {RATIO} (ref 1–2)
ALP LIVER SERPL-CCNC: 108 U/L
ALT SERPL-CCNC: 20 U/L
ANION GAP SERPL CALC-SCNC: 8 MMOL/L (ref 0–18)
AST SERPL-CCNC: 24 U/L (ref ?–34)
BASOPHILS # BLD AUTO: 0.03 X10(3) UL (ref 0–0.2)
BASOPHILS NFR BLD AUTO: 0.4 %
BILIRUB SERPL-MCNC: 0.6 MG/DL (ref 0.2–1.1)
BUN BLD-MCNC: 20 MG/DL (ref 9–23)
BUN/CREAT SERPL: 24.4 (ref 10–20)
CALCIUM BLD-MCNC: 8.4 MG/DL (ref 8.7–10.4)
CHLORIDE SERPL-SCNC: 102 MMOL/L (ref 98–112)
CO2 SERPL-SCNC: 28 MMOL/L (ref 21–32)
CREAT BLD-MCNC: 0.82 MG/DL
DEPRECATED RDW RBC AUTO: 46.9 FL (ref 35.1–46.3)
EGFRCR SERPLBLD CKD-EPI 2021: 80 ML/MIN/1.73M2 (ref 60–?)
EOSINOPHIL # BLD AUTO: 0.1 X10(3) UL (ref 0–0.7)
EOSINOPHIL NFR BLD AUTO: 1.5 %
ERYTHROCYTE [DISTWIDTH] IN BLOOD BY AUTOMATED COUNT: 14.3 % (ref 11–15)
GLOBULIN PLAS-MCNC: 3.3 G/DL (ref 2–3.5)
GLUCOSE BLD-MCNC: 105 MG/DL (ref 70–99)
HCT VFR BLD AUTO: 47.5 %
HGB BLD-MCNC: 15.1 G/DL
IMM GRANULOCYTES # BLD AUTO: 0.01 X10(3) UL (ref 0–1)
IMM GRANULOCYTES NFR BLD: 0.1 %
LYMPHOCYTES # BLD AUTO: 2.37 X10(3) UL (ref 1–4)
LYMPHOCYTES NFR BLD AUTO: 35.3 %
MCH RBC QN AUTO: 28.8 PG (ref 26–34)
MCHC RBC AUTO-ENTMCNC: 31.8 G/DL (ref 31–37)
MCV RBC AUTO: 90.5 FL
MONOCYTES # BLD AUTO: 0.59 X10(3) UL (ref 0.1–1)
MONOCYTES NFR BLD AUTO: 8.8 %
NEUTROPHILS # BLD AUTO: 3.62 X10 (3) UL (ref 1.5–7.7)
NEUTROPHILS # BLD AUTO: 3.62 X10(3) UL (ref 1.5–7.7)
NEUTROPHILS NFR BLD AUTO: 53.9 %
OSMOLALITY SERPL CALC.SUM OF ELEC: 289 MOSM/KG (ref 275–295)
PLATELET # BLD AUTO: 203 10(3)UL (ref 150–450)
POTASSIUM SERPL-SCNC: 3.8 MMOL/L (ref 3.5–5.1)
PROT SERPL-MCNC: 7.5 G/DL (ref 5.7–8.2)
RBC # BLD AUTO: 5.25 X10(6)UL
SODIUM SERPL-SCNC: 138 MMOL/L (ref 136–145)
WBC # BLD AUTO: 6.7 X10(3) UL (ref 4–11)

## 2025-03-07 PROCEDURE — 87637 SARSCOV2&INF A&B&RSV AMP PRB: CPT

## 2025-03-07 RX ORDER — HYDROXYZINE HYDROCHLORIDE 25 MG/1
25 TABLET, FILM COATED ORAL
COMMUNITY
Start: 2025-02-27

## 2025-03-07 RX ORDER — LIDOCAINE HYDROCHLORIDE 20 MG/ML
5 SOLUTION OROPHARYNGEAL
Qty: 100 ML | Refills: 0 | Status: SHIPPED | OUTPATIENT
Start: 2025-03-07

## 2025-03-07 RX ORDER — CARVEDILOL 6.25 MG/1
6.25 TABLET ORAL 2 TIMES DAILY
COMMUNITY
Start: 2025-02-26

## 2025-03-07 NOTE — PROGRESS NOTES
[facility] Progress Note    Patient Name: Camelia Markham   YOB: 1960   Medical Record Number: [id number]   CSN: [id number]   Consulting Physician: Inocente Chen MD  Referring Physician(s): Dr Dana Sands MD    Diagnosis  1. Thyroid cancer (HCC)    2. 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 Pleural effusion    4. Anxiety and depression    5. Secondary hypertension    6. Mucositis    7. Febrile illness         Cancer Staging   Malignant neoplasm of thyroid gland (HCC)  Staging form: Thyroid - Differentiated, AJCC 8th Edition  - Clinical: Stage IVB (rcT2, cN1, cM1, Age at diagnosis: >= 55 years) - Signed by Inocente Chen MD on 8/28/2024    Current Therapy  Lenvatinib - started 9/18/24  (now at 14mg daily)    Interval History  Patient presents for follow-up.  Since her last visit she was doing well.  Her oral mucositis is under control with dexamethasone rinses and as needed lidocaine.  She has developed some febrile illness over the last couple of days with myalgia.  As well as coryza.  No recent sick contacts.  Feels unwell today.  Continues on lenvatinib.  She had a recent CT scan and is here to discuss those results.  No significant dyspnea.  No chest wall discomfort    Past Oncologic History   Camelia Markham is a 64 year old female that was seen today in the Cancer Center for metastatic thyroid cancer. Her oncologic history is detailed below    11/3/2010 was found to have an enlarged thyroid gland ultrasound-guided FNA showed papillary thyroid cancer.  Underwent total thyroidectomy with Dr. Asher.  Final pathology showed a 2.5 cm tumor and 2 positive lymph nodes.    12/2010 BRIDGES uptake study showed no evidence of abnormal uptake outside the neck.  Underwent 207 miCu of radioactive iodine.  She had then been maintained on levothyroxine.  Subsequently was lost to follow-up after 2018.    6/17/2024 Thyroglobulin increased to 10, prompted an ultrasound that showed an oval  hypoechoic nodule in the superior aspect of the left thyroid measuring 1 x 0.6 x 0.6 cm thought to represent an indeterminate lymph node.    8/6/2024 ultrasound-guided FNA showed papillary carcinoma. NGS testing showed a BRAF [id number] mutation    8/16/2024 [facility] thyroid scan showed no discernible pathologic activity increased uptake in the thyroid bed.  Given the biopsy-proven recurrence and absence of I-131 uptake this was thought to represent dedifferentiated recurrent thyroid malignancy.      8/22/2024 PET/CT fusion study showed extensive hypermetabolic pleural-based nodularity, large pleural effusion as well as hypermetabolic nodularity in the left neck soft tissues as well as peripheral right upper nodule all of which was concerning for metastatic disease.  Previously seen cystic pancreatic lesion did not demonstrate any hypermetabolic activity.    8/29/24 had thoracentesis with cytology showing metastatic thyroid carcinoma.    9/15/24 Underwent pleur-X for recurrent pleural effusion.    9/18/24 started lenvatinib at 24mg daily    11/2024 had significant mucositis decreased oral intake and diarrhea with lenvatinib.  This was held.  She required hospitalization and chest tube placement for evacuation loculated effusion.  Lenvatinib is being resumed at 20 mg daily    12/19/2024 CT abdomen pelvis showed decrease in pleural-based nodularity compared to prior PET scan.    12/2024 continue to have worsening mucositis and decreased oral intake.  Lenvatinib subsequently resumed at 14 mg daily.    Past Medical History:  Past Medical History:    Anxiety state, unspecified    Cancer (HCC)    Colon adenomas    x3    Disorder of thyroid    thyroidectomy    Diverticulosis of large intestine    Esophageal reflux    Exposure to medical therapeutic radiation    thyroid    High blood pressure    High cholesterol    Hypercholesteremia    Hyperlipidemia    Hypothyroidism    Osteoarthrosis, unspecified whether generalized or localized,  unspecified site    Thyroid cancer (HCC)    papillary thyroid; s/p surgery resection with Asher and BRIDGES    Unspecified essential hypertension       Past Surgical History:  Past Surgical History:   Procedure Laterality Date    Colonoscopy N/A 2022    Procedure: COLONOSCOPY;  Surgeon: Neeraj No MD;  Location: [facility] ENDOSCOPY    Colonoscopy  2024    Colonoscopy N/A 2024    Procedure: COLONOSCOPY;  Surgeon: Neeraj No MD;  Location: [facility] ENDOSCOPY    Colonoscopy N/A 2024    Procedure: COLONOSCOPY;  Surgeon: Jair Murillo MD;  Location: [facility] ENDOSCOPY    Egd  2022    Egd  2024    Esophagogastroduodenoscopy    Endoscopic ultrasound - internal  2022    Endoscopic ultrasound exam  2024    Endoscopic Ultrasound    Eye surgery  2024    Eye surgery Left 2024    Hysterectomy      Knee surgery Left 2022    no associated bleeding complications          40 week 7 lb(s) 5 oz Male; 6 hr labor           40 week 7 lb(s) 3 oz Female          41 week 9 lb(s) 8 oz Male    Other surgical history      Injection Tendon Sheath, Ligament    Thyroidectomy      total    Total abdom hysterectomy         Family Medical History:  Family History   Problem Relation Age of Onset    Heart Disorder Mother     Breast Cancer Maternal Cousin Female 57    Cancer Daughter 29        Hodgkin's Lymphoma    Ovarian Cancer Neg     Bleeding Disorders Neg     DVT/VTE Neg     Pancreatic Cancer Neg     Prostate Cancer Neg        Gyne History:  OB History    Para Term  AB Living   3 3 0 0 0 0   SAB IAB Ectopic Multiple Live Births   0 0 0 0 0       Social History:  Social History     Socioeconomic History    Marital status:      Spouse name: Not on file    Number of children: Not on file    Years of education: Not on file    Highest education level: Not on file   Occupational History    Not on file   Tobacco Use    Smoking status:  Never    Smokeless tobacco: Never   Vaping Use    Vaping status: Never Used   Substance and Sexual Activity    Alcohol use: Not Currently     Comment: social    Drug use: No    Sexual activity: Not on file   Other Topics Concern     Service Not Asked    Blood Transfusions Not Asked    Caffeine Concern Yes     Comment: 1 cup of coffee     Occupational Exposure Not Asked    Hobby Hazards Not Asked    Sleep Concern Not Asked    Stress Concern Not Asked    Weight Concern Not Asked    Special Diet Not Asked    Back Care Not Asked    Exercise Not Asked    Bike Helmet Not Asked    Seat Belt Not Asked    Self-Exams Not Asked   Social History Narrative    Camelia Figueroa is  to Baldemar x30 yrs. She has 3 adult children. Patient works in [employer] in [employer]. She lives with her , her mom, and 1 of the children in [place], [place].     Social Drivers of Health     Food Insecurity: No Food Insecurity (12/27/2024)    Food Insecurity     Food Insecurity: Never true   Transportation Needs: No Transportation Needs (12/27/2024)    Transportation Needs     Lack of Transportation: No     Car Seat: Not on file   Housing Stability: Low Risk  (12/27/2024)    Housing Stability     Housing Instability: No     Housing Instability Emergency: Not on file     Crib or Bassinette: Not on file       Allergies:   Allergies   Allergen Reactions    Latex HIVES    Peanut-Containing Drug Products SWELLING     Closes Throat  Closes Throat  Closes Throat      Peanuts SWELLING     Closes Throat    Adhesive Tape OTHER (SEE COMMENTS)    Seasonal        Current Medications:   hydrOXYzine 25 MG Oral Tab Take 1 tablet (25 mg total) by mouth.      carvedilol 6.25 MG Oral Tab Take 1 tablet (6.25 mg total) by mouth 2 (two) times daily.      Lenvatinib, 14 MG Daily Dose, 10 & 4 MG Oral Capsule Therapy Pack Take 14 mg by mouth daily. 30 each 5    dexAMETHasone 0.5 MG/5ML Oral Elixir Take 10 ml and swish in mouth for 2 minutes, then spit, do not eat  for 1 hour after. May swish and spit 10 ml, 4 times a day. (Patient taking differently: nightly. Take 10 ml and swish in mouth for 2 minutes, then spit, do not eat for 1 hour after. May swish and spit 10 ml, 4 times a day.) 237 mL 0    metoprolol succinate ER 25 MG Oral Tablet 24 Hr Take 1 tablet (25 mg total) by mouth daily.      ondansetron 4 MG Oral Tablet Dispersible       Lidocaine Viscous HCl 2 % Mouth/Throat Solution Take 5 mL by mouth every 3 (three) hours as needed for Pain. 100 mL 0    HYDROcodone-acetaminophen 5-325 MG Oral Tab Take 1 tablet by mouth every 4 (four) hours as needed. 30 tablet 0    senna-docusate 8.6-50 MG Oral Tab Take 2 tablets by mouth nightly. 60 tablet 2    Omeprazole 40 MG Oral Capsule Delayed Release Take 1 capsule (40 mg total) by mouth daily.      clonazePAM 2 MG Oral Tab Take 1 tablet (2 mg total) by mouth nightly as needed for Anxiety (Sleep as well).      butalbital-acetaminophen-caffeine -40 MG Oral Tab Take 1 tablet by mouth every 6 (six) hours as needed. 30 tablet 0    levothyroxine 150 MCG Oral Tab Take 1 tablet (150 mcg total) by mouth before breakfast.         Review of Systems:  A comprehensive 14 point review of systems was completed.  Pertinent positives and negatives noted in the the HPI.     Vital Signs:  /87 (BP Location: Right arm, Patient Position: Sitting, Cuff Size: large)   Pulse 93   Temp 97.1 °F (36.2 °C) (Tympanic)   Resp 18   Ht 1.575 m (5' 2\")   Wt 67.1 kg (148 lb)   SpO2 96%   BMI 27.07 kg/m²    Physical Examination:  General: No apparent distress  ENT: Conjunctiva clear, EOM intact. OP clear  Lymphatics:  No palpable lymphadenopathy .   Chest: + R sided PleurX. Normal respiratory effort, CTA  CV: Regular rate and rhythm, S1 and S2 heard  Extremities: No edema noted  Abdomen:  Soft, mild tenderness LUQ, non distended, NABS  Skin: No lesions, rashes or nodules on visible skin  Neurological: grossly intact  Psych: depressed mood and affect  prior visits.  Today, tearful but consolable.  Not anxious    Performance Status:  ECOG-1    Labs:    Lab Results   Component Value Date/Time    WBC 6.7 03/07/2025 08:58 AM    RBC 5.25 03/07/2025 08:58 AM    HGB 15.1 03/07/2025 08:58 AM    HCT 47.5 03/07/2025 08:58 AM    MCV 90.5 03/07/2025 08:58 AM    MCH 28.8 03/07/2025 08:58 AM    MCHC 31.8 03/07/2025 08:58 AM    RDW 14.3 03/07/2025 08:58 AM    NEPRELIM 3.62 03/07/2025 08:58 AM    .0 03/07/2025 08:58 AM       Lab Results   Component Value Date/Time     (H) 02/18/2025 02:25 PM    BUN 18 02/18/2025 02:25 PM    CREATSERUM 0.80 02/18/2025 02:25 PM    GFRNAA 67 02/16/2021 12:09 PM    CA 9.2 02/18/2025 02:25 PM    ALB 4.6 02/18/2025 02:25 PM     02/18/2025 02:25 PM    K 3.5 02/18/2025 02:25 PM     02/18/2025 02:25 PM    CO2 25.0 02/18/2025 02:25 PM    ALKPHO 117 02/18/2025 02:25 PM    AST 21 02/18/2025 02:25 PM    ALT 15 02/18/2025 02:25 PM       Imaging:    Impression:  64-year-old with a long history of thyroid cancer s/p thyroidectomy followed by BRIDGES and levothyroxine suppression now with biopsy-proven recurrence around the neck as well as pulmonary parenchymal mets and pleural nodules all worrisome for metastatic thyroid cancer.  BRIDGES uptake scan was negative suggesting dedifferentiated thyroid cancer.    I've previously had a long discussion with patient explained that unfortunately this represents an incurable Stage IV malignancy.  However this is certainly very treatable with oral tyrosine kinase inhibitors with good disease control.  She was started lenvatinib but has developed increasing mucositis nausea and dehydration, so was dose reduced    Plan:  Continue Lenvatinib at 14 mg daily [ previously on 24 mg & then 20mg.], Current dose since approx 12/20/24.  Tolerating better.  If she has continued trouble tolerating lenvatinib may need to switch to a BRAF inhibitor. (Since her tumor does have a BRAF-V600E mutation)  Secondary  hypertension: Better on carvedilol olmesartan and hydralazine.  She follows up with cardiology.  Mucositis, controlled with Viscous lidocaine and Dexamethasone elixir  H/o GI side effects, constipation, controlled, Metoclopramide 5 mg prn  Cancer-related pain, improved/controlled, Norco prn.  Followed by palliative care  Anxiety/Depression, no SI/HI, BHI referral, has seen Polina Kelly LCSW, prior and would like to resume.  Previously provided  [facility] resources.  Discussed SSRI but hold off for now.  Uses Clonazepam 2 mg at bedtime which controls insomnia.    CT shows continued regression of pleural nodules and lung nodule.  All suggesting a positive response to treatment as also noted with continued thyroglobulin decreased.  Febrile illness: Check flu COVID RSV swab, encourage p.o. hydration NSAIDs and will start empiric antibiotics pending results of the above      Thank you Dr FINA MABRY, MD WENDY for the opportunity to participate in the care of this interesting patient. Please do contact me if I may be of any further assistance    Inocente Chen MD   [facility] Hematology/Oncology    High complexity MDM. Our clinic is continuing focal point for all oncologic services the patient needs.

## 2025-03-08 LAB
FLUAV + FLUBV RNA SPEC NAA+PROBE: NOT DETECTED
FLUAV + FLUBV RNA SPEC NAA+PROBE: NOT DETECTED
RSV RNA SPEC NAA+PROBE: NOT DETECTED
SARS-COV-2 RNA RESP QL NAA+PROBE: NOT DETECTED

## 2025-03-10 ENCOUNTER — OFFICE VISIT (OUTPATIENT)
Dept: PULMONOLOGY | Facility: CLINIC | Age: 65
End: 2025-03-10

## 2025-03-10 ENCOUNTER — PATIENT MESSAGE (OUTPATIENT)
Age: 65
End: 2025-03-10

## 2025-03-10 ENCOUNTER — TELEPHONE (OUTPATIENT)
Age: 65
End: 2025-03-10

## 2025-03-10 VITALS
SYSTOLIC BLOOD PRESSURE: 140 MMHG | HEART RATE: 62 BPM | OXYGEN SATURATION: 97 % | RESPIRATION RATE: 14 BRPM | BODY MASS INDEX: 26.05 KG/M2 | HEIGHT: 63 IN | WEIGHT: 147 LBS | DIASTOLIC BLOOD PRESSURE: 86 MMHG

## 2025-03-10 DIAGNOSIS — R93.89 ABNORMAL CHEST CT: ICD-10-CM

## 2025-03-10 DIAGNOSIS — J98.4 RESTRICTIVE LUNG DISEASE: ICD-10-CM

## 2025-03-10 DIAGNOSIS — Z72.4 PROBLEMS RELATED TO INAPPROPRIATE DIET AND EATING HABITS: ICD-10-CM

## 2025-03-10 DIAGNOSIS — R06.02 SOB (SHORTNESS OF BREATH): Primary | ICD-10-CM

## 2025-03-10 PROCEDURE — 3079F DIAST BP 80-89 MM HG: CPT | Performed by: INTERNAL MEDICINE

## 2025-03-10 PROCEDURE — 3008F BODY MASS INDEX DOCD: CPT | Performed by: INTERNAL MEDICINE

## 2025-03-10 PROCEDURE — 3077F SYST BP >= 140 MM HG: CPT | Performed by: INTERNAL MEDICINE

## 2025-03-10 PROCEDURE — 99213 OFFICE O/P EST LOW 20 MIN: CPT | Performed by: INTERNAL MEDICINE

## 2025-03-10 NOTE — TELEPHONE ENCOUNTER
Camelia Figueroa called back. She is feeling better from Friday. No nausea, diarrhea, constipation. No fevers. She is asking about her scan in general if the cancer is better. Discussed with her that per Dr. Chen, the Cancer has improved, Lymph nodes have shrunk, lungs are better, nothing noted about Thyroid. She asked for a dietary consult. Instructed I would have the dietician reach out to her. She voiced understanding and thanked me for the call.

## 2025-03-10 NOTE — PROGRESS NOTES
Pulmonary Medicine Outpatient Clinic Note           Reason for Consultation and CC: abnormal chest CT and new right pleural effusion.    Referring Physician: Dr. Marie and Dr. Sands       Subjective:  Seen for f/u on 3/10/25.  Repeat chest CT showed small right pleural effusion and improving right sided consolidations.  PFTS showed moderate restriction. Referred to pul rehab at [facility] but hasn't scheduled.  Feels much better. Denies breathing issues.   Seeing cardiology-Dr. Raul Casey @ [facility] for HTN.      Seen for f/u on 1/6/25.  Since the last visit, right pleurex catheter removed d/t min fluid and improved CXR.  Then recently admitted to the [facility] with worsening shortness of breath.  Repeat chest imaging showed min pleural fluid with pleural thickening. Didn't need a repeat thoracentesis or pleurex placement.   Was given laxatives for constipation.  Saw Dr. Chen and her cancer treatment meds have been lowered.  Also moving her bowels better.   Feels much better.   Still short of breath with going up and stairs but resolved quickly.   No fevers or chills.       From previous visit.  Seen for f/u on 12/4/24.  Feels she's constipated.   No longer on pain meds.   Breathing coreas feels well.   Not coughing,  No fevers, or chills.  Per home care RN last time pleurex catheter was drained was 75 ml on 12/20/24. Plans to have it drained.  Started on treatments for thyroid CA-follows with .  Since the last visit, was admitted to [facility] with respiratory symptoms and found to have a new right loculated pleural effusion requiring chest tube placement. Effusions were neg for cytology. Chest tube removed and pt was discharged. Previous pleurex remained in place and drained during the hospitalization.       From the previous visit.   Seen for f/u on 10/3/24.  Since the initial visit, underwent a right sided thoracentesis with pleural fluid c/w malignancy (thyroid cancer). Two weeks later was admitted to  [facility] with shortness of breath and chest discomfort. CXR noted the effusion had re-accumulated so she had a pleurex catheter placed on 9/13/24. Was eventually discharged with home care to drain the effusion every 4 days. Also started on treatment Lenvatinib managed by oncology (Dr. Chen).  Now here for f/u. Pleurex drainage was 250ml on the 21st, then 75ml, then 50ml, and 25 ml two days ago. Repeat CXR showed improvement in the right pleural effusion.   Reports some discharge at the site but has since healed.   Reports back pain. Taking the pain medicine and using heat packs.       From the initial visit.    HPI: I had the pleasure of seeing Camelia Markham for a Pulmonary Medicine consultation on 8/29/24.  Very pleasant 65 yo woman with hx of metastatic thyroid CA s/p total thyroidectomy in 2010, s/p BRIDGES tx. Recently, thyroglobulin level increased and a thyroid US showed an oval thyroid nodule thought to be a indeterminante lymph node. 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 Most recent PET scan notable showed extensive hypermetabolic pleural-based nodularity, large right pleural effusion as well as hypermetabolic nodularity in the left neck soft tissues as well as peripheral right upper nodule all of which was concerning for metastatic disease. She has been experiencing right chest pain (under her right breast) for 2 months. She thought it was MSK from sleeping the wrong way.   Also reports shortness of breath at rest and with exertion over the past 2 weeks.   Reports she coughs sometimes and wheezes. Cough is dry.   Denies fevers, chills. Does endorse night-time upper neck sweating about twice a week.        REVIEW OF SYSTEMS:  Positives and negatives as stated in HPI. Remainder of 10 pt review of systems otherwise are negative.       PAST MEDICAL HISTORY:  Past Medical History:    Anxiety state, unspecified    Cancer (HCC)     Colon adenomas    x3    Disorder of thyroid    thyroidectomy    Diverticulosis of large intestine    Esophageal reflux    Exposure to medical therapeutic radiation    thyroid    High blood pressure    High cholesterol    Hypercholesteremia    Hyperlipidemia    Hypothyroidism    Osteoarthrosis, unspecified whether generalized or localized, unspecified site    Thyroid cancer (HCC)    papillary thyroid; s/p surgery resection with Asher and BRIDGES    Unspecified essential hypertension   Denies hx of previous pulmonary conditions       PAST SURGICAL HISTORY:  Past Surgical History:   Procedure Laterality Date    Colonoscopy N/A 2022    Procedure: COLONOSCOPY;  Surgeon: Neeraj No MD;  Location: [facility] ENDOSCOPY    Colonoscopy  2024    Colonoscopy N/A 2024    Procedure: COLONOSCOPY;  Surgeon: Neeraj No MD;  Location: [facility] ENDOSCOPY    Colonoscopy N/A 2024    Procedure: COLONOSCOPY;  Surgeon: Jair Murillo MD;  Location: [facility] ENDOSCOPY    Egd  2022    Egd  2024    Esophagogastroduodenoscopy    Endoscopic ultrasound - internal  2022    Endoscopic ultrasound exam  2024    Endoscopic Ultrasound    Eye surgery  2024    Eye surgery Left 2024    Hysterectomy      Knee surgery Left 2022    no associated bleeding complications          40 week 7 lb(s) 5 oz Male; 6 hr labor           40 week 7 lb(s) 3 oz Female          41 week 9 lb(s) 8 oz Male    Other surgical history      Injection Tendon Sheath, Ligament    Thyroidectomy  2010    total    Total abdom hysterectomy          PAST FAMILY HISTORY:  Family History   Problem Relation Age of Onset    Heart Disorder Mother     Breast Cancer Maternal Cousin Female 57    Cancer Daughter 29        Hodgkin's Lymphoma    Ovarian Cancer Neg     Bleeding Disorders Neg     DVT/VTE Neg     Pancreatic Cancer Neg     Prostate Cancer Neg         PAST SOCIAL HISTORY:  Social History      Socioeconomic History    Marital status:    Tobacco Use    Smoking status: Never    Smokeless tobacco: Never   Vaping Use    Vaping status: Never Used   Substance and Sexual Activity    Alcohol use: Not Currently     Comment: social    Drug use: No   Other Topics Concern    Caffeine Concern Yes     Comment: 1 cup of coffee    Never smoked  No hx of asbestos exposure  Lives with mom and   Has a dog  Employed: office setting environment      ALLERGIES:  Allergies   Allergen Reactions    Latex HIVES    Peanut-Containing Drug Products SWELLING     Closes Throat  Closes Throat  Closes Throat      Peanuts SWELLING     Closes Throat    Adhesive Tape OTHER (SEE COMMENTS)    Seasonal         MEDS:  Current Outpatient Medications on File Prior to Visit   Medication Sig Dispense Refill    hydrOXYzine 25 MG Oral Tab Take 1 tablet (25 mg total) by mouth.      carvedilol 6.25 MG Oral Tab Take 1 tablet (6.25 mg total) by mouth 2 (two) times daily.      Lidocaine Viscous HCl 2 % Mouth/Throat Solution Take 5 mL by mouth every 3 (three) hours as needed for Pain. 100 mL 0    amoxicillin clavulanate 875-125 MG Oral Tab Take 1 tablet by mouth 2 (two) times daily for 7 days. 14 tablet 0    Lenvatinib, 14 MG Daily Dose, 10 & 4 MG Oral Capsule Therapy Pack Take 14 mg by mouth daily. 30 each 5    dexAMETHasone 0.5 MG/5ML Oral Elixir Take 10 ml and swish in mouth for 2 minutes, then spit, do not eat for 1 hour after. May swish and spit 10 ml, 4 times a day. (Patient taking differently: nightly. Take 10 ml and swish in mouth for 2 minutes, then spit, do not eat for 1 hour after. May swish and spit 10 ml, 4 times a day.) 237 mL 0    metoprolol succinate ER 25 MG Oral Tablet 24 Hr Take 1 tablet (25 mg total) by mouth daily.      ondansetron 4 MG Oral Tablet Dispersible       HYDROcodone-acetaminophen 5-325 MG Oral Tab Take 1 tablet by mouth every 4 (four) hours as needed. 30 tablet 0    senna-docusate 8.6-50 MG Oral Tab Take 2  tablets by mouth nightly. 60 tablet 2    Omeprazole 40 MG Oral Capsule Delayed Release Take 1 capsule (40 mg total) by mouth daily.      clonazePAM 2 MG Oral Tab Take 1 tablet (2 mg total) by mouth nightly as needed for Anxiety (Sleep as well).      butalbital-acetaminophen-caffeine -40 MG Oral Tab Take 1 tablet by mouth every 6 (six) hours as needed. 30 tablet 0    levothyroxine 150 MCG Oral Tab Take 1 tablet (150 mcg total) by mouth before breakfast.       No current facility-administered medications on file prior to visit.       PHYSICAL EXAM:  /86   Pulse 62   Resp 14   Ht 5' 3\" (1.6 m)   Wt 147 lb (66.7 kg)   SpO2 97%   BMI 26.04 kg/m²   CONSTITUTIONAL: alert, oriented, no apparent distress  HEENT: atraumatic normocephalic  MOUTH: mucous membranes are moist. No OP exudates  NECK/THROAT: no JVD. Trachea midline. No obvious thyromegaly  LUNG: clear upper with some crackles @ right base. Chest symmetric with respiratory motion  HEART: regular rate and rhythm, no obvious murmers or gallops note  ABD: soft non tender. + bowel sounds. No organomegaly noted  EXT: no clubbing, cyanosis, or edema noted. Pulses intact grossly  NEURO/MUSCULOSKELETAL: no gross deficits  SKIN: warm, dry. No obvious lesions noted  LYMPH: no obvious LAD       IMAGES:  Chest CT 2/28/25  FINDINGS:   Normal heart size.  Coronary calcification is present.  Normal pericardium   Stable mildly enlarged subcarinal and right hilar lymph nodes, subcarinal lymph node 1.1 centimeter short axis   Stable small volume right basilar effusion with diffuse pleural thickening   Small left effusion has resolved   Mildly decreased nodular opacity in the periphery of the right upper lobe now 1.2 by 0.9 centimeter   Decreased septal thickening in the right lung.  Significantly decreased basilar consolidation/atelectasis   Small ground-glass opacity in the left upper lobe measures 1.1 centimeter, likely present on prior in stable   No aggressive  bone lesion   Stable pancreatic cystic lesion   CONCLUSION: Stable small right pleural effusion.  Significantly decreased right lung atelectasis/consolidation       Chest CT PE 12/27/24  CONCLUSION:   1. No pulmonary embolus.   2. Loculated right basilar pleural effusion with thickening of visceral and parietal pleura compatible with a malignant pleural effusion.  Coexistent infection should be excluded clinically.  Diffuse nodular thickening of the pleura in the right   hemithorax.  Atelectasis in the right middle lobe and lower lobe.   3. Thickened septal lines in the right hemithorax concerning for lymphangitic spread of tumor.   4. Left basilar pleural effusion with compressive atelectasis.         CXR 11/11/24  Impression   CONCLUSION: Moderate-sized right basal pleural effusion with chest tube.  No significant interval change in size of the effusion.  Stable right basal pulmonary opacity which may represent secondary compressive atelectasis.  Stable trace left basal pleural effusion.         CXR 11/8/24  CONCLUSION:   No significant change to the right approach pleural pigtail catheter.  Persistent small to moderate right pleural effusion and right basilar opacity.   Question small left pleural effusion.   No new abnormality.         CXR appears right upper loculated effusion is improved  CONCLUSION:   1. Interval placement of a short pigtail chest tube in the right apex with successful drainage of the loculated pleural effusion.   2. Right basilar chest tube remains with interval decrease in loculated right pleural effusion and slight improvement in atelectasis and or pneumonia.   3. Subsegmental atelectasis in the left lower lobe.   4. Top-normal heart size with normal pulmonary vascularity.   5. Atherosclerotic calcification aorta.   6. Internal fixation of an healed right clavicle fracture.         Chest CT 11/4/24  FINDINGS:   The following findings were made:   1. There is a large loculated right-sided  pleural effusion with associated underlying pleural nodularity.  The finding is associated with scattered areas of subsegmental atelectasis throughout the right lung.  There is a PleurX catheter at the right lung base within a free-flowing segment of this loculated pleural effusion.   2. There are scattered ground-glass nodular peribronchial densities within the right lung as well as at the left lung base raising the possibility of a superimposed infectious process.  There is a very small free-flowing left pleural effusion with adjacent compressive atelectasis.   3. The visualized aspects of the liver demonstrates decreased attenuation compatible with fatty infiltration.  There are no focal hepatic lesions identified within the imaged segments of the liver.   4. There are slight to moderate degenerative changes within the thoracic spine.   The remainder of the examination is unremarkable.  Specifically, on vascular windows, the main pulmonary arteries and segmental branches are normal in their course and caliber with no intraluminal filling defects identified to suggest pulmonary embolus. The thoracic aorta is normal in its course and caliber with no aneurysmal dilatation, no dissection, and no secondary signs of acute aortic injury.  On lung windows, there is no pneumothorax.  On mediastinal windows, there is no pericardial effusion or significant adenopathy.  Within the upper abdomen, there is no adrenal mass.       CXR 9/30/24  CONCLUSION:   Small right pleural effusion, moderately decreased from prior.   Decreased right basilar opacity, with persistent somewhat nodular opacity at the lateral right lung base, may in part relate to pleural metastatic disease.       CXR 9/14/24  CONCLUSION:   Large right pleural effusion with right lower lobe atelectasis or pneumonia has increased in size since 9/13/2024.   Small left lower lobe pleural effusion with left lower lobe atelectasis or pneumonia is new.   Enlarged  cardiomediastinal silhouette, unchanged.       PET scan 8/2024  FINDINGS:  REFERENCE VALUES: The SUV max of the mediastinal blood pool is 3.3. The SUV max of the liver is 4.3.  HEAD/NECK: In the left neck soft tissues, a 1.5 x 1.7 cm hypodensity is seen (series 11, image 39) with SUV max of 18.4, increasing to 18.8 on delayed head neck imaging. A 0.6 x 0.7 cm node in the left neck (series 3, image 50) demonstrates SUV 5.6, increasing to 6.1.  Postoperative changes of thyroidectomy are demonstrated. There is metallic streak artifact from dental amalgam.  LUNGS/PLEURA: Extensive pleural nodularity is noted. A reference area of nodularity measuring 0.8 x 1.2 cm (series 3, image 109) has SUV max of 9.3. Hypermetabolic pleural nodularity is demonstrated along the right lateral pleural surfaces is seen with SUV max of 6.7-7.1. Extensive nodularity along the minor fissure is demonstrated, measuring 2.7 x 5.2 cm in aggregate (series 3, image 135) with SUV max of 8.9-10.7. Posteriorly along the minor fissure, there is hypermetabolic activity with SUV max of 8.3. Anterior pleural nodularity measures up to 4.6 x 1.8 cm (series 3, image 161) and has SUV max of 12.3. Posterior pleural nodularity has SUV max of 7.8. Extensive nodularity extending into the right costophrenic sulcus has SUV max of 7.7-9.1. A peripheral right upper lobe nodule measures 1.4 x 1.1 cm (series 3, image 111) with SUV max of 6.1.Large right pleural effusion is seen with associated compressive atelectasis, with or without superimposed airspace consolidation. Additional scattered ground-glass and reticular opacities are present and may be atelectatic in origin.  MEDIASTINUM/SHAZIA: Epicardial nodularity is seen with reference nodules measuring 1.1 x 1.2 cm and 1.9 x 1.5 cm (series 3, image 54). The SUV max in this region is 4.1-5.3. Posterior and lateral epicardial activity is seen with SUV max of 6.2.  Periesophageal activity is seen with SUV max of  5.7.  CHEST WALL/AXILLA: No pathologic FDG activity.    ABDOMEN/PELVIS: Nonspecific low density of the hepatic parenchyma may represent underlying hepatic steatosis. The gallbladder is surgically absent with cholecystectomy clips in the gallbladder fossa. There is mild diffuse fatty replacement of the pancreas. A lobulated cystic pancreatic lesion the body measures 1.9 x 3.6 cm (series 23, image 182). Scattered colonic diverticula are present in the sigmoid colon. There is no colonic wall thickening or pericolonic fat stranding. Scattered  atherosclerotic vascular calcifications of the abdominal aorta are observed. The uterus is surgically absent. No pathologic FDG activity.    MUSCULOSKELETAL: There are degenerative changes of the shoulders bilaterally.  Degenerative changes of the hips are present bilaterally.  Postprocedural changes of prior right femoral intramedullary carmella and nail placement are partially delineated with resultant artifactual degradation. No pathologic FDG activity. No suspicious lesions on CT imaging.    OTHER: There is a minute fat-containing umbilical hernia. There is transversely oriented lower abdominopelvic wall scarring, suggestive of prior Pfannenstiel incision.  Small bilateral fat containing inguinal hernias are suspected.    Impression   1. Extensive hypermetabolic pleural-based nodularity is compatible with metastatic disease. Given the absence of activity on prior I-131 imaging, these findings are strongly suggestive of dedifferentiated metastatic thyroid malignancy.  2. Large right pleural effusion, likely malignant.  3. Hypermetabolic nodularity in the left neck soft tissues and involving a left-sided cervical node, consistent with metastatic disease.  4. A hypermetabolic peripheral right upper lobe nodule is concerning for pulmonary parenchymal metastasis.  5. Cystic pancreatic lesion without discernible hypermetabolic activity.  6. Uncomplicated distal colonic diverticulosis.     7. Hepatic steatosis.  8. Status post cholecystectomy.  9. Postoperative changes of hysterectomy.    10. Lesser incidental findings as above.        LABS:  Lab Results   Component Value Date    WBC 6.7 03/07/2025    RBC 5.25 03/07/2025    HGB 15.1 03/07/2025    HCT 47.5 03/07/2025    MCV 90.5 03/07/2025    MCH 28.8 03/07/2025    MCHC 31.8 03/07/2025    MPV 9.9 04/08/2011     Recent Labs   Lab 03/07/25  0858   *   BUN 20   CREATSERUM 0.82   EGFRCR 80   CA 8.4*   ALB 4.2      K 3.8      CO2 28.0   ALKPHO 108   AST 24   ALT 20   BILT 0.6   TP 7.5          PFTS 1/2025        ASSESSMENT/PLAN:  1.Malignant right pleural effusion and pleural mets c/w metastatic thyroid CA  -S/p pleurex catheter placement  -Drainage decreased significantly and pleurex catheter was removed  -Repeat chest imaging with min fluid and nodular and pleural thickening  -Follows with oncology on chemo  -most recent repeat chest CT shows an even small right pleural effusion, improving consolidations  -PFT c/w restrictive pattern likely from pleural fluid and malignant burden. Referred to pulm rehab-provided the pt with number to call to schedule    2.Pleural mets likely the cause of her chronic back and chest pain.   -Follow up with Oncology and Palliative    3. Difficulty with eating certain foods.   -Reports she has trouble with certain foods and maintaining weight  -Nutrition consult    RTC in 6 months if continues to feel well    Thank you for the opportunity to care for Camelia Markham.     I spent a total of 25 minutes in direct contact with the patient and reviewing pertinent outside records on the day of the encounter.     ALDA Brooks DO, MPH  Pulmonary Critical Care Medicine

## 2025-03-10 NOTE — PATIENT INSTRUCTIONS
Refill Routing Note   Medication(s) are not appropriate for processing by [facility] for the following reason(s):      - Required laboratory values are outdated  - Required vitals are abnormal    [facility] action(s):  Defer  Approve          Medication reconciliation completed: No     Appointments  past 12m or future 3m with PCP    Date Provider   Last Visit   11/9/2021 Navdeep Marquez MD   Next Visit   Visit date not found Navdeep Marquez MD   ED visits in past 90 days: 0        Note composed:1:42 AM 05/17/2022           Scheduled an appointment for Pulmonary Rehab at [facility]    Make sure Dr. Jacinto Carter MD and make sure he is ok with you starting pulmonary rehab    Schedule an appointment with the nutritionist    Come back in 6 months if you continue to feel well

## 2025-03-11 ENCOUNTER — ORDER TRANSCRIPTION (OUTPATIENT)
Dept: ADMINISTRATIVE | Facility: HOSPITAL | Age: 65
End: 2025-03-11

## 2025-03-11 DIAGNOSIS — R07.9 CHEST PAIN, UNSPECIFIED: Primary | ICD-10-CM

## 2025-03-12 LAB
THYROGLOB AB: <1 IU/ML
THYROGLOB IMA: 0.6 NG/ML

## 2025-03-13 ENCOUNTER — DIETICIAN VISIT (OUTPATIENT)
Dept: NUTRITION | Facility: HOSPITAL | Age: 65
End: 2025-03-13

## 2025-03-13 ENCOUNTER — NUTRITION (OUTPATIENT)
Dept: ONCOLOGY | Age: 65
End: 2025-03-13

## 2025-03-13 NOTE — PROGRESS NOTES
[facility]   Nutrition Note         Dx:  Thyroid cancer  Nutrition Concern: Mouth sores related to treatment, difficulty eating  Treatment Plan: Lenvatinib 14 mg/ day     Anthropometrics:    Ht:  5' 3\"  Wt: 147 lb  BMI: 26      Nutrition Prescription: High Protein, soft diet    Estimated energy needs:   4261-2032 calories (25 calories/kg)  70-80 grams protein (1.0-1.2 gm/kg)        Nutrition Goals:    Goals:   Protein recommendation is 70-80 grams per day.  Continue to use a high protein supplement drink daily for calories and protein.   Add Greek yogurt (sweetened with aurea and some alcohol free vanilla extract for flavor.  When bananas are at the ripeness you like, cut them in 1-inch circles and free on a baking tray. Once frozen, store in a plastic bag in the freeze to use in smoothies or take out desired portion and allow to warm to room temp before eating.   Consider adding a humidifier near your side of the bed to help moisten air so mouth is not drying out as quickly overnight. I also put a link for XyliMelts that can be used at night to help moisten mouth.   Continue coconut water with water combination, 64 ounces per day- perfect electrolyte drink.      Merissa Colon RD, LDN  Registered Dietitian  [facility]  [address] [place] [address]  O: [phone]  F: [phone]

## 2025-04-02 RX ORDER — METOPROLOL TARTRATE 50 MG
50 TABLET ORAL AS DIRECTED
COMMUNITY

## 2025-04-02 RX ORDER — HYDRALAZINE HYDROCHLORIDE 25 MG/1
25 TABLET, FILM COATED ORAL 2 TIMES DAILY
COMMUNITY

## 2025-04-02 RX ORDER — TELMISARTAN AND HYDROCHLORTHIAZIDE 80; 12.5 MG/1; MG/1
1 TABLET ORAL DAILY
COMMUNITY
Start: 2025-02-13

## 2025-04-03 ENCOUNTER — TELEPHONE (OUTPATIENT)
Age: 65
End: 2025-04-03

## 2025-04-03 NOTE — TELEPHONE ENCOUNTER
Pt called to reschedule her appts tomorrow bc she has another appt during that time.     Pt's appt labs and md appt have been rescheduled for 4/11/25 starting at 7:45am and 8:45 with the doctor. I advised Praveena SAGASTUME will schedule the appt with her    Pt said okay and call ended

## 2025-04-04 ENCOUNTER — APPOINTMENT (OUTPATIENT)
Age: 65
End: 2025-04-04
Attending: NURSE PRACTITIONER
Payer: COMMERCIAL

## 2025-04-04 ENCOUNTER — APPOINTMENT (OUTPATIENT)
Age: 65
End: 2025-04-04
Attending: INTERNAL MEDICINE
Payer: COMMERCIAL

## 2025-04-07 ENCOUNTER — HOSPITAL ENCOUNTER (OUTPATIENT)
Dept: CT IMAGING | Facility: HOSPITAL | Age: 65
Discharge: HOME | End: 2025-04-07
Attending: INTERNAL MEDICINE
Payer: COMMERCIAL

## 2025-04-07 VITALS
RESPIRATION RATE: 18 BRPM | WEIGHT: 148 LBS | SYSTOLIC BLOOD PRESSURE: 142 MMHG | BODY MASS INDEX: 26.22 KG/M2 | DIASTOLIC BLOOD PRESSURE: 75 MMHG | HEIGHT: 63 IN | HEART RATE: 65 BPM

## 2025-04-07 DIAGNOSIS — R07.9 CHEST PAIN, UNSPECIFIED: ICD-10-CM

## 2025-04-07 PROCEDURE — 75574 CT ANGIO HRT W/3D IMAGE: CPT | Performed by: INTERNAL MEDICINE

## 2025-04-07 PROCEDURE — 75580 N-INVAS EST C FFR SW ALY CTA: CPT | Performed by: INTERNAL MEDICINE

## 2025-04-07 RX ORDER — METOPROLOL TARTRATE 25 MG/1
TABLET, FILM COATED ORAL
Status: COMPLETED
Start: 2025-04-07 | End: 2025-04-07

## 2025-04-07 RX ORDER — METOPROLOL TARTRATE 1 MG/ML
5 INJECTION, SOLUTION INTRAVENOUS SEE ADMIN INSTRUCTIONS
Status: DISCONTINUED | OUTPATIENT
Start: 2025-04-07 | End: 2025-04-09

## 2025-04-07 RX ORDER — DILTIAZEM HYDROCHLORIDE 5 MG/ML
5 INJECTION INTRAVENOUS SEE ADMIN INSTRUCTIONS
Status: DISCONTINUED | OUTPATIENT
Start: 2025-04-07 | End: 2025-04-09

## 2025-04-07 RX ORDER — METOPROLOL TARTRATE 25 MG/1
50 TABLET, FILM COATED ORAL ONCE AS NEEDED
Status: DISCONTINUED | OUTPATIENT
Start: 2025-04-07 | End: 2025-04-09

## 2025-04-07 RX ORDER — NITROGLYCERIN 0.4 MG/1
0.4 TABLET SUBLINGUAL ONCE
Status: COMPLETED | OUTPATIENT
Start: 2025-04-07 | End: 2025-04-07

## 2025-04-07 RX ORDER — NITROGLYCERIN 0.4 MG/1
TABLET SUBLINGUAL
Status: COMPLETED
Start: 2025-04-07 | End: 2025-04-07

## 2025-04-07 RX ORDER — METOPROLOL TARTRATE 25 MG/1
100 TABLET, FILM COATED ORAL ONCE AS NEEDED
Status: DISCONTINUED | OUTPATIENT
Start: 2025-04-07 | End: 2025-04-09

## 2025-04-07 RX ORDER — METOPROLOL TARTRATE 25 MG/1
50 TABLET, FILM COATED ORAL ONCE AS NEEDED
Status: COMPLETED | OUTPATIENT
Start: 2025-04-07 | End: 2025-04-07

## 2025-04-07 RX ORDER — METOPROLOL TARTRATE 25 MG/1
100 TABLET, FILM COATED ORAL ONCE AS NEEDED
Status: COMPLETED | OUTPATIENT
Start: 2025-04-07 | End: 2025-04-07

## 2025-04-07 RX ORDER — METOPROLOL TARTRATE 1 MG/ML
INJECTION, SOLUTION INTRAVENOUS
Status: COMPLETED
Start: 2025-04-07 | End: 2025-04-07

## 2025-04-07 RX ADMIN — METOPROLOL TARTRATE 50 MG: 25 TABLET, FILM COATED ORAL at 09:07:00

## 2025-04-07 RX ADMIN — METOPROLOL TARTRATE 5 MG: 1 INJECTION, SOLUTION INTRAVENOUS at 10:09:00

## 2025-04-07 RX ADMIN — NITROGLYCERIN 0.4 MG: 0.4 TABLET SUBLINGUAL at 10:12:00

## 2025-04-07 NOTE — IMAGING NOTE
TO RAD HOLDING AT 0857      HX TAKEN: CHEST PAIN    PT CONSENTED AT 0905     BASELINE VITAL SIGNS: HR 65  /75 BMI 26    CTA ORDERED BY DR CONTRERAS.      WAS PT GIVEN CTA PREMEDS: YES-  PT TOOK METOPROLOL PO 50 MG LAST NIGHT AND AGAIN THIS AM.    METOPROLOL PO GIVEN 50 MILLIGRAMS  AT 0907    18 GAUGE IV STARTED AT 0955    LABS FROM 3/7/25: GFR = 80   CREATINE = 0.82    0955: HR 58 /75    TO CT TABLE @ 1007    CONNECT TO MONITOR  HR 68 /82      1009:  METOPROLOL 5 MILLIGRAMS GIVEN IV PUSH  SEE  PROTOCOL    1011: HR 64 /71     NITROGLYCERIN 0.4 MILLIGRAMS SUBLINGUAL GIVEN AT 1012     CALCIUM SCORE COMPLETED AT 1016     INJECTION STARTED AT 1019 HR 64 DURING SCAN PROCEDURE COMPLETE    POST SCAN HR 66 /73 AT 1023    PT TO HOLDING AREA  VS Q 15 MIN X2   1027: HR 64 /77  1042: HR 62 /72    AVS  PROVIDED      1055 DISCHARGED HOME

## 2025-04-07 NOTE — DISCHARGE INSTRUCTIONS
Computed Tomography Angiography (CTA)  Computed tomography angiography (CTA) is an imaging test. It uses X-rays and computer technology to make detailed pictures of your arteries (blood vessels). Before the test, an X-ray dye (contrast medium) is injected into your vein. The dye makes it easier to see your blood vessels on the X-ray. Pictures are then taken with the CT scanner. A computer turns the images into 2-D and 3-D pictures.      CTA can make 3D images, such as the carotid arteries shown here.     Why CTA is done  CTA may be used to:  Check arteries in your belly, neck, lungs, pelvis, kidneys, or brain.  Look for a ballooning of the blood vessel wall (aneurysm) or a tear (dissection).  Check if a tube (stent) used to keep an artery open is working well.  Find damage to your arteries due to injuries.  Collect details on blood vessels that supply blood to tumors.  Getting ready for your test  Tell your healthcare provider if you:   Have diabetes  Have kidney disease  Are allergic to X-ray dye or other medicines  Are pregnant, think you may be pregnant, or are breastfeeding  Are taking any medicines, herbs, or supplements, including prescription medicines, illegal drugs, and over-the-counter medicines such as aspirin or ibuprofen  Follow any directions you are given for not eating or drinking before the CTA. Follow any other instructions from your healthcare provider.   During your test  You will be asked to remove any hair clips, jewelry, false teeth, or other metal items that could show up on the X-ray.  You will lie down on the scanning table. An IV line will be put in a vein in your arm or hand.  The scanning table will be properly placed. The part of your body being checked will be inside the doughnut-shaped CT scanner.  One image may be taken first to be sure you are in the proper position for the test.  The IV will be hooked up to an automatic injection machine. This controls how often and how fast the  X-ray dye is injected. The injection may continue during part of the exam.  The dye will be put into your vein through the IV line. You may feel warmth through your body when the dye is injected.  You can’t move while the X-rays are being taken. Pillows and foam pads may be used to help you stay still. You will be told to hold your breath for 10 to 25 seconds at a time.  A single scan may take several minutes. You may need more than one scan.    After your test  Drink plenty of fluids to help flush the X-ray dye from your body.  You may eat as soon as you want to.    Possible risks  All procedures have some risks. A CTA has some possible risks. These include:   Problems due to the X-ray dye, such as an allergic reaction or kidney damage  Skin damage from leaking X-ray dye near where the IV was put in  [employer] last reviewed this educational content on 8/1/2022  © 1070-8304 [employer]. All rights reserved. This information is not intended as a substitute for professional medical care. Always follow your healthcare professional's instructions.

## 2025-04-11 ENCOUNTER — OFFICE VISIT (OUTPATIENT)
Age: 65
End: 2025-04-11
Attending: NURSE PRACTITIONER
Payer: COMMERCIAL

## 2025-04-11 ENCOUNTER — NURSE ONLY (OUTPATIENT)
Age: 65
End: 2025-04-11
Attending: NURSE PRACTITIONER
Payer: COMMERCIAL

## 2025-04-11 VITALS
BODY MASS INDEX: 26.57 KG/M2 | OXYGEN SATURATION: 96 % | HEART RATE: 82 BPM | HEIGHT: 62 IN | DIASTOLIC BLOOD PRESSURE: 86 MMHG | RESPIRATION RATE: 18 BRPM | SYSTOLIC BLOOD PRESSURE: 133 MMHG | TEMPERATURE: 98 F | WEIGHT: 144.38 LBS

## 2025-04-11 DIAGNOSIS — C73 THYROID CANCER (HCC): Primary | ICD-10-CM

## 2025-04-11 DIAGNOSIS — C78.01 MALIGNANT NEOPLASM METASTATIC TO RIGHT LUNG (HCC): ICD-10-CM

## 2025-04-11 DIAGNOSIS — J90 PLEURAL EFFUSION: ICD-10-CM

## 2025-04-11 DIAGNOSIS — G89.3 CANCER RELATED PAIN: ICD-10-CM

## 2025-04-11 DIAGNOSIS — C73 THYROID CANCER (HCC): ICD-10-CM

## 2025-04-11 DIAGNOSIS — K12.30 MUCOSITIS: ICD-10-CM

## 2025-04-11 LAB
ALBUMIN SERPL-MCNC: 4.5 G/DL (ref 3.2–4.8)
ALBUMIN/GLOB SERPL: 1.5 {RATIO} (ref 1–2)
ALP LIVER SERPL-CCNC: 109 U/L (ref 50–130)
ALT SERPL-CCNC: 17 U/L (ref 10–49)
ANION GAP SERPL CALC-SCNC: 9 MMOL/L (ref 0–18)
AST SERPL-CCNC: 24 U/L (ref ?–34)
BASOPHILS # BLD AUTO: 0.05 X10(3) UL (ref 0–0.2)
BASOPHILS NFR BLD AUTO: 0.6 %
BILIRUB SERPL-MCNC: 0.8 MG/DL (ref 0.2–1.1)
BUN BLD-MCNC: 18 MG/DL (ref 9–23)
BUN/CREAT SERPL: 20.7 (ref 10–20)
CALCIUM BLD-MCNC: 9.2 MG/DL (ref 8.7–10.4)
CHLORIDE SERPL-SCNC: 103 MMOL/L (ref 98–112)
CO2 SERPL-SCNC: 27 MMOL/L (ref 21–32)
CREAT BLD-MCNC: 0.87 MG/DL (ref 0.55–1.02)
DEPRECATED RDW RBC AUTO: 46.5 FL (ref 35.1–46.3)
EGFRCR SERPLBLD CKD-EPI 2021: 74 ML/MIN/1.73M2 (ref 60–?)
EOSINOPHIL # BLD AUTO: 0.21 X10(3) UL (ref 0–0.7)
EOSINOPHIL NFR BLD AUTO: 2.3 %
ERYTHROCYTE [DISTWIDTH] IN BLOOD BY AUTOMATED COUNT: 14.5 % (ref 11–15)
GLOBULIN PLAS-MCNC: 3.1 G/DL (ref 2–3.5)
GLUCOSE BLD-MCNC: 122 MG/DL (ref 70–99)
HCT VFR BLD AUTO: 42.7 % (ref 35–48)
HGB BLD-MCNC: 14.6 G/DL (ref 12–16)
IMM GRANULOCYTES # BLD AUTO: 0.01 X10(3) UL (ref 0–1)
IMM GRANULOCYTES NFR BLD: 0.1 %
LYMPHOCYTES # BLD AUTO: 3.3 X10(3) UL (ref 1–4)
LYMPHOCYTES NFR BLD AUTO: 36.3 %
MCH RBC QN AUTO: 30 PG (ref 26–34)
MCHC RBC AUTO-ENTMCNC: 34.2 G/DL (ref 31–37)
MCV RBC AUTO: 87.9 FL (ref 80–100)
MONOCYTES # BLD AUTO: 0.62 X10(3) UL (ref 0.1–1)
MONOCYTES NFR BLD AUTO: 6.8 %
NEUTROPHILS # BLD AUTO: 4.9 X10 (3) UL (ref 1.5–7.7)
NEUTROPHILS # BLD AUTO: 4.9 X10(3) UL (ref 1.5–7.7)
NEUTROPHILS NFR BLD AUTO: 53.9 %
OSMOLALITY SERPL CALC.SUM OF ELEC: 291 MOSM/KG (ref 275–295)
PLATELET # BLD AUTO: 230 10(3)UL (ref 150–450)
POTASSIUM SERPL-SCNC: 3.6 MMOL/L (ref 3.5–5.1)
PROT SERPL-MCNC: 7.6 G/DL (ref 5.7–8.2)
RBC # BLD AUTO: 4.86 X10(6)UL (ref 3.8–5.3)
SODIUM SERPL-SCNC: 139 MMOL/L (ref 136–145)
WBC # BLD AUTO: 9.1 X10(3) UL (ref 4–11)

## 2025-04-11 RX ORDER — METOCLOPRAMIDE 5 MG/1
5 TABLET ORAL
COMMUNITY
Start: 2024-12-29

## 2025-04-11 NOTE — PROGRESS NOTES
[facility] Progress Note    Patient Name: Camelia Markham   YOB: 1960   Medical Record Number: [id number]   CSN: [id number]   Consulting Physician: Inocente Chen MD  Referring Physician(s): Dr Dana Sands MD    Diagnosis  1. Thyroid cancer (HCC)    2. 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 Pleural effusion    4. Mucositis         Cancer Staging   Malignant neoplasm of thyroid gland (HCC)  Staging form: Thyroid - Differentiated, AJCC 8th Edition  - Clinical: Stage IVB (rcT2, rcN1, rcM1, Age at diagnosis: >= 55 years) - Signed by Inocente Chen MD on 8/28/2024    Current Therapy  Lenvatinib - started 9/18/24  (now at 14mg daily)    Interval History  Patient presents for follow-up.  Since her last visit she is  doing well.  Her oral mucositis is under control with dexamethasone rinses and as needed lidocaine. Feels fine. Continues on lenvatinib.  She had a recent coronary CT that shows mild CAD.  She is due to follow-up with cardiology.  It also demonstrated minimal right-sided pleural effusion with noes new lung nodules.    Past Oncologic History   Camelia Markham is a 65 year old female that was seen today in the Cancer Center for metastatic thyroid cancer. Her oncologic history is detailed below    11/3/2010 was found to have an enlarged thyroid gland ultrasound-guided FNA showed papillary thyroid cancer.  Underwent total thyroidectomy with Dr. Asher.  Final pathology showed a 2.5 cm tumor and 2 positive lymph nodes.    12/2010 BRIDGES uptake study showed no evidence of abnormal uptake outside the neck.  Underwent 207 miCu of radioactive iodine.  She had then been maintained on levothyroxine.  Subsequently was lost to follow-up after 2018.    6/17/2024 Thyroglobulin increased to 10, prompted an ultrasound that showed an oval hypoechoic nodule in the superior aspect of the left thyroid measuring 1 x 0.6 x 0.6 cm thought to represent an indeterminate lymph node.    8/6/2024  ultrasound-guided FNA showed papillary carcinoma. NGS testing showed a BRAF [id number] mutation    8/16/2024 [facility] thyroid scan showed no discernible pathologic activity increased uptake in the thyroid bed.  Given the biopsy-proven recurrence and absence of I-131 uptake this was thought to represent dedifferentiated recurrent thyroid malignancy.      8/22/2024 PET/CT fusion study showed extensive hypermetabolic pleural-based nodularity, large pleural effusion as well as hypermetabolic nodularity in the left neck soft tissues as well as peripheral right upper nodule all of which was concerning for metastatic disease.  Previously seen cystic pancreatic lesion did not demonstrate any hypermetabolic activity.    8/29/24 had thoracentesis with cytology showing metastatic thyroid carcinoma.    9/15/24 Underwent pleur-X for recurrent pleural effusion.    9/18/24 started lenvatinib at 24mg daily    11/2024 had significant mucositis decreased oral intake and diarrhea with lenvatinib.  This was held.  She required hospitalization and chest tube placement for evacuation loculated effusion.  Lenvatinib is being resumed at 20 mg daily    12/19/2024 CT abdomen pelvis showed decrease in pleural-based nodularity compared to prior PET scan.    12/2024 continue to have worsening mucositis and decreased oral intake.  Lenvatinib subsequently resumed at 14 mg daily.    Past Medical History:  Past Medical History:    Anxiety state, unspecified    Cancer (HCC)    Colon adenomas    x3    Disorder of thyroid    thyroidectomy    Diverticulosis of large intestine    Esophageal reflux    Exposure to medical therapeutic radiation    thyroid    High blood pressure    High cholesterol    Hypercholesteremia    Hyperlipidemia    Hypothyroidism    Osteoarthrosis, unspecified whether generalized or localized, unspecified site    Thyroid cancer (HCC)    papillary thyroid; s/p surgery resection with Asher and BRIDGES    Unspecified essential hypertension        Past Surgical History:  Past Surgical History:   Procedure Laterality Date    Colonoscopy N/A 2022    Procedure: COLONOSCOPY;  Surgeon: Neeraj No MD;  Location: [facility] ENDOSCOPY    Colonoscopy  2024    Colonoscopy N/A 2024    Procedure: COLONOSCOPY;  Surgeon: Neeraj No MD;  Location: [facility] ENDOSCOPY    Colonoscopy N/A 2024    Procedure: COLONOSCOPY;  Surgeon: Jair Murillo MD;  Location: [facility] ENDOSCOPY    Egd  2022    Egd  2024    Esophagogastroduodenoscopy    Endoscopic ultrasound - internal  2022    Endoscopic ultrasound exam  2024    Endoscopic Ultrasound    Eye surgery  2024    Eye surgery Left 2024    Hysterectomy      Knee surgery Left 2022    no associated bleeding complications          40 week 7 lb(s) 5 oz Male; 6 hr labor           40 week 7 lb(s) 3 oz Female          41 week 9 lb(s) 8 oz Male    Other surgical history      Injection Tendon Sheath, Ligament    Thyroidectomy      total    Total abdom hysterectomy         Family Medical History:  Family History   Problem Relation Age of Onset    Heart Disorder Mother     Breast Cancer Maternal Cousin Female 57    Cancer Daughter 29        Hodgkin's Lymphoma    Ovarian Cancer Neg     Bleeding Disorders Neg     DVT/VTE Neg     Pancreatic Cancer Neg     Prostate Cancer Neg        Gyne History:  OB History    Para Term  AB Living   3 3 0 0 0 0   SAB IAB Ectopic Multiple Live Births   0 0 0 0 0       Social History:  Social History     Socioeconomic History    Marital status:      Spouse name: Not on file    Number of children: Not on file    Years of education: Not on file    Highest education level: Not on file   Occupational History    Not on file   Tobacco Use    Smoking status: Never    Smokeless tobacco: Never   Vaping Use    Vaping status: Never Used   Substance and Sexual Activity    Alcohol use: Not Currently      Comment: social    Drug use: No    Sexual activity: Not on file   Other Topics Concern     Service Not Asked    Blood Transfusions Not Asked    Caffeine Concern Yes     Comment: 1 cup of coffee     Occupational Exposure Not Asked    Hobby Hazards Not Asked    Sleep Concern Not Asked    Stress Concern Not Asked    Weight Concern Not Asked    Special Diet Not Asked    Back Care Not Asked    Exercise Not Asked    Bike Helmet Not Asked    Seat Belt Not Asked    Self-Exams Not Asked   Social History Narrative    Camelia Figueroa is  to Baldemar x30 yrs. She has 3 adult children. Patient works in [employer] in [employer]. She lives with her , her mom, and 1 of the children in [place], [place].     Social Drivers of Health     Food Insecurity: No Food Insecurity (12/27/2024)    Food Insecurity     Food Insecurity: Never true   Transportation Needs: No Transportation Needs (12/27/2024)    Transportation Needs     Lack of Transportation: No     Car Seat: Not on file   Housing Stability: Low Risk  (12/27/2024)    Housing Stability     Housing Instability: No     Housing Instability Emergency: Not on file     Crib or Bassinette: Not on file       Allergies:   Allergies   Allergen Reactions    Latex HIVES    Peanut-Containing Drug Products SWELLING     Closes Throat  Closes Throat  Closes Throat      Peanuts SWELLING     Closes Throat    Adhesive Tape OTHER (SEE COMMENTS)    Seasonal        Current Medications:   metoclopramide 5 MG Oral Tab Take 1 tablet (5 mg total) by mouth.      Telmisartan-HCTZ 80-12.5 MG Oral Tab Take 1 tablet by mouth in the morning.      hydrALAZINE 25 MG Oral Tab Take 1 tablet (25 mg total) by mouth in the morning and 1 tablet (25 mg total) before bedtime. (Patient taking differently: Take 2 tablets (50 mg total) by mouth in the morning and 2 tablets (50 mg total) before bedtime.)      hydrOXYzine 25 MG Oral Tab Take 1 tablet (25 mg total) by mouth.      carvedilol 6.25 MG Oral Tab Take 1  tablet (6.25 mg total) by mouth in the morning and 1 tablet (6.25 mg total) before bedtime.      Lidocaine Viscous HCl 2 % Mouth/Throat Solution Take 5 mL by mouth every 3 (three) hours as needed for Pain. 100 mL 0    Lenvatinib, 14 MG Daily Dose, 10 & 4 MG Oral Capsule Therapy Pack Take 14 mg by mouth daily. 30 each 5    dexAMETHasone 0.5 MG/5ML Oral Elixir Take 10 ml and swish in mouth for 2 minutes, then spit, do not eat for 1 hour after. May swish and spit 10 ml, 4 times a day. (Patient taking differently: nightly. Take 10 ml and swish in mouth for 2 minutes, then spit, do not eat for 1 hour after. May swish and spit 10 ml, 4 times a day.) 237 mL 0    ondansetron 4 MG Oral Tablet Dispersible       HYDROcodone-acetaminophen 5-325 MG Oral Tab Take 1 tablet by mouth every 4 (four) hours as needed. 30 tablet 0    senna-docusate 8.6-50 MG Oral Tab Take 2 tablets by mouth nightly. 60 tablet 2    Omeprazole 40 MG Oral Capsule Delayed Release Take 1 capsule (40 mg total) by mouth in the morning.      clonazePAM 2 MG Oral Tab Take 1 tablet (2 mg total) by mouth nightly as needed for Anxiety (Sleep as well).      butalbital-acetaminophen-caffeine -40 MG Oral Tab Take 1 tablet by mouth every 6 (six) hours as needed. 30 tablet 0    levothyroxine 150 MCG Oral Tab Take 1 tablet (150 mcg total) by mouth before breakfast.         Review of Systems:  A comprehensive 14 point review of systems was completed.  Pertinent positives and negatives noted in the the HPI.     Vital Signs:  /86 (BP Location: Right arm, Patient Position: Sitting, Cuff Size: adult)   Pulse 82   Temp 97.9 °F (36.6 °C) (Oral)   Resp 18   Ht 1.575 m (5' 2\")   Wt 65.5 kg (144 lb 6.4 oz)   SpO2 96%   BMI 26.41 kg/m²    Physical Examination:  General: No apparent distress  ENT: Conjunctiva clear, EOM intact. OP clear  Lymphatics:  No palpable lymphadenopathy .   Chest: + R sided PleurX. Normal respiratory effort, CTA  CV: Regular rate and  rhythm, S1 and S2 heard  Extremities: No edema noted  Abdomen:  Soft, mild tenderness LUQ, non distended, NABS  Skin: No lesions, rashes or nodules on visible skin  Neurological: grossly intact  Psych: depressed mood and affect prior visits.  Today, tearful but consolable.  Not anxious    Performance Status:  ECOG-1    Labs:    Lab Results   Component Value Date/Time    WBC 9.1 04/11/2025 07:46 AM    RBC 4.86 04/11/2025 07:46 AM    HGB 14.6 04/11/2025 07:46 AM    HCT 42.7 04/11/2025 07:46 AM    MCV 87.9 04/11/2025 07:46 AM    MCH 30.0 04/11/2025 07:46 AM    MCHC 34.2 04/11/2025 07:46 AM    RDW 14.5 04/11/2025 07:46 AM    NEPRELIM 4.90 04/11/2025 07:46 AM    .0 04/11/2025 07:46 AM       Lab Results   Component Value Date/Time     (H) 04/11/2025 07:46 AM    BUN 18 04/11/2025 07:46 AM    CREATSERUM 0.87 04/11/2025 07:46 AM    GFRNAA 67 02/16/2021 12:09 PM    CA 9.2 04/11/2025 07:46 AM    ALB 4.5 04/11/2025 07:46 AM     04/11/2025 07:46 AM    K 3.6 04/11/2025 07:46 AM     04/11/2025 07:46 AM    CO2 27.0 04/11/2025 07:46 AM    ALKPHO 109 04/11/2025 07:46 AM    AST 24 04/11/2025 07:46 AM    ALT 17 04/11/2025 07:46 AM       Imaging:    Impression:  64-year-old with a long history of thyroid cancer s/p thyroidectomy followed by BRIDGES and levothyroxine suppression now with biopsy-proven recurrence around the neck as well as pulmonary parenchymal mets and pleural nodules all worrisome for metastatic thyroid cancer.  BRIDGES uptake scan was negative suggesting dedifferentiated thyroid cancer.    I've previously had a long discussion with patient explained that unfortunately this represents an incurable Stage IV malignancy.  However this is certainly very treatable with oral tyrosine kinase inhibitors with good disease control.  She was started lenvatinib but has developed increasing mucositis nausea and dehydration, so was dose reduced    Plan:  Continue Lenvatinib at 14 mg daily [ previously on 24 mg &  then 20mg.], Current dose since approx 12/20/24.  Tolerating better.  If she has continued trouble tolerating lenvatinib may need to switch to a BRAF inhibitor. (Since her tumor does have a BRAF-V600E mutation)  Secondary hypertension: Better on carvedilol olmesartan and hydralazine.  She follows up with cardiology.  Mucositis, better with Viscous lidocaine and Dexamethasone elixir  Cancer-related pain, improved/controlled, Norco prn.  Followed by palliative care  CT shows continued regression of pleural nodules and lung nodule.  All suggesting a positive response to treatment as also noted with continued thyroglobulin decrease    Thank you Dr FINA MABRY, MD WENDY for the opportunity to participate in the care of this interesting patient. Please do contact me if I may be of any further assistance    Inocente Chen MD   [facility]/Oncology    High complexity MDM. Our clinic is continuing focal point for all oncologic services the patient needs.

## 2025-04-14 LAB
THYROGLOB AB: <1 IU/ML
THYROGLOB IMA: 0.5 NG/ML

## 2025-04-24 ENCOUNTER — TELEPHONE (OUTPATIENT)
Age: 65
End: 2025-04-24

## 2025-04-24 ENCOUNTER — APPOINTMENT (OUTPATIENT)
Dept: CT IMAGING | Facility: HOSPITAL | Age: 65
End: 2025-04-24
Attending: EMERGENCY MEDICINE
Payer: COMMERCIAL

## 2025-04-24 ENCOUNTER — HOSPITAL ENCOUNTER (EMERGENCY)
Facility: HOSPITAL | Age: 65
Discharge: HOME | End: 2025-04-24
Attending: EMERGENCY MEDICINE
Payer: COMMERCIAL

## 2025-04-24 VITALS
SYSTOLIC BLOOD PRESSURE: 162 MMHG | OXYGEN SATURATION: 95 % | TEMPERATURE: 98 F | HEART RATE: 104 BPM | BODY MASS INDEX: 26 KG/M2 | DIASTOLIC BLOOD PRESSURE: 84 MMHG | RESPIRATION RATE: 18 BRPM | WEIGHT: 144.38 LBS

## 2025-04-24 DIAGNOSIS — R11.2 NAUSEA AND VOMITING, UNSPECIFIED VOMITING TYPE: ICD-10-CM

## 2025-04-24 DIAGNOSIS — R10.9 ABDOMINAL PAIN, ACUTE: Primary | ICD-10-CM

## 2025-04-24 DIAGNOSIS — C73 THYROID CANCER (HCC): ICD-10-CM

## 2025-04-24 LAB
ALBUMIN SERPL-MCNC: 4.8 G/DL (ref 3.2–4.8)
ALBUMIN/GLOB SERPL: 1.4 {RATIO} (ref 1–2)
ALP LIVER SERPL-CCNC: 111 U/L (ref 50–130)
ALT SERPL-CCNC: 15 U/L (ref 10–49)
ANION GAP SERPL CALC-SCNC: 12 MMOL/L (ref 0–18)
AST SERPL-CCNC: 25 U/L (ref ?–34)
BASOPHILS # BLD AUTO: 0.06 X10(3) UL (ref 0–0.2)
BASOPHILS NFR BLD AUTO: 0.4 %
BILIRUB SERPL-MCNC: 1.2 MG/DL (ref 0.2–1.1)
BILIRUB UR QL: NEGATIVE
BUN BLD-MCNC: 23 MG/DL (ref 9–23)
BUN/CREAT SERPL: 27.4 (ref 10–20)
CALCIUM BLD-MCNC: 9.4 MG/DL (ref 8.7–10.4)
CHLORIDE SERPL-SCNC: 103 MMOL/L (ref 98–112)
CLARITY UR: CLEAR
CO2 SERPL-SCNC: 24 MMOL/L (ref 21–32)
COLOR UR: COLORLESS
CREAT BLD-MCNC: 0.84 MG/DL (ref 0.55–1.02)
DEPRECATED RDW RBC AUTO: 46.6 FL (ref 35.1–46.3)
EGFRCR SERPLBLD CKD-EPI 2021: 78 ML/MIN/1.73M2 (ref 60–?)
EOSINOPHIL # BLD AUTO: 0.14 X10(3) UL (ref 0–0.7)
EOSINOPHIL NFR BLD AUTO: 1 %
ERYTHROCYTE [DISTWIDTH] IN BLOOD BY AUTOMATED COUNT: 14.4 % (ref 11–15)
GLOBULIN PLAS-MCNC: 3.4 G/DL (ref 2–3.5)
GLUCOSE BLD-MCNC: 107 MG/DL (ref 70–99)
GLUCOSE UR-MCNC: NORMAL MG/DL
HCT VFR BLD AUTO: 46.6 % (ref 35–48)
HGB BLD-MCNC: 15.6 G/DL (ref 12–16)
HGB UR QL STRIP.AUTO: NEGATIVE
IMM GRANULOCYTES # BLD AUTO: 0.04 X10(3) UL (ref 0–1)
IMM GRANULOCYTES NFR BLD: 0.3 %
KETONES UR-MCNC: NEGATIVE MG/DL
LEUKOCYTE ESTERASE UR QL STRIP.AUTO: 75
LIPASE SERPL-CCNC: 25 U/L (ref 12–53)
LYMPHOCYTES # BLD AUTO: 2.48 X10(3) UL (ref 1–4)
LYMPHOCYTES NFR BLD AUTO: 18.4 %
MCH RBC QN AUTO: 29.8 PG (ref 26–34)
MCHC RBC AUTO-ENTMCNC: 33.5 G/DL (ref 31–37)
MCV RBC AUTO: 89.1 FL (ref 80–100)
MONOCYTES # BLD AUTO: 0.76 X10(3) UL (ref 0.1–1)
MONOCYTES NFR BLD AUTO: 5.6 %
NEUTROPHILS # BLD AUTO: 10 X10 (3) UL (ref 1.5–7.7)
NEUTROPHILS # BLD AUTO: 10 X10(3) UL (ref 1.5–7.7)
NEUTROPHILS NFR BLD AUTO: 74.3 %
NITRITE UR QL STRIP.AUTO: NEGATIVE
OSMOLALITY SERPL CALC.SUM OF ELEC: 292 MOSM/KG (ref 275–295)
PH UR: 6.5 [PH] (ref 5–8)
PLATELET # BLD AUTO: 218 10(3)UL (ref 150–450)
POTASSIUM SERPL-SCNC: 3.7 MMOL/L (ref 3.5–5.1)
PROT SERPL-MCNC: 8.2 G/DL (ref 5.7–8.2)
PROT UR-MCNC: NEGATIVE MG/DL
RBC # BLD AUTO: 5.23 X10(6)UL (ref 3.8–5.3)
SODIUM SERPL-SCNC: 139 MMOL/L (ref 136–145)
SP GR UR STRIP: 1.03 (ref 1–1.03)
UROBILINOGEN UR STRIP-ACNC: NORMAL
WBC # BLD AUTO: 13.5 X10(3) UL (ref 4–11)

## 2025-04-24 PROCEDURE — 83690 ASSAY OF LIPASE: CPT | Performed by: EMERGENCY MEDICINE

## 2025-04-24 PROCEDURE — 87077 CULTURE AEROBIC IDENTIFY: CPT | Performed by: EMERGENCY MEDICINE

## 2025-04-24 PROCEDURE — 85025 COMPLETE CBC W/AUTO DIFF WBC: CPT | Performed by: EMERGENCY MEDICINE

## 2025-04-24 PROCEDURE — 80053 COMPREHEN METABOLIC PANEL: CPT | Performed by: EMERGENCY MEDICINE

## 2025-04-24 PROCEDURE — 99285 EMERGENCY DEPT VISIT HI MDM: CPT

## 2025-04-24 PROCEDURE — 96376 TX/PRO/DX INJ SAME DRUG ADON: CPT

## 2025-04-24 PROCEDURE — 96374 THER/PROPH/DIAG INJ IV PUSH: CPT

## 2025-04-24 PROCEDURE — 81001 URINALYSIS AUTO W/SCOPE: CPT | Performed by: EMERGENCY MEDICINE

## 2025-04-24 PROCEDURE — 99284 EMERGENCY DEPT VISIT MOD MDM: CPT

## 2025-04-24 PROCEDURE — 74177 CT ABD & PELVIS W/CONTRAST: CPT | Performed by: EMERGENCY MEDICINE

## 2025-04-24 PROCEDURE — 93010 ELECTROCARDIOGRAM REPORT: CPT

## 2025-04-24 PROCEDURE — 93005 ELECTROCARDIOGRAM TRACING: CPT

## 2025-04-24 PROCEDURE — 96361 HYDRATE IV INFUSION ADD-ON: CPT

## 2025-04-24 PROCEDURE — 96375 TX/PRO/DX INJ NEW DRUG ADDON: CPT

## 2025-04-24 PROCEDURE — 87086 URINE CULTURE/COLONY COUNT: CPT | Performed by: EMERGENCY MEDICINE

## 2025-04-24 RX ORDER — ONDANSETRON 2 MG/ML
4 INJECTION INTRAMUSCULAR; INTRAVENOUS ONCE
Status: COMPLETED | OUTPATIENT
Start: 2025-04-24 | End: 2025-04-24

## 2025-04-24 RX ORDER — MORPHINE SULFATE 4 MG/ML
4 INJECTION, SOLUTION INTRAMUSCULAR; INTRAVENOUS ONCE
Status: COMPLETED | OUTPATIENT
Start: 2025-04-24 | End: 2025-04-24

## 2025-04-24 NOTE — TELEPHONE ENCOUNTER
Toxicities: Lenvatinib    Nausea/Vomiting/Acute Abdominal Pain    Camelia Figueroa reports she woke up during the night with pain by her umbilicus. She got up and had a large, formed, brown bowel movement. She went back to bed, but the pain continued to increase and become sharp. The pain is radiating from her umbilicus to the right and left sides of her abdomen.. She felt nauseated and vomiting during the night. This morning she has vomited x 2. Her emesis is yellow in color. She is crying and rating her pain \"9/10.\" She is very nauseated. She can't keep medications, fluids or food down.    I explained to Camelia Figueroa that she needs to be evaluated in the ER. She said she has someone with her now who can bring her to the ER. I called report to [facility]. ETA 30 min.

## 2025-04-24 NOTE — ED PROVIDER NOTES
Patient Seen in: [facility] Emergency Department    History     Chief Complaint   Patient presents with    Abdomen/Flank Pain    Nausea/Vomiting/Diarrhea       HPI    History is provided by patient/independent historian: patient  65 year old female with history of colitis, hypertension, hyperlipidemia, here with complaints of vomiting for 1 day.  She woke up at 6 AM and has been vomiting since.  It is only just now subsided just prior to arrival but she is still feeling nauseous and having abdominal pain and bloating.  No changes in bowel movements.  No fevers or chills.  She has had a previous hysterectomy.    History reviewed. Past Medical History[1]      History reviewed. Past Surgical History[2]      Home Medications reviewed :  Prescriptions Prior to Admission[3]      History reviewed. Social Hx on file[4]      ROS  Review of Systems   Respiratory:  Negative for shortness of breath.    Cardiovascular:  Negative for chest pain.   Gastrointestinal:  Positive for abdominal pain, nausea and vomiting.   All other systems reviewed and are negative.     All other pertinent organ systems are reviewed and are negative.      Physical Exam     ED Triage Vitals [04/24/25 1229]   BP (!) 196/123   Pulse 97   Resp 22   Temp 98.1 °F (36.7 °C)   Temp src Oral   SpO2 92 %   O2 Device None (Room air)     Vital signs reviewed.      Physical Exam  Vitals and nursing note reviewed.   Cardiovascular:      Pulses: Normal pulses.   Pulmonary:      Effort: No respiratory distress.   Abdominal:      General: There is no distension.      Tenderness: There is generalized abdominal tenderness.   Neurological:      Mental Status: She is alert.         ED Course       Labs:     Labs Reviewed   COMP METABOLIC PANEL (14) - Abnormal; Notable for the following components:       Result Value    Glucose 107 (*)     BUN/CREA Ratio 27.4 (*)     Bilirubin, Total 1.2 (*)     All other components within normal limits   CBC WITH DIFFERENTIAL  WITH PLATELET - Abnormal; Notable for the following components:    WBC 13.5 (*)     RDW-SD 46.6 (*)     Neutrophil Absolute Prelim 10.00 (*)     Neutrophil Absolute 10.00 (*)     All other components within normal limits   URINALYSIS WITH CULTURE REFLEX - Abnormal; Notable for the following components:    Urine Color Colorless (*)     Leukocyte Esterase Urine 75 (*)     Squamous Epi. Cells Few (*)     All other components within normal limits   LIPASE - Normal   URINE CULTURE, ROUTINE         My EKG Interpretation: EKG    Rate, intervals and axes as noted on EKG Report.  Rate: 83  Rhythm: Sinus Rhythm  Reading: normal for rate, normal for rhythm, no acute ST elevation           As reviewed and Interpreted by me      Imaging Results Available and Reviewed while in ED:   CT ABDOMEN+PELVIS(CONTRAST ONLY)(CPT=74177)  Result Date: 4/24/2025  CONCLUSION:  1.  Electronic record notes history of thyroid cancer with right lung metastasis.  Small right pleural effusion which has slightly increased in size since previous exam.  Within the right lung base there are interstitial changes compatible with lymphangitic spread of disease which has progressed since previous exam.  Patchy pulmonary opacities within the right lung base which could represent atelectasis There are new atelectatic and/or interstitial changes within the left lung base, which could  represent new sites of disease.  Interval development of omental disease across the ventral aspect of all 4 quadrants of the abdomen, compatible with metastasis. 2.  Post hysterectomy.  Ovaries are not identified likely removed.  No adnexal mass. 3.  Post cholecystectomy.  Stable intra and extrahepatic biliary ductal dilatation likely representing age related and post cholecystectomy changes. 4.  Stable cystic foci within the pancreatic body measuring up to twenty-eight mm in maximal diameter.  This could represent dilated side branch, cystic neoplasm such as IPMN, or pseudocyst.  5.  The base the urinary bladder and anorectal junction projects below the the pelvic floor suggesting pelvic floor laxity/prolapse.   Dictated by (CST): Rolf Alexander MD on 4/24/2025 at 4:28 PM     Finalized by (CST): Rolf Alexander MD on 4/24/2025 at 4:37 PM          My review and independent interpretation of CT images: no free air. Radiology report corroborates this in addition to other details as reported by them.      Decision rules/scores evaluated: none      Diagnostic labs/tests considered but not ordered: none    ED Medications Administered:   Medications   ondansetron (Zofran) 4 MG/2ML injection 4 mg (4 mg Intravenous Given 4/24/25 1437)   sodium chloride 0.9 % IV bolus 1,000 mL (0 mL Intravenous Stopped 4/24/25 1649)   morphINE PF 4 MG/ML injection 4 mg (4 mg Intravenous Given 4/24/25 1507)   iopamidol 76% (ISOVUE-370) injection for power injector (80 mL Intravenous Given 4/24/25 1604)   morphINE PF 4 MG/ML injection 4 mg (4 mg Intravenous Given 4/24/25 1654)            - no further vomiting, pain controlled    MDM       Medical Decision Making      Differential Diagnosis: After obtaining the patient's history, performing the physical exam and reviewing the diagnostics, multiple initial diagnoses were considered based on the presenting problem including gastroenteritis, SBO, viral syndrome, appendicitis, cholecystitis    External document review: I personally reviewed available external medical records for any recent pertinent discharge summaries, testing, and procedures - the findings are as follows: 4/11/25 visit with Dr. Chen for thyroid cancer    Complicating Factors: The patient already  has a past medical history of Anxiety state, unspecified, Cancer (HCC) (2024), Colon adenomas (07/29/2024), Disorder of thyroid, Diverticulosis of large intestine, Esophageal reflux, Exposure to medical therapeutic radiation (01/01/2011), High blood pressure, High cholesterol, Hypercholesteremia (01/01/2005),  Hyperlipidemia, Hypothyroidism, Osteoarthrosis, unspecified whether generalized or localized, unspecified site, Thyroid cancer (HCC) (01/01/2011), and Unspecified essential hypertension. to contribute to the complexity of this ED evaluation.    Procedures performed: none    Discussed management with physician/appropriate source: Dr. Chen - agrees with outpt management    Considered admission/deescalation of care for: abdominal pain    Social determinants of health affecting patient care: none    Prescription medications considered: discussed continuing current medication regimen    The patient requires continuous monitoring for: abdominal pain    Shared decision making: discussed possible admission        Disposition and Plan     Clinical Impression:  1. Abdominal pain, acute    2. Nausea and vomiting, unspecified vomiting type    3. Thyroid cancer (HCC)        Disposition:  Discharge    Follow-up:  Inocente Chen MD  [address] [place], [place] [id number]  [phone]    Follow up        Medications Prescribed:  Current Discharge Medication List                         [1]   Past Medical History:   Anxiety state, unspecified    Cancer (HCC)    Colon adenomas    x3    Disorder of thyroid    thyroidectomy    Diverticulosis of large intestine    Esophageal reflux    Exposure to medical therapeutic radiation    thyroid    High blood pressure    High cholesterol    Hypercholesteremia    Hyperlipidemia    Hypothyroidism    Osteoarthrosis, unspecified whether generalized or localized, unspecified site    Thyroid cancer (HCC)    papillary thyroid; s/p surgery resection with Asher and BRIDGES    Unspecified essential hypertension   [2]   Past Surgical History:  Procedure Laterality Date    Colonoscopy N/A 12/29/2022    Procedure: COLONOSCOPY;  Surgeon: Neeraj No MD;  Location: [facility] ENDOSCOPY    Colonoscopy  07/29/2024    Colonoscopy N/A 07/29/2024    Procedure: COLONOSCOPY;  Surgeon: Neeraj No MD;  Location:  [facility] ENDOSCOPY    Colonoscopy N/A 2024    Procedure: COLONOSCOPY;  Surgeon: Jair Murillo MD;  Location: [facility] ENDOSCOPY    Egd  2022    Egd  2024    Esophagogastroduodenoscopy    Endoscopic ultrasound - internal  2022    Endoscopic ultrasound exam  2024    Endoscopic Ultrasound    Eye surgery  2024    Eye surgery Left 2024    Hysterectomy      Knee surgery Left 2022    no associated bleeding complications          40 week 7 lb(s) 5 oz Male; 6 hr labor           40 week 7 lb(s) 3 oz Female          41 week 9 lb(s) 8 oz Male    Other surgical history      Injection Tendon Sheath, Ligament    Thyroidectomy      total    Total abdom hysterectomy     [3] (Not in a hospital admission)   [4]   Social History  Socioeconomic History    Marital status:    Tobacco Use    Smoking status: Never    Smokeless tobacco: Never   Vaping Use    Vaping status: Never Used   Substance and Sexual Activity    Alcohol use: Not Currently     Comment: social    Drug use: No   Other Topics Concern    Caffeine Concern Yes     Comment: 1 cup of coffee

## 2025-04-24 NOTE — ED INITIAL ASSESSMENT (HPI)
Pt to ED with c/o abdominal pain and vomiting that started this am. Pt denies cough or fever. Pt states +chills. Pt states unable to keep anything down including her prescribed medications. Pt with hx of metastatic thyroid cancer currently on chemo. No respiratory distress noted. Pt is alert and oriented x4.

## 2025-04-25 ENCOUNTER — TELEPHONE (OUTPATIENT)
Age: 65
End: 2025-04-25

## 2025-04-25 LAB
ATRIAL RATE: 83 BPM
P AXIS: 33 DEGREES
P-R INTERVAL: 130 MS
Q-T INTERVAL: 388 MS
QRS DURATION: 84 MS
QTC CALCULATION (BEZET): 455 MS
R AXIS: -12 DEGREES
T AXIS: 106 DEGREES
VENTRICULAR RATE: 83 BPM

## 2025-04-25 NOTE — TELEPHONE ENCOUNTER
Called patient scheduled for follow up post ER visit- scheduled for Tuesday 4/29 at 1:30.  Patient in agreement with date and time of appointment.

## 2025-04-29 ENCOUNTER — OFFICE VISIT (OUTPATIENT)
Age: 65
End: 2025-04-29
Attending: NURSE PRACTITIONER
Payer: COMMERCIAL

## 2025-04-29 VITALS
DIASTOLIC BLOOD PRESSURE: 82 MMHG | WEIGHT: 143 LBS | BODY MASS INDEX: 26.31 KG/M2 | SYSTOLIC BLOOD PRESSURE: 183 MMHG | RESPIRATION RATE: 18 BRPM | TEMPERATURE: 98 F | HEIGHT: 62 IN | HEART RATE: 82 BPM | OXYGEN SATURATION: 96 %

## 2025-04-29 VITALS
OXYGEN SATURATION: 96 % | SYSTOLIC BLOOD PRESSURE: 183 MMHG | BODY MASS INDEX: 26.31 KG/M2 | WEIGHT: 143 LBS | RESPIRATION RATE: 18 BRPM | TEMPERATURE: 98 F | DIASTOLIC BLOOD PRESSURE: 82 MMHG | HEIGHT: 62 IN

## 2025-04-29 DIAGNOSIS — R93.5 ABNORMAL CT OF THE ABDOMEN: ICD-10-CM

## 2025-04-29 DIAGNOSIS — C78.01 MALIGNANT NEOPLASM METASTATIC TO RIGHT LUNG (HCC): ICD-10-CM

## 2025-04-29 DIAGNOSIS — J90 PLEURAL EFFUSION: ICD-10-CM

## 2025-04-29 DIAGNOSIS — R05.9 COUGH IN ADULT: ICD-10-CM

## 2025-04-29 DIAGNOSIS — C73 THYROID CANCER (HCC): ICD-10-CM

## 2025-04-29 DIAGNOSIS — R11.0 NAUSEA: ICD-10-CM

## 2025-04-29 DIAGNOSIS — C73 THYROID CANCER (HCC): Primary | ICD-10-CM

## 2025-04-29 DIAGNOSIS — Z51.5 PALLIATIVE CARE ENCOUNTER: Primary | ICD-10-CM

## 2025-04-29 DIAGNOSIS — G89.3 CANCER RELATED PAIN: ICD-10-CM

## 2025-04-29 DIAGNOSIS — Z71.89 GOALS OF CARE, COUNSELING/DISCUSSION: ICD-10-CM

## 2025-04-29 DIAGNOSIS — K66.8 OMENTAL MASS: ICD-10-CM

## 2025-04-29 RX ORDER — AZELASTINE HYDROCHLORIDE 137 UG/1
1 SPRAY, METERED NASAL DAILY
COMMUNITY
Start: 2025-04-15

## 2025-04-29 RX ORDER — DEXTROMETHORPHAN HYDROBROMIDE AND PROMETHAZINE HYDROCHLORIDE 15; 6.25 MG/5ML; MG/5ML
5 SYRUP ORAL 4 TIMES DAILY PRN
COMMUNITY
Start: 2025-04-20

## 2025-04-29 NOTE — PROGRESS NOTES
[facility] Progress Note    Patient Name: Camelia Markham   YOB: 1960   Medical Record Number: [id number]   CSN: [id number]   Consulting Physician: Inocente Chen MD  Referring Physician(s): Dr Dana Sands MD    Diagnosis  1. Thyroid cancer (HCC)    2. 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 Pleural effusion    4. Cancer related pain    5. Abnormal CT of the abdomen         Cancer Staging   Malignant neoplasm of thyroid gland (HCC)  Staging form: Thyroid - Differentiated, AJCC 8th Edition  - Clinical: Stage IVB (rcT2, rcN1, rcM1, Age at diagnosis: >= 55 years) - Signed by Inocente Chen MD on 8/28/2024    Current Therapy  Lenvatinib - started 9/18/24  (now at 14mg daily)    Interval History  Patient presents for follow-up.  She had developed sudden onset of crampy abdominal pain with nausea and presented the emergency room.    4/24/2025 CT scan shows slightly increased right pleural effusion with interstitial changes and patchy pulmonary opacities.  There was multiple omental disease present across the ventral aspect of the abdomen pelvis which is new compared to prior study with the largest in the right upper quadrant measuring 21 x 38 mm as well as in the left lower quadrant measuring 19 x 66 mm.  No ascites or lymphadenopathy noted.  There was a 28 x 19 mm cystic focus in the body of the pancreas which is unchanged from prior study of September 2024.    Her abdominal pain has improved some but occasionally has cramping.  She does not want any stronger opioids because she has developed some constipation.  Having more cough with clear sputum production.    Past Oncologic History   Camelia Markham is a 65 year old female that was seen today in the Cancer Center for metastatic thyroid cancer. Her oncologic history is detailed below    11/3/2010 was found to have an enlarged thyroid gland ultrasound-guided FNA showed papillary thyroid cancer.  Underwent total thyroidectomy  with Dr. Asher.  Final pathology showed a 2.5 cm tumor and 2 positive lymph nodes.    12/2010 BRIDGES uptake study showed no evidence of abnormal uptake outside the neck.  Underwent 207 miCu of radioactive iodine.  She had then been maintained on levothyroxine.  Subsequently was lost to follow-up after 2018.    6/17/2024 Thyroglobulin increased to 10, prompted an ultrasound that showed an oval hypoechoic nodule in the superior aspect of the left thyroid measuring 1 x 0.6 x 0.6 cm thought to represent an indeterminate lymph node.    8/6/2024 ultrasound-guided FNA showed papillary carcinoma. NGS testing showed a BRAF [id number] mutation    8/16/2024 [facility] thyroid scan showed no discernible pathologic activity increased uptake in the thyroid bed.  Given the biopsy-proven recurrence and absence of I-131 uptake this was thought to represent dedifferentiated recurrent thyroid malignancy.      8/22/2024 PET/CT fusion study showed extensive hypermetabolic pleural-based nodularity, large pleural effusion as well as hypermetabolic nodularity in the left neck soft tissues as well as peripheral right upper nodule all of which was concerning for metastatic disease.  Previously seen cystic pancreatic lesion did not demonstrate any hypermetabolic activity.    8/29/24 had thoracentesis with cytology showing metastatic thyroid carcinoma.    9/15/24 Underwent pleur-X for recurrent pleural effusion.    9/18/24 started lenvatinib at 24mg daily    11/2024 had significant mucositis decreased oral intake and diarrhea with lenvatinib.  This was held.  She required hospitalization and chest tube placement for evacuation loculated effusion.  Lenvatinib is being resumed at 20 mg daily    12/19/2024 CT abdomen pelvis showed decrease in pleural-based nodularity compared to prior PET scan.    12/2024 continue to have worsening mucositis and decreased oral intake.  Lenvatinib subsequently resumed at 14 mg daily.    Past Medical History:  Past Medical  History:    Anxiety state, unspecified    Cancer (HCC)    Colon adenomas    x3    Disorder of thyroid    thyroidectomy    Diverticulosis of large intestine    Esophageal reflux    Exposure to medical therapeutic radiation    thyroid    High blood pressure    High cholesterol    Hypercholesteremia    Hyperlipidemia    Hypothyroidism    Osteoarthrosis, unspecified whether generalized or localized, unspecified site    Thyroid cancer (HCC)    papillary thyroid; s/p surgery resection with Asher and BRIDGES    Unspecified essential hypertension       Past Surgical History:  Past Surgical History:   Procedure Laterality Date    Colonoscopy N/A 2022    Procedure: COLONOSCOPY;  Surgeon: Neeraj No MD;  Location: [facility] ENDOSCOPY    Colonoscopy  2024    Colonoscopy N/A 2024    Procedure: COLONOSCOPY;  Surgeon: Neeraj No MD;  Location: [facility] ENDOSCOPY    Colonoscopy N/A 2024    Procedure: COLONOSCOPY;  Surgeon: Jair Murillo MD;  Location: [facility] ENDOSCOPY    Egd  2022    Egd  2024    Esophagogastroduodenoscopy    Endoscopic ultrasound - internal  2022    Endoscopic ultrasound exam  2024    Endoscopic Ultrasound    Eye surgery  2024    Eye surgery Left 2024    Hysterectomy      Knee surgery Left 2022    no associated bleeding complications          40 week 7 lb(s) 5 oz Male; 6 hr labor           40 week 7 lb(s) 3 oz Female          41 week 9 lb(s) 8 oz Male    Other surgical history      Injection Tendon Sheath, Ligament    Thyroidectomy      total    Total abdom hysterectomy         Family Medical History:  Family History   Problem Relation Age of Onset    Heart Disorder Mother     Breast Cancer Maternal Cousin Female 57    Cancer Daughter 29        Hodgkin's Lymphoma    Ovarian Cancer Neg     Bleeding Disorders Neg     DVT/VTE Neg     Pancreatic Cancer Neg     Prostate Cancer Neg        Gyne History:  OB History     Para Term  AB Living   3 3 0 0 0 0   SAB IAB Ectopic Multiple Live Births   0 0 0 0 0       Social History:  Social History     Socioeconomic History    Marital status:      Spouse name: Not on file    Number of children: Not on file    Years of education: Not on file    Highest education level: Not on file   Occupational History    Not on file   Tobacco Use    Smoking status: Never    Smokeless tobacco: Never   Vaping Use    Vaping status: Never Used   Substance and Sexual Activity    Alcohol use: Not Currently     Comment: social    Drug use: No    Sexual activity: Not on file   Other Topics Concern     Service Not Asked    Blood Transfusions Not Asked    Caffeine Concern Yes     Comment: 1 cup of coffee     Occupational Exposure Not Asked    Hobby Hazards Not Asked    Sleep Concern Not Asked    Stress Concern Not Asked    Weight Concern Not Asked    Special Diet Not Asked    Back Care Not Asked    Exercise Not Asked    Bike Helmet Not Asked    Seat Belt Not Asked    Self-Exams Not Asked   Social History Narrative    Camelia Figueroa is  to Baldemar x30 yrs. She has 3 adult children. 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 She lives with her , her mom, and 1 of the children in [place], [place].     Social Drivers of Health     Food Insecurity: No Food Insecurity (2024)    Food Insecurity     Food Insecurity: Never true   Transportation Needs: No Transportation Needs (2024)    Transportation Needs     Lack of Transportation: No     Car Seat: Not on file   Housing Stability: Low Risk  (2024)    Housing Stability     Housing Instability: No     Housing Instability Emergency: Not on file     Crib or Bassinette: Not on file       Allergies:   Allergies   Allergen Reactions    Latex HIVES    Peanut-Containing Drug Products SWELLING     Closes Throat  Closes Throat  Closes Throat      Peanuts SWELLING     Closes Throat    Adhesive Tape OTHER (SEE COMMENTS)    Seasonal         Current Medications:   azelastine 137 MCG/SPRAY Nasal Solution 1 spray by Nasal route daily.      promethazine-dextromethorphan 6.25-15 MG/5ML Oral Syrup Take 5 mL by mouth 4 (four) times daily as needed.      metoclopramide 5 MG Oral Tab Take 1 tablet (5 mg total) by mouth.      Telmisartan-HCTZ 80-12.5 MG Oral Tab Take 1 tablet by mouth in the morning.      hydrALAZINE 25 MG Oral Tab Take 1 tablet (25 mg total) by mouth in the morning and 1 tablet (25 mg total) before bedtime. (Patient taking differently: Take 2 tablets (50 mg total) by mouth in the morning and 2 tablets (50 mg total) before bedtime.)      hydrOXYzine 25 MG Oral Tab Take 1 tablet (25 mg total) by mouth.      carvedilol 6.25 MG Oral Tab Take 1 tablet (6.25 mg total) by mouth in the morning and 1 tablet (6.25 mg total) before bedtime.      Lidocaine Viscous HCl 2 % Mouth/Throat Solution Take 5 mL by mouth every 3 (three) hours as needed for Pain. 100 mL 0    Lenvatinib, 14 MG Daily Dose, 10 & 4 MG Oral Capsule Therapy Pack Take 14 mg by mouth daily. 30 each 5    dexAMETHasone 0.5 MG/5ML Oral Elixir Take 10 ml and swish in mouth for 2 minutes, then spit, do not eat for 1 hour after. May swish and spit 10 ml, 4 times a day. (Patient taking differently: nightly. Take 10 ml and swish in mouth for 2 minutes, then spit, do not eat for 1 hour after. May swish and spit 10 ml, 4 times a day.) 237 mL 0    ondansetron 4 MG Oral Tablet Dispersible       HYDROcodone-acetaminophen 5-325 MG Oral Tab Take 1 tablet by mouth every 4 (four) hours as needed. 30 tablet 0    senna-docusate 8.6-50 MG Oral Tab Take 2 tablets by mouth nightly. 60 tablet 2    Omeprazole 40 MG Oral Capsule Delayed Release Take 1 capsule (40 mg total) by mouth in the morning.      clonazePAM 2 MG Oral Tab Take 1 tablet (2 mg total) by mouth nightly as needed for Anxiety (Sleep as well).      butalbital-acetaminophen-caffeine -40 MG Oral Tab Take 1 tablet by mouth every 6 (six)  hours as needed. 30 tablet 0    levothyroxine 150 MCG Oral Tab Take 1 tablet (150 mcg total) by mouth before breakfast.         Review of Systems:  A comprehensive 14 point review of systems was completed.  Pertinent positives and negatives noted in the the HPI.     Vital Signs:  BP (!) 183/82 (BP Location: Left arm, Patient Position: Sitting, Cuff Size: large)   Temp 97.8 °F (36.6 °C) (Oral)   Resp 18   Ht 1.575 m (5' 2\")   Wt 64.9 kg (143 lb)   SpO2 96%   BMI 26.16 kg/m²    Physical Examination:  General: No apparent distress  ENT: Conjunctiva clear, EOM intact. OP clear  Lymphatics:  No palpable lymphadenopathy .   Chest: + R sided PleurX. Normal respiratory effort, CTA  CV: Regular rate and rhythm, S1 and S2 heard  Extremities: No edema noted  Abdomen:  Soft, mild tenderness LUQ, non distended, NABS  Skin: No lesions, rashes or nodules on visible skin  Neurological: grossly intact  Psych: depressed mood and affect prior visits.  Today, tearful but consolable.  Not anxious    Performance Status:  ECOG-1    Labs:    Lab Results   Component Value Date/Time    WBC 13.5 (H) 04/24/2025 02:37 PM    RBC 5.23 04/24/2025 02:37 PM    HGB 15.6 04/24/2025 02:37 PM    HCT 46.6 04/24/2025 02:37 PM    MCV 89.1 04/24/2025 02:37 PM    MCH 29.8 04/24/2025 02:37 PM    MCHC 33.5 04/24/2025 02:37 PM    RDW 14.4 04/24/2025 02:37 PM    NEPRELIM 10.00 (H) 04/24/2025 02:37 PM    .0 04/24/2025 02:37 PM       Lab Results   Component Value Date/Time     (H) 04/24/2025 02:37 PM    BUN 23 04/24/2025 02:37 PM    CREATSERUM 0.84 04/24/2025 02:37 PM    GFRNAA 67 02/16/2021 12:09 PM    CA 9.4 04/24/2025 02:37 PM    ALB 4.8 04/24/2025 02:37 PM     04/24/2025 02:37 PM    K 3.7 04/24/2025 02:37 PM     04/24/2025 02:37 PM    CO2 24.0 04/24/2025 02:37 PM    ALKPHO 111 04/24/2025 02:37 PM    AST 25 04/24/2025 02:37 PM    ALT 15 04/24/2025 02:37 PM       Imaging:    Impression:  64-year-old with a long history of thyroid  cancer s/p thyroidectomy followed by BRIDGES and levothyroxine suppression now with biopsy-proven recurrence around the neck as well as pulmonary parenchymal mets and pleural nodules all worrisome for metastatic thyroid cancer.  BRIDGES uptake scan was negative suggesting dedifferentiated thyroid cancer.    I've previously had a long discussion with patient explained that unfortunately this represents an incurable Stage IV malignancy.  However this is certainly very treatable with oral tyrosine kinase inhibitors with good disease control.  She was started lenvatinib but has developed increasing mucositis nausea and dehydration, so was dose reduced.     It is unclear if these new omental lesions represent metastatic progression of her thyroid cancer [which would be quite unusual especially given the normalization of her thyroglobulin].  I have therefore recommended she undergo a PET/CT fusion study and a biopsy.    Plan:  Continue Lenvatinib at 14 mg daily [ previously on 24 mg & then 20mg.], Current dose since approx 12/20/24.   Obtain PET/CT and arrange a percutaneous biopsy with interventional radiology  Cancer-related pain, on PRN norco, seeing palliative care today  New lung infiltrates: She is having more productive cough.  I have therefore recommended a course of antibiotics.  She will return for follow-up after the biopsy.  Will obtain tumor markers including CEA and .  [She is s/p total abdominal hysterectomy and her colonoscopy last year was unremarkable]    Thank you Dr FINA MABRY, MD WENDY for the opportunity to participate in the care of this interesting patient. Please do contact me if I may be of any further assistance    Inocente Chen MD   [facility] Hematology/Oncology    High complexity MDM. Our clinic is continuing focal point for all oncologic services the patient needs.

## 2025-04-29 NOTE — PROGRESS NOTES
Palliative Care Follow Up Note     Patient Name: Camelia Markham   YOB: 1960   Medical Record Number: [id number]   Date of visit: 4/29/2025     The 21st Century Cures Act makes medical notes like these available to patients in the interest of transparency. Please be advised this is a medical document. Medical documents are intended to carry relevant information, facts as evident, and the clinical opinion of the practitioner. The medical note is intended as peer to peer communication and may appear blunt or direct. It is written in medical language and may contain abbreviations or verbiage that are unfamiliar.     Chief Complaint/Reason for Visit:  Chief Complaint   Patient presents with    Palliative Care      History of Present Illness:         Camelia Markham is a 65 year old female with  history of metastatic thyroid ca s/p thyroidectomy 2010, radioactive iodine tx with recent metastatic disease to lung, LN follows with Dr. Chen, hypothyroidism, HTN, HLD, GERD and anxiety.    Camelia Figueroa was admitted to hospital from 12/27/24 - 12/29/24 for abdominal pain management and constipation. Had colonoscopy that was unremarkable.     Reports resolution of constipation since hospital stay in December. Needs to continue f/u with GI.     Camelia Figueroa reports that she no longer takes Norco for R chest wall cancer related pain since Tylenol PRN helps control her intermittent pain.    Denies dyspnea. R PleurX discontinued. Follows with Dr. Brooks.     Reports cough x 1 week. Coughing up moderate amounts of clear sputum. Worse in morning.    Takes Fioricet on occasion for headache (prescribed by PCP).    Takes Clonazepam at bedtime for difficulty sleeping (prescribed by PCP).     Camelia Figueroa previously discontinued anti depressants on her own since she felt she was not as anxious.     PCP: Dr. Marie  Onc: Dr. Chen    Patient seen and examined with her  present today. Camelia Figueroa is awake, alert, oriented x 4, answers  questions appropriately, is a reliable historian, and is in NAD today.     CT scan completed - Dr. Chen ordered PET and bx to be done prior to next visit.    Camelia Figueroa is going to [place] over Mother's Day with her Mom to visit her brothers - she is looking forward to this trip.     See ROS.     Problem List:  Patient Active Problem List   Diagnosis    Thyroid cancer (HCC)    Hypothyroidism    Exposure to medical therapeutic radiation    Acute gastritis    Posterior tibial tendinitis of right leg    Acute tonsillitis    Backache    Bursitis of shoulder    Hypertensive disorder    Fatigue    Gastroesophageal reflux disease    Nasal mucosa dry    Nonspecific syndrome suggestive of viral illness    Psychophysiologic insomnia    Primary osteoarthritis of left knee    Internal derangement of left knee    Internal derangement of right knee    Other instability, right knee    Acute meniscal tear, medial    Abnormal gait    Heartburn    Anxiety state    Dysthymia    Primary osteoarthritis of right knee    Incontinence of feces    History of colon polyps    Cyst of pancreas (HCC)    Intestinal disaccharidase deficiency    Headache    Generalized anxiety disorder    Familial combined hyperlipidemia    Dyspnea    Diverticulitis    Disability of walking    Contact dermatitis    Calcaneal spur    Bilateral plantar fasciitis    Pain in thoracic spine    Obesity    Rogers's metatarsalgia    Mixed hyperlipidemia    Mass of uterine adnexa    Malaise and fatigue    Lumbosacral radiculopathy    Itching    Irritable bowel syndrome    Obesity with body mass index 30 or greater    Type 2 diabetes mellitus without complication (HCC)    Tinea corporis    Seasonal allergic rhinitis    Pyuria    Psoriasis    Posterior rhinorrhea    Pleuritic pain    Plantar fascial fibromatosis    Viral infection    Vitamin D deficiency    Osteoarthritis of knee    Malignant neoplasm of thyroid gland (HCC)    Influenza vaccine needed    Preoperative testing     Malignant neoplasm metastatic to right lung (HCC)    Pleural effusion on right    Malignant pleural effusion (HCC)    Goals of care, counseling/discussion    Advance care planning    Palliative care by specialist    Thyroid carcinoma (HCC)    Cancer related pain    Anxiety    Syncope and collapse    Hyperglycemia    Syncope    Metastasis from thyroid cancer (HCC)    Abdominal pain, generalized    Enteritis    Encounter for immunotherapy    Mucositis    Anxiety and depression        Medical History:  Past Medical History:    Anxiety state, unspecified    Cancer (HCC)    Colon adenomas    x3    Disorder of thyroid    thyroidectomy    Diverticulosis of large intestine    Esophageal reflux    Exposure to medical therapeutic radiation    thyroid    High blood pressure    High cholesterol    Hypercholesteremia    Hyperlipidemia    Hypothyroidism    Osteoarthrosis, unspecified whether generalized or localized, unspecified site    Thyroid cancer (HCC)    papillary thyroid; s/p surgery resection with Asher and BRIDGES    Unspecified essential hypertension       Surgical History:  Past Surgical History:   Procedure Laterality Date    Colonoscopy N/A 2022    Procedure: COLONOSCOPY;  Surgeon: Neeraj No MD;  Location: [facility] ENDOSCOPY    Colonoscopy  2024    Colonoscopy N/A 2024    Procedure: COLONOSCOPY;  Surgeon: Neeraj No MD;  Location: [facility] ENDOSCOPY    Colonoscopy N/A 2024    Procedure: COLONOSCOPY;  Surgeon: Jair Murillo MD;  Location: [facility] ENDOSCOPY    Egd  2022    Egd  2024    Esophagogastroduodenoscopy    Endoscopic ultrasound - internal  2022    Endoscopic ultrasound exam  2024    Endoscopic Ultrasound    Eye surgery  2024    Eye surgery Left 2024    Hysterectomy      Knee surgery Left 2022    no associated bleeding complications          40 week 7 lb(s) 5 oz Male; 6 hr labor           40 week 7 lb(s) 3 oz Female           41 week 9 lb(s) 8 oz Male    Other surgical history      Injection Tendon Sheath, Ligament    Thyroidectomy      total    Total abdom hysterectomy         Allergies:  Allergies   Allergen Reactions    Latex HIVES    Peanut-Containing Drug Products SWELLING     Closes Throat  Closes Throat  Closes Throat      Peanuts SWELLING     Closes Throat    Adhesive Tape OTHER (SEE COMMENTS)    Seasonal        Family History:  Family History   Problem Relation Age of Onset    Heart Disorder Mother     Breast Cancer Maternal Cousin Female 57    Cancer Daughter 29        Hodgkin's Lymphoma    Ovarian Cancer Neg     Bleeding Disorders Neg     DVT/VTE Neg     Pancreatic Cancer Neg     Prostate Cancer Neg        Social History:  Social History     Socioeconomic History    Marital status:    Tobacco Use    Smoking status: Never    Smokeless tobacco: Never   Vaping Use    Vaping status: Never Used   Substance and Sexual Activity    Alcohol use: Not Currently     Comment: social    Drug use: No       Medications:  Current Outpatient Medications   Medication Sig Dispense Refill    azelastine 137 MCG/SPRAY Nasal Solution 1 spray by Nasal route daily.      promethazine-dextromethorphan 6.25-15 MG/5ML Oral Syrup Take 5 mL by mouth 4 (four) times daily as needed.      amoxicillin clavulanate 875-125 MG Oral Tab Take 1 tablet by mouth 2 (two) times daily. 20 tablet 0    metoclopramide 5 MG Oral Tab Take 1 tablet (5 mg total) by mouth.      Telmisartan-HCTZ 80-12.5 MG Oral Tab Take 1 tablet by mouth in the morning.      hydrALAZINE 25 MG Oral Tab Take 1 tablet (25 mg total) by mouth in the morning and 1 tablet (25 mg total) before bedtime. (Patient taking differently: Take 2 tablets (50 mg total) by mouth in the morning and 2 tablets (50 mg total) before bedtime.)      hydrOXYzine 25 MG Oral Tab Take 1 tablet (25 mg total) by mouth.      carvedilol 6.25 MG Oral Tab Take 1 tablet (6.25 mg total) by mouth in the morning  and 1 tablet (6.25 mg total) before bedtime.      Lidocaine Viscous HCl 2 % Mouth/Throat Solution Take 5 mL by mouth every 3 (three) hours as needed for Pain. 100 mL 0    Lenvatinib, 14 MG Daily Dose, 10 & 4 MG Oral Capsule Therapy Pack Take 14 mg by mouth daily. 30 each 5    dexAMETHasone 0.5 MG/5ML Oral Elixir Take 10 ml and swish in mouth for 2 minutes, then spit, do not eat for 1 hour after. May swish and spit 10 ml, 4 times a day. (Patient taking differently: nightly. Take 10 ml and swish in mouth for 2 minutes, then spit, do not eat for 1 hour after. May swish and spit 10 ml, 4 times a day.) 237 mL 0    ondansetron 4 MG Oral Tablet Dispersible       HYDROcodone-acetaminophen 5-325 MG Oral Tab Take 1 tablet by mouth every 4 (four) hours as needed. 30 tablet 0    senna-docusate 8.6-50 MG Oral Tab Take 2 tablets by mouth nightly. 60 tablet 2    Omeprazole 40 MG Oral Capsule Delayed Release Take 1 capsule (40 mg total) by mouth in the morning.      clonazePAM 2 MG Oral Tab Take 1 tablet (2 mg total) by mouth nightly as needed for Anxiety (Sleep as well).      butalbital-acetaminophen-caffeine -40 MG Oral Tab Take 1 tablet by mouth every 6 (six) hours as needed. 30 tablet 0    levothyroxine 150 MCG Oral Tab Take 1 tablet (150 mcg total) by mouth before breakfast.         Review of Systems:  Review of Systems   Constitutional:  Negative for malaise/fatigue and weight loss.   HENT: Negative.          Xerostomia     Eyes: Negative.    Respiratory:  Positive for cough (X 1 week - was seen in ER last week) and sputum production (clear sputum). Negative for hemoptysis, shortness of breath (Denies today) and wheezing.         Cough and SOB have reduced in frequency     R PleurX idiscontinued    Following with Pulm       Cardiovascular: Negative.  Negative for chest pain, palpitations and leg swelling.   Gastrointestinal:  Negative for constipation (See HPI; constipation resolved; taking Reglan QD and has Senna-S to  take in addition to Reglan is constipation occurs; recent colonoscopy unremarkable), nausea (Denies today; Takes Reglan daily and Ondansetron PRN) and vomiting. Abdominal pain: Denies.  Genitourinary: Negative.    Musculoskeletal:  Negative for back pain (Denies).        Denies cancer related R chest wall pain today    R PleurX removed previously       Skin: Negative.    Neurological: Negative.    Psychiatric/Behavioral:  Negative for hallucinations, memory loss, substance abuse and suicidal ideas. The patient is not nervous/anxious (Anxiety about health; previosuly saw Polina Kelly LCSW; stopped taking Duloxetine \"it makes me feel sad\"; takes Klonopin QHS to help with sleep; does not want to take an antidepressant) and does not have insomnia (Takes Clonopin QHS/PRN prescribed by PCP).         Physical Examination:  Physical Exam  Vitals reviewed.   Constitutional:       General: She is not in acute distress.     Appearance: Normal appearance. She is not ill-appearing.   HENT:      Head: Normocephalic and atraumatic.      Right Ear: External ear normal.      Left Ear: External ear normal.      Nose: Nose normal.      Mouth/Throat:      Mouth: Mucous membranes are moist.      Pharynx: Oropharynx is clear.   Eyes:      General: No scleral icterus.     Conjunctiva/sclera: Conjunctivae normal.   Cardiovascular:      Rate and Rhythm: Normal rate and regular rhythm.      Pulses: Normal pulses.   Pulmonary:      Effort: Pulmonary effort is normal. No respiratory distress.      Comments:     Abdominal:      General: Bowel sounds are normal. There is no distension.      Palpations: Abdomen is soft. There is no mass.      Tenderness: There is no abdominal tenderness. There is no right CVA tenderness, left CVA tenderness, guarding or rebound.      Hernia: No hernia is present.   Musculoskeletal:         General: Normal range of motion.      Cervical back: Normal range of motion and neck supple.      Right lower leg: No  edema.      Left lower leg: No edema.   Skin:     General: Skin is warm and dry.      Coloration: Skin is not jaundiced or pale.   Neurological:      General: No focal deficit present.      Mental Status: She is alert and oriented to person, place, and time. Mental status is at baseline.      Motor: No weakness.      Gait: Gait normal.   Psychiatric:         Mood and Affect: Mood normal.         Behavior: Behavior normal.         Thought Content: Thought content normal.         Judgment: Judgment normal.       Palliative Care Goals of Care:  Discussed with patient/family today: Yes  Patient's preference about sharing medical information: Patient and family may receive information  Patient's decision making preferences: Fully involved and speak with family  Code status: FULL CODE  Have advanced directives been discussed with patient or healthcare power of : Yes                         Palliative Care Assessment/Plan:  1. Palliative care encounter    2. Nausea    3. Cough in adult    4. Goals of care, counseling/discussion    5. Thyroid cancer (HCC)    6. Malignant neoplasm metastatic to right lung (HCC)        Cancer Related Pain  -Resolved         Constipation  -Resolved  -Miralax/Senna-S PRN  -Needs GI f/u          Nausea  -Denies today  -Chemo induced  -Ondansetron 8mg TID/PRN  -Prochlorperazine (Compazine) 10mg - take 1 tablet every 6 hours as needed for nausea  -CONTINUE taking Omeprazole  -Reglan 10mg Q6H/PRN (pt currently taking once daily with benefit)          Xerostomia  -Continue Biotene - swish and spit 4 times/day  -Continue mouth rinse as directed by Onc for mucositis  -Increase water intake     Cough in adult  -Increase water intake to thin secretions  -Follows with Pulm  -Call Pulm if worsening     Anxiety about health/depressed mood  -Camelia Figueroa discontinued Duloxetine on her own previously - she felt that it made her feel sad  -CONTINUE Klonopin 1mg at bedtime/PRN - prescribed by PCP    -Previously met with Polina Kelly LCSW - I encouraged continued follow up     Goals of care counseling/discussion  -Pt is FULL CODE  -Continue supportive medical treatments; pt plans to continue pursuing cancer treatment  -Patient is agreeable to hospitalization, if indicated  -Patient agreeable to following up with outpatient palliative care  -Provided emotional support to pt/family who appear to be coping adequately  -See above narrative for further details      Advance care planning counseling/discussion  -Pt is FULL CODE  -Health Surrogate: Baldemar Markham ()    Most recent imaging reports and labs were reviewed today.     Palliative Performance Scale 70%    Emotional support provided to patient/family: Yes    Palliative Care Follow-up:  I spent a total of  30 minutes with the patient today, which included all of the following:direct face to face contact, history taking, physical examination, and >50% was spent counseling and coordinating care.    Thank you for allowing the Palliative Care Team to participate in the care of your patient. I will continue to follow clinically.    BEBE FALK DNP, FNP-BC, [facility]  [phone]  4/29/2025    Number and Complexity of Problems Addressed  1 acute, uncomplicated illness or injury  1 or more chronic illness with exacerbation, progression, or side effects of treatment    Amount and/or Complexity of Data to be Reviewed and Analyzed  2 review of prior external notes from each unique source  2 review of the results of each unique test  Independent interpretation of a test performed by another physician/other qualified health care professional (not separately reported)    Risk of Complications and/or Morbidity or Mortality of Patient Management  Moderate    Time  I spent a total of 30 minutes on the day of the visit.      [id number]  OFFICE/OUTPT VISIT,EST,LEVL IV

## 2025-05-02 ENCOUNTER — HOSPITAL ENCOUNTER (OUTPATIENT)
Dept: NUCLEAR MEDICINE | Facility: HOSPITAL | Age: 65
Discharge: HOME | End: 2025-05-02
Attending: INTERNAL MEDICINE
Payer: COMMERCIAL

## 2025-05-02 DIAGNOSIS — C73 THYROID CANCER (HCC): ICD-10-CM

## 2025-05-02 DIAGNOSIS — C78.01 MALIGNANT NEOPLASM METASTATIC TO RIGHT LUNG (HCC): ICD-10-CM

## 2025-05-02 DIAGNOSIS — K66.8 OMENTAL MASS: ICD-10-CM

## 2025-05-02 DIAGNOSIS — G89.3 CANCER RELATED PAIN: ICD-10-CM

## 2025-05-02 LAB — GLUCOSE BLDC GLUCOMTR-MCNC: 107 MG/DL (ref 70–99)

## 2025-05-02 PROCEDURE — 82962 GLUCOSE BLOOD TEST: CPT

## 2025-05-02 PROCEDURE — 78815 PET IMAGE W/CT SKULL-THIGH: CPT | Performed by: INTERNAL MEDICINE

## 2025-05-07 ENCOUNTER — TELEPHONE (OUTPATIENT)
Facility: LOCATION | Age: 65
End: 2025-05-07

## 2025-05-12 ENCOUNTER — TELEPHONE (OUTPATIENT)
Age: 65
End: 2025-05-12

## 2025-05-12 NOTE — TELEPHONE ENCOUNTER
Spoke to patient she is in \[facility] [place] [facility]\"".  She is having dry cough, pain and increased sob when lying on left side.    Having some fever and chills, head sweating.  Having sob with increased dry cough when lying on left side.  Also pain in left side of chest.  Denies n/v/d but appetite very decreased.  Denies lightheadedness or dizziness.  Having some itching in vaginal area  Denies bowel complaints.      Instructed to present at local ER for evaluation.

## 2025-05-16 ENCOUNTER — TELEPHONE (OUTPATIENT)
Facility: LOCATION | Age: 65
End: 2025-05-16

## 2025-05-16 ENCOUNTER — DOCUMENTATION ONLY (OUTPATIENT)
Age: 65
End: 2025-05-16

## 2025-05-16 ENCOUNTER — TELEPHONE (OUTPATIENT)
Age: 65
End: 2025-05-16

## 2025-05-16 NOTE — TELEPHONE ENCOUNTER
Called Camelia Figueroa to let her know that her 5/20 biopsy has been cancelled as she is still admitted to hospital in [place]. Discussed with her that Dr. Chen has spoken with pathologist in [place] about her recent pleural fluid that was sent to [facility]. Discussed that that will be sent to [facility] for further testing. Provided emotional support to Camelia Figueroa as she is overwhelmed with her disease progression and the difficulty of flying home with the probability of being discharged with oxygen. Encouraged her to take one day at a day. She inquired about Oral Lenvantinib, Dr. Chen had told her to hold it when she was admitted. Instructed her to hold it thru the weekend and I would follow up with her on Monday. She thanked me for the call.

## 2025-05-16 NOTE — PROGRESS NOTES
Order entered online for [facility] for PD-L1 and xT Tumor of Origin will be drawn from Pathology specimen [id number] From [facility].

## 2025-05-20 ENCOUNTER — TELEPHONE (OUTPATIENT)
Age: 65
End: 2025-05-20

## 2025-05-20 DIAGNOSIS — C78.01 MALIGNANT NEOPLASM METASTATIC TO RIGHT LUNG (HCC): ICD-10-CM

## 2025-05-20 RX ORDER — HYDROCODONE BITARTRATE AND ACETAMINOPHEN 5; 325 MG/1; MG/1
1 TABLET ORAL EVERY 6 HOURS PRN
Qty: 60 TABLET | Refills: 0 | Status: SHIPPED | OUTPATIENT
Start: 2025-05-20

## 2025-05-20 NOTE — TELEPHONE ENCOUNTER
Called Camelia figueroa [facility]. [facility] on [facility] and will be following up with an Oncologist there for now until she is stronger and safe to travel home. Let Camelia Figueroa know that I will fax the [facility] results to the Oncologist there once they are resulted from from the speciman from [place]. Instructed her to hold Lenvantinib per Dr. Chen. She asked for a norco refill. Dr. Chen will send. Emotional support given. She voiced understanding and thanked me for the call.

## 2025-05-29 ENCOUNTER — APPOINTMENT (OUTPATIENT)
Age: 65
End: 2025-05-29
Attending: NURSE PRACTITIONER
Payer: COMMERCIAL

## 2025-06-02 ENCOUNTER — TELEPHONE (OUTPATIENT)
Age: 65
End: 2025-06-02

## 2025-06-02 NOTE — TELEPHONE ENCOUNTER
LM for Rachel SPENCE at [facility] for her to call back with a FAX number that I can fax over Tempus results to them. Asking for her to call back.

## 2025-06-02 NOTE — TELEPHONE ENCOUNTER
Called MD oro, requesting fax number for Dr. Hadley Bellamy. Faxed recent tempus results from Left Pleural Fluid from 5/13 from [facility].

## (undated) DIAGNOSIS — R19.4 CHANGE IN BOWEL HABITS: ICD-10-CM

## (undated) DIAGNOSIS — Z86.010 PERSONAL HISTORY OF COLONIC POLYPS: ICD-10-CM

## (undated) DIAGNOSIS — Z12.11 COLON CANCER SCREENING: Primary | ICD-10-CM

## (undated) DIAGNOSIS — R15.9 INCONTINENCE OF FECES, UNSPECIFIED FECAL INCONTINENCE TYPE: ICD-10-CM

## (undated) DEVICE — FORCEPS BX LG 2.4MM X 240CM NDL RAD JAW 4

## (undated) DEVICE — SYRINGE MNJCT 35ML LF STRL LL

## (undated) DEVICE — 35 ML SYRINGE REGULAR TIP: Brand: MONOJECT

## (undated) DEVICE — SINGLE-USE SNARE: Brand: CAPTIVATOR™ COLD

## (undated) DEVICE — SYRINGE, LUER SLIP, STERILE, 60ML: Brand: MEDLINE

## (undated) DEVICE — YANKAUER,BULB TIP,W/O VENT,RIGID,STERILE: Brand: MEDLINE

## (undated) DEVICE — TRAY SURESTEP 16 LUB DRAINAGE

## (undated) DEVICE — KIT ENDO ORCAPOD 160/180/190

## (undated) DEVICE — V2 SPECIMEN COLLECTION MANIFOLD KIT: Brand: NEPTUNE

## (undated) DEVICE — CO2 CANNULA,SSOFT,ADLT,7O2,4CO2,FEMALE: Brand: MEDLINE

## (undated) DEVICE — LINE MNTR ADLT SET O2 INTMD

## (undated) DEVICE — BALLOON HEMOSTATIC EUS LINEAR

## (undated) DEVICE — SNARE ENDOSCOPIC 10MM ROUND

## (undated) DEVICE — SUT VICRYL 1CT-1  J341H

## (undated) DEVICE — UNDYED BRAIDED (POLYGLACTIN 910), SYNTHETIC ABSORBABLE SUTURE: Brand: COATED VICRYL

## (undated) DEVICE — FORCEP RADIAL JAW 4

## (undated) DEVICE — KIT CLEAN ENDOKIT 1.1OZ GOWNX2

## (undated) DEVICE — ENCORE® LATEX MICRO SIZE 6.5, STERILE LATEX POWDER-FREE SURGICAL GLOVE: Brand: ENCORE

## (undated) DEVICE — CONMED SCOPE SAVER BITE BLOCK, 20X27 MM: Brand: SCOPE SAVER

## (undated) DEVICE — Device

## (undated) DEVICE — STERILE SURGICAL LUBRICANT, METAL TUBE: Brand: SURGILUBE

## (undated) DEVICE — SUT VICRYL 0 CT-1 JJ31G

## (undated) DEVICE — GIJAW SINGLE-USE BIOPSY FORCEPS WITH NEEDLE: Brand: GIJAW

## (undated) DEVICE — MEDI-VAC NON-CONDUCTIVE SUCTION TUBING 6MM X 1.8M (6FT.) L: Brand: CARDINAL HEALTH

## (undated) DEVICE — SUT PLAIN GUT 3-0 CT-1 842H

## (undated) DEVICE — SOL NACL IRRIG 0.9% 1000ML BTL

## (undated) DEVICE — 60 ML SYRINGE REGULAR TIP: Brand: MONOJECT

## (undated) DEVICE — MEDI-VAC NON-CONDUCTIVE SUCTION TUBING: Brand: CARDINAL HEALTH

## (undated) DEVICE — WOUND RETRACTOR AND PROTECTOR: Brand: ALEXIS O WOUND PROTECTOR-RETRACTOR

## (undated) DEVICE — LAPAROTOMY: Brand: MEDLINE INDUSTRIES, INC.

## (undated) DEVICE — TRAP SPEC REMOVAL ETRAP 15CM

## (undated) DEVICE — GAMMEX® PI HYBRID SIZE 7, STERILE POWDER-FREE SURGICAL GLOVE, POLYISOPRENE AND NEOPRENE BLEND: Brand: GAMMEX

## (undated) DEVICE — ENDOSCOPIC ULTRASOUND ASPIRATION NEEDLE: Brand: EXPECT SLIMLINE SL

## (undated) DEVICE — ENCORE® LATEX MICRO SIZE 7.5, STERILE LATEX POWDER-FREE SURGICAL GLOVE: Brand: ENCORE

## (undated) NOTE — LETTER
Date & Time: 10/19/2020, 12:33 PM  Patient: 33 Maribeth Markham  Encounter Provider(s):    Emilie New MD       To Whom It May Concern: Silvia Aleman was seen and treated in our department on 10/19/2020.  She can return to work with these [address] [place] [address]

## (undated) NOTE — LETTER
Hospital Discharge Documentation    From: [facility] Hospitalist's Office  Phone: [phone]    Patient discharged time/date: 2024  1:45 PM  Patient discharge disposition:  Home or Self Care  No discharge summary available at this time.  Please see below for last patient progress note.      Progress Note            Camelia Markham Patient Status:  Inpatient    1960 MRN [id number]   Location [facility] 4W/SW/SE Attending Viri Foster MD   Hosp Day # 1 PCP FINA MABRY, MD WENDY      Chief Complaint: Pain secondary to pleural mets        Subjective:  S: Patient states that she is very apprehensive of taking Norco as she has stopped in November due to severe constipation leading to impactation and colitis.  She was just here last week and had a colonoscopy which was negative.  She has been having bowel movements but have been small and infrequent.  She has had intermittent irregular bowel movements even before taking the Norco.  She is in significant pain at night and has only been taking \"teas for pain control.     Review of Systems:   10 point ROS completed and was negative, except for pertinent positive and negatives stated in subjective.        Objective:  Vital signs:  Temp:  [97.2 °F (36.2 °C)-98.4 °F (36.9 °C)] 98.4 °F (36.9 °C)  Pulse:  [] 97  Resp:  [16] 16  BP: ()/(61-84) 97/61  SpO2:  [92 %-93 %] 92 %         Wt Readings from Last 6 Encounters:   24 148 lb (67.1 kg)   24 148 lb 1.6 oz (67.2 kg)   24 148 lb (67.1 kg)   24 155 lb (70.3 kg)   24 153 lb (69.4 kg)   24 153 lb 4.8 oz (69.5 kg)            Physical Exam:    General: No acute distress. Alert ,         Respiratory: Clear to auscultation bilaterally. No wheezes. No rhonchi.  Cardiovascular: S1, S2. Regular rate and rhythm. No murmurs, rubs or gallops.   Abdomen: Soft, nontender, nondistended.  Positive bowel sounds. No rebound or guarding.  Neurologic: No focal neurological  deficits.   Musculoskeletal: Moves all extremities.  Extremities: No edema.        Results:  Diagnostic Data:       Labs:     Labs Last 24 Hours:                      BMP         CBC       Other      Na 141 Cl 106 BUN 18 Glu 119     Hb 11.7     PTT 28.4 Procal -    K 3.9 CO2 26.0 Cr 0.82     WBC 8.1 >< .0   INR 1.00 CRP -    Renal Lytes Endo       Hct 35.4     Trop - D dim -    eGFR - Ca 9.1 POC Gluc  -       LFT     pBNP - Lactic -    eGFR AA - PO4 - A1c -     AST - APk - Prot -   LDL -        Mg - TSH -     ALT - T shaun - Alb -             COVID-19 Lab Results     COVID-19        Lab Results   Component Value Date     COVID19 Not Detected 02/25/2023     COVID19 Not Detected 12/27/2022         Pro-Calcitonin  No results for input(s): \"PCT\" in the last 168 hours.     Cardiac  No results for input(s): \"TROP\", \"PBNP\" in the last 168 hours.     Creatinine Kinase  No results for input(s): \"CK\" in the last 168 hours.     Inflammatory Markers  No results for input(s): \"CRP\", \"KOBE\", \"LDH\", \"DDIMER\" in the last 168 hours.     Imaging: Imaging data reviewed in Epic.     Medications:   Scheduled Medications    morphINE  4 mg Intravenous Once    HYDROcodone-acetaminophen  1 tablet Oral Once               Assessment & Plan:  ASSESSMENT / PLAN:      #Recurrent right malignant pleural effusion with pleuritic chest pain, underlying metastatic thyroid papillary carcinoma, recurrent. Patient will be admitted to general medical floor. Chest CT scan. Pulmonary consult. Further recommendations pending CT scan results. Pain control. Further recommendations to follow.         Pain management/history of severe constipation worsened by opiate induced constipation:  Pt failed gabapentin   Check KUB to assess stool burden  Resume norco; start reglan tid, senna prn  Consider Gi conslt in AM (Dr Murillo saw on last admission and did colonoscopy which was negative)     Quality:  DVT Prophylaxis: Lovenox  CODE status: Full            Coordinated care with providers and counseling re: treatment plan and workup     Viri Foster MD        Supplementary Documentation:                     Electronically signed by Viri Foster MD at 12/28/2024  2:14 PM         ED to Hosp-Admission (Discharged) on 12/27/2024            Routing History            Detailed Report          Note shared with patient  Chart Review: Note Routing History    Recipients Sent On Sent By Routed Reports     [facility]   Fax: [phone]       12/29/2024  2:06 PM Yuki Mckeon RN Progress Notes by Viri Foster MD           Routing History    Date/Time From To Method   12/29/2024  2:06 PM Yuki Mckeon, RN [facility] Fax

## (undated) NOTE — LETTER
Hospital Discharge Documentation    From: [facility] Hospitalist's Office  Phone: [phone]    Patient discharged time/date: 2024  2:26 PM  Patient discharge disposition:  [facility]    Discharge Summary not available, attached latest progress note:          Fidel Henderson MD   Physician  Hospitalist     Progress Notes     Signed     Date of Service: 11/10/2024 12:10 PM     Signed       Expand All Collapse All       [facility]  part of [facility]     Progress Note           Camelia Markham Patient Status:  Observation    1960 MRN [id number]   Location [facility] 3W/SW Attending Fidel Henderson MD   Hosp Day # 4 PCP FINA MABRY, MD WENDY      Chief Complaint:      Syncope        Subjective:  Subjective:     Patient seen and examined  normotensive afebrile  No new complaints at this time.   Overall with clinical improvement           Objective:  Blood pressure 135/81, pulse 84, temperature 98 °F (36.7 °C), temperature source Oral, resp. rate 16, weight 166 lb 3.2 oz (75.4 kg), SpO2 93%.  Physical Exam     General: Patient is alert and oriented x3 does not appear to be in acute distress at this time  HEENT: EOMI PERRLA, atraumatic normocephalic  Cardiac: S1-S2 appreciated  Lungs: Good air entry bilaterally clear to auscultation, PleurX in place R side  Abdomen: Soft nontender nondistended positive bowel sounds  Ext: Peripheral pulses are positive  Neuro: No focal deficits noted  Psych: Normal mood  Skin: No rashes noted  MSK: Full range of motion intact           Results:        Lab Results   Component Value Date     WBC 9.6 11/10/2024     HGB 11.8 (L) 11/10/2024     HCT 35.2 11/10/2024     .0 11/10/2024     CREATSERUM 0.66 2024     BUN 7 (L) 2024      2024     K 3.8 2024      2024     CO2 26.0 2024      (H) 2024     CA 8.7 2024     ALB 3.5 2024      ALKPHO 90 11/04/2024     BILT 1.0 11/04/2024     TP 5.8 11/06/2024     AST 16 11/04/2024     ALT 16 11/04/2024     PTT 26.0 12/17/2022     INR 0.98 03/13/2023     T4F 1.9 (H) 07/17/2024     TSH 0.048 (L) 07/17/2024     LIP 33 05/23/2024     DDIMER 0.55 05/23/2024     MG 2.0 11/10/2024     PHOS 4.0 11/10/2024     TROPHS <3 11/04/2024         XR CHEST AP PORTABLE  (CPT=71045)     Result Date: 11/10/2024  CONCLUSION:          No new abnormality.  Unchanged right basilar chest tube and moderate loculated right pleural effusion.  No appreciable pneumothorax.  Persistent small left pleural effusion and left retrocardiac opacity.      Dictated by (CST): Jose Miguel Velazquez MD on 11/10/2024 at 9:40 AM     Finalized by (CST): Jose Miguel Velazquez MD on 11/10/2024 at 9:42 AM           XR CHEST AP PORTABLE  (CPT=71045)     Result Date: 11/9/2024  CONCLUSION:          Status post removal of right chest tube.  No pneumothorax.  Unchanged small to moderate loculated right pleural effusion.  Unchanged right lower lung alveolar opacity.  Left retrocardiac opacity unchanged.      Dictated by (CST): Maggie Marques MD on 11/09/2024 at 8:51 AM     Finalized by (CST): Maggie Marques MD on 11/09/2024 at 8:54 AM                   Assessment & Plan:  Syncope and collapse  -possibly due to poor PO intake,   -orthostasis.   -continue current IV hydration will monitor closely     Malignant pleural effusion (HCC)  -Patient with hx of recurrent metastatic Papillary thyroid cancer.   -has a pleurX in place but new imaging reveals a loculated pleural effusion that is away from the pleurX  -will have pulmonology placed on consult  -appreciate hem/onc recs.   -will monitor closely     Hyperglycemia  -continue Accuchecks qachs,  -supplement with sliding scale     HTN  -montior BP  -titrate meds as needed.      VTE ppx: heparin subcutaneous.   Full Code     Global A/P  -CXR with improvement.  -R chest tube placement removed.  -transferred to [facility]  floor  -DC planning.  -monitor closely.  -appreciate IR and Pulm recs.  -will monitor closely  -appreciate hem/onc recs  -Reviewed previous consultant notes  -Reviewed CBC, BMP, Mag, and Phos  -Reviewed tests ordered  -Repeat labs in am  -MDM: High, severe exacerbation of chronic illness posing a threat to life. IV medications requiring close inpatient monitoring.                  Fidel Henderson MD                                       Electronically signed by Fidel Henderson MD at 11/11/2024 10:28 AM         ED to Hosp-Admission (Discharged) on 11/4/2024            Detailed Report          Note shared with patient  Chart Review: Note Routing History    No routing history on file.  Care Timeline    11/04   Admitted from ED (Observation) 1613   11/06   Admitted 1147   11/11   Discharged 1426

## (undated) NOTE — LETTER
3/7/2019              [address] [place] [address]         Dear Sary Ramesh,    Our records indicate that the tests ordered for you by Akil Licea MD  have not been done.   If you have, in fact, already completed the

## (undated) NOTE — LETTER
3/7/2023              Nicholas Edy Markham        [address] [place] [address]         To Whom It May Concern,    The above named patient is currently under my medical care. My patient is currently recovering postoperatively and has not yet been cleared to return to work. Please feel free to contact my office with any questions. Sincerely,    Katherine Orozco.  Roxy Perez, MD  [facility]  [address] [address]  [phone]

## (undated) NOTE — LETTER
[facility]  Administration of Anesthesia  I, Camelia Markham agree to be cared for by a physician anesthesiologist alone and/or with a nurse anesthetist, who is specially trained to monitor me and give me medicine to put me to sleep or keep me comfortable during my procedure    I understand that my anesthesiologist and/or anesthetist is not an employee or agent of [facility] or [employer]. He or she works for [facility]    As the patient asking for anesthesia services, I agree to:  Allow the anesthesiologist (anesthesia doctor) to give me medicine and do additional procedures as necessary. Some examples are: Starting or using an “IV” to give me medicine, fluids or blood during my procedure, and having a breathing tube placed to help me breathe when I’m asleep (intubation). In the event that my heart stops working properly, I understand that my anesthesiologist will make every effort to sustain my life, unless otherwise directed by [facility] Do Not Resuscitate documents.  Tell my anesthesia doctor before my procedure:  If I am pregnant.  The last time that I ate or drank.  iii. All of the medicines I take (including prescriptions, herbal supplements, and pills I can buy without a prescription (including street drugs/illegal medications). Failure to inform my anesthesiologist about these medicines may increase my risk of anesthetic complications.  iv.If I am allergic to anything or have had a reaction to anesthesia before.  I understand how the anesthesia medicine will help me (benefits).  I understand that with any type of anesthesia medicine there are risks:  The most common risks are: nausea, vomiting, sore throat, muscle soreness, damage to my eyes, mouth, or teeth (from breathing tube placement).  Rare risks include: remembering what happened during my procedure, allergic reactions to medications, injury to my airway, heart, lungs, vision, nerves, or  muscles and in extremely rare instances death.  My doctor has explained to me other choices available to me for my care (alternatives).  Pregnant Patients (“epidural”):  I understand that the risks of having an epidural (medicine given into my back to help control pain during labor), include itching, low blood pressure, difficulty urinating, headache or slowing of the baby’s heart. Very rare risks include infection, bleeding, seizure, irregular heart rhythms and nerve injury.  Regional Anesthesia (“spinal”, “epidural”, & “nerve blocks”):  I understand that rare but potential complications include headache, bleeding, infection, seizure, irregular heart rhythms, and nerve injury.    _____________________________________________________________________________  Patient (or Representative) Signature/Relationship to Patient  Date   Time    _____________________________________________________________________________   Name (if used)    Language/Organization   Time    _____________________________________________________________________________  Nurse Anesthetist Signature     Date   Time  _____________________________________________________________________________  Anesthesiologist Signature     Date   Time  I have discussed the procedure and information above with the patient (or patient’s representative) and answered their questions. The patient or their representative has agreed to have anesthesia services.    _____________________________________________________________________________  Witness        Date   Time  I have verified that the signature is that of the patient or patient’s representative, and that it was signed before the procedure  Patient Name: Camelia Markham     : 1960                 Printed: 2024 at 6:59 AM    Medical Record #: [id number]                                            Page 1 of 1  ----------ANESTHESIA CONSENT----------

## (undated) NOTE — LETTER
No referring provider defined for this encounter.       05/02/24        Patient: Camelia Markham   YOB: 1960   Date of Visit: 5/2/2024       Dear  Dr. Frank MD,      Thank you for referring Camelia Markham to my practice.  Please find my assessment and plan below.        ASSESSMENT AND PLAN    1. Thyroid cancer (HCC)  She is more than 13 years out from total thyroidectomy for papillary carcinoma.  Known true vocal cord paralysis on the left preoperatively.  No respiratory issues at this time.  Chronically suppressed TSH.  We did discuss the fact that at this point she more than likely does not require her TSH to be suppressed and may be able to back off on some of her levothyroxine.  Has not seen Dr. Dana Sands of endocrinology for many years.  I did recommend getting an ultrasound of her neck to evaluate for any residual thyroid tissue or new lymphadenopathy as she did have lymph nodes in her neck 6 years ago that turned out to be benign.  Additionally I did recommend checking a thyroglobulin level and T3-T4 and she will follow-up with Dr. Sands to discuss management of her levothyroxine.  She will continue to see me on a yearly basis for routine oncologic follow-up.           Sincerely,   Norman Asher MD   [facility], [address] [address]    Document electronically generated by:  Normna Asher MD

## (undated) NOTE — LETTER
5/29/2024          To Whom It May Concern:    Camelia Markham is currently under my medical care. Her procedure that was scheduled on 6/3/24 has now been cancelled due to her new diagnosis of diverticulitis which was noted from her emergency room evaluation on 5/23/24.    Please excuse her on 6/27/24 as she will now require an office visit to discuss further treatment and plan to reschedule her procedure to a later date.     She may return to work on at this time without restrictions. If you require additional information please contact our office.        Sincerely,    Neeraj No MD

## (undated) NOTE — LETTER
[facility]  [address], [place], [place]    Authorization for Surgical Operation and Procedure                               I hereby authorize Neeraj No MD, my physician and his/her assistants (if applicable), which may include medical students, residents, and/or fellows, to perform the following surgical operation/ procedure and administer such anesthesia as may be determined necessary by my physician: Operation/Procedure name (s) COLONOSCOPY/ ESOPHAGOGASTRODUODENOSCOPY/ ENDOSCOPIC ULTRASOUND with possible Fine Needle aspiration on Camelia Markham   2.   I recognize that during the surgical operation/procedure, unforeseen conditions may necessitate additional or different procedures than those listed above.  I, therefore, further authorize and request that the above-named surgeon, assistants, or designees perform such procedures as are, in their judgment, necessary and desirable.    3.   My surgeon/physician has discussed prior to my surgery the potential benefits, risks and side effects of this procedure; the likelihood of achieving goals; and potential problems that might occur during recuperation.  They also discussed reasonable alternatives to the procedure, including risks, benefits, and side effects related to the alternatives and risks related to not receiving this procedure.  I have had all my questions answered and I acknowledge that no guarantee has been made as to the result that may be obtained.    4.   Should the need arise during my operation/procedure, which includes change of level of care prior to discharge, I also consent to the administration of blood and/or blood products.  Further, I understand that despite careful testing and screening of blood or blood products by collecting agencies, I may still be subject to ill effects as a result of receiving a blood transfusion and/or blood products.  The following are some, but not all, of the potential risks that can  occur: fever and allergic reactions, hemolytic reactions, transmission of diseases such as Hepatitis, AIDS and Cytomegalovirus (CMV) and fluid overload.  In the event that I wish to have an autologous transfusion of my own blood, or a directed donor transfusion, I will discuss this with my physician.  Check only if Refusing Blood or Blood Products  I understand refusal of blood or blood products as deemed necessary by my physician may have serious consequences to my condition to include possible death. I hereby assume responsibility for my refusal and release the hospital, its personnel, and my physicians from any responsibility for the consequences of my refusal.    o  Refuse   5.   I authorize the use of any specimen, organs, tissues, body parts or foreign objects that may be removed from my body during the operation/procedure for diagnosis, research or teaching purposes and their subsequent disposal by hospital authorities.  I also authorize the release of specimen test results and/or written reports to my treating physician on the hospital medical staff or other referring or consulting physicians involved in my care, at the discretion of the Pathologist or my treating physician.    6.   I consent to the photographing or videotaping of the operations or procedures to be performed, including appropriate portions of my body for medical, scientific, or educational purposes, provided my identity is not revealed by the pictures or by descriptive texts accompanying them.  If the procedure has been photographed/videotaped, the surgeon will obtain the original picture, image, videotape or CD.  The hospital will not be responsible for storage, release or maintenance of the picture, image, tape or CD.    7.   I consent to the presence of a  or observers in the operating room as deemed necessary by my physician or their designees.    8.   I recognize that in the event my procedure results in extended  X-Ray/fluoroscopy time, I may develop a skin reaction.    9. If I have a Do Not Attempt Resuscitation (DNAR) order in place, that status will be suspended while in the operating room, procedural suite, and during the recovery period unless otherwise explicitly stated by me (or a person authorized to consent on my behalf). The surgeon or my attending physician will determine when the applicable recovery period ends for purposes of reinstating the DNAR order.  10. Patients having a sterilization procedure: I understand that if the procedure is successful the results will be permanent and it will therefore be impossible for me to inseminate, conceive, or bear children.  I also understand that the procedure is intended to result in sterility, although the result has not been guaranteed.   11. I acknowledge that my physician has explained sedation/analgesia administration to me including the risk and benefits I consent to the administration of sedation/analgesia as may be necessary or desirable in the judgment of my physician.    I CERTIFY THAT I HAVE READ AND FULLY UNDERSTAND THE ABOVE CONSENT TO OPERATION and/or OTHER PROCEDURE.     ____________________________________  _________________________________        ______________________________  Signature of Patient    Signature of Responsible Person                Printed Name of Responsible Person                                      ____________________________________  _____________________________                ________________________________  Signature of Witness        Date  Time         Relationship to Patient    STATEMENT OF PHYSICIAN My signature below affirms that prior to the time of the procedure; I have explained to the patient and/or his/her legal representative, the risks and benefits involved in the proposed treatment and any reasonable alternative to the proposed treatment. I have also explained the risks and benefits involved in refusal of the proposed  treatment and alternatives to the proposed treatment and have answered the patient's questions. If I have a significant financial interest in a co-management agreement or a significant financial interest in any product or implant, or other significant relationship used in this procedure/surgery, I have disclosed this and had a discussion with my patient.     _____________________________________________________              _____________________________  (Signature of Physician)                                                                                         (Date)                                   (Time)  Patient Name: Camelia Markham      : 1960      Printed: 2024     Medical Record #: [id number]                                      Page 1 of 1

## (undated) NOTE — Clinical Note
Jen Aldana,  This is a mutual patient that had total thyroidectomy 5 years ago. I think she has a likely recurrence of thyroid cancer and sending her for WBS. I will let you know the result. Just ELIN. ..  Thanks, Tomas Stern

## (undated) NOTE — Clinical Note
Right pleurex drainage is decreasing. Will ask to drain once a week. Still experiencing significant pain from the pleural mets. She saw Palliative while inpt. Can they see her to help with the pain issues?  Thanks,  Mike Brooks Pulm/ccm

## (undated) NOTE — Clinical Note
Thank you for the opportunity to participate in the care of this interesting patient. Please do contact me if I may be of any further assistance  Inocente Chen MD [facility] Hematology/Oncology

## (undated) NOTE — LETTER
Hospital Discharge Documentation    From: [facility] Hospitalist's Office  Phone: [phone]    Patient discharged time/date: 2024  5:02 PM  Patient discharge disposition:  Home or Self Care       Discharge Summary - D/C Summary        Discharge Summary signed by Jacky Brower MD at 2024 10:02 AM  Version 2 of 2      Author: Jacky Brower MD Service: Hospitalist Author Type: Physician    Filed: 2024 10:02 AM Date of Service: 2024  9:53 AM Status: Addendum    : Jacky Brower MD (Physician)    Related Notes: Original Note by Jacky Brower MD (Physician) filed at 2024  9:55 AM           [facility]  part of [facility]    DISCHARGE SUMMARY     Camelia Markham Patient Status:  Observation    1960 MRN [id number]   Location [facility] 4W/SW/SE Attending Jacky Brower MD   Hosp Day # 0 PCP FINA MABRY, MD WENDY     Date of Admission: 2024  Date of Discharge:  2024    Discharge Disposition: Home or Self Care    Discharge Diagnosis:     Enterocolitis  Thyroid cancer  HTN    History of Present Illness:     The patient is a 64-year-old  female, currently undergoing oral immunotherapy with lenvatinib for recurrent metastatic thyroid cancer. Came in today for abdominal cramps and discomfort. She had a loose bowel movement in the emergency room. Chemistry showed BUN and creatinine ratio of 21.1; otherwise, unremarkable. Total bilirubin 1.4. Urinalysis showed possible urinary tract infection. CBC unremarkable. CT scan of the abdomen showed diffuse nondilated fluid-filled small and large bowel loops with associated mucosal hyperenhancement as well as diffuse colonic wall thickening concerning for enterocolitis. C difficile was ordered in the emergency room. Also, she had right posterior lung base small pleural effusion known to be malignant. Patient will be admitted to the hospital for further management.     Brief Synopsis:      Enterocolitis  Imaging reviewed  C Diff negative  Cultures pending  GI on consult  S/p Colonoscopy - minimal inflammation. Await pathology prior to starting steroids  ADAT  PRN pain control and nausea medications  May start steroids as opt  Close opt follow up  HemOnc on consult  Okay to resume lenvatinib at 14 mg daily once symptoms are improved  Close opt follow up  Thyroid cancer  Currently on immunotherapy with lenvatinib  HTN  BP well controlled  Patient is to follow up with PCP and GI and HemOnc as opt.   Patient is to remain compliant with all discharge medications and instructions and to follow up as advised.   Patient encouraged to make healthy lifestyle and dietary changes.    Lace+ Score: 78  59-90 High Risk  29-58 Medium Risk  0-28   Low Risk       TCM Follow-Up Recommendation:  LACE > 58: High Risk of readmission after discharge from the hospital.      Procedures during hospitalization:   CLN    Incidental or significant findings and recommendations (brief descriptions):  None    Lab/Test results pending at Discharge:   Pathology    Consultants:  GI  HemOnc    Discharge Medication List:     Discharge Medications        START taking these medications        Instructions Prescription details   potassium chloride 20 MEQ Tbcr  Commonly known as: Klor-Con M20      Take 2 tablets (40 mEq total) by mouth 2 (two) times daily for 5 days.   Stop taking on: December 27, 2024  Quantity: 20 tablet  Refills: 0            CONTINUE taking these medications        Instructions Prescription details   amLODIPine 10 MG Tabs  Commonly known as: Norvasc      Take 1 tablet (10 mg total) by mouth daily.   Refills: 0     butalbital-acetaminophen-caffeine -40 MG Tabs  Commonly known as: Fioricet      Take 1 tablet by mouth every 6 (six) hours as needed.   Quantity: 30 tablet  Refills: 0     clonazePAM 2 MG Tabs  Commonly known as: KlonoPIN      Take 1 tablet (2 mg total) by mouth nightly as needed for Anxiety (Sleep as  well).   Refills: 0     hydroCHLOROthiazide 25 MG Tabs      Take 1 tablet (25 mg total) by mouth daily.   Quantity: 30 tablet  Refills: 6     Lenvatinib (20 MG Daily Dose) 2 x 10 MG Cppk      Take 20 mg by mouth daily.   Quantity: 60 each  Refills: 5     levothyroxine 150 MCG Tabs  Commonly known as: Synthroid      Take 1 tablet (150 mcg total) by mouth before breakfast.   Refills: 0     Olmesartan Medoxomil 40 MG Tabs  Commonly known as: BENICAR      Take 1 tablet (40 mg total) by mouth daily.   Refills: 0     Omeprazole 40 MG Cpdr      Take 1 capsule (40 mg total) by mouth daily.   Refills: 0     senna-docusate 8.6-50 MG Tabs  Commonly known as: Senokot-S      Take 2 tablets by mouth nightly.   Quantity: 60 tablet  Refills: 2            STOP taking these medications      ondansetron 8 MG tablet  Commonly known as: Zofran                  Where to Get Your Medications        These medications were sent to [facility] - [place], [place] - [address] [phone], [phone]  [address], [facility] [address]      Phone: [phone]   potassium chloride 20 MEQ Tbcr         [place]    Follow-up appointment:   Jair Murillo MD  [address] [facility] [address]  [facility] [id number] [phone]    Call  As needed    Alton Marie MD  [address] [facility] [address]  [phone]    Follow up in 1 week(s)      Ludin Hernandez MD  [address] [facility] [address]  [phone]    Follow up in 1 week(s)      Appointments for Next 30 Days 12/22/2024 - 1/21/2025        Date Arrival Time Visit Type Length Department Provider     1/6/2025  8:45 AM  LAB - EM CC [[id number]] 15 min [facility] Hematology Oncology [place] CMA RESOURCE    Patient Instructions:         Location Instructions:     Your appointment is at the [facility]. The address is [address] [place] [address]  [facility]. The entrance is located on the East side of the [place] parking lot.  Masks are optional for all patients and visitors, unless otherwise indicated.               1/6/2025  9:00 AM  PALLIATIVE CARE FOLLOW UP [5071] 30 min [facility] [place] Sugar Miller APRN    Patient Instructions:         Location Instructions:     Your appointment is at the [facility]. The address is [address] [place] [address]. The entrance is located on the East side of the [place] parking lot.  Masks are optional for all patients and visitors, unless otherwise indicated.               1/6/2025  9:45 AM  FOLLOW UP-HEM/ONC [0301] 15 min [facility] [place] Inocente Chen MD    Patient Instructions:         Location Instructions:     Your appointment is at the [facility]. The address is [address] [place] [address]. The entrance is located on the East side of the [place] parking lot.  Masks are optional for all patients and visitors, unless otherwise indicated.               1/6/2025 11:45 AM  FOLLOW UP VISIT [2828] 15 min [facility], [facility], [place] Dank Brooks DO    Patient Instructions:         Location Instructions:     Your appointment is located at [address] [facility] in [place], [place].&nbsp; Please park in the [place] lot, enter through the [facility] entrance, and go to [address].  Masks are optional for all patients and visitors, unless otherwise indicated.                      Vital signs:  Temp:  [98.5 °F (36.9 °C)-98.9 °F (37.2 °C)] 98.6 °F (37 °C)  Pulse:  [89-99] 92  Resp:  [16-20] 16  BP: (107-132)/(57-73) 123/68  SpO2:  [92 %-96 %] 92 %    Physical Exam:    Gen: NAD AO x3  Chest: good air entry CTABL  CVS: normal s1 and s2 RR  Abd: NABS soft NT ND  Neuro: CN 2-12 grossly intact  Ext: no edema in bilateral  QUAN    -----------------------------------------------------------------------------------------------  PATIENT DISCHARGE INSTRUCTIONS: See electronic chart    Jacky Brower MD  Hospitalist    Time spent:  > 30 minutes    The  Cures Act makes medical notes like these available to patients in the interest of transparency. Please be advised this is a medical document. Medical documents are intended to carry relevant information, facts as evident, and the clinical opinion of the practitioner. The medical note is intended as peer to peer communication and may appear blunt or direct. It is written in medical language and may contain abbreviations or verbiage that are unfamiliar.     Electronically signed by Jacky Brower MD on 2024 10:02 AM       Discharge Summary signed by Jacky Brower MD at 2024  9:55 AM  Version 1 of 2      Author: Jacky Brower MD Service: Hospitalist Author Type: Physician    Filed: 2024  9:55 AM Date of Service: 2024  9:53 AM Status: Signed    : Jacky Brower MD (Physician)    Related Notes: Addendum by Jacky Brower MD (Physician) filed at 2024 10:02 AM           [facility]  part of [facility]    DISCHARGE SUMMARY     Camelia Markham Patient Status:  Observation    1960 MRN [id number]   Location [facility] 4W/SW/SE Attending Jacky Brower MD    Day # 0 DEEDEE HARDEN MD, MD WENDY     Date of Admission: 2024  Date of Discharge:  2024    Discharge Disposition: Home or Self Care    Discharge Diagnosis:     Enterocolitis  Thyroid cancer  HTN    History of Present Illness:     The patient is a 64-year-old  female, currently undergoing oral immunotherapy with lenvatinib for recurrent metastatic thyroid cancer. Came in today for abdominal cramps and discomfort. She had a loose bowel movement in the emergency room. Chemistry showed BUN and creatinine ratio of 21.1; otherwise, unremarkable. Total  bilirubin 1.4. Urinalysis showed possible urinary tract infection. CBC unremarkable. CT scan of the abdomen showed diffuse nondilated fluid-filled small and large bowel loops with associated mucosal hyperenhancement as well as diffuse colonic wall thickening concerning for enterocolitis. C difficile was ordered in the emergency room. Also, she had right posterior lung base small pleural effusion known to be malignant. Patient will be admitted to the hospital for further management.     Brief Synopsis:     Enterocolitis  Imaging reviewed  C Diff negative  Cultures pending  GI on consult  S/p Colonoscopy - minimal inflammation. Await pathology prior to starting steroids  ADAT  PRN pain control and nausea medications  May start steroids as opt  Close opt follow up  HemOnc on consult  Okay to resume lenvatinib at 14 mg daily once symptoms are improved  Close opt follow up  Thyroid cancer  Currently on immunotherapy with lenvatinib  HTN  BP well controlled  Patient is to follow up with PCP and GI and HemOnc as opt.   Patient is to remain compliant with all discharge medications and instructions and to follow up as advised.   Patient encouraged to make healthy lifestyle and dietary changes.    Lace+ Score: 78  59-90 High Risk  29-58 Medium Risk  0-28   Low Risk       TCM Follow-Up Recommendation:  LACE > 58: High Risk of readmission after discharge from the hospital.      Procedures during hospitalization:   CLN    Incidental or significant findings and recommendations (brief descriptions):  None    Lab/Test results pending at Discharge:   Pathology    Consultants:  GI  HemOnc    Discharge Medication List:     Discharge Medications        CONTINUE taking these medications        Instructions Prescription details   amLODIPine 10 MG Tabs  Commonly known as: Norvasc      Take 1 tablet (10 mg total) by mouth daily.   Refills: 0     butalbital-acetaminophen-caffeine -40 MG Tabs  Commonly known as: Fioricet      Take 1  tablet by mouth every 6 (six) hours as needed.   Quantity: 30 tablet  Refills: 0     clonazePAM 2 MG Tabs  Commonly known as: KlonoPIN      Take 1 tablet (2 mg total) by mouth nightly as needed for Anxiety (Sleep as well).   Refills: 0     hydroCHLOROthiazide 25 MG Tabs      Take 1 tablet (25 mg total) by mouth daily.   Quantity: 30 tablet  Refills: 6     Lenvatinib (20 MG Daily Dose) 2 x 10 MG Cppk      Take 20 mg by mouth daily.   Quantity: 60 each  Refills: 5     levothyroxine 150 MCG Tabs  Commonly known as: Synthroid      Take 1 tablet (150 mcg total) by mouth before breakfast.   Refills: 0     Olmesartan Medoxomil 40 MG Tabs  Commonly known as: BENICAR      Take 1 tablet (40 mg total) by mouth daily.   Refills: 0     Omeprazole 40 MG Cpdr      Take 1 capsule (40 mg total) by mouth daily.   Refills: 0     senna-docusate 8.6-50 MG Tabs  Commonly known as: Senokot-S      Take 2 tablets by mouth nightly.   Quantity: 60 tablet  Refills: 2            STOP taking these medications      ondansetron 8 MG tablet  Commonly known as: Zofran                 [facility] reviewed: yes    Follow-up appointment:   Jair Murillo MD  [address] [facility] [address]  [place] [address]  [phone]    Call  As needed    Alton Marie MD  [address] [place] [address]  [phone]    Follow up in 1 week(s)      Ludin Hernandez MD  [address] [place] [address]  [phone]    Follow up in 1 week(s)      Appointments for Next 30 Days 12/22/2024 - 1/21/2025        Date Arrival Time Visit Type Length Department Provider     1/6/2025  8:45 AM  LAB - EM CC [[id number]] 15 min [facility] Hematology Oncology [facility] RESOURCE    Patient Instructions:         Location Instructions:     Your appointment is at the [facility]. The address is [address] [place] [address]. The entrance is located on the East side of the [place] parking lot.  Masks are optional  for all patients and visitors, unless otherwise indicated.               1/6/2025  9:00 AM  PALLIATIVE CARE FOLLOW UP [5071] 30 min [facility] Hematology Oncology [place] Sugar Miller APRN    Patient Instructions:         Location Instructions:     Your appointment is at the [facility]. The address is [address] [place] [address]. The entrance is located on the East side of the [place] parking lot.  Masks are optional for all patients and visitors, unless otherwise indicated.               1/6/2025  9:45 AM  FOLLOW UP-HEM/ONC [8891] 15 min [facility] Hematology Oncology [place] Inocente Chen MD    Patient Instructions:         Location Instructions:     Your appointment is at the [facility]. The address is [address] [place] [address]. The entrance is located on the East side of the [place] parking lot.  Masks are optional for all patients and visitors, unless otherwise indicated.               1/6/2025 11:45 AM  FOLLOW UP VISIT [2828] 15 min [facility], [facility], [place] Dank Brooks DO    Patient Instructions:         Location Instructions:     Your appointment is located at [address] [facility] in [place], [place].&nbsp; Please park in the [place] lot, enter through the [facility] entrance, and go to [address].  Masks are optional for all patients and visitors, unless otherwise indicated.                      Vital signs:  Temp:  [98.5 °F (36.9 °C)-98.9 °F (37.2 °C)] 98.6 °F (37 °C)  Pulse:  [89-99] 92  Resp:  [16-20] 16  BP: (107-132)/(57-73) 123/68  SpO2:  [92 %-96 %] 92 %    Physical Exam:    Gen: NAD AO x3  Chest: good air entry CTABL  CVS: normal s1 and s2 RR  Abd: NABS soft NT ND  Neuro: CN 2-12 grossly intact  Ext: no edema in bilateral  LE    -----------------------------------------------------------------------------------------------  PATIENT DISCHARGE INSTRUCTIONS: See electronic chart    Jacky Brower MD  Hospitalist    Time spent:  > 30 minutes    The 21st Century Cures Act makes medical notes like these available to patients in the interest of transparency. Please be advised this is a medical document. Medical documents are intended to carry relevant information, facts as evident, and the clinical opinion of the practitioner. The medical note is intended as peer to peer communication and may appear blunt or direct. It is written in medical language and may contain abbreviations or verbiage that are unfamiliar.     Electronically signed by Jacky Brower MD on 12/22/2024  9:55 AM

## (undated) NOTE — MR AVS SNAPSHOT
[facility] [place]  [address] [place] [address] [phone]               Thank you for choosing us for your health care visit with Keshav Browne MD.  We are glad to serve you and happy to provide you with this summary of your visit.   Ple Commonly known as:  PRILOSEC                   [employer]     Sign up for [employer], your secure online medical record. [employer] will allow you to access patient instructions from your recent visit,  view other health information, and more.  To sign up or find m Get your heart pumping – brisk walking, biking, swimming Even 10 minute increments are effective and add up over the week   2 ½ hours per week – spread out over time Use a bernadine to keep you motivated   Don’t forget strength training with weights and resist

## (undated) NOTE — LETTER
[address] [place], [place]  Authorization for Invasive Procedure                                                                                           1. I hereby authorize Titus Conteh MD, my physician and his/her assistants (if applicable), which may include medical students, residents, and/or fellows, to perform the following surgical operation/ procedure and administer such anesthesia as may be determined necessary by my physician: Operation/Procedure name (s) COLONOSCOPY/ ESOPHAGOGASTRODUODENOSCOPY (EGD)/ENDOSCOPIC ULTRASOUND (EUS) with fine needle aspiration on [address] [facility]   2. I recognize that during the surgical operation/procedure, unforeseen conditions may necessitate additional or different procedures than those listed above. I, therefore, further authorize and request that the above-named surgeon, assistants, or designees perform such procedures as are, in their judgment, necessary and desirable. 3.   My surgeon/physician has discussed prior to my surgery the potential benefits, risks and side effects of this procedure; the likelihood of achieving goals; and potential problems that might occur during recuperation. They also discussed reasonable alternatives to the procedure, including risks, benefits, and side effects related to the alternatives and risks related to not receiving this procedure. I have had all my questions answered and I acknowledge that no guarantee has been made as to the result that may be obtained. 4.   Should the need arise during my operation/procedure, which includes change of level of care prior to discharge, I also consent to the administration of blood and/or blood products. Further, I understand that despite careful testing and screening of blood or blood products by collecting agencies, I may still be subject to ill effects as a result of receiving a blood transfusion and/or blood products.   The following are some, but not all, of the potential risks that can occur: fever and allergic reactions, hemolytic reactions, transmission of diseases such as Hepatitis, AIDS and Cytomegalovirus (CMV) and fluid overload. In the event that I wish to have an autologous transfusion of my own blood, or a directed donor transfusion, I will discuss this with my physician. Check only if Refusing Blood or Blood Products  I understand refusal of blood or blood products as deemed necessary by my physician may have serious consequences to my condition to include possible death. I hereby assume responsibility for my refusal and release the hospital, its personnel, and my physicians from any responsibility for the consequences of my refusal.    o  Refuse   5. I authorize the use of any specimen, organs, tissues, body parts or foreign objects that may be removed from my body during the operation/procedure for diagnosis, research or teaching purposes and their subsequent disposal by hospital authorities. I also authorize the release of specimen test results and/or written reports to my treating physician on the hospital medical staff or other referring or consulting physicians involved in my care, at the discretion of the Pathologist or my treating physician. 6.   I consent to the photographing or videotaping of the operations or procedures to be performed, including appropriate portions of my body for medical, scientific, or educational purposes, provided my identity is not revealed by the pictures or by descriptive texts accompanying them. If the procedure has been photographed/videotaped, the surgeon will obtain the original picture, image, videotape or CD. The hospital will not be responsible for storage, release or maintenance of the picture, image, tape or CD.    7.   I consent to the presence of a  or observers in the operating room as deemed necessary by my physician or their designees.     8.   I recognize that in the event my procedure results in extended X-Ray/fluoroscopy time, I may develop a skin reaction. 9. If I have a Do Not Attempt Resuscitation (DNAR) order in place, that status will be suspended while in the operating room, procedural suite, and during the recovery period unless otherwise explicitly stated by me (or a person authorized to consent on my behalf). The surgeon or my attending physician will determine when the applicable recovery period ends for purposes of reinstating the DNAR order. 10. Patients having a sterilization procedure: I understand that if the procedure is successful the results will be permanent and it will therefore be impossible for me to inseminate, conceive, or bear children. I also understand that the procedure is intended to result in sterility, although the result has not been guaranteed. 11. I acknowledge that my physician has explained sedation/analgesia administration to me including the risk and benefits I consent to the administration of sedation/analgesia as may be necessary or desirable in the judgment of my physician. I CERTIFY THAT I HAVE READ AND FULLY UNDERSTAND THE ABOVE CONSENT TO OPERATION and/or OTHER PROCEDURE.     _________________________________________ _________________________________     ___________________________________  Signature of Patient     Signature of Responsible Person                   Printed Name of Responsible Person                              _________________________________________ ______________________________        ___________________________________  Signature of Witness         Date  Time         Relationship to Patient    STATEMENT OF PHYSICIAN My signature below affirms that prior to the time of the procedure; I have explained to the patient and/or his/her legal representative, the risks and benefits involved in the proposed treatment and any reasonable alternative to the proposed treatment.  I have also explained the risks and benefits involved in refusal of the proposed treatment and alternatives to the proposed treatment and have answered the patient's questions.  If I have a significant financial interest in a co-management agreement or a significant financial interest in any product or implant, or other significant relationship used in this procedure/surgery, I have disclosed this and had a discussion with my patient.     _______________________________________________________________ _____________________________  (Signature of Physician)                                                                                         (Date)                                   (Time)  Patient Name: Bruce Weinberg    : 1960   Printed: 2022      Medical Record #: [id number]                                              Page 1 of 1

## (undated) NOTE — Clinical Note
Good morning.  I was hoping that your staff may be able to reach out to this patient to have her meet with as she tried to make an appointment and was told that she could not see you for many months from now.  She is scheduled to see your new partner in late August but I am hoping that she might be able to see you sooner as she has seen you in the past and is very happy with her care.  At this time she does have an increase in her thyroglobulin levels but her thyroid function testing is somewhat out of order to be addressed.  Thank you for your help

## (undated) NOTE — LETTER
Date & Time: 11/30/2022, 11:14 PM  Patient: Makayla Markham  Encounter Provider(s):    Annetta Adler MD       To Whom It May Concern: Chance Feliciano was seen and treated in our department on 11/30/2022. She can return to work in 2 days.     If you have any questions or concerns, please do not hesitate to call.        _____________________________  Physician/APC Signature

## (undated) NOTE — LETTER
[facility]  Administration of Anesthesia  I, Camelia Markham agree to be cared for by a physician anesthesiologist alone and/or with a nurse anesthetist, who is specially trained to monitor me and give me medicine to put me to sleep or keep me comfortable during my procedure    I understand that my anesthesiologist and/or anesthetist is not an employee or agent of [facility] or [employer]. He or she works for [facility]    As the patient asking for anesthesia services, I agree to:  Allow the anesthesiologist (anesthesia doctor) to give me medicine and do additional procedures as necessary. Some examples are: Starting or using an “IV” to give me medicine, fluids or blood during my procedure, and having a breathing tube placed to help me breathe when I’m asleep (intubation). In the event that my heart stops working properly, I understand that my anesthesiologist will make every effort to sustain my life, unless otherwise directed by [facility] Do Not Resuscitate documents.  Tell my anesthesia doctor before my procedure:  If I am pregnant.  The last time that I ate or drank.  iii. All of the medicines I take (including prescriptions, herbal supplements, and pills I can buy without a prescription (including street drugs/illegal medications). Failure to inform my anesthesiologist about these medicines may increase my risk of anesthetic complications.  iv.If I am allergic to anything or have had a reaction to anesthesia before.  I understand how the anesthesia medicine will help me (benefits).  I understand that with any type of anesthesia medicine there are risks:  The most common risks are: nausea, vomiting, sore throat, muscle soreness, damage to my eyes, mouth, or teeth (from breathing tube placement).  Rare risks include: remembering what happened during my procedure, allergic reactions to medications, injury to my airway, heart, lungs, vision, nerves, or  muscles and in extremely rare instances death.  My doctor has explained to me other choices available to me for my care (alternatives).  Pregnant Patients (“epidural”):  I understand that the risks of having an epidural (medicine given into my back to help control pain during labor), include itching, low blood pressure, difficulty urinating, headache or slowing of the baby’s heart. Very rare risks include infection, bleeding, seizure, irregular heart rhythms and nerve injury.  Regional Anesthesia (“spinal”, “epidural”, & “nerve blocks”):  I understand that rare but potential complications include headache, bleeding, infection, seizure, irregular heart rhythms, and nerve injury.    _____________________________________________________________________________  Patient (or Representative) Signature/Relationship to Patient  Date   Time    _____________________________________________________________________________   Name (if used)    Language/Organization   Time    _____________________________________________________________________________  Nurse Anesthetist Signature     Date   Time  _____________________________________________________________________________  Anesthesiologist Signature     Date   Time  I have discussed the procedure and information above with the patient (or patient’s representative) and answered their questions. The patient or their representative has agreed to have anesthesia services.    _____________________________________________________________________________  Witness        Date   Time  I have verified that the signature is that of the patient or patient’s representative, and that it was signed before the procedure  Patient Name: Camelia Markham     : 1960                 Printed: 2024 at 4:55 PM    Medical Record #: [id number]                                            Page 1 of 1  ----------ANESTHESIA CONSENT----------

## (undated) NOTE — LETTER
6/27/2024          To Whom It May Concern:    Camelia Markham is currently under my medical care and she will be having a procedure on   7/29/2024.  She may return to work on 7/31/2024.  Activity is restricted as follows: none.    If you require additional information please contact our office.        Sincerely,    Neeraj No MD

## (undated) NOTE — LETTER
Yuniel Cowart, [address] [facility] [address]       07/20/17        Patient:  Gunner Minors   YOB: 1960   Date of Visit: 7/20/2017       Dear  Dr. Gm Hallman MD,      Thank you for referring Gunner Minors to my practi

## (undated) NOTE — LETTER
[facility]  [address], [place], [place]    Authorization for Surgical Operation and Procedure                               I hereby authorize Jair Murillo MD, my physician and his/her assistants (if applicable), which may include medical students, residents, and/or fellows, to perform the following surgical operation/ procedure and administer such anesthesia as may be determined necessary by my physician: Operation/Procedure name (s) COLONOSCOPY on Camelia Markham   2.   I recognize that during the surgical operation/procedure, unforeseen conditions may necessitate additional or different procedures than those listed above.  I, therefore, further authorize and request that the above-named surgeon, assistants, or designees perform such procedures as are, in their judgment, necessary and desirable.    3.   My surgeon/physician has discussed prior to my surgery the potential benefits, risks and side effects of this procedure; the likelihood of achieving goals; and potential problems that might occur during recuperation.  They also discussed reasonable alternatives to the procedure, including risks, benefits, and side effects related to the alternatives and risks related to not receiving this procedure.  I have had all my questions answered and I acknowledge that no guarantee has been made as to the result that may be obtained.    4.   Should the need arise during my operation/procedure, which includes change of level of care prior to discharge, I also consent to the administration of blood and/or blood products.  Further, I understand that despite careful testing and screening of blood or blood products by collecting agencies, I may still be subject to ill effects as a result of receiving a blood transfusion and/or blood products.  The following are some, but not all, of the potential risks that can occur: fever and allergic reactions, hemolytic reactions, transmission of diseases  such as Hepatitis, AIDS and Cytomegalovirus (CMV) and fluid overload.  In the event that I wish to have an autologous transfusion of my own blood, or a directed donor transfusion, I will discuss this with my physician.  Check only if Refusing Blood or Blood Products  I understand refusal of blood or blood products as deemed necessary by my physician may have serious consequences to my condition to include possible death. I hereby assume responsibility for my refusal and release the hospital, its personnel, and my physicians from any responsibility for the consequences of my refusal.    o  Refuse   5.   I authorize the use of any specimen, organs, tissues, body parts or foreign objects that may be removed from my body during the operation/procedure for diagnosis, research or teaching purposes and their subsequent disposal by hospital authorities.  I also authorize the release of specimen test results and/or written reports to my treating physician on the hospital medical staff or other referring or consulting physicians involved in my care, at the discretion of the Pathologist or my treating physician.    6.   I consent to the photographing or videotaping of the operations or procedures to be performed, including appropriate portions of my body for medical, scientific, or educational purposes, provided my identity is not revealed by the pictures or by descriptive texts accompanying them.  If the procedure has been photographed/videotaped, the surgeon will obtain the original picture, image, videotape or CD.  The hospital will not be responsible for storage, release or maintenance of the picture, image, tape or CD.    7.   I consent to the presence of a  or observers in the operating room as deemed necessary by my physician or their designees.    8.   I recognize that in the event my procedure results in extended X-Ray/fluoroscopy time, I may develop a skin reaction.    9. If I have a Do Not Attempt  Resuscitation (DNAR) order in place, that status will be suspended while in the operating room, procedural suite, and during the recovery period unless otherwise explicitly stated by me (or a person authorized to consent on my behalf). The surgeon or my attending physician will determine when the applicable recovery period ends for purposes of reinstating the DNAR order.  10. Patients having a sterilization procedure: I understand that if the procedure is successful the results will be permanent and it will therefore be impossible for me to inseminate, conceive, or bear children.  I also understand that the procedure is intended to result in sterility, although the result has not been guaranteed.   11. I acknowledge that my physician has explained sedation/analgesia administration to me including the risk and benefits I consent to the administration of sedation/analgesia as may be necessary or desirable in the judgment of my physician.    I CERTIFY THAT I HAVE READ AND FULLY UNDERSTAND THE ABOVE CONSENT TO OPERATION and/or OTHER PROCEDURE.     ____________________________________  _________________________________        ______________________________  Signature of Patient    Signature of Responsible Person                Printed Name of Responsible Person                                      ____________________________________  _____________________________                ________________________________  Signature of Witness        Date  Time         Relationship to Patient    STATEMENT OF PHYSICIAN My signature below affirms that prior to the time of the procedure; I have explained to the patient and/or his/her legal representative, the risks and benefits involved in the proposed treatment and any reasonable alternative to the proposed treatment. I have also explained the risks and benefits involved in refusal of the proposed treatment and alternatives to the proposed treatment and have answered the patient's  questions. If I have a significant financial interest in a co-management agreement or a significant financial interest in any product or implant, or other significant relationship used in this procedure/surgery, I have disclosed this and had a discussion with my patient.     _____________________________________________________              _____________________________  (Signature of Physician)                                                                                         (Date)                                   (Time)  Patient Name: Camelia Markham      : 1960      Printed: 2024     Medical Record #: [id number]                                      Page 1 of 1

## (undated) NOTE — LETTER
[place] [address]  Authorization for Invasive Procedures  Date: 12/20/24           Time: 1200    I hereby authorize ***, my physician and his/her assistants (if applicable), which may include medical students, residents, and/or fellows, to perform the following surgical operation/ procedure and administer such anesthesia as may be determined necessary by my physician: *** on Camelia Markham  2.   I recognize that during the surgical operation/procedure, unforeseen conditions may necessitate additional or different procedures than those listed above.  I, therefore, further authorize and request that the above-named surgeon, assistants, or designees perform such procedures as are, in their judgment, necessary and desirable.    3.   My surgeon/physician has discussed prior to my surgery the potential benefits, risks and side effects of this procedure; the likelihood of achieving goals; and potential problems that might occur during recuperation.  They also discussed reasonable alternatives to the procedure, including risks, benefits, and side effects related to the alternatives and risks related to not receiving this procedure.  I have had all my questions answered and I acknowledge that no guarantee has been made as to the result that may be obtained.    4.   Should the need arise during my operation/procedure, which includes change of level of care prior to discharge, I also consent to the administration of blood and/or blood products.  Further, I understand that despite careful testing and screening of blood or blood products by collecting agencies, I may still be subject to ill effects as a result of receiving a blood transfusion and/or blood products.  The following are some, but not all, of the potential risks that can occur: fever and allergic reactions, hemolytic reactions, transmission of diseases such as Hepatitis, AIDS and Cytomegalovirus (CMV) and fluid overload.  In the event  that I wish to have an autologous transfusion of my own blood, or a directed donor transfusion, I will discuss this with my physician.   Check only if Refusing Blood or Blood Products  I understand refusal of blood or blood products as deemed necessary by my physician may have serious consequences to my condition to include possible death. I hereby assume responsibility for my refusal and release the hospital, its personnel, and my physicians from any responsibility for the consequences of my refusal.         o  Refuse         5.   I authorize the use of any specimen, organs, tissues, body parts or foreign objects that may be removed from my body during the operation/procedure for diagnosis, research or teaching purposes and their subsequent disposal by hospital authorities.  I also authorize the release of specimen test results and/or written reports to my treating physician on the hospital medical staff or other referring or consulting physicians involved in my care, at the discretion of the Pathologist or my treating physician.    6.   I consent to the photographing or videotaping of the operations or procedures to be performed, including appropriate portions of my body for medical, scientific, or educational purposes, provided my identity is not revealed by the pictures or by descriptive texts accompanying them.  If the procedure has been photographed/videotaped, the surgeon will obtain the original picture, image, videotape or CD.  The hospital will not be responsible for storage, release or maintenance of the picture, image, tape or CD.    7.   I consent to the presence of a  or observers in the operating room as deemed necessary by my physician or their designees.    8.   I recognize that in the event my procedure results in extended X-Ray/fluoroscopy time, I may develop a skin reaction.    9. If I have a Do Not Attempt Resuscitation (DNAR) order in place, that status will be suspended while  in the operating room, procedural suite, and during the recovery period unless otherwise explicitly stated by me (or a person authorized to consent on my behalf). The surgeon or my attending physician will determine when the applicable recovery period ends for purposes of reinstating the DNAR order.  10. Patients having a sterilization procedure: I understand that if the procedure is successful the results will be permanent and it will therefore be impossible for me to inseminate, conceive, or bear children.  I also understand that the procedure is intended to result in sterility, although the result has not been guaranteed.   11. I acknowledge that my physician has explained sedation/analgesia administration to me including the risk and benefits I consent to the administration of sedation/analgesia as may be necessary or desirable in the judgment of my physician.    I CERTIFY THAT I HAVE READ AND FULLY UNDERSTAND THE ABOVE CONSENT TO OPERATION and/or OTHER PROCEDURE.        ____________________________________       _________________________________      ______________________________  Signature of Patient         Signature of Responsible Person        Printed Name of Responsible Person        ____________________________________      _________________________________      ______________________________       Signature of Witness          Relationship to Patient                       Date                                       Time  Patient Name: Camelia Markham  : 1960    Reviewed: 2024   Printed: 2024  Medical Record #: [id number] Page 1 of 2             STATEMENT OF PHYSICIAN My signature below affirms that prior to the time of the procedure; I have explained to the patient and/or his/her legal representative, the risks and benefits involved in the proposed treatment and any reasonable alternative to the proposed treatment. I have also explained the risks and benefits involved in  refusal of the proposed treatment and alternatives to the proposed treatment and have answered the patient's questions. If I have a significant financial interest in a co-management agreement or a significant financial interest in any product or implant, or other significant relationship used in this procedure/surgery, I have disclosed this and had a discussion with my patient.     _______________________________________________________________ _____________________________  (Signature of Physician)                                                                                         (Date)                                   (Time)  Patient Name: Camelia Markham  : 1960    Reviewed: 2024   Printed: 2024  Medical Record #: [id number] Page 2 of 2

## (undated) NOTE — Clinical Note
Please place her name and our thyroid book. She requires a repeat ultrasound of the thyroid specifically looking for lymphadenopathy. This should be performed in 6 months.

## (undated) NOTE — LETTER
[address], [place], [place]     AUTHORIZATION FOR SURGICAL OPERATION OR PROCEDURE    1.  I hereby authorize Dr. Rosendo Enriquez, my Physician(s) and whomever may be designated as the doctor's Assistant, to perform the following op 5. I consent to the photographing of procedure(s) to be performed for the purposes of advancing medicine, science and/or education, provided my identity is not revealed.  If the procedure has been videotaped, the physician/surgeon will obtain the original v (Witness signature)                                                                                                  (Date)                                (Time)  STATEMENT OF PHYSICIAN My signature below affirms that prior to the time of the procedure;  I

## (undated) NOTE — LETTER
[facility] [address], [place], [place]    Authorization for Surgical Operation and Procedure                               I hereby authorize                           , my physician and his/her assistants (if applicable), which may include medical students, residents, and/or fellows, to perform the following surgical operation/ procedure and administer such anesthesia as may be determined necessary by my physician: ULTRASOUND GUIDED FINE NEEDLE ASPIRATION BIOPSY LEFT THYROID NODULE on Camelia Markham   2.   I recognize that during the surgical operation/procedure, unforeseen conditions may necessitate additional or different procedures than those listed above.  I, therefore, further authorize and request that the above-named surgeon, assistants, or designees perform such procedures as are, in their judgment, necessary and desirable.    3.   My surgeon/physician has discussed prior to my surgery the potential benefits, risks and side effects of this procedure; the likelihood of achieving goals; and potential problems that might occur during recuperation.  They also discussed reasonable alternatives to the procedure, including risks, benefits, and side effects related to the alternatives and risks related to not receiving this procedure.  I have had all my questions answered and I acknowledge that no guarantee has been made as to the result that may be obtained.    4.   Should the need arise during my operation/procedure, which includes change of level of care prior to discharge, I also consent to the administration of blood and/or blood products.  Further, I understand that despite careful testing and screening of blood or blood products by collecting agencies, I may still be subject to ill effects as a result of receiving a blood transfusion and/or blood products.  The following are some, but not all, of the potential risks that can occur: fever and allergic reactions, hemolytic  reactions, transmission of diseases such as Hepatitis, AIDS and Cytomegalovirus (CMV) and fluid overload.  In the event that I wish to have an autologous transfusion of my own blood, or a directed donor transfusion, I will discuss this with my physician.  Check only if Refusing Blood or Blood Products  I understand refusal of blood or blood products as deemed necessary by my physician may have serious consequences to my condition to include possible death. I hereby assume responsibility for my refusal and release the hospital, its personnel, and my physicians from any responsibility for the consequences of my refusal.    o  Refuse   5.   I authorize the use of any specimen, organs, tissues, body parts or foreign objects that may be removed from my body during the operation/procedure for diagnosis, research or teaching purposes and their subsequent disposal by hospital authorities.  I also authorize the release of specimen test results and/or written reports to my treating physician on the hospital medical staff or other referring or consulting physicians involved in my care, at the discretion of the Pathologist or my treating physician.    6.   I consent to the photographing or videotaping of the operations or procedures to be performed, including appropriate portions of my body for medical, scientific, or educational purposes, provided my identity is not revealed by the pictures or by descriptive texts accompanying them.  If the procedure has been photographed/videotaped, the surgeon will obtain the original picture, image, videotape or CD.  The hospital will not be responsible for storage, release or maintenance of the picture, image, tape or CD.    7.   I consent to the presence of a  or observers in the operating room as deemed necessary by my physician or their designees.    8.   I recognize that in the event my procedure results in extended X-Ray/fluoroscopy time, I may develop a skin  reaction.    9. If I have a Do Not Attempt Resuscitation (DNAR) order in place, that status will be suspended while in the operating room, procedural suite, and during the recovery period unless otherwise explicitly stated by me (or a person authorized to consent on my behalf). The surgeon or my attending physician will determine when the applicable recovery period ends for purposes of reinstating the DNAR order.  10. Patients having a sterilization procedure: I understand that if the procedure is successful the results will be permanent and it will therefore be impossible for me to inseminate, conceive, or bear children.  I also understand that the procedure is intended to result in sterility, although the result has not been guaranteed.   11. I acknowledge that my physician has explained sedation/analgesia administration to me including the risk and benefits I consent to the administration of sedation/analgesia as may be necessary or desirable in the judgment of my physician.    I CERTIFY THAT I HAVE READ AND FULLY UNDERSTAND THE ABOVE CONSENT TO OPERATION and/or OTHER PROCEDURE.     ____________________________________  _________________________________        ______________________________  Signature of Patient    Signature of Responsible Person                Printed Name of Responsible Person                                      ____________________________________  _____________________________                ________________________________  Signature of Witness        Date  Time         Relationship to Patient    STATEMENT OF PHYSICIAN My signature below affirms that prior to the time of the procedure; I have explained to the patient and/or his/her legal representative, the risks and benefits involved in the proposed treatment and any reasonable alternative to the proposed treatment. I have also explained the risks and benefits involved in refusal of the proposed treatment and alternatives to the proposed  treatment and have answered the patient's questions. If I have a significant financial interest in a co-management agreement or a significant financial interest in any product or implant, or other significant relationship used in this procedure/surgery, I have disclosed this and had a discussion with my patient.     _____________________________________________________              _____________________________  (Signature of Physician)                                                                                         (Date)                                   (Time)  Patient Name: Camelia Markham      : 1960      Printed: 2024     Medical Record #: [id number]                                      Page 1 of 1

## (undated) NOTE — Clinical Note
Doing better overall but BP is high. Advised her to contact Dr. Marie for management.   Thanks  Mike Carmona/CE

## (undated) NOTE — LETTER
No referring provider defined for this encounter.       08/17/24        Patient: Camelia Markham   YOB: 1960   Date of Visit: 8/17/2024       Dear  Dr. Frank MD,      Thank you for referring Camelia Markham to my practice.  Please find my assessment and plan below.    ASSESSMENT AND PLAN    1. Thyroid cancer (HCC)  We had a very detailed conversation with the patient and her  regarding the findings of her studies so far.  Previous ultrasound demonstrated a 1 cm structure within the left thyroid bed/peritracheal region.  She subsequently underwent ultrasound-guided biopsy of this 1 cm left neck nodule which demonstrated recurrent papillary carcinoma.  We did discuss the fact that this is a very small recurrence at this time.  We also discussed the fact that her nuclear medicine study was negative for any uptake in the left neck suggestive of a noniodine avid papillary carcinoma recurrence.  At this time she is scheduled to undergo a PET scan to evaluate for this neck metastasis and also to look for any metastasis anywhere else throughout the body.  We did discuss management options which may likely require neck dissection in the near future.  At this time I have recommended that we simply wait to see what the results of her PET scan which show and plan accordingly depending on these results.  She states understanding and accepts this plan and will simply return to see me after she has gone through with her PET scan.                 Sincerely,   Norman Asher MD   [facility] [address] [address]    Document electronically generated by:  Norman Asher MD

## (undated) NOTE — LETTER
[address], [place], [place]    Authorization for Surgical Operation and Procedure                               I hereby authorize DR SAW DENNIS my physician and his/her assistants (if applicable), which may include medical students, residents, and/or fellows, to perform the following surgical operation/ procedure and administer such anesthesia as may be determined necessary by my physician: ULTRASOUND GUIDED RIGHT THORACENTESIS   on Camelia Markham   2.   I recognize that during the surgical operation/procedure, unforeseen conditions may necessitate additional or different procedures than those listed above.  I, therefore, further authorize and request that the above-named surgeon, assistants, or designees perform such procedures as are, in their judgment, necessary and desirable.    3.   My surgeon/physician has discussed prior to my surgery the potential benefits, risks and side effects of this procedure; the likelihood of achieving goals; and potential problems that might occur during recuperation.  They also discussed reasonable alternatives to the procedure, including risks, benefits, and side effects related to the alternatives and risks related to not receiving this procedure.  I have had all my questions answered and I acknowledge that no guarantee has been made as to the result that may be obtained.    4.   Should the need arise during my operation/procedure, which includes change of level of care prior to discharge, I also consent to the administration of blood and/or blood products.  Further, I understand that despite careful testing and screening of blood or blood products by collecting agencies, I may still be subject to ill effects as a result of receiving a blood transfusion and/or blood products.  The following are some, but not all, of the potential risks that can occur: fever and allergic reactions, hemolytic reactions, transmission of diseases such as Hepatitis,  AIDS and Cytomegalovirus (CMV) and fluid overload.  In the event that I wish to have an autologous transfusion of my own blood, or a directed donor transfusion, I will discuss this with my physician.  Check only if Refusing Blood or Blood Products  I understand refusal of blood or blood products as deemed necessary by my physician may have serious consequences to my condition to include possible death. I hereby assume responsibility for my refusal and release the hospital, its personnel, and my physicians from any responsibility for the consequences of my refusal.    o  Refuse   5.   I authorize the use of any specimen, organs, tissues, body parts or foreign objects that may be removed from my body during the operation/procedure for diagnosis, research or teaching purposes and their subsequent disposal by hospital authorities.  I also authorize the release of specimen test results and/or written reports to my treating physician on the hospital medical staff or other referring or consulting physicians involved in my care, at the discretion of the Pathologist or my treating physician.    6.   I consent to the photographing or videotaping of the operations or procedures to be performed, including appropriate portions of my body for medical, scientific, or educational purposes, provided my identity is not revealed by the pictures or by descriptive texts accompanying them.  If the procedure has been photographed/videotaped, the surgeon will obtain the original picture, image, videotape or CD.  The hospital will not be responsible for storage, release or maintenance of the picture, image, tape or CD.    7.   I consent to the presence of a  or observers in the operating room as deemed necessary by my physician or their designees.    8.   I recognize that in the event my procedure results in extended X-Ray/fluoroscopy time, I may develop a skin reaction.    9. If I have a Do Not Attempt Resuscitation  (DNAR) order in place, that status will be suspended while in the operating room, procedural suite, and during the recovery period unless otherwise explicitly stated by me (or a person authorized to consent on my behalf). The surgeon or my attending physician will determine when the applicable recovery period ends for purposes of reinstating the DNAR order.  10. Patients having a sterilization procedure: I understand that if the procedure is successful the results will be permanent and it will therefore be impossible for me to inseminate, conceive, or bear children.  I also understand that the procedure is intended to result in sterility, although the result has not been guaranteed.   11. I acknowledge that my physician has explained sedation/analgesia administration to me including the risk and benefits I consent to the administration of sedation/analgesia as may be necessary or desirable in the judgment of my physician.    I CERTIFY THAT I HAVE READ AND FULLY UNDERSTAND THE ABOVE CONSENT TO OPERATION and/or OTHER PROCEDURE.     ____________________________________  _________________________________        ______________________________  Signature of Patient    Signature of Responsible Person                Printed Name of Responsible Person                                      ____________________________________  _____________________________                ________________________________  Signature of Witness        Date  Time         Relationship to Patient    STATEMENT OF PHYSICIAN My signature below affirms that prior to the time of the procedure; I have explained to the patient and/or his/her legal representative, the risks and benefits involved in the proposed treatment and any reasonable alternative to the proposed treatment. I have also explained the risks and benefits involved in refusal of the proposed treatment and alternatives to the proposed treatment and have answered the patient's questions. If I  have a significant financial interest in a co-management agreement or a significant financial interest in any product or implant, or other significant relationship used in this procedure/surgery, I have disclosed this and had a discussion with my patient.     _____________________________________________________              _____________________________  (Signature of Physician)                                                                                         (Date)                                   (Time)  Patient Name: Camelia Markham      : 1960      Printed: 2024     Medical Record #: [id number]                                      Page 1 of 1

## (undated) NOTE — LETTER
Hospital Discharge Documentation  Please phone to schedule a hospital follow up appointment.    From: [facility] Hospitalist's Office  Phone: [phone]    Patient discharged time/date: 9/17/2024  6:22 PM  Patient discharge disposition:  Home or Self Care

## (undated) NOTE — LETTER
[address], [place], [place]    Authorization for Surgical Operation and Procedure                               I hereby authorize Dr. Berman, my physician and his/her assistants (if applicable), which may include medical students, residents, and/or fellows, to perform the following surgical operation/ procedure and administer such anesthesia as may be determined necessary by my physician: Operation/Procedure name (s) IR Chest Tube Insertion on Camelia Markham   2.   I recognize that during the surgical operation/procedure, unforeseen conditions may necessitate additional or different procedures than those listed above.  I, therefore, further authorize and request that the above-named surgeon, assistants, or designees perform such procedures as are, in their judgment, necessary and desirable.    3.   My surgeon/physician has discussed prior to my surgery the potential benefits, risks and side effects of this procedure; the likelihood of achieving goals; and potential problems that might occur during recuperation.  They also discussed reasonable alternatives to the procedure, including risks, benefits, and side effects related to the alternatives and risks related to not receiving this procedure.  I have had all my questions answered and I acknowledge that no guarantee has been made as to the result that may be obtained.    4.   Should the need arise during my operation/procedure, which includes change of level of care prior to discharge, I also consent to the administration of blood and/or blood products.  Further, I understand that despite careful testing and screening of blood or blood products by collecting agencies, I may still be subject to ill effects as a result of receiving a blood transfusion and/or blood products.  The following are some, but not all, of the potential risks that can occur: fever and allergic reactions, hemolytic reactions, transmission of diseases such as  Hepatitis, AIDS and Cytomegalovirus (CMV) and fluid overload.  In the event that I wish to have an autologous transfusion of my own blood, or a directed donor transfusion, I will discuss this with my physician.  Check only if Refusing Blood or Blood Products  I understand refusal of blood or blood products as deemed necessary by my physician may have serious consequences to my condition to include possible death. I hereby assume responsibility for my refusal and release the hospital, its personnel, and my physicians from any responsibility for the consequences of my refusal.    o  Refuse   5.   I authorize the use of any specimen, organs, tissues, body parts or foreign objects that may be removed from my body during the operation/procedure for diagnosis, research or teaching purposes and their subsequent disposal by hospital authorities.  I also authorize the release of specimen test results and/or written reports to my treating physician on the hospital medical staff or other referring or consulting physicians involved in my care, at the discretion of the Pathologist or my treating physician.    6.   I consent to the photographing or videotaping of the operations or procedures to be performed, including appropriate portions of my body for medical, scientific, or educational purposes, provided my identity is not revealed by the pictures or by descriptive texts accompanying them.  If the procedure has been photographed/videotaped, the surgeon will obtain the original picture, image, videotape or CD.  The hospital will not be responsible for storage, release or maintenance of the picture, image, tape or CD.    7.   I consent to the presence of a  or observers in the operating room as deemed necessary by my physician or their designees.    8.   I recognize that in the event my procedure results in extended X-Ray/fluoroscopy time, I may develop a skin reaction.    9. If I have a Do Not Attempt  Resuscitation (DNAR) order in place, that status will be suspended while in the operating room, procedural suite, and during the recovery period unless otherwise explicitly stated by me (or a person authorized to consent on my behalf). The surgeon or my attending physician will determine when the applicable recovery period ends for purposes of reinstating the DNAR order.  10. Patients having a sterilization procedure: I understand that if the procedure is successful the results will be permanent and it will therefore be impossible for me to inseminate, conceive, or bear children.  I also understand that the procedure is intended to result in sterility, although the result has not been guaranteed.   11. I acknowledge that my physician has explained sedation/analgesia administration to me including the risk and benefits I consent to the administration of sedation/analgesia as may be necessary or desirable in the judgment of my physician.    I CERTIFY THAT I HAVE READ AND FULLY UNDERSTAND THE ABOVE CONSENT TO OPERATION and/or OTHER PROCEDURE.     ____________________________________  _________________________________        ______________________________  Signature of Patient    Signature of Responsible Person                Printed Name of Responsible Person                                      ____________________________________  _____________________________                ________________________________  Signature of Witness        Date  Time         Relationship to Patient    STATEMENT OF PHYSICIAN My signature below affirms that prior to the time of the procedure; I have explained to the patient and/or his/her legal representative, the risks and benefits involved in the proposed treatment and any reasonable alternative to the proposed treatment. I have also explained the risks and benefits involved in refusal of the proposed treatment and alternatives to the proposed treatment and have answered the patient's  questions. If I have a significant financial interest in a co-management agreement or a significant financial interest in any product or implant, or other significant relationship used in this procedure/surgery, I have disclosed this and had a discussion with my patient.     _____________________________________________________              _____________________________  (Signature of Physician)                                                                                         (Date)                                   (Time)  Patient Name: Camelia Markham      : 1960      Printed: 2024     Medical Record #: [id number]                                      Page 1 of 1

## (undated) NOTE — LETTER
[address] [place], [place] [address]   Date:   11/29/2021     Name:   Fariba Campbell    YOB: 1960   MRN:   [id number]       WHERE IS YOUR PAIN NOW?   Solitario Mayer the areas on your body where you feel the described